# Patient Record
Sex: FEMALE | Race: WHITE | NOT HISPANIC OR LATINO | Employment: OTHER | URBAN - METROPOLITAN AREA
[De-identification: names, ages, dates, MRNs, and addresses within clinical notes are randomized per-mention and may not be internally consistent; named-entity substitution may affect disease eponyms.]

---

## 2017-02-05 DIAGNOSIS — I10 ESSENTIAL HYPERTENSION: ICD-10-CM

## 2017-02-06 RX ORDER — PROPRANOLOL HYDROCHLORIDE 120 MG/1
CAPSULE, EXTENDED RELEASE ORAL
Qty: 180 CAPSULE | Refills: 1 | Status: SHIPPED | OUTPATIENT
Start: 2017-02-06 | End: 2017-07-30 | Stop reason: SDUPTHER

## 2017-02-12 DIAGNOSIS — E03.9 ACQUIRED HYPOTHYROIDISM: ICD-10-CM

## 2017-02-13 RX ORDER — LEVOTHYROXINE SODIUM 75 UG/1
TABLET ORAL
Qty: 90 TABLET | Refills: 1 | Status: SHIPPED | OUTPATIENT
Start: 2017-02-13 | End: 2017-10-02 | Stop reason: SDUPTHER

## 2017-03-06 RX ORDER — OLMESARTAN MEDOXOMIL 40 MG/1
TABLET ORAL
Qty: 30 TABLET | Refills: 9 | OUTPATIENT
Start: 2017-03-06

## 2017-03-06 RX ORDER — OLMESARTAN MEDOXOMIL 40 MG/1
TABLET ORAL
Qty: 90 TABLET | Refills: 1 | Status: SHIPPED | OUTPATIENT
Start: 2017-03-06 | End: 2017-06-28

## 2017-03-14 DIAGNOSIS — M81.0 OSTEOPOROSIS: Primary | ICD-10-CM

## 2017-03-15 ENCOUNTER — CLINICAL SUPPORT (OUTPATIENT)
Dept: INTERNAL MEDICINE | Facility: CLINIC | Age: 82
End: 2017-03-15

## 2017-03-15 ENCOUNTER — OFFICE VISIT (OUTPATIENT)
Dept: INFECTIOUS DISEASES | Facility: CLINIC | Age: 82
End: 2017-03-15

## 2017-03-15 ENCOUNTER — APPOINTMENT (OUTPATIENT)
Dept: LAB | Facility: HOSPITAL | Age: 82
End: 2017-03-15

## 2017-03-15 VITALS
HEART RATE: 56 BPM | BODY MASS INDEX: 20.52 KG/M2 | SYSTOLIC BLOOD PRESSURE: 184 MMHG | TEMPERATURE: 97.7 F | HEIGHT: 63 IN | DIASTOLIC BLOOD PRESSURE: 96 MMHG | WEIGHT: 115.8 LBS

## 2017-03-15 DIAGNOSIS — Z79.2 LONG TERM CURRENT USE OF ANTIBIOTICS: ICD-10-CM

## 2017-03-15 DIAGNOSIS — A31.0 MYCOBACTERIUM AVIUM COMPLEX (HCC): Primary | ICD-10-CM

## 2017-03-15 DIAGNOSIS — M81.0 OSTEOPOROSIS: ICD-10-CM

## 2017-03-15 LAB
ALBUMIN SERPL-MCNC: 4.2 G/DL (ref 3.5–5.2)
ALBUMIN/GLOB SERPL: 1.3 G/DL
ALP SERPL-CCNC: 70 U/L (ref 39–117)
ALT SERPL W P-5'-P-CCNC: 10 U/L (ref 1–33)
ANION GAP SERPL CALCULATED.3IONS-SCNC: 12.6 MMOL/L
AST SERPL-CCNC: 19 U/L (ref 1–32)
BASOPHILS # BLD AUTO: 0.01 10*3/MM3 (ref 0–0.2)
BASOPHILS NFR BLD AUTO: 0.1 % (ref 0–1.5)
BILIRUB SERPL-MCNC: 0.7 MG/DL (ref 0.1–1.2)
BUN BLD-MCNC: 15 MG/DL (ref 8–23)
BUN/CREAT SERPL: 15.8 (ref 7–25)
CALCIUM SPEC-SCNC: 9.7 MG/DL (ref 8.6–10.5)
CHLORIDE SERPL-SCNC: 100 MMOL/L (ref 98–107)
CO2 SERPL-SCNC: 26.4 MMOL/L (ref 22–29)
CREAT BLD-MCNC: 0.95 MG/DL (ref 0.57–1)
DEPRECATED RDW RBC AUTO: 44.6 FL (ref 37–54)
EOSINOPHIL # BLD AUTO: 0.07 10*3/MM3 (ref 0–0.7)
EOSINOPHIL NFR BLD AUTO: 1 % (ref 0.3–6.2)
ERYTHROCYTE [DISTWIDTH] IN BLOOD BY AUTOMATED COUNT: 12.9 % (ref 11.7–13)
GFR SERPL CREATININE-BSD FRML MDRD: 56 ML/MIN/1.73
GLOBULIN UR ELPH-MCNC: 3.3 GM/DL
GLUCOSE BLD-MCNC: 99 MG/DL (ref 65–99)
HCT VFR BLD AUTO: 41 % (ref 35.6–45.5)
HGB BLD-MCNC: 14 G/DL (ref 11.9–15.5)
IMM GRANULOCYTES # BLD: 0.03 10*3/MM3 (ref 0–0.03)
IMM GRANULOCYTES NFR BLD: 0.4 % (ref 0–0.5)
LYMPHOCYTES # BLD AUTO: 2.13 10*3/MM3 (ref 0.9–4.8)
LYMPHOCYTES NFR BLD AUTO: 28.9 % (ref 19.6–45.3)
MCH RBC QN AUTO: 32.9 PG (ref 26.9–32)
MCHC RBC AUTO-ENTMCNC: 34.1 G/DL (ref 32.4–36.3)
MCV RBC AUTO: 96.2 FL (ref 80.5–98.2)
MONOCYTES # BLD AUTO: 1.02 10*3/MM3 (ref 0.2–1.2)
MONOCYTES NFR BLD AUTO: 13.9 % (ref 5–12)
NEUTROPHILS # BLD AUTO: 4.1 10*3/MM3 (ref 1.9–8.1)
NEUTROPHILS NFR BLD AUTO: 55.7 % (ref 42.7–76)
PLATELET # BLD AUTO: 259 10*3/MM3 (ref 140–500)
PMV BLD AUTO: 9.3 FL (ref 6–12)
POTASSIUM BLD-SCNC: 4.7 MMOL/L (ref 3.5–5.2)
PROT SERPL-MCNC: 7.5 G/DL (ref 6–8.5)
RBC # BLD AUTO: 4.26 10*6/MM3 (ref 3.9–5.2)
SODIUM BLD-SCNC: 139 MMOL/L (ref 136–145)
WBC NRBC COR # BLD: 7.36 10*3/MM3 (ref 4.5–10.7)

## 2017-03-15 PROCEDURE — 80053 COMPREHEN METABOLIC PANEL: CPT | Performed by: INTERNAL MEDICINE

## 2017-03-15 PROCEDURE — 99213 OFFICE O/P EST LOW 20 MIN: CPT | Performed by: INTERNAL MEDICINE

## 2017-03-15 PROCEDURE — 36415 COLL VENOUS BLD VENIPUNCTURE: CPT | Performed by: INTERNAL MEDICINE

## 2017-03-15 PROCEDURE — 77080 DXA BONE DENSITY AXIAL: CPT | Performed by: INTERNAL MEDICINE

## 2017-03-15 PROCEDURE — 85025 COMPLETE CBC W/AUTO DIFF WBC: CPT | Performed by: INTERNAL MEDICINE

## 2017-03-15 RX ORDER — AZITHROMYCIN 250 MG/1
500 TABLET, FILM COATED ORAL 3 TIMES WEEKLY
Qty: 26 TABLET | Refills: 6 | Status: SHIPPED | OUTPATIENT
Start: 2017-03-15 | End: 2017-10-02

## 2017-03-15 RX ORDER — ETHAMBUTOL HYDROCHLORIDE 400 MG/1
1200 TABLET, FILM COATED ORAL 3 TIMES WEEKLY
Qty: 30 TABLET | Refills: 6 | Status: SHIPPED | OUTPATIENT
Start: 2017-03-15 | End: 2017-09-02 | Stop reason: SDUPTHER

## 2017-03-15 RX ORDER — RIFAMPIN 300 MG/1
600 CAPSULE ORAL 3 TIMES WEEKLY
Qty: 30 CAPSULE | Refills: 6 | Status: SHIPPED | OUTPATIENT
Start: 2017-03-15 | End: 2017-10-02

## 2017-03-15 NOTE — PROGRESS NOTES
"cc: Stacie returns for follow-up MAC.    She was last seen in December 2016.  Since that time, she says she has done well.  She says that she's feeling much much better than she was this time last year.  Her energy level has increased.  She denies any significant cough or dyspnea or constitutional symptoms of infection such as fever, chills, night sweats.  She has chronic ringing in the ears which is not changed.  She denies any side effects or missed doses in terms of her long-term use antibiotics.  Her blood pressure is little bit elevated today, however this is chronic for her wounds being managed by cardiology and her primary care physician.  She is asymptomatic.    Review of Systems: All other reviewed and negative except as per HPI    Blood pressure (!) 184/96, pulse 56, temperature 97.7 °F (36.5 °C), height 63\" (160 cm), weight 115 lb 12.8 oz (52.5 kg),  GENERAL: Awake and alert, in no acute distress.   LUNGS: Clear to auscultation anteriorly with normal respiratory effort.   PSYCHIATRIC: Appropriate mood, affect, insight, and judgment.     Assessment and Plan  Mycobacterium avium complex  Long term current use of antibiotics    She is doing quite well.  She's been on therapy since January 2016 and has had tremendous symptomatic improvement.  In April 2016, she had bronchoscopy and a positive MAC cultures at that time.  I think we should give her 18 months from that time which would keep her on antibiotics through October of this year.  We will continue on triple drug therapy with rifampin 600 mg, azithromycin 500 mg ethambutol 25 mg all thrice weekly.  We will repeat CMP and CBC with differential today and I will see her back in clinic in 3 months or sooner if needed    "

## 2017-06-16 ENCOUNTER — APPOINTMENT (OUTPATIENT)
Dept: LAB | Facility: HOSPITAL | Age: 82
End: 2017-06-16

## 2017-06-16 ENCOUNTER — OFFICE VISIT (OUTPATIENT)
Dept: INFECTIOUS DISEASES | Facility: CLINIC | Age: 82
End: 2017-06-16

## 2017-06-16 ENCOUNTER — OFFICE VISIT (OUTPATIENT)
Dept: INTERNAL MEDICINE | Facility: CLINIC | Age: 82
End: 2017-06-16

## 2017-06-16 VITALS
HEIGHT: 63 IN | DIASTOLIC BLOOD PRESSURE: 96 MMHG | BODY MASS INDEX: 20.48 KG/M2 | WEIGHT: 115.6 LBS | RESPIRATION RATE: 12 BRPM | HEART RATE: 60 BPM | SYSTOLIC BLOOD PRESSURE: 201 MMHG

## 2017-06-16 VITALS
SYSTOLIC BLOOD PRESSURE: 150 MMHG | WEIGHT: 114.6 LBS | DIASTOLIC BLOOD PRESSURE: 100 MMHG | BODY MASS INDEX: 20.3 KG/M2 | HEIGHT: 63 IN

## 2017-06-16 DIAGNOSIS — E03.9 ACQUIRED HYPOTHYROIDISM: ICD-10-CM

## 2017-06-16 DIAGNOSIS — I10 ESSENTIAL HYPERTENSION: Primary | ICD-10-CM

## 2017-06-16 DIAGNOSIS — I10 ESSENTIAL HYPERTENSION: ICD-10-CM

## 2017-06-16 DIAGNOSIS — A31.0 MYCOBACTERIUM AVIUM COMPLEX (HCC): Primary | ICD-10-CM

## 2017-06-16 DIAGNOSIS — E78.2 MIXED HYPERLIPIDEMIA: ICD-10-CM

## 2017-06-16 DIAGNOSIS — Z79.2 LONG TERM CURRENT USE OF ANTIBIOTICS: ICD-10-CM

## 2017-06-16 LAB
ALBUMIN SERPL-MCNC: 4.5 G/DL (ref 3.5–5.2)
ALBUMIN/GLOB SERPL: 1.4 G/DL
ALP SERPL-CCNC: 72 U/L (ref 39–117)
ALT SERPL W P-5'-P-CCNC: 10 U/L (ref 1–33)
ANION GAP SERPL CALCULATED.3IONS-SCNC: 13.6 MMOL/L
AST SERPL-CCNC: 20 U/L (ref 1–32)
BASOPHILS # BLD AUTO: 0.02 10*3/MM3 (ref 0–0.2)
BASOPHILS NFR BLD AUTO: 0.3 % (ref 0–1.5)
BILIRUB SERPL-MCNC: 0.8 MG/DL (ref 0.1–1.2)
BUN BLD-MCNC: 14 MG/DL (ref 8–23)
BUN/CREAT SERPL: 14.7 (ref 7–25)
CALCIUM SPEC-SCNC: 9.7 MG/DL (ref 8.6–10.5)
CHLORIDE SERPL-SCNC: 99 MMOL/L (ref 98–107)
CO2 SERPL-SCNC: 26.4 MMOL/L (ref 22–29)
CREAT BLD-MCNC: 0.95 MG/DL (ref 0.57–1)
DEPRECATED RDW RBC AUTO: 44.2 FL (ref 37–54)
EOSINOPHIL # BLD AUTO: 0.42 10*3/MM3 (ref 0–0.7)
EOSINOPHIL NFR BLD AUTO: 6.3 % (ref 0.3–6.2)
ERYTHROCYTE [DISTWIDTH] IN BLOOD BY AUTOMATED COUNT: 12.9 % (ref 11.7–13)
GFR SERPL CREATININE-BSD FRML MDRD: 56 ML/MIN/1.73
GLOBULIN UR ELPH-MCNC: 3.3 GM/DL
GLUCOSE BLD-MCNC: 85 MG/DL (ref 65–99)
HCT VFR BLD AUTO: 41.8 % (ref 35.6–45.5)
HGB BLD-MCNC: 14.4 G/DL (ref 11.9–15.5)
IMM GRANULOCYTES # BLD: 0.04 10*3/MM3 (ref 0–0.03)
IMM GRANULOCYTES NFR BLD: 0.6 % (ref 0–0.5)
LYMPHOCYTES # BLD AUTO: 1.77 10*3/MM3 (ref 0.9–4.8)
LYMPHOCYTES NFR BLD AUTO: 26.4 % (ref 19.6–45.3)
MCH RBC QN AUTO: 32.2 PG (ref 26.9–32)
MCHC RBC AUTO-ENTMCNC: 34.4 G/DL (ref 32.4–36.3)
MCV RBC AUTO: 93.5 FL (ref 80.5–98.2)
MONOCYTES # BLD AUTO: 1.29 10*3/MM3 (ref 0.2–1.2)
MONOCYTES NFR BLD AUTO: 19.2 % (ref 5–12)
NEUTROPHILS # BLD AUTO: 3.17 10*3/MM3 (ref 1.9–8.1)
NEUTROPHILS NFR BLD AUTO: 47.2 % (ref 42.7–76)
NRBC BLD MANUAL-RTO: 0 /100 WBC (ref 0–0)
PLATELET # BLD AUTO: 295 10*3/MM3 (ref 140–500)
PMV BLD AUTO: 9.1 FL (ref 6–12)
POTASSIUM BLD-SCNC: 4.8 MMOL/L (ref 3.5–5.2)
PROT SERPL-MCNC: 7.8 G/DL (ref 6–8.5)
RBC # BLD AUTO: 4.47 10*6/MM3 (ref 3.9–5.2)
SODIUM BLD-SCNC: 139 MMOL/L (ref 136–145)
WBC NRBC COR # BLD: 6.71 10*3/MM3 (ref 4.5–10.7)

## 2017-06-16 PROCEDURE — 36415 COLL VENOUS BLD VENIPUNCTURE: CPT | Performed by: INTERNAL MEDICINE

## 2017-06-16 PROCEDURE — 85025 COMPLETE CBC W/AUTO DIFF WBC: CPT | Performed by: INTERNAL MEDICINE

## 2017-06-16 PROCEDURE — 99213 OFFICE O/P EST LOW 20 MIN: CPT | Performed by: INTERNAL MEDICINE

## 2017-06-16 PROCEDURE — 99214 OFFICE O/P EST MOD 30 MIN: CPT | Performed by: INTERNAL MEDICINE

## 2017-06-16 PROCEDURE — 80053 COMPREHEN METABOLIC PANEL: CPT | Performed by: INTERNAL MEDICINE

## 2017-06-16 NOTE — PROGRESS NOTES
"Subjective   Stacie Vieira is a 82 y.o. female here for   Chief Complaint   Patient presents with   • Hypertension     BP was high today at another doctors appointment   .    Vitals:    06/16/17 1556   BP: 150/100   BP Location: Left arm   Patient Position: Sitting   Cuff Size: Adult   Weight: 114 lb 9.6 oz (52 kg)   Height: 63\" (160 cm)       Hypertension   This is a chronic problem. The current episode started more than 1 year ago. The problem has been rapidly worsening since onset. The problem is uncontrolled. Associated symptoms include palpitations (off/on for yrs). Pertinent negatives include no chest pain, malaise/fatigue, peripheral edema or shortness of breath.        Encounter Diagnoses   Name Primary?   • Essential hypertension Yes   • Mixed hyperlipidemia    • Acquired hypothyroidism        The following portions of the patient's history were reviewed and updated as appropriate: allergies, current medications, past social history and problem list.    Review of Systems   Constitutional: Negative for chills, fatigue, fever and malaise/fatigue.   Respiratory: Positive for cough. Negative for shortness of breath and wheezing.    Cardiovascular: Positive for palpitations (off/on for yrs). Negative for chest pain and leg swelling.   Allergic/Immunologic: Positive for environmental allergies.   Psychiatric/Behavioral: Negative for dysphoric mood and sleep disturbance. The patient is not nervous/anxious.      BP at home = 160-170 at home systolic.    Objective   Physical Exam   Constitutional: She appears well-developed and well-nourished. No distress.   Cardiovascular: Normal rate, regular rhythm and normal heart sounds.    Pulmonary/Chest: No respiratory distress. She has no wheezes. She has no rales. She exhibits no tenderness.   Musculoskeletal: She exhibits no edema.   Psychiatric: She has a normal mood and affect. Her behavior is normal.   Nursing note and vitals reviewed.      Assessment/Plan   Problem " List Items Addressed This Visit        Unprioritized    Essential hypertension - Primary    Hypothyroidism    Hyperlipidemia         HTN uncontrolled - need to increase benicar from 1/2 to one pill daily.  Only use HCTZ if bp over 160 systolic. Continue low salt diet & avoiding stimulants.  Call if problems persist.   Return for wellness.

## 2017-06-16 NOTE — PROGRESS NOTES
"cc: Patient here for follow-up MAC.    In terms of her MAC, she remains largely asymptomatic.  Has significant dyspnea or shortness of breath.  Her weight is stable.  in terms of her long-term use antibiotics, she is tolerating her medicines well without side effects.  Today, she was noted to have poorly controlled hypertension with a blood pressure 200/110.  She is asymptomatic.  She said it normally runs in the 180s at home.  She is not touch base with her primary care doctor or her cardiologist.    Past medical history: MAC, C. difficile in the past, hyperlipidemia, hypertension, hypothyroidism, sinusitis, SVT    Review of Systems: Intermittent palpitations.  Rash for which she seeks dermatology.  All other reviewed and negative except as per HPI    Blood pressure (!) 201/96, pulse 60, resp. rate 12, height 63\" (160 cm), weight 115 lb 9.6 oz (52.4 kg),   GENERAL: Awake and alert, in no acute distress.   SKIN: Warm and dry without cutaneous eruptions   PSYCHIATRIC: Appropriate mood, affect, insight, and judgment.       DIAGNOSTICS:  Lab Results   Component Value Date    WBC 7.36 03/15/2017    HGB 14.0 03/15/2017    HCT 41.0 03/15/2017     03/15/2017     Lab Results   Component Value Date    GLUCOSE 99 03/15/2017    BUN 15 03/15/2017    CREATININE 0.95 03/15/2017    EGFRIFNONA 56 (L) 03/15/2017    EGFRIFAFRI  09/06/2016      Comment:      <15 Indicative of kidney failure.    BCR 15.8 03/15/2017    CO2 26.4 03/15/2017    CALCIUM 9.7 03/15/2017    ALBUMIN 4.20 03/15/2017    LABIL2 1.3 03/15/2017    AST 19 03/15/2017    ALT 10 03/15/2017         Assessment and Plan  Mycobacterium avium complex  Long term current use of antibiotics  Essential hypertension    She is doing tremendously well.  She has been on triple drug MAC therapy since January 2016 with good symptomatic improvement.  In April 2016 she had bronchoscopy and positive MAC cultures that time.  I would give her at least a year and a half of therapy " after that positive culture.  She is unable to produce any sputum to reculture.  We will continue on triple drug therapy with rifampin 600 mg, azithromycin 500 mg, and ethambutol 25 mg/kg all thrice weekly.  Repeat CBC and CBC with differential today.  I will also check a CT chests noncontrasted to follow-up for pulmonary nodules and see how her reticular nodular infiltrates are progressing.    Lastly, her blood pressure remains very very poorly controlled.  She is asymptomatic but I'm very concerned about her.  She does not want to go to the ER but did promise me that she would go see her primary care physician and her walk-in clinic this afternoon immediately after this appointment.    I will see her back in 3 months or sooner if needed.

## 2017-06-28 ENCOUNTER — TELEPHONE (OUTPATIENT)
Dept: INTERNAL MEDICINE | Facility: CLINIC | Age: 82
End: 2017-06-28

## 2017-06-28 RX ORDER — OLMESARTAN MEDOXOMIL 40 MG/1
TABLET ORAL
Qty: 90 TABLET | Refills: 1 | Status: SHIPPED | OUTPATIENT
Start: 2017-06-28 | End: 2017-10-02 | Stop reason: SDUPTHER

## 2017-06-28 NOTE — TELEPHONE ENCOUNTER
Discussed - she will continue whole pill benicar (increased 2 wks ago) - coming into office for bp chk & to chk her machine against our bp cuff next Monday - pls call her with best time for coming in

## 2017-06-29 NOTE — TELEPHONE ENCOUNTER
[facility],    Can you please call MsAmeya Jungeve and make her a nurse visit for a BP check only w/ me.    Thank you,    Von

## 2017-07-03 ENCOUNTER — CLINICAL SUPPORT (OUTPATIENT)
Dept: INTERNAL MEDICINE | Facility: CLINIC | Age: 82
End: 2017-07-03

## 2017-07-03 VITALS — DIASTOLIC BLOOD PRESSURE: 94 MMHG | SYSTOLIC BLOOD PRESSURE: 152 MMHG

## 2017-07-03 DIAGNOSIS — Z01.30 BP CHECK: Primary | ICD-10-CM

## 2017-07-03 PROCEDURE — 99211 OFF/OP EST MAY X REQ PHY/QHP: CPT | Performed by: INTERNAL MEDICINE

## 2017-07-03 NOTE — PROGRESS NOTES
Patient came in for her BP Check: She was checked by FATUMA Menard and it was 160/100 in her Left arm and 20 minutes later she was check by myself and it was 168/98 in her Right arm.    While sitting and allowing her heart to get at a resting rate Ms. Vieira had checked her BP with her  Own ([employer] BP machine) that she brought in from home and she recorded it to be 161/83 in her Left arm @ 1:35 P.M which was 5 min after FATUMA Menard had taken it.     3rd BP taken was 152/94 in the Left arm.    Ms. Vieira has been sufficient in keeping a list of her BP readings on a daily basis

## 2017-07-20 ENCOUNTER — TELEPHONE (OUTPATIENT)
Dept: INTERNAL MEDICINE | Facility: CLINIC | Age: 82
End: 2017-07-20

## 2017-07-20 NOTE — TELEPHONE ENCOUNTER
----- Message from Farooq Pablo sent at 7/20/2017 12:50 PM EDT -----  Contact: pt  Pt calling to give you a b.p update. Pt says that her lowest b/p reading has been 128/49 and that was on 7/09/17. She says that her highest b.p readings was 165/69. She says that her blood pressure is staying in the 150s/50s.     #[phone]

## 2017-07-30 DIAGNOSIS — I10 ESSENTIAL HYPERTENSION: ICD-10-CM

## 2017-07-31 RX ORDER — PROPRANOLOL HYDROCHLORIDE 120 MG/1
CAPSULE, EXTENDED RELEASE ORAL
Qty: 180 CAPSULE | Refills: 0 | Status: SHIPPED | OUTPATIENT
Start: 2017-07-31 | End: 2017-10-02 | Stop reason: SDUPTHER

## 2017-09-05 RX ORDER — ETHAMBUTOL HYDROCHLORIDE 400 MG/1
TABLET, FILM COATED ORAL
Qty: 30 TABLET | Refills: 5 | Status: SHIPPED | OUTPATIENT
Start: 2017-09-05 | End: 2017-10-02

## 2017-09-15 ENCOUNTER — HOSPITAL ENCOUNTER (OUTPATIENT)
Dept: CT IMAGING | Facility: HOSPITAL | Age: 82
Discharge: HOME | End: 2017-09-15
Attending: INTERNAL MEDICINE | Admitting: INTERNAL MEDICINE

## 2017-09-15 DIAGNOSIS — A31.0 MYCOBACTERIUM AVIUM COMPLEX (HCC): ICD-10-CM

## 2017-09-15 PROCEDURE — 71250 CT THORAX DX C-: CPT

## 2017-09-18 ENCOUNTER — APPOINTMENT (OUTPATIENT)
Dept: CT IMAGING | Facility: HOSPITAL | Age: 82
End: 2017-09-18
Attending: INTERNAL MEDICINE

## 2017-09-21 ENCOUNTER — OFFICE VISIT (OUTPATIENT)
Dept: INFECTIOUS DISEASES | Facility: CLINIC | Age: 82
End: 2017-09-21

## 2017-09-21 VITALS
HEART RATE: 58 BPM | BODY MASS INDEX: 20.3 KG/M2 | DIASTOLIC BLOOD PRESSURE: 101 MMHG | SYSTOLIC BLOOD PRESSURE: 173 MMHG | WEIGHT: 114.6 LBS | TEMPERATURE: 98.1 F | HEIGHT: 63 IN

## 2017-09-21 DIAGNOSIS — Z79.2 LONG TERM CURRENT USE OF ANTIBIOTICS: ICD-10-CM

## 2017-09-21 DIAGNOSIS — A31.0 MYCOBACTERIUM AVIUM COMPLEX (HCC): Primary | ICD-10-CM

## 2017-09-21 PROCEDURE — 99214 OFFICE O/P EST MOD 30 MIN: CPT | Performed by: INTERNAL MEDICINE

## 2017-09-21 NOTE — PROGRESS NOTES
"cc: Follow-up (MAC)      Stacie returns for follow-up MAC.  She has been on therapy since January 2016.  She denies any active symptoms.  She has tolerated her antibiotics without much difficulty.  Last month I was contacted by Dr. Braga and spoke with him about potential hearing loss.  There is some concern that this might of been secondary to the macrolide but she wants audiology had hearing aid place and is resolved.  It was the opinion of the audiologist that she had had chronic indolent hearing loss so it felt like it was less likely related to the macrolides.  She feels quite well today and is asking to come off her antibiotics.     Past medical history: MAC, C. difficile in the past, hyperlipidemia, hypertension, hypothyroidism, sinusitis, SVT         Review of Systems: All other reviewed and negative except as per HPI    Blood pressure (!) 173/101, pulse 58, temperature 98.1 °F (36.7 °C), height 63\" (160 cm), weight 114 lb 9.6 oz (52 kg),   GENERAL: Awake and alert, in no acute distress.   HEART: Regular rate and rhythm. No peripheral edema.   LUNGS: Clear to auscultation anteriorly with normal respiratory effort.   PSYCHIATRIC: Appropriate mood, affect, insight, and judgment.       DIAGNOSTICS:  CMP from June 16, 2017 was okay and a CBC with differential from that same day looked good except for a mild monocytosis of 19.2% eosinophilia 6%.  CT chest from September 15, personally reviewed shows no significant airspace disease.  There is minimal interstitial infiltrates and reticular nodular pattern.  Overall, radiology has read this as an improved scan since March 2016    Assessment and Plan  Mycobacterium avium complex  Long term current use of antibiotics    She has received 21 months of antibiotics and 18 months of antibiotics since her last positive culture.  Ideally we would've treated 1 year after her first negative culture.  Unfortunately (or fortunately) she was unable to produce sputum and didn't " want to go through with bronchoscopy.  Therefore we've given her 18 months of therapy from her last positive culture.  She is asymptomatic and is doing quite well.  CT scan shows improvement has not completely normalized which is rather common in MAC lung disease.  I estimate she has at least 80% chance of cure.  We will therefore discontinue antibiotics.  We discussed signs and symptoms recrudescent infection and when to seek medical attention.  I'll see her back in clinic in 6 months or sooner if needed.

## 2017-10-02 ENCOUNTER — RESULTS ENCOUNTER (OUTPATIENT)
Dept: INTERNAL MEDICINE | Facility: CLINIC | Age: 82
End: 2017-10-02

## 2017-10-02 ENCOUNTER — OFFICE VISIT (OUTPATIENT)
Dept: INTERNAL MEDICINE | Facility: CLINIC | Age: 82
End: 2017-10-02

## 2017-10-02 VITALS
RESPIRATION RATE: 15 BRPM | HEART RATE: 57 BPM | BODY MASS INDEX: 21.56 KG/M2 | HEIGHT: 61 IN | SYSTOLIC BLOOD PRESSURE: 158 MMHG | TEMPERATURE: 97.6 F | OXYGEN SATURATION: 97 % | WEIGHT: 114.2 LBS | DIASTOLIC BLOOD PRESSURE: 80 MMHG

## 2017-10-02 DIAGNOSIS — I10 ESSENTIAL HYPERTENSION: ICD-10-CM

## 2017-10-02 DIAGNOSIS — Z12.11 SCREENING FOR COLON CANCER: ICD-10-CM

## 2017-10-02 DIAGNOSIS — E78.2 MIXED HYPERLIPIDEMIA: ICD-10-CM

## 2017-10-02 DIAGNOSIS — R00.2 PALPITATIONS: ICD-10-CM

## 2017-10-02 DIAGNOSIS — E03.9 ACQUIRED HYPOTHYROIDISM: ICD-10-CM

## 2017-10-02 DIAGNOSIS — Z23 NEED FOR VACCINATION: ICD-10-CM

## 2017-10-02 DIAGNOSIS — A31.0 MYCOBACTERIUM AVIUM COMPLEX (HCC): ICD-10-CM

## 2017-10-02 DIAGNOSIS — Z00.00 MEDICARE ANNUAL WELLNESS VISIT, SUBSEQUENT: Primary | ICD-10-CM

## 2017-10-02 DIAGNOSIS — J30.89 NON-SEASONAL ALLERGIC RHINITIS, UNSPECIFIED CHRONICITY, UNSPECIFIED TRIGGER: ICD-10-CM

## 2017-10-02 PROCEDURE — G0008 ADMIN INFLUENZA VIRUS VAC: HCPCS | Performed by: INTERNAL MEDICINE

## 2017-10-02 PROCEDURE — 99213 OFFICE O/P EST LOW 20 MIN: CPT | Performed by: INTERNAL MEDICINE

## 2017-10-02 PROCEDURE — G0439 PPPS, SUBSEQ VISIT: HCPCS | Performed by: INTERNAL MEDICINE

## 2017-10-02 PROCEDURE — 90662 IIV NO PRSV INCREASED AG IM: CPT | Performed by: INTERNAL MEDICINE

## 2017-10-02 PROCEDURE — 96160 PT-FOCUSED HLTH RISK ASSMT: CPT | Performed by: INTERNAL MEDICINE

## 2017-10-02 RX ORDER — LEVOTHYROXINE SODIUM 0.07 MG/1
75 TABLET ORAL EVERY OTHER DAY
Qty: 45 TABLET | Refills: 3 | Status: SHIPPED | OUTPATIENT
Start: 2017-10-02 | End: 2018-12-01 | Stop reason: SDUPTHER

## 2017-10-02 RX ORDER — OLMESARTAN MEDOXOMIL 40 MG/1
TABLET ORAL
Qty: 90 TABLET | Refills: 3 | Status: ON HOLD | OUTPATIENT
Start: 2017-10-02 | End: 2017-12-08 | Stop reason: SDUPTHER

## 2017-10-02 RX ORDER — LEVOTHYROXINE SODIUM 0.05 MG/1
50 TABLET ORAL EVERY OTHER DAY
Qty: 45 TABLET | Refills: 3 | Status: SHIPPED | OUTPATIENT
Start: 2017-10-02 | End: 2018-11-17 | Stop reason: SDUPTHER

## 2017-10-02 RX ORDER — PROPRANOLOL HYDROCHLORIDE 120 MG/1
120 CAPSULE, EXTENDED RELEASE ORAL 2 TIMES DAILY
Qty: 180 CAPSULE | Refills: 3 | Status: SHIPPED | OUTPATIENT
Start: 2017-10-02 | End: 2018-01-11 | Stop reason: HOSPADM

## 2017-10-02 NOTE — PROGRESS NOTES
QUICK REFERENCE INFORMATION:  The ABCs of the Annual Wellness Visit    Subsequent Medicare Wellness Visit    HEALTH RISK ASSESSMENT    1935    Recent Hospitalizations:  No hospitalization(s) within the last year..        Current Medical Providers:  Patient Care Team:  Tatiana Bellamy MD as PCP - General (Internal Medicine)  James Del Angel MD as Consulting Physician (Infectious Diseases)  Ezekiel Verde MD as Consulting Physician (Otolaryngology)  Aisha Curiel MD as Consulting Physician (Cardiology)  Sinan Braga MD as Consulting Physician (Pulmonary Disease)  Shawn Felton MD as Consulting Physician (Allergy)        Smoking Status:  History   Smoking Status   • Never Smoker   Smokeless Tobacco   • Never Used     Comment: caffine use       Alcohol Consumption:  History   Alcohol Use No       Depression Screen:   PHQ-2/PHQ-9 Depression Screening 10/2/2017   Little interest or pleasure in doing things 0   Feeling down, depressed, or hopeless 0   Trouble falling or staying asleep, or sleeping too much 0   Feeling tired or having little energy 0   Poor appetite or overeating 0   Feeling bad about yourself - or that you are a failure or have let yourself or your family down 0   Trouble concentrating on things, such as reading the newspaper or watching television 0   Moving or speaking so slowly that other people could have noticed. Or the opposite - being so fidgety or restless that you have been moving around a lot more than usual 0   Thoughts that you would be better off dead, or of hurting yourself in some way 0   Total Score 0       Health Habits and Functional and Cognitive Screening:  Functional & Cognitive Status 10/2/2017   Do you have difficulty preparing food and eating? No   Do you have difficulty bathing yourself? No   Do you have difficulty getting dressed? No   Do you have difficulty using the toilet? No   Do you have difficulty moving around from place to place?  No   In the past year have you fallen or experienced a near fall? Yes   Do you need help using the phone?  No   Are you deaf or do you have serious difficulty hearing?  Yes   Do you need help with transportation? No   Do you need help shopping? No   Do you need help preparing meals?  No   Do you need help with housework?  No   Do you need help with laundry? No   Do you need help taking your medications? No   Do you need help managing money? No   Do you have difficulty concentrating, remembering or making decisions? No              Does the patient have evidence of cognitive impairment? No    Aspirin use counseling: Does not need ASA (and currently is not on it)      Recent Lab Results:  CMP:  Lab Results   Component Value Date    BUN 14 06/16/2017    CREATININE 0.95 06/16/2017    EGFRIFNONA 56 (L) 06/16/2017    EGFRIFAFRI  09/06/2016      Comment:      <15 Indicative of kidney failure.    BCR 14.7 06/16/2017     06/16/2017    K 4.8 06/16/2017    CO2 26.4 06/16/2017    CALCIUM 9.7 06/16/2017    ALBUMIN 4.50 06/16/2017    LABIL2 1.4 06/16/2017    BILITOT 0.8 06/16/2017    ALKPHOS 72 06/16/2017    AST 20 06/16/2017    ALT 10 06/16/2017     Lipid Panel:  Lab Results   Component Value Date    CHOL 274 (H) 09/06/2016    TRIG 108 09/06/2016     (H) 09/06/2016    VLDL 21.6 09/06/2016    LDLCALC 142 (H) 09/06/2016    LDLHDL 1.29 09/06/2016     HbA1c:       Visual Acuity:  No exam data present    Age-appropriate Screening Schedule:  Refer to the list below for future screening recommendations based on patient's age, sex and/or medical conditions. Orders for these recommended tests are listed in the plan section. The patient has been provided with a written plan.    Health Maintenance   Topic Date Due   • LIPID PANEL  09/06/2017   • PNEUMOCOCCAL VACCINES (65+ LOW/MEDIUM RISK) (2 of 2 - PPSV23) 09/13/2017   • MAMMOGRAM  11/15/2018   • DXA SCAN  03/15/2019   • TDAP/TD VACCINES (2 - Td) 09/13/2026   • INFLUENZA  VACCINE  Addressed   • ZOSTER VACCINE  Completed        Subjective   History of Present Illness    Stacie Vieira is a 82 y.o. female who presents for an Subsequent Wellness Visit.    The following portions of the patient's history were reviewed and updated as appropriate: allergies, current medications, past family history, past medical history, past social history, past surgical history and problem list.    Outpatient Medications Prior to Visit   Medication Sig Dispense Refill   • Calcium-Magnesium-Vitamin D (CALCIUM MAGNESIUM PO) Take by mouth.     • Cholecalciferol (VITAMIN D-3) 1000 UNITS capsule Take by mouth.     • loratadine (CLARITIN) 10 MG tablet Take 1 tablet by mouth daily.     • Misc Natural Products (JOINT SUPPORT PO) Take by mouth.     • montelukast (SINGULAIR) 10 MG tablet Take by mouth.     • Multiple Vitamin (MULTI VITAMIN DAILY PO) Take by mouth.     • saccharomyces boulardii (FLORASTOR) 250 MG capsule Take 250 mg by mouth 2 (two) times a day.     • LEVOXYL 50 MCG tablet TAKE ONE TABLET BY MOUTH EVERY OTHER DAY (ALTERNATING WITH 75 MCG) 30 tablet 11   • LEVOXYL 75 MCG tablet TAKE ONE TABLET BY MOUTH EVERY OTHER DAY **ALTERNATING WITH 50 MCG** 90 tablet 1   • olmesartan (BENICAR) 40 MG tablet 1 po daily 90 tablet 1   • propranolol LA (INDERAL LA) 120 MG 24 hr capsule TAKE ONE CAPSULE BY MOUTH TWICE A  capsule 0   • azithromycin (ZITHROMAX) 250 MG tablet Take 2 tablets by mouth 3 (Three) Times a Week. 26 tablet 6   • ethambutol (MYAMBUTOL) 400 MG tablet TAKE 3 TABLETS BY MOUTH 3 TIMES A WEEK 30 tablet 5   • rifAMPin (RIFADIN) 300 MG capsule Take 2 capsules by mouth 3 (Three) Times a Week. 30 capsule 6     No facility-administered medications prior to visit.        Patient Active Problem List   Diagnosis   • Mycobacterium avium complex   • Essential hypertension   • Palpitations   • Allergic rhinitis   • Hypothyroidism   • Hyperlipidemia       Advance Care Planning:  has an advance directive  "- a copy HAS NOT been provided. Have asked the patient to send this to us to add to record.    Identification of Risk Factors:  Risk factors include: increased fall risk.    Review of Systems    Compared to one year ago, the patient feels her physical health is better.  Compared to one year ago, the patient feels her mental health is better.    Objective     Physical Exam    Vitals:    10/02/17 1317   BP: 128/74   BP Location: Left arm   Patient Position: Sitting   Cuff Size: Adult   Pulse: 57   Resp: 15   Temp: 97.6 °F (36.4 °C)   TempSrc: Oral   SpO2: 97%   Weight: 114 lb 3.2 oz (51.8 kg)   Height: 60.65\" (154.1 cm)  Comment: Updated Height   PainSc: 0-No pain       Body mass index is 21.83 kg/(m^2).  Discussed the patient's BMI with her. The BMI is in the acceptable range.    Assessment/Plan   Patient Self-Management and Personalized Health Advice  The patient has been provided with information about: diet, exercise, weight management, prevention of cardiac or vascular disease and fall prevention and preventive services including:   · Advance directive, Colorectal cancer screening, cologuard test ordered, Counseling for cardiovascular disease risk reduction, Exercise counseling provided, Fall Risk assessment done, Fall Risk plan of care done, Influenza vaccine, Nutrition counseling provided, Screening for AAA, referral for ultrasound placed.    Visit Diagnoses:    ICD-10-CM ICD-9-CM   1. Medicare annual wellness visit, subsequent Z00.00 V70.0   2. Essential hypertension I10 401.9   3. Mixed hyperlipidemia E78.2 272.2   4. Acquired hypothyroidism E03.9 244.9   5. Mycobacterium avium complex A31.0 031.2   6. Non-seasonal allergic rhinitis, unspecified chronicity, unspecified trigger J30.89 477.8   7. Palpitations R00.2 785.1       Orders Placed This Encounter   Procedures   • CBC Auto Differential     Standing Status:   Future     Standing Expiration Date:   10/2/2018   • Comprehensive Metabolic Panel     Standing " Status:   Future     Standing Expiration Date:   10/2/2018   • Lipid Panel     Standing Status:   Future     Standing Expiration Date:   10/2/2018   • TSH     Standing Status:   Future     Standing Expiration Date:   10/2/2018   • Urinalysis With / Microscopic If Indicated - Urine, Clean Catch     Standing Status:   Future     Standing Expiration Date:   10/2/2018       Outpatient Encounter Prescriptions as of 10/2/2017   Medication Sig Dispense Refill   • Calcium-Magnesium-Vitamin D (CALCIUM MAGNESIUM PO) Take by mouth.     • Cholecalciferol (VITAMIN D-3) 1000 UNITS capsule Take by mouth.     • levothyroxine (LEVOXYL) 50 MCG tablet Take 1 tablet by mouth Every Other Day. 45 tablet 3   • levothyroxine (LEVOXYL) 75 MCG tablet Take 1 tablet by mouth Every Other Day. 45 tablet 3   • loratadine (CLARITIN) 10 MG tablet Take 1 tablet by mouth daily.     • Misc Natural Products (JOINT SUPPORT PO) Take by mouth.     • montelukast (SINGULAIR) 10 MG tablet Take by mouth.     • Multiple Vitamin (MULTI VITAMIN DAILY PO) Take by mouth.     • olmesartan (BENICAR) 40 MG tablet 1 po daily 90 tablet 3   • propranolol LA (INDERAL LA) 120 MG 24 hr capsule Take 1 capsule by mouth 2 (Two) Times a Day. 180 capsule 3   • saccharomyces boulardii (FLORASTOR) 250 MG capsule Take 250 mg by mouth 2 (two) times a day.     • [DISCONTINUED] LEVOXYL 50 MCG tablet TAKE ONE TABLET BY MOUTH EVERY OTHER DAY (ALTERNATING WITH 75 MCG) 30 tablet 11   • [DISCONTINUED] LEVOXYL 75 MCG tablet TAKE ONE TABLET BY MOUTH EVERY OTHER DAY **ALTERNATING WITH 50 MCG** 90 tablet 1   • [DISCONTINUED] olmesartan (BENICAR) 40 MG tablet 1 po daily 90 tablet 1   • [DISCONTINUED] propranolol LA (INDERAL LA) 120 MG 24 hr capsule TAKE ONE CAPSULE BY MOUTH TWICE A  capsule 0   • [DISCONTINUED] azithromycin (ZITHROMAX) 250 MG tablet Take 2 tablets by mouth 3 (Three) Times a Week. 26 tablet 6   • [DISCONTINUED] ethambutol (MYAMBUTOL) 400 MG tablet TAKE 3 TABLETS BY MOUTH  3 TIMES A WEEK 30 tablet 5   • [DISCONTINUED] rifAMPin (RIFADIN) 300 MG capsule Take 2 capsules by mouth 3 (Three) Times a Week. 30 capsule 6     No facility-administered encounter medications on file as of 10/2/2017.        Reviewed use of high risk medication in the elderly: not applicable  Reviewed for potential of harmful drug interactions in the elderly: not applicable    Follow Up:  Return in about 6 months (around 4/2/2018) for Recheck.     An After Visit Summary and PPPS with all of these plans were given to the patient.

## 2017-10-02 NOTE — PROGRESS NOTES
"Subjective   Stacie Vieira is a 82 y.o. female here for   Chief Complaint   Patient presents with   • Subsequent Wellness   • Hyperlipidemia   • Hypertension   • Hypothyroidism   .    Vitals:    10/02/17 1317   BP: 158/80   BP Location: Left arm   Patient Position: Sitting   Cuff Size: Adult   Pulse: 57   Resp: 15   Temp: 97.6 °F (36.4 °C)   TempSrc: Oral   SpO2: 97%   Weight: 114 lb 3.2 oz (51.8 kg)   Height: 60.65\" (154.1 cm)       Body mass index is 21.83 kg/(m^2).    Hyperlipidemia   This is a chronic problem. The current episode started more than 1 year ago. The problem is controlled. Recent lipid tests were reviewed and are normal. Exacerbating diseases include hypothyroidism. She has no history of diabetes or obesity. Pertinent negatives include no chest pain or shortness of breath.   Hypertension   This is a chronic problem. The current episode started more than 1 year ago. The problem is unchanged. The problem is controlled. Pertinent negatives include no chest pain, malaise/fatigue, palpitations, peripheral edema or shortness of breath.   Hypothyroidism   This is a chronic problem. The current episode started more than 1 year ago. The problem occurs constantly. The problem has been unchanged. Pertinent negatives include no chest pain, chills, coughing, fatigue or fever.        The following portions of the patient's history were reviewed and updated as appropriate: allergies, current medications, past social history and problem list.    Review of Systems   Constitutional: Negative for chills, fatigue, fever and malaise/fatigue.   Respiratory: Negative for cough, shortness of breath and wheezing.    Cardiovascular: Negative for chest pain, palpitations and leg swelling.   Psychiatric/Behavioral: Negative for dysphoric mood and sleep disturbance. The patient is not nervous/anxious.        Objective   Physical Exam   Constitutional: She appears well-developed and well-nourished. No distress. "   Cardiovascular: Normal rate, regular rhythm and normal heart sounds.    Pulmonary/Chest: No respiratory distress. She has no wheezes. She has no rales. She exhibits no tenderness.   Musculoskeletal: She exhibits no edema.   Psychiatric: She has a normal mood and affect. Her behavior is normal.   Nursing note and vitals reviewed.      Assessment/Plan   Diagnoses and all orders for this visit:    Medicare annual wellness visit, subsequent    Essential hypertension  Comments:  stable - call if bp over 140/90  Orders:  -     propranolol LA (INDERAL LA) 120 MG 24 hr capsule; Take 1 capsule by mouth 2 (Two) Times a Day.  -     olmesartan (BENICAR) 40 MG tablet; 1 po daily  -     CBC Auto Differential; Future  -     Comprehensive Metabolic Panel; Future  -     Lipid Panel; Future  -     TSH; Future  -     Urinalysis With / Microscopic If Indicated - Urine, Clean Catch; Future    Mixed hyperlipidemia  Comments:  continue diet & exercise  Orders:  -     Comprehensive Metabolic Panel; Future  -     Lipid Panel; Future    Acquired hypothyroidism  Comments:  need yearly TSH  Orders:  -     levothyroxine (LEVOXYL) 75 MCG tablet; Take 1 tablet by mouth Every Other Day.  -     levothyroxine (LEVOXYL) 50 MCG tablet; Take 1 tablet by mouth Every Other Day.  -     TSH; Future    Mycobacterium avium complex  Comments:  stopping 3 antibiotics today - keep f/u with pulmonary & ID    Non-seasonal allergic rhinitis, unspecified chronicity, unspecified trigger  Comments:  continue allergy shots & singulair     Palpitations  Comments:  no sx with propranolol    Screening for colon cancer  -     Cologuard - Stool, Per Rectum; Future    Need for vaccination  -     Flu Vaccine High Dose PF 65YR+     Return for fasting labs.        Wellness today.   Need daily strengthening & balance exercises (shown today).   She had screening for AAA & carotid disease.  I need reports from [facility].  Information given today.   Need cologuard.

## 2017-10-02 NOTE — PATIENT INSTRUCTIONS
Medicare Wellness  Personal Prevention Plan of Service     Date of Office Visit:  10/02/2017  Encounter Provider:  Tatiana Bellamy MD  Place of Service:  [facility] INTERNAL MEDICINE  Patient Name: Stacie Vieira  :  1935    As part of the Medicare Wellness portion of your visit today, we are providing you with this personalized preventive plan of services (PPPS). This plan is based upon recommendations of the [place] Preventive Services Task Force (USPSTF) and the Advisory Committee on Immunization Practices (ACIP).    This lists the preventive care services that should be considered, and provides dates of when you are due. Items listed as completed are up-to-date and do not require any further intervention.    Health Maintenance   Topic Date Due   • LIPID PANEL  2017   • PNEUMOCOCCAL VACCINES (65+ LOW/MEDIUM RISK) (2 of 2 - PPSV23) 2017   • MEDICARE ANNUAL WELLNESS  10/02/2018   • MAMMOGRAM  11/15/2018   • DXA SCAN  03/15/2019   • TDAP/TD VACCINES (2 - Td) 2026   • INFLUENZA VACCINE  Addressed   • ZOSTER VACCINE  Completed       No orders of the defined types were placed in this encounter.      No Follow-up on file.

## 2017-10-10 ENCOUNTER — LAB (OUTPATIENT)
Dept: INTERNAL MEDICINE | Facility: CLINIC | Age: 82
End: 2017-10-10

## 2017-10-10 DIAGNOSIS — E03.9 ACQUIRED HYPOTHYROIDISM: ICD-10-CM

## 2017-10-10 DIAGNOSIS — I10 ESSENTIAL HYPERTENSION: ICD-10-CM

## 2017-10-10 DIAGNOSIS — E78.2 MIXED HYPERLIPIDEMIA: ICD-10-CM

## 2017-10-10 LAB
ALBUMIN SERPL-MCNC: 4.6 G/DL (ref 3.5–5.2)
ALBUMIN/GLOB SERPL: 1.8 G/DL
ALP SERPL-CCNC: 71 U/L (ref 39–117)
ALT SERPL-CCNC: 9 U/L (ref 1–33)
AST SERPL-CCNC: 21 U/L (ref 1–32)
BACTERIA UR QL AUTO: ABNORMAL /HPF
BASOPHILS # BLD AUTO: 0.02 10*3/MM3 (ref 0–0.2)
BASOPHILS NFR BLD AUTO: 0.3 % (ref 0–1.5)
BILIRUB SERPL-MCNC: 0.4 MG/DL (ref 0.1–1.2)
BILIRUB UR QL STRIP: NEGATIVE
BUN SERPL-MCNC: 14 MG/DL (ref 8–23)
BUN/CREAT SERPL: 14.1 (ref 7–25)
CALCIUM SERPL-MCNC: 9.7 MG/DL (ref 8.6–10.5)
CHLORIDE SERPL-SCNC: 97 MMOL/L (ref 98–107)
CHOLEST SERPL-MCNC: 278 MG/DL (ref 0–200)
CLARITY UR: CLEAR
CO2 SERPL-SCNC: 27 MMOL/L (ref 22–29)
COLOR UR: YELLOW
CREAT SERPL-MCNC: 0.99 MG/DL (ref 0.57–1)
EOSINOPHIL # BLD AUTO: 0.08 10*3/MM3 (ref 0–0.7)
EOSINOPHIL # BLD AUTO: 1.4 % (ref 0.3–6.2)
ERYTHROCYTE [DISTWIDTH] IN BLOOD BY AUTOMATED COUNT: 13.3 % (ref 11.7–13)
GLOBULIN SER CALC-MCNC: 2.5 GM/DL
GLUCOSE SERPL-MCNC: 115 MG/DL (ref 65–99)
GLUCOSE UR STRIP-MCNC: NEGATIVE MG/DL
HCT VFR BLD AUTO: 41.6 % (ref 35.6–45.5)
HDLC SERPL-MCNC: 88 MG/DL (ref 40–60)
HGB BLD-MCNC: 13.5 G/DL (ref 11.9–15.5)
HGB UR QL STRIP.AUTO: ABNORMAL
HYALINE CASTS UR QL AUTO: ABNORMAL /LPF
IMM GRANULOCYTES # BLD: 0.02 10*3/MM3 (ref 0–0.03)
IMM GRANULOCYTES NFR BLD: 0.3 % (ref 0–0.5)
KETONES UR QL STRIP: NEGATIVE
LDLC SERPL CALC-MCNC: 161 MG/DL (ref 0–100)
LEUKOCYTE ESTERASE UR QL STRIP.AUTO: NEGATIVE
LYMPHOCYTES # BLD AUTO: 2.26 10*3/MM3 (ref 0.9–4.8)
LYMPHOCYTES NFR BLD AUTO: 38.8 % (ref 19.6–45.3)
MCH RBC QN AUTO: 32.3 PG (ref 26.9–32)
MCHC RBC AUTO-ENTMCNC: 32.5 G/DL (ref 32.4–36.3)
MCV RBC AUTO: 99.5 FL (ref 80.5–98.2)
MONOCYTES # BLD AUTO: 1.14 10*3/MM3 (ref 0.2–1.2)
MONOCYTES NFR BLD AUTO: 19.6 % (ref 5–12)
NEUTROPHILS # BLD AUTO: 2.3 10*3/MM3 (ref 1.9–8.1)
NEUTROPHILS NFR BLD AUTO: 39.6 % (ref 42.7–76)
NITRITE UR QL STRIP: NEGATIVE
PH UR STRIP.AUTO: 6 [PH] (ref 5–8)
PLATELET # BLD AUTO: 209 10*3/MM3 (ref 140–500)
POTASSIUM SERPL-SCNC: 4.4 MMOL/L (ref 3.5–5.2)
PROT SERPL-MCNC: 7.1 G/DL (ref 6–8.5)
PROT UR QL STRIP: NEGATIVE
RBC # BLD AUTO: 4.18 10*6/MM3 (ref 3.9–5.2)
RBC # UR: ABNORMAL /HPF
REF LAB TEST METHOD: ABNORMAL
SODIUM SERPL-SCNC: 139 MMOL/L (ref 136–145)
SP GR UR STRIP: 1.01 (ref 1–1.03)
SQUAMOUS #/AREA URNS HPF: ABNORMAL /HPF
TRIGL SERPL-MCNC: 147 MG/DL (ref 0–150)
TSH SERPL-ACNC: 1.62 MIU/ML (ref 0.27–4.2)
UROBILINOGEN UR QL STRIP: ABNORMAL
VLDLC SERPL-MCNC: 29.4 MG/DL (ref 5–40)
WBC # BLD AUTO: 5.82 10*3/MM3 (ref 4.5–10.7)
WBC UR QL AUTO: ABNORMAL /HPF

## 2017-10-10 PROCEDURE — 81001 URINALYSIS AUTO W/SCOPE: CPT | Performed by: INTERNAL MEDICINE

## 2017-11-22 ENCOUNTER — APPOINTMENT (OUTPATIENT)
Dept: WOMENS IMAGING | Facility: HOSPITAL | Age: 82
End: 2017-11-22

## 2017-11-22 PROCEDURE — 77063 BREAST TOMOSYNTHESIS BI: CPT | Performed by: RADIOLOGY

## 2017-11-22 PROCEDURE — G0202 SCR MAMMO BI INCL CAD: HCPCS | Performed by: RADIOLOGY

## 2017-11-22 PROCEDURE — MDREVIEWSP: Performed by: RADIOLOGY

## 2017-12-07 ENCOUNTER — APPOINTMENT (OUTPATIENT)
Dept: GENERAL RADIOLOGY | Facility: HOSPITAL | Age: 82
End: 2017-12-07

## 2017-12-07 LAB
BASOPHILS # BLD AUTO: 0.02 10*3/MM3 (ref 0–0.2)
BASOPHILS NFR BLD AUTO: 0.2 % (ref 0–1.5)
DEPRECATED RDW RBC AUTO: 45.9 FL (ref 37–54)
EOSINOPHIL # BLD AUTO: 0.01 10*3/MM3 (ref 0–0.7)
EOSINOPHIL NFR BLD AUTO: 0.1 % (ref 0.3–6.2)
ERYTHROCYTE [DISTWIDTH] IN BLOOD BY AUTOMATED COUNT: 13 % (ref 11.7–13)
HCT VFR BLD AUTO: 36.7 % (ref 35.6–45.5)
HGB BLD-MCNC: 12.4 G/DL (ref 11.9–15.5)
IMM GRANULOCYTES # BLD: 0.06 10*3/MM3 (ref 0–0.03)
IMM GRANULOCYTES NFR BLD: 0.5 % (ref 0–0.5)
LYMPHOCYTES # BLD AUTO: 1.31 10*3/MM3 (ref 0.9–4.8)
LYMPHOCYTES NFR BLD AUTO: 11.7 % (ref 19.6–45.3)
MCH RBC QN AUTO: 32.9 PG (ref 26.9–32)
MCHC RBC AUTO-ENTMCNC: 33.8 G/DL (ref 32.4–36.3)
MCV RBC AUTO: 97.3 FL (ref 80.5–98.2)
MONOCYTES # BLD AUTO: 2.06 10*3/MM3 (ref 0.2–1.2)
MONOCYTES NFR BLD AUTO: 18.4 % (ref 5–12)
NEUTROPHILS # BLD AUTO: 7.74 10*3/MM3 (ref 1.9–8.1)
NEUTROPHILS NFR BLD AUTO: 69.1 % (ref 42.7–76)
PLATELET # BLD AUTO: 303 10*3/MM3 (ref 140–500)
PMV BLD AUTO: 9.1 FL (ref 6–12)
RBC # BLD AUTO: 3.77 10*6/MM3 (ref 3.9–5.2)
WBC NRBC COR # BLD: 11.2 10*3/MM3 (ref 4.5–10.7)

## 2017-12-07 PROCEDURE — 80053 COMPREHEN METABOLIC PANEL: CPT | Performed by: EMERGENCY MEDICINE

## 2017-12-07 PROCEDURE — 83880 ASSAY OF NATRIURETIC PEPTIDE: CPT | Performed by: EMERGENCY MEDICINE

## 2017-12-07 PROCEDURE — 99284 EMERGENCY DEPT VISIT MOD MDM: CPT

## 2017-12-07 PROCEDURE — 84484 ASSAY OF TROPONIN QUANT: CPT | Performed by: EMERGENCY MEDICINE

## 2017-12-07 PROCEDURE — 85025 COMPLETE CBC W/AUTO DIFF WBC: CPT | Performed by: EMERGENCY MEDICINE

## 2017-12-07 PROCEDURE — 71020 HC CHEST PA AND LATERAL: CPT

## 2017-12-07 PROCEDURE — 36415 COLL VENOUS BLD VENIPUNCTURE: CPT

## 2017-12-07 PROCEDURE — 93010 ELECTROCARDIOGRAM REPORT: CPT | Performed by: INTERNAL MEDICINE

## 2017-12-07 PROCEDURE — 84145 PROCALCITONIN (PCT): CPT | Performed by: INTERNAL MEDICINE

## 2017-12-07 PROCEDURE — 93005 ELECTROCARDIOGRAM TRACING: CPT

## 2017-12-07 RX ORDER — SODIUM CHLORIDE 0.9 % (FLUSH) 0.9 %
10 SYRINGE (ML) INJECTION AS NEEDED
Status: DISCONTINUED | OUTPATIENT
Start: 2017-12-07 | End: 2017-12-13 | Stop reason: HOSPADM

## 2017-12-08 ENCOUNTER — APPOINTMENT (OUTPATIENT)
Dept: CT IMAGING | Facility: HOSPITAL | Age: 82
End: 2017-12-08

## 2017-12-08 ENCOUNTER — HOSPITAL ENCOUNTER (INPATIENT)
Facility: HOSPITAL | Age: 82
LOS: 5 days | Discharge: HOME HEALTH | End: 2017-12-13
Attending: EMERGENCY MEDICINE | Admitting: INTERNAL MEDICINE

## 2017-12-08 DIAGNOSIS — J96.01 ACUTE RESPIRATORY FAILURE WITH HYPOXIA (HCC): ICD-10-CM

## 2017-12-08 DIAGNOSIS — J18.9 COMMUNITY ACQUIRED PNEUMONIA OF LEFT LOWER LOBE OF LUNG: Primary | ICD-10-CM

## 2017-12-08 LAB
ALBUMIN SERPL-MCNC: 3.6 G/DL (ref 3.5–5.2)
ALBUMIN/GLOB SERPL: 0.9 G/DL
ALP SERPL-CCNC: 82 U/L (ref 39–117)
ALT SERPL W P-5'-P-CCNC: 13 U/L (ref 1–33)
ANION GAP SERPL CALCULATED.3IONS-SCNC: 12.8 MMOL/L
AST SERPL-CCNC: 23 U/L (ref 1–32)
B PERT DNA SPEC QL NAA+PROBE: NOT DETECTED
BILIRUB SERPL-MCNC: 0.8 MG/DL (ref 0.1–1.2)
BUN BLD-MCNC: 14 MG/DL (ref 8–23)
BUN/CREAT SERPL: 16.1 (ref 7–25)
C PNEUM DNA NPH QL NAA+NON-PROBE: NOT DETECTED
CALCIUM SPEC-SCNC: 8.9 MG/DL (ref 8.6–10.5)
CHLORIDE SERPL-SCNC: 98 MMOL/L (ref 98–107)
CO2 SERPL-SCNC: 24.2 MMOL/L (ref 22–29)
CREAT BLD-MCNC: 0.87 MG/DL (ref 0.57–1)
D-LACTATE SERPL-SCNC: 1.2 MMOL/L (ref 0.5–2)
FLUAV H1 2009 PAND RNA NPH QL NAA+PROBE: NOT DETECTED
FLUAV H1 HA GENE NPH QL NAA+PROBE: NOT DETECTED
FLUAV H3 RNA NPH QL NAA+PROBE: NOT DETECTED
FLUAV SUBTYP SPEC NAA+PROBE: NOT DETECTED
FLUBV RNA ISLT QL NAA+PROBE: NOT DETECTED
GFR SERPL CREATININE-BSD FRML MDRD: 62 ML/MIN/1.73
GLOBULIN UR ELPH-MCNC: 3.8 GM/DL
GLUCOSE BLD-MCNC: 195 MG/DL (ref 65–99)
HADV DNA SPEC NAA+PROBE: NOT DETECTED
HCOV 229E RNA SPEC QL NAA+PROBE: NOT DETECTED
HCOV HKU1 RNA SPEC QL NAA+PROBE: NOT DETECTED
HCOV NL63 RNA SPEC QL NAA+PROBE: NOT DETECTED
HCOV OC43 RNA SPEC QL NAA+PROBE: NOT DETECTED
HMPV RNA NPH QL NAA+NON-PROBE: NOT DETECTED
HOLD SPECIMEN: NORMAL
HOLD SPECIMEN: NORMAL
HPIV1 RNA SPEC QL NAA+PROBE: NOT DETECTED
HPIV2 RNA SPEC QL NAA+PROBE: NOT DETECTED
HPIV3 RNA NPH QL NAA+PROBE: NOT DETECTED
HPIV4 P GENE NPH QL NAA+PROBE: NOT DETECTED
M PNEUMO IGG SER IA-ACNC: NOT DETECTED
NT-PROBNP SERPL-MCNC: 3805 PG/ML (ref 0–1800)
POTASSIUM BLD-SCNC: 4.3 MMOL/L (ref 3.5–5.2)
PROCALCITONIN SERPL-MCNC: 0.31 NG/ML (ref 0.1–0.25)
PROT SERPL-MCNC: 7.4 G/DL (ref 6–8.5)
RHINOVIRUS RNA SPEC NAA+PROBE: NOT DETECTED
RSV RNA NPH QL NAA+NON-PROBE: NOT DETECTED
SODIUM BLD-SCNC: 135 MMOL/L (ref 136–145)
TROPONIN T SERPL-MCNC: <0.01 NG/ML (ref 0–0.03)
WHOLE BLOOD HOLD SPECIMEN: NORMAL
WHOLE BLOOD HOLD SPECIMEN: NORMAL

## 2017-12-08 PROCEDURE — 25010000002 AZITHROMYCIN PER 500 MG: Performed by: EMERGENCY MEDICINE

## 2017-12-08 PROCEDURE — 87486 CHLMYD PNEUM DNA AMP PROBE: CPT | Performed by: INTERNAL MEDICINE

## 2017-12-08 PROCEDURE — 87040 BLOOD CULTURE FOR BACTERIA: CPT | Performed by: EMERGENCY MEDICINE

## 2017-12-08 PROCEDURE — 87798 DETECT AGENT NOS DNA AMP: CPT | Performed by: INTERNAL MEDICINE

## 2017-12-08 PROCEDURE — 94799 UNLISTED PULMONARY SVC/PX: CPT

## 2017-12-08 PROCEDURE — 25010000002 CEFTRIAXONE PER 250 MG: Performed by: EMERGENCY MEDICINE

## 2017-12-08 PROCEDURE — 71250 CT THORAX DX C-: CPT

## 2017-12-08 PROCEDURE — 87581 M.PNEUMON DNA AMP PROBE: CPT | Performed by: INTERNAL MEDICINE

## 2017-12-08 PROCEDURE — 87633 RESP VIRUS 12-25 TARGETS: CPT | Performed by: INTERNAL MEDICINE

## 2017-12-08 PROCEDURE — 83605 ASSAY OF LACTIC ACID: CPT | Performed by: EMERGENCY MEDICINE

## 2017-12-08 RX ORDER — CETIRIZINE HYDROCHLORIDE 10 MG/1
5 TABLET ORAL DAILY
Status: DISCONTINUED | OUTPATIENT
Start: 2017-12-08 | End: 2017-12-08

## 2017-12-08 RX ORDER — OLMESARTAN MEDOXOMIL 20 MG/1
40 TABLET ORAL
Status: DISCONTINUED | OUTPATIENT
Start: 2017-12-08 | End: 2017-12-08 | Stop reason: CLARIF

## 2017-12-08 RX ORDER — OLMESARTAN MEDOXOMIL 40 MG/1
40 TABLET ORAL DAILY
COMMUNITY
End: 2018-12-28

## 2017-12-08 RX ORDER — LEVOTHYROXINE SODIUM 0.05 MG/1
50 TABLET ORAL EVERY OTHER DAY
Status: DISCONTINUED | OUTPATIENT
Start: 2017-12-08 | End: 2017-12-13 | Stop reason: HOSPADM

## 2017-12-08 RX ORDER — LOSARTAN POTASSIUM 100 MG/1
100 TABLET ORAL
Status: DISCONTINUED | OUTPATIENT
Start: 2017-12-08 | End: 2017-12-08

## 2017-12-08 RX ORDER — IPRATROPIUM BROMIDE AND ALBUTEROL SULFATE 2.5; .5 MG/3ML; MG/3ML
3 SOLUTION RESPIRATORY (INHALATION)
Status: DISCONTINUED | OUTPATIENT
Start: 2017-12-08 | End: 2017-12-13 | Stop reason: HOSPADM

## 2017-12-08 RX ORDER — LORATADINE 10 MG/1
10 TABLET ORAL DAILY PRN
COMMUNITY
End: 2018-10-08

## 2017-12-08 RX ORDER — SACCHAROMYCES BOULARDII 250 MG
250 CAPSULE ORAL 2 TIMES DAILY
Status: DISCONTINUED | OUTPATIENT
Start: 2017-12-08 | End: 2017-12-13 | Stop reason: HOSPADM

## 2017-12-08 RX ORDER — CEFTRIAXONE SODIUM 1 G/50ML
1 INJECTION, SOLUTION INTRAVENOUS ONCE
Status: COMPLETED | OUTPATIENT
Start: 2017-12-08 | End: 2017-12-08

## 2017-12-08 RX ORDER — LEVOTHYROXINE SODIUM 0.07 MG/1
75 TABLET ORAL EVERY OTHER DAY
Status: DISCONTINUED | OUTPATIENT
Start: 2017-12-09 | End: 2017-12-13 | Stop reason: HOSPADM

## 2017-12-08 RX ORDER — LOSARTAN POTASSIUM 50 MG/1
100 TABLET ORAL NIGHTLY
Status: DISCONTINUED | OUTPATIENT
Start: 2017-12-08 | End: 2017-12-13 | Stop reason: HOSPADM

## 2017-12-08 RX ORDER — METHYLPREDNISOLONE 4 MG/1
1 TABLET ORAL 2 TIMES DAILY
COMMUNITY
End: 2017-12-13 | Stop reason: HOSPADM

## 2017-12-08 RX ORDER — PROPRANOLOL HCL 60 MG
120 CAPSULE, EXTENDED RELEASE 24HR ORAL
Status: DISCONTINUED | OUTPATIENT
Start: 2017-12-08 | End: 2017-12-08

## 2017-12-08 RX ORDER — PROPRANOLOL HCL 60 MG
120 CAPSULE, EXTENDED RELEASE 24HR ORAL EVERY 12 HOURS SCHEDULED
Status: DISCONTINUED | OUTPATIENT
Start: 2017-12-08 | End: 2017-12-13 | Stop reason: HOSPADM

## 2017-12-08 RX ORDER — MONTELUKAST SODIUM 10 MG/1
10 TABLET ORAL DAILY
Status: DISCONTINUED | OUTPATIENT
Start: 2017-12-08 | End: 2017-12-08

## 2017-12-08 RX ORDER — LEVOTHYROXINE SODIUM 0.07 MG/1
75 TABLET ORAL EVERY OTHER DAY
Status: DISCONTINUED | OUTPATIENT
Start: 2017-12-08 | End: 2017-12-08

## 2017-12-08 RX ORDER — CEFTRIAXONE SODIUM 1 G/50ML
1 INJECTION, SOLUTION INTRAVENOUS EVERY 24 HOURS
Status: COMPLETED | OUTPATIENT
Start: 2017-12-09 | End: 2017-12-12

## 2017-12-08 RX ADMIN — IPRATROPIUM BROMIDE AND ALBUTEROL SULFATE 3 ML: .5; 3 SOLUTION RESPIRATORY (INHALATION) at 15:48

## 2017-12-08 RX ADMIN — IPRATROPIUM BROMIDE AND ALBUTEROL SULFATE 3 ML: .5; 3 SOLUTION RESPIRATORY (INHALATION) at 12:52

## 2017-12-08 RX ADMIN — AZITHROMYCIN MONOHYDRATE 500 MG: 500 INJECTION, POWDER, LYOPHILIZED, FOR SOLUTION INTRAVENOUS at 02:23

## 2017-12-08 RX ADMIN — IPRATROPIUM BROMIDE AND ALBUTEROL SULFATE 3 ML: .5; 3 SOLUTION RESPIRATORY (INHALATION) at 19:31

## 2017-12-08 RX ADMIN — PROPRANOLOL HYDROCHLORIDE 120 MG: 60 CAPSULE, EXTENDED RELEASE ORAL at 20:18

## 2017-12-08 RX ADMIN — CEFTRIAXONE SODIUM 1 G: 1 INJECTION, SOLUTION INTRAVENOUS at 02:02

## 2017-12-08 RX ADMIN — HYDROCODONE BITARTRATE AND HOMATROPINE METHYLBROMIDE 5 ML: 5; 1.5 SOLUTION ORAL at 16:03

## 2017-12-08 RX ADMIN — PROPRANOLOL HYDROCHLORIDE 120 MG: 60 CAPSULE, EXTENDED RELEASE ORAL at 12:04

## 2017-12-08 RX ADMIN — HYDROCODONE BITARTRATE AND HOMATROPINE METHYLBROMIDE 5 ML: 5; 1.5 SOLUTION ORAL at 06:54

## 2017-12-08 RX ADMIN — LOSARTAN POTASSIUM 100 MG: 100 TABLET, FILM COATED ORAL at 20:18

## 2017-12-08 RX ADMIN — Medication 250 MG: at 09:50

## 2017-12-08 RX ADMIN — IPRATROPIUM BROMIDE AND ALBUTEROL SULFATE 3 ML: .5; 3 SOLUTION RESPIRATORY (INHALATION) at 09:17

## 2017-12-08 RX ADMIN — Medication 250 MG: at 17:50

## 2017-12-08 NOTE — PLAN OF CARE
Problem: Patient Care Overview (Adult)  Goal: Plan of Care Review  Outcome: Ongoing (interventions implemented as appropriate)    12/08/17 1728   Coping/Psychosocial Response Interventions   Plan Of Care Reviewed With patient   Patient Care Overview   Progress no change   Outcome Evaluation   Outcome Summary/Follow up Plan No change in symptoms today. Bronchoscopy scheduled for tomorrow. Npo after midnight.        Goal: Adult Individualization and Mutuality  Outcome: Ongoing (interventions implemented as appropriate)  Goal: Discharge Needs Assessment  Outcome: Ongoing (interventions implemented as appropriate)    Problem: Fall Risk (Adult)  Goal: Identify Related Risk Factors and Signs and Symptoms  Outcome: Outcome(s) achieved Date Met:  12/08/17  Goal: Absence of Falls  Outcome: Ongoing (interventions implemented as appropriate)    Problem: Skin Integrity Impairment, Risk/Actual (Adult)  Goal: Identify Related Risk Factors and Signs and Symptoms  Outcome: Outcome(s) achieved Date Met:  12/08/17  Goal: Skin Integrity/Wound Healing  Outcome: Ongoing (interventions implemented as appropriate)    Problem: Pneumonia (Adult)  Goal: Signs and Symptoms of Listed Potential Problems Will be Absent or Manageable (Pneumonia)  Outcome: Ongoing (interventions implemented as appropriate)

## 2017-12-08 NOTE — PROGRESS NOTES
Seen by dr. harrison  Patient seen and discussed with her and rn.  Bronch tomorrow  D/w her Son Nato who is an intensivist in [place] ([phone])

## 2017-12-08 NOTE — PLAN OF CARE
Problem: Patient Care Overview (Adult)  Goal: Plan of Care Review  Outcome: Ongoing (interventions implemented as appropriate)    12/08/17 0423   Coping/Psychosocial Response Interventions   Plan Of Care Reviewed With patient   Patient Care Overview   Progress no change   Outcome Evaluation   Outcome Summary/Follow up Plan Very pleasant pt admitted to unit. VSS, no c/o pain or nausea. Pt has a frequest cough. Pt received Rocephin and Zithro IV in ER. Awaiting MD admission orders. Pt lying in bed comfortably. Will cont to monitor.         Problem: Fall Risk (Adult)  Goal: Identify Related Risk Factors and Signs and Symptoms  Outcome: Ongoing (interventions implemented as appropriate)  Goal: Absence of Falls  Outcome: Ongoing (interventions implemented as appropriate)    Problem: Skin Integrity Impairment, Risk/Actual (Adult)  Goal: Identify Related Risk Factors and Signs and Symptoms  Outcome: Ongoing (interventions implemented as appropriate)  Goal: Skin Integrity/Wound Healing  Outcome: Ongoing (interventions implemented as appropriate)    Problem: Pneumonia (Adult)  Goal: Signs and Symptoms of Listed Potential Problems Will be Absent or Manageable (Pneumonia)  Outcome: Ongoing (interventions implemented as appropriate)

## 2017-12-08 NOTE — ED NOTES
PT c/o increasing shortness of breathe on exertion for several days with a productive cough. PT reports yellow, thick sputum.      Ghislaine Sanchez RN  12/08/17 0058

## 2017-12-08 NOTE — ED NOTES
Pt from home, presents with SOA. Pt with hx of pneumonia. Pt does not wear O2 at home.     Rosa Delacruz RN  12/07/17 6032

## 2017-12-08 NOTE — PROGRESS NOTES
Clinical Pharmacy Services: Medication History    Stacie Vieira is a 82 y.o. female presenting to [facility] for Community acquired pneumonia of left lower lobe of lung [J18.1]    She  has a past medical history of Acute stress disorder; Allergic rhinitis; C. difficile colitis; Elevated fasting glucose; H/O: pneumonia; Health care maintenance; History of palpitations; Hyperlipidemia; Hypertension; Hypoactive thyroid; Hypokalemia; Hypothyroidism; Insomnia; MAC (mycobacterium avium-intracellulare complex); Osteoporosis; Palpitation; Pneumonia; Sinusitis; Staph infection; SVT (supraventricular tachycardia); and Syncope and collapse.    Allergies as of 12/07/2017 - Nato as Reviewed 12/07/2017   Allergen Reaction Noted   • Alendronate Other (See Comments) 02/15/2016   • Ezetimibe Other (See Comments) 02/15/2016   • Pravachol [pravastatin] Other (See Comments) 02/15/2016       Medication information was obtained from: Patient and Pharmacy  Pharmacy and Phone Number: [facility] [facility] [phone] [facility] [phone]     Prior to Admission Medications       Prescriptions Last Dose Informant Patient Reported? Taking?      CALCIUM-MAGNESIUM-VITAMIN D PO  Self Yes Yes    Take 1 tablet by mouth Daily.    Cholecalciferol (VITAMIN D-3) 1000 UNITS capsule 12/7/2017 Self Yes Yes    Take by mouth.    levothyroxine (LEVOXYL) 50 MCG tablet  Pharmacy No Yes    Take 1 tablet by mouth Every Other Day.    levothyroxine (LEVOXYL) 75 MCG tablet  Pharmacy No Yes    Take 1 tablet by mouth Every Other Day.    loratadine (CLARITIN) 10 MG tablet  Pharmacy Yes No    Take 10 mg by mouth Daily As Needed for Allergies.    MethylPREDNISolone (MEDROL) 4 MG tablet  Pharmacy Yes No    Take 1 tablet by mouth 2 (Two) Times a Day. Take as directed on package instructions.    Misc Natural Products (JOINT SUPPORT PO) 12/7/2017 Self Yes Yes    Take 1 tablet by  mouth Daily.    montelukast (SINGULAIR) 10 MG tablet  Pharmacy Yes No    Take 1 mg by mouth.    Multiple Vitamin (MULTI VITAMIN DAILY PO) 12/7/2017 Self Yes Yes    Take 1 tablet by mouth Daily.    olmesartan (BENICAR) 40 MG tablet  Pharmacy Yes Yes    Take 40 mg by mouth Daily.    propranolol LA (INDERAL LA) 120 MG 24 hr capsule 12/8/2017 Pharmacy No Yes    Take 1 capsule by mouth 2 (Two) Times a Day.    saccharomyces boulardii (FLORASTOR) 250 MG capsule 12/7/2017 Self Yes Yes    Take 250 mg by mouth 2 (two) times a day.                Medication notes: Patient was able to verify all medications but the pharmacy was also called to make sure it matched. Patient also stated that she was concerned about her propranolol 120 mg being changed from twice daily as prescribed to once daily. Spoke to nurse in charged of the patient and she would follow up with doctor regarding the change.     This medication list is complete to the best of my knowledge as of 12/8/2017    Please call if questions.    Pacheco Higgins  PharmD candidate   12/8/2017 10:22 AM

## 2017-12-08 NOTE — H&P
Group: [facility]         H/p  NOTE    Patient Identification:  Stacie Vieira  82 y.o.  female  1935  [id number]              CC:     History of Present Illness:  Very pleasant 82-year-old female follows my partner Dr. Sinan Joyce with a history of Mac treatment.  She was also following infectious diseases Dr. Aranda.  September of this year she had discontinued her Mac treatment after being treated for 21 months for back.  Patient presented to the emergency room last night with shortness of breath for the last 5-6 days.  She's had a cough productive of clearish to light yellow sputum.  She's had some chills but no fever documented.  No chest pain or chest tightness reported.  No nausea vomiting or aspiration was reported.      Review of Systems  Constitutional: Negative for fever.   HENT: Negative for sore throat.    Eyes: Negative.    Respiratory: Positive for cough (productive) and shortness of breath.    Cardiovascular: Negative for chest pain.   Gastrointestinal: Negative for abdominal pain, diarrhea and vomiting.   Genitourinary: Negative for dysuria.   Musculoskeletal: Negative for neck pain.   Skin: Negative for rash.   Allergic/Immunologic: Negative.    Neurological: Negative for weakness, numbness and headaches.   Hematological: Negative.    Psychiatric/Behavioral: Negative.    All other systems reviewed and are negative.  Past Medical History:  Past Medical History:   Diagnosis Date   • Acute stress disorder    • Allergic rhinitis    • C. difficile colitis     hx in past   • Elevated fasting glucose    • H/O: pneumonia    • Health care maintenance    • History of palpitations    • Hyperlipidemia    • Hypertension    • Hypoactive thyroid    • Hypokalemia    • Hypothyroidism    • Insomnia    • MAC (mycobacterium avium-intracellulare complex)     reason for bronch.  dx with this nov 2015   • Osteoporosis    • Palpitation    • Pneumonia     past hx of freq.   • Sinusitis    • Staph  infection     in sinus area  recent sx jan 2016   • SVT (supraventricular tachycardia)     past hx  on medicine   • Syncope and collapse        Past Surgical History:  Past Surgical History:   Procedure Laterality Date   • BREAST BIOPSY      PERCUTATNEOUS NEEDLE CORE   • BRONCHOSCOPY      nov 2015   • BRONCHOSCOPY N/A 4/8/2016    Procedure: BRONCHOSCOPY WITH BAL;  Surgeon: Sinan Braga MD;  Location: [facility] ENDOSCOPY;  Service:    • COLONOSCOPY N/A 07/08/2005   • DILATATION AND CURETTAGE N/A 01/01/1990   • PAP SMEAR N/A 03/30/2004   • SINUS SURGERY  01/2016   • TONSILLECTOMY          Home Meds:  Prescriptions Prior to Admission   Medication Sig Dispense Refill Last Dose   • Calcium-Magnesium-Vitamin D (CALCIUM MAGNESIUM PO) Take by mouth.   12/7/2017 at Unknown time   • Cholecalciferol (VITAMIN D-3) 1000 UNITS capsule Take by mouth.   12/7/2017 at Unknown time   • loratadine (CLARITIN) 10 MG tablet Take 1 tablet by mouth daily.   Past Week at Unknown time   • Misc Natural Products (JOINT SUPPORT PO) Take by mouth.   12/7/2017 at Unknown time   • Multiple Vitamin (MULTI VITAMIN DAILY PO) Take by mouth.   12/7/2017 at Unknown time   • olmesartan (BENICAR) 40 MG tablet 1 po daily 90 tablet 3 12/7/2017 at Unknown time   • propranolol LA (INDERAL LA) 120 MG 24 hr capsule Take 1 capsule by mouth 2 (Two) Times a Day. 180 capsule 3 12/8/2017 at Unknown time   • saccharomyces boulardii (FLORASTOR) 250 MG capsule Take 250 mg by mouth 2 (two) times a day.   12/7/2017 at Unknown time   • levothyroxine (LEVOXYL) 50 MCG tablet Take 1 tablet by mouth Every Other Day. 45 tablet 3 Unknown at Unknown time   • levothyroxine (LEVOXYL) 75 MCG tablet Take 1 tablet by mouth Every Other Day. 45 tablet 3 Unknown at Unknown time   • montelukast (SINGULAIR) 10 MG tablet Take by mouth.   Taking       Allergies:  Allergies   Allergen Reactions   • Alendronate Other (See Comments)     Headaches   • Ezetimibe Other (See Comments)     Leg  "Cramps   • Pravachol [Pravastatin] Other (See Comments)     Leg Cramps       Social History:   Social History     Social History   • Marital status:      Spouse name: N/A   • Number of children: N/A   • Years of education: N/A     Occupational History   • Not on file.     Social History Main Topics   • Smoking status: Never Smoker   • Smokeless tobacco: Never Used      Comment: caffine use   • Alcohol use No   • Drug use: No   • Sexual activity: No     Other Topics Concern   • Not on file     Social History Narrative       Family History:  Family History   Problem Relation Age of Onset   • Hypertension Brother    • Dementia Mother    • Kidney disease Father    • Thyroid disease Other        Physical Exam:  /69 (BP Location: Left arm, Patient Position: Lying)  Pulse 65  Temp 98.4 °F (36.9 °C) (Oral)   Resp 17  Ht 160 cm (62.99\")  Wt 50.8 kg (111 lb 15.9 oz)  LMP 01/01/1993 (LMP Unknown)  SpO2 98%  BMI 19.84 kg/m2 Body mass index is 19.84 kg/(m^2). 98% 50.8 kg (111 lb 15.9 oz)  Physical Exam  Elderly female resting comfortably she's had a persistent cough during the whole exam.  No respiratory distress  Alert oriented ×3  Neck is supple no bruit no adenopathy  Chest diminished breath sounds occasional rhonchi on the left lower base  CVS regular rate and rhythm no murmurs or gallop  Abdomen is soft nontender bowel sounds positive  Extremities no edema no sinuses no clubbing  CNS no deficits  No hallucination and delusional joint deformities or skin rashes    LABS:  Lab Results   Component Value Date    CALCIUM 8.9 12/07/2017     Results from last 7 days  Lab Units 12/07/17  2330   SODIUM mmol/L 135*   POTASSIUM mmol/L 4.3   CHLORIDE mmol/L 98   CO2 mmol/L 24.2   BUN mg/dL 14   CREATININE mg/dL 0.87   GLUCOSE mg/dL 195*   CALCIUM mg/dL 8.9   WBC 10*3/mm3 11.20*   HEMOGLOBIN g/dL 12.4   PLATELETS 10*3/mm3 303   ALT (SGPT) U/L 13   AST (SGOT) U/L 23   PROBNP pg/mL 3805.0*     Lab Results   Component " Value Date    TROPONINT <0.010 12/07/2017       Results from last 7 days  Lab Units 12/07/17  2330   TROPONIN T ng/mL <0.010           Results from last 7 days  Lab Units 12/08/17  0201   LACTATE mmol/L 1.2                     Lab Results   Component Value Date    TSH 1.620 10/10/2017     Estimated Creatinine Clearance: 40.7 mL/min (by C-G formula based on Cr of 0.87).         Imaging: I personally visualized the images of scans/x-rays performed within last 3 days.  Chest x-ray reviewed possible left lower lobe infiltrate    Assessment:  Community-acquired pneumonia  History of Mac  Acute hypoxemia      Recommendations:  At this point we have elderly female known history of Mac completed 21 months of treatment for Mac.  The question is does she have community acquired pneumonia versus reemergence of Mac.  Although I would think with her being immunocompetent it would be too early for Mac to return but I would proceed with CT chest without contrast.  She may need bronchoscopy to evaluate and rule out Mac.  Continue antibiotics for now  We'll prescribe cough syrup  Bronchodilators  Respiratory viral panel  Dr. Joyce will follow the patient.          Jac Duncan MD  12/8/2017  6:34 AM      Much of this encounter note is an electronic transcription/translation of spoken language to printed text using Dragon Software.

## 2017-12-08 NOTE — ED PROVIDER NOTES
" EMERGENCY DEPARTMENT ENCOUNTER    CHIEF COMPLAINT  Chief Complaint: SOA  History given by: pt  History limited by: nothing  Room Number: 29/29  PMD: Tatiana Bellamy MD      HPI:  Pt is a 82 y.o. female with MAC who presents complaining of shortness of breath that began 5 days ago. She states the exertion exacerbates to SOA. She also admits to a productive cough. She sees Dr. Moreno as her specialist. She is not on O2 at home.    Duration:  5 days  Onset: gradual  Timing: constant  Location: chest  Radiation: none  Quality: \"SOA\"  Intensity/Severity: moderate  Progression: unchanged  Associated Symptoms: productive cough  Aggravating Factors: none  Alleviating Factors: none  Previous Episodes: Pt has MAC  Treatment before arrival: none    PAST MEDICAL HISTORY  Active Ambulatory Problems     Diagnosis Date Noted   • Mycobacterium avium complex 03/07/2016   • Essential hypertension 03/07/2016   • Palpitations 09/13/2016   • Allergic rhinitis 09/13/2016   • Hypothyroidism 09/13/2016   • Hyperlipidemia 09/13/2016     Resolved Ambulatory Problems     Diagnosis Date Noted   • Long term current use of antibiotics 03/07/2016     Past Medical History:   Diagnosis Date   • Acute stress disorder    • Allergic rhinitis    • C. difficile colitis    • Elevated fasting glucose    • H/O: pneumonia    • Health care maintenance    • History of palpitations    • Hyperlipidemia    • Hypertension    • Hypoactive thyroid    • Hypokalemia    • Hypothyroidism    • Insomnia    • MAC (mycobacterium avium-intracellulare complex)    • Osteoporosis    • Palpitation    • Pneumonia    • Sinusitis    • Staph infection    • SVT (supraventricular tachycardia)    • Syncope and collapse        PAST SURGICAL HISTORY  Past Surgical History:   Procedure Laterality Date   • BREAST BIOPSY      PERCUTATNEOUS NEEDLE CORE   • BRONCHOSCOPY      nov 2015   • BRONCHOSCOPY N/A 4/8/2016    Procedure: BRONCHOSCOPY WITH BAL;  Surgeon: Sinan Braga MD;  " Location: [facility] ENDOSCOPY;  Service:    • COLONOSCOPY N/A 07/08/2005   • DILATATION AND CURETTAGE N/A 01/01/1990   • PAP SMEAR N/A 03/30/2004   • SINUS SURGERY  01/2016   • TONSILLECTOMY         FAMILY HISTORY  Family History   Problem Relation Age of Onset   • Hypertension Brother    • Dementia Mother    • Kidney disease Father    • Thyroid disease Other        SOCIAL HISTORY  Social History     Social History   • Marital status:      Spouse name: N/A   • Number of children: N/A   • Years of education: N/A     Occupational History   • Not on file.     Social History Main Topics   • Smoking status: Never Smoker   • Smokeless tobacco: Never Used      Comment: caffine use   • Alcohol use No   • Drug use: No   • Sexual activity: No     Other Topics Concern   • Not on file     Social History Narrative       ALLERGIES  Alendronate; Ezetimibe; and Pravachol [pravastatin]    REVIEW OF SYSTEMS  Review of Systems   Constitutional: Negative for fever.   HENT: Negative for sore throat.    Eyes: Negative.    Respiratory: Positive for cough (productive) and shortness of breath.    Cardiovascular: Negative for chest pain.   Gastrointestinal: Negative for abdominal pain, diarrhea and vomiting.   Genitourinary: Negative for dysuria.   Musculoskeletal: Negative for neck pain.   Skin: Negative for rash.   Allergic/Immunologic: Negative.    Neurological: Negative for weakness, numbness and headaches.   Hematological: Negative.    Psychiatric/Behavioral: Negative.    All other systems reviewed and are negative.      PHYSICAL EXAM  ED Triage Vitals   Temp Heart Rate Resp BP SpO2   12/07/17 2204 12/07/17 2204 12/07/17 2204 12/07/17 2204 12/07/17 2204   99.7 °F (37.6 °C) 90 30 141/91 100 %      Temp src Heart Rate Source Patient Position BP Location FiO2 (%)   12/07/17 2204 12/07/17 2204 12/07/17 2204 12/07/17 2204 --   Tympanic Monitor Sitting Right arm        Physical Exam   Constitutional: She is oriented to person, place, and  time. She appears distressed (mild).   HENT:   Head: Normocephalic and atraumatic.   Eyes: EOM are normal. Pupils are equal, round, and reactive to light.   Neck: Normal range of motion. Neck supple.   Cardiovascular: Normal rate, regular rhythm and normal heart sounds.    Pulmonary/Chest: Effort normal. No respiratory distress. She has rhonchi in the left lower field.   Persistent cough   Abdominal: Soft. There is no tenderness. There is no rebound and no guarding.   Musculoskeletal: Normal range of motion. She exhibits no edema.   Neurological: She is alert and oriented to person, place, and time. She has normal sensation and normal strength.   Skin: Skin is warm and dry. No rash noted.   Psychiatric: Mood and affect normal.   Nursing note and vitals reviewed.      LAB RESULTS  Lab Results (last 24 hours)     Procedure Component Value Units Date/Time    CBC & Differential [[id number]] Collected:  12/07/17 2330    Specimen:  Blood Updated:  12/07/17 2350    Narrative:       The following orders were created for panel order CBC & Differential.  Procedure                               Abnormality         Status                     ---------                               -----------         ------                     Scan Slide[[id number]]                                                                  CBC Auto Differential[[id number]]        Abnormal            Final result                 Please view results for these tests on the individual orders.    Comprehensive Metabolic Panel [[id number]]  (Abnormal) Collected:  12/07/17 2330    Specimen:  Blood from Arm, Right Updated:  12/08/17 0004     Glucose 195 (H) mg/dL      BUN 14 mg/dL      Creatinine 0.87 mg/dL      Sodium 135 (L) mmol/L      Potassium 4.3 mmol/L      Chloride 98 mmol/L      CO2 24.2 mmol/L      Calcium 8.9 mg/dL      Total Protein 7.4 g/dL      Albumin 3.60 g/dL      ALT (SGPT) 13 U/L      AST (SGOT) 23 U/L      Alkaline Phosphatase 82 U/L      Total  Bilirubin 0.8 mg/dL      eGFR Non African Amer 62 mL/min/1.73      Globulin 3.8 gm/dL      A/G Ratio 0.9 g/dL      BUN/Creatinine Ratio 16.1     Anion Gap 12.8 mmol/L     Narrative:       The MDRD GFR formula is only valid for adults with stable renal function between ages 18 and 70.    BNP [[id number]]  (Abnormal) Collected:  12/07/17 2330    Specimen:  Blood from Arm, Right Updated:  12/08/17 0002     proBNP 3805.0 (H) pg/mL     Narrative:       Among patients with dyspnea, NT-proBNP is highly sensitive for the detection of acute congestive heart failure. In addition NT-proBNP of <300 pg/ml effectively rules out acute congestive heart failure with 99% negative predictive value.    Troponin [[id number]]  (Normal) Collected:  12/07/17 2330    Specimen:  Blood from Arm, Right Updated:  12/08/17 0004     Troponin T <0.010 ng/mL     Narrative:       Troponin T Reference Ranges:  Less than 0.03 ng/mL:    Negative for AMI  0.03 to 0.09 ng/mL:      Indeterminant for AMI  Greater than 0.09 ng/mL: Positive for AMI    CBC Auto Differential [[id number]]  (Abnormal) Collected:  12/07/17 2330    Specimen:  Blood from Arm, Right Updated:  12/07/17 2350     WBC 11.20 (H) 10*3/mm3      RBC 3.77 (L) 10*6/mm3      Hemoglobin 12.4 g/dL      Hematocrit 36.7 %      MCV 97.3 fL      MCH 32.9 (H) pg      MCHC 33.8 g/dL      RDW 13.0 %      RDW-SD 45.9 fl      MPV 9.1 fL      Platelets 303 10*3/mm3      Neutrophil % 69.1 %      Lymphocyte % 11.7 (L) %      Monocyte % 18.4 (H) %      Eosinophil % 0.1 (L) %      Basophil % 0.2 %      Immature Grans % 0.5 %      Neutrophils, Absolute 7.74 10*3/mm3      Lymphocytes, Absolute 1.31 10*3/mm3      Monocytes, Absolute 2.06 (H) 10*3/mm3      Eosinophils, Absolute 0.01 10*3/mm3      Basophils, Absolute 0.02 10*3/mm3      Immature Grans, Absolute 0.06 (H) 10*3/mm3     Lactic Acid, Plasma [[id number]] Collected:  12/08/17 0201    Specimen:  Blood Updated:  12/08/17 0205    Blood Culture - Blood, Blood,  Venous Line [[id number]] Collected:  12/08/17 0201    Specimen:  Blood from Arm, Left Updated:  12/08/17 0206    Blood Culture - Blood, Blood, Venous Line [[id number]] Collected:  12/08/17 0201    Specimen:  Blood from Arm, Right Updated:  12/08/17 0206          I ordered the above labs and reviewed the results    RADIOLOGY  XR Chest 2 View   Final Result   1. COPD with new left lung base opacities suspicious for pneumonia.   2. Areas of nodularity are not as well seen by plain film imaging and   continued CT follow-up will be needed.       This report was finalized on 12/7/2017 11:12 PM by Eber Sepluveda MD.               I ordered the above noted radiological studies. Interpreted by radiologist. Reviewed by me in PACS.       PROCEDURES  Procedures    EKG           EKG time: 2347  Rhythm/Rate: NSR 64  P waves and CT: normal  QRS, axis: normal   ST and T waves: diffuse non specific T wave abnormalities     Interpreted Contemporaneously by me, independently viewed  Slightly changed compared to prior 12/14/2016      PROGRESS AND CONSULTS  ED Course     2207 - Ordered labs, EKG, and CXR for further evaluation.    0124 - Ordered labs for further evaluation. Ordered Rocephin and Zithromax for pneumonia.    0125 - Notified pt of imaging results which shows possible pneumonia. Discussed plan to admit the pt due to unknown cause of the pneumonia and  The pt requiring O2 in the ED. Pt understands and agrees with plan, all questions addressed.    0134 - Placed call to Pulmonology.    MEDICAL DECISION MAKING  Results were reviewed/discussed with the patient and they were also made aware of online access. Pt also made aware that some labs, such as cultures, will not be resulted during ER visit and follow up with PMD is necessary.     MDM  Number of Diagnoses or Management Options     Amount and/or Complexity of Data Reviewed  Clinical lab tests: ordered and reviewed (proBNP = 3805.0. WBC = 11.20)  Tests in the radiology section  of CPT®: ordered and reviewed (CXR - COPD with new left lung base opacities suspicious for pneumonia.)  Tests in the medicine section of CPT®: ordered and reviewed (See EKG procedure note)  Discuss the patient with other providers: yes (Pulmonology)           DIAGNOSIS  Final diagnoses:   Community acquired pneumonia of left lower lobe of lung       DISPOSITION  ADMISSION    Discussed treatment plan and reason for admission with pt/family and admitting physician.  Pt/family voiced understanding of the plan for admission for further testing/treatment as needed.         Latest Documented Vital Signs:  As of 2:12 AM  BP- 118/70 HR- 63 Temp- 99.7 °F (37.6 °C) (Tympanic) O2 sat- 98%    --  Documentation assistance provided by oskar Matthews for Dr. Knott.  Information recorded by the scribe was done at my direction and has been verified and validated by me.     Gagan Matthews  12/08/17 0214       Clyde Knott MD  12/08/17 0245

## 2017-12-09 ENCOUNTER — ANESTHESIA (OUTPATIENT)
Dept: GASTROENTEROLOGY | Facility: HOSPITAL | Age: 82
End: 2017-12-09

## 2017-12-09 ENCOUNTER — ANESTHESIA EVENT (OUTPATIENT)
Dept: GASTROENTEROLOGY | Facility: HOSPITAL | Age: 82
End: 2017-12-09

## 2017-12-09 LAB
APPEARANCE FLD: ABNORMAL
APPEARANCE FLD: ABNORMAL
B PERT DNA SPEC QL NAA+PROBE: NOT DETECTED
C PNEUM DNA NPH QL NAA+NON-PROBE: NOT DETECTED
COLOR FLD: ABNORMAL
COLOR FLD: ABNORMAL
EOSINOPHIL NFR FLD MANUAL: 1 %
FLUAV H1 2009 PAND RNA NPH QL NAA+PROBE: NOT DETECTED
FLUAV H1 HA GENE NPH QL NAA+PROBE: NOT DETECTED
FLUAV H3 RNA NPH QL NAA+PROBE: NOT DETECTED
FLUAV SUBTYP SPEC NAA+PROBE: NOT DETECTED
FLUBV RNA ISLT QL NAA+PROBE: NOT DETECTED
HADV DNA SPEC NAA+PROBE: NOT DETECTED
HCOV 229E RNA SPEC QL NAA+PROBE: NOT DETECTED
HCOV HKU1 RNA SPEC QL NAA+PROBE: NOT DETECTED
HCOV NL63 RNA SPEC QL NAA+PROBE: NOT DETECTED
HCOV OC43 RNA SPEC QL NAA+PROBE: NOT DETECTED
HMPV RNA NPH QL NAA+NON-PROBE: NOT DETECTED
HPIV1 RNA SPEC QL NAA+PROBE: NOT DETECTED
HPIV2 RNA SPEC QL NAA+PROBE: NOT DETECTED
HPIV3 RNA NPH QL NAA+PROBE: NOT DETECTED
HPIV4 P GENE NPH QL NAA+PROBE: NOT DETECTED
LYMPHOCYTES NFR FLD MANUAL: 6 %
M PNEUMO IGG SER IA-ACNC: NOT DETECTED
MONOCYTES NFR FLD: 3 %
MONOS+MACROS NFR FLD: 1 %
NEUTROPHILS NFR FLD MANUAL: 90 %
NEUTROPHILS NFR FLD MANUAL: 99 %
OTHER CELLS FLUID PER 100/WBCS: 1 /100 WBCS
RBC # FLD AUTO: 483 /MM3
RBC # FLD AUTO: ABNORMAL /MM3
RHINOVIRUS RNA SPEC NAA+PROBE: NOT DETECTED
RSV RNA NPH QL NAA+NON-PROBE: NOT DETECTED
WBC # FLD: 5950 /MM3
WBC # FLD: 665 /MM3

## 2017-12-09 PROCEDURE — 94799 UNLISTED PULMONARY SVC/PX: CPT

## 2017-12-09 PROCEDURE — 25010000002 ENOXAPARIN PER 10 MG: Performed by: INTERNAL MEDICINE

## 2017-12-09 PROCEDURE — 87633 RESP VIRUS 12-25 TARGETS: CPT | Performed by: INTERNAL MEDICINE

## 2017-12-09 PROCEDURE — 88104 CYTOPATH FL NONGYN SMEARS: CPT | Performed by: INTERNAL MEDICINE

## 2017-12-09 PROCEDURE — 87102 FUNGUS ISOLATION CULTURE: CPT | Performed by: INTERNAL MEDICINE

## 2017-12-09 PROCEDURE — 25010000002 CEFTRIAXONE PER 250 MG: Performed by: INTERNAL MEDICINE

## 2017-12-09 PROCEDURE — 87798 DETECT AGENT NOS DNA AMP: CPT | Performed by: INTERNAL MEDICINE

## 2017-12-09 PROCEDURE — 87116 MYCOBACTERIA CULTURE: CPT | Performed by: INTERNAL MEDICINE

## 2017-12-09 PROCEDURE — 88305 TISSUE EXAM BY PATHOLOGIST: CPT | Performed by: INTERNAL MEDICINE

## 2017-12-09 PROCEDURE — 87071 CULTURE AEROBIC QUANT OTHER: CPT | Performed by: INTERNAL MEDICINE

## 2017-12-09 PROCEDURE — 89051 BODY FLUID CELL COUNT: CPT | Performed by: INTERNAL MEDICINE

## 2017-12-09 PROCEDURE — 0BDB8ZX EXTRACTION OF LEFT LOWER LOBE BRONCHUS, VIA NATURAL OR ARTIFICIAL OPENING ENDOSCOPIC, DIAGNOSTIC: ICD-10-PCS | Performed by: INTERNAL MEDICINE

## 2017-12-09 PROCEDURE — 87205 SMEAR GRAM STAIN: CPT | Performed by: INTERNAL MEDICINE

## 2017-12-09 PROCEDURE — 87486 CHLMYD PNEUM DNA AMP PROBE: CPT | Performed by: INTERNAL MEDICINE

## 2017-12-09 PROCEDURE — 87070 CULTURE OTHR SPECIMN AEROBIC: CPT | Performed by: INTERNAL MEDICINE

## 2017-12-09 PROCEDURE — 87206 SMEAR FLUORESCENT/ACID STAI: CPT | Performed by: INTERNAL MEDICINE

## 2017-12-09 PROCEDURE — 25010000002 AZITHROMYCIN PER 500 MG: Performed by: INTERNAL MEDICINE

## 2017-12-09 PROCEDURE — 87581 M.PNEUMON DNA AMP PROBE: CPT | Performed by: INTERNAL MEDICINE

## 2017-12-09 PROCEDURE — 25010000002 PROPOFOL 10 MG/ML EMULSION: Performed by: ANESTHESIOLOGY

## 2017-12-09 PROCEDURE — 88112 CYTOPATH CELL ENHANCE TECH: CPT | Performed by: INTERNAL MEDICINE

## 2017-12-09 PROCEDURE — 0B9J8ZX DRAINAGE OF LEFT LOWER LUNG LOBE, VIA NATURAL OR ARTIFICIAL OPENING ENDOSCOPIC, DIAGNOSTIC: ICD-10-PCS | Performed by: INTERNAL MEDICINE

## 2017-12-09 PROCEDURE — 87556 M.TUBERCULO DNA AMP PROBE: CPT | Performed by: INTERNAL MEDICINE

## 2017-12-09 RX ORDER — PROPOFOL 10 MG/ML
VIAL (ML) INTRAVENOUS CONTINUOUS PRN
Status: DISCONTINUED | OUTPATIENT
Start: 2017-12-09 | End: 2017-12-09 | Stop reason: SURG

## 2017-12-09 RX ORDER — LIDOCAINE HYDROCHLORIDE 10 MG/ML
INJECTION, SOLUTION EPIDURAL; INFILTRATION; INTRACAUDAL; PERINEURAL AS NEEDED
Status: DISCONTINUED | OUTPATIENT
Start: 2017-12-09 | End: 2017-12-09 | Stop reason: HOSPADM

## 2017-12-09 RX ORDER — PROPOFOL 10 MG/ML
VIAL (ML) INTRAVENOUS AS NEEDED
Status: DISCONTINUED | OUTPATIENT
Start: 2017-12-09 | End: 2017-12-09 | Stop reason: SURG

## 2017-12-09 RX ORDER — LIDOCAINE HYDROCHLORIDE 20 MG/ML
INJECTION, SOLUTION EPIDURAL; INFILTRATION; INTRACAUDAL; PERINEURAL AS NEEDED
Status: DISCONTINUED | OUTPATIENT
Start: 2017-12-09 | End: 2017-12-09 | Stop reason: HOSPADM

## 2017-12-09 RX ORDER — SODIUM CHLORIDE, SODIUM LACTATE, POTASSIUM CHLORIDE, CALCIUM CHLORIDE 600; 310; 30; 20 MG/100ML; MG/100ML; MG/100ML; MG/100ML
30 INJECTION, SOLUTION INTRAVENOUS CONTINUOUS PRN
Status: DISCONTINUED | OUTPATIENT
Start: 2017-12-09 | End: 2017-12-13 | Stop reason: HOSPADM

## 2017-12-09 RX ORDER — LIDOCAINE HYDROCHLORIDE 20 MG/ML
INJECTION, SOLUTION INFILTRATION; PERINEURAL AS NEEDED
Status: DISCONTINUED | OUTPATIENT
Start: 2017-12-09 | End: 2017-12-09 | Stop reason: SURG

## 2017-12-09 RX ADMIN — HYDROCODONE BITARTRATE AND HOMATROPINE METHYLBROMIDE 5 ML: 5; 1.5 SOLUTION ORAL at 00:13

## 2017-12-09 RX ADMIN — PROPRANOLOL HYDROCHLORIDE 120 MG: 60 CAPSULE, EXTENDED RELEASE ORAL at 21:12

## 2017-12-09 RX ADMIN — LIDOCAINE HYDROCHLORIDE 100 MG: 20 INJECTION, SOLUTION INFILTRATION; PERINEURAL at 12:10

## 2017-12-09 RX ADMIN — ENOXAPARIN SODIUM 40 MG: 40 INJECTION SUBCUTANEOUS at 21:12

## 2017-12-09 RX ADMIN — HYDROCODONE BITARTRATE AND HOMATROPINE METHYLBROMIDE 5 ML: 5; 1.5 SOLUTION ORAL at 13:45

## 2017-12-09 RX ADMIN — LOSARTAN POTASSIUM 100 MG: 100 TABLET, FILM COATED ORAL at 21:12

## 2017-12-09 RX ADMIN — Medication 250 MG: at 08:00

## 2017-12-09 RX ADMIN — PROPOFOL 100 MCG/KG/MIN: 10 INJECTION, EMULSION INTRAVENOUS at 12:05

## 2017-12-09 RX ADMIN — Medication 250 MG: at 17:43

## 2017-12-09 RX ADMIN — EPHEDRINE SULFATE 25 MG: 50 INJECTION INTRAMUSCULAR; INTRAVENOUS; SUBCUTANEOUS at 12:22

## 2017-12-09 RX ADMIN — HYDROCODONE BITARTRATE AND HOMATROPINE METHYLBROMIDE 5 ML: 5; 1.5 SOLUTION ORAL at 17:45

## 2017-12-09 RX ADMIN — SODIUM CHLORIDE, POTASSIUM CHLORIDE, SODIUM LACTATE AND CALCIUM CHLORIDE: 600; 310; 30; 20 INJECTION, SOLUTION INTRAVENOUS at 11:58

## 2017-12-09 RX ADMIN — LEVOTHYROXINE SODIUM 75 MCG: 75 TABLET ORAL at 06:04

## 2017-12-09 RX ADMIN — CEFTRIAXONE SODIUM 1 G: 1 INJECTION, SOLUTION INTRAVENOUS at 02:03

## 2017-12-09 RX ADMIN — IPRATROPIUM BROMIDE AND ALBUTEROL SULFATE 3 ML: .5; 3 SOLUTION RESPIRATORY (INHALATION) at 20:30

## 2017-12-09 RX ADMIN — AZITHROMYCIN MONOHYDRATE 500 MG: 500 INJECTION, POWDER, LYOPHILIZED, FOR SOLUTION INTRAVENOUS at 03:03

## 2017-12-09 RX ADMIN — IPRATROPIUM BROMIDE AND ALBUTEROL SULFATE 3 ML: .5; 3 SOLUTION RESPIRATORY (INHALATION) at 16:58

## 2017-12-09 RX ADMIN — IPRATROPIUM BROMIDE AND ALBUTEROL SULFATE 3 ML: .5; 3 SOLUTION RESPIRATORY (INHALATION) at 07:41

## 2017-12-09 RX ADMIN — SODIUM CHLORIDE, POTASSIUM CHLORIDE, SODIUM LACTATE AND CALCIUM CHLORIDE 30 ML/HR: 600; 310; 30; 20 INJECTION, SOLUTION INTRAVENOUS at 11:10

## 2017-12-09 RX ADMIN — PROPOFOL 200 MG: 10 INJECTION, EMULSION INTRAVENOUS at 12:05

## 2017-12-09 RX ADMIN — PROPRANOLOL HYDROCHLORIDE 120 MG: 60 CAPSULE, EXTENDED RELEASE ORAL at 08:00

## 2017-12-09 RX ADMIN — EPHEDRINE SULFATE 25 MG: 50 INJECTION INTRAMUSCULAR; INTRAVENOUS; SUBCUTANEOUS at 12:19

## 2017-12-09 NOTE — ANESTHESIA PREPROCEDURE EVALUATION
Anesthesia Evaluation     Patient summary reviewed and Nursing notes reviewed   NPO Solid Status: > 8 hours  NPO Liquid Status: > 8 hours     Airway   Mallampati: II  TM distance: <3 FB  Neck ROM: full  no difficulty expected  Dental - normal exam     Pulmonary     breath sounds clear to auscultation  (+) pneumonia ,   Cardiovascular     Rhythm: regular    (+) hypertension, hyperlipidemia      Neuro/Psych  (+) syncope, psychiatric history,    GI/Hepatic/Renal/Endo    (+)  hypothyroidism,     Musculoskeletal     Abdominal    Substance History      OB/GYN          Other                                        Anesthesia Plan    ASA 3     MAC     intravenous induction   Anesthetic plan and risks discussed with patient.

## 2017-12-09 NOTE — PLAN OF CARE
Problem: Patient Care Overview (Adult)  Goal: Plan of Care Review  Outcome: Ongoing (interventions implemented as appropriate)    12/09/17 7465   Coping/Psychosocial Response Interventions   Plan Of Care Reviewed With patient   Patient Care Overview   Progress improving   Outcome Evaluation   Outcome Summary/Follow up Plan Patient has been pleasant and cooperative during shift. Medicated once for pain/cough. No complaints of SOA. Went for bronchoscopy, lavage, and brushing. Has ambulated once today. Will continue to monitor and assist patient as needed.         Problem: Fall Risk (Adult)  Goal: Absence of Falls  Outcome: Ongoing (interventions implemented as appropriate)    Problem: Skin Integrity Impairment, Risk/Actual (Adult)  Goal: Skin Integrity/Wound Healing  Outcome: Ongoing (interventions implemented as appropriate)    Problem: Pneumonia (Adult)  Goal: Signs and Symptoms of Listed Potential Problems Will be Absent or Manageable (Pneumonia)  Outcome: Ongoing (interventions implemented as appropriate)

## 2017-12-09 NOTE — PROGRESS NOTES
[facility]  Phone: [phone]  Real Rousseau MD    Subjective:  LOS: 1    She is still coughing. S/p bronch earlier today. No distress.    Objective   Vital Signs past 24hrs  BP range: BP: (107-161)/(51-81) 156/77  Pulse range: Heart Rate:  [67-81] 68  Resp rate range: Resp:  [16-20] 18  Temp range: Temp (24hrs), Av.6 °F (37 °C), Min:98.1 °F (36.7 °C), Max:99.7 °F (37.6 °C)    O2 Device: room airFlow (L/min):  [2-4] 2  Oxygen range:SpO2:  [92 %-100 %] 92 %   50.8 kg (111 lb 15.9 oz); Body mass index is 19.84 kg/(m^2).    Intake/Output Summary (Last 24 hours) at 17 1620  Last data filed at 17 1437   Gross per 24 hour   Intake             1160 ml   Output              300 ml   Net              860 ml       Physical Exam   Constitutional: She appears well-developed.   Eyes: Conjunctivae are normal.   Neck: Normal range of motion. Neck supple.   Cardiovascular: Normal rate and regular rhythm.    No murmur heard.  Pulmonary/Chest: Effort normal. No respiratory distress. She has no wheezes. She has rales (scattered).   Abdominal: Soft. Bowel sounds are normal. She exhibits no mass. There is no tenderness.   Musculoskeletal: She exhibits no edema.   Neurological: She is alert.   Skin: Skin is warm. No rash noted.     Results Review:    I have reviewed the laboratory and imaging data since the last note by [facility] physician.  My annotations are noted in assessment and plan.    Medication Review:  I have reviewed the current MAR.  My annotations are noted in assessment and plan.      lactated ringers 30 mL/hr Last Rate: Stopped (17 1329)     Plan   PCCM Problems  Pneumonia, CAP  Hx COOKIE  Acute respiratory failure, hypoxia    Plan of Treatment  Continue rx for CAP.    Now s/p bronch for possible COOKIE.    Wean O2 as tolerated.    Add lovenox for dvt prophylaxis.    Real Rousseau MD  17  4:20 PM    Part of this note may be an electronic transcription/translation of spoken language to printed  text using the Dragon Dictation System.

## 2017-12-09 NOTE — PLAN OF CARE
Problem: Patient Care Overview (Adult)  Goal: Plan of Care Review  Outcome: Ongoing (interventions implemented as appropriate)    12/09/17 0311   Coping/Psychosocial Response Interventions   Plan Of Care Reviewed With patient   Patient Care Overview   Progress no change   Outcome Evaluation   Outcome Summary/Follow up Plan no complications pt slept most of the night. will continue to monitor        Goal: Adult Individualization and Mutuality  Outcome: Ongoing (interventions implemented as appropriate)  Goal: Discharge Needs Assessment  Outcome: Ongoing (interventions implemented as appropriate)    Problem: Fall Risk (Adult)  Goal: Absence of Falls  Outcome: Ongoing (interventions implemented as appropriate)    Problem: Skin Integrity Impairment, Risk/Actual (Adult)  Goal: Skin Integrity/Wound Healing  Outcome: Ongoing (interventions implemented as appropriate)    Problem: Pneumonia (Adult)  Goal: Signs and Symptoms of Listed Potential Problems Will be Absent or Manageable (Pneumonia)  Outcome: Ongoing (interventions implemented as appropriate)

## 2017-12-09 NOTE — PLAN OF CARE
Problem: Bronchoscopy (Adult)  Goal: Signs and Symptoms of Listed Potential Problems Will be Absent or Manageable (Bronchoscopy)  Outcome: Ongoing (interventions implemented as appropriate)    12/09/17 1107   Bronchoscopy   Problems Assessed (Bronchoscopy) all   Problems Present (Bronchoscopy) none

## 2017-12-09 NOTE — ANESTHESIA POSTPROCEDURE EVALUATION
"Patient: Stacie Vieira    Procedure Summary     Date Anesthesia Start Anesthesia Stop Room / Location    12/09/17 1158 1253  [facility]       Procedure Diagnosis Surgeon Provider    BRONCHOSCOPY (N/A Bronchus) Community acquired pneumonia of left lower lobe of lung  (Community acquired pneumonia of left lower lobe of lung [J18.1]) MD Fabian Vidales MD          Anesthesia Type: MAC  Last vitals  BP   161/76 (12/09/17 1308)   Temp   36.7 °C (98.1 °F) (12/09/17 1258)   Pulse   71 (12/09/17 1308)   Resp   16 (12/09/17 1308)     SpO2   98 % (12/09/17 1308)     Post Anesthesia Care and Evaluation    Patient location during evaluation: PACU  Patient participation: complete - patient participated  Level of consciousness: sleepy but conscious  Pain score: 0  Pain management: adequate  Airway patency: patent  Anesthetic complications: No anesthetic complications    Cardiovascular status: acceptable  Respiratory status: acceptable  Hydration status: acceptable    Comments: /76 (BP Location: Left arm, Patient Position: Sitting)  Pulse 71  Temp 36.7 °C (98.1 °F) (Oral)   Resp 16  Ht 160 cm (62.99\")  Wt 50.8 kg (111 lb 15.9 oz)  LMP 01/01/1993 (LMP Unknown)  SpO2 98%  BMI 19.84 kg/m2        "

## 2017-12-10 PROCEDURE — 25010000002 ENOXAPARIN PER 10 MG: Performed by: INTERNAL MEDICINE

## 2017-12-10 PROCEDURE — 94799 UNLISTED PULMONARY SVC/PX: CPT

## 2017-12-10 PROCEDURE — 25010000002 AZITHROMYCIN PER 500 MG: Performed by: INTERNAL MEDICINE

## 2017-12-10 PROCEDURE — 25010000002 CEFTRIAXONE PER 250 MG: Performed by: INTERNAL MEDICINE

## 2017-12-10 RX ADMIN — HYDROCODONE BITARTRATE AND HOMATROPINE METHYLBROMIDE 5 ML: 5; 1.5 SOLUTION ORAL at 09:56

## 2017-12-10 RX ADMIN — PROPRANOLOL HYDROCHLORIDE 120 MG: 60 CAPSULE, EXTENDED RELEASE ORAL at 20:44

## 2017-12-10 RX ADMIN — LEVOTHYROXINE SODIUM 50 MCG: 50 TABLET ORAL at 06:23

## 2017-12-10 RX ADMIN — PROPRANOLOL HYDROCHLORIDE 120 MG: 60 CAPSULE, EXTENDED RELEASE ORAL at 08:00

## 2017-12-10 RX ADMIN — HYDROCODONE BITARTRATE AND HOMATROPINE METHYLBROMIDE 5 ML: 5; 1.5 SOLUTION ORAL at 16:24

## 2017-12-10 RX ADMIN — IPRATROPIUM BROMIDE AND ALBUTEROL SULFATE 3 ML: .5; 3 SOLUTION RESPIRATORY (INHALATION) at 16:02

## 2017-12-10 RX ADMIN — HYDROCODONE BITARTRATE AND HOMATROPINE METHYLBROMIDE 5 ML: 5; 1.5 SOLUTION ORAL at 22:40

## 2017-12-10 RX ADMIN — CEFTRIAXONE SODIUM 1 G: 1 INJECTION, SOLUTION INTRAVENOUS at 02:00

## 2017-12-10 RX ADMIN — ENOXAPARIN SODIUM 40 MG: 40 INJECTION SUBCUTANEOUS at 20:44

## 2017-12-10 RX ADMIN — LOSARTAN POTASSIUM 100 MG: 100 TABLET, FILM COATED ORAL at 20:43

## 2017-12-10 RX ADMIN — AZITHROMYCIN MONOHYDRATE 500 MG: 500 INJECTION, POWDER, LYOPHILIZED, FOR SOLUTION INTRAVENOUS at 03:00

## 2017-12-10 RX ADMIN — IPRATROPIUM BROMIDE AND ALBUTEROL SULFATE 3 ML: .5; 3 SOLUTION RESPIRATORY (INHALATION) at 20:16

## 2017-12-10 RX ADMIN — Medication 250 MG: at 08:00

## 2017-12-10 RX ADMIN — IPRATROPIUM BROMIDE AND ALBUTEROL SULFATE 3 ML: .5; 3 SOLUTION RESPIRATORY (INHALATION) at 08:39

## 2017-12-10 RX ADMIN — IPRATROPIUM BROMIDE AND ALBUTEROL SULFATE 3 ML: .5; 3 SOLUTION RESPIRATORY (INHALATION) at 11:36

## 2017-12-10 RX ADMIN — Medication 250 MG: at 17:08

## 2017-12-10 NOTE — PLAN OF CARE
Problem: Patient Care Overview (Adult)  Goal: Plan of Care Review  Outcome: Ongoing (interventions implemented as appropriate)    12/10/17 0427   Coping/Psychosocial Response Interventions   Plan Of Care Reviewed With patient   Patient Care Overview   Progress no change   Outcome Evaluation   Outcome Summary/Follow up Plan patient alert follow sc ommands up adlib. prn hycodan for cough. no nausea. iv antibiotics given. no acute distress noted will continue to monitor       Goal: Adult Individualization and Mutuality  Outcome: Ongoing (interventions implemented as appropriate)  Goal: Discharge Needs Assessment  Outcome: Ongoing (interventions implemented as appropriate)    Problem: Fall Risk (Adult)  Goal: Absence of Falls  Outcome: Ongoing (interventions implemented as appropriate)    Problem: Skin Integrity Impairment, Risk/Actual (Adult)  Goal: Skin Integrity/Wound Healing  Outcome: Ongoing (interventions implemented as appropriate)    Problem: Pneumonia (Adult)  Goal: Signs and Symptoms of Listed Potential Problems Will be Absent or Manageable (Pneumonia)  Outcome: Ongoing (interventions implemented as appropriate)

## 2017-12-10 NOTE — PROGRESS NOTES
[facility]  Phone: [phone]  Real Rousseau MD    Subjective:  LOS: 2    Cough and SOA better. She feels stronger.    Objective   Vital Signs past 24hrs  BP range: BP: ()/(59-72) 141/59  Pulse range: Heart Rate:  [62-97] 65  Resp rate range: Resp:  [12-18] 12  Temp range: Temp (24hrs), Av.6 °F (36.4 °C), Min:97 °F (36.1 °C), Max:98.2 °F (36.8 °C)    O2 Device: nasal cannulaFlow (L/min):  [2-3] 2  Oxygen range:SpO2:  [91 %-100 %] 98 %   50.8 kg (111 lb 15.9 oz); Body mass index is 19.84 kg/(m^2).    Intake/Output Summary (Last 24 hours) at 12/10/17 1752  Last data filed at 17 2319   Gross per 24 hour   Intake              210 ml   Output                0 ml   Net              210 ml       Physical Exam   Constitutional: She appears well-developed.   Eyes: Conjunctivae are normal.   Neck: Normal range of motion. Neck supple.   Cardiovascular: Normal rate and regular rhythm.    No murmur heard.  Pulmonary/Chest: Effort normal. No respiratory distress. She has no wheezes. She has no rales.   Abdominal: Soft. Bowel sounds are normal. She exhibits no mass. There is no tenderness.   Musculoskeletal: She exhibits no edema.   Neurological: She is alert.   Skin: Skin is warm. No rash noted.     Results Review:    I have reviewed the laboratory and imaging data since the last note by [facility] physician.  My annotations are noted in assessment and plan.    Medication Review:  I have reviewed the current MAR.  My annotations are noted in assessment and plan.      lactated ringers 30 mL/hr Last Rate: Stopped (17 1329)     Plan   PCCM Problems  Pneumonia, CAP  Hx COOKIE  Acute respiratory failure, hypoxia    Plan of Treatment  Continue rx for CAP. So far BAL unremarkable.    Now s/p bronch for possible COOKIE. Pending results.    Wean O2 as tolerated.    Lovenox for dvt prophylaxis.    Ask PT to see.    Real Rousseau MD  12/10/17  5:52 PM    Part of this note may be an electronic transcription/translation  of spoken language to printed text using the Dragon Dictation System.

## 2017-12-10 NOTE — PLAN OF CARE
Problem: Patient Care Overview (Adult)  Goal: Plan of Care Review  Outcome: Ongoing (interventions implemented as appropriate)    12/10/17 5453   Coping/Psychosocial Response Interventions   Plan Of Care Reviewed With patient   Patient Care Overview   Progress improving   Outcome Evaluation   Outcome Summary/Follow up Plan Patient has been pleasant and cooperative during shift. Has been treated twice for pain/cough. No complaints of nausea or SOA.. VSS. Has ambulated once today. Will continue to monitor and assist patient as needed.         Problem: Fall Risk (Adult)  Goal: Absence of Falls  Outcome: Ongoing (interventions implemented as appropriate)    Problem: Skin Integrity Impairment, Risk/Actual (Adult)  Goal: Skin Integrity/Wound Healing  Outcome: Ongoing (interventions implemented as appropriate)

## 2017-12-11 LAB
ANION GAP SERPL CALCULATED.3IONS-SCNC: 9.5 MMOL/L
BACTERIA SPEC AEROBE CULT: NORMAL
BACTERIA SPEC RESP CULT: NORMAL
BUN BLD-MCNC: 12 MG/DL (ref 8–23)
BUN/CREAT SERPL: 14.1 (ref 7–25)
CALCIUM SPEC-SCNC: 8.7 MG/DL (ref 8.6–10.5)
CHLORIDE SERPL-SCNC: 101 MMOL/L (ref 98–107)
CO2 SERPL-SCNC: 29.5 MMOL/L (ref 22–29)
CREAT BLD-MCNC: 0.85 MG/DL (ref 0.57–1)
DEPRECATED RDW RBC AUTO: 46.8 FL (ref 37–54)
ERYTHROCYTE [DISTWIDTH] IN BLOOD BY AUTOMATED COUNT: 12.9 % (ref 11.7–13)
GFR SERPL CREATININE-BSD FRML MDRD: 64 ML/MIN/1.73
GLUCOSE BLD-MCNC: 105 MG/DL (ref 65–99)
GRAM STN SPEC: NORMAL
HCT VFR BLD AUTO: 32.8 % (ref 35.6–45.5)
HGB BLD-MCNC: 10.8 G/DL (ref 11.9–15.5)
MAGNESIUM SERPL-MCNC: 2.1 MG/DL (ref 1.6–2.4)
MCH RBC QN AUTO: 32.4 PG (ref 26.9–32)
MCHC RBC AUTO-ENTMCNC: 32.9 G/DL (ref 32.4–36.3)
MCV RBC AUTO: 98.5 FL (ref 80.5–98.2)
PHOSPHATE SERPL-MCNC: 3.7 MG/DL (ref 2.5–4.5)
PLATELET # BLD AUTO: 341 10*3/MM3 (ref 140–500)
PMV BLD AUTO: 9.1 FL (ref 6–12)
POTASSIUM BLD-SCNC: 4.4 MMOL/L (ref 3.5–5.2)
RBC # BLD AUTO: 3.33 10*6/MM3 (ref 3.9–5.2)
SODIUM BLD-SCNC: 140 MMOL/L (ref 136–145)
WBC NRBC COR # BLD: 9.25 10*3/MM3 (ref 4.5–10.7)

## 2017-12-11 PROCEDURE — 97162 PT EVAL MOD COMPLEX 30 MIN: CPT

## 2017-12-11 PROCEDURE — 94799 UNLISTED PULMONARY SVC/PX: CPT

## 2017-12-11 PROCEDURE — 84100 ASSAY OF PHOSPHORUS: CPT | Performed by: INTERNAL MEDICINE

## 2017-12-11 PROCEDURE — G8980 MOBILITY D/C STATUS: HCPCS

## 2017-12-11 PROCEDURE — 85027 COMPLETE CBC AUTOMATED: CPT | Performed by: INTERNAL MEDICINE

## 2017-12-11 PROCEDURE — G8978 MOBILITY CURRENT STATUS: HCPCS

## 2017-12-11 PROCEDURE — 80048 BASIC METABOLIC PNL TOTAL CA: CPT | Performed by: INTERNAL MEDICINE

## 2017-12-11 PROCEDURE — 25010000002 ENOXAPARIN PER 10 MG: Performed by: INTERNAL MEDICINE

## 2017-12-11 PROCEDURE — 83735 ASSAY OF MAGNESIUM: CPT | Performed by: INTERNAL MEDICINE

## 2017-12-11 PROCEDURE — G8979 MOBILITY GOAL STATUS: HCPCS

## 2017-12-11 PROCEDURE — 25010000002 CEFTRIAXONE PER 250 MG: Performed by: INTERNAL MEDICINE

## 2017-12-11 RX ORDER — AZITHROMYCIN 250 MG/1
500 TABLET, FILM COATED ORAL NIGHTLY
Status: COMPLETED | OUTPATIENT
Start: 2017-12-11 | End: 2017-12-12

## 2017-12-11 RX ADMIN — AZITHROMYCIN MONOHYDRATE 500 MG: 500 INJECTION, POWDER, LYOPHILIZED, FOR SOLUTION INTRAVENOUS at 03:09

## 2017-12-11 RX ADMIN — Medication 250 MG: at 17:23

## 2017-12-11 RX ADMIN — PROPRANOLOL HYDROCHLORIDE 120 MG: 60 CAPSULE, EXTENDED RELEASE ORAL at 20:22

## 2017-12-11 RX ADMIN — LOSARTAN POTASSIUM 100 MG: 100 TABLET, FILM COATED ORAL at 20:22

## 2017-12-11 RX ADMIN — HYDROCODONE BITARTRATE AND HOMATROPINE METHYLBROMIDE 5 ML: 5; 1.5 SOLUTION ORAL at 02:40

## 2017-12-11 RX ADMIN — IPRATROPIUM BROMIDE AND ALBUTEROL SULFATE 3 ML: .5; 3 SOLUTION RESPIRATORY (INHALATION) at 08:08

## 2017-12-11 RX ADMIN — AZITHROMYCIN 500 MG: 250 TABLET, FILM COATED ORAL at 21:45

## 2017-12-11 RX ADMIN — LEVOTHYROXINE SODIUM 75 MCG: 75 TABLET ORAL at 07:29

## 2017-12-11 RX ADMIN — IPRATROPIUM BROMIDE AND ALBUTEROL SULFATE 3 ML: .5; 3 SOLUTION RESPIRATORY (INHALATION) at 21:06

## 2017-12-11 RX ADMIN — HYDROCODONE BITARTRATE AND HOMATROPINE METHYLBROMIDE 5 ML: 5; 1.5 SOLUTION ORAL at 20:22

## 2017-12-11 RX ADMIN — IPRATROPIUM BROMIDE AND ALBUTEROL SULFATE 3 ML: .5; 3 SOLUTION RESPIRATORY (INHALATION) at 15:07

## 2017-12-11 RX ADMIN — CEFTRIAXONE SODIUM 1 G: 1 INJECTION, SOLUTION INTRAVENOUS at 02:35

## 2017-12-11 RX ADMIN — PROPRANOLOL HYDROCHLORIDE 120 MG: 60 CAPSULE, EXTENDED RELEASE ORAL at 08:45

## 2017-12-11 RX ADMIN — IPRATROPIUM BROMIDE AND ALBUTEROL SULFATE 3 ML: .5; 3 SOLUTION RESPIRATORY (INHALATION) at 11:04

## 2017-12-11 RX ADMIN — ENOXAPARIN SODIUM 40 MG: 40 INJECTION SUBCUTANEOUS at 20:21

## 2017-12-11 RX ADMIN — Medication 250 MG: at 08:45

## 2017-12-11 NOTE — PROGRESS NOTES
Continued Stay Note  [facility]     Patient Name: Stacie Vieira  MRN: [id number]  Today's Date: 12/11/2017    Admit Date: 12/8/2017          Discharge Plan       12/11/17 1015    Case Management/Social Work Plan    Plan Plan home.  MARIPOSA Calderon    Additional Comments FACE SHEET VERIFIED/ IM LETTER SIGNED.  Spoke to pt at bedside.  Pt lives alone in a single level house.  Pt is independent with ADL's.  Pt gets prescriptions from [facility]  ([place]). Pt denies any issues affording medications.  Pt is not current with HH.  Pt has not been in SNF.  Pt denies any discharge  needs at this time.  Plan home.  MARIPOSA Calderon              Discharge Codes     None            Lucia Cabrera RN

## 2017-12-11 NOTE — PLAN OF CARE
Problem: Patient Care Overview (Adult)  Goal: Plan of Care Review  Outcome: Ongoing (interventions implemented as appropriate)    12/11/17 0551   Coping/Psychosocial Response Interventions   Plan Of Care Reviewed With patient   Patient Care Overview   Progress no change   Outcome Evaluation   Outcome Summary/Follow up Plan UP AD ROBB AROUND ROOM,ALERT AND AWARE OF HER NEEDS. CONTINUE TO HAVE DRY, FREQUENT COUGH, HYCODIN GIVEN TWICE FOR COUGH RELIEF EFFECTIVE.          12/11/17 0551   Coping/Psychosocial Response Interventions   Plan Of Care Reviewed With patient   Patient Care Overview   Progress no change   Outcome Evaluation   Outcome Summary/Follow up Plan UP AD ROBB AROUND ROOM,ALERT AND AWARE OF HER NEEDS. CONTINUE TO HAVE DRY, FREQUENT COUGH, HYCODIN GIVEN TWICE FOR COUGH RELIEF EFFECTIVE. CONT ON IV A/B WITH NO ADVERSE REACTIONS. VSS. NO ACUTE DISTRESS.        Goal: Adult Individualization and Mutuality  Outcome: Ongoing (interventions implemented as appropriate)  Goal: Discharge Needs Assessment  Outcome: Ongoing (interventions implemented as appropriate)    Problem: Fall Risk (Adult)  Goal: Absence of Falls  Outcome: Ongoing (interventions implemented as appropriate)    Problem: Skin Integrity Impairment, Risk/Actual (Adult)  Goal: Skin Integrity/Wound Healing  Outcome: Ongoing (interventions implemented as appropriate)    Problem: Pneumonia (Adult)  Goal: Signs and Symptoms of Listed Potential Problems Will be Absent or Manageable (Pneumonia)  Outcome: Ongoing (interventions implemented as appropriate)

## 2017-12-11 NOTE — THERAPY EVALUATION
Acute Care - Physical Therapy Initial Evaluation  [facility]     Patient Name: Stacie Vieira  : 1935  MRN: [id number]  Today's Date: 2017   Onset of Illness/Injury or Date of Surgery Date: 17  Date of Referral to PT: 17  Referring Physician: Dr. Duncan      Admit Date: 2017     Visit Dx:    ICD-10-CM ICD-9-CM   1. Community acquired pneumonia of left lower lobe of lung J18.1 481     Patient Active Problem List   Diagnosis   • Mycobacterium avium complex   • Essential hypertension   • Palpitations   • Allergic rhinitis   • Hypothyroidism   • Hyperlipidemia   • Community acquired pneumonia of left lower lobe of lung     Past Medical History:   Diagnosis Date   • Acute stress disorder    • Allergic rhinitis    • C. difficile colitis     hx in past   • Elevated fasting glucose    • H/O: pneumonia    • Health care maintenance    • History of palpitations    • Hyperlipidemia    • Hypertension    • Hypoactive thyroid    • Hypokalemia    • Hypothyroidism    • Insomnia    • MAC (mycobacterium avium-intracellulare complex)     reason for bronch.  dx with this 2015   • Osteoporosis    • Palpitation    • Pneumonia     past hx of freq.   • Sinusitis    • Staph infection     in sinus area  recent sx 2016   • SVT (supraventricular tachycardia)     past hx  on medicine   • Syncope and collapse      Past Surgical History:   Procedure Laterality Date   • BREAST BIOPSY      PERCUTATNEOUS NEEDLE CORE   • BRONCHOSCOPY      2015   • BRONCHOSCOPY N/A 2016    Procedure: BRONCHOSCOPY WITH BAL;  Surgeon: Sinan Braga MD;  Location: [facility] ENDOSCOPY;  Service:    • BRONCHOSCOPY N/A 2017    Procedure: BRONCHOSCOPY;  Surgeon: Real Rousseau MD;  Location: [facility] ENDOSCOPY;  Service:    • COLONOSCOPY N/A 2005   • DILATATION AND CURETTAGE N/A 1990   • PAP SMEAR N/A 2004   • SINUS SURGERY  2016   • TONSILLECTOMY            PT ASSESSMENT (last 72 hours)      PT  Evaluation       12/11/17 1400 12/11/17 1014    Rehab Evaluation    Document Type evaluation  -MA     Subjective Information agree to therapy;complains of;fatigue  -MA     Patient Effort, Rehab Treatment good  -MA     Symptoms Noted During/After Treatment none  -MA     General Information    Patient Profile Review yes  -MA     Onset of Illness/Injury or Date of Surgery Date 12/11/17  -MA     Referring Physician Dr. Duncan  -MA     General Observations supine in bed with HOB elevated, no acute distress noted at rest  -MA     Pertinent History Of Current Problem Admitted for SOA  -MA     Precautions/Limitations no known precautions/limitations   hearing aids-bilaterally  -MA     Prior Level of Function independent:;all household mobility  -MA     Equipment Currently Used at Home none  -MA none  -BW    Plans/Goals Discussed With patient  -MA     Living Environment    Lives With  alone  -BW    Living Arrangements  house  -BW    Home Accessibility  no concerns  -BW    Stair Railings at Home  none  -BW    Type of Financial/Environmental Concern  none  -BW    Transportation Available  car;family or friend will provide  -BW    Living Environment Comment No home concerns per patient report  -MA     Clinical Impression    Date of Referral to PT 12/11/17  -MA     Criteria for Skilled Therapeutic Interventions Met no;no problems identified which require skilled intervention;current level of function same as previous level of function  -MA     Vital Signs    O2 Delivery Pre Treatment supplemental O2  -MA     O2 Delivery Intra Treatment room air  -MA     O2 Delivery Post Treatment supplemental O2  -MA     Pain Assessment    Pain Assessment No/denies pain  -MA     Vision Assessment/Intervention    Visual Impairment WFL with corrective lenses  -MA     Cognitive Assessment/Intervention    Current Cognitive/Communication Assessment functional  -MA     Orientation Status oriented x 4  -MA     Follows Commands/Answers Questions 100% of  the time;able to follow multi-step instructions  -MA     Personal Safety WNL/WFL;good awareness, safety precautions  -MA     Personal Safety Interventions fall prevention program maintained;gait belt;nonskid shoes/slippers when out of bed  -MA     ROM (Range of Motion)    General ROM no range of motion deficits identified  -MA     MMT (Manual Muscle Testing)    General MMT Assessment Detail B LE MMT 4+/5 grossly  -MA     Bed Mobility, Assessment/Treatment    Bed Mobility, Assistive Device bed rails  -MA     Bed Mobility, Scoot/Bridge, [place] independent  -MA     Bed Mob, Supine to Sit, [place] independent  -MA     Bed Mob, Sit to Supine, [place] independent  -MA     Transfer Assessment/Treatment    Transfers, Sit-Stand [place] supervision required  -MA     Transfers, Stand-Sit [place] supervision required  -MA     Transfers, Sit-Stand-Sit, Assist Device --   no AD  -MA     Transfer, Comment Good safety awareness, no LOB upon standing  -MA     Gait Assessment/Treatment    Gait, [place] Level supervision required  -MA     Gait, Assistive Device --   no AD  -MA     Gait, Distance (Feet) 160  -MA     Gait, Gait Pattern Analysis swing-through gait  -MA     Gait, Gait Deviations kee decreased  -MA     Gait, Comment No overt LOB or veering noted- slowed kee but steady  -MA     Therapy Exercises    Bilateral Lower Extremities AROM:;10 reps;LAQ;ankle pumps/circles;hip flexion  -MA     Positioning and Restraints    Pre-Treatment Position in bed  -MA     Post Treatment Position chair  -MA     In Chair notified nsg;sitting;call light within reach;encouraged to call for assist  -MA       User Key  (r) = Recorded By, (t) = Taken By, (c) = Cosigned By    Initials Name Provider Type    ARNAV Cabrera RN Case Manager    FATUMA Lee, PT Physical Therapist          Physical Therapy Education     Title: PT OT SLP Therapies (Resolved)     Topic: Physical Therapy (Resolved)      Point: Mobility training (Resolved)    Learning Progress Summary    Learner Readiness Method Response Comment Documented by Status   Patient Acceptance E MARCELINO  MA 12/11/17 1416 Done               Point: Home exercise program (Resolved)    Learning Progress Summary    Learner Readiness Method Response Comment Documented by Status   Patient Acceptance E MARCELINO  MA 12/11/17 1416 Done                      User Key     Initials Effective Dates Name Provider Type Discipline    MA 12/13/16 -  Layne Lee PT Physical Therapist PT                PT Recommendation and Plan  Anticipated Discharge Disposition: home  PT Frequency: evaluation only  Plan of Care Review  Plan Of Care Reviewed With: patient  Outcome Summary/Follow up Plan: Patient is a pleasant 82 y.o. female who was admitted for SOA. Per patient report, she lives at home alone and no use of prior AD. All bed mobility and transfers performed mod I or with SV. Ambulated 150' with no AD and with SV. Based on clinical presentation, patient does not require skilled PT services acutely at this time as patient appears to be at baseline.              Outcome Measures       12/11/17 1400          How much help from another person do you currently need...    Turning from your back to your side while in flat bed without using bedrails? 4  -MA      Moving from lying on back to sitting on the side of a flat bed without bedrails? 4  -MA      Moving to and from a bed to a chair (including a wheelchair)? 4  -MA      Standing up from a chair using your arms (e.g., wheelchair, bedside chair)? 4  -MA      Climbing 3-5 steps with a railing? 4  -MA      To walk in hospital room? 4  -MA      AM-PAC 6 Clicks Score 24  -MA      Functional Assessment    Outcome Measure Options AM-PAC 6 Clicks Basic Mobility (PT)  -MA        User Key  (r) = Recorded By, (t) = Taken By, (c) = Cosigned By    Initials Name Provider Type    FATUMA Lee PT Physical Therapist           Time  Calculation:         PT Charges       12/11/17 1408          Time Calculation    Start Time 1358  -MA      Stop Time 1408  -MA      Time Calculation (min) 10 min  -MA      PT Received On 12/11/17  -MA      PT - Next Appointment 12/12/17  -MA        User Key  (r) = Recorded By, (t) = Taken By, (c) = Cosigned By    Initials Name Provider Type    FATUMA Lee PT Physical Therapist          Therapy Charges for Today     Code Description Service Date Service Provider Modifiers Qty    [id number] HC PT EVAL MOD COMPLEXITY 2 12/11/2017 Layne Lee, PT GP 1          PT G-Codes  Outcome Measure Options: AM-PAC 6 Clicks Basic Mobility (PT)      Layne Lee PT  12/11/2017

## 2017-12-11 NOTE — PLAN OF CARE
Problem: Patient Care Overview (Adult)  Goal: Plan of Care Review  Outcome: Ongoing (interventions implemented as appropriate)    12/11/17 2959   Coping/Psychosocial Response Interventions   Plan Of Care Reviewed With patient   Patient Care Overview   Progress improving   Outcome Evaluation   Outcome Summary/Follow up Plan No c/o SOA. Occ cough. Plan to switch to PO abx and poss D/C in am.         Problem: Fall Risk (Adult)  Goal: Absence of Falls  Outcome: Ongoing (interventions implemented as appropriate)    Problem: Skin Integrity Impairment, Risk/Actual (Adult)  Goal: Skin Integrity/Wound Healing  Outcome: Ongoing (interventions implemented as appropriate)    Problem: Pneumonia (Adult)  Goal: Signs and Symptoms of Listed Potential Problems Will be Absent or Manageable (Pneumonia)  Outcome: Ongoing (interventions implemented as appropriate)

## 2017-12-11 NOTE — PLAN OF CARE
Problem: Patient Care Overview (Adult)  Goal: Plan of Care Review    12/11/17 8292   Coping/Psychosocial Response Interventions   Plan Of Care Reviewed With patient   Outcome Evaluation   Outcome Summary/Follow up Plan Patient is a pleasant 82 y.o. female who was admitted for SOA. Per patient report, she lives at home alone and no use of prior AD. All bed mobility and transfers performed mod I or with SV. Ambulated 150' with no AD and with SV. Based on clinical presentation, patient does not require skilled PT services acutely at this time as patient appears to be at baseline.

## 2017-12-11 NOTE — PROGRESS NOTES
Discharge Planning Assessment  [facility]     Patient Name: Stacie Vieira  MRN: [id number]  Today's Date: 12/11/2017    Admit Date: 12/8/2017          Discharge Needs Assessment       12/11/17 1014    Living Environment    Lives With alone    Living Arrangements house    Home Accessibility no concerns    Stair Railings at Home none    Type of Financial/Environmental Concern none    Transportation Available car;family or friend will provide    Living Environment    Provides Primary Care For no one    Quality Of Family Relationships supportive    Able to Return to Prior Living Arrangements yes    Living Arrangement Comments Pt lives alone in single story house.    Discharge Needs Assessment    Concerns To Be Addressed denies needs/concerns at this time    Anticipated Changes Related to Illness none    Equipment Currently Used at Home none    Equipment Needed After Discharge none    Discharge Disposition home or self-care            Discharge Plan       12/11/17 1015    Case Management/Social Work Plan    Plan Plan home.  MARIPOSA Calderon    Additional Comments FACE SHEET VERIFIED/ IM LETTER SIGNED.  Spoke to pt at bedside.  Pt lives alone in a single level house.  Pt is independent with ADL's.  Pt gets prescriptions from [facility]  ([place] Pt denies any issues affording medications.  Pt is not current with HH.  Pt has not been in SNF.  Pt denies any discharge  needs at this time.  Plan home.  MARIPOSA Calderon        Discharge Placement     No information found                Demographic Summary       12/11/17 1012    Referral Information    Admission Type inpatient    Arrived From home or self-care    Primary Care Physician Information    Name Dr. Tatiana Bellamy    Phone [phone]            Functional Status       12/11/17 1013    Functional Status Current    Ambulation 2-->assistive person    Transferring 2-->assistive person    Toileting 2-->assistive person    Bathing 2-->assistive person    Dressing  2-->assistive person    Eating 0-->independent    Communication 0-->understands/communicates without difficulty    Functional Status Prior    Ambulation 0-->independent    Transferring 0-->independent    Toileting 0-->independent    Bathing 0-->independent    Dressing 0-->independent    Eating 0-->independent    Communication 0-->understands/communicates without difficulty            Psychosocial     None            Abuse/Neglect     None            Legal     None            Substance Abuse     None            Patient Forms     None          Lucia Cabrera, RN

## 2017-12-11 NOTE — PROGRESS NOTES
[facility]  Phone: [phone]  Real Rousseau MD    Subjective:  LOS: 3    Improving but still on O2.    Objective   Vital Signs past 24hrs  BP range: BP: ()/(54-79) 142/62  Pulse range: Heart Rate:  [60-70] 61  Resp rate range: Resp:  [16] 16  Temp range: Temp (24hrs), Av °F (37.2 °C), Min:98.8 °F (37.1 °C), Max:99.4 °F (37.4 °C)    O2 Device: nasal cannulaFlow (L/min):  [1-2] 2  Oxygen range:SpO2:  [95 %-98 %] 98 %   50.8 kg (111 lb 15.9 oz); Body mass index is 19.84 kg/(m^2).    Intake/Output Summary (Last 24 hours) at 17 1609  Last data filed at 17 0309   Gross per 24 hour   Intake              300 ml   Output                0 ml   Net              300 ml       Physical Exam   Constitutional: She appears well-developed.   Eyes: Conjunctivae are normal.   Neck: Normal range of motion. Neck supple.   Cardiovascular: Normal rate and regular rhythm.    No murmur heard.  Pulmonary/Chest: Effort normal. No respiratory distress. She has no wheezes. She has no rales.   Abdominal: Soft. Bowel sounds are normal. She exhibits no mass. There is no tenderness.   Musculoskeletal: She exhibits no edema.   Neurological: She is alert.   Skin: Skin is warm. No rash noted.     Results Review:    I have reviewed the laboratory and imaging data since the last note by [facility] physician.  My annotations are noted in assessment and plan.    Medication Review:  I have reviewed the current MAR.  My annotations are noted in assessment and plan.      lactated ringers 30 mL/hr Last Rate: Stopped (17 1329)     Plan   PCCM Problems  Pneumonia, CAP  Hx COOKIE  Acute respiratory failure, hypoxia    Plan of Treatment  Continue rx for CAP. So far BAL unremarkable.    Now s/p bronch for possible COOKIE. Pending results.    Wean O2 as tolerated.    Lovenox for dvt prophylaxis.    Walk ox and CxR tomorrow. ? Home on oral abx.    Real Rousseau MD  17  4:09 PM    Part of this note may be an electronic  transcription/translation of spoken language to printed text using the Dragon Dictation System.

## 2017-12-12 ENCOUNTER — APPOINTMENT (OUTPATIENT)
Dept: GENERAL RADIOLOGY | Facility: HOSPITAL | Age: 82
End: 2017-12-12
Attending: INTERNAL MEDICINE

## 2017-12-12 LAB
LAB AP CASE REPORT: NORMAL
Lab: NORMAL
PATH REPORT.FINAL DX SPEC: NORMAL
PATH REPORT.GROSS SPEC: NORMAL

## 2017-12-12 PROCEDURE — 25010000002 CEFTRIAXONE PER 250 MG: Performed by: INTERNAL MEDICINE

## 2017-12-12 PROCEDURE — 94799 UNLISTED PULMONARY SVC/PX: CPT

## 2017-12-12 PROCEDURE — 94620 HC PULMONARY STRESS TEST SIMPLE: CPT

## 2017-12-12 PROCEDURE — 71020 HC CHEST PA AND LATERAL: CPT

## 2017-12-12 PROCEDURE — 25010000002 ENOXAPARIN PER 10 MG: Performed by: INTERNAL MEDICINE

## 2017-12-12 RX ORDER — CEFDINIR 300 MG/1
300 CAPSULE ORAL EVERY 12 HOURS SCHEDULED
Status: DISCONTINUED | OUTPATIENT
Start: 2017-12-13 | End: 2017-12-13 | Stop reason: HOSPADM

## 2017-12-12 RX ADMIN — HYDROCODONE BITARTRATE AND HOMATROPINE METHYLBROMIDE 5 ML: 5; 1.5 SOLUTION ORAL at 21:36

## 2017-12-12 RX ADMIN — IPRATROPIUM BROMIDE AND ALBUTEROL SULFATE 3 ML: .5; 3 SOLUTION RESPIRATORY (INHALATION) at 11:24

## 2017-12-12 RX ADMIN — Medication 250 MG: at 17:22

## 2017-12-12 RX ADMIN — IPRATROPIUM BROMIDE AND ALBUTEROL SULFATE 3 ML: .5; 3 SOLUTION RESPIRATORY (INHALATION) at 19:12

## 2017-12-12 RX ADMIN — HYDROCODONE BITARTRATE AND HOMATROPINE METHYLBROMIDE 5 ML: 5; 1.5 SOLUTION ORAL at 05:43

## 2017-12-12 RX ADMIN — ENOXAPARIN SODIUM 40 MG: 40 INJECTION SUBCUTANEOUS at 20:01

## 2017-12-12 RX ADMIN — PROPRANOLOL HYDROCHLORIDE 120 MG: 60 CAPSULE, EXTENDED RELEASE ORAL at 08:35

## 2017-12-12 RX ADMIN — IPRATROPIUM BROMIDE AND ALBUTEROL SULFATE 3 ML: .5; 3 SOLUTION RESPIRATORY (INHALATION) at 06:57

## 2017-12-12 RX ADMIN — LOSARTAN POTASSIUM 100 MG: 100 TABLET, FILM COATED ORAL at 21:36

## 2017-12-12 RX ADMIN — LEVOTHYROXINE SODIUM 50 MCG: 50 TABLET ORAL at 05:41

## 2017-12-12 RX ADMIN — CEFTRIAXONE SODIUM 1 G: 1 INJECTION, SOLUTION INTRAVENOUS at 02:18

## 2017-12-12 RX ADMIN — AZITHROMYCIN 500 MG: 250 TABLET, FILM COATED ORAL at 20:02

## 2017-12-12 RX ADMIN — PROPRANOLOL HYDROCHLORIDE 120 MG: 60 CAPSULE, EXTENDED RELEASE ORAL at 20:01

## 2017-12-12 RX ADMIN — IPRATROPIUM BROMIDE AND ALBUTEROL SULFATE 3 ML: .5; 3 SOLUTION RESPIRATORY (INHALATION) at 15:40

## 2017-12-12 RX ADMIN — Medication 250 MG: at 08:35

## 2017-12-12 NOTE — PLAN OF CARE
Problem: Patient Care Overview (Adult)  Goal: Plan of Care Review  Outcome: Ongoing (interventions implemented as appropriate)    12/12/17 0521   Coping/Psychosocial Response Interventions   Plan Of Care Reviewed With patient   Patient Care Overview   Progress no change   Outcome Evaluation   Outcome Summary/Follow up Plan CONT WITH INTERMITTENT COUGH, PRN HYCODAN GIVEN PERPT REQUEST,SOME RELEIF NOTED. SLEPT FAIR THRU THE NIGHT. NOC/OPAIN OR OTHER DISCOPMFORT. HOB ELEVATED,O2 AT 2LPM/NC IS USE. VSS. NO SOB.        Goal: Adult Individualization and Mutuality  Outcome: Ongoing (interventions implemented as appropriate)  Goal: Discharge Needs Assessment  Outcome: Ongoing (interventions implemented as appropriate)    Problem: Fall Risk (Adult)  Goal: Absence of Falls  Outcome: Ongoing (interventions implemented as appropriate)    Problem: Skin Integrity Impairment, Risk/Actual (Adult)  Goal: Skin Integrity/Wound Healing  Outcome: Ongoing (interventions implemented as appropriate)    Problem: Pneumonia (Adult)  Goal: Signs and Symptoms of Listed Potential Problems Will be Absent or Manageable (Pneumonia)  Outcome: Ongoing (interventions implemented as appropriate)

## 2017-12-12 NOTE — PROGRESS NOTES
Exercise Oximetry    Patient Name:Stacie Vieira   MRN: [id number]   Date: 12/12/17             ROOM AIR BASELINE   SpO2% 93   Heart Rate    Blood Pressure      EXERCISE ON ROOM AIR SpO2% EXERCISE ON O2 @ LPM SpO2%   1 MINUTE 94 1 MINUTE    2 MINUTES 96 2 MINUTES    3 MINUTES 97 3 MINUTES    4 MINUTES 96 4 MINUTES    5 MINUTES 94 5 MINUTES    6 MINUTES 93 6 MINUTES               Distance Walked   Distance Walked   Dyspnea (Sharmila Scale)   Dyspnea (Sharmila Scale)   Fatigue (Sharmila Scale) Fatigue (Sharmila Scale)   SpO2% Post Exercise  94 SpO2% Post Exercise   HR Post Exercise   HR Post Exercise   Time to Recovery   Time to Recovery     Comments: Pt completed full 6 minute walk without getting SOA

## 2017-12-12 NOTE — PROGRESS NOTES
[facility]  Phone: [phone]  Real Rousseau MD    Subjective:  LOS: 4    No significant cough or phlegm. She feels safe to go home.     Objective   Vital Signs past 24hrs  BP range: BP: (124-168)/(58-84) 138/74  Pulse range: Heart Rate:  [53-66] 58  Resp rate range: Resp:  [16-18] 16  Temp range: Temp (24hrs), Av.2 °F (36.8 °C), Min:97.6 °F (36.4 °C), Max:98.9 °F (37.2 °C)    O2 Device: room airFlow (L/min):  [1-3] 1  Oxygen range:SpO2:  [93 %-98 %] 98 %   50.8 kg (111 lb 15.9 oz); Body mass index is 19.84 kg/(m^2).    Intake/Output Summary (Last 24 hours) at 17 1609  Last data filed at 17 0835   Gross per 24 hour   Intake              410 ml   Output                0 ml   Net              410 ml       Physical Exam   Constitutional: She appears well-developed.   Eyes: Conjunctivae are normal.   Neck: Normal range of motion. Neck supple.   Cardiovascular: Normal rate and regular rhythm.    No murmur heard.  Pulmonary/Chest: Effort normal. No respiratory distress. She has no wheezes. She has no rales.   Abdominal: Soft. Bowel sounds are normal. She exhibits no mass. There is no tenderness.   Musculoskeletal: She exhibits no edema.   Neurological: She is alert.   Skin: Skin is warm. No rash noted.     Results Review:    I have reviewed the laboratory and imaging data since the last note by [facility] physician.  My annotations are noted in assessment and plan.    Medication Review:  I have reviewed the current MAR.  My annotations are noted in assessment and plan.      lactated ringers 30 mL/hr Last Rate: Stopped (17 1329)     Plan   PCCM Problems  Pneumonia, CAP  Hx COOKIE  Acute respiratory failure, hypoxia    Plan of Treatment  Continue rx for CAP. So far BAL unremarkable. Change to po tomorrow.    Now s/p bronch for possible COOKIE. Pending results. So far -ve.    Off O2 and walkox normal.    CxR shows evolution of pneumonia. Eventually needs fu CT.    Lovenox for dvt prophylaxis.    Home  after pt assessment tomorrow.    Rael Rousseau MD  12/12/17  4:09 PM    Part of this note may be an electronic transcription/translation of spoken language to printed text using the Dragon Dictation System.

## 2017-12-12 NOTE — PLAN OF CARE
Problem: Patient Care Overview (Adult)  Goal: Plan of Care Review  Outcome: Ongoing (interventions implemented as appropriate)    12/12/17 1085   Coping/Psychosocial Response Interventions   Plan Of Care Reviewed With patient   Patient Care Overview   Progress no change   Outcome Evaluation   Outcome Summary/Follow up Plan Patient doing well. Patient denies having any pain. Patient on room air. Denies having any SOA. Vital signs stable. Will continue to monitor.          Problem: Fall Risk (Adult)  Goal: Absence of Falls  Outcome: Ongoing (interventions implemented as appropriate)    Problem: Skin Integrity Impairment, Risk/Actual (Adult)  Goal: Skin Integrity/Wound Healing  Outcome: Ongoing (interventions implemented as appropriate)    Problem: Pneumonia (Adult)  Goal: Signs and Symptoms of Listed Potential Problems Will be Absent or Manageable (Pneumonia)  Outcome: Ongoing (interventions implemented as appropriate)

## 2017-12-13 ENCOUNTER — TELEPHONE (OUTPATIENT)
Dept: INTERNAL MEDICINE | Facility: CLINIC | Age: 82
End: 2017-12-13

## 2017-12-13 VITALS
BODY MASS INDEX: 19.84 KG/M2 | DIASTOLIC BLOOD PRESSURE: 61 MMHG | HEART RATE: 57 BPM | WEIGHT: 111.99 LBS | OXYGEN SATURATION: 96 % | HEIGHT: 63 IN | SYSTOLIC BLOOD PRESSURE: 138 MMHG | TEMPERATURE: 98.1 F | RESPIRATION RATE: 16 BRPM

## 2017-12-13 PROBLEM — J96.01 ACUTE RESPIRATORY FAILURE WITH HYPOXIA: Status: ACTIVE | Noted: 2017-12-13

## 2017-12-13 LAB
BACTERIA SPEC AEROBE CULT: NORMAL
BACTERIA SPEC AEROBE CULT: NORMAL

## 2017-12-13 PROCEDURE — 94799 UNLISTED PULMONARY SVC/PX: CPT

## 2017-12-13 RX ORDER — CEFDINIR 300 MG/1
300 CAPSULE ORAL EVERY 12 HOURS SCHEDULED
Qty: 11 CAPSULE | Refills: 0 | Status: SHIPPED | OUTPATIENT
Start: 2017-12-13 | End: 2017-12-19

## 2017-12-13 RX ORDER — IPRATROPIUM BROMIDE AND ALBUTEROL SULFATE 2.5; .5 MG/3ML; MG/3ML
3 SOLUTION RESPIRATORY (INHALATION)
Qty: 360 ML | Refills: 3 | Status: SHIPPED | OUTPATIENT
Start: 2017-12-13 | End: 2018-01-11 | Stop reason: HOSPADM

## 2017-12-13 RX ADMIN — IPRATROPIUM BROMIDE AND ALBUTEROL SULFATE 3 ML: .5; 3 SOLUTION RESPIRATORY (INHALATION) at 06:56

## 2017-12-13 RX ADMIN — LEVOTHYROXINE SODIUM 75 MCG: 75 TABLET ORAL at 06:28

## 2017-12-13 RX ADMIN — IPRATROPIUM BROMIDE AND ALBUTEROL SULFATE 3 ML: .5; 3 SOLUTION RESPIRATORY (INHALATION) at 11:39

## 2017-12-13 RX ADMIN — Medication 250 MG: at 08:20

## 2017-12-13 RX ADMIN — PROPRANOLOL HYDROCHLORIDE 120 MG: 60 CAPSULE, EXTENDED RELEASE ORAL at 08:20

## 2017-12-13 RX ADMIN — CEFDINIR 300 MG: 300 CAPSULE ORAL at 08:23

## 2017-12-13 NOTE — SIGNIFICANT NOTE
12/13/17 0954   Rehab Treatment   Discipline physical therapist   Rehab Evaluation   Evaluation Not Performed (New PT eval order noted. Discussed with pt.She feels like mobility is unchanged from PT eval on 12/11-pt was found to be at baseline and safe to d/c home. PT will sign off. Pt would like HH nursing and continued breathing treatments at d/c.)

## 2017-12-13 NOTE — DISCHARGE SUMMARY
Date of Admission: 12/8/2017  Date of Discharge:  12/13/2017    Discharge Diagnosis:    Pneumonia, CAP  Hx COOKIE  Acute respiratory failure, hypoxia-resolved    Discharge Medications     Your medication list      START taking these medications       Instructions Last Dose Given Next Dose Due    cefdinir 300 MG capsule   Commonly known as:  OMNICEF        Take 1 capsule by mouth Every 12 (Twelve) Hours for 11 doses. Indications: Pneumonia         ipratropium-albuterol 0.5-2.5 mg/mL nebulizer   Commonly known as:  DUO-NEB        Take 3 mL by nebulization 4 (Four) Times a Day for 30 days.           CONTINUE taking these medications       Instructions Last Dose Given Next Dose Due    CALCIUM-MAGNESIUM-VITAMIN D PO              JOINT SUPPORT PO              levothyroxine 75 MCG tablet   Commonly known as:  LEVOXYL        Take 1 tablet by mouth Every Other Day.         levothyroxine 50 MCG tablet   Commonly known as:  LEVOXYL        Take 1 tablet by mouth Every Other Day.         loratadine 10 MG tablet   Commonly known as:  CLARITIN              montelukast 10 MG tablet   Commonly known as:  SINGULAIR              MULTI VITAMIN DAILY PO              olmesartan 40 MG tablet   Commonly known as:  BENICAR              propranolol  MG 24 hr capsule   Commonly known as:  INDERAL LA        Take 1 capsule by mouth 2 (Two) Times a Day.         saccharomyces boulardii 250 MG capsule   Commonly known as:  FLORASTOR              Vitamin D-3 1000 units capsule                STOP taking these medications          MEDROL 4 MG tablet   Generic drug:  MethylPREDNISolone                Where to Get Your Medications      These medications were sent to [facility] [address] [place] [address] - [address] AT [facility] - [phone]  - [phone]   [address], [facility] [address]     Phone:  [phone]    • cefdinir 300 MG capsule   • ipratropium-albuterol 0.5-2.5 mg/mL nebulizer              Discharge Instructions    Discharge Diet:  Diet Order   Procedures   • Diet Regular       Follow-up Appointments  Follow-up Information     Follow up with Tatiana Andersen MD .    Specialty:  Internal Medicine    Contact information:    [address]  [place] [address]  [phone]          Follow up with Sinan Braga MD. Schedule an appointment as soon as possible for a visit in 1 week(s).    Specialty:  Pulmonary Disease    Contact information:    [address]  [place] [address]  [phone]          Additional Instructions for the Follow-ups that You Need to Schedule     Obtain Informed Consent    Dec 09, 2017    Informed Consent Given For:  bronch           Ambulatory Referral to Home Health    As directed    Face to Face Visit Date:  12/13/2017    Follow-up Provider for Plan of Care?:  I treated the patient in an acute care facility and will not continue treatment after discharge.    Follow-up Provider:  TATIANA ANDERSEN [[id number]]    Reason/Clinical Findings:  pneumonia; juan j    Describe mobility limitations that make leaving home difficult:  pneumonia    Nursing/Therapeutic Services Requested:  Skilled Nursing Physical Therapy    Skilled nursing orders:  Mini-nebs Cardiopulmonary assessments    PT orders:  Strengthening    Frequency:  1 Week 1                         Presenting Problem/History of Present Illness    Very pleasant 82-year-old female follows my partner Dr. Sinan Braga with a history of Mac treatment.  She was also following infectious diseases Dr. Aranda.  September of this year she had discontinued her Mac treatment after being treated for 21 months for back.  Patient presented to the emergency room last night with shortness of breath for the last 5-6 days.  She's had a cough productive of clearish to light yellow sputum.  She's had some chills but no fever documented.  No chest pain or chest tightness reported.  No nausea vomiting or aspiration was  reported.    Hospital Course  Bronchoscopy was performed. BAL cultures so far are negative and will need to be reviewed as outpatient. She has not had significant cough. Her O2 was weaned off and her exercise oximetry was normal. Her f/u CXR showed evolution of pneumonia and f/u CT will be required as below to ensure infiltrates do not progress. She felt safe to go home with home health.    I was able to reach her son in [place] (intensivist) at [phone], updated. Use nebs qid - ordered. Finish out abx. Clinically better. Requests early appt with Dr. Sinan Braga - will arrange for 2 weeks. Needs CT in 6 weeks or so.      Condition on Discharge:  Good    Vital Signs past 24hrs  BP range: BP: (138-182)/(61-91) 138/61  Pulse range: Heart Rate:  [] 52  Resp rate range: Resp:  [16-18] 16  Temp range: Temp (24hrs), Av.1 °F (36.7 °C), Min:97.5 °F (36.4 °C), Max:98.5 °F (36.9 °C)    O2 Device: room air   Oxygen range:SpO2:  [97 %-98 %] 97 %   50.8 kg (111 lb 15.9 oz); Body mass index is 19.84 kg/(m^2).    Intake/Output Summary (Last 24 hours) at 17 1117  Last data filed at 17 0829   Gross per 24 hour   Intake              300 ml   Output                0 ml   Net              300 ml       Constitutional: She appears well-developed.   Eyes: Conjunctivae are normal.   Neck: Normal range of motion. Neck supple.   Cardiovascular: Normal rate and regular rhythm.    No murmur heard.  Pulmonary/Chest: Effort normal. No respiratory distress. She has no wheezes. She has no rales.   Abdominal: Soft. Bowel sounds are normal. She exhibits no mass. There is no tenderness.   Musculoskeletal: She exhibits no edema.   Neurological: She is alert.   Skin: Skin is warm. No rash noted.     Procedures Performed:    Procedure(s) with comments:  BRONCHOSCOPY - pneumonia    Consults:    Consults     Date and Time Order Name Status Description    2017 0134 Pulmonology (on-call MD unless specified) Completed            Pertinent Test Results:           Results from last 7 days  Lab Units 12/07/17  2330   TROPONIN T ng/mL <0.010               Results from last 7 days  Lab Units 12/11/17  0733 12/07/17  2330   WBC 10*3/mm3 9.25 11.20*   HEMOGLOBIN g/dL 10.8* 12.4   HEMATOCRIT % 32.8* 36.7   PLATELETS 10*3/mm3 341 303       Microbiology Results (last 10 days)     Procedure Component Value - Date/Time    AFB Culture - Brushing, Lung, Left Lower Lobe [[id number]] Collected:  12/09/17 1255    Lab Status:  Preliminary result Specimen:  Brushing from Lung, Left Lower Lobe Updated:  12/10/17 1056     AFB Stain No acid fast bacilli seen on direct smear    Respiratory Culture - Brushing, Lung, Left Lower Lobe [[id number]] Collected:  12/09/17 1255    Lab Status:  Final result Specimen:  Brushing from Lung, Left Lower Lobe Updated:  12/11/17 1054     Respiratory Culture No growth at 2 days     Gram Stain Result Many (4+) WBCs seen      No organisms seen    AFB Culture - Wash, Bronchus [[id number]] Collected:  12/09/17 1252    Lab Status:  Preliminary result Specimen:  Wash from Bronchus Updated:  12/10/17 1352     AFB Stain No acid fast bacilli seen on concentrated smear    Respiratory Culture - Wash, Bronchus [[id number]] Collected:  12/09/17 1252    Lab Status:  Final result Specimen:  Wash from Bronchus Updated:  12/11/17 1045     Respiratory Culture --      Rare Normal Respiratory Ledy     Gram Stain Result Many (4+) WBCs seen      No organisms seen    Respiratory Panel, PCR - Wash, Bronchus [[id number]]  (Normal) Collected:  12/09/17 1252    Lab Status:  Final result Specimen:  Wash from Bronchus Updated:  12/09/17 1505     ADENOVIRUS, PCR Not Detected     Coronavirus 229E Not Detected     Coronavirus HKU1 Not Detected     Coronavirus NL63 Not Detected     Coronavirus OC43 Not Detected     Human Metapneumovirus Not Detected     Human Rhinovirus/Enterovirus Not Detected     Influenza B PCR Not Detected     Parainfluenza Virus 1 Not  Detected     Parainfluenza Virus 2 Not Detected     Parainfluenza Virus 3 Not Detected     Parainfluenza Virus 4 Not Detected     Bordetella pertussis pcr Not Detected     Influenza 2009 H1N1 by PCR Not Detected     Chlamydophila pneumoniae PCR Not Detected     Mycoplasma pneumo by PCR Not Detected     Influenza A PCR Not Detected     Influenza A H3 Not Detected     Influenza A H1 Not Detected     RSV, PCR Not Detected    AFB Culture - Lavage, Lung, Left Lower Lobe [[id number]] Collected:  12/09/17 1233    Lab Status:  Preliminary result Specimen:  Lavage from Lung, Left Lower Lobe Updated:  12/10/17 1352     AFB Stain No acid fast bacilli seen on concentrated smear    BAL Culture, Quantitative - Lavage, Lung, Left Lower Lobe [[id number]]  (Normal) Collected:  12/09/17 1233    Lab Status:  Final result Specimen:  Lavage from Lung, Left Lower Lobe Updated:  12/11/17 1042     BAL Culture No growth at 2 days     Gram Stain Result Few (2+) WBCs seen      No organisms seen    Respiratory Panel, PCR - Swab, Nasopharynx [[id number]]  (Normal) Collected:  12/08/17 1605    Lab Status:  Final result Specimen:  Swab from Nasopharynx Updated:  12/08/17 1748     ADENOVIRUS, PCR Not Detected     Coronavirus 229E Not Detected     Coronavirus HKU1 Not Detected     Coronavirus NL63 Not Detected     Coronavirus OC43 Not Detected     Human Metapneumovirus Not Detected     Human Rhinovirus/Enterovirus Not Detected     Influenza B PCR Not Detected     Parainfluenza Virus 1 Not Detected     Parainfluenza Virus 2 Not Detected     Parainfluenza Virus 3 Not Detected     Parainfluenza Virus 4 Not Detected     Bordetella pertussis pcr Not Detected     Influenza 2009 H1N1 by PCR Not Detected     Chlamydophila pneumoniae PCR Not Detected     Mycoplasma pneumo by PCR Not Detected     Influenza A PCR Not Detected     Influenza A H3 Not Detected     Influenza A H1 Not Detected     RSV, PCR Not Detected    Blood Culture - Blood, Blood, Venous Line  [[id number]]  (Normal) Collected:  12/08/17 0201    Lab Status:  Final result Specimen:  Blood from Arm, Left Updated:  12/13/17 0216     Blood Culture No growth at 5 days    Blood Culture - Blood, Blood, Venous Line [[id number]]  (Normal) Collected:  12/08/17 0201    Lab Status:  Final result Specimen:  Blood from Arm, Right Updated:  12/13/17 0216     Blood Culture No growth at 5 days            Results from last 7 days  Lab Units 12/11/17  0733 12/07/17  2330   SODIUM mmol/L 140 135*   POTASSIUM mmol/L 4.4 4.3   CHLORIDE mmol/L 101 98   CO2 mmol/L 29.5* 24.2   BUN mg/dL 12 14   CREATININE mg/dL 0.85 0.87       Results from last 7 days  Lab Units 12/07/17  2330   BILIRUBIN mg/dL 0.8   ALK PHOS U/L 82   ALT (SGPT) U/L 13   AST (SGOT) U/L 23       Pertinent Imaging Results:    Imaging Results (all)     Procedure Component Value Units Date/Time    XR Chest 2 View [[id number]] Collected:  12/07/17 2310     Updated:  12/07/17 2315    Narrative:       PA AND LATERAL CHEST X-RAY     HISTORY: SOA  triage protocol     COMPARISON: 09/20/2016.     FINDINGS: PA and lateral views of the chest were obtained. The lungs are  hyperexpanded suggesting COPD. There is ill-defined airspace disease  posteriorly in the left lower lobe, better seen on the lateral view,  suspicious for an area of pneumonia. On the lateral view, there appears  to be some consolidation in the lingula as well. On previous CT scan  from 09/15/2017, multiple pulmonary nodules were noted. There is some  faint nodularity peripherally in the right upper lobe which was present  on that CT examination. There were additional smaller nodules which are  not well seen on today's plain film study. Continued surveillance will  be needed to exclude neoplastic process. Vascularity is normal. No  significant pleural fluid. There is lower thoracic dextroscoliosis.  There is generalized osteopenia.       Impression:       1. COPD with new left lung base opacities suspicious for  pneumonia.  2. Areas of nodularity are not as well seen by plain film imaging and  continued CT follow-up will be needed.     This report was finalized on 12/7/2017 11:12 PM by Eber Sepulveda MD.       CT Chest Without Contrast [[id number]] Collected:  12/08/17 0914     Updated:  12/08/17 1137    Narrative:       CT CHEST WITHOUT CONTRAST     HISTORY: Follow-up pneumonia. Mycobacterium avium intracellulare  infection.     TECHNIQUE: Axial CT images of the chest were obtained without  administration of intravenous contrast. Coronal and sagittal reformats  were obtained.     COMPARISON: CT chest from 09/15/2017, chest radiograph from 12/07/2017.     FINDINGS: Lung windows demonstrate new dense consolidation in the  subpleural aspect within the left lower lobe associated with a small  pleural effusion. Since prior chest imaging from September 2017, there  is additionally reticulonodular opacities seen within the right upper  lobe with mild progression, areas of bronchial wall thickening and  mucous plugging are particularly demonstrated within the bilateral lower  lobes.     Mild bronchiectasis and patchy groundglass opacities are also seen  within the right middle lobe with some subpleural consolidation that has  progressed in the interim from prior imaging. Stable and similar nodular  changes are also seen within the lingula.     A superior mediastinal lymph node seen in the paraesophageal location  has increased in the interim now measuring up to 1.2 cm in comparison to  7 mm previously. The visualized upper abdomen is unremarkable.       Impression:       1. New dense consolidation within the subpleural left lower lobe with  small pleural effusion consistent with pneumonia. Reticulonodular  opacities within the right upper lobe and right middle lobe have  slightly progressed with enlarging subpleural consolidation within the  right middle lobe. There are areas of parenchymal thickening and mucous  plugging seen  within bilateral lower lobes.  2. Mild interval enlargement of the superior mediastinal lymph node  which is likely reactive. Follow-up with CT chest is recommended.     Radiation dose reduction techniques were utilized, including automated  exposure control and exposure modulation based on body size.     This report was finalized on 12/8/2017 11:34 AM by Dr. Samuel Ham MD.       XR Chest 2 View [[id number]] Collected:  12/12/17 1003     Updated:  12/12/17 1007    Narrative:       TWO-VIEW CHEST     HISTORY: 82-year-old female with chronic shortness of breath, mildly  progressive     COMPARISON: 12/07/2017     FINDINGS:  1. Mild progression in left lower lobe pneumonia.  2. Mild right basal atelectasis, new since previous study.  3. Developing right upper lobe pneumonia.  4. No other change.     This report was finalized on 12/12/2017 10:04 AM by Dr. Sami Martin MD.               Discharge Disposition  Home    Activity at Discharge:      Test Results Pending at Discharge   Order Current Status    Fungus Culture - Brushing, Lung, Left Lower Lobe In process    Fungus Culture - Lavage, Lung, Left Lower Lobe In process    Fungus Culture - Wash, Bronchus In process    MTB PCR sent to [facility]. - Miscellaneous Test In process    AFB Culture - Brushing, Lung, Left Lower Lobe Preliminary result    AFB Culture - Lavage, Lung, Left Lower Lobe Preliminary result    AFB Culture - Wash, Bronchus Preliminary result           Real Rousseau MD  12/13/17  11:17 AM    Time: I spent over 30mins in the discharge planning of this patient.    Some of this encounter note is an electronic transcription/translation of spoken language to printed text.    Orders Placed during this admission:    Orders Placed This Encounter   Procedures   • Home Nebulizer   •  Nebulizer Kit - Administration Set   • Blood Culture - Blood, Blood, Venous Line   • Blood Culture - Blood, Blood, Venous Line   • Respiratory Panel, PCR -  Swab, Nasopharynx   • Fungus Culture - Lavage, Lung, Left Lower Lobe   • Fungus Culture - Wash, Bronchus   • Fungus Culture - Brushing, Lung, Left Lower Lobe   • AFB Stain - Brushing, Lung, Left Lower Lobe   • AFB Culture - Lavage, Lung, Left Lower Lobe   • AFB Culture - Wash, Bronchus   • AFB Culture - Brushing, Lung, Left Lower Lobe   • Respiratory Culture - Wash, Bronchus   • Respiratory Culture - Brushing, Lung, Left Lower Lobe   • BAL Culture, Quantitative - Lavage, Lung, Left Lower Lobe   • Respiratory Panel, PCR - Wash, Bronchus   • XR Chest 2 View   • CT Chest Without Contrast   • XR Chest 2 View   • [place] Draw   • Comprehensive Metabolic Panel   • BNP   • Troponin   • CBC Auto Differential   • Scan Slide   • Lactic Acid, Plasma   • Procalcitonin   • Body Fluid Cell Count With Differential - Lavage, Lung, Left Lower Lobe   • Body Fluid Cell Count With Differential - Wash, Bronchus   • Body fluid cell count - Body Fluid,   • Body fluid cell count - Body Fluid,   • Body fluid differential - Body Fluid,   • Body fluid differential - Body Fluid,   • Basic Metabolic Panel   • CBC (No Diff)   • Phosphorus   • Magnesium   • MTB PCR sent to [facility]. - Miscellaneous Test   • Ambulatory Referral to Home Health   • Diet Regular   • Undress and Gown   • Cardiac monitoring   • Continuous Pulse Oximetry   • Vital Signs   • Obtain Informed Consent   • Conditional Code   • Pulmonology (on-call MD unless specified)   • PT Consult: Eval & Treat   • PT Consult: Eval & Treat   • Oxygen Therapy- Nasal Cannula; 2 LPM; Titrate for SPO2: equal to or greater than, 92%   • OSCILLATING POSITIVE EXIRATORY PRESSURE (OPEP)   • ECG 12 Lead   • BRONCHOSCOPY   • Walking Oximetry   • Insert peripheral IV   • Inpatient Admission   • Discharge patient   • CBC & Differential   • Light Blue Top   • Green Top (Gel)   • Lavender Top   • Gold Top - SST

## 2017-12-13 NOTE — DISCHARGE PLACEMENT REQUEST
"Dariel Mia J (82 y.o. Female)     Date of Birth Social Security Number Address Home Phone MRN    [id number]  [id number]  [facility] [address] [phone] [id number]    Nondenominational Marital Status          Christianity        Admission Date Admission Type Admitting Provider Attending Provider Department, Room/Bed    12/8/17 Emergency Jac Duncan MD Sayied, Tausif, MD [address], [address]    Discharge Date Discharge Disposition Discharge Destination         [facility]             Attending Provider: Jac Duncan MD     Allergies:  Alendronate, Ezetimibe, Pravachol [Pravastatin]    Isolation:  None   Infection:  None   Code Status:  Conditional    Ht:  160 cm (62.99\")   Wt:  50.8 kg (111 lb 15.9 oz)    Admission Cmt:  None   Principal Problem:  Community acquired pneumonia of left lower lobe of lung [J18.1]                 Active Insurance as of 12/8/2017     Primary Coverage     Payor Plan Insurance Group Employer/Plan Group    HUMANA MEDICARE REPLACEMENT HUMANA MEDICARE REPL [id number]     Payor Plan Address Payor Plan Phone Number Effective From Effective To    [address] [address] [phone] 1/1/2013     [place] [address]       Subscriber Name Subscriber Birth Date Member ID       MIA DORMAN 1935 [id number]                 Emergency Contacts      (Rel.) Home Phone Work Phone Mobile Phone    JanessaGeorges garciale (Grandchild) [phone] -- [phone]          "

## 2017-12-13 NOTE — PLAN OF CARE
Problem: Patient Care Overview (Adult)  Goal: Plan of Care Review  Outcome: Ongoing (interventions implemented as appropriate)    12/13/17 0522   Coping/Psychosocial Response Interventions   Plan Of Care Reviewed With patient   Patient Care Overview   Progress no change   Outcome Evaluation   Outcome Summary/Follow up Plan Pt PNA has evolved into her R. side upper lobe. Wheezing on exertion on R side lobe along with L lobe. Hycodan given at bedtime. Pt slept most of the night. Possible DC tomorrow. Will cont to monitor.         Problem: Fall Risk (Adult)  Goal: Absence of Falls  Outcome: Ongoing (interventions implemented as appropriate)    Problem: Skin Integrity Impairment, Risk/Actual (Adult)  Goal: Skin Integrity/Wound Healing  Outcome: Ongoing (interventions implemented as appropriate)    Problem: Pneumonia (Adult)  Goal: Signs and Symptoms of Listed Potential Problems Will be Absent or Manageable (Pneumonia)  Outcome: Ongoing (interventions implemented as appropriate)

## 2017-12-13 NOTE — TELEPHONE ENCOUNTER
----- Message from Lakia Glover MA sent at 12/13/2017 11:35 AM EST -----  Stella with [facility] calling for SN and PT Eval and Treat.    Stella #[phone] until 4:30

## 2017-12-13 NOTE — PROGRESS NOTES
Continued Stay Note  [facility]     Patient Name: Stacie Vieira  MRN: [id number]  Today's Date: 12/13/2017    Admit Date: 12/8/2017          Discharge Plan       12/13/17 1116    Case Management/Social Work Plan    Plan Plan home with [facility] JOSE Calderon RN    Additional Comments Spoke with pt at bedside.  Pt requesting HH to follow.  Pt provided HH list.   Pt first choice is [facility].  Stella ([facility] [phone]) called to follow.   Pt also will need nebulizer. Pt  requests CCP call  [facility] for nebulizer.   Jennifer  [phone]) called for nebulizer.   Tatiana called with [facility] to inquire if pt qualifies for any meals.  Plan home with [facility] JOSE Calderon RN              Discharge Codes     None        Expected Discharge Date and Time     Expected Discharge Date Expected Discharge Time    Dec 13, 2017             Lucia Cabrera, RN

## 2017-12-14 NOTE — PROGRESS NOTES
Case Management Discharge Note    Final Note: Plan home with [facility] JOSE Calderon RN    Discharge Placement     Facility/Agency Request Status Selected? Address Phone Number Fax Number    [facility] Accepted    Yes [address] [address] [phone]        Other: Other (Private Auto)    Discharge Codes: 06  Discharged/transferred to home under care of organized home health service organization in anticipation of skilled care

## 2017-12-15 ENCOUNTER — APPOINTMENT (OUTPATIENT)
Dept: GENERAL RADIOLOGY | Facility: HOSPITAL | Age: 82
End: 2017-12-15
Attending: EMERGENCY MEDICINE

## 2017-12-15 ENCOUNTER — HOSPITAL ENCOUNTER (EMERGENCY)
Facility: HOSPITAL | Age: 82
Discharge: HOME | End: 2017-12-15
Attending: EMERGENCY MEDICINE | Admitting: EMERGENCY MEDICINE

## 2017-12-15 VITALS
HEART RATE: 60 BPM | BODY MASS INDEX: 19.67 KG/M2 | WEIGHT: 111 LBS | DIASTOLIC BLOOD PRESSURE: 90 MMHG | SYSTOLIC BLOOD PRESSURE: 198 MMHG | TEMPERATURE: 98.5 F | OXYGEN SATURATION: 100 % | RESPIRATION RATE: 18 BRPM | HEIGHT: 63 IN

## 2017-12-15 DIAGNOSIS — K22.4 ESOPHAGEAL DYSMOTILITY: Primary | ICD-10-CM

## 2017-12-15 DIAGNOSIS — R20.2 PARESTHESIAS: ICD-10-CM

## 2017-12-15 PROCEDURE — 74220 X-RAY XM ESOPHAGUS 1CNTRST: CPT

## 2017-12-15 PROCEDURE — 99284 EMERGENCY DEPT VISIT MOD MDM: CPT

## 2017-12-16 NOTE — ED NOTES
"Pt c/o difficulty swallowing that began earlier today.  States the more solid items, shes had trouble with for years but today she noticed that mow just drinking thin liquids gets \"me choked\"     Sherlyn Recinos RN  12/15/17 2019    "

## 2017-12-16 NOTE — ED NOTES
Ems reports difficulty swallowing and numbness in the hands     Brandon Laguerre RN  12/15/17 1931

## 2017-12-16 NOTE — ED PROVIDER NOTES
EMERGENCY DEPARTMENT ENCOUNTER    CHIEF COMPLAINT  Chief Complaint: difficulty swallowing, extremity numbness  History given by: patient  History limited by: none  Room Number: 30/30  PMD: Tatiana Bellamy MD  Pulmonologist- Dr. Sinan Braga    HPI:  Pt is a 82 y.o. female who presents complaining of intermittent numbness in the plamar aspect of her bilateral hands for the last 2-3 days, which became worse today. Pt states that her numbness initially improved when she moved her hands and is worse in certain positions. Pt also complains of difficulty swallowing due to a FB sensation. Pt states that she does not have difficulty swallowing liquids but states that food feels like it becomes stuck in her throat. Pt denies fever, neck pain or history of carpal tunnel. Pt states that she has not taken her night time medications yet.    Duration:  2-3 days  Onset: gradual  Timing: intermittent  Location: palmar aspect of bilateral hands  Radiation: none  Quality: numbness  Intensity/Severity: mild  Progression: unchanged   Associated Symptoms:  Difficulty swallowing  Aggravating Factors: certain positions  Alleviating Factors: moving hands  Previous Episodes: none  Treatment before arrival: none    PAST MEDICAL HISTORY  Active Ambulatory Problems     Diagnosis Date Noted   • Mycobacterium avium complex 03/07/2016   • Essential hypertension 03/07/2016   • Palpitations 09/13/2016   • Allergic rhinitis 09/13/2016   • Hypothyroidism 09/13/2016   • Hyperlipidemia 09/13/2016   • Community acquired pneumonia of left lower lobe of lung 12/08/2017   • Acute respiratory failure with hypoxia 12/13/2017     Resolved Ambulatory Problems     Diagnosis Date Noted   • Long term current use of antibiotics 03/07/2016     Past Medical History:   Diagnosis Date   • Acute stress disorder    • Allergic rhinitis    • C. difficile colitis    • Elevated fasting glucose    • Essential hypertension    • H/O: pneumonia    • Health care maintenance     • History of palpitations    • Hyperlipidemia    • Hypertension    • Hypoactive thyroid    • Hypokalemia    • Hypothyroidism    • Insomnia    • MAC (mycobacterium avium-intracellulare complex)    • Osteoporosis    • Palpitations    • Pneumonia    • Sinusitis    • Staph infection    • SVT (supraventricular tachycardia)    • Syncope and collapse        PAST SURGICAL HISTORY  Past Surgical History:   Procedure Laterality Date   • BREAST BIOPSY      PERCUTATNEOUS NEEDLE CORE   • BRONCHOSCOPY      nov 2015   • BRONCHOSCOPY N/A 4/8/2016    Procedure: BRONCHOSCOPY WITH BAL;  Surgeon: Sinan Braga MD;  Location: [facility] ENDOSCOPY;  Service:    • BRONCHOSCOPY N/A 12/9/2017    Procedure: BRONCHOSCOPY;  Surgeon: eRal Rousseau MD;  Location: [facility] ENDOSCOPY;  Service:    • COLONOSCOPY N/A 07/08/2005   • DILATATION AND CURETTAGE N/A 01/01/1990   • PAP SMEAR N/A 03/30/2004   • SINUS SURGERY  01/2016   • TONSILLECTOMY         FAMILY HISTORY  Family History   Problem Relation Age of Onset   • Hypertension Brother    • Dementia Mother    • Kidney disease Father    • Thyroid disease Other        SOCIAL HISTORY  Social History     Social History   • Marital status:      Spouse name: N/A   • Number of children: N/A   • Years of education: N/A     Occupational History   • Not on file.     Social History Main Topics   • Smoking status: Never Smoker   • Smokeless tobacco: Never Used      Comment: caffine use   • Alcohol use No   • Drug use: No   • Sexual activity: No     Other Topics Concern   • Not on file     Social History Narrative       ALLERGIES  Alendronate; Ezetimibe; and Pravachol [pravastatin]    REVIEW OF SYSTEMS  Review of Systems   Constitutional: Negative for fever.   HENT: Positive for trouble swallowing. Negative for sore throat.    Eyes: Negative.    Respiratory: Negative for cough and shortness of breath.    Cardiovascular: Negative for chest pain.   Gastrointestinal: Negative for abdominal pain, diarrhea and  vomiting.   Genitourinary: Negative for dysuria.   Musculoskeletal: Negative for neck pain.   Skin: Negative for rash.   Allergic/Immunologic: Negative.    Neurological: Positive for numbness (bilateral palms). Negative for weakness and headaches.   Hematological: Negative.    Psychiatric/Behavioral: Negative.    All other systems reviewed and are negative.      PHYSICAL EXAM  ED Triage Vitals   Temp Heart Rate Resp BP SpO2   12/15/17 1935 12/15/17 1935 12/15/17 1935 12/15/17 1935 12/15/17 1935   97.8 °F (36.6 °C) 60 17 195/95 97 %      Temp src Heart Rate Source Patient Position BP Location FiO2 (%)   -- -- -- -- --              Physical Exam   Constitutional: She is oriented to person, place, and time and well-developed, well-nourished, and in no distress. No distress.   HENT:   Head: Normocephalic and atraumatic.   Eyes: EOM are normal. Pupils are equal, round, and reactive to light.   Neck: Normal range of motion. Neck supple. No spinous process tenderness and no muscular tenderness present.   Cardiovascular: Normal rate, regular rhythm and normal heart sounds.    Pulses:       Radial pulses are 2+ on the right side, and 2+ on the left side.   Pulmonary/Chest: Effort normal and breath sounds normal. No respiratory distress.   Abdominal: Soft. There is no tenderness. There is no rebound and no guarding.   Musculoskeletal: Normal range of motion. She exhibits no edema.   Neurological: She is alert and oriented to person, place, and time. She has normal sensation, normal strength and intact cranial nerves.   Skin: Skin is warm and dry. No rash noted.   Psychiatric: Mood and affect normal.   Nursing note and vitals reviewed.    RADIOLOGY  FL Esophagram Complete   Final Result   Moderate distal esophageal dysmotility, otherwise   unremarkable       This report was finalized on 12/15/2017 9:41 PM by Eber Sepulveda MD.               I ordered the above noted radiological studies. Interpreted by radiologist. Reviewed by  me in PACS.       PROCEDURES  Procedures      PROGRESS AND CONSULTS  ED Course     2114 Ordered esophagram for further evaluation.    2208 - Rechecked pt. Pt is resting comfortably. Notified pt of her swallow study results and that she will need to follow up with GI regarding her esophageal dysmotility. D/w pt that her bilateral hand paresthesias could be due to carpal tunnel but I am not concerned that it could be due to a CVA. Discussed the plan to discharge the pt home and instructed her to take her night time BP medications when she gets home. Pt agrees with the plan and all questions were addressed.    2214 Discussed pt's case with pt's son via phone,who agrees with plan to discharge and to follow up with GI.      MEDICAL DECISION MAKING  Results were reviewed/discussed with the patient and they were also made aware of online access. Pt also made aware that some labs, such as cultures, will not be resulted during ER visit and follow up with PMD is necessary.     MDM  Number of Diagnoses or Management Options  Esophageal dysmotility:   Paresthesias:      Amount and/or Complexity of Data Reviewed  Tests in the radiology section of CPT®: ordered and reviewed (FL esophagram shows moderate distal esophageal dysmotility)  Decide to obtain previous medical records or to obtain history from someone other than the patient: yes  Review and summarize past medical records: yes (Pt was admitted from 12-8 until 12-13-17 for community acquired pneumonia and acute respiratory failure. Pt had a bronchoscopy that showed bilateral infiltrates.)  Independent visualization of images, tracings, or specimens: yes    Patient Progress  Patient progress: stable         DIAGNOSIS  Final diagnoses:   Esophageal dysmotility   Paresthesias       DISPOSITION  Disposition: Discharged.    Patient discharged in stable condition.    Reviewed implications of results, diagnosis, meds, responsibility to follow up, warning signs and symptoms of  possible worsening, potential complications and reasons to return to ER.    Patient/Family voiced understanding of above instructions.    Discussed plan for discharge, as there is no emergent indication for admission.  Pt/family is agreeable and understands need for follow up and repeat testing.  Pt is aware that discharge does not mean that nothing is wrong but it indicates no emergency is present and they must continue care with follow-up as given below or physician of their choice.     FOLLOW-UP  Kolby Leblanc MD  [address]  [place] [address]  [phone]    Schedule an appointment as soon as possible for a visit      Tatiana Bellamy MD  [address]  [place] [address]  [phone]    Schedule an appointment as soon as possible for a visit           Medication List      Notice     No changes were made to your prescriptions during this visit.          Latest Documented Vital Signs:  As of 10:21 PM  BP- (!) 184/102 HR- 55 Temp- 97.8 °F (36.6 °C) O2 sat- 97%    --  Documentation assistance provided by debra Magallon and Julia Knox for Dr Triplett.  Information recorded by the oskar was done at my direction and has been verified and validated by me.      Julia Knox  12/15/17 2221       Juana Magallon  12/16/17 1225       Armando Triplett MD  12/18/17 6205

## 2017-12-18 ENCOUNTER — OFFICE VISIT (OUTPATIENT)
Dept: INTERNAL MEDICINE | Facility: CLINIC | Age: 82
End: 2017-12-18

## 2017-12-18 VITALS
WEIGHT: 109 LBS | DIASTOLIC BLOOD PRESSURE: 76 MMHG | HEIGHT: 63 IN | HEART RATE: 58 BPM | BODY MASS INDEX: 19.31 KG/M2 | SYSTOLIC BLOOD PRESSURE: 118 MMHG | OXYGEN SATURATION: 98 %

## 2017-12-18 DIAGNOSIS — K22.4 ESOPHAGEAL DYSMOTILITY: ICD-10-CM

## 2017-12-18 DIAGNOSIS — J18.9 COMMUNITY ACQUIRED PNEUMONIA OF LEFT LOWER LOBE OF LUNG: ICD-10-CM

## 2017-12-18 DIAGNOSIS — IMO0001 PARESTHESIAS/NUMBNESS: Primary | ICD-10-CM

## 2017-12-18 LAB
ALBUMIN SERPL-MCNC: 4.4 G/DL (ref 3.5–5.2)
ALBUMIN/GLOB SERPL: 1.2 G/DL
ALP SERPL-CCNC: 73 U/L (ref 39–117)
ALT SERPL-CCNC: 13 U/L (ref 1–33)
AST SERPL-CCNC: 23 U/L (ref 1–32)
BASOPHILS # BLD AUTO: 0.02 10*3/MM3 (ref 0–0.2)
BASOPHILS NFR BLD AUTO: 0.2 % (ref 0–1.5)
BILIRUB SERPL-MCNC: 0.4 MG/DL (ref 0.1–1.2)
BUN SERPL-MCNC: 13 MG/DL (ref 8–23)
BUN/CREAT SERPL: 14.1 (ref 7–25)
CALCIUM SERPL-MCNC: 9.7 MG/DL (ref 8.6–10.5)
CHLORIDE SERPL-SCNC: 95 MMOL/L (ref 98–107)
CO2 SERPL-SCNC: 25.5 MMOL/L (ref 22–29)
CREAT SERPL-MCNC: 0.92 MG/DL (ref 0.57–1)
EOSINOPHIL # BLD AUTO: 0.05 10*3/MM3 (ref 0–0.7)
EOSINOPHIL # BLD AUTO: 0.5 % (ref 0.3–6.2)
ERYTHROCYTE [DISTWIDTH] IN BLOOD BY AUTOMATED COUNT: 12.8 % (ref 11.7–13)
FOLATE SERPL-MCNC: >20 NG/ML (ref 4.78–24.2)
GLOBULIN SER CALC-MCNC: 3.6 GM/DL
GLUCOSE SERPL-MCNC: 122 MG/DL (ref 65–99)
HCT VFR BLD AUTO: 43.5 % (ref 35.6–45.5)
HGB BLD-MCNC: 14.4 G/DL (ref 11.9–15.5)
IMM GRANULOCYTES # BLD: 0.26 10*3/MM3 (ref 0–0.03)
IMM GRANULOCYTES NFR BLD: 2.5 % (ref 0–0.5)
LYMPHOCYTES # BLD AUTO: 2.99 10*3/MM3 (ref 0.9–4.8)
LYMPHOCYTES NFR BLD AUTO: 28.8 % (ref 19.6–45.3)
MCH RBC QN AUTO: 32.4 PG (ref 26.9–32)
MCHC RBC AUTO-ENTMCNC: 33.1 G/DL (ref 32.4–36.3)
MCV RBC AUTO: 97.8 FL (ref 80.5–98.2)
MONOCYTES # BLD AUTO: 0.92 10*3/MM3 (ref 0.2–1.2)
MONOCYTES NFR BLD AUTO: 8.9 % (ref 5–12)
NEUTROPHILS # BLD AUTO: 6.15 10*3/MM3 (ref 1.9–8.1)
NEUTROPHILS NFR BLD AUTO: 59.1 % (ref 42.7–76)
PLATELET # BLD AUTO: 530 10*3/MM3 (ref 140–500)
POTASSIUM SERPL-SCNC: 4.7 MMOL/L (ref 3.5–5.2)
PROT SERPL-MCNC: 8 G/DL (ref 6–8.5)
RBC # BLD AUTO: 4.45 10*6/MM3 (ref 3.9–5.2)
SODIUM SERPL-SCNC: 137 MMOL/L (ref 136–145)
TSH SERPL-ACNC: 1.72 MIU/ML (ref 0.27–4.2)
VIT B12 SERPL-MCNC: 1318 PG/ML (ref 211–946)
WBC # BLD AUTO: 10.39 10*3/MM3 (ref 4.5–10.7)

## 2017-12-18 PROCEDURE — 36415 COLL VENOUS BLD VENIPUNCTURE: CPT | Performed by: NURSE PRACTITIONER

## 2017-12-18 PROCEDURE — 99214 OFFICE O/P EST MOD 30 MIN: CPT | Performed by: NURSE PRACTITIONER

## 2017-12-19 ENCOUNTER — TELEPHONE (OUTPATIENT)
Dept: INTERNAL MEDICINE | Facility: CLINIC | Age: 82
End: 2017-12-19

## 2017-12-19 NOTE — TELEPHONE ENCOUNTER
Called patient but someone answered, did not speak. Called back but call went straight into voicemail. Left message stating there were no acute abnormalities on labs, she will f/u with Dr. Bellamy next week. Advised to schedule appt with Dr. Leblanc (referred to him in ER) and ENT for further evaluation.

## 2017-12-19 NOTE — TELEPHONE ENCOUNTER
----- Message from Lakia Glover MA sent at 12/19/2017 12:37 PM EST -----  Pt calling for lab results and says she feels worse today than before

## 2017-12-19 NOTE — PROGRESS NOTES
Subjective   Stacie Vieira is a 82 y.o. female who presents for f/u regarding recent hospitalization for left lower lobe pneumonia. She also c/o paresthesias of bilateral hands and feet.    HPI Comments: She was hospitalized 12/8 for left lower lobe pneumonia, discharged home on Cefdinir which she is completing. However, she c/o difficulty swallowing with sensation that her tongue was swelling and therefore presented to the ER 12/15. She also c/o bilateral hand and plantar foot numbness/tingling which began Friday and has been persistent.    Pneumonia   She complains of chest tightness, cough, shortness of breath and sputum production. There is no wheezing. This is a new problem. The current episode started 1 to 4 weeks ago. The problem occurs daily. The problem has been gradually improving. The cough is productive of sputum. Associated symptoms include postnasal drip, rhinorrhea, sneezing and a sore throat. Pertinent negatives include no appetite change, chest pain, ear pain, fever or trouble swallowing.   Upper Extremity Issue   This is a new problem. The current episode started in the past 7 days. The problem occurs constantly. The problem has been gradually worsening. Associated symptoms include congestion, coughing, fatigue, numbness, a sore throat and weakness. Pertinent negatives include no abdominal pain, chest pain, chills, fever, joint swelling, nausea or vomiting.        The following portions of the patient's history were reviewed and updated as appropriate: allergies, current medications, past social history and problem list.    Past Medical History:   Diagnosis Date   • Acute stress disorder    • Allergic rhinitis    • C. difficile colitis     hx in past   • Elevated fasting glucose    • Essential hypertension    • H/O: pneumonia    • Health care maintenance    • History of palpitations    • Hyperlipidemia    • Hypertension    • Hypoactive thyroid    • Hypokalemia    • Hypothyroidism    • Insomnia     • MAC (mycobacterium avium-intracellulare complex)     reason for bronch.  dx with this nov 2015   • Osteoporosis    • Palpitations    • Pneumonia     past hx of freq.   • Sinusitis    • Staph infection     in sinus area  recent sx jan 2016   • SVT (supraventricular tachycardia)     past hx  on medicine   • Syncope and collapse          Current Outpatient Prescriptions:   •  CALCIUM-MAGNESIUM-VITAMIN D PO, Take 1 tablet by mouth Daily., Disp: , Rfl:   •  cefdinir (OMNICEF) 300 MG capsule, Take 1 capsule by mouth Every 12 (Twelve) Hours for 11 doses. Indications: Pneumonia, Disp: 11 capsule, Rfl: 0  •  Cholecalciferol (VITAMIN D-3) 1000 UNITS capsule, Take by mouth., Disp: , Rfl:   •  ipratropium-albuterol (DUO-NEB) 0.5-2.5 mg/mL nebulizer, Take 3 mL by nebulization 4 (Four) Times a Day for 30 days., Disp: 360 mL, Rfl: 3  •  levothyroxine (LEVOXYL) 50 MCG tablet, Take 1 tablet by mouth Every Other Day., Disp: 45 tablet, Rfl: 3  •  levothyroxine (LEVOXYL) 75 MCG tablet, Take 1 tablet by mouth Every Other Day., Disp: 45 tablet, Rfl: 3  •  loratadine (CLARITIN) 10 MG tablet, Take 10 mg by mouth Daily As Needed for Allergies., Disp: , Rfl:   •  Misc Natural Products (JOINT SUPPORT PO), Take 1 tablet by mouth Daily., Disp: , Rfl:   •  montelukast (SINGULAIR) 10 MG tablet, Take 1 mg by mouth., Disp: , Rfl:   •  Multiple Vitamin (MULTI VITAMIN DAILY PO), Take 1 tablet by mouth Daily., Disp: , Rfl:   •  olmesartan (BENICAR) 40 MG tablet, Take 40 mg by mouth Daily., Disp: , Rfl:   •  propranolol LA (INDERAL LA) 120 MG 24 hr capsule, Take 1 capsule by mouth 2 (Two) Times a Day., Disp: 180 capsule, Rfl: 3  •  saccharomyces boulardii (FLORASTOR) 250 MG capsule, Take 250 mg by mouth 2 (two) times a day., Disp: , Rfl:     Allergies   Allergen Reactions   • Alendronate Other (See Comments)     Headaches   • Ezetimibe Other (See Comments)     Leg Cramps   • Pravachol [Pravastatin] Other (See Comments)     Leg Cramps       Review  "of Systems   Constitutional: Positive for fatigue. Negative for activity change, appetite change, chills, fever and unexpected weight change.   HENT: Positive for congestion, postnasal drip, rhinorrhea, sinus pressure, sneezing and sore throat. Negative for drooling, ear pain, facial swelling, hearing loss, mouth sores, nosebleeds, trouble swallowing and voice change.    Eyes: Negative for pain, discharge, itching and visual disturbance.   Respiratory: Positive for cough, sputum production, chest tightness and shortness of breath. Negative for choking and wheezing.    Cardiovascular: Negative for chest pain and palpitations.   Gastrointestinal: Negative for abdominal pain, constipation, diarrhea, nausea and vomiting.   Endocrine: Negative for polyuria.   Genitourinary: Negative for dysuria and frequency.   Musculoskeletal: Negative for back pain and joint swelling.   Neurological: Positive for weakness and numbness.       Objective   Vitals:    12/18/17 1512   BP: 118/76   BP Location: Left arm   Patient Position: Sitting   Cuff Size: Adult   Pulse: 58   SpO2: 98%   Weight: 49.4 kg (109 lb)   Height: 160 cm (63\")     Physical Exam   Constitutional: She appears well-developed and well-nourished. She is cooperative. She does not have a sickly appearance. She does not appear ill.   HENT:   Head: Normocephalic.   Right Ear: Hearing and external ear normal. No drainage, swelling or tenderness. Tympanic membrane is bulging. Tympanic membrane is not erythematous. No middle ear effusion. No decreased hearing is noted.   Left Ear: Hearing and external ear normal. No drainage, swelling or tenderness. Tympanic membrane is bulging. Tympanic membrane is not erythematous.  No middle ear effusion. No decreased hearing is noted.   Nose: Nose normal. No mucosal edema, rhinorrhea, sinus tenderness or nasal deformity. Right sinus exhibits no maxillary sinus tenderness and no frontal sinus tenderness. Left sinus exhibits no maxillary " sinus tenderness and no frontal sinus tenderness.   Mouth/Throat: Mucous membranes are normal. Posterior oropharyngeal erythema present.   Eyes: Conjunctivae and lids are normal.   Neck: Trachea normal.   Cardiovascular: Regular rhythm, normal heart sounds and normal pulses.    No murmur heard.  Pulmonary/Chest: Breath sounds normal. No respiratory distress. She has no decreased breath sounds. She has no wheezes. She has no rhonchi. She has no rales.   Lymphadenopathy:     She has no cervical adenopathy.   Neurological: She is alert.   Skin: Skin is warm, dry and intact.   Nursing note and vitals reviewed.      Assessment/Plan   Stacie was seen today for follow-up.    Diagnoses and all orders for this visit:    Paresthesias/numbness  Comments:  ?etiology, sx began Fri-will check labs to further evaluate  Orders:  -     Vitamin B12 & Folate; Future  -     CBC Auto Differential; Future  -     Comprehensive Metabolic Panel; Future  -     TSH; Future  -     Vitamin B12 & Folate  -     Cancel: CBC Auto Differential  -     Comprehensive Metabolic Panel  -     TSH  -     Scan Slide; Future  -     Cancel: Scan Slide  -     CBC & Differential; Future  -     CBC & Differential  -     CBC Auto Differential    Esophageal dysmotility  Comments:  encouraged GI consult-referred in ER which she has not yet scheduled    Community acquired pneumonia of left lower lobe of lung  Comments:  complete course of Cefdinir

## 2017-12-20 ENCOUNTER — APPOINTMENT (OUTPATIENT)
Dept: GENERAL RADIOLOGY | Facility: HOSPITAL | Age: 82
End: 2017-12-20
Attending: FAMILY MEDICINE

## 2017-12-20 ENCOUNTER — APPOINTMENT (OUTPATIENT)
Dept: CT IMAGING | Facility: HOSPITAL | Age: 82
End: 2017-12-20

## 2017-12-20 ENCOUNTER — APPOINTMENT (OUTPATIENT)
Dept: GENERAL RADIOLOGY | Facility: HOSPITAL | Age: 82
End: 2017-12-20

## 2017-12-20 ENCOUNTER — HOSPITAL ENCOUNTER (INPATIENT)
Facility: HOSPITAL | Age: 82
LOS: 8 days | End: 2017-12-28
Attending: EMERGENCY MEDICINE | Admitting: INTERNAL MEDICINE

## 2017-12-20 DIAGNOSIS — R13.10 DYSPHAGIA, UNSPECIFIED TYPE: ICD-10-CM

## 2017-12-20 DIAGNOSIS — R26.2 DIFFICULTY WALKING: ICD-10-CM

## 2017-12-20 DIAGNOSIS — G62.9 PERIPHERAL POLYNEUROPATHY: Primary | ICD-10-CM

## 2017-12-20 DIAGNOSIS — R49.0 HOARSENESS OF VOICE: ICD-10-CM

## 2017-12-20 DIAGNOSIS — M62.81 GENERALIZED MUSCLE WEAKNESS: ICD-10-CM

## 2017-12-20 DIAGNOSIS — G62.9 POLYNEUROPATHY: ICD-10-CM

## 2017-12-20 PROBLEM — Z66 DNR (DO NOT RESUSCITATE): Status: ACTIVE | Noted: 2017-12-20

## 2017-12-20 PROBLEM — K22.4 ESOPHAGEAL DYSMOTILITY: Status: ACTIVE | Noted: 2017-12-20

## 2017-12-20 PROBLEM — R20.0 BILATERAL HAND NUMBNESS: Status: ACTIVE | Noted: 2017-12-20

## 2017-12-20 PROBLEM — R13.12 OROPHARYNGEAL DYSPHAGIA: Status: ACTIVE | Noted: 2017-12-20

## 2017-12-20 LAB
ALBUMIN SERPL-MCNC: 4.3 G/DL (ref 3.5–5.2)
ALBUMIN/GLOB SERPL: 1 G/DL
ALP SERPL-CCNC: 70 U/L (ref 39–117)
ALT SERPL W P-5'-P-CCNC: 14 U/L (ref 1–33)
ANION GAP SERPL CALCULATED.3IONS-SCNC: 13 MMOL/L
APPEARANCE CSF: ABNORMAL
APPEARANCE CSF: CLEAR
AST SERPL-CCNC: 24 U/L (ref 1–32)
BASOPHILS # BLD AUTO: 0.03 10*3/MM3 (ref 0–0.2)
BASOPHILS NFR BLD AUTO: 0.4 % (ref 0–1.5)
BILIRUB SERPL-MCNC: 0.6 MG/DL (ref 0.1–1.2)
BILIRUB UR QL STRIP: NEGATIVE
BUN BLD-MCNC: 14 MG/DL (ref 8–23)
BUN/CREAT SERPL: 13.3 (ref 7–25)
C GATTII+NEOFOR DNA CSF QL NAA+NON-PROBE: NOT DETECTED
CALCIUM SPEC-SCNC: 9.7 MG/DL (ref 8.6–10.5)
CHLORIDE SERPL-SCNC: 98 MMOL/L (ref 98–107)
CLARITY UR: CLEAR
CMV DNA CSF QL NAA+PROBE: NOT DETECTED
CO2 SERPL-SCNC: 28 MMOL/L (ref 22–29)
COLOR CSF: ABNORMAL
COLOR CSF: COLORLESS
COLOR SPUN CSF: COLORLESS
COLOR UR: YELLOW
CREAT BLD-MCNC: 1.05 MG/DL (ref 0.57–1)
CRP SERPL-MCNC: 0.37 MG/DL (ref 0–0.5)
DEPRECATED RDW RBC AUTO: 45.3 FL (ref 37–54)
E COLI K1 DNA CSF QL NAA+NON-PROBE: NOT DETECTED
EOSINOPHIL # BLD AUTO: 0.03 10*3/MM3 (ref 0–0.7)
EOSINOPHIL NFR BLD AUTO: 0.4 % (ref 0.3–6.2)
ERYTHROCYTE [DISTWIDTH] IN BLOOD BY AUTOMATED COUNT: 13 % (ref 11.7–13)
ERYTHROCYTE [SEDIMENTATION RATE] IN BLOOD: 40 MM/HR (ref 0–30)
EV RNA CSF QL NAA+PROBE: NOT DETECTED
FLUAV AG NPH QL: NEGATIVE
FLUBV AG NPH QL IA: NEGATIVE
GFR SERPL CREATININE-BSD FRML MDRD: 50 ML/MIN/1.73
GLOBULIN UR ELPH-MCNC: 4.3 GM/DL
GLUCOSE BLD-MCNC: 124 MG/DL (ref 65–99)
GLUCOSE CSF-MCNC: 55 MG/DL (ref 40–70)
GLUCOSE UR STRIP-MCNC: NEGATIVE MG/DL
GP B STREP DNA SPEC QL NAA+PROBE: NOT DETECTED
HAEM INFLU SEROTYP DNA SPEC NAA+PROBE: NOT DETECTED
HCT VFR BLD AUTO: 45.4 % (ref 35.6–45.5)
HGB BLD-MCNC: 14.9 G/DL (ref 11.9–15.5)
HGB UR QL STRIP.AUTO: NEGATIVE
HHV6 DNA CSF QL NAA+PROBE: NOT DETECTED
HSV1 DNA CSF QL NAA+PROBE: NOT DETECTED
HSV2 DNA CSF QL NAA+PROBE: NOT DETECTED
IMM GRANULOCYTES # BLD: 0.13 10*3/MM3 (ref 0–0.03)
IMM GRANULOCYTES NFR BLD: 1.7 % (ref 0–0.5)
KETONES UR QL STRIP: NEGATIVE
L MONOCYTOG RRNA SPEC QL PROBE: NOT DETECTED
LEUKOCYTE ESTERASE UR QL STRIP.AUTO: NEGATIVE
LYMPHOCYTES # BLD AUTO: 2.38 10*3/MM3 (ref 0.9–4.8)
LYMPHOCYTES NFR BLD AUTO: 30.9 % (ref 19.6–45.3)
LYMPHOCYTES NFR CSF MANUAL: 43 %
MCH RBC QN AUTO: 31.9 PG (ref 26.9–32)
MCHC RBC AUTO-ENTMCNC: 32.8 G/DL (ref 32.4–36.3)
MCV RBC AUTO: 97.2 FL (ref 80.5–98.2)
METHOD: ABNORMAL
METHOD: ABNORMAL
MONOCYTES # BLD AUTO: 0.84 10*3/MM3 (ref 0.2–1.2)
MONOCYTES NFR BLD AUTO: 10.9 % (ref 5–12)
MONOCYTES NFR CSF MANUAL: 9 %
N MEN DNA SPEC QL NAA+PROBE: NOT DETECTED
NEUTROPHILS # BLD AUTO: 4.28 10*3/MM3 (ref 1.9–8.1)
NEUTROPHILS NFR BLD AUTO: 55.7 % (ref 42.7–76)
NEUTROPHILS NFR CSF MICRO: 48 %
NITRITE UR QL STRIP: NEGATIVE
NUC CELL # CSF MANUAL: 1 /MM3 (ref 0–5)
NUC CELL # CSF MANUAL: 7 /MM3 (ref 0–5)
PARECHOVIRUS A RNA CSF QL NAA+NON-PROBE: NOT DETECTED
PH UR STRIP.AUTO: 6 [PH] (ref 5–8)
PLATELET # BLD AUTO: 454 10*3/MM3 (ref 140–500)
PMV BLD AUTO: 8.9 FL (ref 6–12)
POTASSIUM BLD-SCNC: 4.5 MMOL/L (ref 3.5–5.2)
PROT CSF-MCNC: 48 MG/DL (ref 15–45)
PROT SERPL-MCNC: 8.6 G/DL (ref 6–8.5)
PROT UR QL STRIP: NEGATIVE
RBC # BLD AUTO: 4.67 10*6/MM3 (ref 3.9–5.2)
RBC # CSF MANUAL: 3730 /MM3 (ref 0–0)
RBC # CSF MANUAL: 484 /MM3 (ref 0–0)
S PNEUM DNA CSF QL NAA+NON-PROBE: NOT DETECTED
SODIUM BLD-SCNC: 139 MMOL/L (ref 136–145)
SP GR UR STRIP: 1.01 (ref 1–1.03)
TROPONIN T SERPL-MCNC: <0.01 NG/ML (ref 0–0.03)
TUBE # CSF: 1
TUBE # CSF: 4
UROBILINOGEN UR QL STRIP: NORMAL
VZV DNA CSF QL NAA+PROBE: NOT DETECTED
WBC NRBC COR # BLD: 7.69 10*3/MM3 (ref 4.5–10.7)

## 2017-12-20 PROCEDURE — 70450 CT HEAD/BRAIN W/O DYE: CPT

## 2017-12-20 PROCEDURE — 80053 COMPREHEN METABOLIC PANEL: CPT | Performed by: NURSE PRACTITIONER

## 2017-12-20 PROCEDURE — 009U3ZX DRAINAGE OF SPINAL CANAL, PERCUTANEOUS APPROACH, DIAGNOSTIC: ICD-10-PCS | Performed by: RADIOLOGY

## 2017-12-20 PROCEDURE — 82945 GLUCOSE OTHER FLUID: CPT | Performed by: FAMILY MEDICINE

## 2017-12-20 PROCEDURE — 87205 SMEAR GRAM STAIN: CPT | Performed by: FAMILY MEDICINE

## 2017-12-20 PROCEDURE — 71020 HC CHEST PA AND LATERAL: CPT

## 2017-12-20 PROCEDURE — 36415 COLL VENOUS BLD VENIPUNCTURE: CPT | Performed by: INTERNAL MEDICINE

## 2017-12-20 PROCEDURE — 84484 ASSAY OF TROPONIN QUANT: CPT | Performed by: NURSE PRACTITIONER

## 2017-12-20 PROCEDURE — 81003 URINALYSIS AUTO W/O SCOPE: CPT | Performed by: NURSE PRACTITIONER

## 2017-12-20 PROCEDURE — 99285 EMERGENCY DEPT VISIT HI MDM: CPT

## 2017-12-20 PROCEDURE — 89050 BODY FLUID CELL COUNT: CPT | Performed by: FAMILY MEDICINE

## 2017-12-20 PROCEDURE — 93005 ELECTROCARDIOGRAM TRACING: CPT | Performed by: NURSE PRACTITIONER

## 2017-12-20 PROCEDURE — 87070 CULTURE OTHR SPECIMN AEROBIC: CPT | Performed by: FAMILY MEDICINE

## 2017-12-20 PROCEDURE — 87483 CNS DNA AMP PROBE TYPE 12-25: CPT | Performed by: FAMILY MEDICINE

## 2017-12-20 PROCEDURE — 94799 UNLISTED PULMONARY SVC/PX: CPT

## 2017-12-20 PROCEDURE — 84443 ASSAY THYROID STIM HORMONE: CPT | Performed by: INTERNAL MEDICINE

## 2017-12-20 PROCEDURE — 85652 RBC SED RATE AUTOMATED: CPT | Performed by: FAMILY MEDICINE

## 2017-12-20 PROCEDURE — 89051 BODY FLUID CELL COUNT: CPT | Performed by: FAMILY MEDICINE

## 2017-12-20 PROCEDURE — B01BZZZ FLUOROSCOPY OF SPINAL CORD: ICD-10-PCS | Performed by: RADIOLOGY

## 2017-12-20 PROCEDURE — 86140 C-REACTIVE PROTEIN: CPT | Performed by: FAMILY MEDICINE

## 2017-12-20 PROCEDURE — 85025 COMPLETE CBC W/AUTO DIFF WBC: CPT | Performed by: NURSE PRACTITIONER

## 2017-12-20 PROCEDURE — 84157 ASSAY OF PROTEIN OTHER: CPT | Performed by: FAMILY MEDICINE

## 2017-12-20 PROCEDURE — 93010 ELECTROCARDIOGRAM REPORT: CPT | Performed by: INTERNAL MEDICINE

## 2017-12-20 PROCEDURE — 87015 SPECIMEN INFECT AGNT CONCNTJ: CPT | Performed by: FAMILY MEDICINE

## 2017-12-20 PROCEDURE — 77003 FLUOROGUIDE FOR SPINE INJECT: CPT

## 2017-12-20 PROCEDURE — 87804 INFLUENZA ASSAY W/OPTIC: CPT | Performed by: NURSE PRACTITIONER

## 2017-12-20 PROCEDURE — 25010000002 ENOXAPARIN PER 10 MG: Performed by: INTERNAL MEDICINE

## 2017-12-20 RX ORDER — SODIUM CHLORIDE 0.9 % (FLUSH) 0.9 %
1-10 SYRINGE (ML) INJECTION AS NEEDED
Status: DISCONTINUED | OUTPATIENT
Start: 2017-12-20 | End: 2017-12-28 | Stop reason: HOSPADM

## 2017-12-20 RX ORDER — CETIRIZINE HYDROCHLORIDE 10 MG/1
5 TABLET ORAL DAILY
Status: DISCONTINUED | OUTPATIENT
Start: 2017-12-21 | End: 2017-12-28 | Stop reason: HOSPADM

## 2017-12-20 RX ORDER — SACCHAROMYCES BOULARDII 250 MG
250 CAPSULE ORAL 2 TIMES DAILY
Status: DISCONTINUED | OUTPATIENT
Start: 2017-12-21 | End: 2017-12-28 | Stop reason: HOSPADM

## 2017-12-20 RX ORDER — SODIUM CHLORIDE 9 MG/ML
75 INJECTION, SOLUTION INTRAVENOUS CONTINUOUS
Status: DISCONTINUED | OUTPATIENT
Start: 2017-12-20 | End: 2017-12-22

## 2017-12-20 RX ORDER — IPRATROPIUM BROMIDE AND ALBUTEROL SULFATE 2.5; .5 MG/3ML; MG/3ML
3 SOLUTION RESPIRATORY (INHALATION)
Status: DISCONTINUED | OUTPATIENT
Start: 2017-12-21 | End: 2017-12-21

## 2017-12-20 RX ORDER — ONDANSETRON 4 MG/1
4 TABLET, FILM COATED ORAL EVERY 6 HOURS PRN
Status: DISCONTINUED | OUTPATIENT
Start: 2017-12-20 | End: 2017-12-28 | Stop reason: HOSPADM

## 2017-12-20 RX ORDER — OLMESARTAN MEDOXOMIL 20 MG/1
40 TABLET ORAL DAILY
Status: DISCONTINUED | OUTPATIENT
Start: 2017-12-21 | End: 2017-12-21 | Stop reason: CLARIF

## 2017-12-20 RX ORDER — PROPRANOLOL HCL 60 MG
120 CAPSULE, EXTENDED RELEASE 24HR ORAL ONCE
Status: COMPLETED | OUTPATIENT
Start: 2017-12-20 | End: 2017-12-20

## 2017-12-20 RX ORDER — DOCUSATE SODIUM 100 MG/1
100 CAPSULE, LIQUID FILLED ORAL 2 TIMES DAILY
Status: DISCONTINUED | OUTPATIENT
Start: 2017-12-20 | End: 2017-12-21

## 2017-12-20 RX ORDER — ONDANSETRON 2 MG/ML
4 INJECTION INTRAMUSCULAR; INTRAVENOUS EVERY 6 HOURS PRN
Status: DISCONTINUED | OUTPATIENT
Start: 2017-12-20 | End: 2017-12-28 | Stop reason: HOSPADM

## 2017-12-20 RX ORDER — PROPRANOLOL HCL 60 MG
120 CAPSULE, EXTENDED RELEASE 24HR ORAL
Status: DISCONTINUED | OUTPATIENT
Start: 2017-12-21 | End: 2017-12-25

## 2017-12-20 RX ORDER — CLONIDINE HYDROCHLORIDE 0.1 MG/1
0.1 TABLET ORAL ONCE
Status: DISCONTINUED | OUTPATIENT
Start: 2017-12-20 | End: 2017-12-20

## 2017-12-20 RX ORDER — LIDOCAINE HYDROCHLORIDE 10 MG/ML
10 INJECTION, SOLUTION INFILTRATION; PERINEURAL ONCE
Status: COMPLETED | OUTPATIENT
Start: 2017-12-20 | End: 2017-12-20

## 2017-12-20 RX ORDER — ACETAMINOPHEN 325 MG/1
650 TABLET ORAL EVERY 4 HOURS PRN
Status: DISCONTINUED | OUTPATIENT
Start: 2017-12-20 | End: 2017-12-28 | Stop reason: HOSPADM

## 2017-12-20 RX ORDER — CALCIUM CARBONATE 200(500)MG
2 TABLET,CHEWABLE ORAL 2 TIMES DAILY PRN
Status: DISCONTINUED | OUTPATIENT
Start: 2017-12-20 | End: 2017-12-28 | Stop reason: HOSPADM

## 2017-12-20 RX ORDER — LEVOTHYROXINE SODIUM 0.07 MG/1
75 TABLET ORAL EVERY OTHER DAY
Status: DISCONTINUED | OUTPATIENT
Start: 2017-12-21 | End: 2017-12-28 | Stop reason: HOSPADM

## 2017-12-20 RX ORDER — SODIUM CHLORIDE 0.9 % (FLUSH) 0.9 %
10 SYRINGE (ML) INJECTION AS NEEDED
Status: DISCONTINUED | OUTPATIENT
Start: 2017-12-20 | End: 2017-12-28 | Stop reason: HOSPADM

## 2017-12-20 RX ORDER — NITROGLYCERIN 0.4 MG/1
0.4 TABLET SUBLINGUAL
Status: DISCONTINUED | OUTPATIENT
Start: 2017-12-20 | End: 2017-12-28 | Stop reason: HOSPADM

## 2017-12-20 RX ORDER — MONTELUKAST SODIUM 10 MG/1
10 TABLET ORAL NIGHTLY
Status: DISCONTINUED | OUTPATIENT
Start: 2017-12-21 | End: 2017-12-28 | Stop reason: HOSPADM

## 2017-12-20 RX ORDER — ONDANSETRON 4 MG/1
4 TABLET, ORALLY DISINTEGRATING ORAL EVERY 6 HOURS PRN
Status: DISCONTINUED | OUTPATIENT
Start: 2017-12-20 | End: 2017-12-28 | Stop reason: HOSPADM

## 2017-12-20 RX ADMIN — LIDOCAINE HYDROCHLORIDE 1 ML: 10 INJECTION, SOLUTION INFILTRATION; PERINEURAL at 14:32

## 2017-12-20 RX ADMIN — PROPRANOLOL HYDROCHLORIDE 120 MG: 60 CAPSULE, EXTENDED RELEASE ORAL at 13:26

## 2017-12-20 RX ADMIN — ENOXAPARIN SODIUM 40 MG: 40 INJECTION SUBCUTANEOUS at 21:58

## 2017-12-20 RX ADMIN — SODIUM CHLORIDE 75 ML/HR: 9 INJECTION, SOLUTION INTRAVENOUS at 21:57

## 2017-12-21 ENCOUNTER — APPOINTMENT (OUTPATIENT)
Dept: GENERAL RADIOLOGY | Facility: HOSPITAL | Age: 82
End: 2017-12-21
Attending: SURGERY

## 2017-12-21 PROBLEM — T88.1XXA GUILLAIN-BARRE SYNDROME FOLLOWING VACCINATION: Status: ACTIVE | Noted: 2017-12-21

## 2017-12-21 PROBLEM — E43 SEVERE MALNUTRITION: Status: ACTIVE | Noted: 2017-12-21

## 2017-12-21 PROBLEM — G61.0 GUILLAIN-BARRE SYNDROME FOLLOWING VACCINATION (HCC): Status: ACTIVE | Noted: 2017-12-21

## 2017-12-21 LAB
ANION GAP SERPL CALCULATED.3IONS-SCNC: 15.6 MMOL/L
BASOPHILS # BLD AUTO: 0.06 10*3/MM3 (ref 0–0.2)
BASOPHILS NFR BLD AUTO: 0.6 % (ref 0–1.5)
BUN BLD-MCNC: 16 MG/DL (ref 8–23)
BUN/CREAT SERPL: 18.6 (ref 7–25)
CALCIUM SPEC-SCNC: 8.8 MG/DL (ref 8.6–10.5)
CHLORIDE SERPL-SCNC: 103 MMOL/L (ref 98–107)
CO2 SERPL-SCNC: 20.4 MMOL/L (ref 22–29)
CREAT BLD-MCNC: 0.86 MG/DL (ref 0.57–1)
DEPRECATED RDW RBC AUTO: 49.1 FL (ref 37–54)
EOSINOPHIL # BLD AUTO: 0.05 10*3/MM3 (ref 0–0.7)
EOSINOPHIL NFR BLD AUTO: 0.5 % (ref 0.3–6.2)
ERYTHROCYTE [DISTWIDTH] IN BLOOD BY AUTOMATED COUNT: 13.3 % (ref 11.7–13)
GFR SERPL CREATININE-BSD FRML MDRD: 63 ML/MIN/1.73
GLUCOSE BLD-MCNC: 87 MG/DL (ref 65–99)
HCT VFR BLD AUTO: 46.6 % (ref 35.6–45.5)
HGB BLD-MCNC: 14.7 G/DL (ref 11.9–15.5)
IMM GRANULOCYTES # BLD: 0.14 10*3/MM3 (ref 0–0.03)
IMM GRANULOCYTES NFR BLD: 1.4 % (ref 0–0.5)
LYMPHOCYTES # BLD AUTO: 3.69 10*3/MM3 (ref 0.9–4.8)
LYMPHOCYTES NFR BLD AUTO: 36.2 % (ref 19.6–45.3)
MCH RBC QN AUTO: 32 PG (ref 26.9–32)
MCHC RBC AUTO-ENTMCNC: 31.5 G/DL (ref 32.4–36.3)
MCV RBC AUTO: 101.5 FL (ref 80.5–98.2)
MONOCYTES # BLD AUTO: 1.28 10*3/MM3 (ref 0.2–1.2)
MONOCYTES NFR BLD AUTO: 12.6 % (ref 5–12)
NEUTROPHILS # BLD AUTO: 4.97 10*3/MM3 (ref 1.9–8.1)
NEUTROPHILS NFR BLD AUTO: 48.7 % (ref 42.7–76)
PLATELET # BLD AUTO: 346 10*3/MM3 (ref 140–500)
PMV BLD AUTO: 8.8 FL (ref 6–12)
POTASSIUM BLD-SCNC: 4.3 MMOL/L (ref 3.5–5.2)
RBC # BLD AUTO: 4.59 10*6/MM3 (ref 3.9–5.2)
SODIUM BLD-SCNC: 139 MMOL/L (ref 136–145)
TSH SERPL DL<=0.05 MIU/L-ACNC: 2.08 MIU/ML (ref 0.27–4.2)
WBC NRBC COR # BLD: 10.19 10*3/MM3 (ref 4.5–10.7)

## 2017-12-21 PROCEDURE — 05HM33Z INSERTION OF INFUSION DEVICE INTO RIGHT INTERNAL JUGULAR VEIN, PERCUTANEOUS APPROACH: ICD-10-PCS | Performed by: SURGERY

## 2017-12-21 PROCEDURE — 80048 BASIC METABOLIC PNL TOTAL CA: CPT | Performed by: INTERNAL MEDICINE

## 2017-12-21 PROCEDURE — 25010000002 HEPARIN (PORCINE) PER 1000 UNITS: Performed by: SURGERY

## 2017-12-21 PROCEDURE — 99223 1ST HOSP IP/OBS HIGH 75: CPT | Performed by: PSYCHIATRY & NEUROLOGY

## 2017-12-21 PROCEDURE — 25010000002 ENOXAPARIN PER 10 MG: Performed by: INTERNAL MEDICINE

## 2017-12-21 PROCEDURE — 94640 AIRWAY INHALATION TREATMENT: CPT

## 2017-12-21 PROCEDURE — 71010 HC CHEST PA OR AP: CPT

## 2017-12-21 PROCEDURE — 94799 UNLISTED PULMONARY SVC/PX: CPT

## 2017-12-21 PROCEDURE — 6A551Z3 PHERESIS OF PLASMA, MULTIPLE: ICD-10-PCS | Performed by: INTERNAL MEDICINE

## 2017-12-21 PROCEDURE — 85025 COMPLETE CBC W/AUTO DIFF WBC: CPT | Performed by: INTERNAL MEDICINE

## 2017-12-21 PROCEDURE — 92610 EVALUATE SWALLOWING FUNCTION: CPT

## 2017-12-21 RX ORDER — HEPARIN SODIUM 1000 [USP'U]/ML
4000 INJECTION, SOLUTION INTRAVENOUS; SUBCUTANEOUS DAILY
Status: DISPENSED | OUTPATIENT
Start: 2017-12-21 | End: 2017-12-26

## 2017-12-21 RX ORDER — IPRATROPIUM BROMIDE AND ALBUTEROL SULFATE 2.5; .5 MG/3ML; MG/3ML
3 SOLUTION RESPIRATORY (INHALATION) EVERY 4 HOURS PRN
Status: DISCONTINUED | OUTPATIENT
Start: 2017-12-21 | End: 2017-12-28 | Stop reason: HOSPADM

## 2017-12-21 RX ORDER — DOCUSATE SODIUM 50 MG/5 ML
100 LIQUID (ML) ORAL 2 TIMES DAILY
Status: DISCONTINUED | OUTPATIENT
Start: 2017-12-21 | End: 2017-12-22

## 2017-12-21 RX ORDER — HEPARIN SODIUM 1000 [USP'U]/ML
10000 INJECTION, SOLUTION INTRAVENOUS; SUBCUTANEOUS
Status: COMPLETED | OUTPATIENT
Start: 2017-12-21 | End: 2017-12-21

## 2017-12-21 RX ORDER — ANTICOAGULANT CITRATE DEXTROSE SOLUTION FORMULA A 12.25; 11; 3.65 G/500ML; G/500ML; G/500ML
500 SOLUTION INTRAVENOUS ONCE
Status: COMPLETED | OUTPATIENT
Start: 2017-12-21 | End: 2017-12-22

## 2017-12-21 RX ORDER — LOSARTAN POTASSIUM 100 MG/1
100 TABLET ORAL
Status: DISCONTINUED | OUTPATIENT
Start: 2017-12-21 | End: 2017-12-28 | Stop reason: HOSPADM

## 2017-12-21 RX ORDER — ALBUMIN, HUMAN INJ 5% 5 %
1750 SOLUTION INTRAVENOUS DAILY
Status: DISPENSED | OUTPATIENT
Start: 2017-12-21 | End: 2017-12-26

## 2017-12-21 RX ADMIN — Medication 250 MG: at 20:11

## 2017-12-21 RX ADMIN — PROPRANOLOL HYDROCHLORIDE 120 MG: 60 CAPSULE, EXTENDED RELEASE ORAL at 12:16

## 2017-12-21 RX ADMIN — ACETAMINOPHEN 650 MG: 325 TABLET ORAL at 15:21

## 2017-12-21 RX ADMIN — HEPARIN SODIUM 10000 UNITS: 1000 INJECTION, SOLUTION INTRAVENOUS; SUBCUTANEOUS at 13:47

## 2017-12-21 RX ADMIN — MONTELUKAST 10 MG: 10 TABLET, FILM COATED ORAL at 20:11

## 2017-12-21 RX ADMIN — SODIUM CHLORIDE 75 ML/HR: 9 INJECTION, SOLUTION INTRAVENOUS at 12:16

## 2017-12-21 RX ADMIN — Medication 250 MG: at 12:16

## 2017-12-21 RX ADMIN — DOCUSATE SODIUM 100 MG: 50 LIQUID ORAL at 20:11

## 2017-12-21 RX ADMIN — LOSARTAN POTASSIUM 100 MG: 100 TABLET, FILM COATED ORAL at 12:16

## 2017-12-21 RX ADMIN — ENOXAPARIN SODIUM 40 MG: 40 INJECTION SUBCUTANEOUS at 20:11

## 2017-12-22 ENCOUNTER — APPOINTMENT (OUTPATIENT)
Dept: GENERAL RADIOLOGY | Facility: HOSPITAL | Age: 82
End: 2017-12-22

## 2017-12-22 LAB
ALBUMIN SERPL-MCNC: 3.8 G/DL (ref 3.5–5.2)
ANION GAP SERPL CALCULATED.3IONS-SCNC: 11.3 MMOL/L
BUN BLD-MCNC: 15 MG/DL (ref 8–23)
BUN/CREAT SERPL: 18.5 (ref 7–25)
CA-I BLD-MCNC: 5.2 MG/DL (ref 4.6–5.4)
CA-I SERPL ISE-MCNC: 1.3 MMOL/L (ref 1.15–1.35)
CALCIUM SPEC-SCNC: 8.6 MG/DL (ref 8.6–10.5)
CHLORIDE SERPL-SCNC: 107 MMOL/L (ref 98–107)
CO2 SERPL-SCNC: 23.7 MMOL/L (ref 22–29)
CREAT BLD-MCNC: 0.81 MG/DL (ref 0.57–1)
GFR SERPL CREATININE-BSD FRML MDRD: 68 ML/MIN/1.73
GLUCOSE BLD-MCNC: 86 MG/DL (ref 65–99)
PHOSPHATE SERPL-MCNC: 2.6 MG/DL (ref 2.5–4.5)
POTASSIUM BLD-SCNC: 3.9 MMOL/L (ref 3.5–5.2)
SODIUM BLD-SCNC: 142 MMOL/L (ref 136–145)

## 2017-12-22 PROCEDURE — 25010000002 ENOXAPARIN PER 10 MG: Performed by: INTERNAL MEDICINE

## 2017-12-22 PROCEDURE — 82330 ASSAY OF CALCIUM: CPT | Performed by: INTERNAL MEDICINE

## 2017-12-22 PROCEDURE — 80069 RENAL FUNCTION PANEL: CPT | Performed by: INTERNAL MEDICINE

## 2017-12-22 PROCEDURE — 25010000002 ALBUMIN HUMAN 5% PER 50 ML: Performed by: INTERNAL MEDICINE

## 2017-12-22 PROCEDURE — P9041 ALBUMIN (HUMAN),5%, 50ML: HCPCS | Performed by: INTERNAL MEDICINE

## 2017-12-22 PROCEDURE — 25010000002 CALCIUM GLUCONATE PER 10 ML: Performed by: INTERNAL MEDICINE

## 2017-12-22 PROCEDURE — 92611 MOTION FLUOROSCOPY/SWALLOW: CPT

## 2017-12-22 PROCEDURE — 74230 X-RAY XM SWLNG FUNCJ C+: CPT

## 2017-12-22 PROCEDURE — 99231 SBSQ HOSP IP/OBS SF/LOW 25: CPT | Performed by: PSYCHIATRY & NEUROLOGY

## 2017-12-22 PROCEDURE — 97162 PT EVAL MOD COMPLEX 30 MIN: CPT

## 2017-12-22 PROCEDURE — 97110 THERAPEUTIC EXERCISES: CPT

## 2017-12-22 RX ORDER — ANTICOAGULANT CITRATE DEXTROSE SOLUTION FORMULA A 12.25; 11; 3.65 G/500ML; G/500ML; G/500ML
500 SOLUTION INTRAVENOUS DAILY
Status: COMPLETED | OUTPATIENT
Start: 2017-12-23 | End: 2017-12-25

## 2017-12-22 RX ORDER — DOCUSATE SODIUM 100 MG/1
100 CAPSULE, LIQUID FILLED ORAL 2 TIMES DAILY
Status: DISCONTINUED | OUTPATIENT
Start: 2017-12-22 | End: 2017-12-28 | Stop reason: HOSPADM

## 2017-12-22 RX ORDER — AMLODIPINE BESYLATE 5 MG/1
5 TABLET ORAL
Status: DISCONTINUED | OUTPATIENT
Start: 2017-12-22 | End: 2017-12-28 | Stop reason: HOSPADM

## 2017-12-22 RX ADMIN — ANTICOAGULANT CITRATE DEXTROSE SOLUTION FORMULA A 500 ML: 12.25; 11; 3.65 SOLUTION INTRAVENOUS at 16:34

## 2017-12-22 RX ADMIN — CALCIUM GLUCONATE 1.75 G: 94 INJECTION, SOLUTION INTRAVENOUS at 16:34

## 2017-12-22 RX ADMIN — SODIUM CHLORIDE 75 ML/HR: 9 INJECTION, SOLUTION INTRAVENOUS at 13:04

## 2017-12-22 RX ADMIN — Medication 250 MG: at 20:06

## 2017-12-22 RX ADMIN — Medication 250 MG: at 11:03

## 2017-12-22 RX ADMIN — ACETAMINOPHEN 650 MG: 325 TABLET ORAL at 06:12

## 2017-12-22 RX ADMIN — DOCUSATE SODIUM 100 MG: 100 CAPSULE, LIQUID FILLED ORAL at 11:04

## 2017-12-22 RX ADMIN — ALBUMIN HUMAN 1750 ML: 0.05 INJECTION, SOLUTION INTRAVENOUS at 16:34

## 2017-12-22 RX ADMIN — AMLODIPINE BESYLATE 5 MG: 5 TABLET ORAL at 18:01

## 2017-12-22 RX ADMIN — SODIUM CHLORIDE 75 ML/HR: 9 INJECTION, SOLUTION INTRAVENOUS at 00:02

## 2017-12-22 RX ADMIN — DOCUSATE SODIUM 100 MG: 100 CAPSULE, LIQUID FILLED ORAL at 20:06

## 2017-12-22 RX ADMIN — ENOXAPARIN SODIUM 40 MG: 40 INJECTION SUBCUTANEOUS at 20:06

## 2017-12-22 RX ADMIN — LOSARTAN POTASSIUM 100 MG: 100 TABLET, FILM COATED ORAL at 11:03

## 2017-12-22 RX ADMIN — PROPRANOLOL HYDROCHLORIDE 120 MG: 60 CAPSULE, EXTENDED RELEASE ORAL at 11:03

## 2017-12-23 LAB
ALBUMIN SERPL-MCNC: 4 G/DL (ref 3.5–5.2)
ANION GAP SERPL CALCULATED.3IONS-SCNC: 10.9 MMOL/L
BACTERIA SPEC AEROBE CULT: NORMAL
BUN BLD-MCNC: 11 MG/DL (ref 8–23)
BUN/CREAT SERPL: 15.9 (ref 7–25)
CALCIUM SPEC-SCNC: 8.8 MG/DL (ref 8.6–10.5)
CHLORIDE SERPL-SCNC: 106 MMOL/L (ref 98–107)
CO2 SERPL-SCNC: 25.1 MMOL/L (ref 22–29)
CREAT BLD-MCNC: 0.69 MG/DL (ref 0.57–1)
GFR SERPL CREATININE-BSD FRML MDRD: 82 ML/MIN/1.73
GLUCOSE BLD-MCNC: 106 MG/DL (ref 65–99)
GRAM STN SPEC: NORMAL
PHOSPHATE SERPL-MCNC: 2.6 MG/DL (ref 2.5–4.5)
POTASSIUM BLD-SCNC: 4 MMOL/L (ref 3.5–5.2)
SODIUM BLD-SCNC: 142 MMOL/L (ref 136–145)

## 2017-12-23 PROCEDURE — 86255 FLUORESCENT ANTIBODY SCREEN: CPT | Performed by: PSYCHIATRY & NEUROLOGY

## 2017-12-23 PROCEDURE — 25010000002 ALBUMIN HUMAN 5% PER 50 ML: Performed by: INTERNAL MEDICINE

## 2017-12-23 PROCEDURE — P9041 ALBUMIN (HUMAN),5%, 50ML: HCPCS | Performed by: INTERNAL MEDICINE

## 2017-12-23 PROCEDURE — 80069 RENAL FUNCTION PANEL: CPT | Performed by: INTERNAL MEDICINE

## 2017-12-23 PROCEDURE — 99231 SBSQ HOSP IP/OBS SF/LOW 25: CPT | Performed by: PSYCHIATRY & NEUROLOGY

## 2017-12-23 PROCEDURE — 25010000002 ENOXAPARIN PER 10 MG: Performed by: INTERNAL MEDICINE

## 2017-12-23 PROCEDURE — 25010000002 CALCIUM GLUCONATE PER 10 ML: Performed by: INTERNAL MEDICINE

## 2017-12-23 PROCEDURE — 97110 THERAPEUTIC EXERCISES: CPT

## 2017-12-23 PROCEDURE — 25010000002 HEPARIN (PORCINE) PER 1000 UNITS: Performed by: INTERNAL MEDICINE

## 2017-12-23 PROCEDURE — 83519 RIA NONANTIBODY: CPT | Performed by: PSYCHIATRY & NEUROLOGY

## 2017-12-23 PROCEDURE — 86317 IMMUNOASSAY INFECTIOUS AGENT: CPT | Performed by: PSYCHIATRY & NEUROLOGY

## 2017-12-23 RX ORDER — LEVOTHYROXINE SODIUM 0.05 MG/1
50 TABLET ORAL EVERY OTHER DAY
Status: DISCONTINUED | OUTPATIENT
Start: 2017-12-24 | End: 2017-12-26

## 2017-12-23 RX ADMIN — PROPRANOLOL HYDROCHLORIDE 120 MG: 60 CAPSULE, EXTENDED RELEASE ORAL at 08:01

## 2017-12-23 RX ADMIN — AMLODIPINE BESYLATE 5 MG: 5 TABLET ORAL at 08:01

## 2017-12-23 RX ADMIN — Medication 10 ML: at 07:53

## 2017-12-23 RX ADMIN — DOCUSATE SODIUM 100 MG: 100 CAPSULE, LIQUID FILLED ORAL at 08:01

## 2017-12-23 RX ADMIN — ACETAMINOPHEN 650 MG: 325 TABLET ORAL at 03:17

## 2017-12-23 RX ADMIN — Medication 250 MG: at 08:01

## 2017-12-23 RX ADMIN — HEPARIN SODIUM 4000 UNITS: 1000 INJECTION, SOLUTION INTRAVENOUS; SUBCUTANEOUS at 12:29

## 2017-12-23 RX ADMIN — ANTICOAGULANT CITRATE DEXTROSE SOLUTION FORMULA A 500 ML: 12.25; 11; 3.65 SOLUTION INTRAVENOUS at 10:30

## 2017-12-23 RX ADMIN — LEVOTHYROXINE SODIUM 75 MCG: 75 TABLET ORAL at 08:01

## 2017-12-23 RX ADMIN — Medication 10 ML: at 07:52

## 2017-12-23 RX ADMIN — ENOXAPARIN SODIUM 40 MG: 40 INJECTION SUBCUTANEOUS at 21:10

## 2017-12-23 RX ADMIN — CALCIUM GLUCONATE 1.75 G: 94 INJECTION, SOLUTION INTRAVENOUS at 10:30

## 2017-12-23 RX ADMIN — Medication 250 MG: at 21:10

## 2017-12-23 RX ADMIN — ALBUMIN HUMAN 1750 ML: 0.05 INJECTION, SOLUTION INTRAVENOUS at 10:30

## 2017-12-23 RX ADMIN — LOSARTAN POTASSIUM 100 MG: 100 TABLET, FILM COATED ORAL at 08:01

## 2017-12-24 LAB
ANION GAP SERPL CALCULATED.3IONS-SCNC: 10 MMOL/L
BUN BLD-MCNC: 12 MG/DL (ref 8–23)
BUN/CREAT SERPL: 16.2 (ref 7–25)
CALCIUM SPEC-SCNC: 8.8 MG/DL (ref 8.6–10.5)
CHLORIDE SERPL-SCNC: 106 MMOL/L (ref 98–107)
CO2 SERPL-SCNC: 28 MMOL/L (ref 22–29)
CREAT BLD-MCNC: 0.74 MG/DL (ref 0.57–1)
GFR SERPL CREATININE-BSD FRML MDRD: 75 ML/MIN/1.73
GLUCOSE BLD-MCNC: 100 MG/DL (ref 65–99)
POTASSIUM BLD-SCNC: 3.8 MMOL/L (ref 3.5–5.2)
SODIUM BLD-SCNC: 144 MMOL/L (ref 136–145)

## 2017-12-24 PROCEDURE — 25010000002 ALBUMIN HUMAN 5% PER 50 ML: Performed by: INTERNAL MEDICINE

## 2017-12-24 PROCEDURE — 80048 BASIC METABOLIC PNL TOTAL CA: CPT | Performed by: INTERNAL MEDICINE

## 2017-12-24 PROCEDURE — 25010000002 CALCIUM GLUCONATE PER 10 ML: Performed by: INTERNAL MEDICINE

## 2017-12-24 PROCEDURE — P9041 ALBUMIN (HUMAN),5%, 50ML: HCPCS | Performed by: INTERNAL MEDICINE

## 2017-12-24 PROCEDURE — 99233 SBSQ HOSP IP/OBS HIGH 50: CPT | Performed by: PSYCHIATRY & NEUROLOGY

## 2017-12-24 PROCEDURE — 97110 THERAPEUTIC EXERCISES: CPT

## 2017-12-24 PROCEDURE — 25010000002 HEPARIN (PORCINE) PER 1000 UNITS: Performed by: INTERNAL MEDICINE

## 2017-12-24 PROCEDURE — 25010000002 ENOXAPARIN PER 10 MG: Performed by: INTERNAL MEDICINE

## 2017-12-24 RX ADMIN — CALCIUM GLUCONATE 1.75 G: 94 INJECTION, SOLUTION INTRAVENOUS at 14:06

## 2017-12-24 RX ADMIN — Medication 250 MG: at 20:36

## 2017-12-24 RX ADMIN — ALBUMIN HUMAN 1750 ML: 0.05 INJECTION, SOLUTION INTRAVENOUS at 14:08

## 2017-12-24 RX ADMIN — AMLODIPINE BESYLATE 5 MG: 5 TABLET ORAL at 08:23

## 2017-12-24 RX ADMIN — PROPRANOLOL HYDROCHLORIDE 120 MG: 60 CAPSULE, EXTENDED RELEASE ORAL at 08:23

## 2017-12-24 RX ADMIN — MONTELUKAST 10 MG: 10 TABLET, FILM COATED ORAL at 20:36

## 2017-12-24 RX ADMIN — DOCUSATE SODIUM 100 MG: 100 CAPSULE, LIQUID FILLED ORAL at 20:36

## 2017-12-24 RX ADMIN — ANTICOAGULANT CITRATE DEXTROSE SOLUTION FORMULA A 500 ML: 12.25; 11; 3.65 SOLUTION INTRAVENOUS at 14:09

## 2017-12-24 RX ADMIN — ENOXAPARIN SODIUM 40 MG: 40 INJECTION SUBCUTANEOUS at 20:36

## 2017-12-24 RX ADMIN — LEVOTHYROXINE SODIUM 50 MCG: 50 TABLET ORAL at 05:47

## 2017-12-24 RX ADMIN — Medication 250 MG: at 08:24

## 2017-12-24 RX ADMIN — HEPARIN SODIUM 4000 UNITS: 1000 INJECTION, SOLUTION INTRAVENOUS; SUBCUTANEOUS at 14:07

## 2017-12-24 RX ADMIN — LOSARTAN POTASSIUM 100 MG: 100 TABLET, FILM COATED ORAL at 10:21

## 2017-12-24 RX ADMIN — DOCUSATE SODIUM 100 MG: 100 CAPSULE, LIQUID FILLED ORAL at 08:25

## 2017-12-24 RX ADMIN — ACETAMINOPHEN 650 MG: 325 TABLET ORAL at 01:24

## 2017-12-25 ENCOUNTER — APPOINTMENT (OUTPATIENT)
Dept: GENERAL RADIOLOGY | Facility: HOSPITAL | Age: 82
End: 2017-12-25

## 2017-12-25 PROBLEM — R00.1 BRADYCARDIA: Status: ACTIVE | Noted: 2017-12-25

## 2017-12-25 PROBLEM — R55 SYNCOPE: Status: ACTIVE | Noted: 2017-12-25

## 2017-12-25 LAB
ANION GAP SERPL CALCULATED.3IONS-SCNC: 13.3 MMOL/L
BUN BLD-MCNC: 19 MG/DL (ref 8–23)
BUN/CREAT SERPL: 20.9 (ref 7–25)
CALCIUM SPEC-SCNC: 9 MG/DL (ref 8.6–10.5)
CHLORIDE SERPL-SCNC: 102 MMOL/L (ref 98–107)
CO2 SERPL-SCNC: 25.7 MMOL/L (ref 22–29)
CREAT BLD-MCNC: 0.91 MG/DL (ref 0.57–1)
D DIMER PPP FEU-MCNC: 0.34 MCGFEU/ML (ref 0–0.49)
DEPRECATED RDW RBC AUTO: 50.2 FL (ref 37–54)
ERYTHROCYTE [DISTWIDTH] IN BLOOD BY AUTOMATED COUNT: 14.1 % (ref 11.7–13)
GFR SERPL CREATININE-BSD FRML MDRD: 59 ML/MIN/1.73
GLUCOSE BLD-MCNC: 133 MG/DL (ref 65–99)
GLUCOSE BLDC GLUCOMTR-MCNC: 158 MG/DL (ref 70–130)
HCT VFR BLD AUTO: 36.8 % (ref 35.6–45.5)
HGB BLD-MCNC: 11.8 G/DL (ref 11.9–15.5)
MCH RBC QN AUTO: 31.7 PG (ref 26.9–32)
MCHC RBC AUTO-ENTMCNC: 32.1 G/DL (ref 32.4–36.3)
MCV RBC AUTO: 98.9 FL (ref 80.5–98.2)
PLATELET # BLD AUTO: 208 10*3/MM3 (ref 140–500)
PMV BLD AUTO: 9 FL (ref 6–12)
POTASSIUM BLD-SCNC: 4.5 MMOL/L (ref 3.5–5.2)
RBC # BLD AUTO: 3.72 10*6/MM3 (ref 3.9–5.2)
SODIUM BLD-SCNC: 141 MMOL/L (ref 136–145)
TROPONIN T SERPL-MCNC: <0.01 NG/ML (ref 0–0.03)
TSH SERPL DL<=0.05 MIU/L-ACNC: 4.2 MIU/ML (ref 0.27–4.2)
WBC NRBC COR # BLD: 11.58 10*3/MM3 (ref 4.5–10.7)

## 2017-12-25 PROCEDURE — 82962 GLUCOSE BLOOD TEST: CPT

## 2017-12-25 PROCEDURE — 80048 BASIC METABOLIC PNL TOTAL CA: CPT | Performed by: INTERNAL MEDICINE

## 2017-12-25 PROCEDURE — 25010000002 HEPARIN (PORCINE) PER 1000 UNITS: Performed by: INTERNAL MEDICINE

## 2017-12-25 PROCEDURE — 93010 ELECTROCARDIOGRAM REPORT: CPT | Performed by: INTERNAL MEDICINE

## 2017-12-25 PROCEDURE — 85379 FIBRIN DEGRADATION QUANT: CPT | Performed by: INTERNAL MEDICINE

## 2017-12-25 PROCEDURE — 25010000002 ENOXAPARIN PER 10 MG: Performed by: INTERNAL MEDICINE

## 2017-12-25 PROCEDURE — 25010000002 ALBUMIN HUMAN 5% PER 50 ML: Performed by: INTERNAL MEDICINE

## 2017-12-25 PROCEDURE — 84443 ASSAY THYROID STIM HORMONE: CPT | Performed by: INTERNAL MEDICINE

## 2017-12-25 PROCEDURE — 25010000002 CALCIUM GLUCONATE PER 10 ML: Performed by: INTERNAL MEDICINE

## 2017-12-25 PROCEDURE — P9041 ALBUMIN (HUMAN),5%, 50ML: HCPCS | Performed by: INTERNAL MEDICINE

## 2017-12-25 PROCEDURE — 93005 ELECTROCARDIOGRAM TRACING: CPT | Performed by: INTERNAL MEDICINE

## 2017-12-25 PROCEDURE — 85027 COMPLETE CBC AUTOMATED: CPT | Performed by: INTERNAL MEDICINE

## 2017-12-25 PROCEDURE — 25010000002 ONDANSETRON PER 1 MG: Performed by: INTERNAL MEDICINE

## 2017-12-25 PROCEDURE — 84484 ASSAY OF TROPONIN QUANT: CPT | Performed by: INTERNAL MEDICINE

## 2017-12-25 PROCEDURE — 99232 SBSQ HOSP IP/OBS MODERATE 35: CPT | Performed by: PSYCHIATRY & NEUROLOGY

## 2017-12-25 PROCEDURE — 71010 HC CHEST PA OR AP: CPT

## 2017-12-25 RX ORDER — ATROPINE SULFATE 0.4 MG/ML
1 AMPUL (ML) INJECTION ONCE
Status: COMPLETED | OUTPATIENT
Start: 2017-12-25 | End: 2017-12-25

## 2017-12-25 RX ADMIN — LOSARTAN POTASSIUM 100 MG: 100 TABLET, FILM COATED ORAL at 10:11

## 2017-12-25 RX ADMIN — HEPARIN SODIUM 4000 UNITS: 1000 INJECTION, SOLUTION INTRAVENOUS; SUBCUTANEOUS at 09:25

## 2017-12-25 RX ADMIN — Medication 250 MG: at 10:09

## 2017-12-25 RX ADMIN — DOCUSATE SODIUM 100 MG: 100 CAPSULE, LIQUID FILLED ORAL at 23:13

## 2017-12-25 RX ADMIN — ENOXAPARIN SODIUM 40 MG: 40 INJECTION SUBCUTANEOUS at 21:02

## 2017-12-25 RX ADMIN — SODIUM CHLORIDE 250 ML: 9 INJECTION, SOLUTION INTRAVENOUS at 16:18

## 2017-12-25 RX ADMIN — ANTICOAGULANT CITRATE DEXTROSE SOLUTION FORMULA A 500 ML: 12.25; 11; 3.65 SOLUTION INTRAVENOUS at 08:00

## 2017-12-25 RX ADMIN — PROPRANOLOL HYDROCHLORIDE 120 MG: 60 CAPSULE, EXTENDED RELEASE ORAL at 10:09

## 2017-12-25 RX ADMIN — DOCUSATE SODIUM 100 MG: 100 CAPSULE, LIQUID FILLED ORAL at 10:09

## 2017-12-25 RX ADMIN — CALCIUM GLUCONATE 1.75 G: 94 INJECTION, SOLUTION INTRAVENOUS at 08:00

## 2017-12-25 RX ADMIN — LEVOTHYROXINE SODIUM 75 MCG: 75 TABLET ORAL at 06:03

## 2017-12-25 RX ADMIN — ATROPINE SULFATE 1 MG: 0.4 INJECTION, SOLUTION INTRAMUSCULAR; INTRAVENOUS; SUBCUTANEOUS at 16:18

## 2017-12-25 RX ADMIN — ALBUMIN HUMAN 1750 ML: 0.05 INJECTION, SOLUTION INTRAVENOUS at 08:00

## 2017-12-25 RX ADMIN — AMLODIPINE BESYLATE 5 MG: 5 TABLET ORAL at 10:09

## 2017-12-25 RX ADMIN — Medication 250 MG: at 21:02

## 2017-12-25 RX ADMIN — ONDANSETRON 4 MG: 2 INJECTION INTRAMUSCULAR; INTRAVENOUS at 16:38

## 2017-12-26 ENCOUNTER — TELEPHONE (OUTPATIENT)
Dept: INTERNAL MEDICINE | Facility: CLINIC | Age: 82
End: 2017-12-26

## 2017-12-26 ENCOUNTER — TRANSCRIBE ORDERS (OUTPATIENT)
Dept: ADMINISTRATIVE | Facility: HOSPITAL | Age: 82
End: 2017-12-26

## 2017-12-26 DIAGNOSIS — J18.9 PNEUMONIA DUE TO INFECTIOUS ORGANISM, UNSPECIFIED LATERALITY, UNSPECIFIED PART OF LUNG: Primary | ICD-10-CM

## 2017-12-26 LAB
ANION GAP SERPL CALCULATED.3IONS-SCNC: 11.5 MMOL/L
BUN BLD-MCNC: 21 MG/DL (ref 8–23)
BUN/CREAT SERPL: 18.3 (ref 7–25)
CALCIUM SPEC-SCNC: 8.8 MG/DL (ref 8.6–10.5)
CHLORIDE SERPL-SCNC: 104 MMOL/L (ref 98–107)
CO2 SERPL-SCNC: 24.5 MMOL/L (ref 22–29)
CREAT BLD-MCNC: 1.15 MG/DL (ref 0.57–1)
GFR SERPL CREATININE-BSD FRML MDRD: 45 ML/MIN/1.73
GLUCOSE BLD-MCNC: 106 MG/DL (ref 65–99)
GLUCOSE BLDC GLUCOMTR-MCNC: 169 MG/DL (ref 70–130)
PLATELET # BLD AUTO: 192 10*3/MM3 (ref 140–500)
POTASSIUM BLD-SCNC: 4.6 MMOL/L (ref 3.5–5.2)
SODIUM BLD-SCNC: 140 MMOL/L (ref 136–145)

## 2017-12-26 PROCEDURE — 25010000002 ENOXAPARIN PER 10 MG: Performed by: INTERNAL MEDICINE

## 2017-12-26 PROCEDURE — 97110 THERAPEUTIC EXERCISES: CPT | Performed by: PHYSICAL THERAPIST

## 2017-12-26 PROCEDURE — 93010 ELECTROCARDIOGRAM REPORT: CPT | Performed by: INTERNAL MEDICINE

## 2017-12-26 PROCEDURE — 82962 GLUCOSE BLOOD TEST: CPT

## 2017-12-26 PROCEDURE — 93005 ELECTROCARDIOGRAM TRACING: CPT | Performed by: INTERNAL MEDICINE

## 2017-12-26 PROCEDURE — 99222 1ST HOSP IP/OBS MODERATE 55: CPT | Performed by: INTERNAL MEDICINE

## 2017-12-26 PROCEDURE — 85049 AUTOMATED PLATELET COUNT: CPT | Performed by: INTERNAL MEDICINE

## 2017-12-26 PROCEDURE — 80048 BASIC METABOLIC PNL TOTAL CA: CPT | Performed by: INTERNAL MEDICINE

## 2017-12-26 RX ORDER — METOPROLOL SUCCINATE 25 MG/1
12.5 TABLET, EXTENDED RELEASE ORAL EVERY 12 HOURS SCHEDULED
Status: DISCONTINUED | OUTPATIENT
Start: 2017-12-26 | End: 2017-12-28 | Stop reason: HOSPADM

## 2017-12-26 RX ORDER — LEVOTHYROXINE SODIUM 0.05 MG/1
50 TABLET ORAL
Status: DISCONTINUED | OUTPATIENT
Start: 2017-12-28 | End: 2017-12-28 | Stop reason: HOSPADM

## 2017-12-26 RX ADMIN — AMLODIPINE BESYLATE 5 MG: 5 TABLET ORAL at 10:17

## 2017-12-26 RX ADMIN — DOCUSATE SODIUM 100 MG: 100 CAPSULE, LIQUID FILLED ORAL at 10:17

## 2017-12-26 RX ADMIN — LEVOTHYROXINE SODIUM 50 MCG: 50 TABLET ORAL at 07:32

## 2017-12-26 RX ADMIN — Medication 250 MG: at 20:11

## 2017-12-26 RX ADMIN — LOSARTAN POTASSIUM 100 MG: 100 TABLET, FILM COATED ORAL at 10:17

## 2017-12-26 RX ADMIN — METOPROLOL SUCCINATE 12.5 MG: 25 TABLET, FILM COATED, EXTENDED RELEASE ORAL at 16:01

## 2017-12-26 RX ADMIN — ENOXAPARIN SODIUM 30 MG: 30 INJECTION SUBCUTANEOUS at 20:11

## 2017-12-26 RX ADMIN — DOCUSATE SODIUM 100 MG: 100 CAPSULE, LIQUID FILLED ORAL at 20:10

## 2017-12-26 RX ADMIN — Medication 250 MG: at 10:17

## 2017-12-26 NOTE — TELEPHONE ENCOUNTER
----- Message from Misa Rey sent at 12/26/2017  8:54 AM EST -----  Contact: pt -ROBERTA  Pt has been admitted to [facility] since last Thursday morning, she will be canceling her appt today.    Pt thinks it was from the flu shot.        # [facility] room 551

## 2017-12-27 ENCOUNTER — APPOINTMENT (OUTPATIENT)
Dept: CARDIOLOGY | Facility: HOSPITAL | Age: 82
End: 2017-12-27
Attending: INTERNAL MEDICINE

## 2017-12-27 LAB
ANION GAP SERPL CALCULATED.3IONS-SCNC: 14.5 MMOL/L
AORTIC ARCH: 2 CM
ASCENDING AORTA: 2.9 CM
BH CV ECHO MEAS - ACS: 1.6 CM
BH CV ECHO MEAS - AO MAX PG: 4 MMHG
BH CV ECHO MEAS - AO MEAN PG (FULL): 1 MMHG
BH CV ECHO MEAS - AO MEAN PG: 3 MMHG
BH CV ECHO MEAS - AO ROOT AREA (BSA CORRECTED): 1.8
BH CV ECHO MEAS - AO ROOT AREA: 5.7 CM^2
BH CV ECHO MEAS - AO ROOT DIAM: 2.7 CM
BH CV ECHO MEAS - AO V2 MAX: 105 CM/SEC
BH CV ECHO MEAS - AO V2 MEAN: 82 CM/SEC
BH CV ECHO MEAS - AO V2 VTI: 23.8 CM
BH CV ECHO MEAS - ASC AORTA: 2.9 CM
BH CV ECHO MEAS - AVA(I,A): 2.7 CM^2
BH CV ECHO MEAS - AVA(I,D): 2.7 CM^2
BH CV ECHO MEAS - BSA(HAYCOCK): 1.5 M^2
BH CV ECHO MEAS - BSA: 1.5 M^2
BH CV ECHO MEAS - BZI_BMI: 18 KILOGRAMS/M^2
BH CV ECHO MEAS - BZI_METRIC_HEIGHT: 162.6 CM
BH CV ECHO MEAS - BZI_METRIC_WEIGHT: 47.6 KG
BH CV ECHO MEAS - CONTRAST EF (2CH): 62.7 ML/M^2
BH CV ECHO MEAS - CONTRAST EF 4CH: 66.7 ML/M^2
BH CV ECHO MEAS - EDV(CUBED): 54.9 ML
BH CV ECHO MEAS - EDV(MOD-SP2): 59 ML
BH CV ECHO MEAS - EDV(MOD-SP4): 51 ML
BH CV ECHO MEAS - EDV(TEICH): 62 ML
BH CV ECHO MEAS - EF(CUBED): 74.8 %
BH CV ECHO MEAS - EF(MOD-SP2): 62.7 %
BH CV ECHO MEAS - EF(MOD-SP4): 66.7 %
BH CV ECHO MEAS - EF(TEICH): 67.5 %
BH CV ECHO MEAS - ESV(CUBED): 13.8 ML
BH CV ECHO MEAS - ESV(MOD-SP2): 22 ML
BH CV ECHO MEAS - ESV(MOD-SP4): 17 ML
BH CV ECHO MEAS - ESV(TEICH): 20.2 ML
BH CV ECHO MEAS - FS: 36.8 %
BH CV ECHO MEAS - IVS/LVPW: 1
BH CV ECHO MEAS - IVSD: 0.7 CM
BH CV ECHO MEAS - LAT PEAK E' VEL: 6 CM/SEC
BH CV ECHO MEAS - LV DIASTOLIC VOL/BSA (35-75): 34.3 ML/M^2
BH CV ECHO MEAS - LV MASS(C)D: 71.9 GRAMS
BH CV ECHO MEAS - LV MASS(C)DI: 48.4 GRAMS/M^2
BH CV ECHO MEAS - LV MAX PG: 4 MMHG
BH CV ECHO MEAS - LV MEAN PG: 2 MMHG
BH CV ECHO MEAS - LV SYSTOLIC VOL/BSA (12-30): 11.4 ML/M^2
BH CV ECHO MEAS - LV V1 MAX: 103 CM/SEC
BH CV ECHO MEAS - LV V1 MEAN: 65.6 CM/SEC
BH CV ECHO MEAS - LV V1 VTI: 20.7 CM
BH CV ECHO MEAS - LVIDD: 3.8 CM
BH CV ECHO MEAS - LVIDS: 2.4 CM
BH CV ECHO MEAS - LVLD AP2: 6.1 CM
BH CV ECHO MEAS - LVLD AP4: 5.6 CM
BH CV ECHO MEAS - LVLS AP2: 5.1 CM
BH CV ECHO MEAS - LVLS AP4: 4.6 CM
BH CV ECHO MEAS - LVOT AREA (M): 3.1 CM^2
BH CV ECHO MEAS - LVOT AREA: 3.1 CM^2
BH CV ECHO MEAS - LVOT DIAM: 2 CM
BH CV ECHO MEAS - LVPWD: 0.7 CM
BH CV ECHO MEAS - MED PEAK E' VEL: 5 CM/SEC
BH CV ECHO MEAS - MV A DUR: 0.09 SEC
BH CV ECHO MEAS - MV A MAX VEL: 116 CM/SEC
BH CV ECHO MEAS - MV DEC SLOPE: 511 CM/SEC^2
BH CV ECHO MEAS - MV DEC TIME: 0.24 SEC
BH CV ECHO MEAS - MV E MAX VEL: 73.1 CM/SEC
BH CV ECHO MEAS - MV E/A: 0.63
BH CV ECHO MEAS - MV MAX PG: 6 MMHG
BH CV ECHO MEAS - MV MEAN PG: 2 MMHG
BH CV ECHO MEAS - MV P1/2T MAX VEL: 96.4 CM/SEC
BH CV ECHO MEAS - MV P1/2T: 55.3 MSEC
BH CV ECHO MEAS - MV V2 MEAN: 61.9 CM/SEC
BH CV ECHO MEAS - MV V2 VTI: 35.6 CM
BH CV ECHO MEAS - MVA P1/2T LCG: 2.3 CM^2
BH CV ECHO MEAS - MVA(P1/2T): 4 CM^2
BH CV ECHO MEAS - MVA(VTI): 1.8 CM^2
BH CV ECHO MEAS - PA ACC SLOPE: 979 CM/SEC^2
BH CV ECHO MEAS - PA ACC TIME: 0.09 SEC
BH CV ECHO MEAS - PA MAX PG: 3.9 MMHG
BH CV ECHO MEAS - PA PR(ACCEL): 37.6 MMHG
BH CV ECHO MEAS - PA V2 MAX: 99.1 CM/SEC
BH CV ECHO MEAS - PI END-D VEL: 82.8 CM/SEC
BH CV ECHO MEAS - PULM A REVS DUR: 0.1 SEC
BH CV ECHO MEAS - PULM A REVS VEL: 34.6 CM/SEC
BH CV ECHO MEAS - PULM DIAS VEL: 27.6 CM/SEC
BH CV ECHO MEAS - PULM S/D: 2
BH CV ECHO MEAS - PULM SYS VEL: 55.3 CM/SEC
BH CV ECHO MEAS - QP/QS: 0.58
BH CV ECHO MEAS - RAP SYSTOLE: 3 MMHG
BH CV ECHO MEAS - RV MEAN PG: 1 MMHG
BH CV ECHO MEAS - RV V1 MEAN: 52.5 CM/SEC
BH CV ECHO MEAS - RV V1 VTI: 14.7 CM
BH CV ECHO MEAS - RVOT AREA: 2.5 CM^2
BH CV ECHO MEAS - RVOT DIAM: 1.8 CM
BH CV ECHO MEAS - RVSP: 15 MMHG
BH CV ECHO MEAS - SI(AO): 91.6 ML/M^2
BH CV ECHO MEAS - SI(CUBED): 27.6 ML/M^2
BH CV ECHO MEAS - SI(LVOT): 43.7 ML/M^2
BH CV ECHO MEAS - SI(MOD-SP2): 24.9 ML/M^2
BH CV ECHO MEAS - SI(MOD-SP4): 22.9 ML/M^2
BH CV ECHO MEAS - SI(TEICH): 28.1 ML/M^2
BH CV ECHO MEAS - SUP REN AO DIAM: 1.6 CM
BH CV ECHO MEAS - SV(AO): 136.3 ML
BH CV ECHO MEAS - SV(CUBED): 41 ML
BH CV ECHO MEAS - SV(LVOT): 65 ML
BH CV ECHO MEAS - SV(MOD-SP2): 37 ML
BH CV ECHO MEAS - SV(MOD-SP4): 34 ML
BH CV ECHO MEAS - SV(RVOT): 37.4 ML
BH CV ECHO MEAS - SV(TEICH): 41.8 ML
BH CV ECHO MEAS - TAPSE (>1.6): 2.6 CM2
BH CV ECHO MEAS - TR MAX VEL: 174 CM/SEC
BH CV VAS BP RIGHT ARM: NORMAL MMHG
BH CV XLRA - RV BASE: 3.3 CM
BH CV XLRA - TDI S': 16 CM/SEC
BUN BLD-MCNC: 20 MG/DL (ref 8–23)
BUN/CREAT SERPL: 24.7 (ref 7–25)
C DIPHTHERIAE AB SER IA-ACNC: <0.1 IU/ML
CALCIUM SPEC-SCNC: 9 MG/DL (ref 8.6–10.5)
CHLORIDE SERPL-SCNC: 103 MMOL/L (ref 98–107)
CO2 SERPL-SCNC: 23.5 MMOL/L (ref 22–29)
CREAT BLD-MCNC: 0.81 MG/DL (ref 0.57–1)
E/E' RATIO: 13
GFR SERPL CREATININE-BSD FRML MDRD: 68 ML/MIN/1.73
GLUCOSE BLD-MCNC: 103 MG/DL (ref 65–99)
LEFT ATRIUM VOLUME INDEX: 19 ML/M2
MAXIMAL PREDICTED HEART RATE: 139 BPM
POTASSIUM BLD-SCNC: 4.4 MMOL/L (ref 3.5–5.2)
SODIUM BLD-SCNC: 141 MMOL/L (ref 136–145)
STRESS TARGET HR: 118 BPM

## 2017-12-27 PROCEDURE — 80048 BASIC METABOLIC PNL TOTAL CA: CPT | Performed by: INTERNAL MEDICINE

## 2017-12-27 PROCEDURE — 99232 SBSQ HOSP IP/OBS MODERATE 35: CPT | Performed by: INTERNAL MEDICINE

## 2017-12-27 PROCEDURE — 25010000002 ENOXAPARIN PER 10 MG: Performed by: INTERNAL MEDICINE

## 2017-12-27 PROCEDURE — 97165 OT EVAL LOW COMPLEX 30 MIN: CPT

## 2017-12-27 PROCEDURE — 92526 ORAL FUNCTION THERAPY: CPT

## 2017-12-27 PROCEDURE — 93306 TTE W/DOPPLER COMPLETE: CPT

## 2017-12-27 PROCEDURE — 93010 ELECTROCARDIOGRAM REPORT: CPT | Performed by: INTERNAL MEDICINE

## 2017-12-27 PROCEDURE — 93306 TTE W/DOPPLER COMPLETE: CPT | Performed by: INTERNAL MEDICINE

## 2017-12-27 PROCEDURE — 97110 THERAPEUTIC EXERCISES: CPT | Performed by: PHYSICAL THERAPIST

## 2017-12-27 PROCEDURE — 93005 ELECTROCARDIOGRAM TRACING: CPT | Performed by: NURSE PRACTITIONER

## 2017-12-27 RX ADMIN — DOCUSATE SODIUM 100 MG: 100 CAPSULE, LIQUID FILLED ORAL at 09:16

## 2017-12-27 RX ADMIN — METOPROLOL SUCCINATE 12.5 MG: 25 TABLET, FILM COATED, EXTENDED RELEASE ORAL at 09:15

## 2017-12-27 RX ADMIN — METOPROLOL SUCCINATE 12.5 MG: 25 TABLET, FILM COATED, EXTENDED RELEASE ORAL at 20:25

## 2017-12-27 RX ADMIN — LEVOTHYROXINE SODIUM 75 MCG: 75 TABLET ORAL at 06:15

## 2017-12-27 RX ADMIN — ENOXAPARIN SODIUM 30 MG: 30 INJECTION SUBCUTANEOUS at 20:26

## 2017-12-27 RX ADMIN — DOCUSATE SODIUM 100 MG: 100 CAPSULE, LIQUID FILLED ORAL at 20:26

## 2017-12-27 RX ADMIN — AMLODIPINE BESYLATE 5 MG: 5 TABLET ORAL at 09:15

## 2017-12-27 RX ADMIN — LOSARTAN POTASSIUM 100 MG: 100 TABLET, FILM COATED ORAL at 09:19

## 2017-12-27 RX ADMIN — Medication 250 MG: at 20:26

## 2017-12-27 RX ADMIN — Medication 250 MG: at 09:16

## 2017-12-28 ENCOUNTER — HOSPITAL ENCOUNTER (INPATIENT)
Facility: HOSPITAL | Age: 82
LOS: 14 days | Discharge: HOME HEALTH | End: 2018-01-11
Attending: PHYSICAL MEDICINE & REHABILITATION | Admitting: PHYSICAL MEDICINE & REHABILITATION

## 2017-12-28 VITALS
OXYGEN SATURATION: 92 % | SYSTOLIC BLOOD PRESSURE: 143 MMHG | HEIGHT: 64 IN | TEMPERATURE: 98.1 F | RESPIRATION RATE: 20 BRPM | DIASTOLIC BLOOD PRESSURE: 73 MMHG | BODY MASS INDEX: 18.06 KG/M2 | WEIGHT: 105.8 LBS | HEART RATE: 84 BPM

## 2017-12-28 DIAGNOSIS — R53.1 GENERALIZED WEAKNESS: Primary | ICD-10-CM

## 2017-12-28 PROBLEM — G61.0 GUILLAIN BARRÉ SYNDROME (HCC): Status: ACTIVE | Noted: 2017-12-28

## 2017-12-28 LAB
ANION GAP SERPL CALCULATED.3IONS-SCNC: 12.1 MMOL/L
BUN BLD-MCNC: 18 MG/DL (ref 8–23)
BUN/CREAT SERPL: 22.8 (ref 7–25)
CALCIUM SPEC-SCNC: 8.9 MG/DL (ref 8.6–10.5)
CHLORIDE SERPL-SCNC: 105 MMOL/L (ref 98–107)
CO2 SERPL-SCNC: 25.9 MMOL/L (ref 22–29)
CREAT BLD-MCNC: 0.79 MG/DL (ref 0.57–1)
GFR SERPL CREATININE-BSD FRML MDRD: 70 ML/MIN/1.73
GLUCOSE BLD-MCNC: 103 MG/DL (ref 65–99)
POTASSIUM BLD-SCNC: 5.1 MMOL/L (ref 3.5–5.2)
SODIUM BLD-SCNC: 143 MMOL/L (ref 136–145)

## 2017-12-28 PROCEDURE — 97535 SELF CARE MNGMENT TRAINING: CPT

## 2017-12-28 PROCEDURE — 97110 THERAPEUTIC EXERCISES: CPT | Performed by: PHYSICAL THERAPIST

## 2017-12-28 PROCEDURE — 25010000002 ENOXAPARIN PER 10 MG: Performed by: PHYSICAL MEDICINE & REHABILITATION

## 2017-12-28 PROCEDURE — 99232 SBSQ HOSP IP/OBS MODERATE 35: CPT | Performed by: INTERNAL MEDICINE

## 2017-12-28 PROCEDURE — 80048 BASIC METABOLIC PNL TOTAL CA: CPT | Performed by: INTERNAL MEDICINE

## 2017-12-28 RX ORDER — NITROGLYCERIN 0.4 MG/1
0.4 TABLET SUBLINGUAL
Status: DISCONTINUED | OUTPATIENT
Start: 2017-12-28 | End: 2018-01-11

## 2017-12-28 RX ORDER — LOSARTAN POTASSIUM 50 MG/1
100 TABLET ORAL
Status: DISCONTINUED | OUTPATIENT
Start: 2017-12-29 | End: 2018-01-11 | Stop reason: HOSPADM

## 2017-12-28 RX ORDER — ACETAMINOPHEN 325 MG/1
650 TABLET ORAL EVERY 4 HOURS PRN
Status: DISCONTINUED | OUTPATIENT
Start: 2017-12-28 | End: 2018-01-11

## 2017-12-28 RX ORDER — MONTELUKAST SODIUM 10 MG/1
10 TABLET ORAL NIGHTLY
Status: CANCELLED | OUTPATIENT
Start: 2017-12-28

## 2017-12-28 RX ORDER — LEVOTHYROXINE SODIUM 0.05 MG/1
50 TABLET ORAL
Status: CANCELLED | OUTPATIENT
Start: 2017-12-30

## 2017-12-28 RX ORDER — ONDANSETRON 4 MG/1
4 TABLET, ORALLY DISINTEGRATING ORAL EVERY 6 HOURS PRN
Status: CANCELLED | OUTPATIENT
Start: 2017-12-28

## 2017-12-28 RX ORDER — SACCHAROMYCES BOULARDII 250 MG
250 CAPSULE ORAL 2 TIMES DAILY
Status: CANCELLED | OUTPATIENT
Start: 2017-12-28

## 2017-12-28 RX ORDER — LOSARTAN POTASSIUM 100 MG/1
100 TABLET ORAL
Status: CANCELLED | OUTPATIENT
Start: 2017-12-29

## 2017-12-28 RX ORDER — SODIUM CHLORIDE 0.9 % (FLUSH) 0.9 %
10 SYRINGE (ML) INJECTION AS NEEDED
Status: CANCELLED | OUTPATIENT
Start: 2017-12-28

## 2017-12-28 RX ORDER — ONDANSETRON 2 MG/ML
4 INJECTION INTRAMUSCULAR; INTRAVENOUS EVERY 6 HOURS PRN
Status: CANCELLED | OUTPATIENT
Start: 2017-12-28

## 2017-12-28 RX ORDER — CALCIUM CARBONATE 200(500)MG
2 TABLET,CHEWABLE ORAL 2 TIMES DAILY PRN
Status: DISCONTINUED | OUTPATIENT
Start: 2017-12-28 | End: 2018-01-11

## 2017-12-28 RX ORDER — LEVOTHYROXINE SODIUM 0.07 MG/1
75 TABLET ORAL EVERY OTHER DAY
Status: CANCELLED | OUTPATIENT
Start: 2017-12-29

## 2017-12-28 RX ORDER — AMLODIPINE BESYLATE 5 MG/1
5 TABLET ORAL
Status: CANCELLED | OUTPATIENT
Start: 2017-12-29

## 2017-12-28 RX ORDER — AMLODIPINE BESYLATE 5 MG/1
5 TABLET ORAL
Status: DISCONTINUED | OUTPATIENT
Start: 2017-12-29 | End: 2018-01-11 | Stop reason: HOSPADM

## 2017-12-28 RX ORDER — ONDANSETRON 2 MG/ML
4 INJECTION INTRAMUSCULAR; INTRAVENOUS EVERY 6 HOURS PRN
Status: DISCONTINUED | OUTPATIENT
Start: 2017-12-28 | End: 2018-01-11

## 2017-12-28 RX ORDER — DOCUSATE SODIUM 100 MG/1
100 CAPSULE, LIQUID FILLED ORAL 2 TIMES DAILY
Status: DISCONTINUED | OUTPATIENT
Start: 2017-12-28 | End: 2018-01-11 | Stop reason: HOSPADM

## 2017-12-28 RX ORDER — CALCIUM CARBONATE 200(500)MG
2 TABLET,CHEWABLE ORAL 2 TIMES DAILY PRN
Status: CANCELLED | OUTPATIENT
Start: 2017-12-28

## 2017-12-28 RX ORDER — ONDANSETRON 4 MG/1
4 TABLET, FILM COATED ORAL EVERY 6 HOURS PRN
Status: CANCELLED | OUTPATIENT
Start: 2017-12-28

## 2017-12-28 RX ORDER — LEVOTHYROXINE SODIUM 0.05 MG/1
50 TABLET ORAL
Status: DISCONTINUED | OUTPATIENT
Start: 2017-12-30 | End: 2018-01-11 | Stop reason: HOSPADM

## 2017-12-28 RX ORDER — METOPROLOL SUCCINATE 25 MG/1
25 TABLET, EXTENDED RELEASE ORAL
Status: DISCONTINUED | OUTPATIENT
Start: 2017-12-29 | End: 2018-01-11 | Stop reason: HOSPADM

## 2017-12-28 RX ORDER — ONDANSETRON 4 MG/1
4 TABLET, FILM COATED ORAL EVERY 6 HOURS PRN
Status: DISCONTINUED | OUTPATIENT
Start: 2017-12-28 | End: 2018-01-11

## 2017-12-28 RX ORDER — DOCUSATE SODIUM 100 MG/1
100 CAPSULE, LIQUID FILLED ORAL 2 TIMES DAILY
Status: CANCELLED | OUTPATIENT
Start: 2017-12-28

## 2017-12-28 RX ORDER — SODIUM CHLORIDE 0.9 % (FLUSH) 0.9 %
1-10 SYRINGE (ML) INJECTION AS NEEDED
Status: CANCELLED | OUTPATIENT
Start: 2017-12-28

## 2017-12-28 RX ORDER — MONTELUKAST SODIUM 10 MG/1
10 TABLET ORAL NIGHTLY
Status: DISCONTINUED | OUTPATIENT
Start: 2017-12-28 | End: 2017-12-28

## 2017-12-28 RX ORDER — NITROGLYCERIN 0.4 MG/1
0.4 TABLET SUBLINGUAL
Status: CANCELLED | OUTPATIENT
Start: 2017-12-28

## 2017-12-28 RX ORDER — IPRATROPIUM BROMIDE AND ALBUTEROL SULFATE 2.5; .5 MG/3ML; MG/3ML
3 SOLUTION RESPIRATORY (INHALATION) EVERY 4 HOURS PRN
Status: CANCELLED | OUTPATIENT
Start: 2017-12-28

## 2017-12-28 RX ORDER — ONDANSETRON 4 MG/1
4 TABLET, ORALLY DISINTEGRATING ORAL EVERY 6 HOURS PRN
Status: DISCONTINUED | OUTPATIENT
Start: 2017-12-28 | End: 2018-01-11

## 2017-12-28 RX ORDER — CETIRIZINE HYDROCHLORIDE 10 MG/1
5 TABLET ORAL DAILY
Status: DISCONTINUED | OUTPATIENT
Start: 2017-12-29 | End: 2018-01-11

## 2017-12-28 RX ORDER — IPRATROPIUM BROMIDE AND ALBUTEROL SULFATE 2.5; .5 MG/3ML; MG/3ML
3 SOLUTION RESPIRATORY (INHALATION) EVERY 4 HOURS PRN
Status: DISCONTINUED | OUTPATIENT
Start: 2017-12-28 | End: 2018-01-11

## 2017-12-28 RX ORDER — ACETAMINOPHEN 325 MG/1
650 TABLET ORAL EVERY 4 HOURS PRN
Status: CANCELLED | OUTPATIENT
Start: 2017-12-28

## 2017-12-28 RX ORDER — SACCHAROMYCES BOULARDII 250 MG
250 CAPSULE ORAL 2 TIMES DAILY
Status: DISCONTINUED | OUTPATIENT
Start: 2017-12-28 | End: 2018-01-11 | Stop reason: HOSPADM

## 2017-12-28 RX ORDER — LEVOTHYROXINE SODIUM 0.07 MG/1
75 TABLET ORAL EVERY OTHER DAY
Status: DISCONTINUED | OUTPATIENT
Start: 2017-12-29 | End: 2018-01-11 | Stop reason: HOSPADM

## 2017-12-28 RX ORDER — CETIRIZINE HYDROCHLORIDE 10 MG/1
5 TABLET ORAL DAILY
Status: CANCELLED | OUTPATIENT
Start: 2017-12-29

## 2017-12-28 RX ORDER — METOPROLOL SUCCINATE 25 MG/1
25 TABLET, EXTENDED RELEASE ORAL
Status: CANCELLED | OUTPATIENT
Start: 2017-12-29

## 2017-12-28 RX ADMIN — ENOXAPARIN SODIUM 30 MG: 30 INJECTION SUBCUTANEOUS at 21:33

## 2017-12-28 RX ADMIN — Medication 250 MG: at 08:20

## 2017-12-28 RX ADMIN — METOPROLOL SUCCINATE 12.5 MG: 25 TABLET, FILM COATED, EXTENDED RELEASE ORAL at 08:20

## 2017-12-28 RX ADMIN — LOSARTAN POTASSIUM 100 MG: 100 TABLET, FILM COATED ORAL at 08:19

## 2017-12-28 RX ADMIN — LEVOTHYROXINE SODIUM 50 MCG: 50 TABLET ORAL at 06:33

## 2017-12-28 RX ADMIN — DOCUSATE SODIUM 100 MG: 100 CAPSULE, LIQUID FILLED ORAL at 08:19

## 2017-12-28 RX ADMIN — Medication 250 MG: at 21:33

## 2017-12-28 RX ADMIN — DOCUSATE SODIUM 100 MG: 100 CAPSULE, LIQUID FILLED ORAL at 21:33

## 2017-12-28 RX ADMIN — CETIRIZINE HYDROCHLORIDE 5 MG: 10 TABLET, FILM COATED ORAL at 08:19

## 2017-12-28 RX ADMIN — AMLODIPINE BESYLATE 5 MG: 5 TABLET ORAL at 08:20

## 2017-12-29 LAB
ANION GAP SERPL CALCULATED.3IONS-SCNC: 11.3 MMOL/L
BASOPHILS # BLD AUTO: 0.02 10*3/MM3 (ref 0–0.2)
BASOPHILS NFR BLD AUTO: 0.3 % (ref 0–1.5)
BUN BLD-MCNC: 18 MG/DL (ref 8–23)
BUN/CREAT SERPL: 25.7 (ref 7–25)
CALCIUM SPEC-SCNC: 9 MG/DL (ref 8.6–10.5)
CHLORIDE SERPL-SCNC: 103 MMOL/L (ref 98–107)
CO2 SERPL-SCNC: 26.7 MMOL/L (ref 22–29)
CREAT BLD-MCNC: 0.7 MG/DL (ref 0.57–1)
DEPRECATED RDW RBC AUTO: 51.9 FL (ref 37–54)
EOSINOPHIL # BLD AUTO: 0.41 10*3/MM3 (ref 0–0.7)
EOSINOPHIL NFR BLD AUTO: 5.6 % (ref 0.3–6.2)
ERYTHROCYTE [DISTWIDTH] IN BLOOD BY AUTOMATED COUNT: 13.9 % (ref 11.7–13)
GFR SERPL CREATININE-BSD FRML MDRD: 80 ML/MIN/1.73
GLUCOSE BLD-MCNC: 99 MG/DL (ref 65–99)
HCT VFR BLD AUTO: 36.8 % (ref 35.6–45.5)
HGB BLD-MCNC: 11.6 G/DL (ref 11.9–15.5)
IMM GRANULOCYTES # BLD: 0.05 10*3/MM3 (ref 0–0.03)
IMM GRANULOCYTES NFR BLD: 0.7 % (ref 0–0.5)
LYMPHOCYTES # BLD AUTO: 2.51 10*3/MM3 (ref 0.9–4.8)
LYMPHOCYTES NFR BLD AUTO: 34.1 % (ref 19.6–45.3)
MCH RBC QN AUTO: 32 PG (ref 26.9–32)
MCHC RBC AUTO-ENTMCNC: 31.5 G/DL (ref 32.4–36.3)
MCV RBC AUTO: 101.7 FL (ref 80.5–98.2)
MONOCYTES # BLD AUTO: 1.06 10*3/MM3 (ref 0.2–1.2)
MONOCYTES NFR BLD AUTO: 14.4 % (ref 5–12)
NEUTROPHILS # BLD AUTO: 3.31 10*3/MM3 (ref 1.9–8.1)
NEUTROPHILS NFR BLD AUTO: 44.9 % (ref 42.7–76)
PLATELET # BLD AUTO: 212 10*3/MM3 (ref 140–500)
PMV BLD AUTO: 9.2 FL (ref 6–12)
POTASSIUM BLD-SCNC: 4.6 MMOL/L (ref 3.5–5.2)
RBC # BLD AUTO: 3.62 10*6/MM3 (ref 3.9–5.2)
SODIUM BLD-SCNC: 141 MMOL/L (ref 136–145)
WBC NRBC COR # BLD: 7.36 10*3/MM3 (ref 4.5–10.7)

## 2017-12-29 PROCEDURE — 97110 THERAPEUTIC EXERCISES: CPT | Performed by: OCCUPATIONAL THERAPIST

## 2017-12-29 PROCEDURE — 25010000002 ENOXAPARIN PER 10 MG: Performed by: PHYSICAL MEDICINE & REHABILITATION

## 2017-12-29 PROCEDURE — 97165 OT EVAL LOW COMPLEX 30 MIN: CPT | Performed by: OCCUPATIONAL THERAPIST

## 2017-12-29 PROCEDURE — 97161 PT EVAL LOW COMPLEX 20 MIN: CPT | Performed by: PHYSICAL THERAPIST

## 2017-12-29 PROCEDURE — 80048 BASIC METABOLIC PNL TOTAL CA: CPT | Performed by: PHYSICAL MEDICINE & REHABILITATION

## 2017-12-29 PROCEDURE — 85025 COMPLETE CBC W/AUTO DIFF WBC: CPT | Performed by: PHYSICAL MEDICINE & REHABILITATION

## 2017-12-29 PROCEDURE — 97110 THERAPEUTIC EXERCISES: CPT | Performed by: PHYSICAL THERAPIST

## 2017-12-29 PROCEDURE — 97535 SELF CARE MNGMENT TRAINING: CPT | Performed by: OCCUPATIONAL THERAPIST

## 2017-12-29 RX ADMIN — CETIRIZINE HYDROCHLORIDE 5 MG: 10 TABLET, FILM COATED ORAL at 08:44

## 2017-12-29 RX ADMIN — DOCUSATE SODIUM 100 MG: 100 CAPSULE, LIQUID FILLED ORAL at 08:46

## 2017-12-29 RX ADMIN — ENOXAPARIN SODIUM 30 MG: 30 INJECTION SUBCUTANEOUS at 20:42

## 2017-12-29 RX ADMIN — LEVOTHYROXINE SODIUM 75 MCG: 75 TABLET ORAL at 06:14

## 2017-12-29 RX ADMIN — METOPROLOL SUCCINATE 25 MG: 25 TABLET, FILM COATED, EXTENDED RELEASE ORAL at 08:44

## 2017-12-29 RX ADMIN — AMLODIPINE BESYLATE 5 MG: 5 TABLET ORAL at 08:44

## 2017-12-29 RX ADMIN — LOSARTAN POTASSIUM 100 MG: 50 TABLET, FILM COATED ORAL at 08:44

## 2017-12-29 RX ADMIN — DOCUSATE SODIUM 100 MG: 100 CAPSULE, LIQUID FILLED ORAL at 20:42

## 2017-12-29 RX ADMIN — Medication 250 MG: at 20:42

## 2017-12-29 RX ADMIN — Medication 250 MG: at 09:30

## 2017-12-30 PROCEDURE — 97110 THERAPEUTIC EXERCISES: CPT

## 2017-12-30 PROCEDURE — 25010000002 ENOXAPARIN PER 10 MG: Performed by: PHYSICAL MEDICINE & REHABILITATION

## 2017-12-30 PROCEDURE — 97530 THERAPEUTIC ACTIVITIES: CPT

## 2017-12-30 PROCEDURE — 97535 SELF CARE MNGMENT TRAINING: CPT

## 2017-12-30 RX ADMIN — DOCUSATE SODIUM 100 MG: 100 CAPSULE, LIQUID FILLED ORAL at 08:53

## 2017-12-30 RX ADMIN — AMLODIPINE BESYLATE 5 MG: 5 TABLET ORAL at 08:53

## 2017-12-30 RX ADMIN — LEVOTHYROXINE SODIUM 50 MCG: 50 TABLET ORAL at 05:38

## 2017-12-30 RX ADMIN — ENOXAPARIN SODIUM 30 MG: 30 INJECTION SUBCUTANEOUS at 20:34

## 2017-12-30 RX ADMIN — METOPROLOL SUCCINATE 25 MG: 25 TABLET, FILM COATED, EXTENDED RELEASE ORAL at 08:53

## 2017-12-30 RX ADMIN — LOSARTAN POTASSIUM 100 MG: 50 TABLET, FILM COATED ORAL at 08:53

## 2017-12-30 RX ADMIN — Medication 250 MG: at 20:33

## 2017-12-30 RX ADMIN — DOCUSATE SODIUM 100 MG: 100 CAPSULE, LIQUID FILLED ORAL at 20:33

## 2017-12-30 RX ADMIN — Medication 250 MG: at 08:53

## 2017-12-31 PROCEDURE — 25010000002 ENOXAPARIN PER 10 MG: Performed by: PHYSICAL MEDICINE & REHABILITATION

## 2017-12-31 RX ADMIN — Medication 250 MG: at 08:56

## 2017-12-31 RX ADMIN — DOCUSATE SODIUM 100 MG: 100 CAPSULE, LIQUID FILLED ORAL at 20:14

## 2017-12-31 RX ADMIN — AMLODIPINE BESYLATE 5 MG: 5 TABLET ORAL at 08:56

## 2017-12-31 RX ADMIN — CETIRIZINE HYDROCHLORIDE 5 MG: 10 TABLET, FILM COATED ORAL at 08:56

## 2017-12-31 RX ADMIN — ENOXAPARIN SODIUM 30 MG: 30 INJECTION SUBCUTANEOUS at 20:15

## 2017-12-31 RX ADMIN — Medication 250 MG: at 20:14

## 2017-12-31 RX ADMIN — LOSARTAN POTASSIUM 100 MG: 50 TABLET, FILM COATED ORAL at 08:57

## 2017-12-31 RX ADMIN — DOCUSATE SODIUM 100 MG: 100 CAPSULE, LIQUID FILLED ORAL at 08:56

## 2017-12-31 RX ADMIN — LEVOTHYROXINE SODIUM 75 MCG: 75 TABLET ORAL at 05:43

## 2017-12-31 RX ADMIN — METOPROLOL SUCCINATE 25 MG: 25 TABLET, FILM COATED, EXTENDED RELEASE ORAL at 08:56

## 2018-01-01 LAB
BASOPHILS # BLD AUTO: 0.03 10*3/MM3 (ref 0–0.2)
BASOPHILS NFR BLD AUTO: 0.5 % (ref 0–1.5)
DEPRECATED RDW RBC AUTO: 51 FL (ref 37–54)
EOSINOPHIL # BLD AUTO: 0.21 10*3/MM3 (ref 0–0.7)
EOSINOPHIL NFR BLD AUTO: 3.2 % (ref 0.3–6.2)
ERYTHROCYTE [DISTWIDTH] IN BLOOD BY AUTOMATED COUNT: 13.8 % (ref 11.7–13)
HCT VFR BLD AUTO: 33.9 % (ref 35.6–45.5)
HGB BLD-MCNC: 11 G/DL (ref 11.9–15.5)
IMM GRANULOCYTES # BLD: 0.05 10*3/MM3 (ref 0–0.03)
IMM GRANULOCYTES NFR BLD: 0.8 % (ref 0–0.5)
LYMPHOCYTES # BLD AUTO: 1.9 10*3/MM3 (ref 0.9–4.8)
LYMPHOCYTES NFR BLD AUTO: 29 % (ref 19.6–45.3)
MCH RBC QN AUTO: 32.6 PG (ref 26.9–32)
MCHC RBC AUTO-ENTMCNC: 32.4 G/DL (ref 32.4–36.3)
MCV RBC AUTO: 100.6 FL (ref 80.5–98.2)
MONOCYTES # BLD AUTO: 0.92 10*3/MM3 (ref 0.2–1.2)
MONOCYTES NFR BLD AUTO: 14 % (ref 5–12)
NEUTROPHILS # BLD AUTO: 3.44 10*3/MM3 (ref 1.9–8.1)
NEUTROPHILS NFR BLD AUTO: 52.5 % (ref 42.7–76)
PLATELET # BLD AUTO: 287 10*3/MM3 (ref 140–500)
PMV BLD AUTO: 9 FL (ref 6–12)
RBC # BLD AUTO: 3.37 10*6/MM3 (ref 3.9–5.2)
WBC NRBC COR # BLD: 6.55 10*3/MM3 (ref 4.5–10.7)

## 2018-01-01 PROCEDURE — 25010000002 ENOXAPARIN PER 10 MG: Performed by: PHYSICAL MEDICINE & REHABILITATION

## 2018-01-01 PROCEDURE — 97110 THERAPEUTIC EXERCISES: CPT

## 2018-01-01 PROCEDURE — 97535 SELF CARE MNGMENT TRAINING: CPT | Performed by: OCCUPATIONAL THERAPIST

## 2018-01-01 PROCEDURE — 85025 COMPLETE CBC W/AUTO DIFF WBC: CPT | Performed by: PHYSICAL MEDICINE & REHABILITATION

## 2018-01-01 RX ADMIN — METOPROLOL SUCCINATE 25 MG: 25 TABLET, FILM COATED, EXTENDED RELEASE ORAL at 08:39

## 2018-01-01 RX ADMIN — Medication 250 MG: at 08:39

## 2018-01-01 RX ADMIN — Medication 250 MG: at 20:25

## 2018-01-01 RX ADMIN — CETIRIZINE HYDROCHLORIDE 5 MG: 10 TABLET, FILM COATED ORAL at 08:39

## 2018-01-01 RX ADMIN — AMLODIPINE BESYLATE 5 MG: 5 TABLET ORAL at 08:39

## 2018-01-01 RX ADMIN — DOCUSATE SODIUM 100 MG: 100 CAPSULE, LIQUID FILLED ORAL at 20:25

## 2018-01-01 RX ADMIN — ENOXAPARIN SODIUM 30 MG: 30 INJECTION SUBCUTANEOUS at 20:25

## 2018-01-01 RX ADMIN — LOSARTAN POTASSIUM 100 MG: 50 TABLET, FILM COATED ORAL at 08:39

## 2018-01-01 RX ADMIN — LEVOTHYROXINE SODIUM 50 MCG: 50 TABLET ORAL at 05:11

## 2018-01-01 RX ADMIN — DOCUSATE SODIUM 100 MG: 100 CAPSULE, LIQUID FILLED ORAL at 08:39

## 2018-01-01 NOTE — THERAPY TREATMENT NOTE
Inpatient Rehabilitation - Physical Therapy Treatment Note  [facility]     Patient Name: Stacie Vieira  : 1935  MRN: [id number]  Today's Date: 2018  Onset of Illness/Injury or Date of Surgery Date: 17  Date of Referral to PT: 17  Referring Physician: Dr Houston    Admit Date: 2017    Visit Dx:    ICD-10-CM ICD-9-CM   1. 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   • Bradycardia   • Syncope   • Guillain Barré syndrome               Adult Rehabilitation Note       18 1216 17 1352 17 0929    Rehab Assessment/Intervention    Discipline physical therapist  -MD occupational therapist  -LE physical therapist  -MD    Document Type therapy note (daily note)  -MD therapy note (daily note)  -LE therapy note (daily note)  -MD    Subjective Information agree to therapy;complains of;weakness;fatigue  -MD agree to therapy;complains of;fatigue;weakness  -LE agree to therapy;complains of;weakness;fatigue  -MD    Patient Effort, Rehab Treatment good  -MD  good  -MD    Symptoms Noted During/After Treatment none  -MD fatigue;shortness of breath   as day progresses more fatigued and weary looking  -CARLA none  -MD    Precautions/Limitations fall precautions  -MD fall precautions  -LE fall precautions  -MD    Specific Treatment Considerations  --   ed frequent rest breaks, seated tasks, taking things slow  -LE     Equipment Issued to Patient gait belt  -MD      Recorded by [MD] Jennifer Vera, PT [LE]  Lakia Reardon OTR [MD] Jennifer Vera PT    Vital Signs    O2 Delivery Pre Treatment  room air  -LE     O2 Delivery Intra Treatment  room air  -LE     O2 Delivery Post Treatment  room air  -LE     Recorded by  [CARLA] Lakia Reardon OTR     Pain Assessment    Pain Assessment No/denies pain  -MD No/denies pain  -LE No/denies pain  -MD    Recorded by [MD] Jennifer Vera PT [LE] Lakia Reardon OTR [MD] Jennifer Vera PT    Cognitive Assessment/Intervention    Current Cognitive/Communication Assessment functional  -MD functional  -LE functional  -MD    Orientation Status oriented x 4  -MD  oriented x 4  -MD    Follows Commands/Answers Questions 100% of the time  -MD  100% of the time  -MD    Personal Safety WNL/WFL  -MD  WNL/WFL  -MD    Personal Safety Interventions fall prevention program maintained;gait belt;muscle strengthening facilitated;nonskid shoes/slippers when out of bed;supervised activity  -MD  fall prevention program maintained;gait belt;muscle strengthening facilitated;nonskid shoes/slippers when out of bed;supervised activity  -MD    Recorded by [MD] Jennifer Vera, PT [LE] Lakia Reardon, OTR [MD] Jennifer Vera PT    Bed Mobility, Assessment/Treatment    Bed Mob, Supine to Sit, [place] not tested  -MD supervision required  -LE not tested  -MD    Bed Mob, Sit to Supine, [place] not tested  -MD  not tested  -MD    Recorded by [MD] Jennifer Vera, PT [LE] Lakia Reardon OTR [MD] Jennifer Vera PT    Transfer Assessment/Treatment    Transfers, Bed-Chair [place]  supervision required  -LE     Transfers, Sit-Stand [place] contact guard assist;hand held assist  -MD contact guard assist  -LE verbal cues required;minimum assist (75% patient effort)  -MD    Transfers, Stand-Sit [place] contact guard assist;hand held assist  -MD contact guard assist  -LE verbal cues required;minimum assist (75% patient effort)  -MD    Toilet Transfer, [place]  moderate assist (50% patient effort)  -LE     Walk-In Shower Transfer, [place]   maximum assist (25% patient effort)  -LE     Walk-In Shower Transfer, Assist Device  standard shower chair   grab bar  -LE     Transfer, Safety Issues weight-shifting ability decreased  -MD balance decreased during turns;sequencing ability decreased   hand held assist as pt becomes fatigued.  increase A in PM  -LE weight-shifting ability decreased;step length decreased  -MD    Transfer, Impairments impaired balance;strength decreased  -MD  strength decreased  -MD    Recorded by [MD] Jennifer Vera PT [LE] AMAURI Mazariegos [MD] Jennifer Vera PT    Gait Assessment/Treatment    Gait, [place] Level verbal cues required;hand held assist;contact guard assist  -MD  verbal cues required;minimum assist (75% patient effort);hand held assist  -MD    Gait, Distance (Feet) 80   x3  -MD  40   x4.  80` x2 in PM   -MD    Gait, Gait Deviations bilateral:;kee decreased;decreased heel strike;forward flexed posture;step length decreased;toe-to-floor clearance decreased  -MD  bilateral:;kee decreased;forward flexed posture;step length decreased;toe-to-floor clearance decreased  -MD    Gait, Safety Issues sequencing ability decreased;step length decreased  -MD  step length decreased;weight-shifting ability decreased  -MD    Gait, Impairments strength decreased;impaired balance  -MD  strength decreased;impaired balance  -MD    Recorded by [MD] Jennifer Vera PT  [MD] Jennifer Vera PT    Functional Mobility    Functional Mobility- Ind. Level  contact guard assist   hand held assist  -LE     Functional Mobility-Distance (Feet)  20  -LE     Functional Mobility- Safety Issues  balance decreased during turns;step length decreased;sequencing ability decreased  -LE     Recorded by  [LE] AMAURI Mazariegos     Upper Body Bathing Assessment/Training    UB Bathing Assess/Train Assistive Device  grab bars;hand-held shower head;shower chair with back  -LE     UB Bathing Assess/Train, Position  supported sitting  -LE     UB Bathing Assess/Train,  [place] Level  set up required;stand by assist;supervision required  -LE     Recorded by  [CARLA] AMAURI Mazariegos     Lower Body Bathing Assessment/Training    LB Bathing Assess/Train Assistive Device  shower chair with back;hand-held shower head;grab bars  -LE     LB Bathing Assess/Train, Position  supported sitting;supported standing  -LE     LB Bathing Assess/Train, [place] Level  set up required;stand by assist;supervision required   close SBA to stand  -LE     LB Bathing Assess/Train, Impairments  impaired balance;strength decreased  -LE     Recorded by  [CARLA] AMAURI Mazariegos     Upper Body Dressing Assessment/Training    UB Dressing Assess/Train, Clothing Type  doffing:;donning:;bra;pull over  -LE     UB Dressing Assess/Train, Position  supported sitting  -LE     UB Dressing Assess/Train, [place]  set up required;supervision required;verbal cues required  -LE     UB Dressing Assess/Train, Comment  --   increase time to unbutton pj top   -LE     Recorded by  [CARLA] AMAURI Mazariegos     Lower Body Dressing Assessment/Training    LB Dressing Assess/Train, Clothing Type  doffing:;donning:;pants;shoes;socks  -LE     LB Dressing Assess/Train, Position  supported sitting;supported standing  -LE     LB Dressing Assess/Train, [place]  contact guard assist  -LE     LB Dressing Assess/Train, Impairments  impaired balance  -LE     Recorded by  [CARLA] AMAURI Mazariegos     Toileting Assessment/Training    Toileting Assess/Train, Assistive Device  grab bars  -LE     Toileting Assess/Train, Position  supported sitting;supported standing  -LE     Toileting Assess/Train, Indepen Level  supervision required   close SBA  -LE     Toileting Assess/Train, Impairments  impaired balance;coordination impaired  -LE     Recorded by  [CARLA] AMAURI Mazariegos     Grooming Assessment/Training    Grooming Assess/Train, Position  supported sitting  -LE     Grooming Assess/Train, Indep[place] Level  set up required;supervision  required;verbal cues required  -LE     Recorded by  [CARLA] AMAURI Mazariegos     Balance Skills Training    Standing-Level of Assistance  --   CGA.  More assist PM session as pt fatigued  -LE     Standing-Balance Activities  Reaching for objects;Reaching for weighted objects   water plants, carry vase to BR, hand clothes.  CGA balance.   -LE     Standing Balance # of Minutes  --   in PM pt had to sit with each 30 seconds of standing  -LE     Gait Balance-Level of Assistance   Contact guard  -MD    Gait Balance Support   parallel bars  -MD    Gait Balance Activities   backwards;side-stepping  -MD    Recorded by  [CARLA] AMAURI Mazariegos [MD] Jennifer Vera PT    Therapy Exercises    Bilateral Lower Extremities AROM:;20 reps;sitting;ankle pumps/circles;hip abduction/adduction;hip flexion;LAQ;glut sets  -MD  AROM:;20 reps;sitting;ankle pumps/circles;hip abduction/adduction;hip flexion;LAQ;glut sets  -MD    Recorded by [MD] Jennifer Vera PT  [MD] Jennifer Vera PT    Sensory Assessment/Intervention    Sensory Impairment  --   clumsines with unbuttoning shirt  -LE     Recorded by  [CARLA] AMAURI Mazariegos     Positioning and Restraints    Pre-Treatment Position sitting in chair/recliner  -MD --   am-in bed.  PM-in w/c  -LE sitting in chair/recliner  -MD    Post Treatment Position wheelchair  -MD wheelchair  -LE wheelchair  -MD    In Chair  sitting;call light within reach;notified nsg;encouraged to call for assist  -LE sitting;call light within reach  -MD    In Wheelchair sitting;call light within reach;encouraged to call for assist  -MD      Recorded by [MD] Jennifer Vera PT [CARLA] AMAURI Mazariegos [MD] Jennifer Vera PT      User Key  (r) = Recorded By, (t) = Taken By, (c) = Cosigned By    Initials Name Effective Dates    AMAURI Goodwin 04/13/15 -     MD Jennifer Vera PT 12/01/15 -                 IP PT Goals       12/29/17 1225 12/22/17 1301       Bed Mobility PT LTG    Bed Mobility PT LTG, Activity Type  all bed mobility  -EF     Bed Mobility  PT LTG, [place] Level  conditional independence  -EF     Transfer Training PT LTG    Transfer Training PT LTG, Activity Type  all transfers  -EF     Transfer Training PT LTG, [place] Level  contact guard assist;minimum assist (75% patient effort)  -EF     Transfer Training PT LTG, Assist Device  --   with device if needed  -EF     Transfer Training 2 PT LTG    Transfer Training PT 2 LTG, Date Established 12/29/17  -KH      Transfer Training PT 2 LTG, Time to Achieve 1 wk  -KH      Transfer Training PT 2 LTG, Activity Type all transfers  -KH      Transfer Training PT 2 LTG, [place] Level independent  -KH      Gait Training PT LTG    Gait Training Goal PT LTG, Date Established 12/29/17  -KH      Gait Training Goal PT LTG, Time to Achieve 1 wk  -KH      Gait Training Goal PT LTG, [place] Level independent  -KH contact guard assist;minimum assist (75% patient effort)  -EF     Gait Training Goal PT LTG, Assist Device  --   with device if needed  -EF     Gait Training Goal PT LTG, Distance to Achieve 150  -KH 80  -EF     Stair Training PT LTG    Stair Training Goal PT LTG, Date Established 12/29/17  -KH      Stair Training Goal PT LTG, Time to Achieve 1 wk  -KH      Stair Training Goal PT LTG, Number of Steps 12  -KH      Stair Training Goal PT LTG, [place] Level conditional independence  -KH      Stair Training Goal PT LTG, Assist Device 1 handrail  -KH      Strength Goal PT LTG    Strength Goal PT LTG, Date Established 12/29/17  -KH      Strength Goal PT LTG, Time to Achieve 1 wk  -KH      Strength Goal PT LTG, Measure to Achieve B LE grossly 4-/5 with MMT  -KH        User Key  (r) = Recorded By, (t) = Taken By, (c) = Cosigned By    Initials Name Provider Type    EF Zulma Vides, PT Physical Therapist    TEMO Rosario, PT Physical Therapist          Physical Therapy Education     Title: PT OT SLP Therapies (Active)     Topic: Physical Therapy (Active)     Point: Mobility training (Done)     Learning Progress Summary    Learner Readiness Method Response Comment Documented by Status   Patient Acceptance E VU Educated patient on safe hand placement and body mechanics with basic mobility including bed mobility, transfers, gait, and stairs  12/29/17 1224 Done               Point: Body mechanics (Done)    Learning Progress Summary    Learner Readiness Method Response Comment Documented by Status   Patient Acceptance E VU Educated patient on safe hand placement and body mechanics with basic mobility including bed mobility, transfers, gait, and stairs  12/29/17 1224 Done               Point: Precautions (Done)    Learning Progress Summary    Learner Readiness Method Response Comment Documented by Status   Patient Acceptance PURNIMA FARFAN MD 01/01/18 1219 Done    Acceptance PURNIMA FARFAN MD 12/30/17 0932 Done                      User Key     Initials Effective Dates Name Provider Type Discipline    MD 12/01/15 -  Jennifer Vera, PT Physical Therapist PT     06/22/16 -  Maria Del Carmen Rosario PT Physical Therapist PT                    PT Recommendation and Plan  Anticipated Discharge Disposition: home with home health  Planned Therapy Interventions: balance training, bed mobility training, gait training, home exercise program, stair training, strengthening, stretching, transfer training, patient/family education, postural re-education  PT Frequency: 2 times/day            Time Calculation:         PT Charges       01/01/18 1215          Time Calculation    Start Time 1045  -MD      Stop Time 1115  -MD      Time Calculation (min) 30 min  -MD      PT Received On 01/01/18  -MD      PT - Next Appointment 01/02/18  -MD        User Key  (r) = Recorded By, (t) = Taken By, (c) = Cosigned By    Initials Name Provider Type    MD Jennifer Vera, PT Physical Therapist          Therapy Charges for Today     Code Description Service Date Service Provider Modifiers Qty    63601397188 HC PT THER PROC EA 15 MIN 1/1/2018 Jennifer Vera, PT GP 2     [id number]  PT THER SUPP EA 15 MIN 1/1/2018 Jennifer Vera, PT GP 1               Jennifer Vera, PT  1/1/2018

## 2018-01-01 NOTE — PROGRESS NOTES
Inpatient Rehabilitation Functional Measures Assessment    Functional Measures  KISHOR Eating:  [facility] Grooming: [facility] Bathing:  [facility] Upper Body Dressing:  [facility] Lower Body Dressing:  [facility] Toileting:  [facility] Bladder Management  Level of Assistance:  [place]  Frequency/Number of Accidents this Shift:  [facility] Bowel Management  Level of Assistance: [place]  Frequency/Number of Accidents this Shift: [facility] Bed/Chair/Wheelchair Transfer:  [facility] Toilet Transfer:  [facility] Tub/Shower Transfer:  [place]    Previously Documented Mode of Locomotion at Discharge: Field  KISHOR Expected Mode of Locomotion at Discharge: [facility] Walk/Wheelchair:  [facility] Stairs:  [facility] Comprehension:  Auditory comprehension is the usual mode. Comprehension  Score = 6, Modified [place].  Patient comprehends complex/abstract  information in their primary language with only mild difficulty.  KISHOR Expression:  Vocal expression is the usual mode. Expression Score = 6,  Modified Independent.  Patient expresses complex/abstract information in their  primary language with only mild difficulty with tasks.  KISHOR Social Interaction:  Social Interaction Score = 7, Independent. Patient is  completely independent for social interaction.  There are no activity  limitations.  KISHOR Problem Solving:  Problem Solving Score = 6, Modified [place].  Patient  makes appropriate decisions in order to solve complex problems with mild  difficulty but self-corrects.  KISHOR Memory:  Memory Score = 6, Modified [place].  Patient is modified  independent for memory, having only mild difficulty and using self-initiated or  environmental cues to remember.    Therapy Mode Minutes  Occupational Therapy: [place]  Physical Therapy: [place]  Speech Language Pathology:  [place]    Signed by: Shira Tobias RN

## 2018-01-01 NOTE — PLAN OF CARE
Problem: Patient Care Overview (Adult)  Goal: Plan of Care Review  Outcome: Ongoing (interventions implemented as appropriate)    Goal: Adult Individualization and Mutuality  Outcome: Ongoing (interventions implemented as appropriate)    Goal: Discharge Needs Assessment  Outcome: Ongoing (interventions implemented as appropriate)      Problem: Fall Risk (Adult)  Goal: Absence of Falls  Outcome: Ongoing (interventions implemented as appropriate)      Problem: Mobility, Physical Impaired (Adult)  Goal: Enhanced Functionality Ability  Outcome: Ongoing (interventions implemented as appropriate)

## 2018-01-01 NOTE — PLAN OF CARE
Problem: Fall Risk (Adult)  Goal: Absence of Falls  Outcome: Ongoing (interventions implemented as appropriate)   01/01/18 0456   Fall Risk (Adult)   Absence of Falls making progress toward outcome       Problem: Mobility, Physical Impaired (Adult)  Goal: Enhanced Functionality Ability  Outcome: Ongoing (interventions implemented as appropriate)   01/01/18 0456   Mobility, Physical Impaired (Adult)   Enhanced Functionality Ability making progress toward outcome

## 2018-01-01 NOTE — THERAPY TREATMENT NOTE
Inpatient Rehabilitation - Occupational Therapy Treatment Note  [facility]     Patient Name: Stacie Vieira  : 1935  MRN: [id number]  Today's Date: 2018  Onset of Illness/Injury or Date of Surgery Date: 17     Referring Physician: Dr Houston      Admit Date: 2017    Visit Dx:     ICD-10-CM ICD-9-CM   1. Generalized weakness R53.1 780.79     Patient Active Problem List   Diagnosis   • Mycobacterium avium complex   • Essential hypertension   • Palpitations   • Allergic rhinitis   • Hypothyroidism   • Hyperlipidemia   • Community acquired pneumonia of left lower lobe of lung   • Acute respiratory failure with hypoxia   • Bilateral hand numbness   • DNR (do not resuscitate)   • Polyneuropathy   • Oropharyngeal dysphagia   • Esophageal dysmotility   • Severe malnutrition due to acute illness   • Guillain[place] syndrome following vaccination   • Bradycardia   • Syncope   • Guillain Barré syndrome             Adult Rehabilitation Note       18 1216 18 1215 17 1352    Rehab Assessment/Intervention    Discipline physical therapist  -MD occupational therapist  -KP occupational therapist  -LE    Document Type therapy note (daily note)  -MD therapy note (daily note)  - therapy note (daily note)  -LE    Subjective Information agree to therapy;complains of;weakness;fatigue  -MD agree to therapy;no complaints  -KP agree to therapy;complains of;fatigue;weakness  -LE    Patient Effort, Rehab Treatment good  -MD good  -KP     Symptoms Noted During/After Treatment none  -MD none  -KP fatigue;shortness of breath   as day progresses more fatigued and weary looking  -LE    Precautions/Limitations fall precautions  -MD fall precautions  -KP fall precautions  -LE    Specific Treatment Considerations   --   ed frequent rest breaks, seated tasks, taking things slow  -LE    Equipment Issued to Patient gait belt  -MD      Recorded by [MD] Jennifer Vera, PT [KP] Maggie Gilbert, OTR [LE] Lakia  Elder, AMAURI    Vital Signs    O2 Delivery Pre Treatment   room air  -LE    O2 Delivery Intra Treatment   room air  -LE    O2 Delivery Post Treatment   room air  -LE    Recorded by   [CARLA] AMAURI Mazariegos    Pain Assessment    Pain Assessment No/denies pain  -MD No/denies pain  -KP No/denies pain  -LE    Recorded by [MD] Jennifer Vera, PT [KP] Maggie Gilbert, OTR [LE] AMAURI Mazariegos    Vision Assessment/Intervention    Visual Impairment  WFL with corrective lenses  -KP     Recorded by  [KP] Maggie Gilbert, OTR     Cognitive Assessment/Intervention    Current Cognitive/Communication Assessment functional  -MD functional  -KP functional  -LE    Orientation Status oriented x 4  -MD oriented x 4  -KP     Follows Commands/Answers Questions 100% of the time  -% of the time;able to follow single-step instructions  -KP     Personal Safety WNL/WFL  -MD WNL/WFL  -KP     Personal Safety Interventions fall prevention program maintained;gait belt;muscle strengthening facilitated;nonskid shoes/slippers when out of bed;supervised activity  -MD fall prevention program maintained;gait belt;nonskid shoes/slippers when out of bed  -KP     Recorded by [MD] Jennifer Vera, PT [KP] Maggie Gilbert, OTR [LE] AMAURI Mazariegos    Bed Mobility, Assessment/Treatment    Bed Mob, Supine to Sit, [place] not tested  -MD set up required;supervision required  - supervision required  -LE    Bed Mob, Sit to Supine, [place] not tested  -MD not tested  -KP     Recorded by [MD] Jennifer Vera, PT [KP] Maggie Gilbert, OTR [LE] Lakia Reardon OTR    Transfer Assessment/Treatment    Transfers, Bed-Chair [place]  supervision required  - supervision required  -LE    Transfers, Chair-Bed [place]  not tested  -KP     Transfers, Sit-Stand [place] contact guard assist;hand held assist  -MD stand by assist  -[place] contact guard assist  -LE    Transfers, Stand-Sit [place] contact guard assist;hand held assist  -MD  stand by assist  -KP contact guard assist  -LE    Toilet Transfer, [place]  supervision required  -KP moderate assist (50% patient effort)  -LE    Toilet Transfer, Assistive Device  other (see comments)   grab bar  -KP     Walk-In Shower Transfer, [place]  set up required;contact guard assist  -KP maximum assist (25% patient effort)  -LE    Walk-In Shower Transfer, Assist Device  other (see comments);standard shower chair   grab bar  -KP standard shower chair   grab bar  -LE    Transfer, Safety Issues weight-shifting ability decreased  -MD  balance decreased during turns;sequencing ability decreased   hand held assist as pt becomes fatigued.  increase A in PM  -LE    Transfer, Impairments impaired balance;strength decreased  -MD      Recorded by [MD] Jennifer Vera, PT [KP] Maggie Gilbert, OTR [LE] AMAURI Mazariegos    Gait Assessment/Treatment    Gait, [place] Level verbal cues required;hand held assist;contact guard assist  -MD      Gait, Distance (Feet) 80   x3  -MD      Gait, Gait Deviations bilateral:;kee decreased;decreased heel strike;forward flexed posture;step length decreased;toe-to-floor clearance decreased  -MD      Gait, Safety Issues sequencing ability decreased;step length decreased  -MD      Gait, Impairments strength decreased;impaired balance  -MD      Recorded by [MD] Jennifer Vera, PT      Functional Mobility    Functional Mobility- Ind. Level  set up required;contact guard assist  -KP contact guard assist   hand held assist  -LE    Functional Mobility-Distance (Feet)   20  -LE    Functional Mobility- Safety Issues   balance decreased during turns;step length decreased;sequencing ability decreased  -LE    Functional Mobility- Comment  walks in room. to BR, to toilet, to shower from toilet  -     Recorded by  [KP] Maggie Gilbert, OTR [LE] AMAURI Mazariegos    Upper Body Bathing Assessment/Training    UB Bathing Assess/Train Assistive Device  hand-held shower head;shower  chair with back  -KP grab bars;hand-held shower head;shower chair with back  -LE    UB Bathing Assess/Train, Position  sitting  -KP supported sitting  -LE    UB Bathing Assess/Train, [place] Level  set up required;stand by assist  -KP set up required;stand by assist;supervision required  -LE    Recorded by  [KP] Maggie Gilbert OTR [LE] AMAURI Mazariegos    Lower Body Bathing Assessment/Training    LB Bathing Assess/Train Assistive Device  grab bars;hand-held shower head;shower chair with back  -KP shower chair with back;hand-held shower head;grab bars  -LE    LB Bathing Assess/Train, Position  sitting;standing  -KP supported sitting;supported standing  -LE    LB Bathing Assess/Train, [place] Level  set up required;stand by assist  -KP set up required;stand by assist;supervision required   close SBA to stand  -LE    LB Bathing Assess/Train, Impairments   impaired balance;strength decreased  -LE    Recorded by  [KP] Maggie Gilbert OTR [LE] AMAURI Mazariegos    Upper Body Dressing Assessment/Training    UB Dressing Assess/Train, Clothing Type  doffing:;donning:;bra;pull over  -KP doffing:;donning:;bra;pull over  -LE    UB Dressing Assess/Train, Position  sitting  -KP supported sitting  -LE    UB Dressing Assess/Train, [place]  set up required;supervision required  -KP set up required;supervision required;verbal cues required  -LE    UB Dressing Assess/Train, Comment   --   increase time to unbutton pj top   -LE    Recorded by  [KP] Maggie Gilbert OTR [LE] AMAURI Mazariegos    Lower Body Dressing Assessment/Training    LB Dressing Assess/Train, Clothing Type  doffing:;donning:;pants;shoes;socks  -KP doffing:;donning:;pants;shoes;socks  -LE    LB Dressing Assess/Train, Position  sitting;standing  -KP supported sitting;supported standing  -LE    LB Dressing Assess/Train, [place]  set up required;supervision required  -KP contact guard assist  -LE    LB Dressing Assess/Train,  Impairments   impaired balance  -LE    Recorded by  [KP] AMAURI Au [LE] AMAURI Mazariegos    Toileting Assessment/Training    Toileting Assess/Train, Assistive Device  grab bars  - grab bars  -    Toileting Assess/Train, Position  standing;sitting  - supported sitting;supported standing  -LE    Toileting Assess/Train, [place] Level  supervision required;set up required  - supervision required   close SBA  -LE    Toileting Assess/Train, Impairments   impaired balance;coordination impaired  -LE    Recorded by  [KP] AMAURI Au [LE] AMAURI Mazariegos    Grooming Assessment/Training    Grooming Assess/Train, Position  sitting  - supported sitting  -LE    Grooming Assess/Train, [place] Level  set up required;supervision required  - set up required;supervision required;verbal cues required  -LE    Recorded by  [KP] AMAURI Au [LE] AMAURI Mazariegos    Balance Skills Training    Sitting-Level of Assistance  Close supervision  -     Sitting-Balance Support  Feet supported  -     Standing-Level of Assistance  Close supervision;Contact guard  - --   CGA.  More assist PM session as pt fatigued  -LE    Standing-Balance Activities   Reaching for objects;Reaching for weighted objects   water plants, carry vase to BR, hand clothes.  CGA balance.   -LE    Standing Balance # of Minutes   --   in PM pt had to sit with each 30 seconds of standing  -LE    Recorded by  [KP] AMAURI Au [LE] AMAURI Mazariegos    Therapy Exercises    Bilateral Lower Extremities AROM:;20 reps;sitting;ankle pumps/circles;hip abduction/adduction;hip flexion;LAQ;glut sets  -MD      Recorded by [MD] Jennifer Vera, PT      Sensory Assessment/Intervention    Sensory Impairment   --   clumsines with unbuttoning shirt  -LE    Recorded by   [LE] AMAURI Mazariegos    Positioning and Restraints    Pre-Treatment Position sitting in chair/recliner  -MD in bed  - --   am-in bed.  PM-in w/c  -LE     Post Treatment Position wheelchair  -MD wheelchair  -KP wheelchair  -LE    In Chair   sitting;call light within reach;notified nsg;encouraged to call for assist  -LE    In Wheelchair sitting;call light within reach;encouraged to call for assist  -MD sitting;with PT  -KP     Recorded by [MD] Jennifer Vera PT [KP] Maggie [place] Gilbert, OTR [LE] Lakia Reardon, OTR      12/30/17 0904          Rehab Assessment/Intervention    Discipline physical therapist  -MD      Document Type therapy note (daily note)  -MD      Subjective Information agree to therapy;complains of;weakness;fatigue  -MD      Patient Effort, Rehab Treatment good  -MD      Symptoms Noted During/After Treatment none  -MD      Precautions/Limitations fall precautions  -MD      Recorded by [MD] Jennifer Vera PT      Pain Assessment    Pain Assessment No/denies pain  -MD      Recorded by [MD] Jennifer Vera PT      Cognitive Assessment/Intervention    Current Cognitive/Communication Assessment functional  -MD      Orientation Status oriented x 4  -MD      Follows Commands/Answers Questions 100% of the time  -MD      Personal Safety WNL/WFL  -MD      Personal Safety Interventions fall prevention program maintained;gait belt;muscle strengthening facilitated;nonskid shoes/slippers when out of bed;supervised activity  -MD      Recorded by [MD] Jennifer Vera PT      Bed Mobility, Assessment/Treatment    Bed Mob, Supine to Sit, [place] not tested  -MD      Bed Mob, Sit to Supine, [place] not tested  -MD      Recorded by [MD] Jennifer Vera PT      Transfer Assessment/Treatment    Transfers, Sit-Stand [place] verbal cues required;minimum assist (75% patient effort)  -MD      Transfers, Stand-Sit [place] verbal cues required;minimum assist (75% patient effort)  -MD      Transfer, Safety Issues weight-shifting ability decreased;step length decreased  -MD      Transfer, Impairments strength decreased  -MD      Recorded by [MD] Jennifer Vera PT      Gait  Assessment/Treatment    Gait, [place] Level verbal cues required;minimum assist (75% patient effort);hand held assist  -MD      Gait, Distance (Feet) 40   x4.  80` x2 in PM   -MD      Gait, Gait Deviations bilateral:;kee decreased;forward flexed posture;step length decreased;toe-to-floor clearance decreased  -MD      Gait, Safety Issues step length decreased;weight-shifting ability decreased  -MD      Gait, Impairments strength decreased;impaired balance  -MD      Recorded by [MD] Jennifer Vera, PT      Balance Skills Training    Gait Balance-Level of Assistance Contact guard  -MD      Gait Balance Support parallel bars  -MD      Gait Balance Activities backwards;side-stepping  -MD      Recorded by [MD] Jennifer Vera PT      Therapy Exercises    Bilateral Lower Extremities AROM:;20 reps;sitting;ankle pumps/circles;hip abduction/adduction;hip flexion;LAQ;glut sets  -MD      Recorded by [MD] Jennifer Vera PT      Positioning and Restraints    Pre-Treatment Position sitting in chair/recliner  -MD      Post Treatment Position wheelchair  -MD      In Chair sitting;call light within reach  -MD      Recorded by [MD] Jennifer Vera PT        User Key  (r) = Recorded By, (t) = Taken By, (c) = Cosigned By    Initials Name Effective Dates    CARLA Lakia Alexys, OTR 04/13/15 -     KP Maggie Gilbert, OTR 04/13/15 -     MD Jennifer Vera, PT 12/01/15 -                 OT Goals       12/29/17 1511 12/27/17 1545       Transfer Training OT STG    Transfer Training OT STG, Date Established 12/29/17  -KP      Transfer Training OT STG, Time to Achieve 5 - 7 days  -KP      Transfer Training OT STG, Activity Type toilet;sit to stand/stand to sit;shower chair;walk-in shower  -      Transfer Training OT STG, [place] Level supervision required  -KP      Transfer Training OT STG, Assist Device shower chair  -KP      Transfer Training OT LTG    Transfer Training OT LTG, Date Established 12/29/17  -KP 12/27/17  -AF     Transfer Training OT  LTG, Time to Achieve by discharge  -KP by discharge  -AF     Transfer Training OT LTG, Activity Type toilet;walk-in shower;shower chair;sit to stand/stand to sit  -KP toilet  -AF     Transfer Training OT LTG, [place] Level independent;conditional independence  -KP supervision required  -AF     Transfer Training OT LTG, Assist Device shower chair  -KP      Transfer Training OT LTG, Additional Goal  appropriate AD  -AF     Transfer Training OT LTG, Outcome  goal ongoing  -AF     Strength OT STG    Strength Goal OT STG, Date Established 12/29/17  -KP      Strength Goal OT STG, Time to Achieve 5 - 7 days  -KP      Strength Goal OT STG, Measure to Achieve to increase B UE to 4/5  -KP      Strength Goal OT STG, Functional Goal to increase functional tsf and self-care skills  -KP      Strength OT LTG    Strength Goal OT LTG, Date Established 12/29/17  -      Strength Goal OT LTG, Time to Achieve by discharge  -KP      Strength Goal OT LTG, Measure to Achieve to increase to 4+/5   -KP      Strength Goal OT LTG, Functional Goal to increase functional tsf and self-care skills  -KP      Dynamic Standing Balance OT STG    Dynamic Standing Balance OT STG, Date Established 12/29/17  -      Dynamic Standing Balance OT STG, Time to Achieve 5 - 7 days  -KP      Dynamic Standing Balance OT STG, [place] Level supervision required  -KP      Dynamic Standing Balance OT STG, Assist Device UE Support   to increase independence w. self care and IADLs  -KP      Dynamic Standing Balance OT LTG    Dynamic Standing Balance OT LTG, Date Established 12/29/17  -      Dynamic Standing Balance OT LTG, Time to Achieve by discharge  -KP      Dynamic Standing Balance OT LTG, [place] Level independent;conditional independence  -KP      Patient Education OT LTG    Patient Education OT LTG, Date Established 12/29/17  -      Patient Education OT LTG, Time to Achieve by discharge  -KP      Patient Education OT LTG, Education Type  written program;HEP;home safety  -      Patient Education OT LTG, Education Understanding independent;demonstrates adequately;verbalizes understanding  -KP      ADL OT STG    ADL OT STG, Date Established 12/29/17  -      ADL OT STG, Time to Achieve 1 wk  -      ADL OT STG, Activity Type ADL skills  -KP      ADL OT STG, [place] Level standby assist;setup;assistive device  -      ADL OT LTG    ADL OT LTG, Date Established 12/29/17  -KP 12/27/17  -AF     ADL OT LTG, Time to Achieve by discharge  -KP by discharge  -AF     ADL OT LTG, Activity Type ADL skills  -KP ADL skills  -AF     ADL OT LTG, [place] Level independent;independent with device;assistive device  - standby assist  -AF     ADL OT LTG, Outcome  goal ongoing  -AF     Functional Mobility OT LTG    Functional Mobility Goal OT LTG, Date Established  12/27/17  -     Functional Mobility Goal OT LTG, Time to Achieve  by discharge  -AF     Functional Mobility Goal OT LTG, [place] Level  supervision  -AF     Functional Mobility Goal OT LTG, Assist Device  appropriate AD  -AF     Functional Mobility Goal OT LTG, Distance to Achieve  to the bathroom  -AF     Functional Mobility Goal OT LTG, Outcome  goal ongoing  -AF     Endurance OT LTG    Endurance Goal OT LTG, Date Established 12/29/17  -      Endurance Goal OT LTG, Time to Achieve by discharge  -      Endurance Goal OT LTG, Activity Level to increase;endurance 2 good  -KP        User Key  (r) = Recorded By, (t) = Taken By, (c) = Cosigned By    Initials Name Provider Type     Maggie Gilbert, OTR Occupational Therapist    AF Elissa Malone, OTR Occupational Therapist          Occupational Therapy Education     Title: PT OT SLP Therapies (Active)     Topic: Occupational Therapy (Active)     Point: ADL training (Done)    Description: Instruct learner(s) on proper safety adaptation and remediation techniques during self care or transfers.   Instruct in proper use of assistive  devices.    Learning Progress Summary    Learner Readiness Method Response Comment Documented by Status   Patient Acceptance E,TB,D VU,DU ed pt on role of OT, benefit of therapy. POC w. OT.  12/29/17 1510 Done               Point: Precautions (Done)    Description: Instruct learner(s) on prescribed precautions during self-care and functional transfers.    Learning Progress Summary    Learner Readiness Method Response Comment Documented by Status   Patient Acceptance E,TB,D VU,DU ed pt on role of OT, benefit of therapy. POC w. OT.  12/29/17 1510 Done               Point: Body mechanics (Done)    Description: Instruct learner(s) on proper positioning and spine alignment during self-care, functional mobility activities and/or exercises.    Learning Progress Summary    Learner Readiness Method Response Comment Documented by Status   Patient Acceptance E,TB,D VU,DU ed pt on role of OT, benefit of therapy. POC w. OT.  12/29/17 1510 Done                      User Key     Initials Effective Dates Name Provider Type Discipline     04/13/15 -  Maggie Gilbert, OTR Occupational Therapist OT                  OT Recommendation and Plan  Anticipated Discharge Disposition: home  Planned Therapy Interventions: activity intolerance, ADL retraining, balance training, IADL retraining, home exercise program, strengthening, transfer training  Therapy Frequency: 5 times/wk  Plan of Care Review  Plan Of Care Reviewed With: patient  Progress: no change  Outcome Summary/Follow up Plan: pt evaluated for OT. Pt is SBA w. toilet tsf, SBA w. UB bathing and UBD. Pt is CGA for LBD and bathing, but close to SBA. Pt w. decr B UE strength, will benefit from contiued OT.       Time Calculation:         Time Calculation- OT       01/01/18 1220          Time Calculation- OT    OT Start Time 1000  -      OT Stop Time 1030  -      OT Time Calculation (min) 30 min  -        User Key  (r) = Recorded By, (t) = Taken By, (c) = Cosigned By     Initials Name Provider Type     AMAURI Au Occupational Therapist           Therapy Charges for Today     Code Description Service Date Service Provider Modifiers Qty    08134329541 HC OT SELF CARE/MGMT/TRAIN EA 15 MIN 1/1/2018 AMAURI Au GO 2               AMAURI Au  1/1/2018

## 2018-01-01 NOTE — PROGRESS NOTES
Occupational Therapy: Individual: 30 minutes.    Physical Therapy: [place]    Speech Language Pathology:  Branch    Signed by: AMAURI Ibarra/L

## 2018-01-01 NOTE — PROGRESS NOTES
Inpatient Rehabilitation Functional Measures Assessment    Functional Measures  KISHOR Eating:  Branch  [facility] Grooming: [place]  [facility] Bathing:  [place]  [facility] Upper Body Dressing:  [place]  [facility] Lower Body Dressing:  [facility] Toileting:  [facility] Bladder Management  Level of Assistance:  [place]  Frequency/Number of Accidents this Shift:  [facility] Bowel Management  Level of Assistance: [place]  Frequency/Number of Accidents this Shift: [facility] Bed/Chair/Wheelchair Transfer:  Activity was not observed.  KISHOR Toilet Transfer:  [facility] Tub/Shower Transfer:  [place]    Previously Documented Mode of Locomotion at Discharge: Field  KISHOR Expected Mode of Locomotion at Discharge: [facility] Walk/Wheelchair:  WHEELCHAIR OBSERVATION   Activity was not observed.    WALK OBSERVATION   Walk Distance Scale = 2.  Distance walked is 50 -149 feet. Walk Score = 2.  Patient performs 75% or more of effort and requires minimal assistance.  Incidental assistance, contact guard or steadying was provided. Patient walked a  distance of 80 feet. No assistive devices were required.  KISHOR Stairs:  Activity was not observed.    KISHOR Comprehension:  [place]  [facility] Expression:  [facility] Social Interaction:  [facility] Problem Solving:  [facility] Memory:  [place]    Therapy Mode Minutes  Occupational Therapy: [place]  Physical Therapy: Individual: 30 minutes.  Speech Language Pathology:  [place]    Signed by: Jennifer Vera, PT

## 2018-01-01 NOTE — PROGRESS NOTES
Inpatient Rehabilitation Plan of Care Note    Plan of Care  Care Plan Reviewed - No updates at this time.    Psychosocial    Performed Intervention(s)  Therapeutic enviroment  verbalizes needs and concerns.      Safety    Performed Intervention(s)  Falls protocol  hourly rounds, items in reach  Bed alarm chair alarm      Sphincter Control    Performed Intervention(s)  Monitor intake and output.  medication    Signed by: Shira Tobias RN

## 2018-01-01 NOTE — PROGRESS NOTES
LOS: 4 days   Patient Care Team:  Tatiana Bellamy MD as PCP - General (Internal Medicine)  James Del Angel MD as Consulting Physician (Infectious Diseases)  Ezekiel Verde MD as Consulting Physician (Otolaryngology)  Aisha Curiel MD as Consulting Physician (Cardiology)  Sinan Braga MD as Consulting Physician (Pulmonary Disease)  Shawn Felton MD as Consulting Physician (Allergy)    Chief Complaint: same    Subjective     History of Present Illness    Subjective Pt is awake and alert. Pt is pleased with her progress so far    History taken from: patient    Objective     Vital Signs  Temp:  [97.7 °F (36.5 °C)-98.1 °F (36.7 °C)] 98.1 °F (36.7 °C)  Heart Rate:  [73-88] 88  Resp:  [16-18] 16  BP: (130-141)/(57-73) 134/68    Objective exam unchanged    Results Review:     I reviewed the patient's new clinical results.    Medication Review:     Assessment/Plan     Active Problems:    Guillain Barré syndrome      Assessment & Plan Continue to prepare for dc.     Chris Chávez MD  01/01/18  12:40 PM    Time:

## 2018-01-02 PROCEDURE — 25010000002 ENOXAPARIN PER 10 MG: Performed by: PHYSICAL MEDICINE & REHABILITATION

## 2018-01-02 PROCEDURE — 97110 THERAPEUTIC EXERCISES: CPT

## 2018-01-02 RX ADMIN — Medication 250 MG: at 20:02

## 2018-01-02 RX ADMIN — LEVOTHYROXINE SODIUM 75 MCG: 75 TABLET ORAL at 07:07

## 2018-01-02 RX ADMIN — DOCUSATE SODIUM 100 MG: 100 CAPSULE, LIQUID FILLED ORAL at 08:45

## 2018-01-02 RX ADMIN — Medication 250 MG: at 08:46

## 2018-01-02 RX ADMIN — ACETAMINOPHEN 650 MG: 325 TABLET, FILM COATED ORAL at 21:59

## 2018-01-02 RX ADMIN — ENOXAPARIN SODIUM 30 MG: 30 INJECTION SUBCUTANEOUS at 20:02

## 2018-01-02 RX ADMIN — METOPROLOL SUCCINATE 25 MG: 25 TABLET, FILM COATED, EXTENDED RELEASE ORAL at 08:46

## 2018-01-02 RX ADMIN — LOSARTAN POTASSIUM 100 MG: 50 TABLET, FILM COATED ORAL at 08:45

## 2018-01-02 RX ADMIN — DOCUSATE SODIUM 100 MG: 100 CAPSULE, LIQUID FILLED ORAL at 20:02

## 2018-01-02 RX ADMIN — AMLODIPINE BESYLATE 5 MG: 5 TABLET ORAL at 08:46

## 2018-01-02 NOTE — PROGRESS NOTES
Case Management  Inpatient Rehabilitation Plan of Care and Discharge Plan Note    Rehabilitation Diagnosis:  Branch  Date of Onset:  Linda    Medical Summary:  Branch  Past Medical History: Branch    Plan of Care  Updated Problems/Interventions  Field    Expected Intensity:  [place]  Interdisciplinary Team:  Linda  Estimated Length of Stay/Anticipated Discharge Date: Branch  Anticipated Discharge Destination:  Anticipated discharge destination from inpatient rehabilitation is community  discharge with assistance. Patient lives alone in one story home with 2 step  entry.  D/C plan is home with intermittent assist from family and friends      Based on the patient's medical and functional status, their prognosis and  expected level of functional improvement is:  [place]inda    Signed by: JAVIER Parkinson

## 2018-01-02 NOTE — THERAPY TREATMENT NOTE
Inpatient Rehabilitation - Occupational Therapy Treatment Note  [facility]     Patient Name: Stacie Vieira  : 1935  MRN: [id number]  Today's Date: 2018  Onset of Illness/Injury or Date of Surgery Date: 17     Referring Physician: Dr Houston      Admit Date: 2017    Visit Dx:     ICD-10-CM ICD-9-CM   1. Generalized weakness R53.1 780.79     Patient Active Problem List   Diagnosis   • Mycobacterium avium complex   • Essential hypertension   • Palpitations   • Allergic rhinitis   • Hypothyroidism   • Hyperlipidemia   • Community acquired pneumonia of left lower lobe of lung   • Acute respiratory failure with hypoxia   • Bilateral hand numbness   • DNR (do not resuscitate)   • Polyneuropathy   • Oropharyngeal dysphagia   • Esophageal dysmotility   • Severe malnutrition due to acute illness   • Guillain-[place] syndrome following vaccination   • Bradycardia   • Syncope   • Guillain Barré syndrome             Adult Rehabilitation Note       18 1536 18 0938 18 1216    Rehab Assessment/Intervention    Discipline occupational therapist  -CC physical therapy assistant  -LB physical therapist  -MD    Document Type therapy note (daily note)  -CC therapy note (daily note)  -LB therapy note (daily note)  -MD    Subjective Information no complaints;agree to therapy  -CC agree to therapy  -LB agree to therapy;complains of;weakness;fatigue  -MD    Patient Effort, Rehab Treatment good  -CC good  -LB good  -MD    Symptoms Noted During/After Treatment   none  -MD    Precautions/Limitations fall precautions  -CC fall precautions  -LB fall precautions  -MD    Equipment Issued to Patient   gait belt  -MD    Recorded by [CC] Jyoti Roblero, OTR [LB] Lakia Menjivar PTA [MD] Jennifer Vera, PT    Pain Assessment    Pain Assessment No/denies pain  -CC No/denies pain  -LB No/denies pain  -MD    Recorded by [CC] Jyoti Roblero OTR [LB] Lakia Menjivar PTA [MD] Jennifer Vera, PT    Cognitive  Assessment/Intervention    Current Cognitive/Communication Assessment   functional  -MD    Orientation Status   oriented x 4  -MD    Follows Commands/Answers Questions   100% of the time  -MD    Personal Safety   WNL/WFL  -MD    Personal Safety Interventions fall prevention program maintained  -CC fall prevention program maintained;gait belt;muscle strengthening facilitated;nonskid shoes/slippers when out of bed  -LB fall prevention program maintained;gait belt;muscle strengthening facilitated;nonskid shoes/slippers when out of bed;supervised activity  -MD    Recorded by [CC] Jyoti Roblero OTR [LB] Lakia Menjivar PTA [MD] Jennifer Vera, PT    Bed Mobility, Assessment/Treatment    Bed Mob, Supine to Sit, [place]  supervision required  -LB not tested  -MD    Bed Mob, Sit to Supine, [place]  supervision required  -LB not tested  -MD    Bed Mobility, Comment --   in w/c  -CC      Recorded by [CC] AMAURI Rojas [LB] Lakia Menjivar PTA [MD] Jennifer Vera, PT    Transfer Assessment/Treatment    Transfers, Bed-Chair [place]  contact guard assist  -LB     Transfers, Chair-Bed [place]  contact guard assist  -LB     Transfers, Sit-Stand [place]  contact guard assist  -LB contact guard assist;hand held assist  -MD    Transfers, Stand-Sit [place]  contact guard assist  -LB contact guard assist;hand held assist  -MD    Transfers, Sit-Stand-Sit, Assist Device  other (see comments)   HHA  -LB     Transfer, Safety Issues   weight-shifting ability decreased  -MD    Transfer, Impairments   impaired balance;strength decreased  -MD    Transfer, Comment  car tsf, CGA, HHA  -LB     Recorded by  [LB] Lakia Menjivar PTA [MD] Jennifer Vera, PT    Gait Assessment/Treatment    Gait, [place] Level  contact guard assist;hand held assist  -LB verbal cues required;hand held assist;contact guard assist  -MD    Gait, Assistive Device  other (see comments)   HHA  -LB     Gait, Distance (Feet)  160  -LB 80    x3  -MD    Gait, Gait Deviations  bilateral:;kee decreased;forward flexed posture;decreased heel strike;step length decreased;toe-to-floor clearance decreased  -LB bilateral:;kee decreased;decreased heel strike;forward flexed posture;step length decreased;toe-to-floor clearance decreased  -MD    Gait, Safety Issues   sequencing ability decreased;step length decreased  -MD    Gait, Impairments   strength decreased;impaired balance  -MD    Recorded by  [LB] Lakia Menjivar PTA [MD] Jennifer Vera, PT    Stairs Assessment/Treatment    Number of Stairs  4  -LB     Stairs, Handrail Location  both sides  -LB     Stairs, [place] Level  minimum assist (75% patient effort)  -LB     Stairs, Technique Used  step over step (ascending);step over step (descending)  -LB     Recorded by  [LB] Lakia Menjivar PTA     ADL Assessment/Intervention    Additional Documentation --   refused this date  -CC      Recorded by [CC] Jyoti Roblero OTR      Therapy Exercises    Bilateral Lower Extremities  AROM:;20 reps;supine;ankle pumps/circles;heel slides;hip abduction/adduction;SAQ;sitting  -LB AROM:;20 reps;sitting;ankle pumps/circles;hip abduction/adduction;hip flexion;LAQ;glut sets  -MD    Bilateral Upper Extremity --   arm bike 3 min x 2; dowel 2# 10x3  -CC      BUE Resistance --   yello wtheraband 10x3; 1# hand wt 10x3; hand helper 20  -CC      Recorded by [CC] Jyoti Roblero OTR [LB] Lakia Menjivar PTA [MD] Jennifer Vera, PT    Positioning and Restraints    Pre-Treatment Position   sitting in chair/recliner  -MD    Post Treatment Position wheelchair  -CC wheelchair  -LB wheelchair  -MD    In Wheelchair sitting;call light within reach;with family/caregiver;encouraged to call for assist  -CC sitting;with OT  -LB sitting;call light within reach;encouraged to call for assist  -MD    Recorded by [CC] Jyoti Roblero OTR [LB] Lakia Mejnivar PTA [MD] Jennifer Vera, PT      01/01/18 1215          Rehab  Assessment/Intervention    Discipline occupational therapist  -      Document Type therapy note (daily note)  -      Subjective Information agree to therapy;no complaints  -KP      Patient Effort, Rehab Treatment good  -KP      Symptoms Noted During/After Treatment none  -KP      Precautions/Limitations fall precautions  -KP      Recorded by [KP] Maggie Gilbert OTR      Pain Assessment    Pain Assessment No/denies pain  -KP      Recorded by [KP] Maggie Gilbert OTR      Vision Assessment/Intervention    Visual Impairment WFL with corrective lenses  -KP      Recorded by [KP] Maggie Gilbert OTR      Cognitive Assessment/Intervention    Current Cognitive/Communication Assessment functional  -      Orientation Status oriented x 4  -KP      Follows Commands/Answers Questions 100% of the time;able to follow single-step instructions  -      Personal Safety WNL/WFL  -KP      Personal Safety Interventions fall prevention program maintained;gait belt;nonskid shoes/slippers when out of bed  -KP      Recorded by [KP] Maggie Gilbert OTR      Bed Mobility, Assessment/Treatment    Bed Mob, Supine to Sit, [place] set up required;supervision required  -      Bed Mob, Sit to Supine, [place] not tested  -KP      Recorded by [KP] Maggie Gilbert OTR      Transfer Assessment/Treatment    Transfers, Bed-Chair [place] supervision required  -      Transfers, Chair-Bed [place] not tested  -      Transfers, Sit-Stand [place] stand by assist  -      Transfers, Stand-Sit [place] stand by assist  -      Toilet Transfer, [place] supervision required  -      Toilet Transfer, Assistive Device other (see comments)   grab bar  -      Walk-In Shower Transfer, [place] set up required;contact guard assist  -      Walk-In Shower Transfer, Assist Device other (see comments);standard shower chair   grab bar  -KP      Recorded by [KP] Maggie Hodge  ROSA GilbertR      Functional Mobility    Functional Mobility- Ind. Level set up required;contact guard assist  -      Functional Mobility- Comment walks in room. to BR, to toilet, to shower from toilet  -KP      Recorded by [] Maggie Gilbert OTR      Upper Body Bathing Assessment/Training    UB Bathing Assess/Train Assistive Device hand-held shower head;shower chair with back  -KP      UB Bathing Assess/Train, Position sitting  -KP      UB Bathing Assess/Train, [place] Level set up required;stand by assist  -KP      Recorded by [KP] Maggie Gilbert OTR      Lower Body Bathing Assessment/Training    LB Bathing Assess/Train Assistive Device grab bars;hand-held shower head;shower chair with back  -KP      LB Bathing Assess/Train, Position sitting;standing  -KP      LB Bathing Assess/Train, [place] Level set up required;stand by assist  -KP      Recorded by [KP] Maggie Gilbert OTR      Upper Body Dressing Assessment/Training    UB Dressing Assess/Train, Clothing Type doffing:;donning:;bra;pull over  -KP      UB Dressing Assess/Train, Position sitting  -      UB Dressing Assess/Train, [place] set up required;supervision required  -KP      Recorded by [KP] Maggie Gilbert OTR      Lower Body Dressing Assessment/Training    LB Dressing Assess/Train, Clothing Type doffing:;donning:;pants;shoes;socks  -KP      LB Dressing Assess/Train, Position sitting;standing  -KP      LB Dressing Assess/Train, [place] set up required;supervision required  -KP      Recorded by [KP] Maggie Gilbert OTR      Toileting Assessment/Training    Toileting Assess/Train, Assistive Device grab bars  -      Toileting Assess/Train, Position standing;sitting  -      Toileting Assess/Train, [place] Level supervision required;set up required  -      Recorded by [] Maggie Gilbert OTR      Grooming Assessment/Training    Grooming Assess/Train, Position sitting  -      Grooming  Assess/Train, [place] Level set up required;supervision required  -KP      Recorded by [KP] Maggei Gilbert, OTR      Balance Skills Training    Sitting-Level of Assistance Close supervision  -KP      Sitting-Balance Support Feet supported  -KP      Standing-Level of Assistance Close supervision;Contact guard  -KP      Recorded by [KP] Maggie Gilbert, OTR      Positioning and Restraints    Pre-Treatment Position in bed  -KP      Post Treatment Position wheelchair  -KP      In Wheelchair sitting;with PT  -KP      Recorded by [KP] Maggie Gilbert, OTR        User Key  (r) = Recorded By, (t) = Taken By, (c) = Cosigned By    Initials Name Effective Dates    CC Jyoti Osbaldo, OTR 04/13/15 -     KP Maggie Gilbert, OTR 04/13/15 -     LB Lakia Menjivar, PTA 02/18/16 -     MD Jennifer Vera, PT 12/01/15 -                 OT Goals       12/29/17 1511 12/27/17 1545       Transfer Training OT STG    Transfer Training OT STG, Date Established 12/29/17  -KP      Transfer Training OT STG, Time to Achieve 5 - 7 days  -KP      Transfer Training OT STG, Activity Type toilet;sit to stand/stand to sit;shower chair;walk-in shower  -KP      Transfer Training OT STG, [place] Level supervision required  -KP      Transfer Training OT STG, Assist Device shower chair  -KP      Transfer Training OT LTG    Transfer Training OT LTG, Date Established 12/29/17  -KP 12/27/17  -AF     Transfer Training OT LTG, Time to Achieve by discharge  -KP by discharge  -AF     Transfer Training OT LTG, Activity Type toilet;walk-in shower;shower chair;sit to stand/stand to sit  -KP toilet  -AF     Transfer Training OT LTG, [place] Level independent;conditional independence  -KP supervision required  -AF     Transfer Training OT LTG, Assist Device shower chair  -KP      Transfer Training OT LTG, Additional Goal  appropriate AD  -AF     Transfer Training OT LTG, Outcome  goal ongoing  -AF     Strength OT STG    Strength Goal OT  STG, Date Established 12/29/17  -      Strength Goal OT STG, Time to Achieve 5 - 7 days  -      Strength Goal OT STG, Measure to Achieve to increase B UE to 4/5  -KP      Strength Goal OT STG, Functional Goal to increase functional tsf and self-care skills  -      Strength OT LTG    Strength Goal OT LTG, Date Established 12/29/17  -      Strength Goal OT LTG, Time to Achieve by discharge  -      Strength Goal OT LTG, Measure to Achieve to increase to 4+/5   -KP      Strength Goal OT LTG, Functional Goal to increase functional tsf and self-care skills  -      Dynamic Standing Balance OT STG    Dynamic Standing Balance OT STG, Date Established 12/29/17  -      Dynamic Standing Balance OT STG, Time to Achieve 5 - 7 days  -      Dynamic Standing Balance OT STG, [place] Level supervision required  -      Dynamic Standing Balance OT STG, Assist Device UE Support   to increase independence w. self care and IADLs  -      Dynamic Standing Balance OT LTG    Dynamic Standing Balance OT LTG, Date Established 12/29/17  -      Dynamic Standing Balance OT LTG, Time to Achieve by discharge  -      Dynamic Standing Balance OT LTG, [place] Level independent;conditional independence  -      Patient Education OT LTG    Patient Education OT LTG, Date Established 12/29/17  -      Patient Education OT LTG, Time to Achieve by discharge  -      Patient Education OT LTG, Education Type written program;HEP;home safety  -      Patient Education OT LTG, Education Understanding independent;demonstrates adequately;verbalizes understanding  -      ADL OT STG    ADL OT STG, Date Established 12/29/17  -      ADL OT STG, Time to Achieve 1 wk  -      ADL OT STG, Activity Type ADL skills  -      ADL OT STG, [place] Level standby assist;setup;assistive device  -      ADL OT LTG    ADL OT LTG, Date Established 12/29/17  -KP 12/27/17  -AF     ADL OT LTG, Time to Achieve by discharge  - by  discharge  -AF     ADL OT LTG, Activity Type ADL skills  - ADL skills  -AF     ADL OT LTG, [place] Level independent;independent with device;assistive device  - standby assist  -AF     ADL OT LTG, Outcome  goal ongoing  -AF     Functional Mobility OT LTG    Functional Mobility Goal OT LTG, Date Established  12/27/17  -AF     Functional Mobility Goal OT LTG, Time to Achieve  by discharge  -AF     Functional Mobility Goal OT LTG, [place] Level  supervision  -AF     Functional Mobility Goal OT LTG, Assist Device  appropriate AD  -AF     Functional Mobility Goal OT LTG, Distance to Achieve  to the bathroom  -AF     Functional Mobility Goal OT LTG, Outcome  goal ongoing  -AF     Endurance OT LTG    Endurance Goal OT LTG, Date Established 12/29/17  -      Endurance Goal OT LTG, Time to Achieve by discharge  -      Endurance Goal OT LTG, Activity Level to increase;endurance 2 good  -        User Key  (r) = Recorded By, (t) = Taken By, (c) = Cosigned By    Initials Name Provider Type     Maggie Gilbert, OTR Occupational Therapist    AF Elissa Malone, OTR Occupational Therapist          Occupational Therapy Education     Title: PT OT SLP Therapies (Active)     Topic: Occupational Therapy (Active)     Point: ADL training (Done)    Description: Instruct learner(s) on proper safety adaptation and remediation techniques during self care or transfers.   Instruct in proper use of assistive devices.    Learning Progress Summary    Learner Readiness Method Response Comment Documented by Status   Patient Acceptance E,PURNIMA CARRANZA,TOYIN ed pt on role of OT, benefit of therapy. POC w. OT.  12/29/17 1510 Done               Point: Precautions (Done)    Description: Instruct learner(s) on prescribed precautions during self-care and functional transfers.    Learning Progress Summary    Learner Readiness Method Response Comment Documented by Status   Patient Acceptance E,TERE,PURNIMA BENSON,TOYIN ed pt on role of OT, benefit of  therapy. POC w. OT.  12/29/17 1510 Done               Point: Body mechanics (Done)    Description: Instruct learner(s) on proper positioning and spine alignment during self-care, functional mobility activities and/or exercises.    Learning Progress Summary    Learner Readiness Method Response Comment Documented by Status   Patient Acceptance E,TB,D VU,DU ed pt on role of OT, benefit of therapy. POC w. OT.  12/29/17 1510 Done                      User Key     Initials Effective Dates Name Provider Type Discipline     04/13/15 -  AMAURI Au Occupational Therapist OT                  OT Recommendation and Plan  Anticipated Discharge Disposition: home  Planned Therapy Interventions: activity intolerance, ADL retraining, balance training, IADL retraining, home exercise program, strengthening, transfer training  Therapy Frequency: 5 times/wk          Time Calculation:         Time Calculation- OT       01/02/18 1430 01/02/18 1100       Time Calculation- OT    OT Start Time 1430  -CC 1100  -CC     OT Stop Time 1515  -CC 1145  -CC     OT Time Calculation (min) 45 min  -CC 45 min  -CC       User Key  (r) = Recorded By, (t) = Taken By, (c) = Cosigned By    Initials Name Provider Type    CC AMAURI Rojas Occupational Therapist           Therapy Charges for Today     Code Description Service Date Service Provider Modifiers Qty    13925911543 HC OT THER PROC EA 15 MIN 1/2/2018 AMAURI Rojas GO 6               AMAURI Rojas  1/2/2018

## 2018-01-02 NOTE — PROGRESS NOTES
"   LOS: 5 days   Patient Care Team:  Tatiana Bellamy MD as PCP - General (Internal Medicine)  James Del Angel MD as Consulting Physician (Infectious Diseases)  Ezekiel Verde MD as Consulting Physician (Otolaryngology)  Aisha Curiel MD as Consulting Physician (Cardiology)  Sinan Braga MD as Consulting Physician (Pulmonary Disease)  Shawn Felton MD as Consulting Physician (Allergy)    Chief Complaint:   Guillain-Barré syndrome    Subjective     History of Present Illness    Subjective  Still some numbness in her feet greater than hands.  She feels that she is making improvement with her mobility.  Tolerates therapies.      History taken from: patient    Objective     Vital Signs  Temp:  [98.1 °F (36.7 °C)-98.3 °F (36.8 °C)] 98.1 °F (36.7 °C)  Heart Rate:  [71-74] 74  Resp:  [18] 18  BP: (113-120)/(56) 116/56    Objective:  Vital signs: (most recent): Blood pressure 116/56, pulse 74, temperature 98.1 °F (36.7 °C), temperature source Oral, resp. rate 18, height 160 cm (63\"), weight 50.3 kg (110 lb 12.8 oz), last menstrual period 01/01/1993, SpO2 99 %, not currently breastfeeding.            Physical Exam  Mental status-awake alert rest  HEENT-sclera nonicteric, conjunctiva pink, or fax moist.     No JVD.     Lungs-clear to auscultation without wheezes rales rhonchi.  No accessory muscle use.  Heart-regular rate and rhythm without rub murmur gallop.  Abdomen-normal active bowel sounds soft and nontender.  Extremities -without edema or cyanosis.  Neurologic-oriented ×4.  Good historian.        Face symmetric.  No dysarthria.  Shoulder shrug equal.  Motor exam shows to take resistance proximally distally in the bilateral upper extremities and lower extremities.   She has some slight weakness with left shoulder abduction compared to right shoulder abduction.  Light touch decreased distally in the  lower extremities      Results Review:     I reviewed the patient's new clinical " results.    Results from last 7 days  Lab Units 01/01/18  0851 12/29/17  0722   WBC 10*3/mm3 6.55 7.36   HEMOGLOBIN g/dL 11.0* 11.6*   HEMATOCRIT % 33.9* 36.8   PLATELETS 10*3/mm3 287 212         Results from last 7 days  Lab Units 12/29/17  0722 12/28/17  0507 12/27/17  0600   SODIUM mmol/L 141 143 141   POTASSIUM mmol/L 4.6 5.1 4.4   CHLORIDE mmol/L 103 105 103   CO2 mmol/L 26.7 25.9 23.5   BUN mg/dL 18 18 20   CREATININE mg/dL 0.70 0.79 0.81   CALCIUM mg/dL 9.0 8.9 9.0   GLUCOSE mg/dL 99 103* 103*       Medication Review: done  Scheduled Meds:    amLODIPine 5 mg Oral Q24H   cetirizine 5 mg Oral Daily   docusate sodium 100 mg Oral BID   enoxaparin 30 mg Subcutaneous Q24H   levothyroxine 50 mcg Oral Q48H   levothyroxine 75 mcg Oral Every Other Day   losartan 100 mg Oral Q24H   metoprolol succinate XL 25 mg Oral Q24H   saccharomyces boulardii 250 mg Oral BID     Continuous Infusions:   PRN Meds:.•  acetaminophen  •  calcium carbonate  •  ipratropium-albuterol  •  nitroglycerin  •  ondansetron **OR** ondansetron ODT **OR** ondansetron      Assessment/Plan     Active Problems:    Guillain Barré syndrome      Assessment & Plan  Guillain-Barré syndrome  S/p plasmapheresis  HTN- multi-drug regimen  DVT prophylaxis - SCDs/Lovenox  Hypothyroidism - on replacement.      82-year-old female previously independent with Guillain-Barré syndrome with numbness and weakness in her extremities with impairments with her mobility and self-care.  She's also had bradycardia.     She's completed 5 sessions of plasmapheresis.  Recent pneumonia.  Recent dysphagia.  Given her functional impairments and comorbidities, current recommendation is to pursue acute inpatient rehabilitation.  This would be a comprehensive inpatient rehabilitation program with physical medicine rehabilitation and ongoing physician follow-up monitoring her neurologic and pulmonary and cardiac status, occupational therapy for functional mobility and activities daily  living and physical therapy for transfers balance gait and strengthening.  Therapy 3 hours a day 5 days a week, PT 1.5 hours and OT 1.5 hours 5 days a week. .  Rehabilitation nursing for carryover and monitoring of her bowel bladder, skin, cardiopulmonary status, neurologic status.  Weekly team conferences.  The patient will require precertification.  Features of the rehabilitation program were reviewed with the patient.  The patient's in agreement with this plan. Goal mod I. Rehab prognosis fair. Medical prognosis fair. ELOS 10 days but only estimation.     January 2-transfers contact-guard.  Gait 160 feet contact guard with a rolling walker.  She still has some numbness in the hands and feet.  Tolerates therapies.  She feels he's making progress.     Virgilio Amaya MD  01/02/18  4:40 PM    Time:

## 2018-01-02 NOTE — PROGRESS NOTES
Inpatient Rehabilitation Plan of Care Note    Plan of Care  Care Plan Reviewed - Updates as Follows    Psychosocial    [RN] Coping/Adjustment(Active)  Current Status(01/02/2018): Very pleasant, verbalizes easily. No coping issues  voiced.  Weekly Goal(01/09/2018): Allow opportunity to express concerns regarding status.  Discharge Goal: Adequate coping regarding life changes with ongoing support    Performed Intervention(s)  Therapeutic enviroment  verbalizes needs and concerns.      Safety    [RN] Potential for Injury(Active)  Current Status(01/02/2018): weakness of LEs, numbness bilateral feet. Fatiques  easily.  Weekly Goal(01/09/2018): No unsafe behavior. Calls for assist.  Discharge Goal: Pt/family aware of fall/safety in the home setting.    Performed Intervention(s)  Falls protocol  hourly rounds, items in reach  Bed alarm chair alarm      Sphincter Control    [RN] Bladder and bowel management(Active)  Current Status(01/02/2018): Continent  Weekly Goal(01/09/2018): Continent of bladder, bowel 100% with BM q1-3 days and  no difficulty voiding.  Discharge Goal: Same as weekly    Performed Intervention(s)  Monitor intake and output.  medication    Signed by: Carole Candelario RN

## 2018-01-02 NOTE — PROGRESS NOTES
Inpatient Rehabilitation Functional Measures Assessment    Functional Measures  KISHOR Eating:  [facility] Grooming: [facility] Bathing:  [facility] Upper Body Dressing:  [facility] Lower Body Dressing:  [facility] Toileting:  [facility] Bladder Management  Level of Assistance:  [place]  Frequency/Number of Accidents this Shift:  [facility] Bowel Management  Level of Assistance: [place]  Frequency/Number of Accidents this Shift: [facility] Bed/Chair/Wheelchair Transfer:  [facility] Toilet Transfer:  [facility] Tub/Shower Transfer:  [place]    Previously Documented Mode of Locomotion at Discharge: [place]  KISHOR Expected Mode of Locomotion at Discharge: [facility] Walk/Wheelchair:  [facility] Stairs:  [facility] Comprehension:  Auditory comprehension is the usual mode. Comprehension  Score = 6, Modified [place].  Patient comprehends complex/abstract  information in their primary language with only mild difficulty.  KISHOR Expression:  Vocal expression is the usual mode. Expression Score = 6,  Modified Independent.  Patient expresses complex/abstract information in their  primary language with only mild difficulty with tasks.  KISHOR Social Interaction:  Social Interaction Score = 7, Independent. Patient is  completely independent for social interaction.  There are no activity  limitations.  KISHOR Problem Solving:  Activity was not observed.  KISHOR Memory:  Memory Score = 6, Modified [place].  Patient is modified  independent for memory, having only mild difficulty and using self-initiated or  environmental cues to remember.    Therapy Mode Minutes  Occupational Therapy: [place]  Physical Therapy: [place]  Speech Language Pathology:  [place]    Signed by: Carole Candelario RN

## 2018-01-02 NOTE — PROGRESS NOTES
Inpatient Rehabilitation Functional Measures Assessment and Plan of Care    Plan of Care  Updated Problems/Interventions  Mobility    [PT] Bed/Chair/Wheelchair(Active)  Current Status(01/02/2018): CGA  Weekly Goal(01/10/2018): Indep  Discharge Goal: Independent    [PT] Stairs(Active)  Current Status(01/02/2018): Min A 4 steps with handrails  Weekly Goal(01/10/2018): PT only  Discharge Goal: Independent with one Handrail 12 steps    [PT] Car Transfers(Active)  Current Status(01/02/2018): CGA  Weekly Goal(01/10/2018): Indep  Discharge Goal: Independent    [PT] Walk(Active)  Current Status(01/02/2018): ', HHA  Weekly Goal(01/10/2018): to BR Indep  Discharge Goal: Independent 160'    Functional Measures  KISHOR Eating:  [place]  [facility] Grooming: [place]  [facility] Bathing:  [place]  [facility] Upper Body Dressing:  [place]  [facility] Lower Body Dressing:  [place]  [facility] Toileting:  [place]    [facility] Bladder Management  Level of Assistance:  [place]  Frequency/Number of Accidents this Shift:  [facility] Bowel Management  Level of Assistance: [place]  Frequency/Number of Accidents this Shift: [place]    [facility] Bed/Chair/Wheelchair Transfer:  Bed/chair/wheelchair Transfer Score = 4.  Patient performs 75% or more of effort and minimal assistance (little/incidental  help/lifting of one limb/steadying) for transferring to and from the  bed/chair/wheelchair, requiring: Contact guard. No assistive devices were  required.  KISHOR Toilet Transfer:  [place]  [facility] Tub/Shower Transfer:  [place]    Previously Documented Mode of Locomotion at Discharge: Field  KISHOR Expected Mode of Locomotion at Discharge: [place]  [facility] Walk/Wheelchair:  WHEELCHAIR OBSERVATION   Activity was not observed.    WALK OBSERVATION   Walk Distance Scale = 3.  Distance walked is greater than 150 feet. Walk Score  = 4.  Patient performs 75% or more of effort and requires minimal assistance.  Incidental help/contact guard/steadying was provided. Patient walked a distance  of  160 feet. Patient  requires the following assistive device(s): HHA .  KISHOR Stairs:  Stairs Score = 2.  Incidental assistance with lifting or lowering,  contact guard or steadying was provided. Patient performs 75% or more of effort  and requires minimal contact assistance. Patient negotiated  4 stairs. Patient  requires the following assistive device(s): Handrail(s).    KISHOR Comprehension:  [place]  KISHOR Expression:  [place]  KISHOR Social Interaction:  [facility] Problem Solving:  [facility] Memory:  [place]    Therapy Mode Minutes  Occupational Therapy: [place]  Physical Therapy: Individual: 90 minutes.  Speech Language Pathology:  [place]    Signed by: Lakia Menjivar PTA

## 2018-01-02 NOTE — THERAPY TREATMENT NOTE
Inpatient Rehabilitation - Physical Therapy Treatment Note  [facility]     Patient Name: Stacie Vieira  : 1935  MRN: [id number]  Today's Date: 2018  Onset of Illness/Injury or Date of Surgery Date: 17  Date of Referral to PT: 17  Referring Physician: Dr Houston    Admit Date: 2017    Visit Dx:    ICD-10-CM ICD-9-CM   1. Generalized weakness R53.1 780.79     Patient Active Problem List   Diagnosis   • Mycobacterium avium complex   • Essential hypertension   • Palpitations   • Allergic rhinitis   • Hypothyroidism   • Hyperlipidemia   • Community acquired pneumonia of left lower lobe of lung   • Acute respiratory failure with hypoxia   • Bilateral hand numbness   • DNR (do not resuscitate)   • Polyneuropathy   • Oropharyngeal dysphagia   • Esophageal dysmotility   • Severe malnutrition due to acute illness   • Guillain-[place] syndrome following vaccination   • Bradycardia   • Syncope   • Guillain Barré syndrome               Adult Rehabilitation Note       18 0938 18 1216 18 1215    Rehab Assessment/Intervention    Discipline physical therapy assistant  -LB physical therapist  -MD occupational therapist  -KP    Document Type therapy note (daily note)  -LB therapy note (daily note)  -MD therapy note (daily note)  -KP    Subjective Information agree to therapy  -LB agree to therapy;complains of;weakness;fatigue  -MD agree to therapy;no complaints  -KP    Patient Effort, Rehab Treatment good  -LB good  -MD good  -KP    Symptoms Noted During/After Treatment  none  -MD none  -KP    Precautions/Limitations fall precautions  -LB fall precautions  -MD fall precautions  -KP    Equipment Issued to Patient  gait belt  -MD     Recorded by [LB] Lakia Menjivar PTA [MD] Jennifer Vera, PT [KP] Maggie Gilbert, OTR    Pain Assessment    Pain Assessment No/denies pain  -LB No/denies pain  -MD No/denies pain  -KP    Recorded by [LB] Lakia Menjivar PTA [MD] Jennifer Vera, PT [KP]  Maggie Gilbert, ROSAR    Vision Assessment/Intervention    Visual Impairment   WFL with corrective lenses  -KP    Recorded by   [KP] Maggie Gilbert OTR    Cognitive Assessment/Intervention    Current Cognitive/Communication Assessment  functional  -MD functional  -KP    Orientation Status  oriented x 4  -MD oriented x 4  -KP    Follows Commands/Answers Questions  100% of the time  -% of the time;able to follow single-step instructions  -KP    Personal Safety  WNL/WFL  -MD WNL/WFL  -KP    Personal Safety Interventions fall prevention program maintained;gait belt;muscle strengthening facilitated;nonskid shoes/slippers when out of bed  -LB fall prevention program maintained;gait belt;muscle strengthening facilitated;nonskid shoes/slippers when out of bed;supervised activity  -MD fall prevention program maintained;gait belt;nonskid shoes/slippers when out of bed  -KP    Recorded by [LB] Lakia Menjivar PTA [MD] Jennifer Vera, PT [KP] Maggie Gilbert, OTR    Bed Mobility, Assessment/Treatment    Bed Mob, Supine to Sit, [place] supervision required  -LB not tested  -MD set up required;supervision required  -KP    Bed Mob, Sit to Supine, [place] supervision required  -LB not tested  -MD not tested  -KP    Recorded by [LB] Lakia Menjivar PTA [MD] Jennifer Vera, PT [KP] Maggie Gilbert, OTR    Transfer Assessment/Treatment    Transfers, Bed-Chair [place] contact guard assist  -LB  supervision required  -    Transfers, Chair-Bed [place] contact guard assist  -LB  not tested  -KP    Transfers, Sit-Stand [place] contact guard assist  -LB contact guard assist;hand held assist  -MD stand by assist  -KP    Transfers, Stand-Sit [place] contact guard assist  -LB contact guard assist;hand held assist  -MD stand by assist  -KP    Transfers, Sit-Stand-Sit, Assist Device other (see comments)   HHA  -LB      Toilet Transfer, [place]   supervision required  -    Toilet  Transfer, Assistive Device   other (see comments)   grab bar  -KP    Walk-In Shower Transfer, [place]   set up required;contact guard assist  -KP    Walk-In Shower Transfer, Assist Device   other (see comments);standard shower chair   grab bar  -KP    Transfer, Safety Issues  weight-shifting ability decreased  -MD     Transfer, Impairments  impaired balance;strength decreased  -MD     Transfer, Comment car tsf, CGA, HHA  -LB      Recorded by [LB] Lakia Menjivar PTA [MD] Jennifer Vera, PT [KP] Maggie Gilbert OTR    Gait Assessment/Treatment    Gait, [place] Level contact guard assist;hand held assist  -LB verbal cues required;hand held assist;contact guard assist  -MD     Gait, Assistive Device other (see comments)   HHA  -LB      Gait, Distance (Feet) 160  -LB 80   x3  -MD     Gait, Gait Deviations bilateral:;kee decreased;forward flexed posture;decreased heel strike;step length decreased;toe-to-floor clearance decreased  -LB bilateral:;kee decreased;decreased heel strike;forward flexed posture;step length decreased;toe-to-floor clearance decreased  -MD     Gait, Safety Issues  sequencing ability decreased;step length decreased  -MD     Gait, Impairments  strength decreased;impaired balance  -MD     Recorded by [LB] Lakia Menjivar PTA [MD] Jennifer Vera, PT     Stairs Assessment/Treatment    Number of Stairs 4  -LB      Stairs, Handrail Location both sides  -LB      Stairs, [place] Level minimum assist (75% patient effort)  -LB      Stairs, Technique Used step over step (ascending);step over step (descending)  -LB      Recorded by [LB] Lakia Menjivar PTA      Functional Mobility    Functional Mobility- Ind. Level   set up required;contact guard assist  -KP    Functional Mobility- Comment   walks in room. to BR, to toilet, to shower from toilet  -    Recorded by   [KP] Maggie Gilbert OTR    Upper Body Bathing Assessment/Training    UB Bathing Assess/Train Assistive Device    hand-held shower head;shower chair with back  -KP    UB Bathing Assess/Train, Position   sitting  -KP    UB Bathing Assess/Train, [place] Level   set up required;stand by assist  -KP    Recorded by   [] Maggie Gilbert OTR    Lower Body Bathing Assessment/Training    LB Bathing Assess/Train Assistive Device   grab bars;hand-held shower head;shower chair with back  -KP    LB Bathing Assess/Train, Position   sitting;standing  -KP    LB Bathing Assess/Train, [place] Level   set up required;stand by assist  -KP    Recorded by   [KP] Maggie Gilbert OTR    Upper Body Dressing Assessment/Training    UB Dressing Assess/Train, Clothing Type   doffing:;donning:;bra;pull over  -KP    UB Dressing Assess/Train, Position   sitting  -    UB Dressing Assess/Train, [place]   set up required;supervision required  -KP    Recorded by   [] Maggie Gilbert, ROSAR    Lower Body Dressing Assessment/Training    LB Dressing Assess/Train, Clothing Type   doffing:;donning:;pants;shoes;socks  -    LB Dressing Assess/Train, Position   sitting;standing  -KP    LB Dressing Assess/Train, [place]   set up required;supervision required  -KP    Recorded by   [] AMAURI Au    Toileting Assessment/Training    Toileting Assess/Train, Assistive Device   grab bars  -    Toileting Assess/Train, Position   standing;sitting  -    Toileting Assess/Train, [place] Level   supervision required;set up required  -KP    Recorded by   [] Maggie Gilbert OTR    Grooming Assessment/Training    Grooming Assess/Train, Position   sitting  -    Grooming Assess/Train, [place] Level   set up required;supervision required  -KP    Recorded by   [] Maggie Gilbert OTR    Balance Skills Training    Sitting-Level of Assistance   Close supervision  -    Sitting-Balance Support   Feet supported  -    Standing-Level of Assistance   Close supervision;Contact guard  -    Recorded by   []  Maggie Gilbert, OTR    Therapy Exercises    Bilateral Lower Extremities AROM:;20 reps;supine;ankle pumps/circles;heel slides;hip abduction/adduction;SAQ;sitting  -LB AROM:;20 reps;sitting;ankle pumps/circles;hip abduction/adduction;hip flexion;LAQ;glut sets  -MD     Recorded by [LB] Lakia Menjivar PTA [MD] Jennifer Vera, PT     Positioning and Restraints    Pre-Treatment Position  sitting in chair/recliner  -MD in bed  -KP    Post Treatment Position wheelchair  -LB wheelchair  -MD wheelchair  -KP    In Wheelchair sitting;with OT  -LB sitting;call light within reach;encouraged to call for assist  -MD sitting;with PT  -KP    Recorded by [LB] Lakia Menjivar PTA [MD] Jennifer Vera, PT [KP] Maggie Gilbert, OTR      User Key  (r) = Recorded By, (t) = Taken By, (c) = Cosigned By    Initials Name Effective Dates     Maggie Gilbert, OTR 04/13/15 -     LB Lakia Menjivar PTA 02/18/16 -     MD Jennifer Vera, PT 12/01/15 -                 IP PT Goals       12/29/17 1225 12/22/17 1301       Bed Mobility PT LTG    Bed Mobility PT LTG, Activity Type  all bed mobility  -EF     Bed Mobility PT LTG, [place] Level  conditional independence  -EF     Transfer Training PT LTG    Transfer Training PT LTG, Activity Type  all transfers  -EF     Transfer Training PT LTG, [place] Level  contact guard assist;minimum assist (75% patient effort)  -EF     Transfer Training PT LTG, Assist Device  --   with device if needed  -EF     Transfer Training 2 PT LTG    Transfer Training PT 2 LTG, Date Established 12/29/17  -KH      Transfer Training PT 2 LTG, Time to Achieve 1 wk  -KH      Transfer Training PT 2 LTG, Activity Type all transfers  -KH      Transfer Training PT 2 LTG, [place] Level independent  -KH      Gait Training PT LTG    Gait Training Goal PT LTG, Date Established 12/29/17  -KH      Gait Training Goal PT LTG, Time to Achieve 1 wk  -KH      Gait Training Goal PT LTG, [place] Level independent   -KH contact guard assist;minimum assist (75% patient effort)  -EF     Gait Training Goal PT LTG, Assist Device  --   with device if needed  -EF     Gait Training Goal PT LTG, Distance to Achieve 150  -KH 80  -EF     Stair Training PT LTG    Stair Training Goal PT LTG, Date Established 12/29/17  -KH      Stair Training Goal PT LTG, Time to Achieve 1 wk  -KH      Stair Training Goal PT LTG, Number of Steps 12  -KH      Stair Training Goal PT LTG, [place] Level conditional independence  -KH      Stair Training Goal PT LTG, Assist Device 1 handrail  -KH      Strength Goal PT LTG    Strength Goal PT LTG, Date Established 12/29/17  -KH      Strength Goal PT LTG, Time to Achieve 1 wk  -KH      Strength Goal PT LTG, Measure to Achieve B LE grossly 4-/5 with MMT  -KH        User Key  (r) = Recorded By, (t) = Taken By, (c) = Cosigned By    Initials Name Provider Type    EF Zulma Vides, PT Physical Therapist    TEMO Rosario, PT Physical Therapist          Physical Therapy Education     Title: PT OT SLP Therapies (Active)     Topic: Physical Therapy (Active)     Point: Mobility training (Done)    Learning Progress Summary    Learner Readiness Method Response Comment Documented by Status   Patient Acceptance NAHEED BENSON,NR  LB 01/02/18 1312 Done    Acceptance E MARCELINO Educated patient on safe hand placement and body mechanics with basic mobility including bed mobility, transfers, gait, and stairs  12/29/17 1224 Done               Point: Body mechanics (Done)    Learning Progress Summary    Learner Readiness Method Response Comment Documented by Status   Patient Acceptance NAHEED BENSON Educated patient on safe hand placement and body mechanics with basic mobility including bed mobility, transfers, gait, and stairs  12/29/17 1224 Done               Point: Precautions (Done)    Learning Progress Summary    Learner Readiness Method Response Comment Documented by Status   Patient Acceptance PURNIMA FARFAN MD 01/01/18 1219 Done    Acceptance  E,D MARCELINO DANIELSON 12/30/17 0932 Done                      User Key     Initials Effective Dates Name Provider Type Discipline    LB 02/18/16 -  Lakia Menjivar PTA Physical Therapy Assistant PT    MD 12/01/15 -  Jennifer Vera, PT Physical Therapist PT     06/22/16 -  Maria Del Carmen Rosario, PT Physical Therapist PT                    PT Recommendation and Plan  Anticipated Discharge Disposition: home with home health  Planned Therapy Interventions: balance training, bed mobility training, gait training, home exercise program, stair training, strengthening, stretching, transfer training, patient/family education, postural re-education  PT Frequency: 2 times/day            Time Calculation:         PT Charges       01/02/18 1411 01/02/18 1312       Time Calculation    Start Time 1400  -LB 0930  -LB     Stop Time 1430  -LB 1030  -LB     Time Calculation (min) 30 min  -LB 60 min  -LB       User Key  (r) = Recorded By, (t) = Taken By, (c) = Cosigned By    Initials Name Provider Type    LB Lakia Menjivar PTA Physical Therapy Assistant          Therapy Charges for Today     Code Description Service Date Service Provider Modifiers Qty    [id number] HC PT THER PROC EA 15 MIN 1/2/2018 Lakia Menjivar PTA GP 6               Lakia Menjivar PTA  1/2/2018

## 2018-01-02 NOTE — PROGRESS NOTES
Case Management  Inpatient Rehabilitation Plan of Care and Discharge Plan Note    Rehabilitation Diagnosis:  [place] syndrome  Date of Onset:  12/20/17    Medical Summary:  Recent hospitalization for LLL PNA, flu vax 2 months ago; to  ER with c/o numbness PHILIPPE hands and feet, difficulty swallowing, hoarse voice and  weakness all extremities; dx GBS, severe malnutrition; dysphagia - resolved  sinus bradycardia - rapid response  Past Medical History: Allergic rhinitis, sinusitis, tonsils, sinus sx x3;  essential HTN, hyperlipid, hx SVT; PNA, nycobacterium avium - intracellular  complex, bronch; osteoporosis; hx c-diff, D&C, colonoscopy; insomnia; hypoactive  thyroid, hypokalemia; breast bx benign; acute distress disorder    Plan of Care  Updated Problems/Interventions      Expected Intensity:  Average of 3 hours of therapy 5 days/week.  Interdisciplinary Team:  Interdisciplinary Team: Medical Supervision and 24 Hour Rehabilitation Nursing.,  Physical Therapy:, Occupational Therapy:, Social Work, Therapeutic Recreation.  Physical Therapy Intensity/Duration: 90 minutes/day, 5 days/week  Occupational Therapy Intensity/Duration: 90 minutes/day, 5 days/week  Estimated Length of Stay/Anticipated Discharge Date: ELOS: 1 - 2 weeks  Anticipated Discharge Destination:  Anticipated discharge destination from inpatient rehabilitation is community  discharge with assistance. Home alone with intermittent assist      Based on the patient's medical and functional status, their prognosis and  expected level of functional improvement is:  Indep/MOD I    Signed by: Adam Gallegos RN

## 2018-01-02 NOTE — PROGRESS NOTES
Inpatient Rehabilitation Functional Measures Assessment and Plan of Care    Plan of Care  Updated Problems/Interventions  Field    Functional Measures  KISHOR Eating:  [facility] Grooming: [facility] Bathing:  [facility] Upper Body Dressing:  [facility] Lower Body Dressing:  [facility] Toileting:  [facility] Bladder Management  Level of Assistance:  [place]  Frequency/Number of Accidents this Shift:  [facility] Bowel Management  Level of Assistance: [place]  Frequency/Number of Accidents this Shift: [facility] Bed/Chair/Wheelchair Transfer:  [facility] Toilet Transfer:  [facility] Tub/Shower Transfer:  [place]    Previously Documented Mode of Locomotion at Discharge: Field  KISHOR Expected Mode of Locomotion at Discharge: [facility] Walk/Wheelchair:  [facility] Stairs:  [facility] Comprehension:  [facility] Expression:  [facility] Social Interaction:  [facility] Problem Solving:  [facility] Memory:  [place]    Therapy Mode Minutes  Occupational Therapy: Individual: 90 minutes.  Physical Therapy: [place]  Speech Language Pathology:  [place]    Signed by: AMAURI Rojas/MECHE

## 2018-01-02 NOTE — PLAN OF CARE
Problem: Patient Care Overview (Adult)  Goal: Plan of Care Review  Outcome: Ongoing (interventions implemented as appropriate)   01/02/18 1241   Coping/Psychosocial Response Interventions   Plan Of Care Reviewed With patient   Patient Care Overview   Progress improving   Outcome Evaluation   Outcome Summary/Follow up Plan Pleasant and cooperative. No unsafe behavior. Still numb, heaviness in feet and tires easily. Rests as needed.       Problem: Fall Risk (Adult)  Goal: Absence of Falls  Outcome: Ongoing (interventions implemented as appropriate)      Problem: Mobility, Physical Impaired (Adult)  Goal: Enhanced Functionality Ability  Outcome: Ongoing (interventions implemented as appropriate)

## 2018-01-02 NOTE — PLAN OF CARE
Problem: Patient Care Overview (Adult)  Goal: Plan of Care Review  Outcome: Ongoing (interventions implemented as appropriate)   01/02/18 0329   Coping/Psychosocial Response Interventions   Plan Of Care Reviewed With patient   Outcome Evaluation   Outcome Summary/Follow up Plan Pt alert and oriented, cooperative with staff, able to make needs known, denies pain at this time. Appears to be sleeping well overnight, will continue to monitor and follow MD orders.        Problem: Fall Risk (Adult)  Goal: Absence of Falls  Outcome: Ongoing (interventions implemented as appropriate)   01/02/18 0329   Fall Risk (Adult)   Absence of Falls making progress toward outcome       Problem: Mobility, Physical Impaired (Adult)  Goal: Enhanced Functionality Ability  Outcome: Ongoing (interventions implemented as appropriate)   01/02/18 0329   Mobility, Physical Impaired (Adult)   Enhanced Functionality Ability making progress toward outcome

## 2018-01-02 NOTE — PROGRESS NOTES
Adult Nutrition  Assessment/PES    Patient Name:  Stacie Vieira  YOB: 1935  MRN: [id number]  Admit Date:  12/28/2017    Assessment Date:  1/2/2018     Comments: Appetite good, tolerating diet. Intake 100%          Reason for Assessment       01/02/18 1337    Reason for Assessment    Reason For Assessment/Visit follow up protocol                  Labs/Tests/Procedures/Meds       01/02/18 1337    Labs/Tests/Procedures/Meds    Diagnostic Test/Procedure Review reviewed    Labs/Tests Review Reviewed    Medication Review Reviewed, pertinent    Significant Vitals reviewed                Nutrition Prescription Ordered       01/02/18 1338    Nutrition Prescription PO    Current PO Diet Regular    Fluid Consistency Thin    Common Modifiers Cardiac            Evaluation of Received Nutrient/Fluid Intake       01/02/18 1338    PO Evaluation    Number of Meals 5    % PO Intake 100            Problem/Interventions:                  Intervention Goal       01/02/18 1339    Intervention Goal    General Maintain nutrition    PO Maintain intake;PO intake (%)    PO Intake % 75 %    Weight Maintain weight            Nutrition Intervention       01/02/18 1339    Nutrition Intervention    RD/Tech Action Follow Tx progress;Care plan reviewd;Encourage intake;Interview for preference              Education/Evaluation       01/02/18 1339    Education    Education Will Instruct as appropriate    Monitor/Evaluation    Monitor Per protocol        Electronically signed by:  Yaquelin Pozo RD  01/02/18 1:39 PM

## 2018-01-02 NOTE — PROGRESS NOTES
Inpatient Rehabilitation Functional Measures Assessment and Plan of Care    Plan of Care  Updated Problems/Interventions  Mobility    [OT] Toilet Transfers(Active)  Current Status(01/02/2018): SBA  Weekly Goal(01/10/2018): supervision  Discharge Goal: Mod I/ I    [OT] Tub/Shower Transfers(Active)  Current Status(01/02/2018):  CGA  Weekly Goal(01/09/2018): SBA  Discharge Goal: Mod I/I        Self Care    [OT] Bathing(Active)  Current Status(01/02/2018): CGA/SBA  Weekly Goal(01/10/2018): Supervision  Discharge Goal: mod I/I    [OT] Dressing (Lower)(Active)  Current Status(01/02/2018): CGA/SBA  Weekly Goal(01/09/2018): Supervision  Discharge Goal: mod I/ I    [OT] Dressing (Upper)(Active)  Current Status(01/02/2018): Set up  Weekly Goal(01/10/2018): set up  Discharge Goal: mod I / I    [OT] Grooming(Active)  Current Status(01/02/2018): SBA  Weekly Goal(01/09/2018):  supervision  Discharge Goal: mod I/ I    [OT] Toileting(Active)  Current Status(01/02/2018): SBA  Weekly Goal(01/10/2018): supervision  Discharge Goal: mod I/ I    Functional Measures  KISHOR Eating:  Branch  KISHOR Grooming: Branch  KISHOR Bathing:  Branch  KISHOR Upper Body Dressing:  Branch  KISHOR Lower Body Dressing:  Branch  KISHOR Toileting:  Branch    KISHOR Bladder Management  Level of Assistance:  Branch  Frequency/Number of Accidents this Shift:  Branch    KISHOR Bowel Management  Level of Assistance: Branch  Frequency/Number of Accidents this Shift: Branch    KISHOR Bed/Chair/Wheelchair Transfer:  Branch  KISHOR Toilet Transfer:  Branch  KISHOR Tub/Shower Transfer:  Branch    Previously Documented Mode of Locomotion at Discharge: Field  [facility] Expected Mode of Locomotion at Discharge: Branch  KISHOR Walk/Wheelchair:  Branch  [facility] Stairs:  Branch    [facility] Comprehension:  Branch  KISHOR Expression:  Branch  [facility] Social Interaction:  Branch  [facility] Problem Solving:  Branch  KISHOR Memory:  Branch    Therapy Mode Minutes  Occupational Therapy: Branch  Physical Therapy: Branch  Speech Language  Pathology:  Branch    Signed by: Jyoti Roblero, OTR/L

## 2018-01-03 LAB
ACHR AB SER-SCNC: <0.03 NMOL/L (ref 0–0.24)
ACHR BLOCK AB/ACHR TOTAL SFR SER: 8 % (ref 0–25)
ACHR MOD AB/ACHR TOTAL SFR SER: <12 % (ref 0–20)
STRIA MUS AB TITR SER IF: NEGATIVE {TITER}

## 2018-01-03 PROCEDURE — 25010000002 ENOXAPARIN PER 10 MG: Performed by: PHYSICAL MEDICINE & REHABILITATION

## 2018-01-03 PROCEDURE — 97110 THERAPEUTIC EXERCISES: CPT | Performed by: OCCUPATIONAL THERAPIST

## 2018-01-03 PROCEDURE — 97112 NEUROMUSCULAR REEDUCATION: CPT | Performed by: OCCUPATIONAL THERAPIST

## 2018-01-03 PROCEDURE — 97110 THERAPEUTIC EXERCISES: CPT

## 2018-01-03 PROCEDURE — 97535 SELF CARE MNGMENT TRAINING: CPT | Performed by: OCCUPATIONAL THERAPIST

## 2018-01-03 RX ADMIN — LOSARTAN POTASSIUM 100 MG: 50 TABLET, FILM COATED ORAL at 08:54

## 2018-01-03 RX ADMIN — METOPROLOL SUCCINATE 25 MG: 25 TABLET, FILM COATED, EXTENDED RELEASE ORAL at 08:54

## 2018-01-03 RX ADMIN — DOCUSATE SODIUM 100 MG: 100 CAPSULE, LIQUID FILLED ORAL at 08:54

## 2018-01-03 RX ADMIN — ENOXAPARIN SODIUM 30 MG: 30 INJECTION SUBCUTANEOUS at 21:01

## 2018-01-03 RX ADMIN — ACETAMINOPHEN 650 MG: 325 TABLET, FILM COATED ORAL at 23:33

## 2018-01-03 RX ADMIN — LEVOTHYROXINE SODIUM 50 MCG: 50 TABLET ORAL at 06:01

## 2018-01-03 RX ADMIN — AMLODIPINE BESYLATE 5 MG: 5 TABLET ORAL at 08:54

## 2018-01-03 RX ADMIN — Medication 250 MG: at 08:54

## 2018-01-03 RX ADMIN — DOCUSATE SODIUM 100 MG: 100 CAPSULE, LIQUID FILLED ORAL at 21:01

## 2018-01-03 RX ADMIN — Medication 250 MG: at 21:01

## 2018-01-03 NOTE — PROGRESS NOTES
Per staff report.  SECTION GG    Mobility Performance:    Uses Wheelchair/Scooter: No    Mobility Discharge Goals: Branch    Signed by: Adam Gallegos RN

## 2018-01-03 NOTE — PROGRESS NOTES
[facility] Coordinator  Inpatient Rehabilitation Admission    Ethnic Group: White.  Marital Status:  Marital Status: .    IRF Admission Date:  12/28/2017  Admission Class: Initial Rehab.  Admit From:  [facility]    Pre-Hospital Living: Home. Pre-Hospital Living  With: (1) Alone.    Payment Sources: Primary: Medicare - Medicare Advantage  Secondary: Not Listed.  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 No    KISHOR Bladder Accidents:  0 - Accidents.  Bladder Score = 7. Patient has not had an accident.  KISHOR Bowel Accident: 0 -Accidents.  Bowel Score = 6. Patient has no accidents, but uses a device/medications.    Presence of Pressure Ulcer:  No observed/documented pressure ulcers.    MEDICAL NEEDS  Height on Admission:  63 inches.  Weight on Admission:  111 pounds.    QUALITY INDICATORS  Prior Functioning:  Self Care: Patient completed the activities by him/herself, with or without an  assistive device, with no assistance from a helper.  Indoor Mobility: Patient completed the activities by him/herself, with or  without an assistive device, with no assistance from a helper.  Stairs: Patient completed the activities by him/herself, with or without an  assistive device, with no assistance from a helper.  Functional Cognition: Patient completed the activities by him/herself, with or  without an assistive device, with no assistance from a helper.  Prior Device Use: Patient does not use manual or motorized wheelchair or  scooter, mechanical lift, walker, or an orthotic/prosthesis.    Bladder and Bowel: Bladder  Continence: Always continent (no documented  incontinence).  Bowel Continence: Always continent (no documented incontinence).  Swallowing/Nutritional Status: Regular food (solids and liquids swallowed safely  without supervision or modified food or liquid consistency).  Special Conditions: Patient did not receive total parenteral nutrition treatment  at the time of admission.    Signed by: Adam Gallegos RN

## 2018-01-03 NOTE — THERAPY TREATMENT NOTE
Inpatient Rehabilitation - Occupational Therapy Treatment Note  [facility]     Patient Name: Stacie Vieira  : 1935  MRN: [id number]  Today's Date: 1/3/2018  Onset of Illness/Injury or Date of Surgery Date: 17     Referring Physician: Dr Houston      Admit Date: 2017    Visit Dx:     ICD-10-CM ICD-9-CM   1. Generalized weakness R53.1 780.79     Patient Active Problem List   Diagnosis   • Mycobacterium avium complex   • Essential hypertension   • Palpitations   • Allergic rhinitis   • Hypothyroidism   • Hyperlipidemia   • Community acquired pneumonia of left lower lobe of lung   • Acute respiratory failure with hypoxia   • Bilateral hand numbness   • DNR (do not resuscitate)   • Polyneuropathy   • Oropharyngeal dysphagia   • Esophageal dysmotility   • Severe malnutrition due to acute illness   • Guillain-Denison syndrome following vaccination   • Bradycardia   • Syncope   • Guillain Barré syndrome             Adult Rehabilitation Note       18 1608 18 1216 18 0950    Rehab Assessment/Intervention    Discipline occupational therapist  -KP occupational therapist  - physical therapy assistant  -LB    Document Type therapy note (daily note)  - therapy note (daily note)  - therapy note (daily note)  -LB    Subjective Information agree to therapy;no complaints  - agree to therapy;no complaints  - agree to therapy  -LB    Patient Effort, Rehab Treatment good  -KP good  -KP good  -LB    Precautions/Limitations fall precautions  - fall precautions  - fall precautions  -LB    Recorded by [KP] Maggie Gilbert, OTR [KP] Maggie Gilbert, OTR [LB] Lakia Menjivar PTA    Pain Assessment    Pain Assessment No/denies pain  - No/denies pain  - No/denies pain  -LB    Recorded by [KP] Maggie Gilbert, OTR [KP] Maggie Gilbert, OTR [LB] Lakia Menjivar PTA    Cognitive Assessment/Intervention    Current Cognitive/Communication Assessment functional   -KP functional  -KP     Orientation Status oriented x 4  -KP oriented x 4  -KP     Follows Commands/Answers Questions 100% of the time;able to follow single-step instructions  -% of the time;able to follow single-step instructions  -KP     Personal Safety WNL/WFL  -KP WNL/WFL  -KP     Personal Safety Interventions fall prevention program maintained;gait belt;muscle strengthening facilitated;nonskid shoes/slippers when out of bed  -KP fall prevention program maintained;gait belt;nonskid shoes/slippers when out of bed  -KP fall prevention program maintained;gait belt;muscle strengthening facilitated;nonskid shoes/slippers when out of bed  -LB    Recorded by [KP] Maggie Gilbert, OTR [KP] Maggie Gilbert, OTR [LB] Lakia Menjivar PTA    Bed Mobility, Assessment/Treatment    Bed Mobility, Assistive Device   bed rails;head of bed elevated  -LB    Bed Mob, Supine to Sit, [place] independent  -KP  conditional independence  -LB    Bed Mob, Sit to Supine, [place] not tested  -KP  conditional independence  -LB    Bed Mobility, Comment  NT pt up in   -KP     Recorded by [KP] Maggie Gilbert, OTR [KP] Maggie Gilbert, OTR [LB] Lakia Menjivar, DEJA    Transfer Assessment/Treatment    Transfers, Bed-Chair [place] supervision required  -KP  contact guard assist;stand by assist  -LB    Transfers, Chair-Bed [place]   contact guard assist;stand by assist  -LB    Transfers, Sit-Stand [place] stand by assist  -KP stand by assist  -KP stand by assist  -LB    Transfers, Stand-Sit [place] stand by assist  -KP stand by assist  -KP stand by assist  -LB    Transfers, Sit-Stand-Sit, Assist Device other (see comments)     - other (see comments)   wc.   -KP     Toilet Transfer, [place]  supervision required  -     Toilet Transfer, Assistive Device  other (see comments)   grab bars  -KP     Walk-In Shower Transfer, [place]  supervision required;set up required   -KP     Walk-In Shower Transfer, Assist Device  other (see comments)   tub bench. grab bar  -KP     Recorded by [KP] AMAURI Au [KP] Maggie Gilbert OTR [LB] Lakia Menjivar PTA    Gait Assessment/Treatment    Gait, [place] Level   contact guard assist  -LB    Gait, Assistive Device   other (see comments)   none  -LB    Gait, Distance (Feet)   160  -LB    Gait, Gait Deviations   kee decreased;forward flexed posture;bilateral:;toe-to-floor clearance decreased;step length decreased;decreased heel strike  -LB    Recorded by   [LB] Lakia Menjivar PTA    Stairs Assessment/Treatment    Number of Stairs   4  -LB    Stairs, Handrail Location   both sides  -LB    Stairs, [place] Level   minimum assist (75% patient effort)  -LB    Stairs, Technique Used   step over step (ascending);step over step (descending)  -LB    Recorded by   [LB] Lakia Menjivar PTA    Functional Mobility    Functional Mobility- Ind. Level  set up required;contact guard assist  -KP     Functional Mobility- Comment  walks into BR , to toilet, to shower, to wc.   -KP     Recorded by  [KP] Maggie Gilbert OTR     Upper Body Bathing Assessment/Training    UB Bathing Assess/Train Assistive Device  tub bench;hand-held shower head  -KP     UB Bathing Assess/Train, Position  sitting  -KP     UB Bathing Assess/Train, [place] Level  stand by assist;conditional independence  -KP     UB Bathing Assess/Train, Comment  pt retireves clothes from her closet CGA to SBA  -KP     Recorded by  [KP] Maggie Gilbert OTR     Lower Body Bathing Assessment/Training    LB Bathing Assess/Train Assistive Device  grab bars;hand-held shower head;tub bench  -KP     LB Bathing Assess/Train, Position  sitting;standing  -KP     LB Bathing Assess/Train, [place] Level  stand by assist  -KP     Recorded by  [KP] Maggie Gilbert OTR     Upper Body Dressing Assessment/Training    UB Dressing Assess/Train, Clothing  Type  donning:;doffing:;bra;pull over  -KP     UB Dressing Assess/Train, Position  sitting  -KP     UB Dressing Assess/Train, [place]  conditional independence  -KP     Recorded by  [KP] Maggie Gilbert OTR     Lower Body Dressing Assessment/Training    LB Dressing Assess/Train, Clothing Type  doffing:;donning:;shoes;pants;slipper socks   undergarment  -KP     LB Dressing Assess/Train, Position  sitting;standing  -KP     LB Dressing Assess/Train, [place]  supervision required  -KP     Recorded by  [KP] Maggie Gilbert OTR     Toileting Assessment/Training    Toileting Assess/Train, Assistive Device  grab bars  -KP     Toileting Assess/Train, Position  standing;sitting  -KP     Toileting Assess/Train, [place]ep[place] Level  supervision required  -KP     Recorded by  [KP] Maggie Gilbert OTR     Grooming Assessment/Training    Grooming Assess/Train, Position  sitting  -KP     Grooming Assess/Train, [place] Level  supervision required  -KP     Recorded by  [KP] AMAURI Au     Balance Skills Training    Sitting-Level of Assistance  Distant supervision  -KP     Sitting-Balance Support  Feet supported  -KP     Sitting-Balance Activities  Reaching for objects  -KP     Standing-Level of Assistance Close supervision  -KP Close supervision;Contact guard   CGA./HHA for walking  -KP Contact guard  -LB    Static Standing Balance Support --   intermittent UE support  -KP Right upper extremity supported;No upper extremity supported   intermittent UE support during shower, toileting  -KP No upper extremity supported  -LB    Standing-Balance Activities Reaching for objects;Weight Shift R-L;Lateral lean  -  --   batting a balloon  -LB    Recorded by [KP] Maggie Gilbert, AMAURI [KP] Maggie Gilbert, ROSAR [LB] Lakia Menjivar, PTA    Therapy Exercises    Bilateral Lower Extremities   AROM:;20 reps;supine;ankle pumps/circles;hip abduction/adduction;SAQ   hhel slides w small ball under  foot.  -LB    BUE Resistance other (comment);theraputty   1# hand wt 10x3, yellow digi flex 10x2,yellow putty pinch  -KP      Recorded by [KP] Mgagie Gilbert, OTR  [LB] Lakia Menjivar PTA    Positioning and Restraints    Pre-Treatment Position in bed  -KP sitting in chair/recliner  -KP     Post Treatment Position wheelchair  -KP wheelchair  -KP wheelchair  -LB    In Wheelchair sitting;call light within reach;encouraged to call for assist   w. her  present  -KP sitting;encouraged to call for assist;call light within reach  -KP sitting;call light within reach  -LB    Recorded by [KP] Maggie Gilbert, OTR [KP] Maggie Gilbert, ROSAR [LB] Lakia Menjivar PTA      01/02/18 1536 01/02/18 0938 01/01/18 1216    Rehab Assessment/Intervention    Discipline occupational therapist  -CC physical therapy assistant  -LB physical therapist  -MD    Document Type therapy note (daily note)  -CC therapy note (daily note)  -LB therapy note (daily note)  -MD    Subjective Information no complaints;agree to therapy  -CC agree to therapy  -LB agree to therapy;complains of;weakness;fatigue  -MD    Patient Effort, Rehab Treatment good  -CC good  -LB good  -MD    Symptoms Noted During/After Treatment   none  -MD    Precautions/Limitations fall precautions  -CC fall precautions  -LB fall precautions  -MD    Equipment Issued to Patient   gait belt  -MD    Recorded by [CC] Jyoti Roblero OTR [LB] Lakia Menjivar PTA [MD] Jennifer Vera, PT    Pain Assessment    Pain Assessment No/denies pain  -CC No/denies pain  -LB No/denies pain  -MD    Recorded by [CC] AMAURI Rojas [LB] Lakia Menjivar PTA [MD] Jennifer Vera, PT    Cognitive Assessment/Intervention    Current Cognitive/Communication Assessment   functional  -MD    Orientation Status   oriented x 4  -MD    Follows Commands/Answers Questions   100% of the time  -MD    Personal Safety   WNL/WFL  -MD    Personal Safety Interventions fall prevention program  maintained  -CC fall prevention program maintained;gait belt;muscle strengthening facilitated;nonskid shoes/slippers when out of bed  -LB fall prevention program maintained;gait belt;muscle strengthening facilitated;nonskid shoes/slippers when out of bed;supervised activity  -MD    Recorded by [CC] Jyoti Roblero OTR [LB] Lakia Menjivar PTA [MD] Jennifer Vera PT    Bed Mobility, Assessment/Treatment    Bed Mob, Supine to Sit, [place]  supervision required  -LB not tested  -MD    Bed Mob, Sit to Supine, [place]  supervision required  -LB not tested  -MD    Bed Mobility, Comment --   in w/c  -CC      Recorded by [CC] Jyoti Roblero OTR [LB] Lakia Menjivar PTA [MD] Jennifer Vera PT    Transfer Assessment/Treatment    Transfers, Bed-Chair [place]  contact guard assist  -LB     Transfers, Chair-Bed [place]  contact guard assist  -LB     Transfers, Sit-Stand [place]  contact guard assist  -LB contact guard assist;hand held assist  -MD    Transfers, Stand-Sit [place]  contact guard assist  -LB contact guard assist;hand held assist  -MD    Transfers, Sit-Stand-Sit, Assist Device  other (see comments)   HHA  -LB     Transfer, Safety Issues   weight-shifting ability decreased  -MD    Transfer, Impairments   impaired balance;strength decreased  -MD    Transfer, Comment  car tsf, CGA, HHA  -LB     Recorded by  [LB] Lakia Menjivar PTA [MD] Jennifer Vera, PT    Gait Assessment/Treatment    Gait, [place] Level  contact guard assist;hand held assist  -LB verbal cues required;hand held assist;contact guard assist  -MD    Gait, Assistive Device  other (see comments)   HHA  -LB     Gait, Distance (Feet)  160  -LB 80   x3  -MD    Gait, Gait Deviations  bilateral:;kee decreased;forward flexed posture;decreased heel strike;step length decreased;toe-to-floor clearance decreased  -LB bilateral:;kee decreased;decreased heel strike;forward flexed posture;step length decreased;toe-to-floor  clearance decreased  -MD    Gait, Safety Issues   sequencing ability decreased;step length decreased  -MD    Gait, Impairments   strength decreased;impaired balance  -MD    Recorded by  [LB] Lakia Menjivar PTA [MD] Jennifer Vera, PT    Stairs Assessment/Treatment    Number of Stairs  4  -LB     Stairs, Handrail Location  both sides  -LB     Stairs, [place] Level  minimum assist (75% patient effort)  -LB     Stairs, Technique Used  step over step (ascending);step over step (descending)  -LB     Recorded by  [LB] Lakia Menjivar PTA     ADL Assessment/Intervention    Additional Documentation --   refused this date  -CC      Recorded by [CC] Jyoti Roblero OTR      Therapy Exercises    Bilateral Lower Extremities  AROM:;20 reps;supine;ankle pumps/circles;heel slides;hip abduction/adduction;SAQ;sitting  -LB AROM:;20 reps;sitting;ankle pumps/circles;hip abduction/adduction;hip flexion;LAQ;glut sets  -MD    Bilateral Upper Extremity --   arm bike 3 min x 2; dowel 2# 10x3  -CC      BUE Resistance --   yello wtheraband 10x3; 1# hand wt 10x3; hand helper 20  -CC      Recorded by [CC] Jyoti Roblero, OTR [LB] Lakia Menjivar PTA [MD] Jennifer Vera, PT    Positioning and Restraints    Pre-Treatment Position   sitting in chair/recliner  -MD    Post Treatment Position wheelchair  -CC wheelchair  -LB wheelchair  -MD    In Wheelchair sitting;call light within reach;with family/caregiver;encouraged to call for assist  -CC sitting;with OT  -LB sitting;call light within reach;encouraged to call for assist  -MD    Recorded by [CC] Jyoti Roblero, OTR [LB] Lakia Menjivar PTA [MD] Jennifer Vera, PT      01/01/18 1215          Rehab Assessment/Intervention    Discipline occupational therapist  -      Document Type therapy note (daily note)  -KP      Subjective Information agree to therapy;no complaints  -KP      Patient Effort, Rehab Treatment good  -KP      Symptoms Noted During/After Treatment none  -KP       Precautions/Limitations fall precautions  -KP      Recorded by [KP] AMAURI Au      Pain Assessment    Pain Assessment No/denies pain  -KP      Recorded by [KP] AMAURI Au      Vision Assessment/Intervention    Visual Impairment WFL with corrective lenses  -KP      Recorded by [KP] Maggie Gilbert OTR      Cognitive Assessment/Intervention    Current Cognitive/Communication Assessment functional  -      Orientation Status oriented x 4  -KP      Follows Commands/Answers Questions 100% of the time;able to follow single-step instructions  -      Personal Safety WNL/WFL  -      Personal Safety Interventions fall prevention program maintained;gait belt;nonskid shoes/slippers when out of bed  -KP      Recorded by [KP] AMAURI Au      Bed Mobility, Assessment/Treatment    Bed Mob, Supine to Sit, [place] set up required;supervision required  -      Bed Mob, Sit to Supine, [place] not tested  -KP      Recorded by [KP] Maggie Gilbert OTR      Transfer Assessment/Treatment    Transfers, Bed-Chair [place] supervision required  -      Transfers, Chair-Bed [place] not tested  -      Transfers, Sit-Stand [place] stand by assist  -      Transfers, Stand-Sit [place] stand by assist  -      Toilet Transfer, [place] supervision required  -      Toilet Transfer, Assistive Device other (see comments)   grab bar  -      Walk-In Shower Transfer, [place] set up required;contact guard assist  -      Walk-In Shower Transfer, Assist Device other (see comments);standard shower chair   grab bar  -KP      Recorded by [KP] AMAURI Au      Functional Mobility    Functional Mobility- Ind. Level set up required;contact guard assist  -      Functional Mobility- Comment walks in room. to BR, to toilet, to shower from toilet  -      Recorded by [KP] AMAURI Au      Upper Body Bathing  Assessment/Training    UB Bathing Assess/Train Assistive Device hand-held shower head;shower chair with back  -KP      UB Bathing Assess/Train, Position sitting  -KP      UB Bathing Assess/Train, [place] Level set up required;stand by assist  -      Recorded by [] Maggie Gilbert, OTR      Lower Body Bathing Assessment/Training    LB Bathing Assess/Train Assistive Device grab bars;hand-held shower head;shower chair with back  -KP      LB Bathing Assess/Train, Position sitting;standing  -KP      LB Bathing Assess/Train, [place] Level set up required;stand by assist  -      Recorded by [] Maggie Gilbert, OTR      Upper Body Dressing Assessment/Training    UB Dressing Assess/Train, Clothing Type doffing:;donning:;bra;pull over  -      UB Dressing Assess/Train, Position sitting  -      UB Dressing Assess/Train, [place] set up required;supervision required  -KP      Recorded by [] Maggie Gilbert, OTR      Lower Body Dressing Assessment/Training    LB Dressing Assess/Train, Clothing Type doffing:;donning:;pants;shoes;socks  -      LB Dressing Assess/Train, Position sitting;standing  -KP      LB Dressing Assess/Train, [place] set up required;supervision required  -KP      Recorded by [] Maggie Gilbert, ROSAR      Toileting Assessment/Training    Toileting Assess/Train, Assistive Device grab bars  -      Toileting Assess/Train, Position standing;sitting  -      Toileting Assess/Train, [place] Level supervision required;set up required  -KP      Recorded by [] Maggie Gilbert, RSOAR      Grooming Assessment/Training    Grooming Assess/Train, Position sitting  -      Grooming Assess/Train, [place] Level set up required;supervision required  -KP      Recorded by [] Maggie Gilbert, ROSAR      Balance Skills Training    Sitting-Level of Assistance Close supervision  -      Sitting-Balance Support Feet supported  -      Standing-Level of  Assistance Close supervision;Contact guard  -KP      Recorded by [KP] AMAURI Au      Positioning and Restraints    Pre-Treatment Position in bed  -KP      Post Treatment Position wheelchair  -KP      In Wheelchair sitting;with PT  -KP      Recorded by [KP] Maggie Gilbert OTPERNELL        User Key  (r) = Recorded By, (t) = Taken By, (c) = Cosigned By    Initials Name Effective Dates    CC Jyoti Osbaldo, OTR 04/13/15 -     KP Maggie Gilbert, OTR 04/13/15 -     LB Lakia Menjivar, PTA 02/18/16 -     MD Jennifer Vera, PT 12/01/15 -                 OT Goals       12/29/17 1511 12/27/17 1545       Transfer Training OT STG    Transfer Training OT STG, Date Established 12/29/17  -KP      Transfer Training OT STG, Time to Achieve 5 - 7 days  -KP      Transfer Training OT STG, Activity Type toilet;sit to stand/stand to sit;shower chair;walk-in shower  -KP      Transfer Training OT STG, [place] Level supervision required  -KP      Transfer Training OT STG, Assist Device shower chair  -KP      Transfer Training OT LTG    Transfer Training OT LTG, Date Established 12/29/17  -KP 12/27/17  -AF     Transfer Training OT LTG, Time to Achieve by discharge  -KP by discharge  -AF     Transfer Training OT LTG, Activity Type toilet;walk-in shower;shower chair;sit to stand/stand to sit  -KP toilet  -AF     Transfer Training OT LTG, [place] Level independent;conditional independence  -KP supervision required  -AF     Transfer Training OT LTG, Assist Device shower chair  -KP      Transfer Training OT LTG, Additional Goal  appropriate AD  -AF     Transfer Training OT LTG, Outcome  goal ongoing  -AF     Strength OT STG    Strength Goal OT STG, Date Established 12/29/17  -KP      Strength Goal OT STG, Time to Achieve 5 - 7 days  -KP      Strength Goal OT STG, Measure to Achieve to increase B UE to 4/5  -KP      Strength Goal OT STG, Functional Goal to increase functional tsf and self-care skills  -KP       Strength OT LTG    Strength Goal OT LTG, Date Established 12/29/17  -      Strength Goal OT LTG, Time to Achieve by discharge  -      Strength Goal OT LTG, Measure to Achieve to increase to 4+/5   -KP      Strength Goal OT LTG, Functional Goal to increase functional tsf and self-care skills  -      Dynamic Standing Balance OT STG    Dynamic Standing Balance OT STG, Date Established 12/29/17  -      Dynamic Standing Balance OT STG, Time to Achieve 5 - 7 days  -      Dynamic Standing Balance OT STG, [place] Level supervision required  -      Dynamic Standing Balance OT STG, Assist Device UE Support   to increase independence w. self care and IADLs  -      Dynamic Standing Balance OT LTG    Dynamic Standing Balance OT LTG, Date Established 12/29/17  -      Dynamic Standing Balance OT LTG, Time to Achieve by discharge  -      Dynamic Standing Balance OT LTG, [place] Level independent;conditional independence  -      Patient Education OT LTG    Patient Education OT LTG, Date Established 12/29/17  -      Patient Education OT LTG, Time to Achieve by discharge  -      Patient Education OT LTG, Education Type written program;HEP;home safety  -      Patient Education OT LTG, Education Understanding independent;demonstrates adequately;verbalizes understanding  -      ADL OT STG    ADL OT STG, Date Established 12/29/17  -      ADL OT STG, Time to Achieve 1 wk  -      ADL OT STG, Activity Type ADL skills  -      ADL OT STG, [place] Level standby assist;setup;assistive device  -      ADL OT LTG    ADL OT LTG, Date Established 12/29/17  - 12/27/17  -AF     ADL OT LTG, Time to Achieve by discharge  - by discharge  -AF     ADL OT LTG, Activity Type ADL skills  - ADL skills  -AF     ADL OT LTG, [place] Level independent;independent with device;assistive device  - standby assist  -AF     ADL OT LTG, Outcome  goal ongoing  -     Functional Mobility OT LTG    Functional  Mobility Goal OT LTG, Date Established  12/27/17  -AF     Functional Mobility Goal OT LTG, Time to Achieve  by discharge  -AF     Functional Mobility Goal OT LTG, [place] Level  supervision  -AF     Functional Mobility Goal OT LTG, Assist Device  appropriate AD  -AF     Functional Mobility Goal OT LTG, Distance to Achieve  to the bathroom  -AF     Functional Mobility Goal OT LTG, Outcome  goal ongoing  -AF     Endurance OT LTG    Endurance Goal OT LTG, Date Established 12/29/17  -KP      Endurance Goal OT LTG, Time to Achieve by discharge  -      Endurance Goal OT LTG, Activity Level to increase;endurance 2 good  -KP        User Key  (r) = Recorded By, (t) = Taken By, (c) = Cosigned By    Initials Name Provider Type     Maggie Gilbert, OTR Occupational Therapist    AF Elissa Malone, OTR Occupational Therapist          Occupational Therapy Education     Title: PT OT SLP Therapies (Active)     Topic: Occupational Therapy (Active)     Point: ADL training (Done)    Description: Instruct learner(s) on proper safety adaptation and remediation techniques during self care or transfers.   Instruct in proper use of assistive devices.    Learning Progress Summary    Learner Readiness Method Response Comment Documented by Status   Patient Acceptance E,TB,D VU,DU ed pt on role of OT, benefit of therapy. POC w. OT.  12/29/17 1510 Done               Point: Precautions (Done)    Description: Instruct learner(s) on prescribed precautions during self-care and functional transfers.    Learning Progress Summary    Learner Readiness Method Response Comment Documented by Status   Patient Acceptance E,TB,D VU,DU ed pt on role of OT, benefit of therapy. POC w. OT.  12/29/17 1510 Done               Point: Body mechanics (Done)    Description: Instruct learner(s) on proper positioning and spine alignment during self-care, functional mobility activities and/or exercises.    Learning Progress Summary    Learner Readiness  Method Response Comment Documented by Status   Patient Acceptance E,TB,D VU,DU ed pt on role of OT, benefit of therapy. POC w. OT.  12/29/17 1510 Done                      User Key     Initials Effective Dates Name Provider Type Discipline     04/13/15 -  AMAURI Au Occupational Therapist OT                  OT Recommendation and Plan  Anticipated Discharge Disposition: home  Planned Therapy Interventions: activity intolerance, ADL retraining, balance training, IADL retraining, home exercise program, strengthening, transfer training  Therapy Frequency: 5 times/wk  Plan of Care Review  Plan Of Care Reviewed With: patient  Progress: no change  Outcome Summary/Follow up Plan: pt evaluated for OT. Pt is SBA w. toilet tsf, SBA w. UB bathing and UBD. Pt is CGA for LBD and bathing, but close to SBA. Pt w. decr B UE strength, will benefit from contiued OT.       Time Calculation:         Time Calculation- OT       01/03/18 1611 01/03/18 1220 01/03/18 0835    Time Calculation- OT    OT Start Time 1430  - 1045  -     OT Stop Time 1515  - 1130  -     OT Time Calculation (min) 45 min  - 45 min  -     OT Non-Billable Time (min)   15 min   team rounds  -AF      User Key  (r) = Recorded By, (t) = Taken By, (c) = Cosigned By    Initials Name Provider Type     AMAURI Au Occupational Therapist    AF Elissa Malone, OTR Occupational Therapist           Therapy Charges for Today     Code Description Service Date Service Provider Modifiers Qty    37710710569 HC OT SELF CARE/MGMT/TRAIN EA 15 MIN 1/3/2018 AMAURI Au GO 3    00959760227 HC OT NEUROMUSC RE EDUCATION EA 15 MIN 1/3/2018 AMAURI Au GO 1    [id number] HC OT THER PROC EA 15 MIN 1/3/2018 AMAURI Au GO 2               AMAURI Au  1/3/2018

## 2018-01-03 NOTE — PLAN OF CARE
Problem: Patient Care Overview (Adult)  Goal: Adult Individualization and Mutuality  Outcome: Ongoing (interventions implemented as appropriate)    Goal: Discharge Needs Assessment  Outcome: Ongoing (interventions implemented as appropriate)      Problem: Fall Risk (Adult)  Goal: Absence of Falls  Outcome: Ongoing (interventions implemented as appropriate)      Problem: Mobility, Physical Impaired (Adult)  Goal: Enhanced Functionality Ability  Outcome: Ongoing (interventions implemented as appropriate)

## 2018-01-03 NOTE — PROGRESS NOTES
Inpatient Rehabilitation Functional Measures Assessment    Functional Measures  KISHOR Eating:  Branch  KISHOR Grooming: Branch  KISHOR Bathing:  Branch  KISHOR Upper Body Dressing:  Branch  [facility] Lower Body Dressing:  Branch  [facility] Toileting:  Toileting Score = 4.  Patient requires minimal assistance for  toileting, such as steadying for balance while cleansing or adjusting clothes.  Patient requires the following assistive device(s): Grab bar.    KISHOR Bladder Management  Level of Assistance:  Bladder Score = 5.  Patient is supervision/set-up for  bladder management, requiring: Verbal cuing, prompting, or instructing. Stand by  assistance. No assistive devices were required.  Frequency/Number of Accidents this Shift:  Bladder accidents this shift:  0 .  Patient has not had an accident this shift.    KISHOR Bowel Management  Level of Assistance: Activity was not observed.  Frequency/Number of Accidents this Shift: Bowel accidents this shift: 0 .  Patient has not had an accident this shift.    KISHOR Bed/Chair/Wheelchair Transfer:  Bed/chair/wheelchair Transfer Score = 3.  Patient performs 50-74% of effort and requires moderate assistance (some  lifting) for transferring to and from the bed/chair/wheelchair. Patient requires  the following assistive device(s): Bed rails. Elevated head of bed. Elevated  bed/surface. Grab bars.  KISHOR Toilet Transfer:  Toilet Transfer Score = 3.  Patient performs 50-74% of  effort and requires moderate assistance (some lifting) for transferring to and  from the toilet/commode. Patient requires the following assistive device(s):  Grab bars.  KISHOR Tub/Shower Transfer:  Branch    Previously Documented Mode of Locomotion at Discharge: Field  [facility] Expected Mode of Locomotion at Discharge: Branch  [facility] Walk/Wheelchair:  Branch  [facility] Stairs:  Branch    [facility] Comprehension:  Branch  KISHOR Expression:  Branch  KISHOR Social Interaction:  Branch  KISHOR Problem Solving:  Branch  KISHOR Memory:  Branch    Therapy Mode  Minutes  Occupational Therapy: Branch  Physical Therapy: Branch  Speech Language Pathology:  Branch    Signed by: GERMAN Varghese

## 2018-01-03 NOTE — PROGRESS NOTES
Per staff report.    Section B. Hearing, Speech, Vision: Expression of Ideas and Wants: Expresses  complex messages without difficulty and with speech that is clear and easy to  understand.  Understanding Verbal Content: Understands: Clear comprehension without cues or  repetitions.    Section C. Cognitive Patterns: Brief Interview for Mental Status (BIMS) was  conducted.  Repetition of Three Words: Three words  Temporal Orientation Year: Correct  Temporal Orientation Month: Accurate within 5 days  Temporal Orientation Day of Week: Correct  Recall Socks: Yes, no cue required  Recall Blue: Yes, no cue required  Recall Bed: Yes, no cue required  BIMS SUMMARY SCORE: 15 Cognitively intact Patient was able to complete the Brief  Interview for Mental Status    Signed by: Adam Gallegos RN

## 2018-01-03 NOTE — PROGRESS NOTES
QUALITY INDICATORS  Active Diagnosis: Comorbidities and Co-existing Conditions:   Patient does not  have PAD, PVD, or Diabetes Mellitus  Health Conditions: Patient has not had any falls in the past year. Patient has  had major surgery during the 100 days prior to admission.  Skin Conditions: Unhealed Pressure Ulcer(s) at Stage 1 or Higher on Admission:  No.    Signed by: Adam Gallegos RN

## 2018-01-03 NOTE — PROGRESS NOTES
Occupational Therapy: Individual: 90 minutes.    Physical Therapy: Branch    Speech Language Pathology:  Branch    Signed by: AMAURI Ibarra/L

## 2018-01-03 NOTE — PLAN OF CARE
Problem: Patient Care Overview (Adult)  Goal: Plan of Care Review  Outcome: Unable to achieve outcome(s) by discharge Date Met: 01/03/18 01/03/18 1048   Coping/Psychosocial Response Interventions   Plan Of Care Reviewed With patient   Outcome Evaluation   Outcome Summary/Follow up Plan Pt is getting strionger and is attending all therapies.     Goal: Adult Individualization and Mutuality  Outcome: Ongoing (interventions implemented as appropriate)    Goal: Discharge Needs Assessment  Outcome: Ongoing (interventions implemented as appropriate)      Problem: Fall Risk (Adult)  Goal: Absence of Falls  Outcome: Ongoing (interventions implemented as appropriate)      Problem: Mobility, Physical Impaired (Adult)  Goal: Enhanced Functionality Ability  Outcome: Ongoing (interventions implemented as appropriate)

## 2018-01-03 NOTE — PROGRESS NOTES
Inpatient Rehabilitation Functional Measures Assessment and Plan of Care    Plan of Care  Updated Problems/Interventions  Field    Functional Measures  KISHOR Eating:  [facility] Grooming: [facility] Bathing:  [facility] Upper Body Dressing:  [facility] Lower Body Dressing:  [facility] Toileting:  [facility] Bladder Management  Level of Assistance:  [place]  Frequency/Number of Accidents this Shift:  [facility] Bowel Management  Level of Assistance: [place]  Frequency/Number of Accidents this Shift: [facility] Bed/Chair/Wheelchair Transfer:  Bed/chair/wheelchair Transfer Score = 4.  Patient performs 75% or more of effort and minimal assistance (little/incidental  help/lifting of one limb/steadying) for transferring to and from the  bed/chair/wheelchair, requiring: Contact guard. No assistive devices were  required.  KISHOR Toilet Transfer:  [facility] Tub/Shower Transfer:  [place]    Previously Documented Mode of Locomotion at Discharge: Field  KISHOR Expected Mode of Locomotion at Discharge: [facility] Walk/Wheelchair:  WHEELCHAIR OBSERVATION   Activity was not observed.    WALK OBSERVATION   Walk Distance Scale = 3.  Distance walked is greater than 150 feet. Walk Score  = 4.  Patient performs 75% or more of effort and requires minimal assistance.  Incidental help/contact guard/steadying was provided. Patient walked a distance  of  160 feet. No assistive devices were required.  KISHOR Stairs:  Stairs Score = 2.  Incidental assistance with lifting or lowering,  contact guard or steadying was provided. Patient performs 75% or more of effort  and requires minimal contact assistance. Patient negotiated  4 stairs. Patient  requires the following assistive device(s): Handrail(s).    KISHOR Comprehension:  [facility] Expression:  [facility] Social Interaction:  [facility] Problem Solving:  [facility] Memory:  [place]    Therapy Mode Minutes  Occupational Therapy: [place]  Physical Therapy: Individual: 90 minutes.  Speech  Language Pathology:  Branch    Signed by: Lakia Menjivar, PTA

## 2018-01-03 NOTE — PROGRESS NOTES
Case Management  Inpatient Rehabilitation Team Conference    Conference Date/Time: 1/3/2018 7:40:24 AM    Team Conference Attendees:  Dr. Virgilio Ojeda, Ph.d  Gabriela Villar, MSSW  Estephania Thompson, PT  Lakia Menjivar, PTA  Elissa Malone, OT  Gabriela Tolentino, SLP  Gabriela Dick, CTRS  Oly Nelson RN    Demographics            Age: 82Y            Gender: Female    Admission Date: 12/28/2017 3:08:00 PM  Rehabilitation Diagnosis:  Guillian-[place] syndrome  Past Medical History: Allergic rhinitis, sinusitis, tonsils, sinus sx x3;  essential HTN, hyperlipid, hx SVT; PNA, nycobacterium avium - intracellular  complex, bronch; osteoporosis; hx c-diff, D&C, colonoscopy; insomnia; hypoactive  thyroid, hypokalemia; breast bx benign; acute distress disorder      Plan of Care  Anticipated Discharge Date/Estimated Length of Stay: ELOS: 1 - 2 weeks  Anticipated Discharge Destination: Community discharge with assistance  Discharge Plan : Patient lives alone in one story home with 2 step entry.  D/C plan is home with intermittent assist from family and friends  Medical Necessity Expected Level Rationale: Indep/MOD I  Intensity and Duration: an average of 3 hours/5 days per week  Medical Supervision and 24 Hour Rehab Nursing: x  Physical Therapy: x  PT Intensity/Duration: 90 minutes/day, 5 days/week  Occupational Therapy: x  OT Intensity/Duration: 90 minutes/day, 5 days/week  Social Work: x  Therapeutic Recreation: x  Updated (if changes indicated)    Anticipated Discharge Date/Estimated Length of Stay:   ELOS: DC 1/11    Based on the patient's medical and functional status, their prognosis and  expected level of functional improvement is: Indep/MOD I      Interdisciplinary Problem/Goals/Status    All Rehab Problems:  Mobility    [PT] Bed/Chair/Wheelchair(Active)  Current Status(01/02/2018): CGA  Weekly Goal(01/10/2018): Indep  Discharge Goal: Independent    [PT] Stairs(Active)  Current Status(01/02/2018): Min A 4 steps with  handrails  Weekly Goal(01/10/2018): PT only  Discharge Goal: Independent with one Handrail 12 steps    [PT] Car Transfers(Active)  Current Status(01/02/2018): CGA  Weekly Goal(01/10/2018): Indep  Discharge Goal: Independent    [PT] Walk(Active)  Current Status(01/02/2018): ', HHA  Weekly Goal(01/10/2018): to BR Indep  Discharge Goal: Independent 160'    [OT] Toilet Transfers(Active)  Current Status(01/02/2018): SBA  Weekly Goal(01/10/2018): supervision  Discharge Goal: Mod I/ I    [OT] Tub/Shower Transfers(Active)  Current Status(01/02/2018):  CGA  Weekly Goal(01/09/2018): SBA  Discharge Goal: Mod I/I        Psychosocial    [RN] Coping/Adjustment(Active)  Current Status(01/02/2018): Very pleasant, verbalizes easily. No coping issues  voiced.  Weekly Goal(01/09/2018): Allow opportunity to express concerns regarding status.  Discharge Goal: Adequate coping regarding life changes with ongoing support        Safety    [RN] Potential for Injury(Active)  Current Status(01/02/2018): weakness of LEs, numbness bilateral feet. Fatiques  easily.  Weekly Goal(01/09/2018): No unsafe behavior. Calls for assist.  Discharge Goal: Pt/family aware of fall/safety in the home setting.        Self Care    [OT] Bathing(Active)  Current Status(01/02/2018): CGA/SBA  Weekly Goal(01/10/2018): Supervision  Discharge Goal: mod I/I    [OT] Dressing (Lower)(Active)  Current Status(01/02/2018): CGA/SBA  Weekly Goal(01/09/2018): Supervision  Discharge Goal: mod I/ I    [OT] Dressing (Upper)(Active)  Current Status(01/02/2018): Set up  Weekly Goal(01/10/2018): set up  Discharge Goal: mod I / I    [OT] Grooming(Active)  Current Status(01/02/2018): SBA  Weekly Goal(01/09/2018):  supervision  Discharge Goal: mod I/ I    [OT] Toileting(Active)  Current Status(01/02/2018): SBA  Weekly Goal(01/10/2018): supervision  Discharge Goal: mod I/ I        Sphincter Control    [RN] Bladder and bowel management(Active)  Current Status(01/02/2018):  Continent  Weekly Goal(01/09/2018): Continent of bladder, bowel 100% with BM q1-3 days and  no difficulty voiding.  Discharge Goal: Same as weekly        Comments: 1/3: doing well;    Signed by: Adam Gallegos RN    Physician CoSigned By: Virgilio Amaya 01/03/2018 07:54:14

## 2018-01-03 NOTE — PROGRESS NOTES
Inpatient Rehabilitation Plan of Care Note    Plan of Care  Care Plan Reviewed - No updates at this time.    Psychosocial    Performed Intervention(s)  Therapeutic enviroment  verbalizes needs and concerns.      Safety    Performed Intervention(s)  Falls protocol  hourly rounds, items in reach  Bed alarm chair alarm      Sphincter Control    Performed Intervention(s)  Monitor intake and output.  medication    Signed by: Oly Nelson RN

## 2018-01-03 NOTE — PROGRESS NOTES
"   LOS: 6 days   Patient Care Team:  Tatiana Bellamy MD as PCP - General (Internal Medicine)  James Del Angel MD as Consulting Physician (Infectious Diseases)  Ezekiel Verde MD as Consulting Physician (Otolaryngology)  Aisha Curiel MD as Consulting Physician (Cardiology)  Sinan Braga MD as Consulting Physician (Pulmonary Disease)  Shawn Felton MD as Consulting Physician (Allergy)    Chief Complaint:   Guillain-Barré syndrome    Subjective     History of Present Illness    Subjective   She feels that her strength continues to improve.  Does not have any tingling in the hands but continues with tingling in the feet to just above the ankles.  She's working on gait activities without assistive device.    History taken from: patient    Objective     Vital Signs  Temp:  [98.1 °F (36.7 °C)-98.8 °F (37.1 °C)] 98.7 °F (37.1 °C)  Heart Rate:  [65-77] 65  Resp:  [18] 18  BP: (116-140)/(56-72) 140/72    Objective:  Vital signs: (most recent): Blood pressure 140/72, pulse 65, temperature 98.7 °F (37.1 °C), temperature source Oral, resp. rate 18, height 160 cm (63\"), weight 50.3 kg (110 lb 12.8 oz), last menstrual period 01/01/1993, SpO2 99 %, not currently breastfeeding.            Physical Exam  Mental status-awake alert rest  HEENT-sclera nonicteric, conjunctiva pink, or fax moist.    No JVD.  .  Ears unremarkable externally.  Lungs-clear to auscultation without wheezes rales rhonchi.  No accessory muscle use.  Heart-regular rate and rhythm without rub murmur gallop.  Abdomen-normal active bowel sounds soft and nontender.  Extremities -without edema or cyanosis.  Neurologic-oriented ×4.  Good historian.        Face symmetric.  No dysarthria.     Motor exam shows to take resistance proximally distally in the bilateral upper extremities and lower extremities.         Results Review:     I reviewed the patient's new clinical results.    Results from last 7 days  Lab Units 01/01/18  0851 " 12/29/17  0722   WBC 10*3/mm3 6.55 7.36   HEMOGLOBIN g/dL 11.0* 11.6*   HEMATOCRIT % 33.9* 36.8   PLATELETS 10*3/mm3 287 212         Results from last 7 days  Lab Units 12/29/17  0722 12/28/17  0507   SODIUM mmol/L 141 143   POTASSIUM mmol/L 4.6 5.1   CHLORIDE mmol/L 103 105   CO2 mmol/L 26.7 25.9   BUN mg/dL 18 18   CREATININE mg/dL 0.70 0.79   CALCIUM mg/dL 9.0 8.9   GLUCOSE mg/dL 99 103*       Medication Review: done  Scheduled Meds:    amLODIPine 5 mg Oral Q24H   cetirizine 5 mg Oral Daily   docusate sodium 100 mg Oral BID   enoxaparin 30 mg Subcutaneous Q24H   levothyroxine 50 mcg Oral Q48H   levothyroxine 75 mcg Oral Every Other Day   losartan 100 mg Oral Q24H   metoprolol succinate XL 25 mg Oral Q24H   saccharomyces boulardii 250 mg Oral BID     Continuous Infusions:   PRN Meds:.•  acetaminophen  •  calcium carbonate  •  ipratropium-albuterol  •  nitroglycerin  •  ondansetron **OR** ondansetron ODT **OR** ondansetron      Assessment/Plan     Active Problems:    Guillain Barré syndrome      Assessment & Plan  Guillain-Barré syndrome  S/p plasmapheresis  HTN- multi-drug regimen  DVT prophylaxis - SCDs/Lovenox  Hypothyroidism - on replacement.      82-year-old female previously independent with Guillain-Barré syndrome with numbness and weakness in her extremities with impairments with her mobility and self-care.  She's also had bradycardia.     She's completed 5 sessions of plasmapheresis.  Recent pneumonia.  Recent dysphagia.  Given her functional impairments and comorbidities, current recommendation is to pursue acute inpatient rehabilitation.  This would be a comprehensive inpatient rehabilitation program with physical medicine rehabilitation and ongoing physician follow-up monitoring her neurologic and pulmonary and cardiac status, occupational therapy for functional mobility and activities daily living and physical therapy for transfers balance gait and strengthening.  Therapy 3 hours a day 5 days a week,  PT 1.5 hours and OT 1.5 hours 5 days a week. .  Rehabilitation nursing for carryover and monitoring of her bowel bladder, skin, cardiopulmonary status, neurologic status.  Weekly team conferences.  The patient will require precertification.  Features of the rehabilitation program were reviewed with the patient.  The patient's in agreement with this plan. Goal mod I. Rehab prognosis fair. Medical prognosis fair. ELOS 10 days but only estimation.     January 2-transfers contact-guard.  Gait 160 feet contact guard with a rolling walker.  She still has some numbness in the hands and feet.  Tolerates therapies.  She feels he's making progress.    TEAM CONF - JAN 3 - transfers CTG..  Toilet transfers SBA. LB adls CTG SBA. Continent bowel and bladder.  ELOS- Jan 11.      Virgilio Amaya MD  01/03/18  7:52 AM    Time:

## 2018-01-03 NOTE — THERAPY TREATMENT NOTE
Inpatient Rehabilitation - Physical Therapy Treatment Note  [facility]     Patient Name: Stacie Vieira  : 1935  MRN: [id number]  Today's Date: 1/3/2018  Onset of Illness/Injury or Date of Surgery Date: 17  Date of Referral to PT: 17  Referring Physician: Dr Houston    Admit Date: 2017    Visit Dx:    ICD-10-CM ICD-9-CM   1. 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   • Bradycardia   • Syncope   • Guillain Barré syndrome               Adult Rehabilitation Note       18 1216 18 0950 18 1536    Rehab Assessment/Intervention    Discipline occupational therapist  -KP physical therapy assistant  -LB occupational therapist  -CC    Document Type therapy note (daily note)  -KP therapy note (daily note)  -LB therapy note (daily note)  -CC    Subjective Information agree to therapy;no complaints  -KP agree to therapy  -LB no complaints;agree to therapy  -CC    Patient Effort, Rehab Treatment good  -KP good  -LB good  -CC    Precautions/Limitations fall precautions  -KP fall precautions  -LB fall precautions  -CC    Recorded by [KP] Maggie Gilbert, OTR [LB] Lakia Menjivar PTA [CC] Jyoti Roblero OTR    Pain Assessment    Pain Assessment No/denies pain  -KP No/denies pain  -LB No/denies pain  -CC    Recorded by [KP] Maggie Gilbert, OTR [LB] Lakia Menjivar, DEJA [CC] Jyoti Roblero OTR    Cognitive Assessment/Intervention    Current Cognitive/Communication  Assessment functional  -      Orientation Status oriented x 4  -      Follows Commands/Answers Questions 100% of the time;able to follow single-step instructions  -      Personal Safety WNL/WFL  -KP      Personal Safety Interventions fall prevention program maintained;gait belt;nonskid shoes/slippers when out of bed  -KP fall prevention program maintained;gait belt;muscle strengthening facilitated;nonskid shoes/slippers when out of bed  -LB fall prevention program maintained  -CC    Recorded by [KP] Maggie Gilbert, OTR [LB] Lakia Menjivar PTA [CC] Jyoti Roblero OTR    Bed Mobility, Assessment/Treatment    Bed Mobility, Assistive Device  bed rails;head of bed elevated  -LB     Bed Mob, Supine to Sit, [place]  conditional independence  -LB     Bed Mob, Sit to Supine, [place]  conditional independence  -LB     Bed Mobility, Comment NT pt up in   -  --   in w/c  -CC    Recorded by [KP] Maggie Gilbert, OTR [LB] Lakia Menjivar, DEJA [CC] Jyoti Roblero OTR    Transfer Assessment/Treatment    Transfers, Bed-Chair [place]  contact guard assist;stand by assist  -LB     Transfers, Chair-Bed [place]  contact guard assist;stand by assist  -LB     Transfers, Sit-Stand [place] stand by assist  -KP stand by assist  -LB     Transfers, Stand-Sit [place] stand by assist  -KP stand by assist  -LB     Transfers, Sit-Stand-Sit, Assist Device other (see comments)   wc.   -      Toilet Transfer, [place] supervision required  -      Toilet Transfer, Assistive Device other (see comments)   grab bars  -      Walk-In Shower Transfer, [place] supervision required;set up required  -      Walk-In Shower Transfer, Assist Device other (see comments)   tub bench. grab bar  -KP      Recorded by [] Maggie Gilbert, OTR [LB] Lakia Menjivar PTA     Gait Assessment/Treatment    Gait, [place] Level  contact guard assist  -LB     Gait, Assistive Device   other (see comments)   none  -LB     Gait, Distance (Feet)  160  -LB     Gait, Gait Deviations  kee decreased;forward flexed posture;bilateral:;toe-to-floor clearance decreased;step length decreased;decreased heel strike  -LB     Recorded by  [LB] Lakia Menjivar PTA     Stairs Assessment/Treatment    Number of Stairs  4  -LB     Stairs, Handrail Location  both sides  -LB     Stairs, [place] Level  minimum assist (75% patient effort)  -LB     Stairs, Technique Used  step over step (ascending);step over step (descending)  -LB     Recorded by  [LB] Lakia Menjivar PTA     Functional Mobility    Functional Mobility- Ind. Level set up required;contact guard assist  -      Functional Mobility- Comment walks into BR , to toilet, to shower, to wc.   -      Recorded by [KP] AMAURI Au      ADL Assessment/Intervention    Additional Documentation   --   refused this date  -CC    Recorded by   [CC] AMAURI Rojas    Upper Body Bathing Assessment/Training    UB Bathing Assess/Train Assistive Device tub bench;hand-held shower head  -      UB Bathing Assess/Train, Position sitting  -      UB Bathing Assess/Train, [place] Level stand by assist;conditional independence  -      UB Bathing Assess/Train, Comment pt retireves clothes from her closet CGA to SBA  -KP      Recorded by [KP] AMAURI Au      Lower Body Bathing Assessment/Training    LB Bathing Assess/Train Assistive Device grab bars;hand-held shower head;tub bench  -KP      LB Bathing Assess/Train, Position sitting;standing  -KP      LB Bathing Assess/Train, [place] Level stand by assist  -      Recorded by [KP] Maggie Gilbert OTR      Upper Body Dressing Assessment/Training    UB Dressing Assess/Train, Clothing Type donning:;doffing:;bra;pull over  -KP      UB Dressing Assess/Train, Position sitting  -KP      UB Dressing Assess/Train, [place] conditional independence  -      Recorded  by [KP] Maggie Gilbert, ROSAR      Lower Body Dressing Assessment/Training    LB Dressing Assess/Train, Clothing Type doffing:;donning:;shoes;pants;slipper socks   undergarment  -KP      LB Dressing Assess/Train, Position sitting;standing  -KP      LB Dressing Assess/Train, [place] supervision required  -KP      Recorded by [KP] Maggie Gilbert, OTR      Toileting Assessment/Training    Toileting Assess/Train, Assistive Device grab bars  -KP      Toileting Assess/Train, Position standing;sitting  -KP      Toileting Assess/Train, [place]ep[place] Level supervision required  -KP      Recorded by [KP] Maggie Gilbert OTR      Grooming Assessment/Training    Grooming Assess/Train, Position sitting  -KP      Grooming Assess/Train, [place]ep[place] Level supervision required  -KP      Recorded by [KP] Maggie Gilbert, ROSAR      Balance Skills Training    Sitting-Level of Assistance Distant supervision  -KP      Sitting-Balance Support Feet supported  -KP      Sitting-Balance Activities Reaching for objects  -KP      Standing-Level of Assistance Close supervision;Contact guard   CGA./HHA for walking  -KP Contact guard  -LB     Static Standing Balance Support Right upper extremity supported;No upper extremity supported   intermittent UE support during shower, toileting  -KP No upper extremity supported  -LB     Standing-Balance Activities  --   batting a balloon  -LB     Recorded by [KP] Maggie Gilbert, OTR [LB] Lakia Menjivar, DEJA     Therapy Exercises    Bilateral Lower Extremities  AROM:;20 reps;supine;ankle pumps/circles;hip abduction/adduction;SAQ   hhel slides w small ball under foot.  -LB     Bilateral Upper Extremity   --   arm bike 3 min x 2; dowel 2# 10x3  -CC    BUE Resistance   --   yello wtheraband 10x3; 1# hand wt 10x3; hand helper 20  -CC    Recorded by  [LB] Lakia Menjivar, PTA [CC] Jyoti Roblero OTR    Positioning and Restraints    Pre-Treatment Position sitting in chair/recliner   -KP      Post Treatment Position wheelchair  -KP wheelchair  -LB wheelchair  -CC    In Wheelchair sitting;encouraged to call for assist;call light within reach  -KP sitting;call light within reach  -LB sitting;call light within reach;with family/caregiver;encouraged to call for assist  -CC    Recorded by [KP] Maggie Gilbert, ROSAR [LB] Lakia Menjivar PTA [CC] Jyoti Roblero, OTR      01/02/18 0938 01/01/18 1216 01/01/18 1215    Rehab Assessment/Intervention    Discipline physical therapy assistant  -LB physical therapist  -MD occupational therapist  -    Document Type therapy note (daily note)  -LB therapy note (daily note)  -MD therapy note (daily note)  -KP    Subjective Information agree to therapy  -LB agree to therapy;complains of;weakness;fatigue  -MD agree to therapy;no complaints  -KP    Patient Effort, Rehab Treatment good  -LB good  -MD good  -KP    Symptoms Noted During/After Treatment  none  -MD none  -KP    Precautions/Limitations fall precautions  -LB fall precautions  -MD fall precautions  -KP    Equipment Issued to Patient  gait belt  -MD     Recorded by [LB] Lakia Menjivar PTA [MD] Jennifer Vera, PT [KP] Maggie Gilbert, OTR    Pain Assessment    Pain Assessment No/denies pain  -LB No/denies pain  -MD No/denies pain  -KP    Recorded by [LB] Lakia Menjivar PTA [MD] Jennifer Vera, PT [KP] Maggie Gilbert, OTR    Vision Assessment/Intervention    Visual Impairment   WFL with corrective lenses  -KP    Recorded by   [KP] Maggie Gilbert, OTR    Cognitive Assessment/Intervention    Current Cognitive/Communication Assessment  functional  -MD functional  -KP    Orientation Status  oriented x 4  -MD oriented x 4  -KP    Follows Commands/Answers Questions  100% of the time  -% of the time;able to follow single-step instructions  -KP    Personal Safety  WNL/WFL  -MD WNL/WFL  -KP    Personal Safety Interventions fall prevention program maintained;gait belt;muscle  strengthening facilitated;nonskid shoes/slippers when out of bed  -LB fall prevention program maintained;gait belt;muscle strengthening facilitated;nonskid shoes/slippers when out of bed;supervised activity  -MD fall prevention program maintained;gait belt;nonskid shoes/slippers when out of bed  -KP    Recorded by [LB] Lakia Menjivar PTA [MD] Jennifer Vera, PT [KP] Maggie Gilbert, OTR    Bed Mobility, Assessment/Treatment    Bed Mob, Supine to Sit, [place] supervision required  -LB not tested  -MD set up required;supervision required  -KP    Bed Mob, Sit to Supine, [place] supervision required  -LB not tested  -MD not tested  -KP    Recorded by [LB] Lakia Menjivar PTA [MD] Jennifer Vera, PT [KP] Maggie Gilbert, OTR    Transfer Assessment/Treatment    Transfers, Bed-Chair [place] contact guard assist  -LB  supervision required  -KP    Transfers, Chair-Bed [place] contact guard assist  -LB  not tested  -KP    Transfers, Sit-Stand [place] contact guard assist  -LB contact guard assist;hand held assist  -MD stand by assist  -KP    Transfers, Stand-Sit [place] contact guard assist  -LB contact guard assist;hand held assist  -MD stand by assist  -KP    Transfers, Sit-Stand-Sit, Assist Device other (see comments)   HHA  -LB      Toilet Transfer, [place]   supervision required  -KP    Toilet Transfer, Assistive Device   other (see comments)   grab bar  -KP    Walk-In Shower Transfer, [place]   set up required;contact guard assist  -KP    Walk-In Shower Transfer, Assist Device   other (see comments);standard shower chair   grab bar  -KP    Transfer, Safety Issues  weight-shifting ability decreased  -MD     Transfer, Impairments  impaired balance;strength decreased  -MD     Transfer, Comment car tsf, CGA, HHA  -LB      Recorded by [LB] Lakia Menjivar PTA [MD] Jennifer Vera, PT [KP] Maggie Gilbert, OTR    Gait Assessment/Treatment    Gait, [place] Level contact  guard assist;hand held assist  -LB verbal cues required;hand held assist;contact guard assist  -MD     Gait, Assistive Device other (see comments)   HHA  -LB      Gait, Distance (Feet) 160  -LB 80   x3  -MD     Gait, Gait Deviations bilateral:;kee decreased;forward flexed posture;decreased heel strike;step length decreased;toe-to-floor clearance decreased  -LB bilateral:;kee decreased;decreased heel strike;forward flexed posture;step length decreased;toe-to-floor clearance decreased  -MD     Gait, Safety Issues  sequencing ability decreased;step length decreased  -MD     Gait, Impairments  strength decreased;impaired balance  -MD     Recorded by [LB] Lakia Menjivar PTA [MD] Jennifer Vera, PT     Stairs Assessment/Treatment    Number of Stairs 4  -LB      Stairs, Handrail Location both sides  -LB      Stairs, [place] Level minimum assist (75% patient effort)  -LB      Stairs, Technique Used step over step (ascending);step over step (descending)  -LB      Recorded by [LB] Lakia Menjivar PTA      Functional Mobility    Functional Mobility- Ind. Level   set up required;contact guard assist  -KP    Functional Mobility- Comment   walks in room. to BR, to toilet, to shower from toilet  -    Recorded by   [KP] AMAURI Au    Upper Body Bathing Assessment/Training    UB Bathing Assess/Train Assistive Device   hand-held shower head;shower chair with back  -KP    UB Bathing Assess/Train, Position   sitting  -KP    UB Bathing Assess/Train, [place] Level   set up required;stand by assist  -KP    Recorded by   [KP] AMAURI Au    Lower Body Bathing Assessment/Training    LB Bathing Assess/Train Assistive Device   grab bars;hand-held shower head;shower chair with back  -KP    LB Bathing Assess/Train, Position   sitting;standing  -KP    LB Bathing Assess/Train, [place] Level   set up required;stand by assist  -KP    Recorded by   [KP] AMAURI Au    Upper  Body Dressing Assessment/Training    UB Dressing Assess/Train, Clothing Type   doffing:;donning:;bra;pull over  -KP    UB Dressing Assess/Train, Position   sitting  -KP    UB Dressing Assess/Train, [place]   set up required;supervision required  -KP    Recorded by   [KP] Maggie Gilbert OTR    Lower Body Dressing Assessment/Training    LB Dressing Assess/Train, Clothing Type   doffing:;donning:;pants;shoes;socks  -KP    LB Dressing Assess/Train, Position   sitting;standing  -KP    LB Dressing Assess/Train, [place]   set up required;supervision required  -KP    Recorded by   [KP] Maggie Gilbert OTR    Toileting Assessment/Training    Toileting Assess/Train, Assistive Device   grab bars  -KP    Toileting Assess/Train, Position   standing;sitting  -KP    Toileting Assess/Train, [place] Level   supervision required;set up required  -KP    Recorded by   [KP] Maggie Gilbert OTR    Grooming Assessment/Training    Grooming Assess/Train, Position   sitting  -KP    Grooming Assess/Train, [place] Level   set up required;supervision required  -KP    Recorded by   [KP] AMAURI Au    Balance Skills Training    Sitting-Level of Assistance   Close supervision  -KP    Sitting-Balance Support   Feet supported  -KP    Standing-Level of Assistance   Close supervision;Contact guard  -KP    Recorded by   [KP] AMAURI Au    Therapy Exercises    Bilateral Lower Extremities AROM:;20 reps;supine;ankle pumps/circles;heel slides;hip abduction/adduction;SAQ;sitting  -LB AROM:;20 reps;sitting;ankle pumps/circles;hip abduction/adduction;hip flexion;LAQ;glut sets  -MD     Recorded by [LB] Lakia Menjivar PTA [MD] Jennifer Vera PT     Positioning and Restraints    Pre-Treatment Position  sitting in chair/recliner  -MD in bed  -KP    Post Treatment Position wheelchair  -LB wheelchair  -MD wheelchair  -KP    In Wheelchair sitting;with OT  -LB sitting;call light within reach;encouraged  to call for assist  -MD sitting;with PT  -KP    Recorded by [LB] Lakia Menjivar, PTA [MD] Jennifer Vera, PT [KP] Maggie Gilbert, OTR      User Key  (r) = Recorded By, (t) = Taken By, (c) = Cosigned By    Initials Name Effective Dates    CC Jyoti Roblero, OTR 04/13/15 -     KP Maggie Gilbert, OTR 04/13/15 -     LB Lakia Menjivar, PTA 02/18/16 -     MD Jennifer Vera, PT 12/01/15 -                 IP PT Goals       12/29/17 1225 12/22/17 1301       Bed Mobility PT LTG    Bed Mobility PT LTG, Activity Type  all bed mobility  -EF     Bed Mobility PT LTG, [place] Level  conditional independence  -EF     Transfer Training PT LTG    Transfer Training PT LTG, Activity Type  all transfers  -EF     Transfer Training PT LTG, [place] Level  contact guard assist;minimum assist (75% patient effort)  -EF     Transfer Training PT LTG, Assist Device  --   with device if needed  -EF     Transfer Training 2 PT LTG    Transfer Training PT 2 LTG, Date Established 12/29/17  -KH      Transfer Training PT 2 LTG, Time to Achieve 1 wk  -KH      Transfer Training PT 2 LTG, Activity Type all transfers  -KH      Transfer Training PT 2 LTG, [place] Level independent  -KH      Gait Training PT LTG    Gait Training Goal PT LTG, Date Established 12/29/17  -KH      Gait Training Goal PT LTG, Time to Achieve 1 wk  -KH      Gait Training Goal PT LTG, [place] Level independent  -KH contact guard assist;minimum assist (75% patient effort)  -EF     Gait Training Goal PT LTG, Assist Device  --   with device if needed  -EF     Gait Training Goal PT LTG, Distance to Achieve 150  -KH 80  -EF     Stair Training PT LTG    Stair Training Goal PT LTG, Date Established 12/29/17  -KH      Stair Training Goal PT LTG, Time to Achieve 1 wk  -KH      Stair Training Goal PT LTG, Number of Steps 12  -KH      Stair Training Goal PT LTG, [place] Level conditional independence  -KH      Stair Training Goal PT LTG, Assist Device 1  handrail  -KH      Strength Goal PT LTG    Strength Goal PT LTG, Date Established 12/29/17  -KH      Strength Goal PT LTG, Time to Achieve 1 wk  -KH      Strength Goal PT LTG, Measure to Achieve B LE grossly 4-/5 with MMT  -KH        User Key  (r) = Recorded By, (t) = Taken By, (c) = Cosigned By    Initials Name Provider Type    EF Zulma Vides, PT Physical Therapist    TEMO Rosario, PT Physical Therapist          Physical Therapy Education     Title: PT OT SLP Therapies (Active)     Topic: Physical Therapy (Active)     Point: Mobility training (Done)    Learning Progress Summary    Learner Readiness Method Response Comment Documented by Status   Patient Acceptance E VU,NR   01/03/18 1449 Done    Acceptance E VU,NR   01/02/18 1312 Done    Acceptance E VU Educated patient on safe hand placement and body mechanics with basic mobility including bed mobility, transfers, gait, and stairs  12/29/17 1224 Done               Point: Body mechanics (Done)    Learning Progress Summary    Learner Readiness Method Response Comment Documented by Status   Patient Acceptance E VU Educated patient on safe hand placement and body mechanics with basic mobility including bed mobility, transfers, gait, and stairs  12/29/17 1224 Done               Point: Precautions (Done)    Learning Progress Summary    Learner Readiness Method Response Comment Documented by Status   Patient Acceptance PURNIMA FARFAN MD 01/01/18 1219 Done    Acceptance PURNIMA FARFAN MD 12/30/17 0932 Done                      User Key     Initials Effective Dates Name Provider Type Discipline     02/18/16 -  Lakia Menjivar, PTA Physical Therapy Assistant PT    MD 12/01/15 -  Jennifer Vera, PT Physical Therapist PT     06/22/16 -  Maria Del Carmen Rosario, PT Physical Therapist PT                    PT Recommendation and Plan  Anticipated Discharge Disposition: home with home health  Planned Therapy Interventions: balance training, bed mobility training, gait training, home  exercise program, stair training, strengthening, stretching, transfer training, patient/family education, postural re-education  PT Frequency: 2 times/day            Time Calculation:         PT Charges       01/03/18 1448 01/03/18 1029       Time Calculation    Start Time 1230  -LB 0930  -LB     Stop Time 1300  -LB 1030  -LB     Time Calculation (min) 30 min  -LB 60 min  -LB       User Key  (r) = Recorded By, (t) = Taken By, (c) = Cosigned By    Initials Name Provider Type     Lakia Menjivar PTA Physical Therapy Assistant          Therapy Charges for Today     Code Description Service Date Service Provider Modifiers Qty    80664314797 HC PT THER PROC EA 15 MIN 1/2/2018 Lakia Menjivar PTA GP 6    92211938781 HC PT THER PROC EA 15 MIN 1/3/2018 Lakia Menjivar PTA GP 6               Lakia Menjivar PTA  1/3/2018

## 2018-01-03 NOTE — PROGRESS NOTES
Inpatient Rehabilitation Functional Measures Assessment    Functional Measures  KISHOR Eating:  [facility] Grooming: [facility] Bathing:  [facility] Upper Body Dressing:  [facility] Lower Body Dressing:  [facility] Toileting:  [facility] Bladder Management  Level of Assistance:  [place]  Frequency/Number of Accidents this Shift:  [facility] Bowel Management  Level of Assistance: [place]  Frequency/Number of Accidents this Shift: [facility] Bed/Chair/Wheelchair Transfer:  [facility] Toilet Transfer:  [facility] Tub/Shower Transfer:  [place]    Previously Documented Mode of Locomotion at Discharge: Field  KISHOR Expected Mode of Locomotion at Discharge: [facility] Walk/Wheelchair:  [facility] Stairs:  [facility] Comprehension:  Auditory comprehension is the usual mode. Comprehension  Score = 7, Independent.  Patient comprehends complex/abstract information in  their primary language.  Patient is completely independent for auditory  comprehension.  There are no activity limitations.  KISHOR Expression:  Vocal expression is the usual mode. Expression Score = 7,  Independent.  Patient expresses complex/abstract information in their primary  language.  Patient is completely independent for vocal expression.  There are no  activity limitations.  KISHOR Social Interaction:  Social Interaction Score = 7, Independent. Patient is  completely independent for social interaction.  There are no activity  limitations.  KISHOR Problem Solving:  Problem Solving Score = 7, Independent.  Patient makes  appropriate decisions in order to solve complex problems.  Patient is completely  independent for problem solving.  There are no activity limitations.  KISHOR Memory:  Memory Score = 7, Independent.  Patient is completely independent  for memory.  There are no activity limitations.    Therapy Mode Minutes  Occupational Therapy: Branch  Physical Therapy: Branch  Speech Language Pathology:  [place]    Signed by: Oly Nelson RN

## 2018-01-04 LAB
BASOPHILS # BLD AUTO: 0.02 10*3/MM3 (ref 0–0.2)
BASOPHILS NFR BLD AUTO: 0.3 % (ref 0–1.5)
DEPRECATED RDW RBC AUTO: 49.2 FL (ref 37–54)
EOSINOPHIL # BLD AUTO: 0.33 10*3/MM3 (ref 0–0.7)
EOSINOPHIL NFR BLD AUTO: 4.6 % (ref 0.3–6.2)
ERYTHROCYTE [DISTWIDTH] IN BLOOD BY AUTOMATED COUNT: 13.4 % (ref 11.7–13)
HCT VFR BLD AUTO: 34.8 % (ref 35.6–45.5)
HGB BLD-MCNC: 10.9 G/DL (ref 11.9–15.5)
IMM GRANULOCYTES # BLD: 0.07 10*3/MM3 (ref 0–0.03)
IMM GRANULOCYTES NFR BLD: 1 % (ref 0–0.5)
LYMPHOCYTES # BLD AUTO: 2.93 10*3/MM3 (ref 0.9–4.8)
LYMPHOCYTES NFR BLD AUTO: 41.2 % (ref 19.6–45.3)
MCH RBC QN AUTO: 31.5 PG (ref 26.9–32)
MCHC RBC AUTO-ENTMCNC: 31.3 G/DL (ref 32.4–36.3)
MCV RBC AUTO: 100.6 FL (ref 80.5–98.2)
MONOCYTES # BLD AUTO: 1 10*3/MM3 (ref 0.2–1.2)
MONOCYTES NFR BLD AUTO: 14.1 % (ref 5–12)
NEUTROPHILS # BLD AUTO: 2.76 10*3/MM3 (ref 1.9–8.1)
NEUTROPHILS NFR BLD AUTO: 38.8 % (ref 42.7–76)
PLATELET # BLD AUTO: 295 10*3/MM3 (ref 140–500)
PMV BLD AUTO: 8.9 FL (ref 6–12)
RBC # BLD AUTO: 3.46 10*6/MM3 (ref 3.9–5.2)
WBC NRBC COR # BLD: 7.11 10*3/MM3 (ref 4.5–10.7)

## 2018-01-04 PROCEDURE — 97110 THERAPEUTIC EXERCISES: CPT

## 2018-01-04 PROCEDURE — 97535 SELF CARE MNGMENT TRAINING: CPT

## 2018-01-04 PROCEDURE — 85025 COMPLETE CBC W/AUTO DIFF WBC: CPT | Performed by: PHYSICAL MEDICINE & REHABILITATION

## 2018-01-04 PROCEDURE — 97112 NEUROMUSCULAR REEDUCATION: CPT

## 2018-01-04 PROCEDURE — 97110 THERAPEUTIC EXERCISES: CPT | Performed by: PHYSICAL THERAPIST

## 2018-01-04 PROCEDURE — 25010000002 ENOXAPARIN PER 10 MG: Performed by: PHYSICAL MEDICINE & REHABILITATION

## 2018-01-04 RX ORDER — UREA 10 %
3 LOTION (ML) TOPICAL NIGHTLY PRN
Status: DISCONTINUED | OUTPATIENT
Start: 2018-01-04 | End: 2018-01-11 | Stop reason: HOSPADM

## 2018-01-04 RX ADMIN — LEVOTHYROXINE SODIUM 75 MCG: 75 TABLET ORAL at 05:38

## 2018-01-04 RX ADMIN — ENOXAPARIN SODIUM 30 MG: 30 INJECTION SUBCUTANEOUS at 20:34

## 2018-01-04 RX ADMIN — DOCUSATE SODIUM 100 MG: 100 CAPSULE, LIQUID FILLED ORAL at 20:33

## 2018-01-04 RX ADMIN — Medication 250 MG: at 08:29

## 2018-01-04 RX ADMIN — DOCUSATE SODIUM 100 MG: 100 CAPSULE, LIQUID FILLED ORAL at 08:29

## 2018-01-04 RX ADMIN — AMLODIPINE BESYLATE 5 MG: 5 TABLET ORAL at 08:29

## 2018-01-04 RX ADMIN — Medication 3 MG: at 20:34

## 2018-01-04 RX ADMIN — Medication 250 MG: at 20:34

## 2018-01-04 RX ADMIN — METOPROLOL SUCCINATE 25 MG: 25 TABLET, FILM COATED, EXTENDED RELEASE ORAL at 08:29

## 2018-01-04 RX ADMIN — LOSARTAN POTASSIUM 100 MG: 50 TABLET, FILM COATED ORAL at 08:29

## 2018-01-04 NOTE — PROGRESS NOTES
Occupational Therapy: Individual: 90 minutes.    Physical Therapy: Branch    Speech Language Pathology:  [place]    Signed by: Rocío Strickland OT

## 2018-01-04 NOTE — PROGRESS NOTES
"   LOS: 7 days   Patient Care Team:  Tatiana Bellamy MD as PCP - General (Internal Medicine)  James Del Angel MD as Consulting Physician (Infectious Diseases)  Ezekiel Verde MD as Consulting Physician (Otolaryngology)  Aisha Curiel MD as Consulting Physician (Cardiology)  Sinan Braga MD as Consulting Physician (Pulmonary Disease)  Shawn Felton MD as Consulting Physician (Allergy)    Chief Complaint:   Guillain-Barré syndrome    Subjective     History of Present Illness    Subjective   Still has some tingling in the feet but her hands or premotor) to normal.  Still impaired balance.  Tolerates therapies.  Does have decreased sleep and will try melatonin.    History taken from: patient    Objective     Vital Signs  Temp:  [97.6 °F (36.4 °C)-98 °F (36.7 °C)] 97.6 °F (36.4 °C)  Heart Rate:  [69-79] 79  Resp:  [16-18] 16  BP: (130-142)/(66-72) 130/68    Objective:  Vital signs: (most recent): Blood pressure 130/68, pulse 79, temperature 97.6 °F (36.4 °C), temperature source Oral, resp. rate 16, height 160 cm (63\"), weight 50.3 kg (110 lb 12.8 oz), last menstrual period 01/01/1993, SpO2 100 %, not currently breastfeeding.            Physical Exam  Mental status-awake alert rest  HEENT-sclera nonicteric, conjunctiva pink.     No JVD.  .  Ears unremarkable externally.  Lungs-clear to auscultation without wheezes rales rhonchi.  No accessory muscle use.  Heart-regular rate and rhythm without rub murmur gallop.  Abdomen-normal active bowel sounds soft and nontender.  Extremities -without edema or cyanosis.  Neurologic-oriented ×4.  Good historian.        Face symmetric.  No dysarthria.     Motor exam shows to take resistance proximally distally in the bilateral upper extremities and lower extremities.         Results Review:     I reviewed the patient's new clinical results.    Results from last 7 days  Lab Units 01/04/18  0504 01/01/18  0851 12/29/17  0722   WBC 10*3/mm3 7.11 6.55 " 7.36   HEMOGLOBIN g/dL 10.9* 11.0* 11.6*   HEMATOCRIT % 34.8* 33.9* 36.8   PLATELETS 10*3/mm3 295 287 212         Results from last 7 days  Lab Units 12/29/17  0722   SODIUM mmol/L 141   POTASSIUM mmol/L 4.6   CHLORIDE mmol/L 103   CO2 mmol/L 26.7   BUN mg/dL 18   CREATININE mg/dL 0.70   CALCIUM mg/dL 9.0   GLUCOSE mg/dL 99       Medication Review: done  Scheduled Meds:    amLODIPine 5 mg Oral Q24H   cetirizine 5 mg Oral Daily   docusate sodium 100 mg Oral BID   enoxaparin 30 mg Subcutaneous Q24H   levothyroxine 50 mcg Oral Q48H   levothyroxine 75 mcg Oral Every Other Day   losartan 100 mg Oral Q24H   metoprolol succinate XL 25 mg Oral Q24H   saccharomyces boulardii 250 mg Oral BID     Continuous Infusions:   PRN Meds:.•  acetaminophen  •  calcium carbonate  •  ipratropium-albuterol  •  melatonin  •  nitroglycerin  •  ondansetron **OR** ondansetron ODT **OR** ondansetron      Assessment/Plan     Active Problems:    Guillain Barré syndrome      Assessment & Plan  Guillain-Barré syndrome  S/p plasmapheresis  HTN- multi-drug regimen  DVT prophylaxis - SCDs/Lovenox  Hypothyroidism - on replacement.      82-year-old female previously independent with Guillain-Barré syndrome with numbness and weakness in her extremities with impairments with her mobility and self-care.  She's also had bradycardia.     She's completed 5 sessions of plasmapheresis.  Recent pneumonia.  Recent dysphagia.  Given her functional impairments and comorbidities, current recommendation is to pursue acute inpatient rehabilitation.  This would be a comprehensive inpatient rehabilitation program with physical medicine rehabilitation and ongoing physician follow-up monitoring her neurologic and pulmonary and cardiac status, occupational therapy for functional mobility and activities daily living and physical therapy for transfers balance gait and strengthening.  Therapy 3 hours a day 5 days a week, PT 1.5 hours and OT 1.5 hours 5 days a week. .   Rehabilitation nursing for carryover and monitoring of her bowel bladder, skin, cardiopulmonary status, neurologic status.  Weekly team conferences.  The patient will require precertification.  Features of the rehabilitation program were reviewed with the patient.  The patient's in agreement with this plan. Goal mod I. Rehab prognosis fair. Medical prognosis fair. ELOS 10 days but only estimation.     January 2-transfers contact-guard.  Gait 160 feet contact guard with a rolling walker.  She still has some numbness in the hands and feet.  Tolerates therapies.  She feels he's making progress.    TEAM CONF - JAN 3 - transfers CTG..  Toilet transfers SBA. LB adls CTG SBA. Continent bowel and bladder.  ELOS- Jan 11.      Virgilio Amaya MD  01/04/18  2:59 PM    Time:

## 2018-01-04 NOTE — PROGRESS NOTES
Inpatient Rehabilitation Functional Measures Assessment    Functional Measures  KISHOR Eating:  Branch  [facility] Grooming: Branch  [facility] Bathing:  Branch  [facility] Upper Body Dressing:  Branch  [facility] Lower Body Dressing:  Branch  [facility] Toileting:  Toileting Score = 4.  Patient requires minimal assistance for  toileting, such as steadying for balance while cleansing or adjusting clothes.  Patient requires the following assistive device(s): Grab bar. Adaptive device to  maintain balance.    KISHOR Bladder Management  Level of Assistance:  Bladder Score = 5.  Patient is supervision/set-up for  bladder management, requiring: Stand by assistance. Verbal cuing, prompting, or  instructing. Patient requires the following assistive device(s): bathroom .  Frequency/Number of Accidents this Shift:  Bladder accidents this shift:  0 .  Patient has not had an accident this shift.    KISHOR Bowel Management  Level of Assistance: Activity was not observed.  Frequency/Number of Accidents this Shift: Bowel accidents this shift: 0 .  Patient has not had an accident this shift.    KISHOR Bed/Chair/Wheelchair Transfer:  Bed/chair/wheelchair Transfer Score = 3.  Patient performs 50-74% of effort and requires moderate assistance (some  lifting) for transferring to and from the bed/chair/wheelchair. Patient requires  the following assistive device(s): Elevated head of bed. Elevated bed/surface.  Grab bars.  KISHOR Toilet Transfer:  Toilet Transfer Score = 5.  Patient is supervision/set-up  for transferring to and from the toilet/commode, requiring: Verbal cuing,  prompting, or instructing. Stand by assistance. No assistive devices were  required.  KISHOR Tub/Shower Transfer:  [place]    Previously Documented Mode of Locomotion at Discharge: Field  KISHOR Expected Mode of Locomotion at Discharge: [facility] Walk/Wheelchair:  Branch  [facility] Stairs:  [facility] Comprehension:  Branch  [facility] Expression:  Branch  [facility] Social Interaction:  Branch  [facility] Problem Solving:  Branch  [facility]  Memory:  Branch    Therapy Mode Minutes  Occupational Therapy: Branch  Physical Therapy: Branch  Speech Language Pathology:  Branch    Signed by: GERMAN Varghese

## 2018-01-04 NOTE — PROGRESS NOTES
Inpatient Rehabilitation Functional Measures Assessment    Functional Measures  KISHOR Eating:  [facility] Grooming: [facility] Bathing:  [facility] Upper Body Dressing:  [facility] Lower Body Dressing:  [facility] Toileting:  [facility] Bladder Management  Level of Assistance:  [place]  Frequency/Number of Accidents this Shift:  [facility] Bowel Management  Level of Assistance: [place]  Frequency/Number of Accidents this Shift: [facility] Bed/Chair/Wheelchair Transfer:  Activity was not observed.  KISHOR Toilet Transfer:  [facility] Tub/Shower Transfer:  [place]    Previously Documented Mode of Locomotion at Discharge: Field  KISHOR Expected Mode of Locomotion at Discharge: [facility] Walk/Wheelchair:  WHEELCHAIR OBSERVATION   Wheelchair locomotion was observed using a manual wheelchair. Wheelchair  Distance Scale = 3.  Distance traveled in wheelchair is greater than 150 feet.  Wheelchair Score = 4.  Incidental help such as around corners or over thresholds  was provided.  Patient performs 75% or more of effort and requires minimal  assistance for propelling wheelchair. Patient was able to propel a distance of  175 feet in a wheelchair. No other assistive devices were required.    WALK OBSERVATION   Walk Distance Scale = 3.  Distance walked is greater than 150 feet. Walk Score  = 4.  Patient performs 75% or more of effort and requires minimal assistance.  Incidental help/contact guard/steadying was provided. Patient walked a distance  of  160 feet. Patient requires the following assistive device(s): hand held  assist .  KISHOR Stairs:  Stairs Score = 1.  Incidental assistance with lifting or lowering,  contact guard or steadying was provided. Patient requires/performs 75% or more  of effort and minimal contact assistance with negotiating stairs. Patient  negotiated 1 stairs.  Patient requires the following assistive device(s): hand  held assist .    KISHOR Comprehension:  [facility] Expression:  [facility] Social  Interaction:  Branch  [facility] Problem Solving:  Branch  [facility] Memory:  Branch    Therapy Mode Minutes  Occupational Therapy: Branch  Physical Therapy: Individual: 105 minutes.  Speech Language Pathology:  Branch    Signed by: Darleen Lakhani PT

## 2018-01-04 NOTE — PROGRESS NOTES
Inpatient Rehabilitation Functional Measures Assessment    Functional Measures  KISHOR Eating:  [facility] Grooming: [facility] Bathing:  [facility] Upper Body Dressing:  [facility] Lower Body Dressing:  [facility] Toileting:  [facility] Bladder Management  Level of Assistance:  [place]  Frequency/Number of Accidents this Shift:  [facility] Bowel Management  Level of Assistance: [place]  Frequency/Number of Accidents this Shift: [facility] Bed/Chair/Wheelchair Transfer:  [facility] Toilet Transfer:  [facility] Tub/Shower Transfer:  [place]    Previously Documented Mode of Locomotion at Discharge: Field  KISHOR Expected Mode of Locomotion at Discharge: [facility] Walk/Wheelchair:  [facility] Stairs:  [facility] Comprehension:  Auditory comprehension is the usual mode. Comprehension  Score = 7, Independent.  Patient comprehends complex/abstract information in  their primary language.  Patient is completely independent for auditory  comprehension.  There are no activity limitations.  KISHRO Expression:  Vocal expression is the usual mode. Expression Score = 7,  Independent.  Patient expresses complex/abstract information in their primary  language.  Patient is completely independent for vocal expression.  There are no  activity limitations.  KISHOR Social Interaction:  Social Interaction Score = 7, Independent. Patient is  completely independent for social interaction.  There are no activity  limitations.  KISHOR Problem Solving:  Problem Solving Score = 7, Independent.  Patient makes  appropriate decisions in order to solve complex problems.  Patient is completely  independent for problem solving.  There are no activity limitations.  KISHOR Memory:  Memory Score = 7, Independent.  Patient is completely independent  for memory.  There are no activity limitations.    Therapy Mode Minutes  Occupational Therapy: Branch  Physical Therapy: Branch  Speech Language Pathology:  Branch    Signed by: Evelyne Ornelas RN

## 2018-01-04 NOTE — PLAN OF CARE
Problem: Patient Care Overview (Adult)  Goal: Plan of Care Review  Outcome: Ongoing (interventions implemented as appropriate)   01/04/18 4770   Coping/Psychosocial Response Interventions   Plan Of Care Reviewed With patient   Patient Care Overview   Progress progress toward functional goals as expected   Outcome Evaluation   Outcome Summary/Follow up Plan Participated in therapies and cooperative with staff. Amb. CGA to bathroom. Sat up in wc most of the day. Uses call light for assistance. No safety issues noted. Has numbness in hands and feet.     Goal: Adult Individualization and Mutuality  Outcome: Ongoing (interventions implemented as appropriate)      Problem: Fall Risk (Adult)  Goal: Absence of Falls  Outcome: Ongoing (interventions implemented as appropriate)      Problem: Mobility, Physical Impaired (Adult)  Goal: Enhanced Functionality Ability  Outcome: Ongoing (interventions implemented as appropriate)

## 2018-01-04 NOTE — PLAN OF CARE
Problem: Patient Care Overview (Adult)  Goal: Plan of Care Review  Outcome: Ongoing (interventions implemented as appropriate)   01/04/18 0824   Coping/Psychosocial Response Interventions   Plan Of Care Reviewed With patient   Patient Care Overview   Progress progress towards functional goals is fair   Outcome Evaluation   Outcome Summary/Follow up Plan Patient anxious tonight, had a difficult time going to sleep, but did eventually fall asleep. No new issues

## 2018-01-04 NOTE — THERAPY TREATMENT NOTE
"Inpatient Rehabilitation - Occupational Therapy Treatment Note  [facility]     Patient Name: Stacie Vieira  : 1935  MRN: [id number]  Today's Date: 2018  Onset of Illness/Injury or Date of Surgery Date: 17     Referring Physician: Dr Houston      Admit Date: 2017    Visit Dx:     ICD-10-CM ICD-9-CM   1. 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   • Bradycardia   • Syncope   • Guillain Barré syndrome             Adult Rehabilitation Note       18 1100 18 1608 18 1216    Rehab Assessment/Intervention    Discipline occupational therapist  -HC occupational therapist  -KP occupational therapist  -KP    Document Type therapy note (daily note)  -HC therapy note (daily note)  -KP therapy note (daily note)  -KP    Subjective Information agree to therapy;no complaints  -HC agree to therapy;no complaints  -KP agree to therapy;no complaints  -KP    Patient Effort, Rehab Treatment good  -HC good  -KP good  -KP    Symptoms Noted During/After Treatment fatigue;shortness of breath  -HC      Symptoms Noted Comment Pt teary eyed and concerned over \"the road ahead\" of her in regards to her overall decreased function.  -HC      Precautions/Limitations fall precautions  -HC fall precautions  -KP fall precautions  -KP    Recorded by [HC] Rocío Strickland, OTR [KP] Maggie Gilbert OTR [KP] Maggie Gilbert, OTR    Pain Assessment    Pain Assessment No/denies pain  -HC No/denies pain  -KP " No/denies pain  -KP    Recorded by [HC] Rocío Strickland OTR [KP] Maggie Gilbert, OTR [KP] Maggie Gilbert OTR    Cognitive Assessment/Intervention    Current Cognitive/Communication Assessment functional  -HC functional  -KP functional  -KP    Orientation Status oriented x 4  -HC oriented x 4  -KP oriented x 4  -KP    Follows Commands/Answers Questions 100% of the time;able to follow single-step instructions  -% of the time;able to follow single-step instructions  -% of the time;able to follow single-step instructions  -KP    Personal Safety WNL/WFL  -HC WNL/WFL  -KP WNL/WFL  -KP    Personal Safety Interventions fall prevention program maintained;gait belt;nonskid shoes/slippers when out of bed  -HC fall prevention program maintained;gait belt;muscle strengthening facilitated;nonskid shoes/slippers when out of bed  -KP fall prevention program maintained;gait belt;nonskid shoes/slippers when out of bed  -KP    Recorded by [HC] AMAURI Cook [KP] Maggie Gilbert, OTR [KP] Maggie Gilbert, OTR    Bed Mobility, Assessment/Treatment    Bed Mob, Supine to Sit, [place]  independent  -KP     Bed Mob, Sit to Supine, [place]  not tested  -KP     Bed Mobility, Comment up in w/c  -HC  NT pt up in wc  -KP    Recorded by [HC] AMAURI Cook [KP] Maggie Gilbert, OTR [KP] Maggie Gilbert, OTR    Transfer Assessment/Treatment    Transfers, Bed-Chair [place]  supervision required  -KP     Transfers, Sit-Stand [place] stand by assist  -HC stand by assist  -KP stand by assist  -KP    Transfers, Stand-Sit [place] stand by assist  -HC stand by assist  -KP stand by assist  -KP    Transfers, Sit-Stand-Sit, Assist Device other (see comments)   w/c  -HC other (see comments)   wc  -KP other (see comments)   wc.   -KP    Toilet Transfer, [place] supervision required;other (see comments)   transfer x2  -HC  supervision required  -KP    Toilet Transfer,  Assistive Device other (see comments)   grab bars  -HC  other (see comments)   grab bars  -    Walk-In Shower Transfer, [place]   supervision required;set up required  -    Walk-In Shower Transfer, Assist Device   other (see comments)   tub bench. grab bar  -KP    Transfer, Comment sit<>stand multiple times at sink side during ADLs, CGA intermittently 2/2 increased fatigue  -HC      Recorded by [HC] AMAURI Cook [KP] AMAURI Au [KP] AMAURI Au    Functional Mobility    Functional Mobility- Ind. Level contact guard assist;set up required  -HC  set up required;contact guard assist  -    Functional Mobility-Distance (Feet) --   to BR from bedside and back  -      Functional Mobility- Comment   walks into BR , to toilet, to shower, to wc.   -KP    Recorded by [HC] AMAURI Cook  [KP] AMAURI Au    Upper Body Bathing Assessment/Training    UB Bathing Assess/Train Assistive Device   tub bench;hand-held shower head  -    UB Bathing Assess/Train, Position standing;sink side  -HC  sitting  -    UB Bathing Assess/Train, [place] Level stand by assist;set up required  -HC  stand by assist;conditional independence  -    UB Bathing Assess/Train, Impairments strength decreased  -      UB Bathing Assess/Train, Comment pt stands to complete most of UB bathing  -  pt retireves clothes from her closet CGA to SBA  -KP    Recorded by [HC] AMAURI Cook  [KP] AMAURI Au    Lower Body Bathing Assessment/Training    LB Bathing Assess/Train Assistive Device   grab bars;hand-held shower head;tub bench  -    LB Bathing Assess/Train, Position sitting;standing;sink side  -HC  sitting;standing  -    LB Bathing Assess/Train, [place] Level supervision required;set up required  -HC  stand by assist  -    LB Bathing Assess/Train, Impairments impaired balance;strength decreased  -      LB Bathing Assess/Train, Comment stands to  complete LB w/1 seated 2/2 to decreased endurance and increased fatigue  -HC      Recorded by [HC] Rocío Strickland OTR  [KP] Maggie Gilbert OTR    Upper Body Dressing Assessment/Training    UB Dressing Assess/Train, Clothing Type doffing:;donning:;bra;pull over;t-shirt  -HC  donning:;doffing:;bra;pull over  -    UB Dressing Assess/Train, Position sitting;sink side  -HC  sitting  -    UB Dressing Assess/Train, [place] conditional independence  -  conditional independence  -    UB Dressing Assess/Train, Comment pt sits to complete 2/2 to decreased endurance and SOB; able to retrieve clothing from closet with CGA to SBA  -HC      Recorded by [HC] AMAURI Cook  [KP] Maggie Gilbert, ROSAR    Lower Body Dressing Assessment/Training    LB Dressing Assess/Train, Clothing Type doffing:;donning:;shoes;pants;slipper socks   undergarments  -  doffing:;donning:;shoes;pants;slipper socks   undergarment  -    LB Dressing Assess/Train, Position sitting;standing;sink side  -HC  sitting;standing  -    LB Dressing Assess/Train, [place] supervision required;contact guard assist  -  supervision required  -    LB Dressing Assess/Train, Impairments impaired balance;strength decreased  -      LB Dressing Assess/Train, Comment pt initiates LB dressind in standing starting with undergarments, then needs to sit to complete 2/2 fatigue; CGA to SBA in standing to hike garments over hips  -HC      Recorded by [HC] AMAURI Cook  [KP] Maggie Gilbert, ROSAR    Toileting Assessment/Training    Toileting Assess/Train, Assistive Device grab bars  -HC  grab bars  -    Toileting Assess/Train, Position sitting  -  standing;sitting  -    Toileting Assess/Train, [place] Level supervision required  -  supervision required  -    Toileting Assess/Train, Impairments impaired balance;strength decreased  -      Toileting Assess/Train, Comment toileting x2  -HC      Recorded by [HC] Rocío  AMAURI Strickland  [] AMAURI Au    Grooming Assessment/Training    Grooming Assess/Train, Position standing;sink side  -  sitting  -    Grooming Assess/Train, [place] Level supervision required  -  supervision required  -    Grooming Assess/Train, Impairments strength decreased;impaired balance  -      Grooming Assess/Train, Comment oral hygiene  -HC      Recorded by [HC] AMAURI Cook  [KP] AMAURI Au    Motor Skills/Interventions    Additional Documentation Functional Endurance (Group)  -      Recorded by [HC] AMAURI Cook      Balance Skills Training    Sitting-Level of Assistance Distant supervision  -  Distant supervision  -    Sitting-Balance Support Feet supported  -  Feet supported  -    Sitting-Balance Activities   Reaching for objects  -    Standing-Level of Assistance Close supervision;Contact guard  - Close supervision  - Close supervision;Contact guard   CGA./HHA for walking  -    Static Standing Balance Support  --   intermittent UE support  - Right upper extremity supported;No upper extremity supported   intermittent UE support during shower, toileting  -    Standing-Balance Activities  Reaching for objects;Weight Shift R-L;Lateral lean  -     Recorded by [] AMAURI Cook [] AMAURI Au [] AMAURI Au    Therapy Exercises    BUE Resistance  other (comment);theraputty   1# hand wt 10x3, yellow digi flex 10x2,yellow putty pinch  -     Recorded by  [KP] AMAURI Au     Functional Endurance    Detail (Functional Endurance) fair  -      Recorded by [HC] AMAURI Cook      Positioning and Restraints    Pre-Treatment Position sitting in chair/recliner  - in bed  - sitting in chair/recliner  -KP    Post Treatment Position wheelchair  - wheelchair  -KP wheelchair  -KP    In Wheelchair sitting;call light within reach;encouraged to call for assist  - sitting;call  light within reach;encouraged to call for assist   w. her  present  -KP sitting;encouraged to call for assist;call light within reach  -KP    Recorded by [HC] Rocío Strickland, OTR [KP] Maggie Gilbert, OTR [KP] Maggie Gilbert, OTR      01/03/18 0950 01/02/18 1536 01/02/18 0938    Rehab Assessment/Intervention    Discipline physical therapy assistant  -LB occupational therapist  -CC physical therapy assistant  -LB    Document Type therapy note (daily note)  -LB therapy note (daily note)  -CC therapy note (daily note)  -LB    Subjective Information agree to therapy  -LB no complaints;agree to therapy  -CC agree to therapy  -LB    Patient Effort, Rehab Treatment good  -LB good  -CC good  -LB    Precautions/Limitations fall precautions  -LB fall precautions  -CC fall precautions  -LB    Recorded by [LB] Lakia Menjivar PTA [CC] Jyoti Roblero, OTR [LB] Lakia Menjivar PTA    Pain Assessment    Pain Assessment No/denies pain  -LB No/denies pain  -CC No/denies pain  -LB    Recorded by [LB] Lakia Menjivar PTA [CC] Jyoti Roblero, OTR [LB] Lakia Menjivar PTA    Cognitive Assessment/Intervention    Personal Safety Interventions fall prevention program maintained;gait belt;muscle strengthening facilitated;nonskid shoes/slippers when out of bed  -LB fall prevention program maintained  -CC fall prevention program maintained;gait belt;muscle strengthening facilitated;nonskid shoes/slippers when out of bed  -LB    Recorded by [LB] Lakia Menjivar PTA [CC] Jyoti Roblero, OTR [LB] Lakia Menjivar PTA    Bed Mobility, Assessment/Treatment    Bed Mobility, Assistive Device bed rails;head of bed elevated  -LB      Bed Mob, Supine to Sit, [place] conditional independence  -LB  supervision required  -LB    Bed Mob, Sit to Supine, [place] conditional independence  -LB  supervision required  -LB    Bed Mobility, Comment  --   in w/c  -CC     Recorded by [LB] Lakia Menjivar PTA [CC]  Jyoti Roblero OTR [LB] Lakia Menjivar PTA    Transfer Assessment/Treatment    Transfers, Bed-Chair [place] contact guard assist;stand by assist  -LB  contact guard assist  -LB    Transfers, Chair-Bed [place] contact guard assist;stand by assist  -LB  contact guard assist  -LB    Transfers, Sit-Stand [place] stand by assist  -LB  contact guard assist  -LB    Transfers, Stand-Sit [place] stand by assist  -LB  contact guard assist  -LB    Transfers, Sit-Stand-Sit, Assist Device   other (see comments)   HHA  -LB    Transfer, Comment   car tsf, CGA, HHA  -LB    Recorded by [LB] Lakia Menjivar PTA  [LB] Lakia Menjivar PTA    Gait Assessment/Treatment    Gait, [place] Level contact guard assist  -LB  contact guard assist;hand held assist  -LB    Gait, Assistive Device other (see comments)   none  -LB  other (see comments)   HHA  -LB    Gait, Distance (Feet) 160  -LB  160  -LB    Gait, Gait Deviations kee decreased;forward flexed posture;bilateral:;toe-to-floor clearance decreased;step length decreased;decreased heel strike  -LB  bilateral:;kee decreased;forward flexed posture;decreased heel strike;step length decreased;toe-to-floor clearance decreased  -LB    Recorded by [LB] Lakia Menjivar PTA  [LB] Lakia Menjivar PTA    Stairs Assessment/Treatment    Number of Stairs 4  -LB  4  -LB    Stairs, Handrail Location both sides  -LB  both sides  -LB    Stairs, [place] Level minimum assist (75% patient effort)  -LB  minimum assist (75% patient effort)  -LB    Stairs, Technique Used step over step (ascending);step over step (descending)  -LB  step over step (ascending);step over step (descending)  -LB    Recorded by [LB] Lakia Menjivar PTA  [LB] Lakia Menjivar PTA    ADL Assessment/Intervention    Additional Documentation  --   refused this date  -CC     Recorded by  [CC] Jyoti Roblero OTR     Balance Skills Training    Standing-Level of Assistance Contact  guard  -LB      Static Standing Balance Support No upper extremity supported  -LB      Standing-Balance Activities --   batting a balloon  -LB      Recorded by [LB] Lakia Menjivar PTA      Therapy Exercises    Bilateral Lower Extremities AROM:;20 reps;supine;ankle pumps/circles;hip abduction/adduction;SAQ   hhel slides w small ball under foot.  -LB  AROM:;20 reps;supine;ankle pumps/circles;heel slides;hip abduction/adduction;SAQ;sitting  -LB    Bilateral Upper Extremity  --   arm bike 3 min x 2; dowel 2# 10x3  -CC     BUE Resistance  --   yello wtheraband 10x3; 1# hand wt 10x3; hand helper 20  -CC     Recorded by [LB] Lakia Menjivar PTA [CC] Jyoti Roblero OTR [LB] Lakia Menjivar PTA    Positioning and Restraints    Post Treatment Position wheelchair  -LB wheelchair  -CC wheelchair  -LB    In Wheelchair sitting;call light within reach  -LB sitting;call light within reach;with family/caregiver;encouraged to call for assist  -CC sitting;with OT  -LB    Recorded by [LB] Lakia Menjivar PTA [CC] Jyoti Roblero OTR [LB] Lakia Menjivar PTA      01/01/18 1216 01/01/18 1215       Rehab Assessment/Intervention    Discipline physical therapist  -MD occupational therapist  -     Document Type therapy note (daily note)  -MD therapy note (daily note)  -KP     Subjective Information agree to therapy;complains of;weakness;fatigue  -MD agree to therapy;no complaints  -KP     Patient Effort, Rehab Treatment good  -MD good  -KP     Symptoms Noted During/After Treatment none  -MD none  -KP     Precautions/Limitations fall precautions  -MD fall precautions  -KP     Equipment Issued to Patient gait belt  -MD      Recorded by [MD] Jennifer Vera, PT [KP] Maggie Gilbert, OTR     Pain Assessment    Pain Assessment No/denies pain  -MD No/denies pain  -KP     Recorded by [MD] Jennifer Vera, PT [KP] Maggie Gilbert, OTR     Vision Assessment/Intervention    Visual Impairment  WFL with corrective lenses  -KP      Recorded by  [KP] Maggie Gilbert, ROSAR     Cognitive Assessment/Intervention    Current Cognitive/Communication Assessment functional  -MD functional  -KP     Orientation Status oriented x 4  -MD oriented x 4  -KP     Follows Commands/Answers Questions 100% of the time  -% of the time;able to follow single-step instructions  -KP     Personal Safety WNL/WFL  -MD WNL/WFL  -KP     Personal Safety Interventions fall prevention program maintained;gait belt;muscle strengthening facilitated;nonskid shoes/slippers when out of bed;supervised activity  -MD fall prevention program maintained;gait belt;nonskid shoes/slippers when out of bed  -KP     Recorded by [MD] Jennifer Vera, PT [KP] Maggie Gilbert, ROSAR     Bed Mobility, Assessment/Treatment    Bed Mob, Supine to Sit, [place] not tested  -MD set up required;supervision required  -     Bed Mob, Sit to Supine, [place] not tested  -MD not tested  -KP     Recorded by [MD] Jennifer Vera, PT [KP] Maggie Gilbert, ROSAR     Transfer Assessment/Treatment    Transfers, Bed-Chair [place]  supervision required  -KP     Transfers, Chair-Bed [place]  not tested  -KP     Transfers, Sit-Stand [place] contact guard assist;hand held assist  -MD stand by assist  -KP     Transfers, Stand-Sit [place] contact guard assist;hand held assist  -MD stand by assist  -     Toilet Transfer, [place]  supervision required  -     Toilet Transfer, Assistive Device  other (see comments)   grab bar  -     Walk-In Shower Transfer, [place]  set up required;contact guard assist  -     Walk-In Shower Transfer, Assist Device  other (see comments);standard shower chair   grab bar  -KP     Transfer, Safety Issues weight-shifting ability decreased  -MD      Transfer, Impairments impaired balance;strength decreased  -MD      Recorded by [MD] Jennifer Vera, PT [KP] Maggie Gilbert, OTR     Gait Assessment/Treatment    Gait, [place] Level verbal  cues required;hand held assist;contact guard assist  -MD      Gait, Distance (Feet) 80   x3  -MD      Gait, Gait Deviations bilateral:;kee decreased;decreased heel strike;forward flexed posture;step length decreased;toe-to-floor clearance decreased  -MD      Gait, Safety Issues sequencing ability decreased;step length decreased  -MD      Gait, Impairments strength decreased;impaired balance  -MD      Recorded by [MD] Jennifer Vera PT      Functional Mobility    Functional Mobility- Ind. Level  set up required;contact guard assist  -KP     Functional Mobility- Comment  walks in room. to BR, to toilet, to shower from toilet  -     Recorded by  [KP] Maggie Gilbert OTR     Upper Body Bathing Assessment/Training    UB Bathing Assess/Train Assistive Device  hand-held shower head;shower chair with back  -     UB Bathing Assess/Train, Position  sitting  -     UB Bathing Assess/Train, [place] Level  set up required;stand by assist  -     Recorded by  [] AMAURI Au     Lower Body Bathing Assessment/Training    LB Bathing Assess/Train Assistive Device  grab bars;hand-held shower head;shower chair with back  -     LB Bathing Assess/Train, Position  sitting;standing  -     LB Bathing Assess/Train, [place] Level  set up required;stand by assist  -     Recorded by  [] Maggie Gilbert OTR     Upper Body Dressing Assessment/Training    UB Dressing Assess/Train, Clothing Type  doffing:;donning:;bra;pull over  -     UB Dressing Assess/Train, Position  sitting  -     UB Dressing Assess/Train, [place]  set up required;supervision required  -     Recorded by  [KP] Maggie Gilbert OTR     Lower Body Dressing Assessment/Training    LB Dressing Assess/Train, Clothing Type  doffing:;donning:;pants;shoes;socks  -     LB Dressing Assess/Train, Position  sitting;standing  -     LB Dressing Assess/Train, [place]  set up required;supervision required  -      Recorded by  [KP] Maggie Gilbert OTR     Toileting Assessment/Training    Toileting Assess/Train, Assistive Device  grab bars  -KP     Toileting Assess/Train, Position  standing;sitting  -KP     Toileting Assess/Train, [place] Level  supervision required;set up required  -KP     Recorded by  [KP] Maggie Gilbert OTR     Grooming Assessment/Training    Grooming Assess/Train, Position  sitting  -KP     Grooming Assess/Train, [place] Level  set up required;supervision required  -KP     Recorded by  [KP] Maggie Gilbert OTR     Balance Skills Training    Sitting-Level of Assistance  Close supervision  -KP     Sitting-Balance Support  Feet supported  -KP     Standing-Level of Assistance  Close supervision;Contact guard  -KP     Recorded by  [KP] Maggie Gilbert OTR     Therapy Exercises    Bilateral Lower Extremities AROM:;20 reps;sitting;ankle pumps/circles;hip abduction/adduction;hip flexion;LAQ;glut sets  -MD      Recorded by [MD] Jennifer Vera, PT      Positioning and Restraints    Pre-Treatment Position sitting in chair/recliner  -MD in bed  -KP     Post Treatment Position wheelchair  -MD wheelchair  -KP     In Wheelchair sitting;call light within reach;encouraged to call for assist  -MD sitting;with PT  -KP     Recorded by [MD] Jennifer Vera, PT [KP] Maggie Gilbert OTR       User Key  (r) = Recorded By, (t) = Taken By, (c) = Cosigned By    Initials Name Effective Dates    CC Jyoti Roblero, OTR 04/13/15 -     KP Maggie Gilbert, OTR 04/13/15 -     LB Lakia Menjivar, PTA 02/18/16 -     MD Jennifer Vera, PT 12/01/15 -     HC Rocío Strickland, OTR 08/17/17 -                 OT Goals       12/29/17 1511 12/27/17 1545       Transfer Training OT STG    Transfer Training OT STG, Date Established 12/29/17  -KP      Transfer Training OT STG, Time to Achieve 5 - 7 days  -      Transfer Training OT STG, Activity Type toilet;sit to stand/stand to sit;shower chair;walk-in shower  -       Transfer Training OT STG, [place] Level supervision required  -KP      Transfer Training OT STG, Assist Device shower chair  -KP      Transfer Training OT LTG    Transfer Training OT LTG, Date Established 12/29/17  -KP 12/27/17  -AF     Transfer Training OT LTG, Time to Achieve by discharge  -KP by discharge  -AF     Transfer Training OT LTG, Activity Type toilet;walk-in shower;shower chair;sit to stand/stand to sit  -KP toilet  -AF     Transfer Training OT LTG, [place] Level independent;conditional independence  -KP supervision required  -AF     Transfer Training OT LTG, Assist Device shower chair  -KP      Transfer Training OT LTG, Additional Goal  appropriate AD  -AF     Transfer Training OT LTG, Outcome  goal ongoing  -AF     Strength OT STG    Strength Goal OT STG, Date Established 12/29/17  -KP      Strength Goal OT STG, Time to Achieve 5 - 7 days  -KP      Strength Goal OT STG, Measure to Achieve to increase B UE to 4/5  -KP      Strength Goal OT STG, Functional Goal to increase functional tsf and self-care skills  -KP      Strength OT LTG    Strength Goal OT LTG, Date Established 12/29/17  -KP      Strength Goal OT LTG, Time to Achieve by discharge  -KP      Strength Goal OT LTG, Measure to Achieve to increase to 4+/5   -KP      Strength Goal OT LTG, Functional Goal to increase functional tsf and self-care skills  -KP      Dynamic Standing Balance OT STG    Dynamic Standing Balance OT STG, Date Established 12/29/17  -KP      Dynamic Standing Balance OT STG, Time to Achieve 5 - 7 days  -KP      Dynamic Standing Balance OT STG, [place] Level supervision required  -KP      Dynamic Standing Balance OT STG, Assist Device UE Support   to increase independence w. self care and IADLs  -KP      Dynamic Standing Balance OT LTG    Dynamic Standing Balance OT LTG, Date Established 12/29/17  -KP      Dynamic Standing Balance OT LTG, Time to Achieve by discharge  -KP      Dynamic Standing Balance OT LTG,  [place] Level independent;conditional independence  -KP      Patient Education OT LTG    Patient Education OT LTG, Date Established 12/29/17  -      Patient Education OT LTG, Time to Achieve by discharge  -      Patient Education OT LTG, Education Type written program;HEP;home safety  -      Patient Education OT LTG, Education Understanding independent;demonstrates adequately;verbalizes understanding  -      ADL OT STG    ADL OT STG, Date Established 12/29/17  -      ADL OT STG, Time to Achieve 1 wk  -      ADL OT STG, Activity Type ADL skills  -      ADL OT STG, [place] Level standby assist;setup;assistive device  -      ADL OT LTG    ADL OT LTG, Date Established 12/29/17  - 12/27/17  -AF     ADL OT LTG, Time to Achieve by discharge  -KP by discharge  -AF     ADL OT LTG, Activity Type ADL skills  - ADL skills  -AF     ADL OT LTG, [place] Level independent;independent with device;assistive device  - standby assist  -AF     ADL OT LTG, Outcome  goal ongoing  -AF     Functional Mobility OT LTG    Functional Mobility Goal OT LTG, Date Established  12/27/17  -AF     Functional Mobility Goal OT LTG, Time to Achieve  by discharge  -AF     Functional Mobility Goal OT LTG, [place] Level  supervision  -AF     Functional Mobility Goal OT LTG, Assist Device  appropriate AD  -AF     Functional Mobility Goal OT LTG, Distance to Achieve  to the bathroom  -AF     Functional Mobility Goal OT LTG, Outcome  goal ongoing  -AF     Endurance OT LTG    Endurance Goal OT LTG, Date Established 12/29/17  -      Endurance Goal OT LTG, Time to Achieve by discharge  -      Endurance Goal OT LTG, Activity Level to increase;endurance 2 good  -KP        User Key  (r) = Recorded By, (t) = Taken By, (c) = Cosigned By    Initials Name Provider Type     Maggie Gilbert, OTR Occupational Therapist    AF Elissa Malone, OTR Occupational Therapist          Occupational Therapy Education     Title:  PT OT SLP Therapies (Active)     Topic: Occupational Therapy (Active)     Point: ADL training (Done)    Description: Instruct learner(s) on proper safety adaptation and remediation techniques during self care or transfers.   Instruct in proper use of assistive devices.    Learning Progress Summary    Learner Readiness Method Response Comment Documented by Status   Patient Acceptance E,TB,D VU,DU ed pt on role of OT, benefit of therapy. POC w. OT.  12/29/17 1510 Done               Point: Precautions (Done)    Description: Instruct learner(s) on prescribed precautions during self-care and functional transfers.    Learning Progress Summary    Learner Readiness Method Response Comment Documented by Status   Patient Acceptance E,TB,D VU,DU ed pt on role of OT, benefit of therapy. POC w. OT.  12/29/17 1510 Done               Point: Body mechanics (Done)    Description: Instruct learner(s) on proper positioning and spine alignment during self-care, functional mobility activities and/or exercises.    Learning Progress Summary    Learner Readiness Method Response Comment Documented by Status   Patient Acceptance E,TB,D VU,DU ed pt on role of OT, benefit of therapy. POC w. OT.  12/29/17 1510 Done                      User Key     Initials Effective Dates Name Provider Type Discipline     04/13/15 -  Maggie Gilbert OTR Occupational Therapist OT                  OT Recommendation and Plan  Anticipated Discharge Disposition: home  Planned Therapy Interventions: activity intolerance, ADL retraining, balance training, IADL retraining, home exercise program, strengthening, transfer training  Therapy Frequency: 5 times/wk          Time Calculation:         Time Calculation- OT       01/04/18 1146          Time Calculation- OT    OT Start Time 1045  -HC      OT Stop Time 1130  -HC      OT Time Calculation (min) 45 min  -HC      OT Received On 01/04/18  -        User Key  (r) = Recorded By, (t) = Taken By, (c) =  Cosigned By    Initials Name Provider Type    HC AMAURI Cook Occupational Therapist           Therapy Charges for Today     Code Description Service Date Service Provider Modifiers Qty    [id number]  OT SELF CARE/MGMT/TRAIN EA 15 MIN 1/4/2018 AMAURI Cook GO 3               AMAURI Cook  1/4/2018

## 2018-01-04 NOTE — PROGRESS NOTES
Inpatient Rehabilitation Functional Measures Assessment    Functional Measures  KISHOR Eating:  [facility] Grooming: [facility] Bathing:  [facility] Upper Body Dressing:  [facility] Lower Body Dressing:  [facility] Toileting:  [facility] Bladder Management  Level of Assistance:  [place]  Frequency/Number of Accidents this Shift:  [facility] Bowel Management  Level of Assistance: [place]  Frequency/Number of Accidents this Shift: [facility] Bed/Chair/Wheelchair Transfer:  [facility] Toilet Transfer:  [facility] Tub/Shower Transfer:  [place]    Previously Documented Mode of Locomotion at Discharge: Field  KISHOR Expected Mode of Locomotion at Discharge: [facility] Walk/Wheelchair:  [facility] Stairs:  [facility] Comprehension:  Auditory comprehension is the usual mode. Comprehension  Score = 7, Independent.  Patient comprehends complex/abstract information in  their primary language.  Patient is completely independent for auditory  comprehension.  There are no activity limitations.  KISHOR Expression:  Vocal expression is the usual mode. Expression Score = 7,  Independent.  Patient expresses complex/abstract information in their primary  language.  Patient is completely independent for vocal expression.  There are no  activity limitations.  KISHOR Social Interaction:  Social Interaction Score = 6, Modified Independent.  Patient is modified independent for social interaction, requiring:  [facility] Problem Solving:  Problem Solving Score = 6, Modified [place].  Patient  makes appropriate decisions in order to solve complex problems, but requires  extra time.  KISHOR Memory:  Memory Score = 7, Independent.  Patient is completely independent  for memory.  There are no activity limitations.    Therapy Mode Minutes  Occupational Therapy: [place]  Physical Therapy: [place]  Speech Language Pathology:  [place]    Signed by: Lilli Nova RN

## 2018-01-04 NOTE — THERAPY TREATMENT NOTE
"Inpatient Rehabilitation - Physical Therapy Treatment Note  [facility]     Patient Name: Stacie Vieira  : 1935  MRN: [id number]  Today's Date: 2018  Onset of Illness/Injury or Date of Surgery Date: 17  Date of Referral to PT: 17  Referring Physician: Dr [place]    Admit Date: 2017    Visit Dx:    ICD-10-CM ICD-9-CM   1. 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   • Bradycardia   • Syncope   • Guillain Barré syndrome               Adult Rehabilitation Note       18 1315 18 1100 18 1608    Rehab Assessment/Intervention    Discipline physical therapist  -JK occupational therapist  -HC occupational therapist  -    Document Type therapy note (daily note)  -JK therapy note (daily note)  -HC therapy note (daily note)  -    Subjective Information agree to therapy  -JK agree to therapy;no complaints  -HC agree to therapy;no complaints  -KP    Patient Effort, Rehab Treatment good  -JK good  -HC good  -KP    Patient Effort, Rehab Treatment Comment pt became tearful during therapy; expressing emotion  over events leading to rehab placement and concern over ability to go home independent'y  -JK      Symptoms Noted During/After Treatment none  -JK fatigue;shortness of breath  -HC     Symptoms Noted Comment  Pt teary eyed and concerned over \"the road ahead\" of her in regards to her overall decreased function.  -HC     Precautions/Limitations fall precautions  -JK fall precautions  " -HC fall precautions  -KP    Recorded by [JK] Darleen Lakhani PT [HC] Rocío Strickland, OTR [KP] Maggie Gilbert, OTR    Pain Assessment    Pain Assessment No/denies pain  -JK No/denies pain  -HC No/denies pain  -KP    Recorded by [JK] Darleen Lakhani PT [HC] Rocío Strickland, OTR [KP] Maggie Gilbert, OTR    Vision Assessment/Intervention    Visual Impairment WFL with corrective lenses  -JK      Recorded by [JK] Darleen Lakhani, PT      Cognitive Assessment/Intervention    Current Cognitive/Communication Assessment functional  -JK functional  -HC functional  -KP    Orientation Status oriented x 4  -JK oriented x 4  -HC oriented x 4  -KP    Follows Commands/Answers Questions 100% of the time;able to follow multi-step instructions  -% of the time;able to follow single-step instructions  -% of the time;able to follow single-step instructions  -KP    Personal Safety WNL/WFL  -JK WNL/WFL  -HC WNL/WFL  -KP    Personal Safety Interventions fall prevention program maintained;gait belt  -JK fall prevention program maintained;gait belt;nonskid shoes/slippers when out of bed  -HC fall prevention program maintained;gait belt;muscle strengthening facilitated;nonskid shoes/slippers when out of bed  -KP    Recorded by [JK] Darleen Lakhani, RED [HC] Rocío Strickland, OTR [KP] Maggie Gilbert, ROSAR    Bed Mobility, Assessment/Treatment    Bed Mob, Supine to Sit, [place]   independent  -    Bed Mob, Sit to Supine, [place]   not tested  -    Bed Mobility, Comment up in chair  -JK up in w/c  -HC     Recorded by [JK] Darleen Lakhani, RED [HC] Rocío Strickland, OTR [KP] Maggie Gilbert, ROSAR    Transfer Assessment/Treatment    Transfers, Bed-Chair [place] minimum assist (75% patient effort);contact guard assist  -JK  supervision required  -    Transfers, Chair-Bed [place] minimum assist (75% patient effort);contact guard assist   from armless chair  -JK      Transfers, Bed-Chair-Bed, Assist  Device other (see comments)   none; arm rest of one chair  -JK      Transfers, Sit-Stand [place] minimum assist (75% patient effort);contact guard assist  -JK stand by assist  -HC stand by assist  -KP    Transfers, Stand-Sit [place] minimum assist (75% patient effort);contact guard assist  -JK stand by assist  -HC stand by assist  -KP    Transfers, Sit-Stand-Sit, Assist Device other (see comments)   none  -JK other (see comments)   w/c  -HC other (see comments)   wc  -KP    Toilet Transfer, [place]  supervision required;other (see comments)   transfer x2  -HC     Toilet Transfer, Assistive Device  other (see comments)   grab bars  -HC     Transfer, Safety Issues weight-shifting ability decreased;balance decreased during turns;step length decreased  -JK      Transfer, Impairments impaired balance;strength decreased  -JK      Transfer, Comment  sit<>stand multiple times at sink side during ADLs, CGA intermittently 2/2 increased fatigue  -HC     Recorded by [JK] Darleen Lakhani, PT [HC] Rocío Strickland, OTR [KP] Maggie Gilbert, OTR    Gait Assessment/Treatment    Gait, [place] Level minimum assist (75% patient effort);verbal cues required  -JK      Gait, Assistive Device other (see comments)   HHA (min A) on R  -JK      Gait, Distance (Feet) 160  -JK      Gait, Gait Deviations kee decreased;decreased heel strike;toe-to-floor clearance decreased;step length decreased;forward flexed posture   wide LEDA  -JK      Gait, Safety Issues sequencing ability decreased;step length decreased;weight-shifting ability decreased;balance decreased during turns  -JK      Gait, Impairments strength decreased;impaired balance  -JK      Gait, Comment Pt does not clear toes on R when advancing R LE and poor clearance L LE. Pt amb 80' with roll wx, CGA; poor clearance B LE when stepping.  -JK      Recorded by [JK] Darleen Lakhani, PT      Stairs Assessment/Treatment    Number of Stairs curb   -JK      Stairs,  Handrail Location other (see comments)   HHA on R  -JK      Stairs, [place] Level minimum assist (75% patient effort);verbal cues required  -JK      Stairs, Safety Issues balance decreased during turns;sequencing ability decreased;weight-shifting ability decreased  -JK      Stairs, Impairments strength decreased;impaired balance  -JK      Recorded by [JK] Darleen Lakhani, PT      Functional Mobility    Functional Mobility- Ind. Level  contact guard assist;set up required  -HC     Functional Mobility-Distance (Feet)  --   to BR from bedside and back  -HC     Functional Mobility- Comment [place] 12/28; [place] 10/56  -JK      Recorded by [JK] Darleen Lakhani PT [HC] AMAURI Cook     Wheelchair Training/Management    Wheelchair Propulsion Training forward propulsion;moving through doorways;steering wheelchair;turning wheelchair  -JK      Wheelchair, Distance Propelled 175  -JK      Wheelchair Training Comment SBA for max VCs to steer and propel efficiently  -JK      Recorded by [JK] Darleen Lakhani PT      Upper Body Bathing Assessment/Training    UB Bathing Assess/Train, Position  standing;sink side  -HC     UB Bathing Assess/Train, [place] Level  stand by assist;set up required  -HC     UB Bathing Assess/Train, Impairments  strength decreased  -     UB Bathing Assess/Train, Comment  pt stands to complete most of UB bathing  -HC     Recorded by  [HC] AMAURI Cook     Lower Body Bathing Assessment/Training    LB Bathing Assess/Train, Position  sitting;standing;sink side  -HC     LB Bathing Assess/Train, [place] Level  supervision required;set up required  -HC     LB Bathing Assess/Train, Impairments  impaired balance;strength decreased  -     LB Bathing Assess/Train, Comment  stands to complete LB w/1 seated 2/2 to decreased endurance and increased fatigue  -HC     Recorded by  [HC] AMAURI Cook     Upper Body Dressing Assessment/Training    UB Dressing Assess/Train, Clothing Type   doffing:;donning:;bra;pull over;t-shirt  -     UB Dressing Assess/Train, Position  sitting;sink side  -HC     UB Dressing Assess/Train, [place]  conditional independence  -HC     UB Dressing Assess/Train, Comment  pt sits to complete 2/2 to decreased endurance and SOB; able to retrieve clothing from closet with CGA to SBA  -HC     Recorded by  [HC] AMAURI Cook     Lower Body Dressing Assessment/Training    LB Dressing Assess/Train, Clothing Type  doffing:;donning:;shoes;pants;slipper socks   undergarments  -HC     LB Dressing Assess/Train, Position  sitting;standing;sink side  -HC     LB Dressing Assess/Train, [place]  supervision required;contact guard assist  -     LB Dressing Assess/Train, Impairments  impaired balance;strength decreased  -     LB Dressing Assess/Train, Comment  pt initiates LB dressind in standing starting with undergarments, then needs to sit to complete 2/2 fatigue; CGA to SBA in standing to hike garments over hips  -HC     Recorded by  [HC] AMAURI Cook     Toileting Assessment/Training    Toileting Assess/Train, Assistive Device  grab bars  -     Toileting Assess/Train, Position  sitting  -     Toileting Assess/Train, [place] Level  supervision required  -     Toileting Assess/Train, Impairments  impaired balance;strength decreased  -     Toileting Assess/Train, Comment  toileting x2  -HC     Recorded by  [HC] AMAURI Cook     Grooming Assessment/Training    Grooming Assess/Train, Position  standing;sink side  -     Grooming Assess/Train, [place]ep[place] Level  supervision required  -     Grooming Assess/Train, Impairments  strength decreased;impaired balance  -     Grooming Assess/Train, Comment  oral hygiene  -HC     Recorded by  [HC] AMAURI Cook     Motor Skills/Interventions    Additional Documentation  Functional Endurance (Group)  -HC     Recorded by  [HC] AMAURI Cook     Balance Skills Training    Sitting-Level of Assistance   Distant supervision  -     Sitting-Balance Support  Feet supported  -     Standing-Level of Assistance  Close supervision;Contact guard  - Close supervision  -KP    Static Standing Balance Support   --   intermittent UE support  -KP    Standing-Balance Activities   Reaching for objects;Weight Shift R-L;Lateral lean  -    Recorded by  [HC] AMAURI Cook [KP] Maggie Gilbert, ROSAR    Therapy Exercises    BUE Resistance   other (comment);theraputty   1# hand wt 10x3, yellow digi flex 10x2,yellow putty pinch  -KP    Recorded by   [KP] Maggie Gilbert OTR    Functional Endurance    Detail (Functional Endurance)  fair  -HC     Recorded by  [HC] AMAURI Cook     Positioning and Restraints    Pre-Treatment Position sitting in chair/recliner  -JK sitting in chair/recliner  -HC in bed  -KP    Post Treatment Position wheelchair  -JK wheelchair  -HC wheelchair  -KP    In Wheelchair with nsg;sitting  -JK sitting;call light within reach;encouraged to call for assist  -HC sitting;call light within reach;encouraged to call for assist   w. her  present  -    Recorded by [JK] Darleen Lakhani, PT [HC] AMAURI Cook [KP] Maggie Gilbert, ROSAR      01/03/18 1216 01/03/18 0950 01/02/18 1536    Rehab Assessment/Intervention    Discipline occupational therapist  -KP physical therapy assistant  -LB occupational therapist  -CC    Document Type therapy note (daily note)  -KP therapy note (daily note)  -LB therapy note (daily note)  -CC    Subjective Information agree to therapy;no complaints  -KP agree to therapy  -LB no complaints;agree to therapy  -CC    Patient Effort, Rehab Treatment good  -KP good  -LB good  -CC    Precautions/Limitations fall precautions  -KP fall precautions  -LB fall precautions  -CC    Recorded by [KP] Maggie Gilbert, AMAURI [LB] Lakia Menjivar, PTA [CC] AMAURI Rojas    Pain Assessment    Pain Assessment No/denies pain  -KP No/denies pain  -LB No/denies  pain  -CC    Recorded by [KP] Maggie Gilbert, OTR [LB] Lakia Menjivar PTA [CC] Jyoti Roblero OTR    Cognitive Assessment/Intervention    Current Cognitive/Communication Assessment functional  -      Orientation Status oriented x 4  -      Follows Commands/Answers Questions 100% of the time;able to follow single-step instructions  -      Personal Safety WNL/WFL  -KP      Personal Safety Interventions fall prevention program maintained;gait belt;nonskid shoes/slippers when out of bed  -KP fall prevention program maintained;gait belt;muscle strengthening facilitated;nonskid shoes/slippers when out of bed  -LB fall prevention program maintained  -CC    Recorded by [KP] Maggie Gilbert, OTR [LB] Lakia Menjivar, DEJA [CC] Jyoti Roblero OTR    Bed Mobility, Assessment/Treatment    Bed Mobility, Assistive Device  bed rails;head of bed elevated  -LB     Bed Mob, Supine to Sit, [place]  conditional independence  -LB     Bed Mob, Sit to Supine, [place]  conditional independence  -LB     Bed Mobility, Comment NT pt up in   -KP  --   in w/c  -CC    Recorded by [KP] Maggie Gilbert, OTR [LB] Lakia Menjivar, DEJA [CC] Jyoti Roblero OTR    Transfer Assessment/Treatment    Transfers, Bed-Chair [place]  contact guard assist;stand by assist  -LB     Transfers, Chair-Bed [place]  contact guard assist;stand by assist  -LB     Transfers, Sit-Stand [place] stand by assist  -KP stand by assist  -LB     Transfers, Stand-Sit [place] stand by assist  -KP stand by assist  -LB     Transfers, Sit-Stand-Sit, Assist Device other (see comments)   wc.   -      Toilet Transfer, [place] supervision required  -      Toilet Transfer, Assistive Device other (see comments)   grab bars  -      Walk-In Shower Transfer, [place] supervision required;set up required  -      Walk-In Shower Transfer, Assist Device other (see comments)   tub bench. grab bar  -       Recorded by [KP] Maggie Gilbert OTR [LB] Lakia Menjivar PTA     Gait Assessment/Treatment    Gait, [place] Level  contact guard assist  -LB     Gait, Assistive Device  other (see comments)   none  -LB     Gait, Distance (Feet)  160  -LB     Gait, Gait Deviations  kee decreased;forward flexed posture;bilateral:;toe-to-floor clearance decreased;step length decreased;decreased heel strike  -LB     Recorded by  [LB] Lakia Menjivar PTA     Stairs Assessment/Treatment    Number of Stairs  4  -LB     Stairs, Handrail Location  both sides  -LB     Stairs, [place] Level  minimum assist (75% patient effort)  -LB     Stairs, Technique Used  step over step (ascending);step over step (descending)  -LB     Recorded by  [LB] Lakia Menjivar PTA     Functional Mobility    Functional Mobility- Ind. Level set up required;contact guard assist  -KP      Functional Mobility- Comment walks into BR , to toilet, to shower, to wc.   -KP      Recorded by [KP] Maggie Gilbert OTR      ADL Assessment/Intervention    Additional Documentation   --   refused this date  -CC    Recorded by   [CC] AMAURI Rojas    Upper Body Bathing Assessment/Training    UB Bathing Assess/Train Assistive Device tub bench;hand-held shower head  -      UB Bathing Assess/Train, Position sitting  -      UB Bathing Assess/Train, [place] Level stand by assist;conditional independence  -      UB Bathing Assess/Train, Comment pt retireves clothes from her closet CGA to SBA  -KP      Recorded by [KP] Maggie Gilbert OTR      Lower Body Bathing Assessment/Training    LB Bathing Assess/Train Assistive Device grab bars;hand-held shower head;tub bench  -      LB Bathing Assess/Train, Position sitting;standing  -      LB Bathing Assess/Train, [place] Level stand by assist  -      Recorded by [KP] Maggie Gilbert OTR      Upper Body Dressing Assessment/Training    UB Dressing Assess/Train, Clothing  Type donning:;doffing:;bra;pull over  -KP      UB Dressing Assess/Train, Position sitting  -KP      UB Dressing Assess/Train, [place] conditional independence  -KP      Recorded by [KP] Maggie Gilbert, ROSAR      Lower Body Dressing Assessment/Training    LB Dressing Assess/Train, Clothing Type doffing:;donning:;shoes;pants;slipper socks   undergarment  -KP      LB Dressing Assess/Train, Position sitting;standing  -KP      LB Dressing Assess/Train, [place] supervision required  -KP      Recorded by [KP] Maggie Gilbert, ROSAR      Toileting Assessment/Training    Toileting Assess/Train, Assistive Device grab bars  -KP      Toileting Assess/Train, Position standing;sitting  -KP      Toileting Assess/Train, [place] Level supervision required  -KP      Recorded by [KP] Maggie Gilbert OTR      Grooming Assessment/Training    Grooming Assess/Train, Position sitting  -KP      Grooming Assess/Train, [place] Level supervision required  -KP      Recorded by [KP] Maggie Gilbert, ROSAR      Balance Skills Training    Sitting-Level of Assistance Distant supervision  -KP      Sitting-Balance Support Feet supported  -KP      Sitting-Balance Activities Reaching for objects  -KP      Standing-Level of Assistance Close supervision;Contact guard   CGA./HHA for walking  -KP Contact guard  -LB     Static Standing Balance Support Right upper extremity supported;No upper extremity supported   intermittent UE support during shower, toileting  - No upper extremity supported  -LB     Standing-Balance Activities  --   batting a balloon  -LB     Recorded by [KP] Maggie Gilbert, ROSAR [LB] Lakia Menjivar, PTA     Therapy Exercises    Bilateral Lower Extremities  AROM:;20 reps;supine;ankle pumps/circles;hip abduction/adduction;SAQ   hhel slides w small ball under foot.  -LB     Bilateral Upper Extremity   --   arm bike 3 min x 2; dowel 2# 10x3  -CC    BUE Resistance   --   yello wtheraband 10x3; 1# hand  wt 10x3; hand helper 20  -CC    Recorded by  [LB] Lakia Menjivar PTA [CC] Jyoti Roblero, OTR    Positioning and Restraints    Pre-Treatment Position sitting in chair/recliner  -KP      Post Treatment Position wheelchair  -KP wheelchair  -LB wheelchair  -CC    In Wheelchair sitting;encouraged to call for assist;call light within reach  -KP sitting;call light within reach  -LB sitting;call light within reach;with family/caregiver;encouraged to call for assist  -CC    Recorded by [KP] Maggie Gilbert, OTR [LB] Lakia Menjivar PTA [CC] Jyoti Roblero, OTR      01/02/18 0938          Rehab Assessment/Intervention    Discipline physical therapy assistant  -LB      Document Type therapy note (daily note)  -LB      Subjective Information agree to therapy  -LB      Patient Effort, Rehab Treatment good  -LB      Precautions/Limitations fall precautions  -LB      Recorded by [LB] Lakia Menjivar PTA      Pain Assessment    Pain Assessment No/denies pain  -LB      Recorded by [LB] Lakia Menjivar PTA      Cognitive Assessment/Intervention    Personal Safety Interventions fall prevention program maintained;gait belt;muscle strengthening facilitated;nonskid shoes/slippers when out of bed  -LB      Recorded by [LB] Lakia Menjivar PTA      Bed Mobility, Assessment/Treatment    Bed Mob, Supine to Sit, [place] supervision required  -LB      Bed Mob, Sit to Supine, [place] supervision required  -LB      Recorded by [LB] Lakia Menjivar PTA      Transfer Assessment/Treatment    Transfers, Bed-Chair [place] contact guard assist  -LB      Transfers, Chair-Bed [place] contact guard assist  -LB      Transfers, Sit-Stand [place] contact guard assist  -LB      Transfers, Stand-Sit [place] contact guard assist  -LB      Transfers, Sit-Stand-Sit, Assist Device other (see comments)   HHA  -LB      Transfer, Comment car tsf, CGA, HHA  -LB      Recorded by [LB] Lakia Menjivar PTA       Gait Assessment/Treatment    Gait, [place] Level contact guard assist;hand held assist  -LB      Gait, Assistive Device other (see comments)   HHA  -LB      Gait, Distance (Feet) 160  -LB      Gait, Gait Deviations bilateral:;kee decreased;forward flexed posture;decreased heel strike;step length decreased;toe-to-floor clearance decreased  -LB      Recorded by [LB] Lakia Menjivar PTA      Stairs Assessment/Treatment    Number of Stairs 4  -LB      Stairs, Handrail Location both sides  -LB      Stairs, [place] Level minimum assist (75% patient effort)  -LB      Stairs, Technique Used step over step (ascending);step over step (descending)  -LB      Recorded by [LB] Lakia Menjivar PTA      Therapy Exercises    Bilateral Lower Extremities AROM:;20 reps;supine;ankle pumps/circles;heel slides;hip abduction/adduction;SAQ;sitting  -LB      Recorded by [LB] Lakia Menjivar PTA      Positioning and Restraints    Post Treatment Position wheelchair  -LB      In Wheelchair sitting;with OT  -LB      Recorded by [LB] Lakia Menjivar PTA        User Key  (r) = Recorded By, (t) = Taken By, (c) = Cosigned By    Initials Name Effective Dates    CC Jyoti Roblero, OTR 04/13/15 -     KP Maggie Gilbert, OTR 04/13/15 -     LB Lakia Menjivar, PTA 02/18/16 -     JK Darleen Lakhani, PT 12/01/15 -     HC Rocío Strickland, OTR 08/17/17 -                 IP PT Goals       12/29/17 1225 12/22/17 1301       Bed Mobility PT LTG    Bed Mobility PT LTG, Activity Type  all bed mobility  -EF     Bed Mobility PT LTG, [place] Level  conditional independence  -EF     Transfer Training PT LTG    Transfer Training PT LTG, Activity Type  all transfers  -EF     Transfer Training PT LTG, [place] Level  contact guard assist;minimum assist (75% patient effort)  -EF     Transfer Training PT LTG, Assist Device  --   with device if needed  -EF     Transfer Training 2 PT LTG    Transfer Training PT 2 LTG, Date Established  12/29/17  -KH      Transfer Training PT 2 LTG, Time to Achieve 1 wk  -KH      Transfer Training PT 2 LTG, Activity Type all transfers  -KH      Transfer Training PT 2 LTG, [place] Level independent  -KH      Gait Training PT LTG    Gait Training Goal PT LTG, Date Established 12/29/17  -KH      Gait Training Goal PT LTG, Time to Achieve 1 wk  -KH      Gait Training Goal PT LTG, [place] Level independent  -KH contact guard assist;minimum assist (75% patient effort)  -EF     Gait Training Goal PT LTG, Assist Device  --   with device if needed  -EF     Gait Training Goal PT LTG, Distance to Achieve 150  -KH 80  -EF     Stair Training PT LTG    Stair Training Goal PT LTG, Date Established 12/29/17  -KH      Stair Training Goal PT LTG, Time to Achieve 1 wk  -KH      Stair Training Goal PT LTG, Number of Steps 12  -KH      Stair Training Goal PT LTG, [place] Level conditional independence  -KH      Stair Training Goal PT LTG, Assist Device 1 handrail  -KH      Strength Goal PT LTG    Strength Goal PT LTG, Date Established 12/29/17  -KH      Strength Goal PT LTG, Time to Achieve 1 wk  -KH      Strength Goal PT LTG, Measure to Achieve B LE grossly 4-/5 with MMT  -KH        User Key  (r) = Recorded By, (t) = Taken By, (c) = Cosigned By    Initials Name Provider Type    EF Zulma Vides, PT Physical Therapist    TEMO Rosario, PT Physical Therapist          Physical Therapy Education     Title: PT OT SLP Therapies (Active)     Topic: Physical Therapy (Active)     Point: Mobility training (Done)    Learning Progress Summary    Learner Readiness Method Response Comment Documented by Status   Patient Acceptance E,D HERMINIO BENSON Spoke at length with pt about usefulness of using a roll wx and WC upon return to home since pt will be independent to ensure pt safety and mobility with lessened risk of falls. JK 01/04/18 1323 Done    Acceptance HERMINIO GALE  LB 01/03/18 1449 Done    Acceptance E HERMINIO BENSON 01/02/18 1312 Done     Acceptance E MARCELINO Educated patient on safe hand placement and body mechanics with basic mobility including bed mobility, transfers, gait, and stairs  12/29/17 1224 Done               Point: Body mechanics (Done)    Learning Progress Summary    Learner Readiness Method Response Comment Documented by Status   Patient Acceptance E MARCELINO Educated patient on safe hand placement and body mechanics with basic mobility including bed mobility, transfers, gait, and stairs  12/29/17 1224 Done               Point: Precautions (Done)    Learning Progress Summary    Learner Readiness Method Response Comment Documented by Status   Patient Acceptance PURNIMA FARFAN MD 01/01/18 1219 Done    Acceptance PURNIMA FARFAN MD 12/30/17 0932 Done                      User Key     Initials Effective Dates Name Provider Type Discipline     02/18/16 -  Lakia Menjivar, PTA Physical Therapy Assistant PT    OZ 12/01/15 -  Darleen Lakhani, PT Physical Therapist PT    MD 12/01/15 -  Jennifer Vera, PT Physical Therapist PT     06/22/16 -  Maria Del Carmen Rosario, PT Physical Therapist PT                    PT Recommendation and Plan  Anticipated Discharge Disposition: home with home health  Planned Therapy Interventions: balance training, bed mobility training, gait training, home exercise program, stair training, strengthening, stretching, transfer training, patient/family education, postural re-education  PT Frequency: 2 times/day  Plan of Care Review  Outcome Summary/Follow up Plan: Pt has made improvements with functional status, but continues to have weakness in B LEs and trunk that require pt to have assist to perform daily activities and functional tasks. Pt needed min assist for transfers to stand and for ambulation of 160'. Pt's choice is to ambulate without an assist device, but without a device pt must have min assist provided from hand held assist on the R and assist from holding to the gait belt too. Pt has significantly impaired static and dynamic standing  balance. Her Tinetti score was 12/28 and OSORIO balance assessment is 10/56; both scores indicate pt is at high risk of falls. Pt does not have full clearance of B feet when stepping due to dorsiflexor weakness. Pt would be much safer with gait if she would chose to use a rolling walker, and may need evaluation for AFOs to assist with DF to prevent falling after return to home with walking on carpet.          Time Calculation:         PT Charges       01/04/18 1325 01/04/18 1323       Time Calculation    Start Time 0930  -JK 1230  -JK     Stop Time 1030  -JK 1315  -JK     Time Calculation (min) 60 min  -JK 45 min  -JK       User Key  (r) = Recorded By, (t) = Taken By, (c) = Cosigned By    Initials Name Provider Type    OZ Lakhani PT Physical Therapist          Therapy Charges for Today     Code Description Service Date Service Provider Modifiers Qty    85455645648 HC PT THER PROC EA 15 MIN 1/4/2018 Darleen Lakhani PT GP 7               Darleen Lakhani PT  1/4/2018

## 2018-01-04 NOTE — PROGRESS NOTES
Discussed current status/progress, weekly and d/c goals and d/c date for Thursday, 1/11 with patient. She is a little anxious that she might not be ready to go home that soon but feels she is making progress. She stated she needs to be able to do everything for herself as she will be home alone and only have limited intermittent assistance. She stated she hates to ask people to help her. She discussed her experience with what has happened to her. Will plan to contact her son who is MD in [place] at her request and also contact granddaughter who is here locally to schedule family conference next week before d/c.

## 2018-01-04 NOTE — THERAPY TREATMENT NOTE
"Inpatient Rehabilitation - Occupational Therapy Treatment Note  [facility]     Patient Name: Stacie Vieira  : 1935  MRN: [id number]  Today's Date: 2018  Onset of Illness/Injury or Date of Surgery Date: 17     Referring Physician: Dr Houston      Admit Date: 2017    Visit Dx:     ICD-10-CM ICD-9-CM   1. 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   • Bradycardia   • Syncope   • Guillain Barré syndrome             Adult Rehabilitation Note       18 1315 18 1100 18 1608    Rehab Assessment/Intervention    Discipline physical therapist  -JK occupational therapist  -HC occupational therapist  -    Document Type therapy note (daily note)  -JK therapy note (daily note)  - therapy note (daily note)  -    Subjective Information agree to therapy  -JK agree to therapy;no complaints  - agree to therapy;no complaints  -KP    Patient Effort, Rehab Treatment good  -JK good  -HC good  -    Patient Effort, Rehab Treatment Comment pt became tearful during therapy; expressing emotion  over events leading to rehab placement and concern over ability to go home independent'y  -JK      Symptoms Noted During/After Treatment none  -JK fatigue;shortness of breath  -     Symptoms Noted Comment  Pt teary eyed and concerned over \"the road ahead\" of her in regards to her overall decreased function.  -HC     Precautions/Limitations fall precautions  -JK fall precautions  -HC fall precautions  - "    Recorded by [JK] Darleen Lakhani PT [HC] Rocío Strickland OTR [KP] Maggie Gilbert, ROSAR    Pain Assessment    Pain Assessment No/denies pain  -JK No/denies pain  -HC No/denies pain  -KP    Recorded by [JK] Darleen Lakhani PT [HC] Rocío Strickland, ROSAR [KP] Maggie Gilbert, OTR    Vision Assessment/Intervention    Visual Impairment WFL with corrective lenses  -JK      Recorded by [JK] Darleen Lakhani PT      Cognitive Assessment/Intervention    Current Cognitive/Communication Assessment functional  -JK functional  -HC functional  -KP    Orientation Status oriented x 4  -JK oriented x 4  -HC oriented x 4  -KP    Follows Commands/Answers Questions 100% of the time;able to follow multi-step instructions  -% of the time;able to follow single-step instructions  -% of the time;able to follow single-step instructions  -KP    Personal Safety WNL/WFL  -JK WNL/WFL  -HC WNL/WFL  -KP    Personal Safety Interventions fall prevention program maintained;gait belt  -JK fall prevention program maintained;gait belt;nonskid shoes/slippers when out of bed  -HC fall prevention program maintained;gait belt;muscle strengthening facilitated;nonskid shoes/slippers when out of bed  -KP    Recorded by [JK] Darleen Lakhani PT [HC] Rocío Strickland, ROSAR [KP] Maggie Gilbert, OTR    Bed Mobility, Assessment/Treatment    Bed Mobility, Assistive Device  bed rails;head of bed elevated  -HC     Bed Mob, Supine to Sit, [place]  conditional independence  -HC independent  -KP    Bed Mob, Sit to Supine, [place]  not tested  -HC not tested  -KP    Bed Mobility, Comment up in chair  -JK up in w/c in AM  -HC     Recorded by [JK] Darleen Lakhani PT [HC] Rocío Strickland, ROSAR [KP] Maggie Gilbert, ROSAR    Transfer Assessment/Treatment    Transfers, Bed-Chair [place] minimum assist (75% patient effort);contact guard assist  -JK  supervision required  -KP    Transfers, Chair-Bed [place] minimum assist (75% patient  effort);contact guard assist   from armless chair  -JK      Transfers, Bed-Chair-Bed, Assist Device other (see comments)   none; arm rest of one chair  -JK      Transfers, Sit-Stand [place] minimum assist (75% patient effort);contact guard assist  -JK stand by assist  -HC stand by assist  -KP    Transfers, Stand-Sit [place] minimum assist (75% patient effort);contact guard assist  -JK stand by assist  -HC stand by assist  -KP    Transfers, Sit-Stand-Sit, Assist Device other (see comments)   none  -JK other (see comments)   w/c  -HC other (see comments)   wc  -KP    Toilet Transfer, [place]  supervision required;other (see comments)   transfer x2  -HC     Toilet Transfer, Assistive Device  other (see comments)   grab bars  -HC     Transfer, Safety Issues weight-shifting ability decreased;balance decreased during turns;step length decreased  -JK      Transfer, Impairments impaired balance;strength decreased  -JK      Transfer, Comment  sit<>stand multiple times at sink side during ADLs, CGA intermittently 2/2 increased fatigue  -HC     Recorded by [KATHRINEK] Darleen Lakhani, PT [HC] Rocío Strickland, OTR [KP] Maggie Gilbert, OTR    Gait Assessment/Treatment    Gait, [place] Level minimum assist (75% patient effort);verbal cues required  -JK      Gait, Assistive Device other (see comments)   HHA (min A) on R  -JK      Gait, Distance (Feet) 160  -JK      Gait, Gait Deviations kee decreased;decreased heel strike;toe-to-floor clearance decreased;step length decreased;forward flexed posture   wide LEDA  -JK      Gait, Safety Issues sequencing ability decreased;step length decreased;weight-shifting ability decreased;balance decreased during turns  -JK      Gait, Impairments strength decreased;impaired balance  -JK      Gait, Comment Pt does not clear toes on R when advancing R LE and poor clearance L LE. Pt amb 80' with roll wx, CGA; poor clearance B LE when stepping.  -JK      Recorded by [OZ] Darleen CUELLAR  Lesvia, PT      Stairs Assessment/Treatment    Number of Stairs curb   -JK      Stairs, Handrail Location other (see comments)   HHA on R  -JK      Stairs, [place] Level minimum assist (75% patient effort);verbal cues required  -JK      Stairs, Safety Issues balance decreased during turns;sequencing ability decreased;weight-shifting ability decreased  -JK      Stairs, Impairments strength decreased;impaired balance  -JK      Recorded by [JK] Darleen Lakhani PT      Functional Mobility    Functional Mobility- Ind. Level  contact guard assist;set up required  -HC     Functional Mobility-Distance (Feet)  --   to BR from bedside and back  -HC     Functional Mobility- Comment [place] 12/28; [place] 10/56  -JK      Recorded by [JK] Darleen Lakhani PT [HC] AMAURI Cook     Wheelchair Training/Management    Wheelchair Propulsion Training forward propulsion;moving through doorways;steering wheelchair;turning wheelchair  -JK      Wheelchair, Distance Propelled 175  -JK      Wheelchair Training Comment SBA for max VCs to steer and propel efficiently  -JK      Recorded by [JK] Darleen Lakhani PT      Upper Body Bathing Assessment/Training    UB Bathing Assess/Train, Position  standing;sink side  -     UB Bathing Assess/Train, [place] Level  stand by assist;set up required  -     UB Bathing Assess/Train, Impairments  strength decreased  -     UB Bathing Assess/Train, Comment  pt stands to complete most of UB bathing  -HC     Recorded by  [HC] AMAURI Cook     Lower Body Bathing Assessment/Training    LB Bathing Assess/Train, Position  sitting;standing;sink side  -HC     LB Bathing Assess/Train, [place] Level  supervision required;set up required  -HC     LB Bathing Assess/Train, Impairments  impaired balance;strength decreased  -     LB Bathing Assess/Train, Comment  stands to complete LB w/1 seated 2/2 to decreased endurance and increased fatigue  -HC     Recorded by  [HC] AMAURI Cook      Upper Body Dressing Assessment/Training    UB Dressing Assess/Train, Clothing Type  doffing:;donning:;bra;pull over;t-shirt  -     UB Dressing Assess/Train, Position  sitting;sink side  -HC     UB Dressing Assess/Train, [place]  conditional independence  -     UB Dressing Assess/Train, Comment  pt sits to complete 2/2 to decreased endurance and SOB; able to retrieve clothing from closet with CGA to SBA  -HC     Recorded by  [HC] AMAURI Cook     Lower Body Dressing Assessment/Training    LB Dressing Assess/Train, Clothing Type  doffing:;donning:;shoes;pants;slipper socks   undergarments  -     LB Dressing Assess/Train, Position  sitting;standing;sink side  -HC     LB Dressing Assess/Train, [place]  supervision required;contact guard assist  -     LB Dressing Assess/Train, Impairments  impaired balance;strength decreased  -     LB Dressing Assess/Train, Comment  pt initiates LB dressind in standing starting with undergarments, then needs to sit to complete 2/2 fatigue; CGA to SBA in standing to hike garments over hips  -HC     Recorded by  [HC] AMAURI Cook     Toileting Assessment/Training    Toileting Assess/Train, Assistive Device  grab bars  -     Toileting Assess/Train, Position  sitting  -     Toileting Assess/Train, [place] Level  supervision required  -     Toileting Assess/Train, Impairments  impaired balance;strength decreased  -     Toileting Assess/Train, Comment  toileting x2  -HC     Recorded by  [HC] AMAURI Cook     Grooming Assessment/Training    Grooming Assess/Train, Position  standing;sink side  -     Grooming Assess/Train, [place] Level  supervision required  -     Grooming Assess/Train, Impairments  strength decreased;impaired balance  -     Grooming Assess/Train, Comment  oral hygiene  -HC     Recorded by  [HC] AMAURI Cook     Motor Skills/Interventions    Additional Documentation  Functional Endurance (Group)  -HC     Recorded by  [HC]  AMAURI Cook     Balance Skills Training    Sitting-Level of Assistance  Distant supervision  -     Sitting-Balance Support  Feet supported  -     Standing-Level of Assistance  Close supervision;Contact guard  - Close supervision  -    Static Standing Balance Support  No upper extremity supported  - --   intermittent UE support  -    Standing-Balance Activities  Weight Shift A-P;Ball toss;Retrieve object from floor  - Reaching for objects;Weight Shift R-L;Lateral lean  -    Recorded by  [HC] AMUARI Cook [KP] AMAURI Au    Therapy Exercises    Bilateral Upper Extremity  AROM:;30 reps;sitting;elbow flexion/extension;pronation/supination;shoulder abduction/adduction;shoulder extension/flexion   1# hand weight for 3x10; chest press and overhead press  -     BUE Resistance   other (comment);theraputty   1# hand wt 10x3, yellow digi flex 10x2,yellow putty pinch  -    Recorded by  [HC] AMAURI Cook [KP] AMAURI Au    Functional Endurance    Detail (Functional Endurance)  fair  -     Recorded by  [HC] AMAURI Cook     Positioning and Restraints    Pre-Treatment Position sitting in chair/recliner  -JK sitting in chair/recliner  - in bed  -    Post Treatment Position wheelchair  -JK wheelchair  -HC wheelchair  -KP    In Wheelchair with nsg;sitting  -JK sitting;call light within reach;encouraged to call for assist  - sitting;call light within reach;encouraged to call for assist   w. her  present  -    Recorded by [JK] Darleen Lakhani, PT [] AMAURI Cook [] AMAURI Au      01/03/18 1216 01/03/18 0950 01/02/18 1536    Rehab Assessment/Intervention    Discipline occupational therapist  -KP physical therapy assistant  -LB occupational therapist  -CC    Document Type therapy note (daily note)  -KP therapy note (daily note)  -LB therapy note (daily note)  -CC    Subjective Information agree to therapy;no complaints   -KP agree to therapy  -LB no complaints;agree to therapy  -CC    Patient Effort, Rehab Treatment good  -KP good  -LB good  -CC    Precautions/Limitations fall precautions  -KP fall precautions  -LB fall precautions  -CC    Recorded by [KP] Maggie Gilbert, OTR [LB] Lakia Menjivar PTA [CC] Jyoti Roblero OTR    Pain Assessment    Pain Assessment No/denies pain  -KP No/denies pain  -LB No/denies pain  -CC    Recorded by [KP] Maggie Gilbert, OTR [LB] Lakia Menjivar PTA [CC] AMAURI Rojas    Cognitive Assessment/Intervention    Current Cognitive/Communication Assessment functional  -KP      Orientation Status oriented x 4  -KP      Follows Commands/Answers Questions 100% of the time;able to follow single-step instructions  -KP      Personal Safety WNL/WFL  -KP      Personal Safety Interventions fall prevention program maintained;gait belt;nonskid shoes/slippers when out of bed  -KP fall prevention program maintained;gait belt;muscle strengthening facilitated;nonskid shoes/slippers when out of bed  -LB fall prevention program maintained  -CC    Recorded by [KP] Maggie Gilbert, OTR [LB] Lakia Menjivar PTA [CC] Jyoti Roblero OTR    Bed Mobility, Assessment/Treatment    Bed Mobility, Assistive Device  bed rails;head of bed elevated  -LB     Bed Mob, Supine to Sit, [place]  conditional independence  -LB     Bed Mob, Sit to Supine, [place]  conditional independence  -LB     Bed Mobility, Comment NT pt up in   -KP  --   in w/c  -CC    Recorded by [KP] Maggie Gilbert, OTR [LB] Lakia Menjivar, DEJA [CC] Jyoti Roblero OTR    Transfer Assessment/Treatment    Transfers, Bed-Chair [place]  contact guard assist;stand by assist  -LB     Transfers, Chair-Bed [place]  contact guard assist;stand by assist  -LB     Transfers, Sit-Stand [place] stand by assist  -KP stand by assist  -LB     Transfers, Stand-Sit [place] stand by assist  -KP stand by  assist  -LB     Transfers, Sit-Stand-Sit, Assist Device other (see comments)   wc.   -KP      Toilet Transfer, [place] supervision required  -KP      Toilet Transfer, Assistive Device other (see comments)   grab bars  -KP      Walk-In Shower Transfer, [place] supervision required;set up required  -KP      Walk-In Shower Transfer, Assist Device other (see comments)   tub bench. grab bar  -KP      Recorded by [KP] Maggie Gilbert OTR [LB] Lakia Menjivar PTA     Gait Assessment/Treatment    Gait, [place] Level  contact guard assist  -LB     Gait, Assistive Device  other (see comments)   none  -LB     Gait, Distance (Feet)  160  -LB     Gait, Gait Deviations  kee decreased;forward flexed posture;bilateral:;toe-to-floor clearance decreased;step length decreased;decreased heel strike  -LB     Recorded by  [LB] Lakia Menjivar PTA     Stairs Assessment/Treatment    Number of Stairs  4  -LB     Stairs, Handrail Location  both sides  -LB     Stairs, [place] Level  minimum assist (75% patient effort)  -LB     Stairs, Technique Used  step over step (ascending);step over step (descending)  -LB     Recorded by  [LB] Lakia Menjivar PTA     Functional Mobility    Functional Mobility- Ind. Level set up required;contact guard assist  -KP      Functional Mobility- Comment walks into BR , to toilet, to shower, to wc.   -KP      Recorded by [KP] Maggie Gilbert OTR      ADL Assessment/Intervention    Additional Documentation   --   refused this date  -CC    Recorded by   [CC] AMAURI Rojas    Upper Body Bathing Assessment/Training    UB Bathing Assess/Train Assistive Device tub bench;hand-held shower head  -      UB Bathing Assess/Train, Position sitting  -      UB Bathing Assess/Train, [place] Level stand by assist;conditional independence  -      UB Bathing Assess/Train, Comment pt retireves clothes from her closet CGA to SBA  -KP      Recorded by [KP] Maggie Hodge  [place], ROSAR      Lower Body Bathing Assessment/Training    LB Bathing Assess/Train Assistive Device grab bars;hand-held shower head;tub bench  -KP      LB Bathing Assess/Train, Position sitting;standing  -KP      LB Bathing Assess/Train, [place] Level stand by assist  -KP      Recorded by [] Maggie Gilbert, ROSAR      Upper Body Dressing Assessment/Training    UB Dressing Assess/Train, Clothing Type donning:;doffing:;bra;pull over  -KP      UB Dressing Assess/Train, Position sitting  -KP      UB Dressing Assess/Train, [place] conditional independence  -KP      Recorded by [KP] Maggie Gilbert, OTR      Lower Body Dressing Assessment/Training    LB Dressing Assess/Train, Clothing Type doffing:;donning:;shoes;pants;slipper socks   undergarment  -KP      LB Dressing Assess/Train, Position sitting;standing  -KP      LB Dressing Assess/Train, [place] supervision required  -KP      Recorded by [] Maggie Gilbert, ROSAR      Toileting Assessment/Training    Toileting Assess/Train, Assistive Device grab bars  -KP      Toileting Assess/Train, Position standing;sitting  -KP      Toileting Assess/Train, [place] supervision required  -KP      Recorded by [KP] AMAURI Au      Grooming Assessment/Training    Grooming Assess/Train, Position sitting  -KP      Grooming Assess/Train, [place] supervision required  -KP      Recorded by [] Maggie Gilbert, ROSAR      Balance Skills Training    Sitting-Level of Assistance Distant supervision  -KP      Sitting-Balance Support Feet supported  -KP      Sitting-Balance Activities Reaching for objects  -KP      Standing-Level of Assistance Close supervision;Contact guard   CGA./HHA for walking  -KP Contact guard  -LB     Static Standing Balance Support Right upper extremity supported;No upper extremity supported   intermittent UE support during shower, toileting  -KP No upper extremity supported  -LB     Standing-Balance  Activities  --   batting a balloon  -LB     Recorded by [KP] Maggie Gilbert, OTR [LB] Lakia Menjivar PTA     Therapy Exercises    Bilateral Lower Extremities  AROM:;20 reps;supine;ankle pumps/circles;hip abduction/adduction;SAQ   hhel slides w small ball under foot.  -LB     Bilateral Upper Extremity   --   arm bike 3 min x 2; dowel 2# 10x3  -CC    BUE Resistance   --   yello wtheraband 10x3; 1# hand wt 10x3; hand helper 20  -CC    Recorded by  [LB] Lakia Menjivar PTA [CC] Jyoti Roblero OTR    Positioning and Restraints    Pre-Treatment Position sitting in chair/recliner  -KP      Post Treatment Position wheelchair  -KP wheelchair  -LB wheelchair  -CC    In Wheelchair sitting;encouraged to call for assist;call light within reach  -KP sitting;call light within reach  -LB sitting;call light within reach;with family/caregiver;encouraged to call for assist  -CC    Recorded by [KP] Maggie Gilbert, OTR [LB] Lakia Menjivar PTA [CC] Jyoti Roblero, OTR      01/02/18 0938          Rehab Assessment/Intervention    Discipline physical therapy assistant  -LB      Document Type therapy note (daily note)  -LB      Subjective Information agree to therapy  -LB      Patient Effort, Rehab Treatment good  -LB      Precautions/Limitations fall precautions  -LB      Recorded by [LB] Lakia Menjivar PTA      Pain Assessment    Pain Assessment No/denies pain  -LB      Recorded by [LB] Lakia Menjivar PTA      Cognitive Assessment/Intervention    Personal Safety Interventions fall prevention program maintained;gait belt;muscle strengthening facilitated;nonskid shoes/slippers when out of bed  -LB      Recorded by [LB] Lakia Menjivar PTA      Bed Mobility, Assessment/Treatment    Bed Mob, Supine to Sit, [place] supervision required  -LB      Bed Mob, Sit to Supine, [place] supervision required  -LB      Recorded by [LB] Lakia Menjivar PTA      Transfer Assessment/Treatment    Transfers,  Bed-Chair [place] contact guard assist  -LB      Transfers, Chair-Bed [place] contact guard assist  -LB      Transfers, Sit-Stand [place] contact guard assist  -LB      Transfers, Stand-Sit [place] contact guard assist  -LB      Transfers, Sit-Stand-Sit, Assist Device other (see comments)   HHA  -LB      Transfer, Comment car tsf, CGA, HHA  -LB      Recorded by [LB] Lakia Menjivar PTA      Gait Assessment/Treatment    Gait, [place] Level contact guard assist;hand held assist  -LB      Gait, Assistive Device other (see comments)   HHA  -LB      Gait, Distance (Feet) 160  -LB      Gait, Gait Deviations bilateral:;kee decreased;forward flexed posture;decreased heel strike;step length decreased;toe-to-floor clearance decreased  -LB      Recorded by [LB] Lakia Menjivar PTA      Stairs Assessment/Treatment    Number of Stairs 4  -LB      Stairs, Handrail Location both sides  -LB      Stairs, [place] Level minimum assist (75% patient effort)  -LB      Stairs, Technique Used step over step (ascending);step over step (descending)  -LB      Recorded by [LB] Lakia Menjivar PTA      Therapy Exercises    Bilateral Lower Extremities AROM:;20 reps;supine;ankle pumps/circles;heel slides;hip abduction/adduction;SAQ;sitting  -LB      Recorded by [MELISA] Lakia Menjivar PTA      Positioning and Restraints    Post Treatment Position wheelchair  -LB      In Wheelchair sitting;with OT  -LB      Recorded by [MELISA] Lakia Menjivar PTA        User Key  (r) = Recorded By, (t) = Taken By, (c) = Cosigned By    Initials Name Effective Dates    CC Jyoti Roblero, OTR 04/13/15 -     KP Maggie Gilbert, OTR 04/13/15 -     LB Lakia Menjivar, PTA 02/18/16 -     OZ Lakhani, PT 12/01/15 -     HC Rocío Strickland, OTR 08/17/17 -                 OT Goals       12/29/17 1511 12/27/17 1545       Transfer Training OT STG    Transfer Training OT STG, Date Established 12/29/17  -KP      Transfer  Training OT STG, Time to Achieve 5 - 7 days  -KP      Transfer Training OT STG, Activity Type toilet;sit to stand/stand to sit;shower chair;walk-in shower  -KP      Transfer Training OT STG, [place] Level supervision required  -KP      Transfer Training OT STG, Assist Device shower chair  -KP      Transfer Training OT LTG    Transfer Training OT LTG, Date Established 12/29/17  -KP 12/27/17  -AF     Transfer Training OT LTG, Time to Achieve by discharge  -KP by discharge  -AF     Transfer Training OT LTG, Activity Type toilet;walk-in shower;shower chair;sit to stand/stand to sit  -KP toilet  -AF     Transfer Training OT LTG, [place] Level independent;conditional independence  -KP supervision required  -AF     Transfer Training OT LTG, Assist Device shower chair  -KP      Transfer Training OT LTG, Additional Goal  appropriate AD  -AF     Transfer Training OT LTG, Outcome  goal ongoing  -AF     Strength OT STG    Strength Goal OT STG, Date Established 12/29/17  -KP      Strength Goal OT STG, Time to Achieve 5 - 7 days  -KP      Strength Goal OT STG, Measure to Achieve to increase B UE to 4/5  -KP      Strength Goal OT STG, Functional Goal to increase functional tsf and self-care skills  -KP      Strength OT LTG    Strength Goal OT LTG, Date Established 12/29/17  -KP      Strength Goal OT LTG, Time to Achieve by discharge  -KP      Strength Goal OT LTG, Measure to Achieve to increase to 4+/5   -KP      Strength Goal OT LTG, Functional Goal to increase functional tsf and self-care skills  -KP      Dynamic Standing Balance OT STG    Dynamic Standing Balance OT STG, Date Established 12/29/17  -KP      Dynamic Standing Balance OT STG, Time to Achieve 5 - 7 days  -KP      Dynamic Standing Balance OT STG, [place] Level supervision required  -KP      Dynamic Standing Balance OT STG, Assist Device UE Support   to increase independence w. self care and IADLs  -KP      Dynamic Standing Balance OT LTG    Dynamic  Standing Balance OT LTG, Date Established 12/29/17  -      Dynamic Standing Balance OT LTG, Time to Achieve by discharge  -KP      Dynamic Standing Balance OT LTG, [place] Level independent;conditional independence  -KP      Patient Education OT LTG    Patient Education OT LTG, Date Established 12/29/17  -      Patient Education OT LTG, Time to Achieve by discharge  -      Patient Education OT LTG, Education Type written program;HEP;home safety  -      Patient Education OT LTG, Education Understanding independent;demonstrates adequately;verbalizes understanding  -      ADL OT STG    ADL OT STG, Date Established 12/29/17  -      ADL OT STG, Time to Achieve 1 wk  -KP      ADL OT STG, Activity Type ADL skills  -KP      ADL OT STG, [place] Level standby assist;setup;assistive device  -KP      ADL OT LTG    ADL OT LTG, Date Established 12/29/17  - 12/27/17  -AF     ADL OT LTG, Time to Achieve by discharge  - by discharge  -AF     ADL OT LTG, Activity Type ADL skills  - ADL skills  -AF     ADL OT LTG, [place] Level independent;independent with device;assistive device  - standby assist  -AF     ADL OT LTG, Outcome  goal ongoing  -AF     Functional Mobility OT LTG    Functional Mobility Goal OT LTG, Date Established  12/27/17  -AF     Functional Mobility Goal OT LTG, Time to Achieve  by discharge  -AF     Functional Mobility Goal OT LTG, [place] Level  supervision  -AF     Functional Mobility Goal OT LTG, Assist Device  appropriate AD  -AF     Functional Mobility Goal OT LTG, Distance to Achieve  to the bathroom  -AF     Functional Mobility Goal OT LTG, Outcome  goal ongoing  -AF     Endurance OT LTG    Endurance Goal OT LTG, Date Established 12/29/17  -      Endurance Goal OT LTG, Time to Achieve by discharge  -KP      Endurance Goal OT LTG, Activity Level to increase;endurance 2 good  -KP        User Key  (r) = Recorded By, (t) = Taken By, (c) = Cosigned By    Initials Name  Provider Type     Maggie Gilbert OTR Occupational Therapist    SOPHIE Malone OTR Occupational Therapist          Occupational Therapy Education     Title: PT OT SLP Therapies (Active)     Topic: Occupational Therapy (Active)     Point: ADL training (Done)    Description: Instruct learner(s) on proper safety adaptation and remediation techniques during self care or transfers.   Instruct in proper use of assistive devices.    Learning Progress Summary    Learner Readiness Method Response Comment Documented by Status   Patient Acceptance E,TB,D VU,DU ed pt on role of OT, benefit of therapy. POC w. OT.  12/29/17 1510 Done               Point: Precautions (Done)    Description: Instruct learner(s) on prescribed precautions during self-care and functional transfers.    Learning Progress Summary    Learner Readiness Method Response Comment Documented by Status   Patient Acceptance E,TB,D VU,DU ed pt on role of OT, benefit of therapy. POC w. OT.  12/29/17 1510 Done               Point: Body mechanics (Done)    Description: Instruct learner(s) on proper positioning and spine alignment during self-care, functional mobility activities and/or exercises.    Learning Progress Summary    Learner Readiness Method Response Comment Documented by Status   Patient Acceptance E,TB,D VU,DU ed pt on role of OT, benefit of therapy. POC w. OT.  12/29/17 1510 Done                      User Key     Initials Effective Dates Name Provider Type Discipline     04/13/15 -  AMAURI Au Occupational Therapist OT                  OT Recommendation and Plan  Anticipated Discharge Disposition: home  Planned Therapy Interventions: activity intolerance, ADL retraining, balance training, IADL retraining, home exercise program, strengthening, transfer training  Therapy Frequency: 5 times/wk          Time Calculation:         Time Calculation- OT       01/04/18 1530 01/04/18 1146       Time Calculation- OT    OT Start  Time 1400  - 1045  -     OT Stop Time 1445  - 1130  -     OT Time Calculation (min) 45 min  - 45 min  -     OT Received On 01/04/18  - 01/04/18  -       User Key  (r) = Recorded By, (t) = Taken By, (c) = Cosigned By    Initials Name Provider Type     AMAURI Cook Occupational Therapist           Therapy Charges for Today     Code Description Service Date Service Provider Modifiers Qty    14449473522  OT SELF CARE/MGMT/TRAIN EA 15 MIN 1/4/2018 AMAURI Cook GO 3    [id number]  OT NEUROMUSC RE EDUCATION EA 15 MIN 1/4/2018 AMAURI Cook GO 1    [id number]  OT THER PROC EA 15 MIN 1/4/2018 AMAURI Cook GO 2               AMAURI Cook  1/4/2018

## 2018-01-04 NOTE — PROGRESS NOTES
Inpatient Rehabilitation Plan of Care Note    Plan of Care  Care Plan Reviewed - No updates at this time.    Sphincter Control    Performed Intervention(s)  medication    Signed by: Evelyne Ornelas RN

## 2018-01-04 NOTE — PROGRESS NOTES
Inpatient Rehabilitation Plan of Care Note    Plan of Care  Care Plan Reviewed - No updates at this time.    Psychosocial    Performed Intervention(s)  Therapeutic enviroment  verbalizes needs and concerns.      Safety    Performed Intervention(s)  Falls protocol  hourly rounds, items in reach  Bed alarm chair alarm      Sphincter Control    Performed Intervention(s)  Monitor intake and output.  medication    Signed by: Lilli Nova RN

## 2018-01-04 NOTE — PLAN OF CARE
Problem: Patient Care Overview (Adult)  Goal: Plan of Care Review  Outcome: Ongoing (interventions implemented as appropriate)   01/04/18 0681   Outcome Evaluation   Outcome Summary/Follow up Plan Pt has made improvements with functional status, but continues to have weakness in B LEs and trunk that require pt to have assist to perform daily activities and functional tasks. Pt needed min assist for transfers to stand and for ambulation of 160'. Pt's choice is to ambulate without an assist device, but without a device pt must have min assist provided from hand held assist on the R and assist from holding to the gait belt too.  Pt does not have full clearance of B feet when stepping due to dorsiflexor weakness. Pt would be much safer with gait if she would chose to use a rolling walker, and may need evaluation for AFOs to assist with DF to prevent falling after return to home with walking on carpet.Pt has significantly impaired static and dynamic standing balance. Her Tinetti score was 12/28 and OSORIO balance assessment is 10/56; both scores indicate pt is at high risk of falls.

## 2018-01-05 ENCOUNTER — TELEPHONE (OUTPATIENT)
Dept: INTERNAL MEDICINE | Facility: CLINIC | Age: 83
End: 2018-01-05

## 2018-01-05 PROCEDURE — 97535 SELF CARE MNGMENT TRAINING: CPT

## 2018-01-05 PROCEDURE — 25010000002 ENOXAPARIN PER 10 MG: Performed by: PHYSICAL MEDICINE & REHABILITATION

## 2018-01-05 PROCEDURE — 97110 THERAPEUTIC EXERCISES: CPT

## 2018-01-05 PROCEDURE — 94799 UNLISTED PULMONARY SVC/PX: CPT

## 2018-01-05 RX ADMIN — DOCUSATE SODIUM 100 MG: 100 CAPSULE, LIQUID FILLED ORAL at 08:21

## 2018-01-05 RX ADMIN — Medication 250 MG: at 08:21

## 2018-01-05 RX ADMIN — DOCUSATE SODIUM 100 MG: 100 CAPSULE, LIQUID FILLED ORAL at 20:36

## 2018-01-05 RX ADMIN — METOPROLOL SUCCINATE 25 MG: 25 TABLET, FILM COATED, EXTENDED RELEASE ORAL at 08:21

## 2018-01-05 RX ADMIN — LEVOTHYROXINE SODIUM 50 MCG: 50 TABLET ORAL at 06:24

## 2018-01-05 RX ADMIN — AMLODIPINE BESYLATE 5 MG: 5 TABLET ORAL at 08:21

## 2018-01-05 RX ADMIN — LOSARTAN POTASSIUM 100 MG: 50 TABLET, FILM COATED ORAL at 08:20

## 2018-01-05 RX ADMIN — Medication 250 MG: at 20:36

## 2018-01-05 RX ADMIN — Medication 3 MG: at 20:36

## 2018-01-05 RX ADMIN — ENOXAPARIN SODIUM 30 MG: 30 INJECTION SUBCUTANEOUS at 20:36

## 2018-01-05 NOTE — PLAN OF CARE
Problem: Inpatient Physical Therapy  Goal: Transfer Training Goal 2 LTG- PT  Outcome: Ongoing (interventions implemented as appropriate)   12/29/17 1225 01/05/18 1212   Transfer Training 2 PT LTG   Transfer Training PT 2 LTG, Date Established 12/29/17 --    Transfer Training PT 2 LTG, Time to Achieve 1 wk --    Transfer Training PT 2 LTG, Activity Type all transfers --    Transfer Training PT 2 LTG, [place] Level independent --    Transfer Training PT 2 LTG, Date Goal Reviewed --  01/05/18   Transfer Training PT 2 LTG, Outcome --  goal ongoing     Goal: Gait Training Goal LTG- PT  Outcome: Ongoing (interventions implemented as appropriate)   12/29/17 1225 01/05/18 1212   Gait Training PT LTG   Gait Training Goal PT LTG, Date Established 12/29/17 --    Gait Training Goal PT LTG, Time to Achieve 1 wk --    Gait Training Goal PT LTG, [place] Level independent --    Gait Training Goal PT LTG, Distance to Achieve 150 --    Gait Training Goal PT LTG, Date Goal Reviewed --  01/05/18   Gait Training Goal PT LTG, Outcome --  goal ongoing     Goal: Stair Training Goal LTG- PT  Outcome: Ongoing (interventions implemented as appropriate)   12/29/17 1225 01/05/18 1212   Stair Training PT LTG   Stair Training Goal PT LTG, Date Established 12/29/17 --    Stair Training Goal PT LTG, Time to Achieve 1 wk --    Stair Training Goal PT LTG, Number of Steps 12 --    Stair Training Goal PT LTG, [place] Level conditional independence --    Stair Training Goal PT LTG, Assist Device 1 handrail --    Stair Training Goal PT LTG, Date Goal Reviewed --  01/05/18   Stair Training Goal PT LTG, Outcome --  goal ongoing     Goal: Strength Goal LTG- PT  Outcome: Ongoing (interventions implemented as appropriate)   12/29/17 1225 01/05/18 1212   Strength Goal PT LTG   Strength Goal PT LTG, Date Established 12/29/17 --    Strength Goal PT LTG, Time to Achieve 1 wk --    Strength Goal PT LTG, Measure to Achieve B LE grossly 4-/5 with MMT  --    Strength Goal PT LTG, Date Goal Reviewed --  01/05/18   Strength Goal PT LTG, Outcome --  goal ongoing

## 2018-01-05 NOTE — PROGRESS NOTES
"   LOS: 8 days   Patient Care Team:  Tatiana Bellamy MD as PCP - General (Internal Medicine)  James Del Angel MD as Consulting Physician (Infectious Diseases)  Ezekiel Verde MD as Consulting Physician (Otolaryngology)  Aisha Curiel MD as Consulting Physician (Cardiology)  Sinan Braga MD as Consulting Physician (Pulmonary Disease)  Shawn Felton MD as Consulting Physician (Allergy)    Chief Complaint:   Guillain-Barré syndrome    Subjective     History of Present Illness    Subjective    Still with tingling in the feet but she feels that her hands are pretty much back to normal.  Still has some balance issues.  Continues work on her ambulation.    History taken from: patient    Objective     Vital Signs  Temp:  [97.5 °F (36.4 °C)-98.4 °F (36.9 °C)] 97.6 °F (36.4 °C)  Heart Rate:  [72-79] 72  Resp:  [14-16] 16  BP: (128-138)/(63-68) 128/63    Objective:  Vital signs: (most recent): Blood pressure 128/63, pulse 72, temperature 97.6 °F (36.4 °C), temperature source Oral, resp. rate 16, height 160 cm (63\"), weight 50.3 kg (110 lb 12.8 oz), last menstrual period 01/01/1993, SpO2 99 %, not currently breastfeeding.            Physical Exam  Mental status-awake alert rest  HEENT-sclera nonicteric, conjunctiva pink.     No JVD.  .  Ears unremarkable externally.  Lungs-Normal respirations.  No accessory muscle use.  Heart-regular rate and rhythm    Abdomen- nondistended  Extremities -without edema or cyanosis.  Neurologic-oriented ×4.  Good historian.        Face symmetric.  No dysarthria.     Motor exam shows to take resistance proximally distally in the bilateral upper extremities and lower extremities.         Results Review:     I reviewed the patient's new clinical results.    Results from last 7 days  Lab Units 01/04/18  0504 01/01/18  0851   WBC 10*3/mm3 7.11 6.55   HEMOGLOBIN g/dL 10.9* 11.0*   HEMATOCRIT % 34.8* 33.9*   PLATELETS 10*3/mm3 295 287             Invalid input(s): " LABALBU, PROT    Medication Review: done  Scheduled Meds:    amLODIPine 5 mg Oral Q24H   cetirizine 5 mg Oral Daily   docusate sodium 100 mg Oral BID   enoxaparin 30 mg Subcutaneous Q24H   levothyroxine 50 mcg Oral Q48H   levothyroxine 75 mcg Oral Every Other Day   losartan 100 mg Oral Q24H   metoprolol succinate XL 25 mg Oral Q24H   saccharomyces boulardii 250 mg Oral BID     Continuous Infusions:   PRN Meds:.•  acetaminophen  •  calcium carbonate  •  ipratropium-albuterol  •  melatonin  •  nitroglycerin  •  ondansetron **OR** ondansetron ODT **OR** ondansetron      Assessment/Plan     Active Problems:    Guillain Barré syndrome      Assessment & Plan  Guillain-Barré syndrome  S/p plasmapheresis  HTN- multi-drug regimen  DVT prophylaxis - SCDs/Lovenox  Hypothyroidism - on replacement.      82-year-old female previously independent with Guillain-Barré syndrome with numbness and weakness in her extremities with impairments with her mobility and self-care.  She's also had bradycardia.     She's completed 5 sessions of plasmapheresis.  Recent pneumonia.  Recent dysphagia.  Given her functional impairments and comorbidities, current recommendation is to pursue acute inpatient rehabilitation.  This would be a comprehensive inpatient rehabilitation program with physical medicine rehabilitation and ongoing physician follow-up monitoring her neurologic and pulmonary and cardiac status, occupational therapy for functional mobility and activities daily living and physical therapy for transfers balance gait and strengthening.  Therapy 3 hours a day 5 days a week, PT 1.5 hours and OT 1.5 hours 5 days a week. .  Rehabilitation nursing for carryover and monitoring of her bowel bladder, skin, cardiopulmonary status, neurologic status.  Weekly team conferences.  The patient will require precertification.  Features of the rehabilitation program were reviewed with the patient.  The patient's in agreement with this plan. Goal mod I.  Rehab prognosis fair. Medical prognosis fair. ELOS 10 days but only estimation.     January 2-transfers contact-guard.  Gait 160 feet contact guard with a rolling walker.  She still has some numbness in the hands and feet.  Tolerates therapies.  She feels he's making progress.    TEAM CONF - JAN 3 - transfers CTG..  Toilet transfers SBA. LB adls CTG SBA. Continent bowel and bladder.  ELOS- Jan 11.      Virgilio Amaya MD  01/05/18  4:01 PM    Time:

## 2018-01-05 NOTE — PROGRESS NOTES
Inpatient Rehabilitation Functional Measures Assessment    Functional Measures  KISHOR Eating:  [facility] Grooming: [facility] Bathing:  [facility] Upper Body Dressing:  [facility] Lower Body Dressing:  [facility] Toileting:  [facility] Bladder Management  Level of Assistance:  [place]  Frequency/Number of Accidents this Shift:  [facility] Bowel Management  Level of Assistance: [place]  Frequency/Number of Accidents this Shift: [facility] Bed/Chair/Wheelchair Transfer:  [facility] Toilet Transfer:  [facility] Tub/Shower Transfer:  [place]    Previously Documented Mode of Locomotion at Discharge: Field  KISHOR Expected Mode of Locomotion at Discharge: [facility] Walk/Wheelchair:  [facility] Stairs:  [facility] Comprehension:  Auditory comprehension is the usual mode. Comprehension  Score = 7, Independent.  Patient comprehends complex/abstract information in  their primary language.  Patient is completely independent for auditory  comprehension.  There are no activity limitations.  KISHOR Expression:  Vocal expression is the usual mode. Expression Score = 7,  Independent.  Patient expresses complex/abstract information in their primary  language.  Patient is completely independent for vocal expression.  There are no  activity limitations.  KISHOR Social Interaction:  Social Interaction Score = 6, Modified Independent.  Patient is modified independent for social interaction, requiring:  [facility] Problem Solving:  Problem Solving Score = 6, Modified [place].  Patient  makes appropriate decisions in order to solve complex problems, but requires  extra time.  KISHOR Memory:  Memory Score = 7, Independent.  Patient is completely independent  for memory.  There are no activity limitations.    Therapy Mode Minutes  Occupational Therapy: [place]  Physical Therapy: [place]  Speech Language Pathology:  [place]    Signed by: Lilli Nova RN

## 2018-01-05 NOTE — PLAN OF CARE
Problem: Patient Care Overview (Adult)  Goal: Plan of Care Review  Outcome: Ongoing (interventions implemented as appropriate)   01/05/18 0750 01/05/18 1057   Coping/Psychosocial Response Interventions   Plan Of Care Reviewed With patient --    Patient Care Overview   Progress --  no change   Outcome Evaluation   Outcome Summary/Follow up Plan --  Pt is transfering with an assistance of one staff and has had no c/o pain.     Goal: Adult Individualization and Mutuality  Outcome: Ongoing (interventions implemented as appropriate)    Goal: Discharge Needs Assessment  Outcome: Ongoing (interventions implemented as appropriate)      Problem: Fall Risk (Adult)  Goal: Absence of Falls  Outcome: Ongoing (interventions implemented as appropriate)      Problem: Mobility, Physical Impaired (Adult)  Goal: Enhanced Functionality Ability  Outcome: Ongoing (interventions implemented as appropriate)

## 2018-01-05 NOTE — PLAN OF CARE
Problem: Patient Care Overview (Adult)  Goal: Plan of Care Review  Outcome: Ongoing (interventions implemented as appropriate)   01/05/18 1214   Coping/Psychosocial Response Interventions   Plan Of Care Reviewed With patient   Patient Care Overview   Progress progress toward functional goals is gradual   Outcome Evaluation   Outcome Summary/Follow up Plan pnt is ambulating farther. She has improved with transfers and stairs. Pleasant and cooperative.

## 2018-01-05 NOTE — PROGRESS NOTES
Inpatient Rehabilitation Functional Measures Assessment and Plan of Care    Plan of Care  Updated Problems/Interventions  Field    Functional Measures  KISHOR Eating:  [facility] Grooming: [facility] Bathing:  [facility] Upper Body Dressing:  [facility] Lower Body Dressing:  [facility] Toileting:  [facility] Bladder Management  Level of Assistance:  [place]  Frequency/Number of Accidents this Shift:  [facility] Bowel Management  Level of Assistance: [place]  Frequency/Number of Accidents this Shift: [facility] Bed/Chair/Wheelchair Transfer:  [facility] Toilet Transfer:  [facility] Tub/Shower Transfer:  [place]    Previously Documented Mode of Locomotion at Discharge: Field  KISHOR Expected Mode of Locomotion at Discharge: [facility] Walk/Wheelchair:  [facility] Stairs:  [facility] Comprehension:  [facility] Expression:  [facility] Social Interaction:  [facility] Problem Solving:  [facility] Memory:  [place]    Therapy Mode Minutes  Occupational Therapy: Individual: 90 minutes.  Physical Therapy: [place]  Speech Language Pathology:  [place]    Signed by: AMAURI Rojas/MECHE

## 2018-01-05 NOTE — PROGRESS NOTES
Inpatient Rehabilitation Functional Measures Assessment and Plan of Care    Plan of Care  Updated Problems/Interventions  Field    Functional Measures  KISHOR Eating:  [facility] Grooming: [facility] Bathing:  [facility] Upper Body Dressing:  [facility] Lower Body Dressing:  [facility] Toileting:  [facility] Bladder Management  Level of Assistance:  [place]  Frequency/Number of Accidents this Shift:  [facility] Bowel Management  Level of Assistance: [place]  Frequency/Number of Accidents this Shift: [facility] Bed/Chair/Wheelchair Transfer:  Bed/chair/wheelchair Transfer Score = 4.  Patient performs 75% or more of effort and minimal assistance (little/incidental  help/lifting of one limb/steadying) for transferring to and from the  bed/chair/wheelchair, requiring: Contact guard. No assistive devices were  required.  KISHOR Toilet Transfer:  [facility] Tub/Shower Transfer:  [place]    Previously Documented Mode of Locomotion at Discharge: Field  KISHOR Expected Mode of Locomotion at Discharge: [facility] Walk/Wheelchair:  WHEELCHAIR OBSERVATION   Activity was not observed.    WALK OBSERVATION   Walk Distance Scale = 3.  Distance walked is greater than 150 feet. Walk Score  = 4.  Patient performs 75% or more of effort and requires minimal assistance.  Incidental help/contact guard/steadying was provided. Patient walked a distance  of  160 feet. No assistive devices were required.  KISHOR Stairs:  Stairs Score = 2.  Incidental assistance with lifting or lowering,  contact guard or steadying was provided. Patient performs 75% or more of effort  and requires minimal contact assistance. Patient negotiated  4 stairs. Patient  requires the following assistive device(s): Handrail(s).    KISHOR Comprehension:  [facility] Expression:  [facility] Social Interaction:  [facility] Problem Solving:  [facility] Memory:  [place]    Therapy Mode Minutes  Occupational Therapy: [place]  Physical Therapy: Individual: 90 minutes.  Speech  Language Pathology:  [place]    Signed by: Lakia Menjivar, PTA

## 2018-01-05 NOTE — TELEPHONE ENCOUNTER
----- Message from Lakia Glover MA sent at 1/5/2018  8:49 AM EST -----  [facility] calling to inform that pt will d/c next week  Will Dr Bellamy follow for home health orders    [facility] # [phone] ok to leave msg

## 2018-01-05 NOTE — THERAPY TREATMENT NOTE
Inpatient Rehabilitation - Occupational Therapy Treatment Note  [facility]     Patient Name: Stacie Vieira  : 1935  MRN: [id number]  Today's Date: 2018  Onset of Illness/Injury or Date of Surgery Date: 17     Referring Physician: Dr Houston      Admit Date: 2017    Visit Dx:     ICD-10-CM ICD-9-CM   1. Generalized weakness R53.1 780.79     Patient Active Problem List   Diagnosis   • Mycobacterium avium complex   • Essential hypertension   • Palpitations   • Allergic rhinitis   • Hypothyroidism   • Hyperlipidemia   • Community acquired pneumonia of left lower lobe of lung   • Acute respiratory failure with hypoxia   • Bilateral hand numbness   • DNR (do not resuscitate)   • Polyneuropathy   • Oropharyngeal dysphagia   • Esophageal dysmotility   • Severe malnutrition due to acute illness   • Guillain-[place] syndrome following vaccination   • Bradycardia   • Syncope   • Guillain Barré syndrome             Adult Rehabilitation Note       18 1524 18 0848 18 1315    Rehab Assessment/Intervention    Discipline occupational therapist  -CC physical therapy assistant  -LB physical therapist  -JK    Document Type therapy note (daily note)  -CC therapy note (daily note)  -LB therapy note (daily note)  -JK    Subjective Information agree to therapy  -CC agree to therapy  -LB agree to therapy  -JK    Patient Effort, Rehab Treatment good  -CC good  -LB good  -JK    Patient Effort, Rehab Treatment Comment   pt became tearful during therapy; expressing emotion  over events leading to rehab placement and concern over ability to go home independent'y  -JK    Symptoms Noted During/After Treatment   none  -JK    Precautions/Limitations fall precautions  -CC fall precautions  -LB fall precautions  -JK    Recorded by [CC] Jyoti Roblero OTR [LB] Lakia Menjivar PTA [JK] Darleen Lakhani, PT    Pain Assessment    Pain Assessment No/denies pain  -CC No/denies pain  -LB No/denies pain  -JK     Recorded by [CC] AMAURI Rojas [LB] Lakia Menjivar PTA [JK] Darleen Lakhani, PT    Vision Assessment/Intervention    Visual Impairment WFL with corrective lenses  -CC  WFL with corrective lenses  -JK    Recorded by [CC] AMAURI Rojas  [JK] Darleen Lakhani PT    Cognitive Assessment/Intervention    Current Cognitive/Communication Assessment functional  -CC  functional  -JK    Orientation Status oriented x 4  -CC  oriented x 4  -JK    Follows Commands/Answers Questions   100% of the time;able to follow multi-step instructions  -JK    Personal Safety WNL/WFL  -CC  WNL/WFL  -JK    Personal Safety Interventions fall prevention program maintained;gait belt  -CC fall prevention program maintained;gait belt;muscle strengthening facilitated;nonskid shoes/slippers when out of bed  -LB fall prevention program maintained;gait belt  -JK    Recorded by [CC] AMAURI Rojas [LB] Lakia Menjivar PTA [JK] Darleen Lakhain, PT    Bed Mobility, Assessment/Treatment    Bed Mobility, Roll Left, [place] independent  -CC      Bed Mob, Supine to Sit, [place] supervision required  -CC supervision required  -LB     Bed Mob, Sit to Supine, [place] supervision required  -CC supervision required  -LB     Bed Mobility, Comment   up in chair  -JK    Recorded by [CC] AMAURI Rojas [LB] Lakia Menjivar PTA [JK] Darleen Lakhani, PT    Transfer Assessment/Treatment    Transfers, Bed-Chair [place] contact guard assist  -CC contact guard assist  -LB minimum assist (75% patient effort);contact guard assist  -JK    Transfers, Chair-Bed [place]  contact guard assist  -LB minimum assist (75% patient effort);contact guard assist   from armless chair  -JK    Transfers, Bed-Chair-Bed, Assist Device   other (see comments)   none; arm rest of one chair  -JK    Transfers, Sit-Stand [place] contact guard assist  -CC contact guard assist  -LB minimum assist (75% patient effort);contact guard assist  -JK     Transfers, Stand-Sit [place] contact guard assist  -CC contact guard assist  -LB minimum assist (75% patient effort);contact guard assist  -JK    Transfers, Sit-Stand-Sit, Assist Device   other (see comments)   none  -JK    Toilet Transfer, [place] contact guard assist  -CC      Walk-In Shower Transfer, [place] contact guard assist  -CC      Walk-In Shower Transfer, Assist Device standard shower chair  -CC      Transfer, Safety Issues   weight-shifting ability decreased;balance decreased during turns;step length decreased  -JK    Transfer, Impairments strength decreased;impaired balance  -CC  impaired balance;strength decreased  -JK    Transfer, Comment  car tsf, CGA  -LB     Recorded by [CC] AMAURI Rojas [LB] Lakia Menjivar PTA [JK] Darleen Lakhani, PT    Gait Assessment/Treatment    Gait, [place] Level  contact guard assist  -LB minimum assist (75% patient effort);verbal cues required  -JK    Gait, Assistive Device  other (see comments)   none  -LB other (see comments)   HHA (min A) on R  -JK    Gait, Distance (Feet)  160   as far as 240 ft  -  -JK    Gait, Gait Deviations  kee decreased;forward flexed posture;bilateral:;decreased heel strike;toe-to-floor clearance decreased;step length decreased  -LB kee decreased;decreased heel strike;toe-to-floor clearance decreased;step length decreased;forward flexed posture   wide LEDA  -JK    Gait, Safety Issues   sequencing ability decreased;step length decreased;weight-shifting ability decreased;balance decreased during turns  -JK    Gait, Impairments   strength decreased;impaired balance  -JK    Gait, Comment  slight LOB x 1 when pnt got distracted  -LB Pt does not clear toes on R when advancing R LE and poor clearance L LE. Pt amb 80' with roll wx, CGA; poor clearance B LE when stepping.  -JK    Recorded by  [LB] Lakia Menjivar PTA [JK] Darleen Lakhani, PT    Stairs Assessment/Treatment    Number of Stairs  4  -LB curb    -JK    Stairs, Handrail Location  both sides  -LB other (see comments)   HHA on R  -JK    Stairs, [place] Level  contact guard assist  -LB minimum assist (75% patient effort);verbal cues required  -JK    Stairs, Technique Used  step over step (ascending);step over step (descending)  -LB     Stairs, Safety Issues   balance decreased during turns;sequencing ability decreased;weight-shifting ability decreased  -JK    Stairs, Impairments   strength decreased;impaired balance  -JK    Recorded by  [LB] Lakia Menjivar PTA [JK] Darleen Lakhani, PT    Functional Mobility    Functional Mobility- Comment   Vu 12/28; [place] 10/56  -JK    Recorded by   [JK] Darleen Lakhani, PT    Wheelchair Training/Management    Wheelchair Propulsion Training   forward propulsion;moving through doorways;steering wheelchair;turning wheelchair  -JK    Wheelchair, Distance Propelled   175  -JK    Wheelchair Training Comment   SBA for max VCs to steer and propel efficiently  -JK    Recorded by   [JK] Darleen Lakhani, PT    Upper Body Bathing Assessment/Training    UB Bathing Assess/Train Assistive Device hand-held shower head;shower chair with back  -CC      UB Bathing Assess/Train, Position sitting  -CC      UB Bathing Assess/Train, [place] Level set up required  -CC      Recorded by [CC] Jyoti Roblero OTR      Lower Body Bathing Assessment/Training    LB Bathing Assess/Train Assistive Device hand-held shower head;grab bars;shower chair with back  -CC      LB Bathing Assess/Train, Position standing;sitting  -CC      LB Bathing Assess/Train, [place] Level minimum assist (75% patient effort)  -CC      Recorded by [CC] AMAURI Rojas      Upper Body Dressing Assessment/Training    UB Dressing Assess/Train, Clothing Type donning:;bra;pull over  -CC      UB Dressing Assess/Train, Position sitting  -CC      UB Dressing Assess/Train, [place] supervision required  -CC      Recorded by [CC] Jyoti Roblero OTR      Lower  Body Dressing Assessment/Training    LB Dressing Assess/Train, Clothing Type donning:;doffing:  -CC      LB Dressing Assess/Train, Position sitting;standing  -CC      LB Dressing Assess/Train, [place] contact guard assist;supervision required  -CC      Recorded by [CC] Jyoti Roblero OTR      Toileting Assessment/Training    Toileting Assess/Train, Position sitting;standing  -CC      Toileting Assess/Train, [place] Level contact guard assist  -CC      Recorded by [CC] Jyoti Roblero OTR      Grooming Assessment/Training    Grooming Assess/Train, Position sitting;standing  -CC      Grooming Assess/Train, [place] Level contact guard assist  -CC      Recorded by [CC] AMAURI Rojas      Balance Skills Training    Sitting-Level of Assistance Independent  -CC      Standing-Level of Assistance Close supervision;Contact guard  -CC      Gait Balance-Level of Assistance  Contact guard;Minimum assistance  -LB     Gait Balance Support  No upper extremity supported  -LB     Gait Balance Activities  backwards;side-stepping  -LB     Recorded by [CC] Jyoti Roblero OTR [LB] Lakia Menjivar PTA     Therapy Exercises    Bilateral Lower Extremities  AROM:;20 reps;supine;sitting  -LB     Bilateral Upper Extremity --   arm bike 5 min x 2' yellow theraband 15x2; 1# wt 15x2 wrist  -CC      Recorded by [CC] Jyoti Roblero OTR [LB] Lakia Menjivar PTA     Positioning and Restraints    Pre-Treatment Position   sitting in chair/recliner  -JK    Post Treatment Position  wheelchair  -LB wheelchair  -JK    In Wheelchair  sitting;call light within reach  -LB with nsg;sitting  -JK    Recorded by  [LB] Lakia Menjivar PTA [JK] Darleen Lakhani, PT      01/04/18 1100 01/03/18 1608 01/03/18 1216    Rehab Assessment/Intervention    Discipline occupational therapist  -HC occupational therapist  -KP occupational therapist  -KP    Document Type therapy note (daily note)  -HC therapy note (daily note)  -KP therapy note (daily  "note)  -KP    Subjective Information agree to therapy;no complaints  -HC agree to therapy;no complaints  -KP agree to therapy;no complaints  -KP    Patient Effort, Rehab Treatment good  -HC good  -KP good  -KP    Symptoms Noted During/After Treatment fatigue;shortness of breath  -HC      Symptoms Noted Comment Pt teary eyed and concerned over \"the road ahead\" of her in regards to her overall decreased function.  -HC      Precautions/Limitations fall precautions  -HC fall precautions  -KP fall precautions  -KP    Recorded by [HC] AMAURI Cook [KP] Maggie Gilbert OTR [KP] Maggie Gilbert, ROSAR    Pain Assessment    Pain Assessment No/denies pain  -HC No/denies pain  -KP No/denies pain  -KP    Recorded by [HC] AMAURI Cook [KP] AMAURI Au [KP] Maggie Gilbert, OTR    Cognitive Assessment/Intervention    Current Cognitive/Communication Assessment functional  - functional  -KP functional  -KP    Orientation Status oriented x 4  -HC oriented x 4  -KP oriented x 4  -KP    Follows Commands/Answers Questions 100% of the time;able to follow single-step instructions  -% of the time;able to follow single-step instructions  -% of the time;able to follow single-step instructions  -KP    Personal Safety WNL/WFL  -HC WNL/WFL  -KP WNL/WFL  -KP    Personal Safety Interventions fall prevention program maintained;gait belt;nonskid shoes/slippers when out of bed  -HC fall prevention program maintained;gait belt;muscle strengthening facilitated;nonskid shoes/slippers when out of bed  -KP fall prevention program maintained;gait belt;nonskid shoes/slippers when out of bed  -KP    Recorded by [HC] AMAURI Cook [KP] Maggie Gilbert, OTR [KP] Maggie Gilbert, ROSAR    Bed Mobility, Assessment/Treatment    Bed Mobility, Assistive Device bed rails;head of bed elevated  -      Bed Mob, Supine to Sit, [place] conditional independence  - independent  -     Bed " Mob, Sit to Supine, [place] not tested  -HC not tested  -KP     Bed Mobility, Comment up in w/c in AM  -HC  NT pt up in wc  -KP    Recorded by [HC] AMAURI Cook [KP] AMAURI Au [KP] Maggie Gilbert, AMAURI    Transfer Assessment/Treatment    Transfers, Bed-Chair [place]  supervision required  -KP     Transfers, Sit-Stand [place] stand by assist  -HC stand by assist  -KP stand by assist  -KP    Transfers, Stand-Sit [place] stand by assist  -HC stand by assist  -KP stand by assist  -KP    Transfers, Sit-Stand-Sit, Assist Device other (see comments)   w/c  -HC other (see comments)   wc  -KP other (see comments)   wc.   -KP    Toilet Transfer, [place] supervision required;other (see comments)   transfer x2  -HC  supervision required  -KP    Toilet Transfer, Assistive Device other (see comments)   grab bars  -  other (see comments)   grab bars  -KP    Walk-In Shower Transfer, [place]   supervision required;set up required  -    Walk-In Shower Transfer, Assist Device   other (see comments)   tub bench. grab bar  -KP    Transfer, Comment sit<>stand multiple times at sink side during ADLs, CGA intermittently 2/2 increased fatigue  -HC      Recorded by [HC] AMAURI Cook [KP] AMAURI Au [KP] Maggie Gilbert, AMAURI    Functional Mobility    Functional Mobility- Ind[place] Level contact guard assist;set up required  -  set up required;contact guard assist  -    Functional Mobility-Distance (Feet) --   to BR from bedside and back  -      Functional Mobility- Comment   walks into BR , to toilet, to shower, to wc.   -KP    Recorded by [HC] AMAURI Cook  [KP] Maggie Gilbert, AMAURI    Upper Body Bathing Assessment/Training    UB Bathing Assess/Train Assistive Device   tub bench;hand-held shower head  -    UB Bathing Assess/Train, Position standing;sink side  -  sitting  -    UB Bathing Assess/Train, [place] Level stand by  assist;set up required  -HC  stand by assist;conditional independence  -    UB Bathing Assess/Train, Impairments strength decreased  -      UB Bathing Assess/Train, Comment pt stands to complete most of UB bathing  -  pt retireves clothes from her closet CGA to [facility]  -    Recorded by [HC] Rocío Strickland OTR  [KP] Maggie Gilbert, ROSAR    Lower Body Bathing Assessment/Training    LB Bathing Assess/Train Assistive Device   grab bars;hand-held shower head;tub bench  -    LB Bathing Assess/Train, Position sitting;standing;sink side  -HC  sitting;standing  -    LB Bathing Assess/Train, [place] Level supervision required;set up required  -HC  stand by assist  -    LB Bathing Assess/Train, Impairments impaired balance;strength decreased  -      LB Bathing Assess/Train, Comment stands to complete LB w/1 seated 2/2 to decreased endurance and increased fatigue  -HC      Recorded by [HC] AMAURI Cook  [KP] Maggie Gilbert, ROSAR    Upper Body Dressing Assessment/Training    UB Dressing Assess/Train, Clothing Type doffing:;donning:;bra;pull over;t-shirt  -  donning:;doffing:;bra;pull over  -    UB Dressing Assess/Train, Position sitting;sink side  -HC  sitting  -    UB Dressing Assess/Train, [place] conditional independence  -  conditional independence  -    UB Dressing Assess/Train, Comment pt sits to complete 2/2 to decreased endurance and SOB; able to retrieve clothing from closet with CGA to [facility]      Recorded by [HC] AMAURI Cook  [KP] Maggie Gilbert, ROSAR    Lower Body Dressing Assessment/Training    LB Dressing Assess/Train, Clothing Type doffing:;donning:;shoes;pants;slipper socks   undergarments  -  doffing:;donning:;shoes;pants;slipper socks   undergarment  -    LB Dressing Assess/Train, Position sitting;standing;sink side  -HC  sitting;standing  -    LB Dressing Assess/Train, [place] supervision required;contact guard assist  -   supervision required  -    LB Dressing Assess/Train, Impairments impaired balance;strength decreased  -      LB Dressing Assess/Train, Comment pt initiates LB dressind in standing starting with undergarments, then needs to sit to complete 2/2 fatigue; CGA to SBA in standing to hike garments over hips  -HC      Recorded by [HC] AMAURI Cook  [KP] Maggie Gilbert OTR    Toileting Assessment/Training    Toileting Assess/Train, Assistive Device grab bars  -  grab bars  -    Toileting Assess/Train, Position sitting  -  standing;sitting  -    Toileting Assess/Train, [place] Level supervision required  -  supervision required  -    Toileting Assess/Train, Impairments impaired balance;strength decreased  -      Toileting Assess/Train, Comment toileting x2  -HC      Recorded by [HC] AMAURI Cook  [KP] Maggie Gilbert, ROSAR    Grooming Assessment/Training    Grooming Assess/Train, Position standing;sink side  -HC  sitting  -    Grooming Assess/Train, [place] Level supervision required  -  supervision required  -    Grooming Assess/Train, Impairments strength decreased;impaired balance  -      Grooming Assess/Train, Comment oral hygiene  -HC      Recorded by [HC] AMAURI Cook  [KP] AMAURI Au    Motor Skills/Interventions    Additional Documentation Functional Endurance (Group)  -HC      Recorded by [] AMAURI oCok      Balance Skills Training    Sitting-Level of Assistance Distant supervision  -  Distant supervision  -    Sitting-Balance Support Feet supported  -  Feet supported  -    Sitting-Balance Activities   Reaching for objects  -    Standing-Level of Assistance Close supervision;Contact guard  - Close supervision  - Close supervision;Contact guard   CGA./HHA for walking  -    Static Standing Balance Support No upper extremity supported  - --   intermittent UE support  - Right upper extremity supported;No upper extremity  supported   intermittent UE support during shower, toileting  -    Standing-Balance Activities Weight Shift A-P;Ball toss;Retrieve object from floor  - Reaching for objects;Weight Shift R-L;Lateral lean  -     Recorded by [HC] AMAURI Cook [KP] AMAURI Au [KP] Maggie Gilbert, ROSAR    Therapy Exercises    Bilateral Upper Extremity AROM:;30 reps;sitting;elbow flexion/extension;pronation/supination;shoulder abduction/adduction;shoulder extension/flexion   1# hand weight for 3x10; chest press and overhead press  -      BUE Resistance  other (comment);theraputty   1# hand wt 10x3, yellow digi flex 10x2,yellow putty pinch  -     Recorded by [HC] AMAURI Cook [KP] AMAURI Au     Functional Endurance    Detail (Functional Endurance) fair  -      Recorded by [HC] AMAURI Cook      Positioning and Restraints    Pre-Treatment Position sitting in chair/recliner  - in bed  - sitting in chair/recliner  -    Post Treatment Position wheelchair  - wheelchair  - wheelchair  -    In Wheelchair sitting;call light within reach;encouraged to call for assist  - sitting;call light within reach;encouraged to call for assist   w. her  present  - sitting;encouraged to call for assist;call light within reach  -    Recorded by [HC] AMAURI Cook [KP] AMAURI Au [KP] AMAURI Au      01/03/18 0950 01/02/18 1536       Rehab Assessment/Intervention    Discipline physical therapy assistant  -LB occupational therapist  -CC     Document Type therapy note (daily note)  -LB therapy note (daily note)  -CC     Subjective Information agree to therapy  -LB no complaints;agree to therapy  -CC     Patient Effort, Rehab Treatment good  -LB good  -CC     Precautions/Limitations fall precautions  -LB fall precautions  -CC     Recorded by [LB] Lakia Menjivar PTA [CC] AMAURI Rojas     Pain Assessment    Pain Assessment  No/denies pain  -LB No/denies pain  -CC     Recorded by [LB] Lakia Menjivar PTA [CC] Jyoti Roblero, OTR     Cognitive Assessment/Intervention    Personal Safety Interventions fall prevention program maintained;gait belt;muscle strengthening facilitated;nonskid shoes/slippers when out of bed  -LB fall prevention program maintained  -CC     Recorded by [LB] Lakia Menjivar PTA [CC] Jyoti Roblero, OTR     Bed Mobility, Assessment/Treatment    Bed Mobility, Assistive Device bed rails;head of bed elevated  -LB      Bed Mob, Supine to Sit, [place] conditional independence  -LB      Bed Mob, Sit to Supine, [place] conditional independence  -LB      Bed Mobility, Comment  --   in w/c  -CC     Recorded by [LB] Lakia Menjivar PTA [CC] Jyoti Roblero OTR     Transfer Assessment/Treatment    Transfers, Bed-Chair [place] contact guard assist;stand by assist  -LB      Transfers, Chair-Bed [place] contact guard assist;stand by assist  -LB      Transfers, Sit-Stand [place] stand by assist  -LB      Transfers, Stand-Sit [place] stand by assist  -LB      Recorded by [LB] Lakia Menjivar PTA      Gait Assessment/Treatment    Gait, [place] Level contact guard assist  -LB      Gait, Assistive Device other (see comments)   none  -LB      Gait, Distance (Feet) 160  -LB      Gait, Gait Deviations kee decreased;forward flexed posture;bilateral:;toe-to-floor clearance decreased;step length decreased;decreased heel strike  -LB      Recorded by [LB] Lakia Menjivar PTA      Stairs Assessment/Treatment    Number of Stairs 4  -LB      Stairs, Handrail Location both sides  -LB      Stairs, [place] Level minimum assist (75% patient effort)  -LB      Stairs, Technique Used step over step (ascending);step over step (descending)  -LB      Recorded by [LB] Lakia Menjivar PTA      ADL Assessment/Intervention    Additional Documentation  --   refused this date  -CC     Recorded by   [CC] Jyoti Roblero OTR     Balance Skills Training    Standing-Level of Assistance Contact guard  -LB      Static Standing Balance Support No upper extremity supported  -LB      Standing-Balance Activities --   batting a balloon  -LB      Recorded by [LB] Lakia Menjivar PTA      Therapy Exercises    Bilateral Lower Extremities AROM:;20 reps;supine;ankle pumps/circles;hip abduction/adduction;SAQ   hhel slides w small ball under foot.  -LB      Bilateral Upper Extremity  --   arm bike 3 min x 2; dowel 2# 10x3  -CC     BUE Resistance  --   yello wtheraband 10x3; 1# hand wt 10x3; hand helper 20  -CC     Recorded by [LB] Lakia Menjivar PTA [CC] AMAURI Rojas     Positioning and Restraints    Post Treatment Position wheelchair  -LB wheelchair  -CC     In Wheelchair sitting;call light within reach  -LB sitting;call light within reach;with family/caregiver;encouraged to call for assist  -CC     Recorded by [LB] Lakia Menjivar PTA [CC] AMAURI Rojas       User Key  (r) = Recorded By, (t) = Taken By, (c) = Cosigned By    Initials Name Effective Dates    CC Jyoti Roblero, OTR 04/13/15 -     KP Maggie Gilbert, OTR 04/13/15 -     LB Lakia Menjivar, DEJA 02/18/16 -     OZ Lakhani, PT 12/01/15 -     HC Rocío Strickland, OTR 08/17/17 -                 OT Goals       12/29/17 1511 12/27/17 1545       Transfer Training OT STG    Transfer Training OT STG, Date Established 12/29/17  -KP      Transfer Training OT STG, Time to Achieve 5 - 7 days  -KP      Transfer Training OT STG, Activity Type toilet;sit to stand/stand to sit;shower chair;walk-in shower  -KP      Transfer Training OT STG, [place] Level supervision required  -KP      Transfer Training OT STG, Assist Device shower chair  -KP      Transfer Training OT LTG    Transfer Training OT LTG, Date Established 12/29/17  -KP 12/27/17  -AF     Transfer Training OT LTG, Time to Achieve by discharge  -KP by discharge  -AF     Transfer  Training OT LTG, Activity Type toilet;walk-in shower;shower chair;sit to stand/stand to sit  - toilet  -AF     Transfer Training OT LTG, [place] Level independent;conditional independence  - supervision required  -AF     Transfer Training OT LTG, Assist Device shower chair  -      Transfer Training OT LTG, Additional Goal  appropriate AD  -AF     Transfer Training OT LTG, Outcome  goal ongoing  -AF     Strength OT STG    Strength Goal OT STG, Date Established 12/29/17  -      Strength Goal OT STG, Time to Achieve 5 - 7 days  -KP      Strength Goal OT STG, Measure to Achieve to increase B UE to 4/5  -KP      Strength Goal OT STG, Functional Goal to increase functional tsf and self-care skills  -      Strength OT LTG    Strength Goal OT LTG, Date Established 12/29/17  -      Strength Goal OT LTG, Time to Achieve by discharge  -      Strength Goal OT LTG, Measure to Achieve to increase to 4+/5   -KP      Strength Goal OT LTG, Functional Goal to increase functional tsf and self-care skills  -      Dynamic Standing Balance OT STG    Dynamic Standing Balance OT STG, Date Established 12/29/17  -      Dynamic Standing Balance OT STG, Time to Achieve 5 - 7 days  -      Dynamic Standing Balance OT STG, [place] Level supervision required  -      Dynamic Standing Balance OT STG, Assist Device UE Support   to increase independence w. self care and IADLs  -      Dynamic Standing Balance OT LTG    Dynamic Standing Balance OT LTG, Date Established 12/29/17  -      Dynamic Standing Balance OT LTG, Time to Achieve by discharge  -      Dynamic Standing Balance OT LTG, [place] Level independent;conditional independence  -      Patient Education OT LTG    Patient Education OT LTG, Date Established 12/29/17  -      Patient Education OT LTG, Time to Achieve by discharge  -      Patient Education OT LTG, Education Type written program;HEP;home safety  -      Patient Education OT LTG,  Education Understanding independent;demonstrates adequately;verbalizes understanding  -KP      ADL OT STG    ADL OT STG, Date Established 12/29/17  -      ADL OT STG, Time to Achieve 1 wk  -KP      ADL OT STG, Activity Type ADL skills  -KP      ADL OT STG, [place] Level standby assist;setup;assistive device  -KP      ADL OT LTG    ADL OT LTG, Date Established 12/29/17  -KP 12/27/17  -AF     ADL OT LTG, Time to Achieve by discharge  -KP by discharge  -AF     ADL OT LTG, Activity Type ADL skills  -KP ADL skills  -AF     ADL OT LTG, [place] Level independent;independent with device;assistive device  -KP standby assist  -AF     ADL OT LTG, Outcome  goal ongoing  -AF     Functional Mobility OT LTG    Functional Mobility Goal OT LTG, Date Established  12/27/17  -AF     Functional Mobility Goal OT LTG, Time to Achieve  by discharge  -AF     Functional Mobility Goal OT LTG, [place] Level  supervision  -AF     Functional Mobility Goal OT LTG, Assist Device  appropriate AD  -AF     Functional Mobility Goal OT LTG, Distance to Achieve  to the bathroom  -AF     Functional Mobility Goal OT LTG, Outcome  goal ongoing  -AF     Endurance OT LTG    Endurance Goal OT LTG, Date Established 12/29/17  -      Endurance Goal OT LTG, Time to Achieve by discharge  -KP      Endurance Goal OT LTG, Activity Level to increase;endurance 2 good  -KP        User Key  (r) = Recorded By, (t) = Taken By, (c) = Cosigned By    Initials Name Provider Type     Maggie Gilbert, OTR Occupational Therapist    AF Elissa Malone, OTR Occupational Therapist          Occupational Therapy Education     Title: PT OT SLP Therapies (Active)     Topic: Occupational Therapy (Active)     Point: ADL training (Done)    Description: Instruct learner(s) on proper safety adaptation and remediation techniques during self care or transfers.   Instruct in proper use of assistive devices.    Learning Progress Summary    Learner Readiness Method  Response Comment Documented by Status   Patient Acceptance E,TB,D VU,DU ed pt on role of OT, benefit of therapy. POC w. OT.  12/29/17 1510 Done               Point: Precautions (Done)    Description: Instruct learner(s) on prescribed precautions during self-care and functional transfers.    Learning Progress Summary    Learner Readiness Method Response Comment Documented by Status   Patient Acceptance E,TB,D VU,DU ed pt on role of OT, benefit of therapy. POC w. OT.  12/29/17 1510 Done               Point: Body mechanics (Done)    Description: Instruct learner(s) on proper positioning and spine alignment during self-care, functional mobility activities and/or exercises.    Learning Progress Summary    Learner Readiness Method Response Comment Documented by Status   Patient Acceptance E,TB,D VU,DU ed pt on role of OT, benefit of therapy. POC w. OT.  12/29/17 1510 Done                      User Key     Initials Effective Dates Name Provider Type Discipline     04/13/15 -  Maggie Gilbert OTPERNELL Occupational Therapist OT                  OT Recommendation and Plan  Anticipated Discharge Disposition: home  Planned Therapy Interventions: activity intolerance, ADL retraining, balance training, IADL retraining, home exercise program, strengthening, transfer training  Therapy Frequency: 5 times/wk  Plan of Care Review  Outcome Summary/Follow up Plan: Pt showing steady progress. Increased activity tolerance and balance noted. ADLs at SBA/CGA level.        Time Calculation:         Time Calculation- OT       01/05/18 1430 01/05/18 1030       Time Calculation- OT    OT Start Time 1430  -CC 1030  -CC     OT Stop Time 1500  -CC 1130  -CC     OT Time Calculation (min) 30 min  -CC 60 min  -CC       User Key  (r) = Recorded By, (t) = Taken By, (c) = Cosigned By    Initials Name Provider Type    CC Jyoti Roblero OTR Occupational Therapist           Therapy Charges for Today     Code Description Service Date Service  Provider Modifiers Qty    03243973317 HC OT THER PROC EA 15 MIN 1/5/2018 Jyoti Roblero OTR GO 2    23479413856 HC OT SELF CARE/MGMT/TRAIN EA 15 MIN 1/5/2018 AMAURI Rojas GO 4               AMAURI Rojas  1/5/2018

## 2018-01-05 NOTE — THERAPY TREATMENT NOTE
"Inpatient Rehabilitation - Physical Therapy Treatment Note  [facility]     Patient Name: Stacie Vieira  : 1935  MRN: [id number]  Today's Date: 2018  Onset of Illness/Injury or Date of Surgery Date: 17  Date of Referral to PT: 17  Referring Physician: Dr Houston    Admit Date: 2017    Visit Dx:    ICD-10-CM ICD-9-CM   1. Generalized weakness R53.1 780.79     Patient Active Problem List   Diagnosis   • Mycobacterium avium complex   • Essential hypertension   • Palpitations   • Allergic rhinitis   • Hypothyroidism   • Hyperlipidemia   • Community acquired pneumonia of left lower lobe of lung   • Acute respiratory failure with hypoxia   • Bilateral hand numbness   • DNR (do not resuscitate)   • Polyneuropathy   • Oropharyngeal dysphagia   • Esophageal dysmotility   • Severe malnutrition due to acute illness   • Guillain-[place] syndrome following vaccination   • Bradycardia   • Syncope   • Guillain Barré syndrome               Adult Rehabilitation Note       18 0848 18 1315 18 1100    Rehab Assessment/Intervention    Discipline physical therapy assistant  -LB physical therapist  -JK occupational therapist  -HC    Document Type therapy note (daily note)  -LB therapy note (daily note)  -JK therapy note (daily note)  -HC    Subjective Information agree to therapy  -LB agree to therapy  -JK agree to therapy;no complaints  -HC    Patient Effort, Rehab Treatment good  -LB good  -JK good  -HC    Patient Effort, Rehab Treatment Comment  pt became tearful during therapy; expressing emotion  over events leading to rehab placement and concern over ability to go home independent'y  -JK     Symptoms Noted During/After Treatment  none  -JK fatigue;shortness of breath  -HC    Symptoms Noted Comment   Pt teary eyed and concerned over \"the road ahead\" of her in regards to her overall decreased function.  -HC    Precautions/Limitations fall precautions  -LB fall precautions  -JK fall " precautions  -HC    Recorded by [LB] Lakia Menjivar PTA [JK] Darleen Lakhani, PT [HC] Rocío Strickland, OTR    Pain Assessment    Pain Assessment No/denies pain  -LB No/denies pain  -JK No/denies pain  -HC    Recorded by [LB] Lakia Menjivar PTA [JK] Darleen Lakhani, PT [HC] Rocío Strickland, OTR    Vision Assessment/Intervention    Visual Impairment  WFL with corrective lenses  -JK     Recorded by  [JK] Darleen Lakhani PT     Cognitive Assessment/Intervention    Current Cognitive/Communication Assessment  functional  -JK functional  -HC    Orientation Status  oriented x 4  -JK oriented x 4  -HC    Follows Commands/Answers Questions  100% of the time;able to follow multi-step instructions  -% of the time;able to follow single-step instructions  -HC    Personal Safety  WNL/WFL  -JK WNL/WFL  -HC    Personal Safety Interventions fall prevention program maintained;gait belt;muscle strengthening facilitated;nonskid shoes/slippers when out of bed  -LB fall prevention program maintained;gait belt  -JK fall prevention program maintained;gait belt;nonskid shoes/slippers when out of bed  -HC    Recorded by [LB] Lakia Menjivar PTA [JK] Darleen Lakhani, PT [HC] Rocío Strickland, ROSAR    Bed Mobility, Assessment/Treatment    Bed Mobility, Assistive Device   bed rails;head of bed elevated  -HC    Bed Mob, Supine to Sit, [place] supervision required  -LB  conditional independence  -HC    Bed Mob, Sit to Supine, [place] supervision required  -LB  not tested  -HC    Bed Mobility, Comment  up in chair  -JK up in w/c in AM  -HC    Recorded by [LB] Lakia Menjivar PTA [JK] Darleen Lakhani, PT [HC] Rocío Strickland, OTR    Transfer Assessment/Treatment    Transfers, Bed-Chair [place] contact guard assist  -LB minimum assist (75% patient effort);contact guard assist  -JK     Transfers, Chair-Bed [place] contact guard assist  -LB minimum assist (75% patient effort);contact guard assist   from armless chair  -JK      Transfers, Bed-Chair-Bed, Assist Device  other (see comments)   none; arm rest of one chair  -JK     Transfers, Sit-Stand [place] contact guard assist  -LB minimum assist (75% patient effort);contact guard assist  -JK stand by assist  -HC    Transfers, Stand-Sit [place] contact guard assist  -LB minimum assist (75% patient effort);contact guard assist  -JK stand by assist  -HC    Transfers, Sit-Stand-Sit, Assist Device  other (see comments)   none  -JK other (see comments)   w/c  -HC    Toilet Transfer, [place]   supervision required;other (see comments)   transfer x2  -HC    Toilet Transfer, Assistive Device   other (see comments)   grab bars  -HC    Transfer, Safety Issues  weight-shifting ability decreased;balance decreased during turns;step length decreased  -JK     Transfer, Impairments  impaired balance;strength decreased  -JK     Transfer, Comment car tsf, CGA  -LB  sit<>stand multiple times at sink side during ADLs, CGA intermittently 2/2 increased fatigue  -HC    Recorded by [LB] Lakia Menjivar, PTA [JK] Darleen Lakhani, PT [HC] AMAURI Cook    Gait Assessment/Treatment    Gait, [place] Level contact guard assist  -LB minimum assist (75% patient effort);verbal cues required  -JK     Gait, Assistive Device other (see comments)   none  -LB other (see comments)   HHA (min A) on R  -JK     Gait, Distance (Feet) 160   as far as 240 ft  -  -JK     Gait, Gait Deviations kee decreased;forward flexed posture;bilateral:;decreased heel strike;toe-to-floor clearance decreased;step length decreased  -LB kee decreased;decreased heel strike;toe-to-floor clearance decreased;step length decreased;forward flexed posture   wide LEDA  -JK     Gait, Safety Issues  sequencing ability decreased;step length decreased;weight-shifting ability decreased;balance decreased during turns  -JK     Gait, Impairments  strength decreased;impaired balance  -JK     Gait, Comment slight LOB x 1 when pnt  got distracted  -LB Pt does not clear toes on R when advancing R LE and poor clearance L LE. Pt amb 80' with roll wx, CGA; poor clearance B LE when stepping.  -JK     Recorded by [LB] Lakia Menjivar PTA [JK] Darleen Lakhani, PT     Stairs Assessment/Treatment    Number of Stairs 4  -LB curb   -JK     Stairs, Handrail Location both sides  -LB other (see comments)   HHA on R  -JK     Stairs, [place] Level contact guard assist  -LB minimum assist (75% patient effort);verbal cues required  -JK     Stairs, Technique Used step over step (ascending);step over step (descending)  -LB      Stairs, Safety Issues  balance decreased during turns;sequencing ability decreased;weight-shifting ability decreased  -JK     Stairs, Impairments  strength decreased;impaired balance  -JK     Recorded by [LB] Lakia Menjivar, PTA [JK] Darleen Lakhani, PT     Functional Mobility    Functional Mobility- Ind. Level   contact guard assist;set up required  -    Functional Mobility-Distance (Feet)   --   to BR from bedside and back  -    Functional Mobility- Comment  Vu 12/28; [place] 10/56  -JK     Recorded by  [JK] Darleen Lakhani, PT [HC] AMAURI Cook    Wheelchair Training/Management    Wheelchair Propulsion Training  forward propulsion;moving through doorways;steering wheelchair;turning wheelchair  -JK     Wheelchair, Distance Propelled  175  -JK     Wheelchair Training Comment  SBA for max VCs to steer and propel efficiently  -JK     Recorded by  [JK] Darleen Lakhani, PT     Upper Body Bathing Assessment/Training    UB Bathing Assess/Train, Position   standing;sink side  -    UB Bathing Assess/Train, [place] Level   stand by assist;set up required  -    UB Bathing Assess/Train, Impairments   strength decreased  -    UB Bathing Assess/Train, Comment   pt stands to complete most of UB bathing  -    Recorded by   [HC] AMAURI Cook    Lower Body Bathing Assessment/Training    LB Bathing Assess/Train,  Position   sitting;standing;sink side  -HC    LB Bathing Assess/Train, [place] Level   supervision required;set up required  -    LB Bathing Assess/Train, Impairments   impaired balance;strength decreased  -HC    LB Bathing Assess/Train, Comment   stands to complete LB w/1 seated 2/2 to decreased endurance and increased fatigue  -HC    Recorded by   [HC] AMAURI Cook    Upper Body Dressing Assessment/Training    UB Dressing Assess/Train, Clothing Type   doffing:;donning:;bra;pull over;t-shirt  -HC    UB Dressing Assess/Train, Position   sitting;sink side  -HC    UB Dressing Assess/Train, [place]   conditional independence  -HC    UB Dressing Assess/Train, Comment   pt sits to complete 2/2 to decreased endurance and SOB; able to retrieve clothing from closet with CGA to SBA  -HC    Recorded by   [HC] AMAURI Cook    Lower Body Dressing Assessment/Training    LB Dressing Assess/Train, Clothing Type   doffing:;donning:;shoes;pants;slipper socks   undergarments  -HC    LB Dressing Assess/Train, Position   sitting;standing;sink side  -HC    LB Dressing Assess/Train, [place]   supervision required;contact guard assist  -    LB Dressing Assess/Train, Impairments   impaired balance;strength decreased  -HC    LB Dressing Assess/Train, Comment   pt initiates LB dressind in standing starting with undergarments, then needs to sit to complete 2/2 fatigue; CGA to SBA in standing to hike garments over hips  -HC    Recorded by   [HC] AMAURI Cook    Toileting Assessment/Training    Toileting Assess/Train, Assistive Device   grab bars  -    Toileting Assess/Train, Position   sitting  -    Toileting Assess/Train, [place] Level   supervision required  -    Toileting Assess/Train, Impairments   impaired balance;strength decreased  -    Toileting Assess/Train, Comment   toileting x2  -HC    Recorded by   [HC] AMAURI Cook    Grooming Assessment/Training    Grooming Assess/Train,  Position   standing;sink side  -HC    Grooming Assess/Train, [place] Level   supervision required  -HC    Grooming Assess/Train, Impairments   strength decreased;impaired balance  -HC    Grooming Assess/Train, Comment   oral hygiene  -HC    Recorded by   [HC] AMAURI Cook    Motor Skills/Interventions    Additional Documentation   Functional Endurance (Group)  -HC    Recorded by   [HC] AMAURI Cook    Balance Skills Training    Sitting-Level of Assistance   Distant supervision  -HC    Sitting-Balance Support   Feet supported  -HC    Standing-Level of Assistance   Close supervision;Contact guard  -HC    Static Standing Balance Support   No upper extremity supported  -HC    Standing-Balance Activities   Weight Shift A-P;Ball toss;Retrieve object from floor  -HC    Gait Balance-Level of Assistance Contact guard;Minimum assistance  -LB      Gait Balance Support No upper extremity supported  -LB      Gait Balance Activities backwards;side-stepping  -LB      Recorded by [LB] Lakia Menjivar PTA  [HC] AMAURI Cook    Therapy Exercises    Bilateral Lower Extremities AROM:;20 reps;supine;sitting  -LB      Bilateral Upper Extremity   AROM:;30 reps;sitting;elbow flexion/extension;pronation/supination;shoulder abduction/adduction;shoulder extension/flexion   1# hand weight for 3x10; chest press and overhead press  -HC    Recorded by [LB] Lakia Menjivar PTA  [HC] AMAURI Cook    Functional Endurance    Detail (Functional Endurance)   fair  -HC    Recorded by   [HC] AMAURI Cook    Positioning and Restraints    Pre-Treatment Position  sitting in chair/recliner  -JK sitting in chair/recliner  -HC    Post Treatment Position wheelchair  -LB wheelchair  -JK wheelchair  -HC    In Wheelchair sitting;call light within reach  -LB with nsg;sitting  -JK sitting;call light within reach;encouraged to call for assist  -HC    Recorded by [LB] Lakia Menjivar PTA [JK] Darleen Lakhani PT [HC] Rocío  Marco A, OTR      01/03/18 1608 01/03/18 1216 01/03/18 0950    Rehab Assessment/Intervention    Discipline occupational therapist  -KP occupational therapist  -KP physical therapy assistant  -LB    Document Type therapy note (daily note)  - therapy note (daily note)  - therapy note (daily note)  -LB    Subjective Information agree to therapy;no complaints  -KP agree to therapy;no complaints  -KP agree to therapy  -LB    Patient Effort, Rehab Treatment good  -KP good  -KP good  -LB    Precautions/Limitations fall precautions  -KP fall precautions  - fall precautions  -LB    Recorded by [KP] Maggie Gilbert, OTR [KP] Maggie Gilbert, OTR [LB] Lakia Menjivar PTA    Pain Assessment    Pain Assessment No/denies pain  -KP No/denies pain  - No/denies pain  -LB    Recorded by [KP] Maggie Gilbert, OTR [KP] Maggie Gilbert, OTR [LB] Lakia Menjivar PTA    Cognitive Assessment/Intervention    Current Cognitive/Communication Assessment functional  - functional  -     Orientation Status oriented x 4  -KP oriented x 4  -KP     Follows Commands/Answers Questions 100% of the time;able to follow single-step instructions  -% of the time;able to follow single-step instructions  -     Personal Safety WNL/WFL  -KP WNL/WFL  -KP     Personal Safety Interventions fall prevention program maintained;gait belt;muscle strengthening facilitated;nonskid shoes/slippers when out of bed  -KP fall prevention program maintained;gait belt;nonskid shoes/slippers when out of bed  -KP fall prevention program maintained;gait belt;muscle strengthening facilitated;nonskid shoes/slippers when out of bed  -LB    Recorded by [KP] Maggie Gilbert, OTR [KP] Maggie Gilbert, OTR [LB] Lakia Menjivar PTA    Bed Mobility, Assessment/Treatment    Bed Mobility, Assistive Device   bed rails;head of bed elevated  -LB    Bed Mob, Supine to Sit, [place] independent  -KP  conditional independence  -LB     Bed Mob, Sit to Supine, [place] not tested  -KP  conditional independence  -LB    Bed Mobility, Comment  NT pt up in wc  -KP     Recorded by [KP] Maggie Gilbert, OTR [KP] Maggie Gilbert, OTR [LB] Lakia Menjivar PTA    Transfer Assessment/Treatment    Transfers, Bed-Chair [place] supervision required  -KP  contact guard assist;stand by assist  -LB    Transfers, Chair-Bed [place]   contact guard assist;stand by assist  -LB    Transfers, Sit-Stand [place] stand by assist  -KP stand by assist  -KP stand by assist  -LB    Transfers, Stand-Sit [place] stand by assist  -KP stand by assist  -KP stand by assist  -LB    Transfers, Sit-Stand-Sit, Assist Device other (see comments)   wc  -KP other (see comments)   wc.   -KP     Toilet Transfer, [place]  supervision required  -KP     Toilet Transfer, Assistive Device  other (see comments)   grab bars  -KP     Walk-In Shower Transfer, [place]  supervision required;set up required  -KP     Walk-In Shower Transfer, Assist Device  other (see comments)   tub bench. grab bar  -KP     Recorded by [KP] Maggie Gilbert, OTR [KP] Maggie Gilbert, OTR [LB] Lakia Menjivar PTA    Gait Assessment/Treatment    Gait, [place] Level   contact guard assist  -LB    Gait, Assistive Device   other (see comments)   none  -LB    Gait, Distance (Feet)   160  -LB    Gait, Gait Deviations   kee decreased;forward flexed posture;bilateral:;toe-to-floor clearance decreased;step length decreased;decreased heel strike  -LB    Recorded by   [LB] Lakia Menjivar PTA    Stairs Assessment/Treatment    Number of Stairs   4  -LB    Stairs, Handrail Location   both sides  -LB    Stairs, [place] Level   minimum assist (75% patient effort)  -LB    Stairs, Technique Used   step over step (ascending);step over step (descending)  -LB    Recorded by   [LB] Lakia Menjivar PTA    Functional Mobility    Functional Mobility- Ind. Level   set up required;contact guard assist  -KP     Functional Mobility- Comment  walks into BR , to toilet, to shower, to wc.   -KP     Recorded by  [KP] Maggie Gilbert OTR     Upper Body Bathing Assessment/Training    UB Bathing Assess/Train Assistive Device  tub bench;hand-held shower head  -KP     UB Bathing Assess/Train, Position  sitting  -KP     UB Bathing Assess/Train, [place] Level  stand by assist;conditional independence  -KP     UB Bathing Assess/Train, Comment  pt retireves clothes from her closet CGA to SBA  -KP     Recorded by  [KP] Maggie Gilbert, OTR     Lower Body Bathing Assessment/Training    LB Bathing Assess/Train Assistive Device  grab bars;hand-held shower head;tub bench  -KP     LB Bathing Assess/Train, Position  sitting;standing  -KP     LB Bathing Assess/Train, [place] Level  stand by assist  -KP     Recorded by  [KP] Maggie Gilbert, OTR     Upper Body Dressing Assessment/Training    UB Dressing Assess/Train, Clothing Type  donning:;doffing:;bra;pull over  -KP     UB Dressing Assess/Train, Position  sitting  -KP     UB Dressing Assess/Train, [place]  conditional independence  -KP     Recorded by  [KP] Maggie Gilbert, OTR     Lower Body Dressing Assessment/Training    LB Dressing Assess/Train, Clothing Type  doffing:;donning:;shoes;pants;slipper socks   undergarment  -KP     LB Dressing Assess/Train, Position  sitting;standing  -KP     LB Dressing Assess/Train, [place]  supervision required  -KP     Recorded by  [KP] Maggie Gilbert OTR     Toileting Assessment/Training    Toileting Assess/Train, Assistive Device  grab bars  -     Toileting Assess/Train, Position  standing;sitting  -KP     Toileting Assess/Train, [place] Level  supervision required  -KP     Recorded by  [KP] Maggie Gilbert, OTR     Grooming Assessment/Training    Grooming Assess/Train, Position  sitting  -KP     Grooming Assess/Train, [place] Level  supervision  required  -KP     Recorded by  [KP] Maggie Gilbert OTR     Balance Skills Training    Sitting-Level of Assistance  Distant supervision  -     Sitting-Balance Support  Feet supported  -     Sitting-Balance Activities  Reaching for objects  -     Standing-Level of Assistance Close supervision  - Close supervision;Contact guard   CGA./HHA for walking  - Contact guard  -LB    Static Standing Balance Support --   intermittent UE support  -KP Right upper extremity supported;No upper extremity supported   intermittent UE support during shower, toileting  - No upper extremity supported  -    Standing-Balance Activities Reaching for objects;Weight Shift R-L;Lateral lean  -  --   batting a balloon  -    Recorded by [KP] Maggie Gilbert OTR [KP] Maggie Gilbert, ROSAR [LB] Lakia Menjivar, DEJA    Therapy Exercises    Bilateral Lower Extremities   AROM:;20 reps;supine;ankle pumps/circles;hip abduction/adduction;SAQ   hhel slides w small ball under foot.  -LB    BUE Resistance other (comment);theraputty   1# hand wt 10x3, yellow digi flex 10x2,yellow putty pinch  -      Recorded by [KP] Maggie Gilbert, ROSAR  [LB] Lakia Menjivar, DEJA    Positioning and Restraints    Pre-Treatment Position in bed  - sitting in chair/recliner  -     Post Treatment Position wheelchair  - wheelchair  - wheelchair  -    In Wheelchair sitting;call light within reach;encouraged to call for assist   w. her  present  - sitting;encouraged to call for assist;call light within reach  - sitting;call light within reach  -LB    Recorded by [KP] Maggie Gilbert, ROSAR [KP] Maggie Gilbert, ROSAR [LB] Lakia Menjivar, DEJA      01/02/18 9096          Rehab Assessment/Intervention    Discipline occupational therapist  -CC      Document Type therapy note (daily note)  -CC      Subjective Information no complaints;agree to therapy  -CC      Patient Effort, Rehab Treatment good  -CC       Precautions/Limitations fall precautions  -CC      Recorded by [CC] Jyoti Roblero OTR      Pain Assessment    Pain Assessment No/denies pain  -CC      Recorded by [CC] AMAURI Rojas      Cognitive Assessment/Intervention    Personal Safety Interventions fall prevention program maintained  -CC      Recorded by [CC] AMAURI Rojas      Bed Mobility, Assessment/Treatment    Bed Mobility, Comment --   in w/c  -CC      Recorded by [CC] AMAURI Rojas      ADL Assessment/Intervention    Additional Documentation --   refused this date  -CC      Recorded by [CC] Jyoti Roblero OTR      Therapy Exercises    Bilateral Upper Extremity --   arm bike 3 min x 2; dowel 2# 10x3  -CC      BUE Resistance --   yello wtheraband 10x3; 1# hand wt 10x3; hand helper 20  -CC      Recorded by [CC] AMAURI Rojas      Positioning and Restraints    Post Treatment Position wheelchair  -CC      In Wheelchair sitting;call light within reach;with family/caregiver;encouraged to call for assist  -CC      Recorded by [CC] AMAURI Rojas        User Key  (r) = Recorded By, (t) = Taken By, (c) = Cosigned By    Initials Name Effective Dates    CC Jyoti Roblero, OTR 04/13/15 -     KP Maggie Gilbert, OTR 04/13/15 -     LB Lakia Menjivar, PTA 02/18/16 -     JK Darleen Lakhani, PT 12/01/15 -     HC Rocío Strickland, OTR 08/17/17 -                 IP PT Goals       01/05/18 1212 12/29/17 1225       Transfer Training 2 PT LTG    Transfer Training PT 2 LTG, Date Established  12/29/17  -KH     Transfer Training PT 2 LTG, Time to Achieve  1 wk  -KH     Transfer Training PT 2 LTG, Activity Type  all transfers  -KH     Transfer Training PT 2 LTG, [place] Level  independent  -KH     Transfer Training PT 2 LTG, Date Goal Reviewed 01/05/18  -LB      Transfer Training PT 2 LTG, Outcome goal ongoing  -LB      Gait Training PT LTG    Gait Training Goal PT LTG, Date Established  12/29/17  -KH     Gait Training Goal PT LTG,  Time to Achieve  1 wk  -KH     Gait Training Goal PT LTG, [place] Level  independent  -KH     Gait Training Goal PT LTG, Distance to Achieve  150  -KH     Gait Training Goal PT LTG, Date Goal Reviewed 01/05/18  -LB      Gait Training Goal PT LTG, Outcome goal ongoing  -LB      Stair Training PT LTG    Stair Training Goal PT LTG, Date Established  12/29/17  -KH     Stair Training Goal PT LTG, Time to Achieve  1 wk  -KH     Stair Training Goal PT LTG, Number of Steps  12  -KH     Stair Training Goal PT LTG, [place] Level  conditional independence  -KH     Stair Training Goal PT LTG, Assist Device  1 handrail  -KH     Stair Training Goal PT LTG, Date Goal Reviewed 01/05/18  -LB      Stair Training Goal PT LTG, Outcome goal ongoing  -LB      Strength Goal PT LTG    Strength Goal PT LTG, Date Established  12/29/17  -KH     Strength Goal PT LTG, Time to Achieve  1 wk  -KH     Strength Goal PT LTG, Measure to Achieve  B LE grossly 4-/5 with MMT  -KH     Strength Goal PT LTG, Date Goal Reviewed 01/05/18  -LB      Strength Goal PT LTG, Outcome goal ongoing  -LB        User Key  (r) = Recorded By, (t) = Taken By, (c) = Cosigned By    Initials Name Provider Type    MELISA Menjivar, PTA Physical Therapy Assistant    TEMO Rosario, PT Physical Therapist          Physical Therapy Education     Title: PT OT SLP Therapies (Active)     Topic: Physical Therapy (Active)     Point: Mobility training (Done)    Learning Progress Summary    Learner Readiness Method Response Comment Documented by Status   Patient Acceptance E MARCELINO,NR  LB 01/05/18 7175 Done    Acceptance E,D HERMINIO BENSON Spoke at length with pt about usefulness of using a roll wx and WC upon return to home since pt will be independent to ensure pt safety and mobility with lessened risk of falls. JK 01/04/18 1323 Done    Acceptance E MARCELINO,NR  LB 01/03/18 1449 Done    Acceptance E MARCELINO,NR  LB 01/02/18 1312 Done    Acceptance E MARCELINO Educated patient on safe hand placement and  body mechanics with basic mobility including bed mobility, transfers, gait, and stairs  12/29/17 1224 Done               Point: Body mechanics (Done)    Learning Progress Summary    Learner Readiness Method Response Comment Documented by Status   Patient Acceptance NAHEED MARCELINO Educated patient on safe hand placement and body mechanics with basic mobility including bed mobility, transfers, gait, and stairs  12/29/17 1224 Done               Point: Precautions (Done)    Learning Progress Summary    Learner Readiness Method Response Comment Documented by Status   Patient Acceptance PURNIMA FARFAN MD 01/01/18 1219 Done    Acceptance PURNIMA FARFAN MD 12/30/17 0932 Done                      User Key     Initials Effective Dates Name Provider Type Discipline     02/18/16 -  Lakia Menjivar, PTA Physical Therapy Assistant PT    JK 12/01/15 -  Darleen Lakhani, PT Physical Therapist PT    MD 12/01/15 -  Jennifer Vera, PT Physical Therapist PT     06/22/16 -  Maria Del Carmen Rosario, PT Physical Therapist PT                    PT Recommendation and Plan  Anticipated Discharge Disposition: home with home health  Planned Therapy Interventions: balance training, bed mobility training, gait training, home exercise program, stair training, strengthening, stretching, transfer training, patient/family education, postural re-education  PT Frequency: 2 times/day  Plan of Care Review  Plan Of Care Reviewed With: patient  Progress: progress toward functional goals is gradual  Outcome Summary/Follow up Plan: pnt is ambulating farther. She has improved with transfers and stairs. Pleasant and cooperative.         Time Calculation:         PT Charges       01/05/18 1249 01/05/18 1216 01/05/18 0848    Time Calculation    Start Time 1230  -LB  0830  -LB    Stop Time 1300  -LB  0930  -LB    Time Calculation (min) 30 min  -LB  60 min  -LB    PT Goal Re-Cert Due Date  01/12/18  -LB       User Key  (r) = Recorded By, (t) = Taken By, (c) = Cosigned By    Initials Name  Provider Type    LB Lakia Menjivar PTA Physical Therapy Assistant          Therapy Charges for Today     Code Description Service Date Service Provider Modifiers Qty    06649771452 HC PT THER PROC EA 15 MIN 1/5/2018 Lakia Menjivar PTA GP 6               Lakia Menjivar PTA  1/5/2018

## 2018-01-05 NOTE — PLAN OF CARE
Problem: Patient Care Overview (Adult)  Goal: Plan of Care Review  Outcome: Ongoing (interventions implemented as appropriate)   01/05/18 0115   Coping/Psychosocial Response Interventions   Plan Of Care Reviewed With patient   Patient Care Overview   Progress progress toward functional goals as expected   Outcome Evaluation   Outcome Summary/Follow up Plan Patient pleasant and cooperative, no complaints of pain. Melatonin taken last night with good results. No safety concerns or other issues at this time       Problem: Fall Risk (Adult)  Goal: Absence of Falls  Outcome: Ongoing (interventions implemented as appropriate)   01/05/18 0115   Fall Risk (Adult)   Absence of Falls making progress toward outcome       Problem: Mobility, Physical Impaired (Adult)  Goal: Enhanced Functionality Ability  Outcome: Ongoing (interventions implemented as appropriate)   01/05/18 0115   Mobility, Physical Impaired (Adult)   Enhanced Functionality Ability making progress toward outcome

## 2018-01-05 NOTE — PROGRESS NOTES
Inpatient Rehabilitation Functional Measures Assessment    Functional Measures  KISHOR Eating:  [facility] Grooming: [facility] Bathing:  [facility] Upper Body Dressing:  [facility] Lower Body Dressing:  [facility] Toileting:  [facility] Bladder Management  Level of Assistance:  [place]  Frequency/Number of Accidents this Shift:  [facility] Bowel Management  Level of Assistance: [place]  Frequency/Number of Accidents this Shift: [facility] Bed/Chair/Wheelchair Transfer:  [facility] Toilet Transfer:  [facility] Tub/Shower Transfer:  [place]    Previously Documented Mode of Locomotion at Discharge: Field  KISHOR Expected Mode of Locomotion at Discharge: [facility] Walk/Wheelchair:  [facility] Stairs:  [facility] Comprehension:  Auditory comprehension is the usual mode. Comprehension  Score = 7, Independent.  Patient comprehends complex/abstract information in  their primary language.  Patient is completely independent for auditory  comprehension.  There are no activity limitations.  KISHOR Expression:  Vocal expression is the usual mode. Expression Score = 7,  Independent.  Patient expresses complex/abstract information in their primary  language.  Patient is completely independent for vocal expression.  There are no  activity limitations.  KISHOR Social Interaction:  Activity was not observed.  KISHOR Problem Solving:  Activity was not observed.  KISHOR Memory:  Memory Score = 7, Independent.  Patient is completely independent  for memory.  There are no activity limitations.    Therapy Mode Minutes  Occupational Therapy: [place]  Physical Therapy: [place]  Speech Language Pathology:  [place]    Signed by: Oly Nelson RN

## 2018-01-06 LAB
FUNGUS WND CULT: NORMAL

## 2018-01-06 PROCEDURE — 25010000002 ENOXAPARIN PER 10 MG: Performed by: PHYSICAL MEDICINE & REHABILITATION

## 2018-01-06 PROCEDURE — 97110 THERAPEUTIC EXERCISES: CPT

## 2018-01-06 RX ADMIN — DOCUSATE SODIUM 100 MG: 100 CAPSULE, LIQUID FILLED ORAL at 20:43

## 2018-01-06 RX ADMIN — AMLODIPINE BESYLATE 5 MG: 5 TABLET ORAL at 08:24

## 2018-01-06 RX ADMIN — Medication 250 MG: at 20:43

## 2018-01-06 RX ADMIN — METOPROLOL SUCCINATE 25 MG: 25 TABLET, FILM COATED, EXTENDED RELEASE ORAL at 08:24

## 2018-01-06 RX ADMIN — Medication 3 MG: at 21:16

## 2018-01-06 RX ADMIN — DOCUSATE SODIUM 100 MG: 100 CAPSULE, LIQUID FILLED ORAL at 08:24

## 2018-01-06 RX ADMIN — ENOXAPARIN SODIUM 30 MG: 30 INJECTION SUBCUTANEOUS at 20:42

## 2018-01-06 RX ADMIN — Medication 250 MG: at 08:24

## 2018-01-06 RX ADMIN — LOSARTAN POTASSIUM 100 MG: 50 TABLET, FILM COATED ORAL at 08:24

## 2018-01-06 RX ADMIN — LEVOTHYROXINE SODIUM 75 MCG: 75 TABLET ORAL at 05:20

## 2018-01-06 NOTE — PROGRESS NOTES
Inpatient Rehabilitation Functional Measures Assessment    Functional Measures  KISHOR Eating:  [facility] Grooming: [facility] Bathing:  [facility] Upper Body Dressing:  [facility] Lower Body Dressing:  [facility] Toileting:  [facility] Bladder Management  Level of Assistance:  [place]  Frequency/Number of Accidents this Shift:  [facility] Bowel Management  Level of Assistance: [place]  Frequency/Number of Accidents this Shift: [facility] Bed/Chair/Wheelchair Transfer:  [facility] Toilet Transfer:  [facility] Tub/Shower Transfer:  [place]    Previously Documented Mode of Locomotion at Discharge: Field  KISHOR Expected Mode of Locomotion at Discharge: [facility] Walk/Wheelchair:  [facility] Stairs:  [facility] Comprehension:  Auditory comprehension is the usual mode. Comprehension  Score = 7, Independent.  Patient comprehends complex/abstract information in  their primary language.  Patient is completely independent for auditory  comprehension.  There are no activity limitations.  KISHOR Expression:  Vocal expression is the usual mode. Expression Score = 7,  Independent.  Patient expresses complex/abstract information in their primary  language.  Patient is completely independent for vocal expression.  There are no  activity limitations.  KISHOR Social Interaction:  Social Interaction Score = 7, Independent. Patient is  completely independent for social interaction.  There are no activity  limitations.  KISHOR Problem Solving:  Problem Solving Score = 6, Modified [place].  Patient  makes appropriate decisions in order to solve complex problems, but requires  extra time.  KISHOR Memory:  Memory Score = 7, Independent.  Patient is completely independent  for memory.  There are no activity limitations.    Therapy Mode Minutes  Occupational Therapy: [place]  Physical Therapy: [place]  Speech Language Pathology:  [place]    Signed by: Evelyne Ornelas RN

## 2018-01-06 NOTE — PROGRESS NOTES
Inpatient Rehabilitation Functional Measures Assessment and Plan of Care    Plan of Care  Updated Problems/Interventions  Field    Functional Measures  KISHOR Eating:  [place]  KISHOR Grooming: [facility] Bathing:  [facility] Upper Body Dressing:  [facility] Lower Body Dressing:  [facility] Toileting:  [facility] Bladder Management  Level of Assistance:  [place]  Frequency/Number of Accidents this Shift:  [facility] Bowel Management  Level of Assistance: [place]  Frequency/Number of Accidents this Shift: [facility] Bed/Chair/Wheelchair Transfer:  Activity was not observed.  KISHOR Toilet Transfer:  [facility] Tub/Shower Transfer:  [place]    Previously Documented Mode of Locomotion at Discharge: Field  KISHOR Expected Mode of Locomotion at Discharge: [facility] Walk/Wheelchair:  WHEELCHAIR OBSERVATION   Activity was not observed.    WALK OBSERVATION   Walk Distance Scale = 3.  Distance walked is greater than 150 feet. Walk Score  = 4.  Patient performs 75% or more of effort and requires minimal assistance.  Incidental help/contact guard/steadying was provided. Patient walked a distance  of  160 feet. Patient requires the following assistive device(s): Single point  cane.  KISHOR Stairs:  Stairs Score = 2.  Incidental assistance with lifting or lowering,  contact guard or steadying was provided. Patient performs 75% or more of effort  and requires minimal contact assistance. Patient negotiated  4 stairs. Patient  requires the following assistive device(s): Handrail(s).    KISHOR Comprehension:  [facility] Expression:  [facility] Social Interaction:  [facility] Problem Solving:  [facility] Memory:  [place]    Therapy Mode Minutes  Occupational Therapy: [place]  Physical Therapy: Individual: 30 minutes.  Speech Language Pathology:  [place]    Signed by: Lakia Menjivar PTA

## 2018-01-06 NOTE — THERAPY TREATMENT NOTE
"Inpatient Rehabilitation - Physical Therapy Treatment Note  [facility]     Patient Name: Stacie Vieira  : 1935  MRN: [id number]  Today's Date: 2018  Onset of Illness/Injury or Date of Surgery Date: 17  Date of Referral to PT: 17  Referring Physician: Dr Houston    Admit Date: 2017    Visit Dx:    ICD-10-CM ICD-9-CM   1. Generalized weakness R53.1 780.79     Patient Active Problem List   Diagnosis   • Mycobacterium avium complex   • Essential hypertension   • Palpitations   • Allergic rhinitis   • Hypothyroidism   • Hyperlipidemia   • Community acquired pneumonia of left lower lobe of lung   • Acute respiratory failure with hypoxia   • Bilateral hand numbness   • DNR (do not resuscitate)   • Polyneuropathy   • Oropharyngeal dysphagia   • Esophageal dysmotility   • Severe malnutrition due to acute illness   • Guillain-[place] syndrome following vaccination   • Bradycardia   • Syncope   • Guillain Barré syndrome               Adult Rehabilitation Note       18 1017 18 1524 18 0848    Rehab Assessment/Intervention    Discipline physical therapy assistant  -LB occupational therapist  -CC physical therapy assistant  -LB    Document Type therapy note (daily note)  -LB therapy note (daily note)  -CC therapy note (daily note)  -LB    Subjective Information agree to therapy   \"It felt my my heart flipped earlir. .....fine now\"  -LB agree to therapy  -CC agree to therapy  -LB    Patient Effort, Rehab Treatment good  -LB good  -CC good  -LB    Precautions/Limitations fall precautions  -LB fall precautions  -CC fall precautions  -LB    Recorded by [LB] Lakia Menjivar PTA [CC] Jyoti Roblero, OTR [LB] Lakia Menjivar PTA    Pain Assessment    Pain Assessment No/denies pain  -LB No/denies pain  -CC No/denies pain  -LB    Recorded by [LB] Lakia Menjivar PTA [CC] Jyoti Roblero, OTR [LB] Lakia Menjivar PTA    Vision Assessment/Intervention    Visual Impairment  WFL " with corrective lenses  -CC     Recorded by  [CC] Jyoti Roblero OTR     Cognitive Assessment/Intervention    Current Cognitive/Communication Assessment  functional  -CC     Orientation Status  oriented x 4  -CC     Personal Safety  WNL/WFL  -CC     Personal Safety Interventions fall prevention program maintained;gait belt;muscle strengthening facilitated;nonskid shoes/slippers when out of bed  -LB fall prevention program maintained;gait belt  -CC fall prevention program maintained;gait belt;muscle strengthening facilitated;nonskid shoes/slippers when out of bed  -LB    Recorded by [LB] Lakia Menjivar, DEJA [CC] Jytoi Roblero, OTR [LB] Lakia Menjivar PTA    Bed Mobility, Assessment/Treatment    Bed Mobility, Roll Left, [place]  independent  -CC     Bed Mob, Supine to Sit, [place]  supervision required  -CC supervision required  -LB    Bed Mob, Sit to Supine, [place]  supervision required  -CC supervision required  -LB    Bed Mobility, Comment up in chair  -LB      Recorded by [LB] Lakia Menjivar, DEJA [CC] Jyoti Roblero, OTR [LB] Lakia Menjivar PTA    Transfer Assessment/Treatment    Transfers, Bed-Chair [place]  contact guard assist  -CC contact guard assist  -LB    Transfers, Chair-Bed [place]   contact guard assist  -LB    Transfers, Sit-Stand [place] contact guard assist;stand by assist  -LB contact guard assist  -CC contact guard assist  -LB    Transfers, Stand-Sit [place] contact guard assist;stand by assist  -LB contact guard assist  -CC contact guard assist  -LB    Transfers, Sit-Stand-Sit, Assist Device rolling walker  -LB      Toilet Transfer, [place]  contact guard assist  -CC     Walk-In Shower Transfer, [place]  contact guard assist  -CC     Walk-In Shower Transfer, Assist Device  standard shower chair  -CC     Transfer, Impairments  strength decreased;impaired balance  -CC     Transfer, Comment   car tsf, CGA  -LB    Recorded by [LB] Lakia  DORYS Menjivar PTA [CC] Jyoti Roblero OTR [LB] Lakia Menjivar PTA    Gait Assessment/Treatment    Gait, [place] Level contact guard assist;verbal cues required  -LB  contact guard assist  -LB    Gait, Assistive Device straight cane   w tripod base.  -LB  other (see comments)   none  -LB    Gait, Distance (Feet) 160  -LB  160   as far as 240 ft  -LB    Gait, Gait Deviations kee decreased;forward flexed posture;step length decreased;decreased heel strike;toe-to-floor clearance decreased   kee extremely slow  -LB  kee decreased;forward flexed posture;bilateral:;decreased heel strike;toe-to-floor clearance decreased;step length decreased  -LB    Gait, Comment   slight LOB x 1 when pnt got distracted  -LB    Recorded by [LB] Lakia Menjivar PTA  [LB] Lakia Menjivar PTA    Stairs Assessment/Treatment    Number of Stairs 4  -LB  4  -LB    Stairs, Handrail Location both sides  -LB  both sides  -LB    Stairs, [place] Level contact guard assist  -LB  contact guard assist  -LB    Stairs, Technique Used step over step (ascending);step over step (descending)  -LB  step over step (ascending);step over step (descending)  -LB    Recorded by [LB] Lakia Menjivar PTA  [LB] Lakia Menjivar PTA    Functional Mobility    Functional Mobility- Ind. Level --   SBA  -LB      Functional Mobility- Device rolling walker  -LB      Functional Mobility-Distance (Feet) 200  -LB      Functional Mobility- Safety Issues step length decreased;other (see comments)   decreased kee, decreased HS  -LB      Recorded by [LB] Lakia Menjivar PTA      Upper Body Bathing Assessment/Training    UB Bathing Assess/Train Assistive Device  hand-held shower head;shower chair with back  -CC     UB Bathing Assess/Train, Position  sitting  -CC     UB Bathing Assess/Train, [place] Level  set up required  -CC     Recorded by  [CC] Jyoti Roblero OTR     Lower Body Bathing Assessment/Training    LB Bathing Assess/Train  Assistive Device  hand-held shower head;grab bars;shower chair with back  -CC     LB Bathing Assess/Train, Position  standing;sitting  -CC     LB Bathing Assess/Train, [place] Level  minimum assist (75% patient effort)  -CC     Recorded by  [CC] Jyoti Roblero OTR     Upper Body Dressing Assessment/Training    UB Dressing Assess/Train, Clothing Type  donning:;bra;pull over  -CC     UB Dressing Assess/Train, Position  sitting  -CC     UB Dressing Assess/Train, [place]  supervision required  -CC     Recorded by  [CC] Jyoti Roblero OTR     Lower Body Dressing Assessment/Training    LB Dressing Assess/Train, Clothing Type  donning:;doffing:  -CC     LB Dressing Assess/Train, Position  sitting;standing  -CC     LB Dressing Assess/Train, [place]  contact guard assist;supervision required  -CC     Recorded by  [CC] Jyoti Roblero OTR     Toileting Assessment/Training    Toileting Assess/Train, Position  sitting;standing  -CC     Toileting Assess/Train, [place] Level  contact guard assist  -CC     Recorded by  [CC] Jyoti Roblero OTR     Grooming Assessment/Training    Grooming Assess/Train, Position  sitting;standing  -CC     Grooming Assess/Train, [place] Level  contact guard assist  -CC     Recorded by  [CC] Jyoti Roblero OTR     Balance Skills Training    Sitting-Level of Assistance  Independent  -CC     Standing-Level of Assistance  Close supervision;Contact guard  -CC     Gait Balance-Level of Assistance   Contact guard;Minimum assistance  -LB    Gait Balance Support   No upper extremity supported  -LB    Gait Balance Activities   backwards;side-stepping  -LB    Recorded by  [CC] Jyoti Roblero OTR [LB] Lakia Menjivar, DEJA    Therapy Exercises    Bilateral Lower Extremities   AROM:;20 reps;supine;sitting  -LB    Bilateral Upper Extremity  --   arm bike 5 min x 2' yellow theraband 15x2; 1# wt 15x2 wrist  -CC     Recorded by  [CC] Jyoti Roblero OTR [LB] Lakia Menjivar, PTA     "Positioning and Restraints    Post Treatment Position wheelchair  -LB  wheelchair  -LB    In Wheelchair sitting;call light within reach  -LB  sitting;call light within reach  -LB    Recorded by [LB] Lakia Menjivar PTA  [LB] Lakia Menjivar PTA      01/04/18 1315 01/04/18 1100 01/03/18 1608    Rehab Assessment/Intervention    Discipline physical therapist  -JK occupational therapist  -HC occupational therapist  -KP    Document Type therapy note (daily note)  -JK therapy note (daily note)  -HC therapy note (daily note)  -KP    Subjective Information agree to therapy  -JK agree to therapy;no complaints  -HC agree to therapy;no complaints  -KP    Patient Effort, Rehab Treatment good  -JK good  -HC good  -KP    Patient Effort, Rehab Treatment Comment pt became tearful during therapy; expressing emotion  over events leading to rehab placement and concern over ability to go home independent'y  -JK      Symptoms Noted During/After Treatment none  -JK fatigue;shortness of breath  -HC     Symptoms Noted Comment  Pt teary eyed and concerned over \"the road ahead\" of her in regards to her overall decreased function.  -HC     Precautions/Limitations fall precautions  -JK fall precautions  -HC fall precautions  -KP    Recorded by [JK] Darleen Lakhani, PT [HC] Rocío Strickland, OTR [KP] Maggie Gilbert, OTR    Pain Assessment    Pain Assessment No/denies pain  -JK No/denies pain  -HC No/denies pain  -KP    Recorded by [JK] Darleen Lakhani PT [HC] Rocío Strickland, OTR [KP] Maggie Gilbert, OTR    Vision Assessment/Intervention    Visual Impairment WFL with corrective lenses  -JK      Recorded by [JK] Darleen Lakhani PT      Cognitive Assessment/Intervention    Current Cognitive/Communication Assessment functional  -JK functional  -HC functional  -KP    Orientation Status oriented x 4  -JK oriented x 4  -HC oriented x 4  -KP    Follows Commands/Answers Questions 100% of the time;able to follow multi-step instructions  " -% of the time;able to follow single-step instructions  -% of the time;able to follow single-step instructions  -KP    Personal Safety WNL/WFL  -JK WNL/WFL  -HC WNL/WFL  -KP    Personal Safety Interventions fall prevention program maintained;gait belt  -JK fall prevention program maintained;gait belt;nonskid shoes/slippers when out of bed  - fall prevention program maintained;gait belt;muscle strengthening facilitated;nonskid shoes/slippers when out of bed  -KP    Recorded by [JK] Darleen Lakhani, PT [HC] Rocío Strickland, OTR [] Maggie Gilbert, OTR    Bed Mobility, Assessment/Treatment    Bed Mobility, Assistive Device  bed rails;head of bed elevated  -     Bed Mob, Supine to Sit, [place]  conditional independence  -HC independent  -    Bed Mob, Sit to Supine, [place]  not tested  - not tested  -    Bed Mobility, Comment up in chair  -JK up in w/c in AM  -HC     Recorded by [JK] Darleen Lakhani, PT [HC] Rocío Strickland, OTR [] Maggie Gilbert, OTR    Transfer Assessment/Treatment    Transfers, Bed-Chair [place] minimum assist (75% patient effort);contact guard assist  -JK  supervision required  -KP    Transfers, Chair-Bed [place] minimum assist (75% patient effort);contact guard assist   from armless chair  -JK      Transfers, Bed-Chair-Bed, Assist Device other (see comments)   none; arm rest of one chair  -JK      Transfers, Sit-Stand [place] minimum assist (75% patient effort);contact guard assist  -JK stand by assist  - stand by assist  -KP    Transfers, Stand-Sit [place] minimum assist (75% patient effort);contact guard assist  -JK stand by assist  -HC stand by assist  -KP    Transfers, Sit-Stand-Sit, Assist Device other (see comments)   none  -JK other (see comments)   w/c  -HC other (see comments)   wc  -KP    Toilet Transfer, [place]  supervision required;other (see comments)   transfer x2  -     Toilet Transfer, Assistive Device  other  (see comments)   grab bars  -HC     Transfer, Safety Issues weight-shifting ability decreased;balance decreased during turns;step length decreased  -JK      Transfer, Impairments impaired balance;strength decreased  -JK      Transfer, Comment  sit<>stand multiple times at sink side during ADLs, CGA intermittently 2/2 increased fatigue  -HC     Recorded by [JK] Darleen Lakhani, PT [HC] Rocío Strickland, OTR [KP] Maggie Gilbert, OTR    Gait Assessment/Treatment    Gait, [place] Level minimum assist (75% patient effort);verbal cues required  -JK      Gait, Assistive Device other (see comments)   HHA (min A) on R  -JK      Gait, Distance (Feet) 160  -JK      Gait, Gait Deviations kee decreased;decreased heel strike;toe-to-floor clearance decreased;step length decreased;forward flexed posture   wide LEDA  -JK      Gait, Safety Issues sequencing ability decreased;step length decreased;weight-shifting ability decreased;balance decreased during turns  -JK      Gait, Impairments strength decreased;impaired balance  -JK      Gait, Comment Pt does not clear toes on R when advancing R LE and poor clearance L LE. Pt amb 80' with roll wx, CGA; poor clearance B LE when stepping.  -JK      Recorded by [KATHRINEK] Darelen Lakhani PT      Stairs Assessment/Treatment    Number of Stairs curb   -JK      Stairs, Handrail Location other (see comments)   HHA on R  -JK      Stairs, [place] Level minimum assist (75% patient effort);verbal cues required  -JK      Stairs, Safety Issues balance decreased during turns;sequencing ability decreased;weight-shifting ability decreased  -JK      Stairs, Impairments strength decreased;impaired balance  -JK      Recorded by [JK] Darleen Lakhani, PT      Functional Mobility    Functional Mobility- Ind. Level  contact guard assist;set up required  -HC     Functional Mobility-Distance (Feet)  --   to BR from bedside and back  -HC     Functional Mobility- Comment Vu 12/28; Alek 10/56  -OZ       Recorded by [JK] Darleen Lakhani, PT [HC] AMAURI Cook     Wheelchair Training/Management    Wheelchair Propulsion Training forward propulsion;moving through doorways;steering wheelchair;turning wheelchair  -JK      Wheelchair, Distance Propelled 175  -JK      Wheelchair Training Comment SBA for max VCs to steer and propel efficiently  -JK      Recorded by [JK] Darleen Lakhani, PT      Upper Body Bathing Assessment/Training    UB Bathing Assess/Train, Position  standing;sink side  -HC     UB Bathing Assess/Train, [place] Level  stand by assist;set up required  -HC     UB Bathing Assess/Train, Impairments  strength decreased  -     UB Bathing Assess/Train, Comment  pt stands to complete most of UB bathing  -HC     Recorded by  [HC] AMAURI Cook     Lower Body Bathing Assessment/Training    LB Bathing Assess/Train, Position  sitting;standing;sink side  -HC     LB Bathing Assess/Train, [place] Level  supervision required;set up required  -HC     LB Bathing Assess/Train, Impairments  impaired balance;strength decreased  -     LB Bathing Assess/Train, Comment  stands to complete LB w/1 seated 2/2 to decreased endurance and increased fatigue  -HC     Recorded by  [HC] AMAURI Cook     Upper Body Dressing Assessment/Training    UB Dressing Assess/Train, Clothing Type  doffing:;donning:;bra;pull over;t-shirt  -     UB Dressing Assess/Train, Position  sitting;sink side  -HC     UB Dressing Assess/Train, [place]  conditional independence  -     UB Dressing Assess/Train, Comment  pt sits to complete 2/2 to decreased endurance and SOB; able to retrieve clothing from closet with CGA to SBA  -HC     Recorded by  [HC] AMAURI Cook     Lower Body Dressing Assessment/Training    LB Dressing Assess/Train, Clothing Type  doffing:;donning:;shoes;pants;slipper socks   undergarments  -     LB Dressing Assess/Train, Position  sitting;standing;sink side  -HC     LB Dressing Assess/Train,  [place]  supervision required;contact guard assist  -HC     LB Dressing Assess/Train, Impairments  impaired balance;strength decreased  -     LB Dressing Assess/Train, Comment  pt initiates LB dressind in standing starting with undergarments, then needs to sit to complete 2/2 fatigue; CGA to SBA in standing to hike garments over hips  -HC     Recorded by  [HC] AMAURI Cook     Toileting Assessment/Training    Toileting Assess/Train, Assistive Device  grab bars  -     Toileting Assess/Train, Position  sitting  -     Toileting Assess/Train, [place] Level  supervision required  -     Toileting Assess/Train, Impairments  impaired balance;strength decreased  -     Toileting Assess/Train, Comment  toileting x2  -HC     Recorded by  [HC] AMAURI Cook     Grooming Assessment/Training    Grooming Assess/Train, Position  standing;sink side  -     Grooming Assess/Train, [place] Level  supervision required  -     Grooming Assess/Train, Impairments  strength decreased;impaired balance  -     Grooming Assess/Train, Comment  oral hygiene  -HC     Recorded by  [HC] AMAURI Cook     Motor Skills/Interventions    Additional Documentation  Functional Endurance (Group)  -     Recorded by  [HC] AMAURI Cook     Balance Skills Training    Sitting-Level of Assistance  Distant supervision  -     Sitting-Balance Support  Feet supported  -     Standing-Level of Assistance  Close supervision;Contact guard  - Close supervision  -    Static Standing Balance Support  No upper extremity supported  - --   intermittent UE support  -    Standing-Balance Activities  Weight Shift A-P;Ball toss;Retrieve object from floor  - Reaching for objects;Weight Shift R-L;Lateral lean  -KP    Recorded by  [HC] AMAURI Cook [KP] Maggie Gilbert OTR    Therapy Exercises    Bilateral Upper Extremity  AROM:;30 reps;sitting;elbow flexion/extension;pronation/supination;shoulder  abduction/adduction;shoulder extension/flexion   1# hand weight for 3x10; chest press and overhead press  -     BUE Resistance   other (comment);theraputty   1# hand wt 10x3, yellow digi flex 10x2,yellow putty pinch  -KP    Recorded by  [HC] Rocío Strickland OTR [KP] AMAURI Au    Functional Endurance    Detail (Functional Endurance)  fair  -HC     Recorded by  [HC] AMAURI Cook     Positioning and Restraints    Pre-Treatment Position sitting in chair/recliner  -JK sitting in chair/recliner  -HC in bed  -KP    Post Treatment Position wheelchair  -JK wheelchair  -HC wheelchair  -KP    In Wheelchair with nsg;sitting  -JK sitting;call light within reach;encouraged to call for assist  -HC sitting;call light within reach;encouraged to call for assist   w. her  present  -KP    Recorded by [JK] Darleen Lakhani, PT [HC] Rocío Strickland OTR [KP] AMAURI Au      01/03/18 1216          Rehab Assessment/Intervention    Discipline occupational therapist  -      Document Type therapy note (daily note)  -      Subjective Information agree to therapy;no complaints  -      Patient Effort, Rehab Treatment good  -KP      Precautions/Limitations fall precautions  -KP      Recorded by [KP] AMAURI Au      Pain Assessment    Pain Assessment No/denies pain  -KP      Recorded by [KP] AMAURI Au      Cognitive Assessment/Intervention    Current Cognitive/Communication Assessment functional  -      Orientation Status oriented x 4  -KP      Follows Commands/Answers Questions 100% of the time;able to follow single-step instructions  -      Personal Safety WNL/WFL  -      Personal Safety Interventions fall prevention program maintained;gait belt;nonskid shoes/slippers when out of bed  -KP      Recorded by [KP] AMAURI Au      Bed Mobility, Assessment/Treatment    Bed Mobility, Comment NT pt up in wc  -KP      Recorded by [KP] Maggie Hodge  AMAURI Gilbert      Transfer Assessment/Treatment    Transfers, Sit-Stand [place] stand by assist  -KP      Transfers, Stand-Sit [place] stand by assist  -KP      Transfers, Sit-Stand-Sit, Assist Device other (see comments)   wc.   -KP      Toilet Transfer, [place] supervision required  -KP      Toilet Transfer, Assistive Device other (see comments)   grab bars  -KP      Walk-In Shower Transfer, [place] supervision required;set up required  -KP      Walk-In Shower Transfer, Assist Device other (see comments)   tub bench. grab bar  -KP      Recorded by [KP] AMAURI Au      Functional Mobility    Functional Mobility- Ind. Level set up required;contact guard assist  -KP      Functional Mobility- Comment walks into BR , to toilet, to shower, to wc.   -KP      Recorded by [KP] AMAURI Au      Upper Body Bathing Assessment/Training    UB Bathing Assess/Train Assistive Device tub bench;hand-held shower head  -KP      UB Bathing Assess/Train, Position sitting  -KP      UB Bathing Assess/Train, [place] Level stand by assist;conditional independence  -KP      UB Bathing Assess/Train, Comment pt retireves clothes from her closet CGA to SBA  -KP      Recorded by [KP] AMAURI Au      Lower Body Bathing Assessment/Training    LB Bathing Assess/Train Assistive Device grab bars;hand-held shower head;tub bench  -KP      LB Bathing Assess/Train, Position sitting;standing  -KP      LB Bathing Assess/Train, [place] Level stand by assist  -KP      Recorded by [KP] AMAURI Au      Upper Body Dressing Assessment/Training    UB Dressing Assess/Train, Clothing Type donning:;doffing:;bra;pull over  -KP      UB Dressing Assess/Train, Position sitting  -KP      UB Dressing Assess/Train, [place] conditional independence  -KP      Recorded by [KP] Maggie Gilbert OTR      Lower Body Dressing Assessment/Training    LB Dressing Assess/Train,  Clothing Type doffing:;donning:;shoes;pants;slipper socks   undergarment  -KP      LB Dressing Assess/Train, Position sitting;standing  -KP      LB Dressing Assess/Train, [place] supervision required  -KP      Recorded by [KP] AMAURI Au      Toileting Assessment/Training    Toileting Assess/Train, Assistive Device grab bars  -KP      Toileting Assess/Train, Position standing;sitting  -KP      Toileting Assess/Train, [place] Level supervision required  -KP      Recorded by [KP] AMAURI Au      Grooming Assessment/Training    Grooming Assess/Train, Position sitting  -KP      Grooming Assess/Train, [place] Level supervision required  -KP      Recorded by [KP] AMAURI Au      Balance Skills Training    Sitting-Level of Assistance Distant supervision  -KP      Sitting-Balance Support Feet supported  -KP      Sitting-Balance Activities Reaching for objects  -KP      Standing-Level of Assistance Close supervision;Contact guard   CGA./HHA for walking  -KP      Static Standing Balance Support Right upper extremity supported;No upper extremity supported   intermittent UE support during shower, toileting  -KP      Recorded by [KP] AMAURI Au      Positioning and Restraints    Pre-Treatment Position sitting in chair/recliner  -KP      Post Treatment Position wheelchair  -KP      In Wheelchair sitting;encouraged to call for assist;call light within reach  -KP      Recorded by [KP] AMAURI Au        User Key  (r) = Recorded By, (t) = Taken By, (c) = Cosigned By    Initials Name Effective Dates    CC Jyoti Roblero, OTR 04/13/15 -     KP Maggie Gilbert, OTR 04/13/15 -     LB Lakia Menjivar, PTA 02/18/16 -     JK Darleen Lakhani, PT 12/01/15 -     HC Rocío Strickland, OTR 08/17/17 -                 IP PT Goals       01/05/18 1212 12/29/17 1225       Transfer Training 2 PT LTG    Transfer Training PT 2 LTG, Date Established  12/29/17  -KH      Transfer Training PT 2 LTG, Time to Achieve  1 wk  -KH     Transfer Training PT 2 LTG, Activity Type  all transfers  -KH     Transfer Training PT 2 LTG, [place] Level  independent  -KH     Transfer Training PT 2 LTG, Date Goal Reviewed 01/05/18  -LB      Transfer Training PT 2 LTG, Outcome goal ongoing  -LB      Gait Training PT LTG    Gait Training Goal PT LTG, Date Established  12/29/17  -KH     Gait Training Goal PT LTG, Time to Achieve  1 wk  -KH     Gait Training Goal PT LTG, [place] Level  independent  -KH     Gait Training Goal PT LTG, Distance to Achieve  150  -KH     Gait Training Goal PT LTG, Date Goal Reviewed 01/05/18  -LB      Gait Training Goal PT LTG, Outcome goal ongoing  -LB      Stair Training PT LTG    Stair Training Goal PT LTG, Date Established  12/29/17  -KH     Stair Training Goal PT LTG, Time to Achieve  1 wk  -KH     Stair Training Goal PT LTG, Number of Steps  12  -KH     Stair Training Goal PT LTG, [place] Level  conditional independence  -KH     Stair Training Goal PT LTG, Assist Device  1 handrail  -KH     Stair Training Goal PT LTG, Date Goal Reviewed 01/05/18  -LB      Stair Training Goal PT LTG, Outcome goal ongoing  -LB      Strength Goal PT LTG    Strength Goal PT LTG, Date Established  12/29/17  -KH     Strength Goal PT LTG, Time to Achieve  1 wk  -KH     Strength Goal PT LTG, Measure to Achieve  B LE grossly 4-/5 with MMT  -KH     Strength Goal PT LTG, Date Goal Reviewed 01/05/18  -LB      Strength Goal PT LTG, Outcome goal ongoing  -LB        User Key  (r) = Recorded By, (t) = Taken By, (c) = Cosigned By    Initials Name Provider Type    LB Lakia Menjivar PTA Physical Therapy Assistant    TEMO Rosario, PT Physical Therapist          Physical Therapy Education     Title: PT OT SLP Therapies (Active)     Topic: Physical Therapy (Active)     Point: Mobility training (Done)    Learning Progress Summary    Learner Readiness Method Response Comment Documented by  Status   Patient Acceptance E MARCELINO,NR gait w STC, and w rwx  01/06/18 1056 Done    Acceptance E VU,NR   01/05/18 1425 Done    Acceptance PURNIMA FARFAN NR Spoke at length with pt about usefulness of using a roll wx and WC upon return to home since pt will be independent to ensure pt safety and mobility with lessened risk of falls. JK 01/04/18 1323 Done    Acceptance E MARCELINO,HERMINIO   01/03/18 1449 Done    Acceptance E MARCELINO,NR   01/02/18 1312 Done    Acceptance E MARCELINO Educated patient on safe hand placement and body mechanics with basic mobility including bed mobility, transfers, gait, and stairs  12/29/17 1224 Done               Point: Body mechanics (Done)    Learning Progress Summary    Learner Readiness Method Response Comment Documented by Status   Patient Acceptance E MARCELINO Educated patient on safe hand placement and body mechanics with basic mobility including bed mobility, transfers, gait, and stairs  12/29/17 1224 Done               Point: Precautions (Done)    Learning Progress Summary    Learner Readiness Method Response Comment Documented by Status   Patient Acceptance PURNIMA FARFAN MD 01/01/18 1219 Done    Acceptance PURNIMA FARFAN MD 12/30/17 0932 Done                      User Key     Initials Effective Dates Name Provider Type Discipline     02/18/16 -  Lakia Menjivar, PTA Physical Therapy Assistant PT    JRAFAELA 12/01/15 -  Darleen Lakhani, PT Physical Therapist PT    MD 12/01/15 -  Jennifer Vera, PT Physical Therapist PT     06/22/16 -  Maria Del Carmen Rosario, PT Physical Therapist PT                    PT Recommendation and Plan  Anticipated Discharge Disposition: home with home health  Planned Therapy Interventions: balance training, bed mobility training, gait training, home exercise program, stair training, strengthening, stretching, transfer training, patient/family education, postural re-education  PT Frequency: 2 times/day  Plan of Care Review  Plan Of Care Reviewed With: patient  Progress: progress toward functional goals is  gradual  Outcome Summary/Follow up Plan: pnt is ambulating farther. She has improved with transfers and stairs. Pleasant and cooperative.         Time Calculation:         PT Charges       01/06/18 1056          Time Calculation    Start Time 1000  -LB      Stop Time 1030  -LB      Time Calculation (min) 30 min  -LB        User Key  (r) = Recorded By, (t) = Taken By, (c) = Cosigned By    Initials Name Provider Type     Lakia Menjivar PTA Physical Therapy Assistant          Therapy Charges for Today     Code Description Service Date Service Provider Modifiers Qty    69098905923 HC PT THER PROC EA 15 MIN 1/5/2018 Lakia Menjivar PTA GP 6    70116969979 HC PT THER PROC EA 15 MIN 1/6/2018 Lakia Menjivar PTA GP 2               Lakia Menjivar PTA  1/6/2018

## 2018-01-06 NOTE — PROGRESS NOTES
Inpatient Rehabilitation Plan of Care Note    Plan of Care  Care Plan Reviewed - No updates at this time.    Signed by: Braydon Taylor RN

## 2018-01-06 NOTE — PROGRESS NOTES
Inpatient Rehabilitation Functional Measures Assessment    Functional Measures  KISHOR Eating:  [facility] Grooming: [facility] Bathing:  [facility] Upper Body Dressing:  [facility] Lower Body Dressing:  [facility] Toileting:  [facility] Bladder Management  Level of Assistance:  [place]  Frequency/Number of Accidents this Shift:  [facility] Bowel Management  Level of Assistance: [place]  Frequency/Number of Accidents this Shift: [facility] Bed/Chair/Wheelchair Transfer:  [facility] Toilet Transfer:  [facility] Tub/Shower Transfer:  [place]    Previously Documented Mode of Locomotion at Discharge: Field  KISHOR Expected Mode of Locomotion at Discharge: [facility] Walk/Wheelchair:  [facility] Stairs:  [facility] Comprehension:  Auditory comprehension is the usual mode. Comprehension  Score = 6, Modified [place].  Patient comprehends complex/abstract  information in their primary language with only mild difficulty.  KISHOR Expression:  Vocal expression is the usual mode. Expression Score = 6,  Modified Independent.  Patient expresses complex/abstract information in their  primary language with only mild difficulty with tasks.  KISHOR Social Interaction:  Social Interaction Score = 6, Modified Independent.  Patient is modified independent for social interaction, requiring: Requires  additional time.  KISHOR Problem Solving:  Problem Solving Score = 6, Modified [place].  Patient  makes appropriate decisions in order to solve complex problems with mild  difficulty but self-corrects.  KISHOR Memory:  Memory Score = 6, Modified [place].  Patient is modified  independent for memory, having only mild difficulty and using self-initiated or  environmental cues to remember.    Therapy Mode Minutes  Occupational Therapy: Branch  Physical Therapy: Branch  Speech Language Pathology:  Branch    Signed by: Braydon Taylor RN

## 2018-01-06 NOTE — PROGRESS NOTES
Inpatient Rehabilitation Plan of Care Note    Plan of Care  Care Plan Reviewed - Updates as Follows    Psychosocial    [RN] Coping/Adjustment(Active)  Current Status(01/06/2018): Very pleasant, verbalizes easily. No coping issues  voiced.  Weekly Goal(01/13/2018): Allow opportunity to express concerns regarding status.  Discharge Goal: Adequate coping regarding life changes with ongoing support    Performed Intervention(s)  Therapeutic enviroment  verbalizes needs and concerns.      Safety    [RN] Potential for Injury(Active)  Current Status(01/06/2018): weakness of LEs, numbness bilateral feet. Fatiques  easily.  Weekly Goal(01/13/2018): No unsafe behavior. Calls for assist.  Discharge Goal: Pt/family aware of fall/safety in the home setting.    Performed Intervention(s)  Falls protocol  hourly rounds, items in reach  Bed alarm chair alarm      Sphincter Control    [RN] Bladder and bowel management(Active)  Current Status(01/06/2018): Continent  Weekly Goal(01/13/2018): Continent of bladder, bowel 100% with BM q1-3 days and  no difficulty voiding.  Discharge Goal: Same as weekly    Performed Intervention(s)  Monitor intake and output.  medication    Signed by: Evelyne Ornelas RN

## 2018-01-06 NOTE — PLAN OF CARE
Problem: Patient Care Overview (Adult)  Goal: Plan of Care Review  Outcome: Ongoing (interventions implemented as appropriate)   01/06/18 1602   Coping/Psychosocial Response Interventions   Plan Of Care Reviewed With patient   Patient Care Overview   Progress improving   Outcome Evaluation   Outcome Summary/Follow up Plan Participated in therapy this am. Amb CGA with wx. Up in wc part of the day. Uses call light for assistance. No safety issues noted.     Goal: Adult Individualization and Mutuality  Outcome: Ongoing (interventions implemented as appropriate)      Problem: Fall Risk (Adult)  Goal: Absence of Falls  Outcome: Ongoing (interventions implemented as appropriate)      Problem: Mobility, Physical Impaired (Adult)  Goal: Enhanced Functionality Ability  Outcome: Ongoing (interventions implemented as appropriate)

## 2018-01-06 NOTE — PROGRESS NOTES
LOS: 9 days   Patient Care Team:  Tatiana Bellamy MD as PCP - General (Internal Medicine)  James Del Angel MD as Consulting Physician (Infectious Diseases)  Ezekiel Verde MD as Consulting Physician (Otolaryngology)  Aisha Curiel MD as Consulting Physician (Cardiology)  Sinan Braga MD as Consulting Physician (Pulmonary Disease)  Shawn Felton MD as Consulting Physician (Allergy)    Chief Complaint: same    Subjective     History of Present Illness    Subjective Pt is awake and alert. No c/o. Pt experience episode of tacycardia earlier this am.     History taken from: patient    Objective     Vital Signs  Temp:  [97.6 °F (36.4 °C)-97.9 °F (36.6 °C)] 97.9 °F (36.6 °C)  Heart Rate:  [72-78] 76  Resp:  [16] 16  BP: (128-140)/(63-77) 132/68    Objective  Exam unchanged  Results Review:     I reviewed the patient's new clinical results.    Medication Review:     Assessment/Plan     Active Problems:    Guillain Barré syndrome      Assessment & Plan Continue to prepare for dc.     Chris Chávez MD  01/06/18  1:04 PM    Time:

## 2018-01-06 NOTE — PLAN OF CARE
Problem: Patient Care Overview (Adult)  Goal: Plan of Care Review  Outcome: Ongoing (interventions implemented as appropriate)   01/06/18 0051   Coping/Psychosocial Response Interventions   Plan Of Care Reviewed With patient   Patient Care Overview   Progress progress towards functional goals is fair   Outcome Evaluation   Outcome Summary/Follow up Plan Pt. doing fine tonight. No c/o any pain. Pleasant and cooperative.      Goal: Adult Individualization and Mutuality  Outcome: Ongoing (interventions implemented as appropriate)    Goal: Discharge Needs Assessment  Outcome: Ongoing (interventions implemented as appropriate)      Problem: Fall Risk (Adult)  Goal: Absence of Falls  Outcome: Ongoing (interventions implemented as appropriate)      Problem: Mobility, Physical Impaired (Adult)  Goal: Enhanced Functionality Ability  Outcome: Ongoing (interventions implemented as appropriate)

## 2018-01-07 PROCEDURE — 25010000002 ENOXAPARIN PER 10 MG: Performed by: PHYSICAL MEDICINE & REHABILITATION

## 2018-01-07 PROCEDURE — 99223 1ST HOSP IP/OBS HIGH 75: CPT | Performed by: INTERNAL MEDICINE

## 2018-01-07 RX ORDER — AZITHROMYCIN 250 MG/1
250 TABLET, FILM COATED ORAL
Status: DISCONTINUED | OUTPATIENT
Start: 2018-01-07 | End: 2018-01-11 | Stop reason: HOSPADM

## 2018-01-07 RX ORDER — ETHAMBUTOL HYDROCHLORIDE 400 MG/1
800 TABLET, FILM COATED ORAL
Status: DISCONTINUED | OUTPATIENT
Start: 2018-01-08 | End: 2018-01-11 | Stop reason: HOSPADM

## 2018-01-07 RX ORDER — RIFAMPIN 300 MG/1
600 CAPSULE ORAL
Status: DISCONTINUED | OUTPATIENT
Start: 2018-01-08 | End: 2018-01-11 | Stop reason: HOSPADM

## 2018-01-07 RX ADMIN — LEVOTHYROXINE SODIUM 50 MCG: 50 TABLET ORAL at 05:47

## 2018-01-07 RX ADMIN — Medication 250 MG: at 21:56

## 2018-01-07 RX ADMIN — LOSARTAN POTASSIUM 100 MG: 50 TABLET, FILM COATED ORAL at 08:26

## 2018-01-07 RX ADMIN — DOCUSATE SODIUM 100 MG: 100 CAPSULE, LIQUID FILLED ORAL at 08:26

## 2018-01-07 RX ADMIN — DOCUSATE SODIUM 100 MG: 100 CAPSULE, LIQUID FILLED ORAL at 21:56

## 2018-01-07 RX ADMIN — AMLODIPINE BESYLATE 5 MG: 5 TABLET ORAL at 08:26

## 2018-01-07 RX ADMIN — ENOXAPARIN SODIUM 30 MG: 30 INJECTION SUBCUTANEOUS at 21:56

## 2018-01-07 RX ADMIN — Medication 3 MG: at 21:58

## 2018-01-07 RX ADMIN — AZITHROMYCIN 250 MG: 250 TABLET, FILM COATED ORAL at 17:07

## 2018-01-07 RX ADMIN — Medication 250 MG: at 08:26

## 2018-01-07 RX ADMIN — METOPROLOL SUCCINATE 25 MG: 25 TABLET, FILM COATED, EXTENDED RELEASE ORAL at 08:26

## 2018-01-07 NOTE — CONSULTS
Referring Provider: Dr. Chávez  Reason for Consultation: +AFB      Subjective   History of present illness:   This very nice 82-year-old woman who was admitted to rehabilitation on 12/28/2017 for convalescence following Guillain-[place] syndrome, who I am now asked to provide evaluation and opinion regarding positive AFB from a BAL culture.     The patient is known to me from clinic.  She had started on triple drug therapy for MAC in 01/2016 with ethambutal, rifampin, and azithromycin thrice weekly.  She has done exceptionally well with her MAC and basically had no symptom burden and was unable to provide us any sputum.  We treated her for 21 months in total and 18 months since her last positive culture and again she was asymptomatic and CT scan showed improvement.  Antibiotics were discontinued in 09/2017.         Review of her past medical records, she was readmitted to [facility] in early 12/2017 with respiratory symptoms. CT of her chest showed a dense left lower lobe infiltrate and a BAL was performed on 12/09/2017; 1 out of 2 of AFB cultures are now growing AFB.  The patient actually was discharged from [facility] after receiving IV antibiotics for 6 days. All other cultures from her BAL were normal and her respiratory status actually stabilized some.  Unfortunately, several days after discharge she developed indolent weakness and loss of sensation in her hands and her distal extremities.  This progressed over the next several days and was not associated with any fevers.  She represented to [facility] and was diagnosed with Guillain-[place] for which she received plasmapheresis x 5 sessions. Her weakness has improved although she is not back to baseline yet.  Her anticipated discharge is on 01/11/2018.  The patient says that she is still somewhat short of breath, mildly so, worse with exertion, not associated with fevers but does have a dry cough.  Otherwise she  denies complaints at this time.    Past medical history: MAC, C. difficile in the past, hyperlipidemia, hypertension, hypothyroidism, sinusitis, SVT, Guillan [place]  FH: Dementia in mother and kidney disease in father.  SH:  and Lives alone in [place], [place].  Nonsmoker    Allergies   Allergen Reactions   • Alendronate Other (See Comments)     Headaches   • Ezetimibe Other (See Comments)     Leg Cramps   • Influenza Vaccines      Guillain-[place]   • Pravachol [Pravastatin] Other (See Comments)     Leg Cramps     Review of Systems  Pertinent items are noted in HPI, all other systems reviewed and negative    Objective     Physical Exam:   Vital Signs   Temp:  [97.6 °F (36.4 °C)-98.1 °F (36.7 °C)] 97.6 °F (36.4 °C)  Heart Rate:  [] 104  Resp:  [16-18] 18  BP: (121-132)/(59-73) 132/60    GENERAL: Awake and alert, in no acute distress.   HEENT: Oropharynx is clear. Hearing is grossly normal.   EYES: PERRL. No conjunctival injection. No lid lag.   LYMPHATICS: No lymphadenopathy of the neck or axillary regions.   HEART: Regular rate and rhythm. No peripheral edema.   LUNGS: Clear to auscultation anteriorly with normal respiratory effort.   GI: Soft, nontender, nondistended. No appreciable organomegaly.   SKIN: Warm and dry without cutaneous eruptions   PSYCHIATRIC: Appropriate mood, affect, insight, and judgment.     Results Review:   I reviewed the patient's new clinical results.  WBC 7.11 (p38, L 41, M4, E5)  H/H 10.9/35        Estimated Creatinine Clearance: 43.8 mL/min (by C-G formula based on Cr of 0.7).      Microbiology:  12/9 BAL cx: +AFB 1/2; fungal and bacterial cx were ngtd    Radiology:  CT chest from 12/8/17, personally interpreted:  Dense LLL consolidation with scattered ggo and reticulonodular changes in RUL appears progressed from sept scan    Assessment/Plan   1.  Recurrent MAC  --Obviously devastating complication following 21 MAC months of therapy.  We will send off AFB cultures for  formal susceptibilities to make sure she maintains sensitivity to macrolides.  Resume triple drug therapy with daily dosing instead intermittent.  We'll dose at azithromycin 250 mg per day, ethambutol 15 mg/kg per day,  and rifampin 600 mg daily.  I would like to give her 3-6 months of this therapy and then repeat bronchoscopy.  If she had not yet cleared her cultures by that time, would give consideration to adding an aminoglycoside. No need for airborne isolation in this patient with known MAC (which is not contagious).  Repeat CT chest and CMP.    2.  Guillain-Barré syndrome  --Was this secondary to her influenza-like illness in early December or antecedent viral infection?      James Del Angel MD  01/07/18  2:40 PM

## 2018-01-07 NOTE — PLAN OF CARE
Problem: Patient Care Overview (Adult)  Goal: Plan of Care Review  Outcome: Ongoing (interventions implemented as appropriate)   01/06/18 3468   Coping/Psychosocial Response Interventions   Plan Of Care Reviewed With patient   Patient Care Overview   Progress improving   Outcome Evaluation   Outcome Summary/Follow up Plan Pt. doing fine tonight. No complaints. Takes melatonin at hs for sleep.      Goal: Adult Individualization and Mutuality  Outcome: Ongoing (interventions implemented as appropriate)    Goal: Discharge Needs Assessment  Outcome: Ongoing (interventions implemented as appropriate)      Problem: Fall Risk (Adult)  Goal: Absence of Falls  Outcome: Ongoing (interventions implemented as appropriate)      Problem: Mobility, Physical Impaired (Adult)  Goal: Enhanced Functionality Ability  Outcome: Ongoing (interventions implemented as appropriate)

## 2018-01-07 NOTE — PROGRESS NOTES
Inpatient Rehabilitation Functional Measures Assessment    Functional Measures  KISHOR Eating:  [facility] Grooming: [facility] Bathing:  [facility] Upper Body Dressing:  [facility] Lower Body Dressing:  [facility] Toileting:  [facility] Bladder Management  Level of Assistance:  [place]  Frequency/Number of Accidents this Shift:  [facility] Bowel Management  Level of Assistance: [place]  Frequency/Number of Accidents this Shift: [facility] Bed/Chair/Wheelchair Transfer:  [facility] Toilet Transfer:  [facility] Tub/Shower Transfer:  [place]    Previously Documented Mode of Locomotion at Discharge: Field  KISHOR Expected Mode of Locomotion at Discharge: [facility] Walk/Wheelchair:  [facility] Stairs:  [facility] Comprehension:  Auditory comprehension is the usual mode. Comprehension  Score = 6, Modified [place].  Patient comprehends complex/abstract  information in their primary language with only mild difficulty.  KISHOR Expression:  Vocal expression is the usual mode. Expression Score = 6,  Modified Independent.  Patient expresses complex/abstract information in their  primary language with only mild difficulty with tasks.  KISHOR Social Interaction:  Social Interaction Score = 6, Modified Independent.  Patient is modified independent for social interaction, requiring: Requires  additional time.  KISHOR Problem Solving:  Problem Solving Score = 6, Modified [place].  Patient  makes appropriate decisions in order to solve complex problems, but requires  extra time.  KISHOR Memory:  Memory Score = 6, Modified [place].  Patient is modified  independent for memory, having only mild difficulty and using self-initiated or  environmental cues to remember.    Therapy Mode Minutes  Occupational Therapy: Branch  Physical Therapy: Branch  Speech Language Pathology:  Branch    Signed by: Braydon Taylor RN

## 2018-01-07 NOTE — PROGRESS NOTES
Inpatient Rehabilitation Plan of Care Note    Plan of Care  Care Plan Reviewed - No updates at this time.    Psychosocial    Performed Intervention(s)  Therapeutic enviroment  verbalizes needs and concerns.      Safety    Performed Intervention(s)  Falls protocol  hourly rounds, items in reach  Bed alarm chair alarm      Sphincter Control    Performed Intervention(s)  Monitor intake and output.  medication    Signed by: Evelyne Ornelas RN

## 2018-01-07 NOTE — PROGRESS NOTES
Inpatient Rehabilitation Functional Measures Assessment    Functional Measures  KISHOR Eating:  Eating Score = 7. Patient is completely independent for eating.  There are no activity limitations.  KISHOR Grooming: [place]  [facility] Bathing:  [place]  [facility] Upper Body Dressing:  [place]  [facility] Lower Body Dressing:  [place]  [facility] Toileting:  Toileting Score = 7.  Patient is completely independent for  toileting. There are no activity limitations.    KISHOR Bladder Management  Level of Assistance:  Bladder Score = 7.  Patient is completely independent for  bladder management. There are no activity limitations.  Frequency/Number of Accidents this Shift:  Bladder accidents this shift:  0 .  Patient has not had an accident this shift.    KISHOR Bowel Management  Level of Assistance: Bowel Score = 7.  Patient is completely independent for  bowel management. There are no activity limitations.  Frequency/Number of Accidents this Shift: Bowel accidents this shift: 0 .  Patient has not had an accident this shift.    KISHOR Bed/Chair/Wheelchair Transfer:  Bed/chair/wheelchair Transfer Score = 5.  Patient is supervision/set-up for transferring to and from the  bed/chair/wheelchair, requiring: Patient requires the following assistive  device(s): Grab bars.  KISHOR Toilet Transfer:  Toilet Transfer Score = 5.  Patient is supervision/set-up  for transferring to and from the toilet/commode, requiring: Stand by assistance.  Patient requires the following assistive device(s): Grab bars.  [facility] Tub/Shower Transfer:  [place]    Previously Documented Mode of Locomotion at Discharge: [place]  KISHOR Expected Mode of Locomotion at Discharge: [facility] Walk/Wheelchair:  Branch  [facility] Stairs:  [place]    [facility] Comprehension:  [place]  [facility] Expression:  [facility] Social Interaction:  [facility] Problem Solving:  [facility] Memory:  [place]    Therapy Mode Minutes  Occupational Therapy: Branch  Physical Therapy: Branch  Speech Language Pathology:  [place]    Signed by: Violeta Dasilva  NA

## 2018-01-07 NOTE — NURSING NOTE
Positive culture report for AFB called to RN.  Pt. placed in isolation and further discussion with MD's and Infection control if pt. needs negative pressure room. RN spoke with Dr. Pitts, Dr. Del Angel,  and Cari Betancourt with Infection Control.  Infection control and ID do not feel that the pt. needs to be placed in isolation given hx: VIVEK Ornelas RN

## 2018-01-07 NOTE — PROGRESS NOTES
Inpatient Rehabilitation Plan of Care Note    Plan of Care  Care Plan Reviewed - No updates at this time.    Psychosocial    Performed Intervention(s)  Therapeutic enviroment  verbalizes needs and concerns.      Safety    Performed Intervention(s)  Falls protocol  hourly rounds, items in reach  Bed alarm chair alarm      Sphincter Control    Performed Intervention(s)  Monitor intake and output.  medication    Signed by: Braydon Taylor RN

## 2018-01-07 NOTE — PROGRESS NOTES
Inpatient Rehabilitation Functional Measures Assessment    Functional Measures  KISHOR Eating:  [place]  [facility] Grooming: [facility] Bathing:  [facility] Upper Body Dressing:  [facility] Lower Body Dressing:  [facility] Toileting:  Toileting Score = 4.  Patient requires minimal assistance for  toileting, such as steadying for balance while cleansing or adjusting clothes.  Patient requires the following assistive device(s): Grab bar.    KISHOR Bladder Management  Level of Assistance:  Bladder Score = 7.  Patient is completely independent for  bladder management. There are no activity limitations.  Frequency/Number of Accidents this Shift:  Bladder accidents this shift:  0 .  Patient has not had an accident this shift.    KISHOR Bowel Management  Level of Assistance: [place]  Frequency/Number of Accidents this Shift: [facility] Bed/Chair/Wheelchair Transfer:  Bed/chair/wheelchair Transfer Score = 4.  Patient performs 75% or more of effort and minimal assistance (little/incidental  help/lifting of one limb/steadying) for transferring to and from the  bed/chair/wheelchair, requiring: Contact guard. Patient requires the following  assistive device(s): Bed rails.  KISHOR Toilet Transfer:  Toilet Transfer Score = 4.  Patient performs 75% or more  of effort and minimal assistance (little/incidental help/steadying) for  transferring to and from the toilet/commode, requiring: Contact guard. Patient  requires the following assistive device(s): Grab bars.  [facility] Tub/Shower Transfer:  [place]    Previously Documented Mode of Locomotion at Discharge: Field  KISHOR Expected Mode of Locomotion at Discharge: [facility] Walk/Wheelchair:  [facility] Stairs:  [facility] Comprehension:  [facility] Expression:  [facility] Social Interaction:  [facility] Problem Solving:  [facility] Memory:  [place]    Therapy Mode Minutes  Occupational Therapy: Branch  Physical Therapy: Branch  Speech Language Pathology:  [place]    Signed by: Scarlett Hoyt  NA

## 2018-01-07 NOTE — PLAN OF CARE
Problem: Patient Care Overview (Adult)  Goal: Plan of Care Review  Outcome: Ongoing (interventions implemented as appropriate)   01/07/18 4836   Coping/Psychosocial Response Interventions   Plan Of Care Reviewed With patient   Patient Care Overview   Progress improving   Outcome Evaluation   Outcome Summary/Follow up Plan Sat up in chair for awhile today. Dr. Del Angel saw pt. regarding + AF[employer] culture. New orders received. No isolation required. Pt. upset for a little while but talked with RN. Friend visited this afternoon. Uses call light for assistance. No safety issues noted.     Goal: Adult Individualization and Mutuality  Outcome: Ongoing (interventions implemented as appropriate)      Problem: Fall Risk (Adult)  Goal: Absence of Falls  Outcome: Ongoing (interventions implemented as appropriate)      Problem: Mobility, Physical Impaired (Adult)  Goal: Enhanced Functionality Ability  Outcome: Ongoing (interventions implemented as appropriate)

## 2018-01-07 NOTE — PROGRESS NOTES
LOS: 10 days   Patient Care Team:  Tatiana Bellamy MD as PCP - General (Internal Medicine)  James Del Angel MD as Consulting Physician (Infectious Diseases)  Ezekiel Verde MD as Consulting Physician (Otolaryngology)  Aisha Curiel MD as Consulting Physician (Cardiology)  Sinan Braga MD as Consulting Physician (Pulmonary Disease)  Shawn Felton MD as Consulting Physician (Allergy)    Chief Complaint: same    Subjective     History of Present Illness    Subjective Pt is awake and alert. She is discouraged re results from 12/9 bronch bx. And possible ramifications. Awaiting ID eval today    History taken from: patient    Objective     Vital Signs  Temp:  [97.9 °F (36.6 °C)-98.1 °F (36.7 °C)] 98.1 °F (36.7 °C)  Heart Rate:  [67-75] 67  Resp:  [16-18] 16  BP: (121-133)/(59-73) 125/73    Objective exam unchanged    Results Review:     I reviewed the patient's new clinical results.    Medication Review:     Assessment/Plan     Active Problems:    Guillain Barré syndrome      Assessment & Plan Continue to prepare for dc.     Chris Chávez MD  01/07/18  12:52 PM    Time:

## 2018-01-07 NOTE — PROGRESS NOTES
Inpatient Rehabilitation Functional Measures Assessment    Functional Measures  KISHOR Eating:  [facility] Grooming: [facility] Bathing:  [facility] Upper Body Dressing:  [facility] Lower Body Dressing:  [facility] Toileting:  [facility] Bladder Management  Level of Assistance:  [place]  Frequency/Number of Accidents this Shift:  [facility] Bowel Management  Level of Assistance: [place]  Frequency/Number of Accidents this Shift: [facility] Bed/Chair/Wheelchair Transfer:  [facility] Toilet Transfer:  [facility] Tub/Shower Transfer:  [place]    Previously Documented Mode of Locomotion at Discharge: Field  KISHOR Expected Mode of Locomotion at Discharge: [facility] Walk/Wheelchair:  [facility] Stairs:  [facility] Comprehension:  Auditory comprehension is the usual mode. Comprehension  Score = 7, Independent.  Patient comprehends complex/abstract information in  their primary language.  Patient is completely independent for auditory  comprehension.  There are no activity limitations.  KISHOR Expression:  Vocal expression is the usual mode. Expression Score = 7,  Independent.  Patient expresses complex/abstract information in their primary  language.  Patient is completely independent for vocal expression.  There are no  activity limitations.  KISHOR Social Interaction:  Social Interaction Score = 7, Independent. Patient is  completely independent for social interaction.  There are no activity  limitations.  KISHOR Problem Solving:  Problem Solving Score = 7, Independent.  Patient makes  appropriate decisions in order to solve complex problems.  Patient is completely  independent for problem solving.  There are no activity limitations.  KISHOR Memory:  Memory Score = 7, Independent.  Patient is completely independent  for memory.  There are no activity limitations.    Therapy Mode Minutes  Occupational Therapy: Branch  Physical Therapy: Branch  Speech Language Pathology:  Branch    Signed by: Evelyne Ornelas RN

## 2018-01-08 ENCOUNTER — APPOINTMENT (OUTPATIENT)
Dept: CT IMAGING | Facility: HOSPITAL | Age: 83
End: 2018-01-08
Attending: INTERNAL MEDICINE

## 2018-01-08 LAB
ALBUMIN SERPL-MCNC: 3.8 G/DL (ref 3.5–5.2)
ALBUMIN/GLOB SERPL: 1.3 G/DL
ALP SERPL-CCNC: 54 U/L (ref 39–117)
ALT SERPL W P-5'-P-CCNC: 8 U/L (ref 1–33)
ANION GAP SERPL CALCULATED.3IONS-SCNC: 7.7 MMOL/L
AST SERPL-CCNC: 15 U/L (ref 1–32)
BASOPHILS # BLD AUTO: 0.02 10*3/MM3 (ref 0–0.2)
BASOPHILS NFR BLD AUTO: 0.3 % (ref 0–1.5)
BILIRUB SERPL-MCNC: 0.2 MG/DL (ref 0.1–1.2)
BUN BLD-MCNC: 23 MG/DL (ref 8–23)
BUN/CREAT SERPL: 32.9 (ref 7–25)
CALCIUM SPEC-SCNC: 9.2 MG/DL (ref 8.6–10.5)
CHLORIDE SERPL-SCNC: 102 MMOL/L (ref 98–107)
CO2 SERPL-SCNC: 30.3 MMOL/L (ref 22–29)
CREAT BLD-MCNC: 0.7 MG/DL (ref 0.57–1)
DEPRECATED RDW RBC AUTO: 49 FL (ref 37–54)
EOSINOPHIL # BLD AUTO: 0.25 10*3/MM3 (ref 0–0.7)
EOSINOPHIL NFR BLD AUTO: 4 % (ref 0.3–6.2)
ERYTHROCYTE [DISTWIDTH] IN BLOOD BY AUTOMATED COUNT: 13.6 % (ref 11.7–13)
GFR SERPL CREATININE-BSD FRML MDRD: 80 ML/MIN/1.73
GLOBULIN UR ELPH-MCNC: 2.9 GM/DL
GLUCOSE BLD-MCNC: 88 MG/DL (ref 65–99)
HCT VFR BLD AUTO: 36.4 % (ref 35.6–45.5)
HGB BLD-MCNC: 11.6 G/DL (ref 11.9–15.5)
IMM GRANULOCYTES # BLD: 0.08 10*3/MM3 (ref 0–0.03)
IMM GRANULOCYTES NFR BLD: 1.3 % (ref 0–0.5)
LYMPHOCYTES # BLD AUTO: 2.92 10*3/MM3 (ref 0.9–4.8)
LYMPHOCYTES NFR BLD AUTO: 46.9 % (ref 19.6–45.3)
MCH RBC QN AUTO: 31.7 PG (ref 26.9–32)
MCHC RBC AUTO-ENTMCNC: 31.9 G/DL (ref 32.4–36.3)
MCV RBC AUTO: 99.5 FL (ref 80.5–98.2)
MONOCYTES # BLD AUTO: 0.66 10*3/MM3 (ref 0.2–1.2)
MONOCYTES NFR BLD AUTO: 10.6 % (ref 5–12)
NEUTROPHILS # BLD AUTO: 2.3 10*3/MM3 (ref 1.9–8.1)
NEUTROPHILS NFR BLD AUTO: 36.9 % (ref 42.7–76)
PLATELET # BLD AUTO: 350 10*3/MM3 (ref 140–500)
PMV BLD AUTO: 8.7 FL (ref 6–12)
POTASSIUM BLD-SCNC: 4.2 MMOL/L (ref 3.5–5.2)
PROT SERPL-MCNC: 6.7 G/DL (ref 6–8.5)
RBC # BLD AUTO: 3.66 10*6/MM3 (ref 3.9–5.2)
SODIUM BLD-SCNC: 140 MMOL/L (ref 136–145)
WBC NRBC COR # BLD: 6.23 10*3/MM3 (ref 4.5–10.7)

## 2018-01-08 PROCEDURE — 97535 SELF CARE MNGMENT TRAINING: CPT

## 2018-01-08 PROCEDURE — 25010000002 ENOXAPARIN PER 10 MG: Performed by: PHYSICAL MEDICINE & REHABILITATION

## 2018-01-08 PROCEDURE — 97110 THERAPEUTIC EXERCISES: CPT

## 2018-01-08 PROCEDURE — 80053 COMPREHEN METABOLIC PANEL: CPT | Performed by: INTERNAL MEDICINE

## 2018-01-08 PROCEDURE — 85025 COMPLETE CBC W/AUTO DIFF WBC: CPT | Performed by: PHYSICAL MEDICINE & REHABILITATION

## 2018-01-08 PROCEDURE — 71250 CT THORAX DX C-: CPT

## 2018-01-08 PROCEDURE — 99232 SBSQ HOSP IP/OBS MODERATE 35: CPT | Performed by: INTERNAL MEDICINE

## 2018-01-08 RX ORDER — ETHAMBUTOL HYDROCHLORIDE 400 MG/1
800 TABLET, FILM COATED ORAL DAILY
Qty: 60 TABLET | Refills: 3 | OUTPATIENT
Start: 2018-01-08 | End: 2018-01-11 | Stop reason: HOSPADM

## 2018-01-08 RX ORDER — AZITHROMYCIN 250 MG/1
250 TABLET, FILM COATED ORAL DAILY
Qty: 30 TABLET | Refills: 3 | OUTPATIENT
Start: 2018-01-08 | End: 2018-01-11 | Stop reason: HOSPADM

## 2018-01-08 RX ORDER — RIFAMPIN 300 MG/1
600 CAPSULE ORAL DAILY
Qty: 60 CAPSULE | Refills: 3 | OUTPATIENT
Start: 2018-01-08 | End: 2018-01-11 | Stop reason: HOSPADM

## 2018-01-08 RX ADMIN — LOSARTAN POTASSIUM 100 MG: 50 TABLET, FILM COATED ORAL at 08:00

## 2018-01-08 RX ADMIN — LEVOTHYROXINE SODIUM 75 MCG: 75 TABLET ORAL at 06:13

## 2018-01-08 RX ADMIN — RIFAMPIN 600 MG: 300 CAPSULE ORAL at 08:02

## 2018-01-08 RX ADMIN — DOCUSATE SODIUM 100 MG: 100 CAPSULE, LIQUID FILLED ORAL at 08:00

## 2018-01-08 RX ADMIN — METOPROLOL SUCCINATE 25 MG: 25 TABLET, FILM COATED, EXTENDED RELEASE ORAL at 08:00

## 2018-01-08 RX ADMIN — Medication 3 MG: at 20:35

## 2018-01-08 RX ADMIN — Medication 250 MG: at 08:02

## 2018-01-08 RX ADMIN — ETHAMBUTOL HYDROCHLORIDE 800 MG: 400 TABLET, FILM COATED ORAL at 08:01

## 2018-01-08 RX ADMIN — AZITHROMYCIN 250 MG: 250 TABLET, FILM COATED ORAL at 08:00

## 2018-01-08 RX ADMIN — DOCUSATE SODIUM 100 MG: 100 CAPSULE, LIQUID FILLED ORAL at 20:35

## 2018-01-08 RX ADMIN — Medication 250 MG: at 20:35

## 2018-01-08 RX ADMIN — ENOXAPARIN SODIUM 30 MG: 30 INJECTION SUBCUTANEOUS at 20:35

## 2018-01-08 RX ADMIN — AMLODIPINE BESYLATE 5 MG: 5 TABLET ORAL at 08:00

## 2018-01-08 NOTE — PROGRESS NOTES
Inpatient Rehabilitation Plan of Care Note    Plan of Care  Care Plan Reviewed - No updates at this time.    Psychosocial    Performed Intervention(s)  Therapeutic enviroment  verbalizes needs and concerns.      Safety    Performed Intervention(s)  Falls protocol  hourly rounds, items in reach  Bed alarm chair alarm      Sphincter Control    Performed Intervention(s)  Monitor intake and output.  medication    Signed by: Hazel Cheung RN

## 2018-01-08 NOTE — THERAPY TREATMENT NOTE
"Inpatient Rehabilitation - Occupational Therapy Treatment Note  [facility]     Patient Name: Stacie Vieira  : 1935  MRN: [id number]  Today's Date: 2018  Onset of Illness/Injury or Date of Surgery Date: 17     Referring Physician: Dr Houston      Admit Date: 2017    Visit Dx:     ICD-10-CM ICD-9-CM   1. Generalized weakness R53.1 780.79     Patient Active Problem List   Diagnosis   • Mycobacterium avium complex   • Essential hypertension   • Palpitations   • Allergic rhinitis   • Hypothyroidism   • Hyperlipidemia   • Community acquired pneumonia of left lower lobe of lung   • Acute respiratory failure with hypoxia   • Bilateral hand numbness   • DNR (do not resuscitate)   • Polyneuropathy   • Oropharyngeal dysphagia   • Esophageal dysmotility   • Severe malnutrition due to acute illness   • Guillain-[place] syndrome following vaccination   • Bradycardia   • Syncope   • Guillain Barré syndrome             Adult Rehabilitation Note       18 1203 18 1019 18 1017    Rehab Assessment/Intervention    Discipline occupational therapist  -CC physical therapy assistant  -LB physical therapy assistant  -LB    Document Type therapy note (daily note)  -CC therapy note (daily note)  -LB therapy note (daily note)  -LB    Subjective Information agree to therapy  -CC agree to therapy  -LB agree to therapy   \"It felt my my heart flipped earlir. .....fine now\"  -LB    Patient Effort, Rehab Treatment good  -CC good  -LB good  -LB    Precautions/Limitations fall precautions  -CC fall precautions  -LB fall precautions  -LB    Recorded by [CC] Jyoti Roblero, OTR [LB] Lakia Menjivar PTA [LB] Lakia Menjivar PTA    Pain Assessment    Pain Assessment No/denies pain  -CC No/denies pain  -LB No/denies pain  -LB    Recorded by [CC] Jyoti Roblero OTR [LB] Lakia Menjivar PTA [LB] Lakia Menjivar PTA    Vision Assessment/Intervention    Visual Impairment WFL with corrective lenses  " -CC      Recorded by [CC] AMAURI Rojas      Cognitive Assessment/Intervention    Current Cognitive/Communication Assessment functional  -CC      Orientation Status oriented x 4  -CC      Follows Commands/Answers Questions able to follow single-step instructions  -CC      Personal Safety WNL/WFL  -CC      Personal Safety Interventions gait belt;nonskid shoes/slippers when out of bed;fall prevention program maintained  -CC  fall prevention program maintained;gait belt;muscle strengthening facilitated;nonskid shoes/slippers when out of bed  -LB    Recorded by [CC] Jyoti Roblero OTR  [LB] Lakia Menjivar PTA    Bed Mobility, Assessment/Treatment    Bed Mobility, Assistive Device  bed rails  -LB     Bed Mob, Supine to Sit, [place]  conditional independence  -LB     Bed Mobility, Comment --   up in w/c  -CC  up in chair  -LB    Recorded by [CC] AMAURI Rojas [LB] Lakia Menjivar PTA [LB] Lakia Menjivar PTA    Transfer Assessment/Treatment    Transfers, Bed-Chair [place]  stand by assist  -LB     Transfers, Sit-Stand [place] stand by assist  -CC stand by assist  -LB contact guard assist;stand by assist  -LB    Transfers, Stand-Sit [place] stand by assist  -CC stand by assist  -LB contact guard assist;stand by assist  -LB    Transfers, Sit-Stand-Sit, Assist Device rolling walker  -CC rolling walker  -LB rolling walker  -LB    Toilet Transfer, [place] supervision required  -CC      Toilet Transfer, Assistive Device rolling walker  -CC      Walk-In Shower Transfer, [place] supervision required  -CC      Walk-In Shower Transfer, Assist Device standard walker;standard shower chair  -CC      Recorded by [CC] AMAURI Rojas [LB] Lakia Menjivar PTA [LB] Lakia Menjivar PTA    Gait Assessment/Treatment    Gait, [place] Level  supervision required  -LB contact guard assist;verbal cues required  -LB    Gait, Assistive Device  rolling walker  -LB straight cane    w tripod base.  -LB    Gait, Distance (Feet)  240  -  -LB    Gait, Gait Deviations   kee decreased;forward flexed posture;step length decreased;decreased heel strike;toe-to-floor clearance decreased   kee extremely slow  -LB    Recorded by  [LB] Lakia Menjivar PTA [LB] Lakia Menjivar PTA    Stairs Assessment/Treatment    Number of Stairs  4  -LB 4  -LB    Stairs, Handrail Location  left side (ascending)   both hands on left rail  -LB both sides  -LB    Stairs, [place] Level  contact guard assist  -LB contact guard assist  -LB    Stairs, Technique Used  step to step (ascending);step to step (descending)  -LB step over step (ascending);step over step (descending)  -LB    Stairs, Comment  curb, rwx, CGA. Also tried up curb backward, down forward as pnt reports that she has a single step that rwx will not fit on.  -LB     Recorded by  [LB] Lakia Menjivar PTA [LB] Lakia Menjivar PTA    Functional Mobility    Functional Mobility- Ind. Level   --   SBA  -LB    Functional Mobility- Device   rolling walker  -LB    Functional Mobility-Distance (Feet)   200  -LB    Functional Mobility- Safety Issues   step length decreased;other (see comments)   decreased kee, decreased HS  -LB    Recorded by   [LB] Lakia Menjivar PTA    Upper Body Bathing Assessment/Training    UB Bathing Assess/Train Assistive Device hand-held shower head;shower chair with back  -CC      UB Bathing Assess/Train, Position sitting  -CC      UB Bathing Assess/Train, [place] Level supervision required  -CC      Recorded by [CC] AMAURI Rojas      Lower Body Bathing Assessment/Training    LB Bathing Assess/Train Assistive Device grab bars;hand-held shower head;shower chair with back  -CC      LB Bathing Assess/Train, Position sitting;standing  -CC      LB Bathing Assess/Train, [place] Level supervision required  -CC      Recorded by [CC] AMAURI Rojas      Upper Body Dressing Assessment/Training     UB Dressing Assess/Train, Clothing Type donning:;doffing:;bra;pull over  -CC      UB Dressing Assess/Train, Position sitting  -CC      UB Dressing Assess/Train, [place] supervision required  -CC      Recorded by [CC] Jyoti Roblero OTR      Lower Body Dressing Assessment/Training    LB Dressing Assess/Train, Clothing Type doffing:;donning:;pants;socks;shoes  -CC      LB Dressing Assess/Train, Position sitting;standing  -CC      LB Dressing Assess/Train, [place] supervision required  -CC      Recorded by [CC] Jyoti Roblero OTR      Toileting Assessment/Training    Toileting Assess/Train, Position sitting;standing  -CC      Toileting Assess/Train, [place] Level supervision required  -CC      Recorded by [CC] Jyoti Roblero OTR      Grooming Assessment/Training    Grooming Assess/Train, Position standing  -CC      Grooming Assess/Train, [place] Level set up required;supervision required  -CC      Recorded by [CC] Jyoti Roblero OTR      Positioning and Restraints    Pre-Treatment Position sitting in chair/recliner  -CC      Post Treatment Position wheelchair  -CC wheelchair  -LB wheelchair  -LB    In Wheelchair sitting;call light within reach  -CC call light within reach;sitting  -LB sitting;call light within reach  -LB    Recorded by [CC] Jyoti Roblero OTR [LB] Lakia Menjivar, PTA [LB] Lakia Menjivar, PTA      01/05/18 1524          Rehab Assessment/Intervention    Discipline occupational therapist  -CC      Document Type therapy note (daily note)  -CC      Subjective Information agree to therapy  -CC      Patient Effort, Rehab Treatment good  -CC      Precautions/Limitations fall precautions  -CC      Recorded by [CC] Jyoti Roblero OTR      Pain Assessment    Pain Assessment No/denies pain  -CC      Recorded by [CC] Jyoti Roblero OTR      Vision Assessment/Intervention    Visual Impairment WFL with corrective lenses  -CC      Recorded by [CC] Jyoti Roblero OTR      Cognitive  Assessment/Intervention    Current Cognitive/Communication Assessment functional  -CC      Orientation Status oriented x 4  -CC      Personal Safety WNL/WFL  -CC      Personal Safety Interventions fall prevention program maintained;gait belt  -CC      Recorded by [CC] AMAURI Rojas      Bed Mobility, Assessment/Treatment    Bed Mobility, Roll Left, [place] independent  -CC      Bed Mob, Supine to Sit, [place] supervision required  -CC      Bed Mob, Sit to Supine, [place] supervision required  -CC      Recorded by [CC] AMAURI Rojas      Transfer Assessment/Treatment    Transfers, Bed-Chair [place] contact guard assist  -CC      Transfers, Sit-Stand [place] contact guard assist  -CC      Transfers, Stand-Sit [place] contact guard assist  -CC      Toilet Transfer, [place] contact guard assist  -CC      Walk-In Shower Transfer, [place] contact guard assist  -CC      Walk-In Shower Transfer, Assist Device standard shower chair  -CC      Transfer, Impairments strength decreased;impaired balance  -CC      Recorded by [CC] AMAURI Rojas      Upper Body Bathing Assessment/Training    UB Bathing Assess/Train Assistive Device hand-held shower head;shower chair with back  -CC      UB Bathing Assess/Train, Position sitting  -CC      UB Bathing Assess/Train, [place] Level set up required  -CC      Recorded by [CC] AMAURI Rojas      Lower Body Bathing Assessment/Training    LB Bathing Assess/Train Assistive Device hand-held shower head;grab bars;shower chair with back  -CC      LB Bathing Assess/Train, Position standing;sitting  -CC      LB Bathing Assess/Train, [place] Level minimum assist (75% patient effort)  -CC      Recorded by [CC] AMAURI Rojas      Upper Body Dressing Assessment/Training    UB Dressing Assess/Train, Clothing Type donning:;bra;pull over  -CC      UB Dressing Assess/Train, Position sitting  -CC      UB Dressing  Assess/Train, [place] supervision required  -CC      Recorded by [CC] Jyoti Roblero OTR      Lower Body Dressing Assessment/Training    LB Dressing Assess/Train, Clothing Type donning:;doffing:  -CC      LB Dressing Assess/Train, Position sitting;standing  -CC      LB Dressing Assess/Train, [place] contact guard assist;supervision required  -CC      Recorded by [CC] Jyoti Roblero OTR      Toileting Assessment/Training    Toileting Assess/Train, Position sitting;standing  -CC      Toileting Assess/Train, [place] Level contact guard assist  -CC      Recorded by [CC] Jyoti Roblero OTR      Grooming Assessment/Training    Grooming Assess/Train, Position sitting;standing  -CC      Grooming Assess/Train, [place] Level contact guard assist  -CC      Recorded by [CC] Jyoti Roblero OTR      Balance Skills Training    Sitting-Level of Assistance Independent  -CC      Standing-Level of Assistance Close supervision;Contact guard  -CC      Recorded by [CC] Jyoti Roblero OTR      Therapy Exercises    Bilateral Upper Extremity --   arm bike 5 min x 2' yellow theraband 15x2; 1# wt 15x2 wrist  -CC      Recorded by [CC] Jyoti Roblero OTR        User Key  (r) = Recorded By, (t) = Taken By, (c) = Cosigned By    Initials Name Effective Dates    CC Jyoti Roblero, OTR 04/13/15 -     LB Lakia Menjivar, PTA 02/18/16 -                 OT Goals       12/29/17 1511 12/27/17 1545       Transfer Training OT STG    Transfer Training OT STG, Date Established 12/29/17  -KP      Transfer Training OT STG, Time to Achieve 5 - 7 days  -KP      Transfer Training OT STG, Activity Type toilet;sit to stand/stand to sit;shower chair;walk-in shower  -KP      Transfer Training OT STG, [place] Level supervision required  -KP      Transfer Training OT STG, Assist Device shower chair  -KP      Transfer Training OT LTG    Transfer Training OT LTG, Date Established 12/29/17  -KP 12/27/17  -AF     Transfer Training OT LTG, Time  to Achieve by discharge  -KP by discharge  -AF     Transfer Training OT LTG, Activity Type toilet;walk-in shower;shower chair;sit to stand/stand to sit  - toilet  -AF     Transfer Training OT LTG, [place] Level independent;conditional independence  -KP supervision required  -AF     Transfer Training OT LTG, Assist Device shower chair  -KP      Transfer Training OT LTG, Additional Goal  appropriate AD  -AF     Transfer Training OT LTG, Outcome  goal ongoing  -AF     Strength OT STG    Strength Goal OT STG, Date Established 12/29/17  -      Strength Goal OT STG, Time to Achieve 5 - 7 days  -KP      Strength Goal OT STG, Measure to Achieve to increase B UE to 4/5  -KP      Strength Goal OT STG, Functional Goal to increase functional tsf and self-care skills  -KP      Strength OT LTG    Strength Goal OT LTG, Date Established 12/29/17  -      Strength Goal OT LTG, Time to Achieve by discharge  -KP      Strength Goal OT LTG, Measure to Achieve to increase to 4+/5   -KP      Strength Goal OT LTG, Functional Goal to increase functional tsf and self-care skills  -KP      Dynamic Standing Balance OT STG    Dynamic Standing Balance OT STG, Date Established 12/29/17  -      Dynamic Standing Balance OT STG, Time to Achieve 5 - 7 days  -KP      Dynamic Standing Balance OT STG, [place] Level supervision required  -KP      Dynamic Standing Balance OT STG, Assist Device UE Support   to increase independence w. self care and IADLs  -      Dynamic Standing Balance OT LTG    Dynamic Standing Balance OT LTG, Date Established 12/29/17  -      Dynamic Standing Balance OT LTG, Time to Achieve by discharge  -      Dynamic Standing Balance OT LTG, [place] Level independent;conditional independence  -KP      Patient Education OT LTG    Patient Education OT LTG, Date Established 12/29/17  -      Patient Education OT LTG, Time to Achieve by discharge  -KP      Patient Education OT LTG, Education Type written  program;HEP;home safety  -      Patient Education OT LTG, Education Understanding independent;demonstrates adequately;verbalizes understanding  -      ADL OT STG    ADL OT STG, Date Established 12/29/17  -      ADL OT STG, Time to Achieve 1 wk  -      ADL OT STG, Activity Type ADL skills  -      ADL OT STG, [place] Level standby assist;setup;assistive device  -      ADL OT LTG    ADL OT LTG, Date Established 12/29/17  -KP 12/27/17  -AF     ADL OT LTG, Time to Achieve by discharge  - by discharge  -AF     ADL OT LTG, Activity Type ADL skills  - ADL skills  -AF     ADL OT LTG, [place] Level independent;independent with device;assistive device  - standby assist  -     ADL OT LTG, Outcome  goal ongoing  -AF     Functional Mobility OT LTG    Functional Mobility Goal OT LTG, Date Established  12/27/17  -     Functional Mobility Goal OT LTG, Time to Achieve  by discharge  -AF     Functional Mobility Goal OT LTG, [place] Level  supervision  -AF     Functional Mobility Goal OT LTG, Assist Device  appropriate AD  -AF     Functional Mobility Goal OT LTG, Distance to Achieve  to the bathroom  -AF     Functional Mobility Goal OT LTG, Outcome  goal ongoing  -AF     Endurance OT LTG    Endurance Goal OT LTG, Date Established 12/29/17  -      Endurance Goal OT LTG, Time to Achieve by discharge  -      Endurance Goal OT LTG, Activity Level to increase;endurance 2 good  -KP        User Key  (r) = Recorded By, (t) = Taken By, (c) = Cosigned By    Initials Name Provider Type     Maggie Gilbert, OTR Occupational Therapist    AF Elissa Malone, OTR Occupational Therapist          Occupational Therapy Education     Title: PT OT SLP Therapies (Active)     Topic: Occupational Therapy (Active)     Point: ADL training (Done)    Description: Instruct learner(s) on proper safety adaptation and remediation techniques during self care or transfers.   Instruct in proper use of assistive devices.     Learning Progress Summary    Learner Readiness Method Response Comment Documented by Status   Patient Acceptance E,TB,D VU,DU ed pt on role of OT, benefit of therapy. POC w. OT.  12/29/17 1510 Done               Point: Precautions (Done)    Description: Instruct learner(s) on prescribed precautions during self-care and functional transfers.    Learning Progress Summary    Learner Readiness Method Response Comment Documented by Status   Patient Acceptance E,TB,D VU,DU ed pt on role of OT, benefit of therapy. POC w. OT.  12/29/17 1510 Done               Point: Body mechanics (Done)    Description: Instruct learner(s) on proper positioning and spine alignment during self-care, functional mobility activities and/or exercises.    Learning Progress Summary    Learner Readiness Method Response Comment Documented by Status   Patient Acceptance E,TB,D VU,DU ed pt on role of OT, benefit of therapy. POC w. OT.  12/29/17 1510 Done                      User Key     Initials Effective Dates Name Provider Type Discipline     04/13/15 -  Maggie Gilbert OTR Occupational Therapist OT                  OT Recommendation and Plan  Anticipated Discharge Disposition: home  Planned Therapy Interventions: activity intolerance, ADL retraining, balance training, IADL retraining, home exercise program, strengthening, transfer training  Therapy Frequency: 5 times/wk  Plan of Care Review  Outcome Summary/Follow up Plan: Pt showing steady progress. Increased activity tolerance and balance noted. ADLs at SBA/CGA level.        Time Calculation:         Time Calculation- OT       01/08/18 1030          Time Calculation- OT    OT Start Time 1030  -CC      OT Stop Time 1130  -CC      OT Time Calculation (min) 60 min  -CC        User Key  (r) = Recorded By, (t) = Taken By, (c) = Cosigned By    Initials Name Provider Type    CC Jyoti Roblero OTR Occupational Therapist           Therapy Charges for Today     Code Description Service  Date Service Provider Modifiers Qty    [id number] HC OT SELF CARE/MGMT/TRAIN EA 15 MIN 1/8/2018 Jyoti Roblero OTR GO 4               AMAURI Rojas  1/8/2018

## 2018-01-08 NOTE — PROGRESS NOTES
Occupational Therapy: Individual: 90 minutes.    Physical Therapy: Branch    Speech Language Pathology:  Branch    Signed by: Elissa Malone OT

## 2018-01-08 NOTE — PROGRESS NOTES
Inpatient Rehabilitation Functional Measures Assessment and Plan of Care    Plan of Care  Updated Problems/Interventions  Field    Functional Measures  [facility] Eating:  [facility] Grooming: [facility] Bathing:  [facility] Upper Body Dressing:  [facility] Lower Body Dressing:  [facility] Toileting:  [facility] Bladder Management  Level of Assistance:  [place]  Frequency/Number of Accidents this Shift:  [facility] Bowel Management  Level of Assistance: [place]  Frequency/Number of Accidents this Shift: [facility] Bed/Chair/Wheelchair Transfer:  Bed/chair/wheelchair Transfer Score = 5.  Patient is supervision/set-up for transferring to and from the  bed/chair/wheelchair, requiring: Patient requires the following assistive  device(s): Bed rails.  [facility] Toilet Transfer:  [facility] Tub/Shower Transfer:  [place]    Previously Documented Mode of Locomotion at Discharge: Field  [facility] Expected Mode of Locomotion at Discharge: [facility] Walk/Wheelchair:  WHEELCHAIR OBSERVATION   Activity was not observed.    WALK OBSERVATION   Walk Distance Scale = 3.  Distance walked is greater than 150 feet. Walk Score  = 5.  Patient requires supervision or set up for walking. Patient walked a  distance of  240 feet. Patient requires the following assistive device(s):  Rolling walker.  [facility] Stairs:  Stairs Score = 2.  Incidental assistance with lifting or lowering,  contact guard or steadying was provided. Patient performs 75% or more of effort  and requires minimal contact assistance. Patient negotiated  4 stairs. Patient  requires the following assistive device(s): Handrail(s).    [facility] Comprehension:  [facility] Expression:  [facility] Social Interaction:  [facility] Problem Solving:  [facility] Memory:  [place]    Therapy Mode Minutes  Occupational Therapy: [place]  Physical Therapy: Individual: 90 minutes.  Speech Language Pathology:  [place]    Signed by: Lakia Menjivar PTA

## 2018-01-08 NOTE — PROGRESS NOTES
"   LOS: 11 days   Patient Care Team:  Tatiana Bellamy MD as PCP - General (Internal Medicine)  James Del Angel MD as Consulting Physician (Infectious Diseases)  Ezekiel Verde MD as Consulting Physician (Otolaryngology)  Aisha Curiel MD as Consulting Physician (Cardiology)  Sinan Braga MD as Consulting Physician (Pulmonary Disease)  Shawn Felton MD as Consulting Physician (Allergy)    Chief Complaint:   Guillain-Barré syndrome    Subjective     History of Present Illness    Subjective  Hands feel normal .  Still altered sensation in feet.  No swallow or speech problems.  Patient reviewed with ID.     History taken from: patient    Objective     Vital Signs  Temp:  [97.6 °F (36.4 °C)-98.6 °F (37 °C)] 98.6 °F (37 °C)  Heart Rate:  [] 69  Resp:  [18] 18  BP: (116-132)/(60-74) 124/74    Objective:  Vital signs: (most recent): Blood pressure 124/74, pulse 69, temperature 98.6 °F (37 °C), temperature source Oral, resp. rate 18, height 160 cm (63\"), weight 50.3 kg (110 lb 12.8 oz), last menstrual period 01/01/1993, SpO2 99 %, not currently breastfeeding.            Physical Exam  Mental status-awake alert rest  HEENT-sclera nonicteric, conjunctiva pink.     No JVD.  .  Ears unremarkable externally.  Lungs-Normal respirations.  No accessory muscle use.  Heart-regular rate and rhythm    Abdomen- nondistended  Extremities -without edema or cyanosis.  Neurologic-oriented ×4.  Good historian.        Face symmetric.  No dysarthria.     Motor exam shows to take resistance proximally distally in the bilateral upper extremities and lower extremities.         Results Review:     I reviewed the patient's new clinical results.    Results from last 7 days  Lab Units 01/08/18  0606 01/04/18  0504   WBC 10*3/mm3 6.23 7.11   HEMOGLOBIN g/dL 11.6* 10.9*   HEMATOCRIT % 36.4 34.8*   PLATELETS 10*3/mm3 350 295         Results from last 7 days  Lab Units 01/08/18  0606   SODIUM mmol/L 140 "   POTASSIUM mmol/L 4.2   CHLORIDE mmol/L 102   CO2 mmol/L 30.3*   BUN mg/dL 23   CREATININE mg/dL 0.70   CALCIUM mg/dL 9.2   BILIRUBIN mg/dL 0.2   ALK PHOS U/L 54   ALT (SGPT) U/L 8   AST (SGOT) U/L 15   GLUCOSE mg/dL 88     CT chest Jan 8, 2018 -   IMPRESSION:  Previously demonstrated areas of consolidation within the  left lower lobe, right middle lobe demonstrate near complete interval  resolution. Small areas of residual atelectasis are seen in these  regions. There are persistent small reticulonodular opacities and  cluster of nodules demonstrated bilaterally, largely unchanged from CT  chest dated 09/2017, likely representing a chronic infectious process.  The bronchial wall thickening as well as the mucous plugging have also  resolved. Interval decrease in mediastinal lymphadenopathy.    Medication Review: done  Scheduled Meds:    amLODIPine 5 mg Oral Q24H   azithromycin 250 mg Oral Q24H   cetirizine 5 mg Oral Daily   docusate sodium 100 mg Oral BID   enoxaparin 30 mg Subcutaneous Q24H   ethambutol 800 mg Oral Q24H   levothyroxine 50 mcg Oral Q48H   levothyroxine 75 mcg Oral Every Other Day   losartan 100 mg Oral Q24H   metoprolol succinate XL 25 mg Oral Q24H   rifAMPin 600 mg Oral Q24H   saccharomyces boulardii 250 mg Oral BID     Continuous Infusions:   PRN Meds:.•  acetaminophen  •  calcium carbonate  •  ipratropium-albuterol  •  melatonin  •  nitroglycerin  •  ondansetron **OR** ondansetron ODT **OR** ondansetron      Assessment/Plan     Active Problems:    Guillain Barré syndrome      Assessment & Plan  Guillain-Barré syndrome  S/p plasmapheresis    HTN- multi-drug regimen    DVT prophylaxis - SCDs/Lovenox    Hypothyroidism - on replacement.     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    send off AF[facility] cultures for formal susceptibilities to make sure she maintains sensitivity to macrolides.  Resume triple drug therapy with daily dosing instead intermittent.   dose at azithromycin 250 mg per  day, ethambutol 15 mg/kg per day,  and rifampin 600 mg daily.    give her 3-6 months of this therapy and then repeat bronchoscopy.  If she had not yet cleared her cultures by that time, would give consideration to adding an aminoglycoside. No need for airborne isolation in this patient with known MAC (which is not contagious).     82-year-old female previously independent with Guillain-Barré syndrome with numbness and weakness in her extremities with impairments with her mobility and self-care.  She's also had bradycardia.     She's completed 5 sessions of plasmapheresis.  Recent pneumonia.  Recent dysphagia.  Given her functional impairments and comorbidities, current recommendation is to pursue acute inpatient rehabilitation.  This would be a comprehensive inpatient rehabilitation program with physical medicine rehabilitation and ongoing physician follow-up monitoring her neurologic and pulmonary and cardiac status, occupational therapy for functional mobility and activities daily living and physical therapy for transfers balance gait and strengthening.  Therapy 3 hours a day 5 days a week, PT 1.5 hours and OT 1.5 hours 5 days a week. .  Rehabilitation nursing for carryover and monitoring of her bowel bladder, skin, cardiopulmonary status, neurologic status.  Weekly team conferences.  The patient will require precertification.  Features of the rehabilitation program were reviewed with the patient.  The patient's in agreement with this plan. Goal mod I. Rehab prognosis fair. Medical prognosis fair. ELOS 10 days but only estimation.     January 2-transfers contact-guard.  Gait 160 feet contact guard with a rolling walker.  She still has some numbness in the hands and feet.  Tolerates therapies.  She feels he's making progress.    TEAM CONF - JAN 3 - transfers CTG..  Toilet transfers SBA. LB adls CTG SBA. Continent bowel and bladder.  ELOS- Jan 11.      Virgilio Amaya MD  01/08/18  11:35 AM    Time:

## 2018-01-08 NOTE — THERAPY TREATMENT NOTE
Inpatient Rehabilitation - Physical Therapy Treatment Note  [facility]     Patient Name: Stacie Vieira  : 1935  MRN: [id number]  Today's Date: 2018  Onset of Illness/Injury or Date of Surgery Date: 17  Date of Referral to PT: 17  Referring Physician: Dr Houston    Admit Date: 2017    Visit Dx:    ICD-10-CM ICD-9-CM   1. Generalized weakness R53.1 780.79     Patient Active Problem List   Diagnosis   • Mycobacterium avium complex   • Essential hypertension   • Palpitations   • Allergic rhinitis   • Hypothyroidism   • Hyperlipidemia   • Community acquired pneumonia of left lower lobe of lung   • Acute respiratory failure with hypoxia   • Bilateral hand numbness   • DNR (do not resuscitate)   • Polyneuropathy   • Oropharyngeal dysphagia   • Esophageal dysmotility   • Severe malnutrition due to acute illness   • Guillain-[place] syndrome following vaccination   • Bradycardia   • Syncope   • Guillain Barré syndrome               Adult Rehabilitation Note       18 1352 18 1203 18 1019    Rehab Assessment/Intervention    Discipline occupational therapist  -CC occupational therapist  -CC physical therapy assistant  -LB    Document Type therapy note (daily note)  -CC therapy note (daily note)  -CC therapy note (daily note)  -LB    Subjective Information agree to therapy  -CC agree to therapy  -CC agree to therapy  -LB    Patient Effort, Rehab Treatment good  -CC good  -CC good  -LB    Precautions/Limitations fall precautions  -CC fall precautions  -CC fall precautions  -LB    Recorded by [CC] Jyoti Roblero, OTR [CC] Jyoti Roblero, OTR [LB] Lakia Menjivar, DEJA    Pain Assessment    Pain Assessment No/denies pain  -CC No/denies pain  -CC No/denies pain  -LB    Recorded by [CC] Jyoti Roblero OTR [CC] Jyoti Roblero, OTR [LB] Lakia Menjivar PTA    Vision Assessment/Intervention    Visual Impairment WFL with corrective lenses  -CC WFL with corrective lenses  -CC      Recorded by [CC] Jyoti Roblero OTR [CC] Jyoti Roblero OTR     Cognitive Assessment/Intervention    Current Cognitive/Communication Assessment functional  -CC functional  -CC     Orientation Status oriented x 4  -CC oriented x 4  -CC     Follows Commands/Answers Questions able to follow single-step instructions  -CC able to follow single-step instructions  -CC     Personal Safety WNL/WFL  -CC WNL/WFL  -CC     Personal Safety Interventions gait belt;fall prevention program maintained;nonskid shoes/slippers when out of bed  -CC gait belt;nonskid shoes/slippers when out of bed;fall prevention program maintained  -CC fall prevention program maintained;gait belt;muscle strengthening facilitated;nonskid shoes/slippers when out of bed  -LB    Recorded by [CC] Jyoti Roblero, ROSAR [CC] Jyoti Roblero, ROSAR [LB] Lakia Menjivar, PTA    Bed Mobility, Assessment/Treatment    Bed Mobility, Assistive Device   bed rails  -LB    Bed Mob, Supine to Sit, [place]   conditional independence  -LB    Bed Mob, Sit to Supine, [place]   conditional independence  -LB    Bed Mobility, Comment --   up in w/c  -CC --   up in w/c  -CC     Recorded by [CC] Jyoti Roblero, ROSAR [CC] Jyoti Roblero, OTR [LB] Lakia Menjivar, PTA    Transfer Assessment/Treatment    Transfers, Bed-Chair [place]   stand by assist  -LB    Transfers, Chair-Bed [place]   stand by assist  -LB    Transfers, Sit-Stand [place]  stand by assist  -CC stand by assist  -LB    Transfers, Stand-Sit [place]  stand by assist  -CC stand by assist  -LB    Transfers, Sit-Stand-Sit, Assist Device  rolling walker  -CC rolling walker  -LB    Toilet Transfer, [place]  supervision required  -CC     Toilet Transfer, Assistive Device  rolling walker  -CC     Walk-In Shower Transfer, [place]  supervision required  -CC     Walk-In Shower Transfer, Assist Device  standard walker;standard shower chair  -CC     Transfer, Comment   car ts SBA,  rwx  -LB    Recorded by  [CC] Jyoti Roblero OTR [LB] Lakia Menjivar PTA    Gait Assessment/Treatment    Gait, [place] Level   supervision required  -LB    Gait, Assistive Device   rolling walker  -LB    Gait, Distance (Feet)   240  -LB    Recorded by   [LB] Lakia Menjivar PTA    Stairs Assessment/Treatment    Number of Stairs   4  -LB    Stairs, Handrail Location   left side (ascending)   both hands on left rail  -LB    Stairs, [place] Level   contact guard assist  -LB    Stairs, Technique Used   step to step (ascending);step to step (descending)  -LB    Stairs, Comment   curb, rwx, CGA. Also tried up curb backward, down forward as pnt reports that she has a single step that rwx will not fit on.  -LB    Recorded by   [LB] Lakia Menjivar PTA    Upper Body Bathing Assessment/Training    UB Bathing Assess/Train Assistive Device  hand-held shower head;shower chair with back  -CC     UB Bathing Assess/Train, Position  sitting  -CC     UB Bathing Assess/Train, [place] Level  supervision required  -CC     Recorded by  [CC] Jyoti Roblero OTR     Lower Body Bathing Assessment/Training    LB Bathing Assess/Train Assistive Device  grab bars;hand-held shower head;shower chair with back  -CC     LB Bathing Assess/Train, Position  sitting;standing  -CC     LB Bathing Assess/Train, [place] Level  supervision required  -CC     Recorded by  [CC] Jyoti Roblero OTR     Upper Body Dressing Assessment/Training    UB Dressing Assess/Train, Clothing Type  donning:;doffing:;bra;pull over  -CC     UB Dressing Assess/Train, Position  sitting  -CC     UB Dressing Assess/Train, [place]  supervision required  -CC     Recorded by  [CC] Jyoti Roblero OTR     Lower Body Dressing Assessment/Training    LB Dressing Assess/Train, Clothing Type  doffing:;donning:;pants;socks;shoes  -CC     LB Dressing Assess/Train, Position  sitting;standing  -CC     LB Dressing Assess/Train, [place]  supervision  "required  -CC     Recorded by  [CC] AMAURI Rojas     Toileting Assessment/Training    Toileting Assess/Train, Position  sitting;standing  -CC     Toileting Assess/Train, [place] Level  supervision required  -CC     Recorded by  [CC] AMAURI Rojas     Grooming Assessment/Training    Grooming Assess/Train, Position  standing  -CC     Grooming Assess/Train, [place] Level  set up required;supervision required  -CC     Recorded by  [CC] AMAURI Rojas     Therapy Exercises    Bilateral Lower Extremities   AROM:;15 reps;supine  -LB    Bilateral Upper Extremity --   arm bike 5 min x2; 2# hand wt 15x3  -CC      Recorded by [CC] AMAURI Rojas  [LB] Lakia Menjivar PTA    Positioning and Restraints    Pre-Treatment Position sitting in chair/recliner  -CC sitting in chair/recliner  -CC     Post Treatment Position wheelchair  -CC wheelchair  -CC wheelchair  -LB    In Wheelchair sitting;with PT  -CC sitting;call light within reach  -CC call light within reach;sitting  -LB    Recorded by [CC] AMAURI Rojas [CC] AMAURI Rojas [LB] Lakia Menjivar PTA      01/06/18 1017 01/05/18 1524       Rehab Assessment/Intervention    Discipline physical therapy assistant  -LB occupational therapist  -CC     Document Type therapy note (daily note)  -LB therapy note (daily note)  -CC     Subjective Information agree to therapy   \"It felt my my heart flipped earlir. .....fine now\"  -LB agree to therapy  -CC     Patient Effort, Rehab Treatment good  -LB good  -CC     Precautions/Limitations fall precautions  -LB fall precautions  -CC     Recorded by [LB] Lakia Menjivar PTA [CC] Jyoti Roblero OTR     Pain Assessment    Pain Assessment No/denies pain  -LB No/denies pain  -CC     Recorded by [LB] Lakia Menjivar PTA [CC] AMAURI Rojas     Vision Assessment/Intervention    Visual Impairment  WFL with corrective lenses  -CC     Recorded by  [CC] AMAURI Rojas     Cognitive " Assessment/Intervention    Current Cognitive/Communication Assessment  functional  -CC     Orientation Status  oriented x 4  -CC     Personal Safety  WNL/WFL  -CC     Personal Safety Interventions fall prevention program maintained;gait belt;muscle strengthening facilitated;nonskid shoes/slippers when out of bed  -LB fall prevention program maintained;gait belt  -CC     Recorded by [LB] Lakia Menjivar PTA [CC] Jyoti Roblero OTR     Bed Mobility, Assessment/Treatment    Bed Mobility, Roll Left, [place]  independent  -CC     Bed Mob, Supine to Sit, [place]  supervision required  -CC     Bed Mob, Sit to Supine, [place]  supervision required  -CC     Bed Mobility, Comment up in chair  -LB      Recorded by [LB] Lakia Menjivar PTA [CC] Jyoti Roblero OTR     Transfer Assessment/Treatment    Transfers, Bed-Chair [place]  contact guard assist  -CC     Transfers, Sit-Stand [place] contact guard assist;stand by assist  -LB contact guard assist  -CC     Transfers, Stand-Sit [place] contact guard assist;stand by assist  -LB contact guard assist  -CC     Transfers, Sit-Stand-Sit, Assist Device rolling walker  -LB      Toilet Transfer, [place]  contact guard assist  -CC     Walk-In Shower Transfer, [place]  contact guard assist  -CC     Walk-In Shower Transfer, Assist Device  standard shower chair  -CC     Transfer, Impairments  strength decreased;impaired balance  -CC     Recorded by [LB] Lakia Menjivar PTA [CC] Jyoti Roblero OTR     Gait Assessment/Treatment    Gait, [place] Level contact guard assist;verbal cues required  -LB      Gait, Assistive Device straight cane   w tripod base.  -LB      Gait, Distance (Feet) 160  -LB      Gait, Gait Deviations kee decreased;forward flexed posture;step length decreased;decreased heel strike;toe-to-floor clearance decreased   kee extremely slow  -LB      Recorded by [LB] Lakia Menjivar PTA      Stairs  Assessment/Treatment    Number of Stairs 4  -LB      Stairs, Handrail Location both sides  -LB      Stairs, [place] Level contact guard assist  -LB      Stairs, Technique Used step over step (ascending);step over step (descending)  -LB      Recorded by [LB] Lakia Menjivar PTA      Functional Mobility    Functional Mobility- Ind. Level --   SBA  -LB      Functional Mobility- Device rolling walker  -LB      Functional Mobility-Distance (Feet) 200  -LB      Functional Mobility- Safety Issues step length decreased;other (see comments)   decreased kee, decreased HS  -LB      Recorded by [LB] Lakia Menjivar PTA      Upper Body Bathing Assessment/Training    UB Bathing Assess/Train Assistive Device  hand-held shower head;shower chair with back  -CC     UB Bathing Assess/Train, Position  sitting  -CC     UB Bathing Assess/Train, [place] Level  set up required  -CC     Recorded by  [CC] Jyoti Roblero OTR     Lower Body Bathing Assessment/Training    LB Bathing Assess/Train Assistive Device  hand-held shower head;grab bars;shower chair with back  -CC     LB Bathing Assess/Train, Position  standing;sitting  -CC     LB Bathing Assess/Train, [place] Level  minimum assist (75% patient effort)  -CC     Recorded by  [CC] Jyoti Roblero OTR     Upper Body Dressing Assessment/Training    UB Dressing Assess/Train, Clothing Type  donning:;bra;pull over  -CC     UB Dressing Assess/Train, Position  sitting  -CC     UB Dressing Assess/Train, [place]  supervision required  -CC     Recorded by  [CC] Jyoti Roblero OTR     Lower Body Dressing Assessment/Training    LB Dressing Assess/Train, Clothing Type  donning:;doffing:  -CC     LB Dressing Assess/Train, Position  sitting;standing  -CC     LB Dressing Assess/Train, [place]  contact guard assist;supervision required  -CC     Recorded by  [CC] Jyoti Roblero OTR     Toileting Assessment/Training    Toileting Assess/Train, Position   sitting;standing  -CC     Toileting Assess/Train, [place] Level  contact guard assist  -CC     Recorded by  [CC] Jyoti Roblero OTR     Grooming Assessment/Training    Grooming Assess/Train, Position  sitting;standing  -CC     Grooming Assess/Train, [place] Level  contact guard assist  -CC     Recorded by  [CC] Jyoti Roblero OTR     Balance Skills Training    Sitting-Level of Assistance  Independent  -CC     Standing-Level of Assistance  Close supervision;Contact guard  -CC     Recorded by  [CC] Jyoti Roblero OTR     Therapy Exercises    Bilateral Upper Extremity  --   arm bike 5 min x 2' yellow theraband 15x2; 1# wt 15x2 wrist  -CC     Recorded by  [CC] Jyoti Roblero OTR     Positioning and Restraints    Post Treatment Position wheelchair  -LB      In Wheelchair sitting;call light within reach  -LB      Recorded by [LB] Lakia Menjivar PTA        User Key  (r) = Recorded By, (t) = Taken By, (c) = Cosigned By    Initials Name Effective Dates    CC Jyoti Roblero, OTR 04/13/15 -     LB Lakia Menjivar PTA 02/18/16 -                 IP PT Goals       01/05/18 1212 12/29/17 1225       Transfer Training 2 PT LTG    Transfer Training PT 2 LTG, Date Established  12/29/17  -KH     Transfer Training PT 2 LTG, Time to Achieve  1 wk  -KH     Transfer Training PT 2 LTG, Activity Type  all transfers  -KH     Transfer Training PT 2 LTG, [place] Level  independent  -KH     Transfer Training PT 2 LTG, Date Goal Reviewed 01/05/18  -LB      Transfer Training PT 2 LTG, Outcome goal ongoing  -LB      Gait Training PT LTG    Gait Training Goal PT LTG, Date Established  12/29/17  -KH     Gait Training Goal PT LTG, Time to Achieve  1 wk  -KH     Gait Training Goal PT LTG, [place] Level  independent  -KH     Gait Training Goal PT LTG, Distance to Achieve  150  -KH     Gait Training Goal PT LTG, Date Goal Reviewed 01/05/18  -LB      Gait Training Goal PT LTG, Outcome goal ongoing  -LB      Stair Training PT LTG     Stair Training Goal PT LTG, Date Established  12/29/17  -KH     Stair Training Goal PT LTG, Time to Achieve  1 wk  -KH     Stair Training Goal PT LTG, Number of Steps  12  -KH     Stair Training Goal PT LTG, [place] Level  conditional independence  -KH     Stair Training Goal PT LTG, Assist Device  1 handrail  -KH     Stair Training Goal PT LTG, Date Goal Reviewed 01/05/18  -LB      Stair Training Goal PT LTG, Outcome goal ongoing  -LB      Strength Goal PT LTG    Strength Goal PT LTG, Date Established  12/29/17  -KH     Strength Goal PT LTG, Time to Achieve  1 wk  -KH     Strength Goal PT LTG, Measure to Achieve  B LE grossly 4-/5 with MMT  -KH     Strength Goal PT LTG, Date Goal Reviewed 01/05/18  -LB      Strength Goal PT LTG, Outcome goal ongoing  -LB        User Key  (r) = Recorded By, (t) = Taken By, (c) = Cosigned By    Initials Name Provider Type    MELISA Menjivar, PTA Physical Therapy Assistant    TEMO Rosario, PT Physical Therapist          Physical Therapy Education     Title: PT OT SLP Therapies (Active)     Topic: Physical Therapy (Active)     Point: Mobility training (Done)    Learning Progress Summary    Learner Readiness Method Response Comment Documented by Status   Patient Acceptance E VU,NR  LB 01/08/18 1321 Done    Acceptance E VU,NR gait w STC, and w rwx  01/06/18 1056 Done    Acceptance E VU,NR   01/05/18 1425 Done    Acceptance E,D VU,NR Spoke at length with pt about usefulness of using a roll wx and WC upon return to home since pt will be independent to ensure pt safety and mobility with lessened risk of falls. JK 01/04/18 1323 Done    Acceptance E VU,NR  LB 01/03/18 1449 Done    Acceptance E VU,NR   01/02/18 1312 Done    Acceptance E VU Educated patient on safe hand placement and body mechanics with basic mobility including bed mobility, transfers, gait, and stairs  12/29/17 1224 Done               Point: Body mechanics (Done)    Learning Progress Summary    Learner  Readiness Method Response Comment Documented by Status   Patient Acceptance NAHEED BENSON Educated patient on safe hand placement and body mechanics with basic mobility including bed mobility, transfers, gait, and stairs  12/29/17 1224 Done               Point: Precautions (Done)    Learning Progress Summary    Learner Readiness Method Response Comment Documented by Status   Patient Acceptance PURNIMA FARFAN MD 01/01/18 1219 Done    Acceptance PURNIMA FARFAN MD 12/30/17 0932 Done                      User Key     Initials Effective Dates Name Provider Type Discipline     02/18/16 -  Lakia Menjivar PTA Physical Therapy Assistant PT    OZ 12/01/15 -  Darleen Lakhani, PT Physical Therapist PT    MD 12/01/15 -  Jennifer Vera, PT Physical Therapist PT     06/22/16 -  Maria Del Carmen Rosario, PT Physical Therapist PT                    PT Recommendation and Plan  Anticipated Discharge Disposition: home with home health  Planned Therapy Interventions: balance training, bed mobility training, gait training, home exercise program, stair training, strengthening, stretching, transfer training, patient/family education, postural re-education  PT Frequency: 2 times/day  Plan of Care Review  Plan Of Care Reviewed With: patient  Progress: progress toward functional goals is gradual  Outcome Summary/Follow up Plan: pnt is ambulating farther. She has improved with transfers and stairs. Pleasant and cooperative.         Time Calculation:         PT Charges       01/08/18 1438 01/08/18 1321 01/08/18 1019    Time Calculation    Start Time 1400  -LB 1300  -LB 1000  -LB    Stop Time 1430  -LB 1330  -LB 1030  -LB    Time Calculation (min) 30 min  -LB 30 min  -LB 30 min  -LB      User Key  (r) = Recorded By, (t) = Taken By, (c) = Cosigned By    Initials Name Provider Type    LB Lakia Menjivar PTA Physical Therapy Assistant          Therapy Charges for Today     Code Description Service Date Service Provider Modifiers Qty    [id number] HC PT THER PROC EA 15 MIN  1/8/2018 Lakia Menjivar, PTA GP 6               Lakia Menjivar, PTA  1/8/2018

## 2018-01-08 NOTE — PROGRESS NOTES
Inpatient Rehabilitation Functional Measures Assessment    Functional Measures  KISHOR Eating:  [facility] Grooming: [facility] Bathing:  [facility] Upper Body Dressing:  [facility] Lower Body Dressing:  [facility] Toileting:  [facility] Bladder Management  Level of Assistance:  [place]  Frequency/Number of Accidents this Shift:  [facility] Bowel Management  Level of Assistance: [place]  Frequency/Number of Accidents this Shift: [facility] Bed/Chair/Wheelchair Transfer:  [facility] Toilet Transfer:  [facility] Tub/Shower Transfer:  [place]    Previously Documented Mode of Locomotion at Discharge: [place]  KISHOR Expected Mode of Locomotion at Discharge: [facility] Walk/Wheelchair:  [facility] Stairs:  [facility] Comprehension:  Auditory comprehension is the usual mode. Comprehension  Score = 7, Independent.  Patient comprehends complex/abstract information in  their primary language.  Patient is completely independent for auditory  comprehension.  There are no activity limitations.  KISHOR Expression:  Vocal expression is the usual mode. Expression Score = 7,  Independent.  Patient expresses complex/abstract information in their primary  language.  Patient is completely independent for vocal expression.  There are no  activity limitations.  KISHOR Social Interaction:  Social Interaction Score = 7, Independent. Patient is  completely independent for social interaction.  There are no activity  limitations.  [place]ISH[place] Problem Solving:  Patient does not make appropriate decisions in order to  solve complex problems without assistance from a helper. Problem Solving Score =  4, Minimal Direction. Patient makes appropriate decisions in order to solve  routine problems 75-90% of the time. Patient requires minimal/occasional  direction for the following behavior(s): Difficulty completing tasks. Difficulty  with self-monitoring.  KISHOR Memory:  Activity was not observed.    Therapy Mode Minutes  Occupational Therapy:  [facility]  Physical Therapy: [facility]  Speech Language Pathology:  [facility]    Signed by: Hazel Cheung RN

## 2018-01-08 NOTE — PLAN OF CARE
Problem: Patient Care Overview (Adult)  Goal: Plan of Care Review  Outcome: Ongoing (interventions implemented as appropriate)   01/08/18 0953   Coping/Psychosocial Response Interventions   Plan Of Care Reviewed With patient   Patient Care Overview   Progress no change   Outcome Evaluation   Outcome Summary/Follow up Plan Pt is attending all therapies and is transfering with one staff.      Goal: Adult Individualization and Mutuality  Outcome: Ongoing (interventions implemented as appropriate)    Goal: Discharge Needs Assessment  Outcome: Ongoing (interventions implemented as appropriate)      Problem: Fall Risk (Adult)  Goal: Absence of Falls  Outcome: Ongoing (interventions implemented as appropriate)      Problem: Mobility, Physical Impaired (Adult)  Goal: Enhanced Functionality Ability  Outcome: Ongoing (interventions implemented as appropriate)

## 2018-01-08 NOTE — PROGRESS NOTES
Inpatient Rehabilitation Functional Measures Assessment    Functional Measures  KISHOR Eating:  [facility] Grooming: [facility] Bathing:  [facility] Upper Body Dressing:  [facility] Lower Body Dressing:  [facility] Toileting:  [facility] Bladder Management  Level of Assistance:  Bladder Score = 7.  Patient is completely independent for  bladder management. There are no activity limitations.  Frequency/Number of Accidents this Shift:  Bladder accidents this shift:  2 .    KISHOR Bowel Management  Level of Assistance: Activity was not observed.  Frequency/Number of Accidents this Shift: Bowel accidents this shift: 0 .  Patient has not had an accident this shift.    [facility] Bed/Chair/Wheelchair Transfer:  [facility] Toilet Transfer:  [facility] Tub/Shower Transfer:  [place]    Previously Documented Mode of Locomotion at Discharge: [place]  KISHOR Expected Mode of Locomotion at Discharge: [facility] Walk/Wheelchair:  [facility] Stairs:  [facility] Comprehension:  [facility] Expression:  [facility] Social Interaction:  [facility] Problem Solving:  [facility] Memory:  [place]    Therapy Mode Minutes  Occupational Therapy: [facility]  Physical Therapy: [facility]  Speech Language Pathology:  [place]    Signed by: GERMAN Marte

## 2018-01-08 NOTE — THERAPY TREATMENT NOTE
Inpatient Rehabilitation - Occupational Therapy Treatment Note  [facility]     Patient Name: Stacie Vieira  : 1935  MRN: [id number]  Today's Date: 2018  Onset of Illness/Injury or Date of Surgery Date: 17     Referring Physician: Dr Houston      Admit Date: 2017    Visit Dx:     ICD-10-CM ICD-9-CM   1. 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   • Bradycardia   • Syncope   • Guillain Barré syndrome             Adult Rehabilitation Note       18 1352 18 1203 18 1019    Rehab Assessment/Intervention    Discipline occupational therapist  -CC occupational therapist  -CC physical therapy assistant  -LB    Document Type therapy note (daily note)  -CC therapy note (daily note)  -CC therapy note (daily note)  -LB    Subjective Information agree to therapy  -CC agree to therapy  -CC agree to therapy  -LB    Patient Effort, Rehab Treatment good  -CC good  -CC good  -LB    Precautions/Limitations fall precautions  -CC fall precautions  -CC fall precautions  -LB    Recorded by [CC] Jyoti Roblero OTR [CC] Jyoti Roblero, OTR [LB] Lakia Menjivar, DEJA    Pain Assessment    Pain Assessment No/denies pain  -CC No/denies pain  -CC No/denies pain  -LB    Recorded by [CC] Jyoti Roblero OTR [CC] Jyoti Roblero, OTR [LB] Lakia Menjivar PTA    Vision Assessment/Intervention    Visual Impairment WFL with corrective lenses  -CC WFL with corrective lenses  -CC     Recorded by [CC] Jyoti  Osbaldo, ROSAR [CC] Jyoti Roblero OTR     Cognitive Assessment/Intervention    Current Cognitive/Communication Assessment functional  -CC functional  -CC     Orientation Status oriented x 4  -CC oriented x 4  -CC     Follows Commands/Answers Questions able to follow single-step instructions  -CC able to follow single-step instructions  -CC     Personal Safety WNL/WFL  -CC WNL/WFL  -CC     Personal Safety Interventions gait belt;fall prevention program maintained;nonskid shoes/slippers when out of bed  -CC gait belt;nonskid shoes/slippers when out of bed;fall prevention program maintained  -CC     Recorded by [CC] Jyoti Roblero, ROSAR [CC] Jyoti Roblero OTR     Bed Mobility, Assessment/Treatment    Bed Mobility, Assistive Device   bed rails  -LB    Bed Mob, Supine to Sit, [place]   conditional independence  -LB    Bed Mobility, Comment --   up in w/c  -CC --   up in w/c  -CC     Recorded by [CC] Jyoti Roblero, ROSAR [CC] Jyoti Roblero, ROSAR [LB] Lakia Menjivar PTA    Transfer Assessment/Treatment    Transfers, Bed-Chair [place]   stand by assist  -LB    Transfers, Sit-Stand [place]  stand by assist  -CC stand by assist  -LB    Transfers, Stand-Sit [place]  stand by assist  -CC stand by assist  -LB    Transfers, Sit-Stand-Sit, Assist Device  rolling walker  -CC rolling walker  -LB    Toilet Transfer, [place]  supervision required  -CC     Toilet Transfer, Assistive Device  rolling walker  -CC     Walk-In Shower Transfer, [place]  supervision required  -CC     Walk-In Shower Transfer, Assist Device  standard walker;standard shower chair  -CC     Transfer, Comment   car tsf SBA, rwx  -LB    Recorded by  [CC] Jyoti Roblero, OTR [LB] Lakia Menjivar PTA    Gait Assessment/Treatment    Gait, [place] Level   supervision required  -LB    Gait, Assistive Device   rolling walker  -LB    Gait, Distance (Feet)   240  -LB    Recorded by   [LB] Lakia Menjivar PTA    Stairs  Assessment/Treatment    Number of Stairs   4  -LB    Stairs, Handrail Location   left side (ascending)   both hands on left rail  -LB    Stairs, [place] Level   contact guard assist  -LB    Stairs, Technique Used   step to step (ascending);step to step (descending)  -LB    Stairs, Comment   curb, rwx, CGA. Also tried up curb backward, down forward as pnt reports that she has a single step that rwx will not fit on.  -LB    Recorded by   [LB] Lakia Menjivar, PTA    Upper Body Bathing Assessment/Training    UB Bathing Assess/Train Assistive Device  hand-held shower head;shower chair with back  -CC     UB Bathing Assess/Train, Position  sitting  -CC     UB Bathing Assess/Train, [place] Level  supervision required  -CC     Recorded by  [CC] Jyoti Roblero OTR     Lower Body Bathing Assessment/Training    LB Bathing Assess/Train Assistive Device  grab bars;hand-held shower head;shower chair with back  -CC     LB Bathing Assess/Train, Position  sitting;standing  -CC     LB Bathing Assess/Train, [place] Level  supervision required  -CC     Recorded by  [CC] Jyoti Roblero OTR     Upper Body Dressing Assessment/Training    UB Dressing Assess/Train, Clothing Type  donning:;doffing:;bra;pull over  -CC     UB Dressing Assess/Train, Position  sitting  -CC     UB Dressing Assess/Train, [place]  supervision required  -CC     Recorded by  [CC] Jyoti Roblero OTR     Lower Body Dressing Assessment/Training    LB Dressing Assess/Train, Clothing Type  doffing:;donning:;pants;socks;shoes  -CC     LB Dressing Assess/Train, Position  sitting;standing  -CC     LB Dressing Assess/Train, [place]  supervision required  -CC     Recorded by  [CC] Jyoti Roblero OTR     Toileting Assessment/Training    Toileting Assess/Train, Position  sitting;standing  -CC     Toileting Assess/Train, [place]ep[place] Level  supervision required  -CC     Recorded by  [CC] Jyoti Roblero OTR     Grooming Assessment/Training     "Grooming Assess/Train, Position  standing  -CC     Grooming Assess/Train, Indep[place] Level  set up required;supervision required  -CC     Recorded by  [CC] AMAURI Rojas     Therapy Exercises    Bilateral Upper Extremity --   arm bike 5 min x2; 2# hand wt 15x3  -CC      Recorded by [CC] AMAURI Rojas      Positioning and Restraints    Pre-Treatment Position sitting in chair/recliner  -CC sitting in chair/recliner  -CC     Post Treatment Position wheelchair  -CC wheelchair  -CC wheelchair  -LB    In Wheelchair sitting;with PT  -CC sitting;call light within reach  -CC call light within reach;sitting  -LB    Recorded by [CC] Jyoti Roblero OTR [CC] Jyoti Roblero OTR [LB] Lakia Menjivar PTA      01/06/18 1017 01/05/18 1524       Rehab Assessment/Intervention    Discipline physical therapy assistant  -LB occupational therapist  -CC     Document Type therapy note (daily note)  -LB therapy note (daily note)  -CC     Subjective Information agree to therapy   \"It felt my my heart flipped earlir. .....fine now\"  -LB agree to therapy  -CC     Patient Effort, Rehab Treatment good  -LB good  -CC     Precautions/Limitations fall precautions  -LB fall precautions  -CC     Recorded by [LB] Lakia Menjivar PTA [CC] Jyoti Roblero OTR     Pain Assessment    Pain Assessment No/denies pain  -LB No/denies pain  -CC     Recorded by [LB] Lakia Menjivar PTA [CC] Jyoti Roblero OTR     Vision Assessment/Intervention    Visual Impairment  WFL with corrective lenses  -CC     Recorded by  [CC] AMAURI Rojas     Cognitive Assessment/Intervention    Current Cognitive/Communication Assessment  functional  -CC     Orientation Status  oriented x 4  -CC     Personal Safety  WNL/WFL  -CC     Personal Safety Interventions fall prevention program maintained;gait belt;muscle strengthening facilitated;nonskid shoes/slippers when out of bed  -LB fall prevention program maintained;gait belt  -CC     Recorded by [LB] " Lakia Menjivar PTA [CC] Jyoti Roblero, OTR     Bed Mobility, Assessment/Treatment    Bed Mobility, Roll Left, [place]  independent  -CC     Bed Mob, Supine to Sit, [place]  supervision required  -CC     Bed Mob, Sit to Supine, [place]  supervision required  -CC     Bed Mobility, Comment up in chair  -LB      Recorded by [LB] Lakia Menjivar PTA [CC] Jyoti Roblero, OTR     Transfer Assessment/Treatment    Transfers, Bed-Chair [place]  contact guard assist  -CC     Transfers, Sit-Stand [place] contact guard assist;stand by assist  -LB contact guard assist  -CC     Transfers, Stand-Sit [place] contact guard assist;stand by assist  -LB contact guard assist  -CC     Transfers, Sit-Stand-Sit, Assist Device rolling walker  -LB      Toilet Transfer, [place]  contact guard assist  -CC     Walk-In Shower Transfer, [place]  contact guard assist  -CC     Walk-In Shower Transfer, Assist Device  standard shower chair  -CC     Transfer, Impairments  strength decreased;impaired balance  -CC     Recorded by [LB] Lakia Menjivar PTA [CC] Jyoti Roblero, OTR     Gait Assessment/Treatment    Gait, [place] Level contact guard assist;verbal cues required  -LB      Gait, Assistive Device straight cane   w tripod base.  -LB      Gait, Distance (Feet) 160  -LB      Gait, Gait Deviations kee decreased;forward flexed posture;step length decreased;decreased heel strike;toe-to-floor clearance decreased   kee extremely slow  -LB      Recorded by [LB] Lakia Menjivar PTA      Stairs Assessment/Treatment    Number of Stairs 4  -LB      Stairs, Handrail Location both sides  -LB      Stairs, [place] Level contact guard assist  -LB      Stairs, Technique Used step over step (ascending);step over step (descending)  -LB      Recorded by [LB] Lakia Menjivar PTA      Functional Mobility    Functional Mobility- Ind. Level --   SBA  -LB      Functional Mobility- Device rolling  walker  -LB      Functional Mobility-Distance (Feet) 200  -LB      Functional Mobility- Safety Issues step length decreased;other (see comments)   decreased kee, decreased HS  -LB      Recorded by [LB] Lakia Menjivar PTA      Upper Body Bathing Assessment/Training    UB Bathing Assess/Train Assistive Device  hand-held shower head;shower chair with back  -CC     UB Bathing Assess/Train, Position  sitting  -CC     UB Bathing Assess/Train, [place] Level  set up required  -CC     Recorded by  [CC] Jyoti Roblero OTR     Lower Body Bathing Assessment/Training    LB Bathing Assess/Train Assistive Device  hand-held shower head;grab bars;shower chair with back  -CC     LB Bathing Assess/Train, Position  standing;sitting  -CC     LB Bathing Assess/Train, [place] Level  minimum assist (75% patient effort)  -CC     Recorded by  [CC] Jyoti Roblero OTR     Upper Body Dressing Assessment/Training    UB Dressing Assess/Train, Clothing Type  donning:;bra;pull over  -CC     UB Dressing Assess/Train, Position  sitting  -CC     UB Dressing Assess/Train, [place]  supervision required  -CC     Recorded by  [CC] Jyoti Roblero OTR     Lower Body Dressing Assessment/Training    LB Dressing Assess/Train, Clothing Type  donning:;doffing:  -CC     LB Dressing Assess/Train, Position  sitting;standing  -CC     LB Dressing Assess/Train, [place]  contact guard assist;supervision required  -CC     Recorded by  [CC] Jyoti Roblero OTR     Toileting Assessment/Training    Toileting Assess/Train, Position  sitting;standing  -CC     Toileting Assess/Train, Indepen Level  contact guard assist  -CC     Recorded by  [CC] AMAURI Rojas     Grooming Assessment/Training    Grooming Assess/Train, Position  sitting;standing  -CC     Grooming Assess/Train, Indepen Level  contact guard assist  -CC     Recorded by  [CC] Jyoti Roblero, ROSAR     Balance Skills Training    Sitting-Level of Assistance  Independent  -CC      Standing-Level of Assistance  Close supervision;Contact guard  -CC     Recorded by  [CC] AMAURI Rojas     Therapy Exercises    Bilateral Upper Extremity  --   arm bike 5 min x 2' yellow theraband 15x2; 1# wt 15x2 wrist  -CC     Recorded by  [CC] AMAURI Rojas     Positioning and Restraints    Post Treatment Position wheelchair  -LB      In Wheelchair sitting;call light within reach  -LB      Recorded by [LB] Lakia Menjivar, PTA        User Key  (r) = Recorded By, (t) = Taken By, (c) = Cosigned By    Initials Name Effective Dates    CC Jyoti Roblero OTR 04/13/15 -     LB Lakia Menjivar, PTA 02/18/16 -                 OT Goals       12/29/17 1511 12/27/17 1545       Transfer Training OT STG    Transfer Training OT STG, Date Established 12/29/17  -KP      Transfer Training OT STG, Time to Achieve 5 - 7 days  -KP      Transfer Training OT STG, Activity Type toilet;sit to stand/stand to sit;shower chair;walk-in shower  -KP      Transfer Training OT STG, [place] Level supervision required  -KP      Transfer Training OT STG, Assist Device shower chair  -KP      Transfer Training OT LTG    Transfer Training OT LTG, Date Established 12/29/17  -KP 12/27/17  -AF     Transfer Training OT LTG, Time to Achieve by discharge  -KP by discharge  -AF     Transfer Training OT LTG, Activity Type toilet;walk-in shower;shower chair;sit to stand/stand to sit  -KP toilet  -AF     Transfer Training OT LTG, [place] Level independent;conditional independence  -KP supervision required  -AF     Transfer Training OT LTG, Assist Device shower chair  -KP      Transfer Training OT LTG, Additional Goal  appropriate AD  -AF     Transfer Training OT LTG, Outcome  goal ongoing  -AF     Strength OT STG    Strength Goal OT STG, Date Established 12/29/17  -KP      Strength Goal OT STG, Time to Achieve 5 - 7 days  -KP      Strength Goal OT STG, Measure to Achieve to increase B UE to 4/5  -KP      Strength Goal OT STG,  Functional Goal to increase functional tsf and self-care skills  -      Strength OT LTG    Strength Goal OT LTG, Date Established 12/29/17  -      Strength Goal OT LTG, Time to Achieve by discharge  -      Strength Goal OT LTG, Measure to Achieve to increase to 4+/5   -KP      Strength Goal OT LTG, Functional Goal to increase functional tsf and self-care skills  -      Dynamic Standing Balance OT STG    Dynamic Standing Balance OT STG, Date Established 12/29/17  -      Dynamic Standing Balance OT STG, Time to Achieve 5 - 7 days  -      Dynamic Standing Balance OT STG, [place] Level supervision required  -      Dynamic Standing Balance OT STG, Assist Device UE Support   to increase independence w. self care and IADLs  -      Dynamic Standing Balance OT LTG    Dynamic Standing Balance OT LTG, Date Established 12/29/17  -      Dynamic Standing Balance OT LTG, Time to Achieve by discharge  -      Dynamic Standing Balance OT LTG, [place] Level independent;conditional independence  -      Patient Education OT LTG    Patient Education OT LTG, Date Established 12/29/17  -      Patient Education OT LTG, Time to Achieve by discharge  -      Patient Education OT LTG, Education Type written program;HEP;home safety  -      Patient Education OT LTG, Education Understanding independent;demonstrates adequately;verbalizes understanding  -      ADL OT STG    ADL OT STG, Date Established 12/29/17  -      ADL OT STG, Time to Achieve 1 wk  -      ADL OT STG, Activity Type ADL skills  -      ADL OT STG, [place] Level standby assist;setup;assistive device  -      ADL OT LTG    ADL OT LTG, Date Established 12/29/17  - 12/27/17  -AF     ADL OT LTG, Time to Achieve by discharge  - by discharge  -     ADL OT LTG, Activity Type ADL skills  - ADL skills  -AF     ADL OT LTG, [place] Level independent;independent with device;assistive device  - standby assist  -     ADL OT LTG,  Outcome  goal ongoing  -AF     Functional Mobility OT LTG    Functional Mobility Goal OT LTG, Date Established  12/27/17  -AF     Functional Mobility Goal OT LTG, Time to Achieve  by discharge  -AF     Functional Mobility Goal OT LTG, [place] Level  supervision  -AF     Functional Mobility Goal OT LTG, Assist Device  appropriate AD  -AF     Functional Mobility Goal OT LTG, Distance to Achieve  to the bathroom  -AF     Functional Mobility Goal OT LTG, Outcome  goal ongoing  -AF     Endurance OT LTG    Endurance Goal OT LTG, Date Established 12/29/17  -KP      Endurance Goal OT LTG, Time to Achieve by discharge  -      Endurance Goal OT LTG, Activity Level to increase;endurance 2 good  -KP        User Key  (r) = Recorded By, (t) = Taken By, (c) = Cosigned By    Initials Name Provider Type     Maggie Gilbert, OTR Occupational Therapist    AF Elissa Malone, OTR Occupational Therapist          Occupational Therapy Education     Title: PT OT SLP Therapies (Active)     Topic: Occupational Therapy (Active)     Point: ADL training (Done)    Description: Instruct learner(s) on proper safety adaptation and remediation techniques during self care or transfers.   Instruct in proper use of assistive devices.    Learning Progress Summary    Learner Readiness Method Response Comment Documented by Status   Patient Acceptance E,TB,D VU,DU ed pt on role of OT, benefit of therapy. POC w. OT.  12/29/17 1510 Done               Point: Precautions (Done)    Description: Instruct learner(s) on prescribed precautions during self-care and functional transfers.    Learning Progress Summary    Learner Readiness Method Response Comment Documented by Status   Patient Acceptance E,TB,D VU,DU ed pt on role of OT, benefit of therapy. POC w. OT.  12/29/17 1510 Done               Point: Body mechanics (Done)    Description: Instruct learner(s) on proper positioning and spine alignment during self-care, functional mobility  activities and/or exercises.    Learning Progress Summary    Learner Readiness Method Response Comment Documented by Status   Patient Acceptance E,TB,D VU,DU ed pt on role of OT, benefit of therapy. POC w. OT.  12/29/17 1510 Done                      User Key     Initials Effective Dates Name Provider Type Discipline     04/13/15 -  Maggie Gilbert OTPERNELL Occupational Therapist OT                  OT Recommendation and Plan  Anticipated Discharge Disposition: home  Planned Therapy Interventions: activity intolerance, ADL retraining, balance training, IADL retraining, home exercise program, strengthening, transfer training  Therapy Frequency: 5 times/wk  Plan of Care Review  Outcome Summary/Follow up Plan: Pt showing steady progress. Increased activity tolerance and balance noted. ADLs at SBA/CGA level.        Time Calculation:         Time Calculation- OT       01/08/18 1330 01/08/18 1030       Time Calculation- OT    OT Start Time 1330  -CC 1030  -CC     OT Stop Time 1400  -CC 1130  -CC     OT Time Calculation (min) 30 min  -CC 60 min  -CC       User Key  (r) = Recorded By, (t) = Taken By, (c) = Cosigned By    Initials Name Provider Type    CC AMAURI Rojas Occupational Therapist           Therapy Charges for Today     Code Description Service Date Service Provider Modifiers Qty    46355359909 HC OT SELF CARE/MGMT/TRAIN EA 15 MIN 1/8/2018 AMAURI Rojas GO 4    85152697655 HC OT THER PROC EA 15 MIN 1/8/2018 AMAURI Rojas GO 2               AMAURI Rojas  1/8/2018

## 2018-01-08 NOTE — PROGRESS NOTES
INFECTIOUS DISEASES PROGRESS NOTE    CC: f/u recurrent MAC    S:   Minimal cough  Did okay with meds  No f/c/ns    O:  Physical Exam:  Temp:  [97.6 °F (36.4 °C)-98.6 °F (37 °C)] 98.6 °F (37 °C)  Heart Rate:  [] 69  Resp:  [18] 18  BP: (116-132)/(60-74) 124/74  Physical Exam   Constitutional: She appears well-developed. No distress.   Pulmonary/Chest: Effort normal and breath sounds normal.   Neurological: She is alert.   Skin: Skin is warm and dry.   Psychiatric: She has a normal mood and affect. Her behavior is normal.        Diagnostics:     LFTs nl  Cr 0.70  WBC 6.2 (P37, L 47, M 10, E4)  H/H 11.6/36  PLt 350          Assessment/Plan   1.  Recurrent MAC  --CT chest by my read shows resolved LLL pneumonia and minimal reticulodular infiltrates  --Await sensitivities of MAC  --cont triple drug therapy with daily azithromycin 250 mg, ethambutol 15 mg/kg,  and rifampin 600 mg (I electronically submitted Rx to her pharmacy for these meds)  --Will arrange ID f/u for February but she's to call if issues arise sooner     2.  Guillain-Barré syndrome  --Was this secondary to her influenza-like illness in early December or antecedent viral infection?    D/w Dr. Amaya and no objection to discharge when through with her rehab.  I will not plan to see daily but will be happy to reevaluate anytime you need.    James Del Angel MD  11:44 AM  01/08/18

## 2018-01-08 NOTE — PLAN OF CARE
Problem: Patient Care Overview (Adult)  Goal: Plan of Care Review  Outcome: Ongoing (interventions implemented as appropriate)   01/07/18 1616 01/07/18 2156 01/08/18 0430   Coping/Psychosocial Response Interventions   Plan Of Care Reviewed With --  patient --    Patient Care Overview   Progress improving --  --    Outcome Evaluation   Outcome Summary/Follow up Plan --  --  No c/o pain. No unsafe behavior. States numbness in hands/ feet/ above ankles getting better.     Goal: Adult Individualization and Mutuality  Outcome: Ongoing (interventions implemented as appropriate)    Goal: Discharge Needs Assessment  Outcome: Ongoing (interventions implemented as appropriate)      Problem: Fall Risk (Adult)  Goal: Absence of Falls  Outcome: Ongoing (interventions implemented as appropriate)      Problem: Mobility, Physical Impaired (Adult)  Goal: Enhanced Functionality Ability  Outcome: Ongoing (interventions implemented as appropriate)

## 2018-01-08 NOTE — CONSULTS
Consult to Infection Control received on 1/7/18. No Isolation Precautions necessary per Elizabeth Betancourt RN (Infection Control) and Dr. Garcia (ID).

## 2018-01-09 PROCEDURE — 97110 THERAPEUTIC EXERCISES: CPT

## 2018-01-09 PROCEDURE — 97535 SELF CARE MNGMENT TRAINING: CPT

## 2018-01-09 PROCEDURE — 25010000002 ENOXAPARIN PER 10 MG: Performed by: PHYSICAL MEDICINE & REHABILITATION

## 2018-01-09 RX ADMIN — RIFAMPIN 600 MG: 300 CAPSULE ORAL at 08:17

## 2018-01-09 RX ADMIN — DOCUSATE SODIUM 100 MG: 100 CAPSULE, LIQUID FILLED ORAL at 20:53

## 2018-01-09 RX ADMIN — ETHAMBUTOL HYDROCHLORIDE 800 MG: 400 TABLET, FILM COATED ORAL at 08:17

## 2018-01-09 RX ADMIN — AMLODIPINE BESYLATE 5 MG: 5 TABLET ORAL at 08:17

## 2018-01-09 RX ADMIN — METOPROLOL SUCCINATE 25 MG: 25 TABLET, FILM COATED, EXTENDED RELEASE ORAL at 08:17

## 2018-01-09 RX ADMIN — DOCUSATE SODIUM 100 MG: 100 CAPSULE, LIQUID FILLED ORAL at 08:17

## 2018-01-09 RX ADMIN — Medication 250 MG: at 20:53

## 2018-01-09 RX ADMIN — AZITHROMYCIN 250 MG: 250 TABLET, FILM COATED ORAL at 08:17

## 2018-01-09 RX ADMIN — LEVOTHYROXINE SODIUM 50 MCG: 50 TABLET ORAL at 05:32

## 2018-01-09 RX ADMIN — LOSARTAN POTASSIUM 100 MG: 50 TABLET, FILM COATED ORAL at 08:17

## 2018-01-09 RX ADMIN — Medication 250 MG: at 08:17

## 2018-01-09 RX ADMIN — ENOXAPARIN SODIUM 30 MG: 30 INJECTION SUBCUTANEOUS at 20:53

## 2018-01-09 NOTE — PROGRESS NOTES
Inpatient Rehabilitation Functional Measures Assessment and Plan of Care    Plan of Care  Updated Problems/Interventions  Mobility    [OT] Toilet Transfers(Active)  Current Status(01/09/2018): SBA/Mod I  Weekly Goal(01/11/2018): Mod I  Discharge Goal: Mod I    [OT] Tub/Shower Transfers(Active)  Current Status(01/09/2018): SBa/Mod I  Weekly Goal(01/11/2018): Mod I  Discharge Goal: Mod I        Self Care    [OT] Bathing(Active)  Current Status(01/09/2018): Mod I/SBA  Weekly Goal(01/11/2018): Mod I  Discharge Goal: mod I    [OT] Dressing (Lower)(Active)  Current Status(01/09/2018): Mod I  Weekly Goal(01/10/2018): Mod I  Discharge Goal: mod I    [OT] Dressing (Upper)(Active)  Current Status(01/09/2018): MOd I  Weekly Goal(01/11/2018): Mod I  Discharge Goal: mod I    [OT] Grooming(Active)  Current Status(01/09/2018): Mod I  Weekly Goal(01/09/2018): Mod I  Discharge Goal: mod I    [OT] Toileting(Active)  Current Status(01/09/2018): Mod I  Weekly Goal(01/10/2018): Mod I  Discharge Goal: mod I    Functional Measures  KISHOR Eating:  Branch  KISHOR Grooming: Branch  KISHOR Bathing:  Branch  KISHOR Upper Body Dressing:  Branch  KISHOR Lower Body Dressing:  Branch  KISHOR Toileting:  Branch    KISHOR Bladder Management  Level of Assistance:  Branch  Frequency/Number of Accidents this Shift:  Branch    KISHOR Bowel Management  Level of Assistance: Branch  Frequency/Number of Accidents this Shift: Branch    KISHOR Bed/Chair/Wheelchair Transfer:  Branch  KISHOR Toilet Transfer:  Branch  KISHOR Tub/Shower Transfer:  Branch    Previously Documented Mode of Locomotion at Discharge: Field  KISHOR Expected Mode of Locomotion at Discharge: Branch  KISHOR Walk/Wheelchair:  Branch  KISHOR Stairs:  Branch    KISHOR Comprehension:  Branch  KISHOR Expression:  Branch  KISHOR Social Interaction:  Branch  KISHOR Problem Solving:  Branch  KISHOR Memory:  Branch    Therapy Mode Minutes  Occupational Therapy: Individual: 90 minutes.  Physical Therapy: Branch  Speech Language Pathology:   Branch    Signed by: Jyoti Roblero, OTR/L

## 2018-01-09 NOTE — PROGRESS NOTES
Inpatient Rehabilitation Functional Measures Assessment    Functional Measures  KISHOR Eating:  [facility] Grooming: [facility] Bathing:  [facility] Upper Body Dressing:  [facility] Lower Body Dressing:  [facility] Toileting:  [facility] Bladder Management  Level of Assistance:  [place]  Frequency/Number of Accidents this Shift:  [facility] Bowel Management  Level of Assistance: [place]  Frequency/Number of Accidents this Shift: [facility] Bed/Chair/Wheelchair Transfer:  [facility] Toilet Transfer:  [facility] Tub/Shower Transfer:  [place]    Previously Documented Mode of Locomotion at Discharge: Field  KISHOR Expected Mode of Locomotion at Discharge: [facility] Walk/Wheelchair:  [facility] Stairs:  [facility] Comprehension:  Auditory comprehension is the usual mode. Comprehension  Score = 6, Modified [place].  Patient comprehends complex/abstract  information in their primary language with only mild difficulty.  KISHOR Expression:  Vocal expression is the usual mode. Expression Score = 6,  Modified Independent.  Patient expresses complex/abstract information in their  primary language with only mild difficulty with tasks.  KISHOR Social Interaction:  Social Interaction Score = 6, Modified Independent.  Patient is modified independent for social interaction, requiring:  KISHOR Problem Solving:  Problem Solving Score = 6, Modified [place].  Patient  makes appropriate decisions in order to solve complex problems with mild  difficulty but self-corrects.  KISHOR Memory:  Memory Score = 6, Modified [place].  Patient is modified  independent for memory, requiring:    Therapy Mode Minutes  Occupational Therapy: [place]  Physical Therapy: [place]  Speech Language Pathology:  [place]    Signed by: Braydon Taylor RN

## 2018-01-09 NOTE — THERAPY TREATMENT NOTE
Inpatient Rehabilitation - Occupational Therapy Treatment Note  [facility]     Patient Name: Stacie Vieira  : 1935  MRN: [id number]  Today's Date: 2018  Onset of Illness/Injury or Date of Surgery Date: 17     Referring Physician: Dr Houston      Admit Date: 2017    Visit Dx:     ICD-10-CM ICD-9-CM   1. Generalized weakness R53.1 780.79     Patient Active Problem List   Diagnosis   • Mycobacterium avium complex   • Essential hypertension   • Palpitations   • Allergic rhinitis   • Hypothyroidism   • Hyperlipidemia   • Community acquired pneumonia of left lower lobe of lung   • Acute respiratory failure with hypoxia   • Bilateral hand numbness   • DNR (do not resuscitate)   • Polyneuropathy   • Oropharyngeal dysphagia   • Esophageal dysmotility   • Severe malnutrition due to acute illness   • Guillain-[place] syndrome following vaccination   • Bradycardia   • Syncope   • Guillain Barré syndrome             Adult Rehabilitation Note       18 1439 18 0945 18 0939    Rehab Assessment/Intervention    Discipline occupational therapist  -CC occupational therapist  -CC physical therapy assistant  -LB    Document Type therapy note (daily note)  -CC therapy note (daily note)  -CC therapy note (daily note)  -LB    Subjective Information agree to therapy  -CC agree to therapy  -CC agree to therapy  -LB    Patient Effort, Rehab Treatment excellent  -CC excellent  -CC good  -LB    Symptoms Noted During/After Treatment none  -CC none  -CC     Precautions/Limitations  fall precautions  -CC fall precautions  -LB    Equipment Issued to Patient   --   Rwx ordered  -LB    Recorded by [CC] Jyoti Roblero, OTR [CC] Jyoti Roblero, OTR [LB] Lakia Menjivar, DEJA    Pain Assessment    Pain Assessment No/denies pain  -CC No/denies pain  -CC No/denies pain  -LB    Recorded by [CC] Jyoti Roblero, OTR [CC] Jyoti Roblero, OTR [LB] Lakia Menjivar PTA    Vision Assessment/Intervention     Visual Impairment WFL with corrective lenses  -CC WFL with corrective lenses  -CC     Recorded by [CC] Jyoti Roblero OTR [CC] AMAURI Rojas     Cognitive Assessment/Intervention    Current Cognitive/Communication Assessment functional  -CC functional  -CC     Orientation Status oriented x 4  -CC oriented x 4  -CC     Follows Commands/Answers Questions 100% of the time;able to follow single-step instructions  -% of the time;able to follow multi-step instructions  -CC     Personal Safety WNL/WFL  -CC WNL/WFL  -CC     Personal Safety Interventions gait belt;fall prevention program maintained  -CC gait belt;fall prevention program maintained;nonskid shoes/slippers when out of bed  -CC fall prevention program maintained;gait belt;muscle strengthening facilitated;nonskid shoes/slippers when out of bed  -LB    Recorded by [CC] Jyoti Roblero OTR [CC] AMAURI Rojas [LB] Lakia Menjivar, PTA    Bed Mobility, Assessment/Treatment    Bed Mobility, Assistive Device   --   assessed on txment mat  -LB    Bed Mobility, Roll Left, [place] independent  -CC independent  -CC independent  -LB    Bed Mobility, Roll Right, [place]   independent  -LB    Bed Mobility, Scoot/Bridge, [place]   independent  -LB    Bed Mob, Supine to Sit, [place] independent  -CC independent  -CC independent  -LB    Bed Mob, Sit to Supine, [place]   independent  -LB    Recorded by [CC] AMAURI Rojas [CC] AMAURI Rojas [LB] Lakia Menjivar, PTA    Transfer Assessment/Treatment    Transfers, Bed-Chair [place]   stand by assist  -LB    Transfers, Chair-Bed [place]   stand by assist  -LB    Transfers, Sit-Stand [place]  supervision required;stand by assist  -CC stand by assist  -LB    Transfers, Stand-Sit [place]  stand by assist  -CC stand by assist;verbal cues required   cues for hand placement  -LB    Transfers, Sit-Stand-Sit, Assist Device   rolling walker  -LB    Toilet  Transfer, [place]  supervision required  -CC     Walk-In Shower Transfer, [place]  supervision required  -CC     Walk-In Shower Transfer, Assist Device  standard shower chair  -CC     Transfer, Comment   Car tsf, rwx, CGA  -LB    Recorded by  [CC] AMAURI Rojas [LB] Lakia Menjivar PTA    Gait Assessment/Treatment    Gait, [place] Level   supervision required;verbal cues required  -LB    Gait, Assistive Device   rolling walker  -LB    Gait, Distance (Feet)   260  -LB    Gait, Gait Deviations   kee decreased;forward flexed posture;decreased heel strike;step length decreased  -LB    Recorded by   [LB] Lakia Menjivar PTA    Stairs Assessment/Treatment    Number of Stairs   4  -LB    Stairs, Handrail Location   both sides  -LB    Stairs, [place] Level   contact guard assist  -LB    Stairs, Technique Used   step over step (descending);step over step (ascending)  -LB    Stairs, Comment   curb, rwx, CGA  -LB    Recorded by   [LB] Lakia Menjivar PTA    Functional Mobility    Functional Mobility- Ind. Level   contact guard assist  -LB    Functional Mobility- Device   --   none  -LB    Functional Mobility-Distance (Feet)   60  -LB    Functional Mobility- Safety Issues   other (see comments)   LOB x 1  -LB    Recorded by   [LB] Lakia Menjivar PTA    Upper Body Bathing Assessment/Training    UB Bathing Assess/Train Assistive Device  hand-held shower head;shower chair with back  -CC     UB Bathing Assess/Train, Position  sitting  -CC     UB Bathing Assess/Train, [place] Level  set up required  -CC     Recorded by  [CC] Jyoti Roblero OTR     Lower Body Bathing Assessment/Training    LB Bathing Assess/Train Assistive Device  grab bars;hand-held shower head;shower chair with back  -CC     LB Bathing Assess/Train, Position  standing;sitting  -CC     LB Bathing Assess/Train, [place] Level  supervision required;set up required  -CC     Recorded by  [CC] Jyoti Roblero OTR      Upper Body Dressing Assessment/Training    UB Dressing Assess/Train, Clothing Type  donning:;doffing:;bra;pull over  -CC     UB Dressing Assess/Train, Position  sitting  -CC     UB Dressing Assess/Train, [place]  supervision required  -CC     Recorded by  [CC] AMAURI Rojas     Lower Body Dressing Assessment/Training    LB Dressing Assess/Train, Clothing Type  doffing:;donning:;pants;shoes;socks  -CC     LB Dressing Assess/Train, Position  sitting;standing  -CC     LB Dressing Assess/Train, [place]  supervision required;verbal cues required  -CC     LB Dressing Assess/Train, Comment  --   assist varun Denise  -CC     Recorded by  [CC] AMAURI Rojas     Grooming Assessment/Training    Grooming Assess/Train, Position  standing  -CC     Grooming Assess/Train, [place] Level  set up required  -CC     Recorded by  [CC] AMAURI Rojas     Balance Skills Training    Gait Balance-Level of Assistance   Contact guard;Close supervision  -LB    Gait Balance Support   assistive device  -LB    Gait Balance Activities   uneven surface  -LB    Recorded by   [LB] Lakia Menjivar PTA    Therapy Exercises    Bilateral Lower Extremities   AROM:;20 reps;supine;sidelying  -LB    Bilateral Upper Extremity --   arm bike 5 min x 2; 2# hand wt 15x3;   -CC      BUE Resistance --   hand helper 20x2  -CC      Recorded by [CC] AMAURI Rojas  [LB] Lakia Menjivar, DEJA    Positioning and Restraints    Pre-Treatment Position sitting in chair/recliner  -CC in bed  -CC     Post Treatment Position wheelchair  -CC wheelchair  -CC     In Wheelchair sitting;with PT  -CC sitting;call light within reach  -CC     Recorded by [CC] AMAURI Rojas [CC] AMAURI Rojas       01/08/18 1352 01/08/18 1203 01/08/18 1019    Rehab Assessment/Intervention    Discipline occupational therapist  -CC occupational therapist  -CC physical therapy assistant  -LB    Document Type therapy note (daily note)  -CC therapy note  (daily note)  -CC therapy note (daily note)  -LB    Subjective Information agree to therapy  -CC agree to therapy  -CC agree to therapy  -LB    Patient Effort, Rehab Treatment good  -CC good  -CC good  -LB    Precautions/Limitations fall precautions  -CC fall precautions  -CC fall precautions  -LB    Recorded by [CC] AMAURI Rojas [CC] AMAURI Rojas [LB] Lakia Menjivar, DEJA    Pain Assessment    Pain Assessment No/denies pain  -CC No/denies pain  -CC No/denies pain  -LB    Recorded by [CC] AMAURI Rojas [CC] AMAURI Rojas [LB] Lakia Menjivar PTA    Vision Assessment/Intervention    Visual Impairment WFL with corrective lenses  -CC WFL with corrective lenses  -CC     Recorded by [CC] AMAURI Rojas [CC] AMAURI Rojas     Cognitive Assessment/Intervention    Current Cognitive/Communication Assessment functional  -CC functional  -CC     Orientation Status oriented x 4  -CC oriented x 4  -CC     Follows Commands/Answers Questions able to follow single-step instructions  -CC able to follow single-step instructions  -CC     Personal Safety WNL/WFL  -CC WNL/WFL  -CC     Personal Safety Interventions gait belt;fall prevention program maintained;nonskid shoes/slippers when out of bed  -CC gait belt;nonskid shoes/slippers when out of bed;fall prevention program maintained  -CC fall prevention program maintained;gait belt;muscle strengthening facilitated;nonskid shoes/slippers when out of bed  -LB    Recorded by [CC] AMAURI Rojas [CC] Jyoti Roblero OTR [LB] Lakia Menjivar, DEJA    Bed Mobility, Assessment/Treatment    Bed Mobility, Assistive Device   bed rails  -LB    Bed Mob, Supine to Sit, [place]   conditional independence  -LB    Bed Mob, Sit to Supine, [place]   conditional independence  -LB    Bed Mobility, Comment --   up in w/c  -CC --   up in w/c  -CC     Recorded by [CC] AMAURI Rojas [CC] AMAURI Rojas [LB] Lakia Menjivar, DEJA     Transfer Assessment/Treatment    Transfers, Bed-Chair [place]   stand by assist  -LB    Transfers, Chair-Bed [place]   stand by assist  -LB    Transfers, Sit-Stand [place]  stand by assist  -CC stand by assist  -LB    Transfers, Stand-Sit [place]  stand by assist  -CC stand by assist  -LB    Transfers, Sit-Stand-Sit, Assist Device  rolling walker  -CC rolling walker  -LB    Toilet Transfer, [place]  supervision required  -CC     Toilet Transfer, Assistive Device  rolling walker  -CC     Walk-In Shower Transfer, [place]  supervision required  -CC     Walk-In Shower Transfer, Assist Device  standard walker;standard shower chair  -CC     Transfer, Comment   car tsf SBA, rwx  -LB    Recorded by  [CC] Jyoti Roblero OTR [LB] Lakia Menjivar, PTA    Gait Assessment/Treatment    Gait, [place] Level   supervision required  -LB    Gait, Assistive Device   rolling walker  -LB    Gait, Distance (Feet)   240  -LB    Recorded by   [LB] Lakia Menjivar PTA    Stairs Assessment/Treatment    Number of Stairs   4  -LB    Stairs, Handrail Location   left side (ascending)   both hands on left rail  -LB    Stairs, [place] Level   contact guard assist  -LB    Stairs, Technique Used   step to step (ascending);step to step (descending)  -LB    Stairs, Comment   curb, rwx, CGA. Also tried up curb backward, down forward as pnt reports that she has a single step that rwx will not fit on.  -LB    Recorded by   [LB] Lakia Menjivar, DEJA    Upper Body Bathing Assessment/Training    UB Bathing Assess/Train Assistive Device  hand-held shower head;shower chair with back  -CC     UB Bathing Assess/Train, Position  sitting  -CC     UB Bathing Assess/Train, [place] Level  supervision required  -CC     Recorded by  [CC] Jyoti Roblero, OTR     Lower Body Bathing Assessment/Training    LB Bathing Assess/Train Assistive Device  grab bars;hand-held shower head;shower chair with back  -CC     LB  Bathing Assess/Train, Position  sitting;standing  -CC     LB Bathing Assess/Train, [place] Level  supervision required  -CC     Recorded by  [CC] Jyoti Roblero OTR     Upper Body Dressing Assessment/Training    UB Dressing Assess/Train, Clothing Type  donning:;doffing:;bra;pull over  -CC     UB Dressing Assess/Train, Position  sitting  -CC     UB Dressing Assess/Train, [place]  supervision required  -CC     Recorded by  [CC] Jyoti Roblero OTR     Lower Body Dressing Assessment/Training    LB Dressing Assess/Train, Clothing Type  doffing:;donning:;pants;socks;shoes  -CC     LB Dressing Assess/Train, Position  sitting;standing  -CC     LB Dressing Assess/Train, [place]  supervision required  -CC     Recorded by  [CC] Jyoti Roblero OTR     Toileting Assessment/Training    Toileting Assess/Train, Position  sitting;standing  -CC     Toileting Assess/Train, [place]ep[place] Level  supervision required  -CC     Recorded by  [CC] Jyoti Roblero OTR     Grooming Assessment/Training    Grooming Assess/Train, Position  standing  -CC     Grooming Assess/Train, [place]ep[place] Level  set up required;supervision required  -CC     Recorded by  [CC] AMAURI Rojas     Therapy Exercises    Bilateral Lower Extremities   AROM:;15 reps;supine  -LB    Bilateral Upper Extremity --   arm bike 5 min x2; 2# hand wt 15x3  -CC      Recorded by [CC] AMAURI Rojas  [LB] Lakia Menjivar PTA    Positioning and Restraints    Pre-Treatment Position sitting in chair/recliner  -CC sitting in chair/recliner  -CC     Post Treatment Position wheelchair  -CC wheelchair  -CC wheelchair  -LB    In Wheelchair sitting;with PT  -CC sitting;call light within reach  -CC call light within reach;sitting  -LB    Recorded by [CC] AMAURI Rojas [CC] AMAURI Rojas [LB] Lakia Menjivar PTA      User Key  (r) = Recorded By, (t) = Taken By, (c) = Cosigned By    Initials Name Effective Dates    AMAURI Begum 04/13/15 -      MELISA Menjivar, PTA 02/18/16 -                 OT Goals       12/29/17 1511 12/27/17 1545       Transfer Training OT STG    Transfer Training OT STG, Date Established 12/29/17  -KP      Transfer Training OT STG, Time to Achieve 5 - 7 days  -KP      Transfer Training OT STG, Activity Type toilet;sit to stand/stand to sit;shower chair;walk-in shower  -KP      Transfer Training OT STG, [place] Level supervision required  -KP      Transfer Training OT STG, Assist Device shower chair  -KP      Transfer Training OT LTG    Transfer Training OT LTG, Date Established 12/29/17  -KP 12/27/17  -AF     Transfer Training OT LTG, Time to Achieve by discharge  -KP by discharge  -AF     Transfer Training OT LTG, Activity Type toilet;walk-in shower;shower chair;sit to stand/stand to sit  -KP toilet  -AF     Transfer Training OT LTG, [place] Level independent;conditional independence  -KP supervision required  -AF     Transfer Training OT LTG, Assist Device shower chair  -KP      Transfer Training OT LTG, Additional Goal  appropriate AD  -AF     Transfer Training OT LTG, Outcome  goal ongoing  -AF     Strength OT STG    Strength Goal OT STG, Date Established 12/29/17  -KP      Strength Goal OT STG, Time to Achieve 5 - 7 days  -KP      Strength Goal OT STG, Measure to Achieve to increase B UE to 4/5  -KP      Strength Goal OT STG, Functional Goal to increase functional tsf and self-care skills  -KP      Strength OT LTG    Strength Goal OT LTG, Date Established 12/29/17  -KP      Strength Goal OT LTG, Time to Achieve by discharge  -KP      Strength Goal OT LTG, Measure to Achieve to increase to 4+/5   -KP      Strength Goal OT LTG, Functional Goal to increase functional tsf and self-care skills  -KP      Dynamic Standing Balance OT STG    Dynamic Standing Balance OT STG, Date Established 12/29/17  -KP      Dynamic Standing Balance OT STG, Time to Achieve 5 - 7 days  -KP      Dynamic Standing Balance OT STG,  [place] Level supervision required  -      Dynamic Standing Balance OT STG, Assist Device UE Support   to increase independence w. self care and IADLs  -      Dynamic Standing Balance OT LTG    Dynamic Standing Balance OT LTG, Date Established 12/29/17  -      Dynamic Standing Balance OT LTG, Time to Achieve by discharge  -      Dynamic Standing Balance OT LTG, [place] Level independent;conditional independence  -      Patient Education OT LTG    Patient Education OT LTG, Date Established 12/29/17  -      Patient Education OT LTG, Time to Achieve by discharge  -      Patient Education OT LTG, Education Type written program;HEP;home safety  -      Patient Education OT LTG, Education Understanding independent;demonstrates adequately;verbalizes understanding  -      ADL OT STG    ADL OT STG, Date Established 12/29/17  -      ADL OT STG, Time to Achieve 1 wk  -      ADL OT STG, Activity Type ADL skills  -      ADL OT STG, [place] Level standby assist;setup;assistive device  -      ADL OT LTG    ADL OT LTG, Date Established 12/29/17  - 12/27/17  -     ADL OT LTG, Time to Achieve by discharge  - by discharge  -     ADL OT LTG, Activity Type ADL skills  - ADL skills  -     ADL OT LTG, [place] Level independent;independent with device;assistive device  - standby assist  -     ADL OT LTG, Outcome  goal ongoing  -AF     Functional Mobility OT LTG    Functional Mobility Goal OT LTG, Date Established  12/27/17  -     Functional Mobility Goal OT LTG, Time to Achieve  by discharge  -AF     Functional Mobility Goal OT LTG, [place] Level  supervision  -AF     Functional Mobility Goal OT LTG, Assist Device  appropriate AD  -AF     Functional Mobility Goal OT LTG, Distance to Achieve  to the bathroom  -AF     Functional Mobility Goal OT LTG, Outcome  goal ongoing  -AF     Endurance OT LTG    Endurance Goal OT LTG, Date Established 12/29/17  -      Endurance Goal  OT LTG, Time to Achieve by discharge  -      Endurance Goal OT LTG, Activity Level to increase;endurance 2 good  -KP        User Key  (r) = Recorded By, (t) = Taken By, (c) = Cosigned By    Initials Name Provider Type     Maggie Gilbert, OTR Occupational Therapist    SOPHIE Malone, OTR Occupational Therapist          Occupational Therapy Education     Title: PT OT SLP Therapies (Active)     Topic: Occupational Therapy (Done)     Point: ADL training (Done)    Description: Instruct learner(s) on proper safety adaptation and remediation techniques during self care or transfers.   Instruct in proper use of assistive devices.    Learning Progress Summary    Learner Readiness Method Response Comment Documented by Status   Patient Acceptance E VU Family conf with pt and grand dgt. Status, HEP, DME and home safety discused. Granddgt given info on shower seat needed and will order. CC 01/09/18 1446 Done    Acceptance E,TB,D MARCELINO,TOYIN ed pt on role of OT, benefit of therapy. POC w. OT.  12/29/17 1510 Done   Family Acceptance E VU Family conf with pt and grand dgt. Status, HEP, DME and home safety discused. Granddgt given info on shower seat needed and will order. CC 01/09/18 1446 Done               Point: Home exercise program (Done)    Description: Instruct learner(s) on appropriate technique for monitoring, assisting and/or progressing therapeutic exercises/activities.    Learning Progress Summary    Learner Readiness Method Response Comment Documented by Status   Patient Acceptance E VU Family conf with pt and grand dgt. Status, HEP, DME and home safety discused. Granddgt given info on shower seat needed and will order. CC 01/09/18 1446 Done   Family Acceptance E VU Family conf with pt and grand dgt. Status, HEP, DME and home safety discused. Granddgt given info on shower seat needed and will order. CC 01/09/18 1446 Done               Point: Precautions (Done)    Description: Instruct learner(s) on  prescribed precautions during self-care and functional transfers.    Learning Progress Summary    Learner Readiness Method Response Comment Documented by Status   Patient Acceptance E VU Family conf with pt and grand dgt. Status, HEP, DME and home safety discused. Granddgt given info on shower seat needed and will order.  01/09/18 1446 Done    Acceptance E,TB,D MARCELINO,DU ed pt on role of OT, benefit of therapy. POC w. OT.  12/29/17 1510 Done   Family Acceptance E VU Family conf with pt and grand dgt. Status, HEP, DME and home safety discused. Granddgt given info on shower seat needed and will order.  01/09/18 1446 Done               Point: Body mechanics (Done)    Description: Instruct learner(s) on proper positioning and spine alignment during self-care, functional mobility activities and/or exercises.    Learning Progress Summary    Learner Readiness Method Response Comment Documented by Status   Patient Acceptance E VU Family conf with pt and grand dgt. Status, HEP, DME and home safety discused. Granddgt given info on shower seat needed and will order.  01/09/18 1446 Done    Acceptance E,TB,D MARCELINO,DU ed pt on role of OT, benefit of therapy. POC w. OT.  12/29/17 1510 Done   Family Acceptance E VU Family conf with pt and grand dgt. Status, HEP, DME and home safety discused. Granddgt given info on shower seat needed and will order.  01/09/18 1446 Done                      User Key     Initials Effective Dates Name Provider Type Discipline     04/13/15 -  Jyoti Roblero, OTR Occupational Therapist OT     04/13/15 -  Maggie Gilbert, OTR Occupational Therapist OT                  OT Recommendation and Plan  Anticipated Discharge Disposition: home  Planned Therapy Interventions: activity intolerance, ADL retraining, balance training, IADL retraining, home exercise program, strengthening, transfer training  Therapy Frequency: 5 times/wk  Plan of Care Review  Outcome Summary/Follow up Plan: Pt showing  steady progress. Increased activity tolerance and balance noted. ADLs at SBA/CGA level.        Time Calculation:         Time Calculation- OT       01/09/18 1300 01/09/18 0830       Time Calculation- OT    OT Start Time 1300  -CC 0830  -CC     OT Stop Time 1400  -CC 0900  -CC     OT Time Calculation (min) 60 min  -CC 30 min  -CC     OT Non-Billable Time (min) 30 min   family conf  -CC        User Key  (r) = Recorded By, (t) = Taken By, (c) = Cosigned By    Initials Name Provider Type    CC AMAURI Rojas Occupational Therapist           Therapy Charges for Today     Code Description Service Date Service Provider Modifiers Qty    77392084457 HC OT SELF CARE/MGMT/TRAIN EA 15 MIN 1/8/2018 AMAURI Rojas GO 4    71076418452 HC OT THER PROC EA 15 MIN 1/8/2018 AMAURI Rojas GO 2    28242768787 HC OT SELF CARE/MGMT/TRAIN EA 15 MIN 1/9/2018 AMAURI Rojas GO 2    41078736299 HC OT CARE PLAN EA 15 MIN 1/9/2018 AMAURI Rojas GO 2    53074088492 HC OT THER PROC EA 15 MIN 1/9/2018 AMAURI Rojas GO 4               AMAURI Rojas  1/9/2018

## 2018-01-09 NOTE — PLAN OF CARE
Problem: Patient Care Overview (Adult)  Goal: Plan of Care Review  Outcome: Ongoing (interventions implemented as appropriate)   01/09/18 3926   Coping/Psychosocial Response Interventions   Plan Of Care Reviewed With patient   Patient Care Overview   Progress improving   Outcome Evaluation   Outcome Summary/Follow up Plan Participated in therapies and cooperative with staff. Amb with wx. Family conference held today with shannon and jerman. Uses call light for assistance. No safety issues noted.     Goal: Adult Individualization and Mutuality  Outcome: Ongoing (interventions implemented as appropriate)      Problem: Fall Risk (Adult)  Goal: Absence of Falls  Outcome: Ongoing (interventions implemented as appropriate)      Problem: Mobility, Physical Impaired (Adult)  Goal: Enhanced Functionality Ability  Outcome: Ongoing (interventions implemented as appropriate)

## 2018-01-09 NOTE — PROGRESS NOTES
Inpatient Rehabilitation Functional Measures Assessment    Functional Measures  KISHOR Eating:  Branch  KISHOR Grooming: Branch  KISHOR Bathing:  Branch  KISHOR Upper Body Dressing:  Branch  KISHOR Lower Body Dressing:  Branch  [facility] Toileting:  Toileting Score = 5.  Patient is supervision/set-up for  toileting, requiring: Patient requires the following assistive device(s): Grab  bar.    KISHOR Bladder Management  Level of Assistance:  Bladder Score = 7.  Patient is completely independent for  bladder management. There are no activity limitations.  Frequency/Number of Accidents this Shift:  Bladder accidents this shift:  0 .  Patient has not had an accident this shift.    KISHOR Bowel Management  Level of Assistance: [place]  Frequency/Number of Accidents this Shift: Branch    [facility] Bed/Chair/Wheelchair Transfer:  Bed/chair/wheelchair Transfer Score = 5.  Patient is supervision/set-up for transferring to and from the  bed/chair/wheelchair, requiring: Stand by assistance. Patient requires the  following assistive device(s): Bed rails. Walker.  KISHOR Toilet Transfer:  Toilet Transfer Score = 5.  Patient is supervision/set-up  for transferring to and from the toilet/commode, requiring: Stand by assistance.  Patient requires the following assistive device(s): Grab bars. Walker.  KISHOR Tub/Shower Transfer:  Branch    Previously Documented Mode of Locomotion at Discharge: Field  [facility] Expected Mode of Locomotion at Discharge: Branch  [facility] Walk/Wheelchair:  Branch  [facility] Stairs:  Branch    [facility] Comprehension:  Branch  [facility] Expression:  Branch  [facility] Social Interaction:  Branch  [facility] Problem Solving:  Branch  [facility] Memory:  Branch    Therapy Mode Minutes  Occupational Therapy: Branch  Physical Therapy: Branch  Speech Language Pathology:  Branch    Signed by: GERMAN Ernst

## 2018-01-09 NOTE — PROGRESS NOTES
Adult Nutrition  Assessment/PES    Patient Name:  Stacie Vieira  YOB: 1935  MRN: [id number]  Admit Date:  12/28/2017    Assessment Date:  1/9/2018    Comments: Intake 100% at meals. Continues to get Ensure BID.           Reason for Assessment       01/09/18 1332    Reason for Assessment    Reason For Assessment/Visit follow up protocol                  Labs/Tests/Procedures/Meds       01/09/18 1333    Labs/Tests/Procedures/Meds    Diagnostic Test/Procedure Review reviewed    Labs/Tests Review Reviewed    Medication Review Reviewed, pertinent    Significant Vitals reviewed                Nutrition Prescription Ordered       01/09/18 1333    Nutrition Prescription PO    Current PO Diet Regular    Fluid Consistency Thin    Common Modifiers Cardiac            Evaluation of Received Nutrient/Fluid Intake       01/09/18 1333    PO Evaluation    Number of Meals 5    % PO Intake 100            Problem/Interventions:                  Intervention Goal       01/09/18 1334    Intervention Goal    General Maintain nutrition    PO Maintain intake            Nutrition Intervention       01/09/18 1334    Nutrition Intervention    RD/Tech Action Follow Tx progress;Care plan reviewd;Encourage intake;Menu provided              Education/Evaluation       01/09/18 1334    Monitor/Evaluation    Monitor Per protocol        Electronically signed by:  Yaquelin Pozo RD  01/09/18 1:34 PM

## 2018-01-09 NOTE — PROGRESS NOTES
Inpatient Rehabilitation Functional Measures Assessment and Plan of Care    Plan of Care  Updated Problems/Interventions  Mobility    [PT] Bed/Chair/Wheelchair(Active)  Current Status(01/09/2018): SBA  Weekly Goal(01/11/2018): Indep  Discharge Goal: Independent    [PT] Stairs(Active)  Current Status(01/09/2018): CGA 4 steps with handrails  Weekly Goal(01/11/2018): PT only  Discharge Goal: CGA with one Handrail 12 steps    [PT] Car Transfers(Active)  Current Status(01/09/2018): CGA, Rwx  Weekly Goal(01/11/2018):  Discharge Goal: CGA, rwx    [PT] Walk(Active)  Current Status(01/09/2018): supervision 260 ft, rwx  Weekly Goal(01/11/2018): to BR Indep  Discharge Goal: Independent 260' Rwx    Functional Measures  KISHOR Eating:  Branch  [facility] Grooming: Branch  [facility] Bathing:  Branch  [facility] Upper Body Dressing:  Branch  [facility] Lower Body Dressing:  Branch  [facility] Toileting:  Branch    [facility] Bladder Management  Level of Assistance:  Branch  Frequency/Number of Accidents this Shift:  Branch    [facility] Bowel Management  Level of Assistance: [place]  Frequency/Number of Accidents this Shift: Branch    KISHOR Bed/Chair/Wheelchair Transfer:  Bed/chair/wheelchair Transfer Score = 5.  Patient is supervision/set-up for transferring to and from the  bed/chair/wheelchair, requiring: No assistive devices were required.  KISHOR Toilet Transfer:  Branch  [facility] Tub/Shower Transfer:  Branch    Previously Documented Mode of Locomotion at Discharge: Field  [facility] Expected Mode of Locomotion at Discharge: Branch  [facility] Walk/Wheelchair:  WHEELCHAIR OBSERVATION   Activity was not observed.    WALK OBSERVATION   Walk Distance Scale = 3.  Distance walked is greater than 150 feet. Walk Score  = 5.  Patient requires supervision or set up for walking. Patient walked a  distance of  260 feet. Patient requires the following assistive device(s):  Rolling walker.  KISHOR Stairs:  Stairs Score = 2.  Incidental assistance with lifting or lowering,  contact guard or steadying was provided.  Patient performs 75% or more of effort  and requires minimal contact assistance. Patient negotiated  4 stairs. Patient  requires the following assistive device(s): Handrail(s).    KISHOR Comprehension:  [place]  KISHOR Expression:  [place]  KISHOR Social Interaction:  [place]  KISHOR Problem Solving:  [facility] Memory:  [place]    Therapy Mode Minutes  Occupational Therapy: [place]  Physical Therapy: Individual: 90 minutes.  Speech Language Pathology:  [place]    Signed by: Lakia Menjivar PTA

## 2018-01-09 NOTE — THERAPY TREATMENT NOTE
Inpatient Rehabilitation - Physical Therapy Treatment Note  [facility]     Patient Name: Stacie Vieira  : 1935  MRN: [id number]  Today's Date: 2018  Onset of Illness/Injury or Date of Surgery Date: 17  Date of Referral to PT: 17  Referring Physician: Dr Houston    Admit Date: 2017    Visit Dx:    ICD-10-CM ICD-9-CM   1. 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   • Bradycardia   • Syncope   • Guillain Barré syndrome               Adult Rehabilitation Note       18 1439 18 0945 18 0939    Rehab Assessment/Intervention    Discipline occupational therapist  -CC occupational therapist  -CC physical therapy assistant  -LB    Document Type therapy note (daily note)  -CC therapy note (daily note)  -CC therapy note (daily note)  -LB    Subjective Information agree to therapy  -CC agree to therapy  -CC agree to therapy  -LB    Patient Effort, Rehab Treatment excellent  -CC excellent  -CC good  -LB    Symptoms Noted During/After Treatment none  -CC none  -CC     Precautions/Limitations  fall precautions  -CC fall precautions  -LB    Equipment Issued to Patient   --   Rwx ordered  -LB    Recorded by [CC] Jyoti Roblero OTR [CC] Jyoti Roblero OTR [LB] Lakia Menjivar, DEJA    Pain Assessment    Pain Assessment No/denies pain  -CC No/denies pain  -CC No/denies pain  -LB    Recorded by [CC] AMAURI Rojas [CC] Jyoti Roblero OTR [LB] Lakia Menjivar PTA    Vision  Assessment/Intervention    Visual Impairment WFL with corrective lenses  -CC WFL with corrective lenses  -CC     Recorded by [CC] Jyoti Roblero OTR [CC] AMAURI Rojas     Cognitive Assessment/Intervention    Current Cognitive/Communication Assessment functional  -CC functional  -CC     Orientation Status oriented x 4  -CC oriented x 4  -CC     Follows Commands/Answers Questions 100% of the time;able to follow single-step instructions  -% of the time;able to follow multi-step instructions  -CC     Personal Safety WNL/WFL  -CC WNL/WFL  -CC     Personal Safety Interventions gait belt;fall prevention program maintained  -CC gait belt;fall prevention program maintained;nonskid shoes/slippers when out of bed  -CC fall prevention program maintained;gait belt;muscle strengthening facilitated;nonskid shoes/slippers when out of bed  -LB    Recorded by [CC] Jyoti Roblero, ROSAR [CC] AMAURI Rojas [LB] Lakia Menjivar, PTA    Bed Mobility, Assessment/Treatment    Bed Mobility, Assistive Device   --   assessed on txment mat  -LB    Bed Mobility, Roll Left, [place] independent  -CC independent  -CC independent  -LB    Bed Mobility, Roll Right, [place]   independent  -LB    Bed Mobility, Scoot/Bridge, [place]   independent  -LB    Bed Mob, Supine to Sit, [place] independent  -CC independent  -CC independent  -LB    Bed Mob, Sit to Supine, [place]   independent  -LB    Recorded by [CC] Jyoti Roblero OTR [CC] AMAURI Rojas [LB] Lakia Menjivar, PTA    Transfer Assessment/Treatment    Transfers, Bed-Chair [place]   stand by assist  -LB    Transfers, Chair-Bed [place]   stand by assist  -LB    Transfers, Sit-Stand [place]  supervision required;stand by assist  -CC stand by assist  -LB    Transfers, Stand-Sit [place]  stand by assist  -CC stand by assist;verbal cues required   cues for hand placement  -LB    Transfers, Sit-Stand-Sit, Assist Device    rolling walker  -LB    Toilet Transfer, [place]  supervision required  -CC     Walk-In Shower Transfer, [place]  supervision required  -CC     Walk-In Shower Transfer, Assist Device  standard shower chair  -CC     Transfer, Comment   Car tsf, rwx, CGA  -LB    Recorded by  [CC] Jyoti Roblero OTR [LB] Lakia Menjivar PTA    Gait Assessment/Treatment    Gait, [place] Level   supervision required;verbal cues required  -LB    Gait, Assistive Device   rolling walker  -LB    Gait, Distance (Feet)   260  -LB    Gait, Gait Deviations   kee decreased;forward flexed posture;decreased heel strike;step length decreased  -LB    Recorded by   [LB] Lakia Menjivar PTA    Stairs Assessment/Treatment    Number of Stairs   4  -LB    Stairs, Handrail Location   both sides  -LB    Stairs, [place] Level   contact guard assist  -LB    Stairs, Technique Used   step over step (descending);step over step (ascending)  -LB    Stairs, Comment   curb, rwx, CGA  -LB    Recorded by   [LB] Lakia Menjivar PTA    Functional Mobility    Functional Mobility- Ind. Level   contact guard assist  -LB    Functional Mobility- Device   --   none  -LB    Functional Mobility-Distance (Feet)   60  -LB    Functional Mobility- Safety Issues   other (see comments)   LOB x 1  -LB    Recorded by   [LB] Lakia Menjivar PTA    Upper Body Bathing Assessment/Training    UB Bathing Assess/Train Assistive Device  hand-held shower head;shower chair with back  -CC     UB Bathing Assess/Train, Position  sitting  -CC     UB Bathing Assess/Train, [place] Level  set up required  -CC     Recorded by  [CC] Jyoti Roblero OTR     Lower Body Bathing Assessment/Training    LB Bathing Assess/Train Assistive Device  grab bars;hand-held shower head;shower chair with back  -CC     LB Bathing Assess/Train, Position  standing;sitting  -CC     LB Bathing Assess/Train, [place] Level  supervision required;set up required  -CC     Recorded by   [CC] Jyoti Roblero OTR     Upper Body Dressing Assessment/Training    UB Dressing Assess/Train, Clothing Type  donning:;doffing:;bra;pull over  -CC     UB Dressing Assess/Train, Position  sitting  -CC     UB Dressing Assess/Train, [place]  supervision required  -CC     Recorded by  [CC] Jyoti Roblero OTR     Lower Body Dressing Assessment/Training    LB Dressing Assess/Train, Clothing Type  doffing:;donning:;pants;shoes;socks  -CC     LB Dressing Assess/Train, Position  sitting;standing  -CC     LB Dressing Assess/Train, [place]  supervision required;verbal cues required  -CC     LB Dressing Assess/Train, Comment  --   assist heribertomary jo [place]  -CC     Recorded by  [CC] Jyoti Roblero OTR     Grooming Assessment/Training    Grooming Assess/Train, Position  standing  -CC     Grooming Assess/Train, [place] Level  set up required  -CC     Recorded by  [CC] Jyoti Roblero OTR     Balance Skills Training    Gait Balance-Level of Assistance   Contact guard;Close supervision  -LB    Gait Balance Support   assistive device  -LB    Gait Balance Activities   uneven surface  -LB    Recorded by   [LB] Lakia Menjivar PTA    Therapy Exercises    Bilateral Lower Extremities   AROM:;20 reps;supine;sidelying  -LB    Bilateral Upper Extremity --   arm bike 5 min x 2; 2# hand wt 15x3;   -CC      BUE Resistance --   hand helper 20x2  -CC      Recorded by [CC] Jyoti Roblero OTR  [LB] Lakia Menjivar PTA    Positioning and Restraints    Pre-Treatment Position sitting in chair/recliner  -CC in bed  -CC     Post Treatment Position wheelchair  -CC wheelchair  -CC wheelchair  -LB    In Wheelchair sitting;with PT  -CC sitting;call light within reach  -CC sitting;call light within reach;with nsg  -LB    Recorded by [CC] Jyoti Roblero OTR [CC] Jyoti Roblero OTR [LB] Lakia Menjivar PTA      01/08/18 1352 01/08/18 1203 01/08/18 1019    Rehab Assessment/Intervention    Discipline occupational therapist  -CC  occupational therapist  -CC physical therapy assistant  -LB    Document Type therapy note (daily note)  -CC therapy note (daily note)  -CC therapy note (daily note)  -LB    Subjective Information agree to therapy  -CC agree to therapy  -CC agree to therapy  -LB    Patient Effort, Rehab Treatment good  -CC good  -CC good  -LB    Precautions/Limitations fall precautions  -CC fall precautions  -CC fall precautions  -LB    Recorded by [CC] Jyoti Roblero OTR [CC] Jyoti Roblero OTR [LB] Lakia Menjivar PTA    Pain Assessment    Pain Assessment No/denies pain  -CC No/denies pain  -CC No/denies pain  -LB    Recorded by [CC] Jyoti Roblero OTR [CC] Jyoti Roblero OTR [LB] Lakia Menjivar PTA    Vision Assessment/Intervention    Visual Impairment WFL with corrective lenses  -CC WFL with corrective lenses  -CC     Recorded by [CC] AMAURI Rojas [CC] Jyoti Roblero OTR     Cognitive Assessment/Intervention    Current Cognitive/Communication Assessment functional  -CC functional  -CC     Orientation Status oriented x 4  -CC oriented x 4  -CC     Follows Commands/Answers Questions able to follow single-step instructions  -CC able to follow single-step instructions  -CC     Personal Safety WNL/WFL  -CC WNL/WFL  -CC     Personal Safety Interventions gait belt;fall prevention program maintained;nonskid shoes/slippers when out of bed  -CC gait belt;nonskid shoes/slippers when out of bed;fall prevention program maintained  -CC fall prevention program maintained;gait belt;muscle strengthening facilitated;nonskid shoes/slippers when out of bed  -LB    Recorded by [CC] Jyoti Roblero OTR [CC] Jyoti Roblero, ROSAR [LB] Lakia Menjivar, PTA    Bed Mobility, Assessment/Treatment    Bed Mobility, Assistive Device   bed rails  -LB    Bed Mob, Supine to Sit, [place]   conditional independence  -LB    Bed Mob, Sit to Supine, [place]   conditional independence  -LB    Bed Mobility, Comment --   up in w/c   -CC --   up in w/c  -CC     Recorded by [CC] Jyoti Roblero, OTR [CC] Jyoti Roblero, OTR [LB] Lakia Menjivar PTA    Transfer Assessment/Treatment    Transfers, Bed-Chair [place]   stand by assist  -LB    Transfers, Chair-Bed [place]   stand by assist  -LB    Transfers, Sit-Stand [place]  stand by assist  -CC stand by assist  -LB    Transfers, Stand-Sit [place]  stand by assist  -CC stand by assist  -LB    Transfers, Sit-Stand-Sit, Assist Device  rolling walker  -CC rolling walker  -LB    Toilet Transfer, [place]  supervision required  -CC     Toilet Transfer, Assistive Device  rolling walker  -CC     Walk-In Shower Transfer, [place]  supervision required  -CC     Walk-In Shower Transfer, Assist Device  standard walker;standard shower chair  -CC     Transfer, Comment   car tsf SBA, rwx  -LB    Recorded by  [CC] Jyoti Roblero OTR [LB] Lakia Menjivar PTA    Gait Assessment/Treatment    Gait, [place] Level   supervision required  -LB    Gait, Assistive Device   rolling walker  -LB    Gait, Distance (Feet)   240  -LB    Recorded by   [LB] Lakia Menjivar PTA    Stairs Assessment/Treatment    Number of Stairs   4  -LB    Stairs, Handrail Location   left side (ascending)   both hands on left rail  -LB    Stairs, [place] Level   contact guard assist  -LB    Stairs, Technique Used   step to step (ascending);step to step (descending)  -LB    Stairs, Comment   curb, rwx, CGA. Also tried up curb backward, down forward as pnt reports that she has a single step that rwx will not fit on.  -LB    Recorded by   [LB] Lakia Menjivar PTA    Upper Body Bathing Assessment/Training    UB Bathing Assess/Train Assistive Device  hand-held shower head;shower chair with back  -CC     UB Bathing Assess/Train, Position  sitting  -CC     UB Bathing Assess/Train, [place] Level  supervision required  -CC     Recorded by  [CC] Jyoti Roblero OTR     Lower Body Bathing  Assessment/Training    LB Bathing Assess/Train Assistive Device  grab bars;hand-held shower head;shower chair with back  -CC     LB Bathing Assess/Train, Position  sitting;standing  -CC     LB Bathing Assess/Train, [place] Level  supervision required  -CC     Recorded by  [CC] Jyoti Roblero OTR     Upper Body Dressing Assessment/Training    UB Dressing Assess/Train, Clothing Type  donning:;doffing:;bra;pull over  -CC     UB Dressing Assess/Train, Position  sitting  -CC     UB Dressing Assess/Train, [place]  supervision required  -CC     Recorded by  [CC] Jyoti Roblero OTR     Lower Body Dressing Assessment/Training    LB Dressing Assess/Train, Clothing Type  doffing:;donning:;pants;socks;shoes  -CC     LB Dressing Assess/Train, Position  sitting;standing  -CC     LB Dressing Assess/Train, [place]  supervision required  -CC     Recorded by  [CC] Jyoti Roblero OTR     Toileting Assessment/Training    Toileting Assess/Train, Position  sitting;standing  -CC     Toileting Assess/Train, [place] Level  supervision required  -CC     Recorded by  [CC] AMAURI Rojas     Grooming Assessment/Training    Grooming Assess/Train, Position  standing  -CC     Grooming Assess/Train, [place] Level  set up required;supervision required  -CC     Recorded by  [CC] AMAURI Rojas     Therapy Exercises    Bilateral Lower Extremities   AROM:;15 reps;supine  -LB    Bilateral Upper Extremity --   arm bike 5 min x2; 2# hand wt 15x3  -CC      Recorded by [CC] Jyoti Roblero OTR  [LB] Lakia Menjivar, DEJA    Positioning and Restraints    Pre-Treatment Position sitting in chair/recliner  -CC sitting in chair/recliner  -CC     Post Treatment Position wheelchair  -CC wheelchair  -CC wheelchair  -LB    In Wheelchair sitting;with PT  -CC sitting;call light within reach  -CC call light within reach;sitting  -LB    Recorded by [CC] AMAURI Rojas [CC] Jyoti Roblero OTR [LB] Lakia Menjivar, PTA       User Key  (r) = Recorded By, (t) = Taken By, (c) = Cosigned By    Initials Name Effective Dates    JAYE Jyoti Osbaldo, OTR 04/13/15 -     LB Lakia Menjivar, PTA 02/18/16 -                 IP PT Goals       01/05/18 1212 12/29/17 1225       Transfer Training 2 PT LTG    Transfer Training PT 2 LTG, Date Established  12/29/17  -KH     Transfer Training PT 2 LTG, Time to Achieve  1 wk  -KH     Transfer Training PT 2 LTG, Activity Type  all transfers  -KH     Transfer Training PT 2 LTG, [place] Level  independent  -KH     Transfer Training PT 2 LTG, Date Goal Reviewed 01/05/18  -LB      Transfer Training PT 2 LTG, Outcome goal ongoing  -LB      Gait Training PT LTG    Gait Training Goal PT LTG, Date Established  12/29/17  -KH     Gait Training Goal PT LTG, Time to Achieve  1 wk  -KH     Gait Training Goal PT LTG, [place] Level  independent  -KH     Gait Training Goal PT LTG, Distance to Achieve  150  -KH     Gait Training Goal PT LTG, Date Goal Reviewed 01/05/18  -LB      Gait Training Goal PT LTG, Outcome goal ongoing  -LB      Stair Training PT LTG    Stair Training Goal PT LTG, Date Established  12/29/17  -KH     Stair Training Goal PT LTG, Time to Achieve  1 wk  -KH     Stair Training Goal PT LTG, Number of Steps  12  -KH     Stair Training Goal PT LTG, [place] Level  conditional independence  -KH     Stair Training Goal PT LTG, Assist Device  1 handrail  -KH     Stair Training Goal PT LTG, Date Goal Reviewed 01/05/18  -LB      Stair Training Goal PT LTG, Outcome goal ongoing  -LB      Strength Goal PT LTG    Strength Goal PT LTG, Date Established  12/29/17  -KH     Strength Goal PT LTG, Time to Achieve  1 wk  -KH     Strength Goal PT LTG, Measure to Achieve  B LE grossly 4-/5 with MMT  -KH     Strength Goal PT LTG, Date Goal Reviewed 01/05/18  -LB      Strength Goal PT LTG, Outcome goal ongoing  -LB        User Key  (r) = Recorded By, (t) = Taken By, (c) = Cosigned By    Initials Name Provider  Type    LB Lakia Menjivar PTA Physical Therapy Assistant    TEMO Rosario, PT Physical Therapist          Physical Therapy Education     Title: PT OT SLP Therapies (Active)     Topic: Physical Therapy (Active)     Point: Mobility training (Done)    Learning Progress Summary    Learner Readiness Method Response Comment Documented by Status   Patient Acceptance E VU,NR  LB 01/09/18 1528 Done    Acceptance E VU,NR  LB 01/08/18 1321 Done    Acceptance E VU,NR gait w STC, and w rwx LB 01/06/18 1056 Done    Acceptance E VU,NR   01/05/18 1425 Done    Acceptance E,D VU,NR Spoke at length with pt about usefulness of using a roll wx and WC upon return to home since pt will be independent to ensure pt safety and mobility with lessened risk of falls. JK 01/04/18 1323 Done    Acceptance E VU,NR   01/03/18 1449 Done    Acceptance E VU,NR   01/02/18 1312 Done    Acceptance E VU Educated patient on safe hand placement and body mechanics with basic mobility including bed mobility, transfers, gait, and stairs  12/29/17 1224 Done               Point: Body mechanics (Done)    Learning Progress Summary    Learner Readiness Method Response Comment Documented by Status   Patient Acceptance E VU Educated patient on safe hand placement and body mechanics with basic mobility including bed mobility, transfers, gait, and stairs  12/29/17 1224 Done               Point: Precautions (Done)    Learning Progress Summary    Learner Readiness Method Response Comment Documented by Status   Patient Acceptance PURNIMA FARFAN MD 01/01/18 1219 Done    Acceptance PURNIMA FARFAN MD 12/30/17 0932 Done                      User Key     Initials Effective Dates Name Provider Type Discipline     02/18/16 -  Lakia Menjivar PTA Physical Therapy Assistant PT    JRAFAELA 12/01/15 -  Darleen Lakhani, PT Physical Therapist PT    MD 12/01/15 -  Jennifer Vera, PT Physical Therapist PT     06/22/16 -  Maria Del Carmen Rosario, PT Physical Therapist PT                    PT  Recommendation and Plan  Anticipated Discharge Disposition: home with home health  Planned Therapy Interventions: balance training, bed mobility training, gait training, home exercise program, stair training, strengthening, stretching, transfer training, patient/family education, postural re-education  PT Frequency: 2 times/day  Plan of Care Review  Plan Of Care Reviewed With: patient  Progress: progress toward functional goals is gradual  Outcome Summary/Follow up Plan: pnt is ambulating farther. She has improved with transfers and stairs. Pleasant and cooperative.         Time Calculation:         PT Charges       01/09/18 1526 01/09/18 0939       Time Calculation    Start Time 1415  -LB 0930  -LB     Stop Time 1445  -LB 1030  -LB     Time Calculation (min) 30 min  -LB 60 min  -LB       User Key  (r) = Recorded By, (t) = Taken By, (c) = Cosigned By    Initials Name Provider Type    MELISA Menjivar PTA Physical Therapy Assistant          Therapy Charges for Today     Code Description Service Date Service Provider Modifiers Qty    34096678893 HC PT THER PROC EA 15 MIN 1/8/2018 Lakia Menjivar PTA GP 6    78792998734 HC PT THER PROC EA 15 MIN 1/9/2018 Lakia Menjivar PTA GP 6    10124098274 HC PT CARE PLAN EACH 15 MIN 1/9/2018 Lakia Menjivar PTA GP 2               Lakia Menjivar PTA  1/9/2018

## 2018-01-09 NOTE — PLAN OF CARE
Problem: Patient Care Overview (Adult)  Goal: Plan of Care Review  Outcome: Ongoing (interventions implemented as appropriate)   01/09/18 0026   Coping/Psychosocial Response Interventions   Plan Of Care Reviewed With patient   Patient Care Overview   Progress progress toward functional goals as expected   Outcome Evaluation   Outcome Summary/Follow up Plan Pt. doing fine tonight. No c/o any pain. Ambulatory to the bathroom. No unsafe behaviors.      Goal: Adult Individualization and Mutuality  Outcome: Ongoing (interventions implemented as appropriate)    Goal: Discharge Needs Assessment  Outcome: Ongoing (interventions implemented as appropriate)      Problem: Fall Risk (Adult)  Goal: Absence of Falls  Outcome: Ongoing (interventions implemented as appropriate)      Problem: Mobility, Physical Impaired (Adult)  Goal: Enhanced Functionality Ability  Outcome: Ongoing (interventions implemented as appropriate)

## 2018-01-09 NOTE — THERAPY TREATMENT NOTE
Inpatient Rehabilitation - Occupational Therapy Treatment Note  [facility]     Patient Name: Stacie Vieira  : 1935  MRN: [id number]  Today's Date: 2018  Onset of Illness/Injury or Date of Surgery Date: 17     Referring Physician: Dr Houston      Admit Date: 2017    Visit Dx:     ICD-10-CM ICD-9-CM   1. Generalized weakness R53.1 780.79     Patient Active Problem List   Diagnosis   • Mycobacterium avium complex   • Essential hypertension   • Palpitations   • Allergic rhinitis   • Hypothyroidism   • Hyperlipidemia   • Community acquired pneumonia of left lower lobe of lung   • Acute respiratory failure with hypoxia   • Bilateral hand numbness   • DNR (do not resuscitate)   • Polyneuropathy   • Oropharyngeal dysphagia   • Esophageal dysmotility   • Severe malnutrition due to acute illness   • Guillain-[place] syndrome following vaccination   • Bradycardia   • Syncope   • Guillain Barré syndrome             Adult Rehabilitation Note       18 0945 18 1352 18 1203    Rehab Assessment/Intervention    Discipline occupational therapist  -CC occupational therapist  -CC occupational therapist  -CC    Document Type therapy note (daily note)  -CC therapy note (daily note)  -CC therapy note (daily note)  -CC    Subjective Information agree to therapy  -CC agree to therapy  -CC agree to therapy  -CC    Patient Effort, Rehab Treatment excellent  -CC good  -CC good  -CC    Symptoms Noted During/After Treatment none  -CC      Precautions/Limitations fall precautions  -CC fall precautions  -CC fall precautions  -CC    Recorded by [CC] Jyoti Roblero OTR [CC] Jyoti Roblero OTR [CC] AMAURI Rojas    Pain Assessment    Pain Assessment No/denies pain  -CC No/denies pain  -CC No/denies pain  -CC    Recorded by [CC] Jyoti Roblero OTR [CC] Jyoti Roblero OTR [CC] AMAURI Rojas    Vision Assessment/Intervention    Visual Impairment WFL with corrective lenses  -CC WFL with  corrective lenses  -CC WFL with corrective lenses  -CC    Recorded by [CC] AMAURI Rojas [CC] AMAURI Rojas [CC] Jyoti Roblero OTR    Cognitive Assessment/Intervention    Current Cognitive/Communication Assessment functional  -CC functional  -CC functional  -CC    Orientation Status oriented x 4  -CC oriented x 4  -CC oriented x 4  -CC    Follows Commands/Answers Questions 100% of the time;able to follow multi-step instructions  -CC able to follow single-step instructions  -CC able to follow single-step instructions  -CC    Personal Safety WNL/WFL  -CC WNL/WFL  -CC WNL/WFL  -CC    Personal Safety Interventions gait belt;fall prevention program maintained;nonskid shoes/slippers when out of bed  -CC gait belt;fall prevention program maintained;nonskid shoes/slippers when out of bed  -CC gait belt;nonskid shoes/slippers when out of bed;fall prevention program maintained  -CC    Recorded by [CC] AMAURI Rojas [CC] AMAURI Rojas [CC] Jyoti Roblero OTR    Bed Mobility, Assessment/Treatment    Bed Mobility, Roll Left, [place] independent  -CC      Bed Mob, Supine to Sit, [place] independent  -CC      Bed Mobility, Comment  --   up in w/c  -CC --   up in w/c  -CC    Recorded by [CC] AMAURI Rojas [CC] AMAURI Rojas [CC] Jyoti Roblero OTR    Transfer Assessment/Treatment    Transfers, Sit-Stand [place] supervision required;stand by assist  -CC  stand by assist  -CC    Transfers, Stand-Sit [place] stand by assist  -CC  stand by assist  -CC    Transfers, Sit-Stand-Sit, Assist Device   rolling walker  -CC    Toilet Transfer, [place] supervision required  -CC  supervision required  -CC    Toilet Transfer, Assistive Device   rolling walker  -CC    Walk-In Shower Transfer, [place] supervision required  -CC  supervision required  -CC    Walk-In Shower Transfer, Assist Device standard shower chair  -CC  standard walker;standard shower chair  -CC     Recorded by [CC] Jyoti Roblero OTR  [CC] Jyoti Roblero OTR    Upper Body Bathing Assessment/Training    UB Bathing Assess/Train Assistive Device hand-held shower head;shower chair with back  -CC  hand-held shower head;shower chair with back  -CC    UB Bathing Assess/Train, Position sitting  -CC  sitting  -CC    UB Bathing Assess/Train, [place] Level set up required  -CC  supervision required  -CC    Recorded by [CC] Jyoti Roblero OTR  [CC] Jyoti Roblero OTR    Lower Body Bathing Assessment/Training    LB Bathing Assess/Train Assistive Device grab bars;hand-held shower head;shower chair with back  -CC  grab bars;hand-held shower head;shower chair with back  -CC    LB Bathing Assess/Train, Position standing;sitting  -CC  sitting;standing  -CC    LB Bathing Assess/Train, [place] Level supervision required;set up required  -CC  supervision required  -CC    Recorded by [CC] Jyoti Roblero OTR  [CC] Jyoti Roblero OTR    Upper Body Dressing Assessment/Training    UB Dressing Assess/Train, Clothing Type donning:;doffing:;bra;pull over  -CC  donning:;doffing:;bra;pull over  -CC    UB Dressing Assess/Train, Position sitting  -CC  sitting  -CC    UB Dressing Assess/Train, [place] supervision required  -CC  supervision required  -CC    Recorded by [CC] Jyoti Roblero OTR  [CC] Jyoti Roblero OTR    Lower Body Dressing Assessment/Training    LB Dressing Assess/Train, Clothing Type doffing:;donning:;pants;shoes;socks  -CC  doffing:;donning:;pants;socks;shoes  -CC    LB Dressing Assess/Train, Position sitting;standing  -CC  sitting;standing  -CC    LB Dressing Assess/Train, [place] supervision required;verbal cues required  -CC  supervision required  -CC    LB Dressing Assess/Train, Comment --   assist varun Denise  -CC      Recorded by [CC] Jyoti Roblero OTR  [CC] Jyoti Roblero OTR    Toileting Assessment/Training    Toileting Assess/Train, Position   sitting;standing  -CC    Toileting  "Assess/Train, Indepen Level   supervision required  -CC    Recorded by   [CC] AMAURI Rojas    Grooming Assessment/Training    Grooming Assess/Train, Position standing  -CC  standing  -CC    Grooming Assess/Train, Indepen Level set up required  -CC  set up required;supervision required  -CC    Recorded by [CC] AMAURI Rojas  [CC] AMAURI Rojas    Therapy Exercises    Bilateral Upper Extremity  --   arm bike 5 min x2; 2# hand wt 15x3  -CC     Recorded by  [CC] AMAURI Rojas     Positioning and Restraints    Pre-Treatment Position in bed  -CC sitting in chair/recliner  -CC sitting in chair/recliner  -CC    Post Treatment Position wheelchair  -CC wheelchair  -CC wheelchair  -CC    In Wheelchair sitting;call light within reach  -CC sitting;with PT  -CC sitting;call light within reach  -CC    Recorded by [CC] AMAURI Rojas [CC] AMAURI Rojas [CC] Jyoti Roblero OTR      01/08/18 1019 01/06/18 1017       Rehab Assessment/Intervention    Discipline physical therapy assistant  -LB physical therapy assistant  -LB     Document Type therapy note (daily note)  -LB therapy note (daily note)  -LB     Subjective Information agree to therapy  -LB agree to therapy   \"It felt my my heart flipped earlir. .....fine now\"  -LB     Patient Effort, Rehab Treatment good  -LB good  -LB     Precautions/Limitations fall precautions  -LB fall precautions  -LB     Recorded by [LB] Lakia Menjivar PTA [LB] Lakia Menjivar PTA     Pain Assessment    Pain Assessment No/denies pain  -LB No/denies pain  -LB     Recorded by [LB] Lakia Menjivar PTA [LB] Lakia Menjivar PTA     Cognitive Assessment/Intervention    Personal Safety Interventions fall prevention program maintained;gait belt;muscle strengthening facilitated;nonskid shoes/slippers when out of bed  -LB fall prevention program maintained;gait belt;muscle strengthening facilitated;nonskid shoes/slippers when out of bed  -LB     Recorded " by [LB] Lakia Menjivar PTA [LB] Lakia Menjivar PTA     Bed Mobility, Assessment/Treatment    Bed Mobility, Assistive Device bed rails  -LB      Bed Mob, Supine to Sit, [place] conditional independence  -LB      Bed Mob, Sit to Supine, [place] conditional independence  -LB      Bed Mobility, Comment  up in chair  -LB     Recorded by [LB] Lakia Menjivar PTA [LB] Lakia Menjivar PTA     Transfer Assessment/Treatment    Transfers, Bed-Chair [place] stand by assist  -LB      Transfers, Chair-Bed [place] stand by assist  -LB      Transfers, Sit-Stand [place] stand by assist  -LB contact guard assist;stand by assist  -LB     Transfers, Stand-Sit [place] stand by assist  -LB contact guard assist;stand by assist  -LB     Transfers, Sit-Stand-Sit, Assist Device rolling walker  -LB rolling walker  -LB     Transfer, Comment car tsf SBA, rwx  -LB      Recorded by [LB] Lakia Menjivar PTA [LB] Lakia Menjivar PTA     Gait Assessment/Treatment    Gait, [place] Level supervision required  -LB contact guard assist;verbal cues required  -LB     Gait, Assistive Device rolling walker  -LB straight cane   w tripod base.  -LB     Gait, Distance (Feet) 240  -  -LB     Gait, Gait Deviations  kee decreased;forward flexed posture;step length decreased;decreased heel strike;toe-to-floor clearance decreased   kee extremely slow  -LB     Recorded by [LB] Lakia Menjivar PTA [LB] Lakia Menjivar PTA     Stairs Assessment/Treatment    Number of Stairs 4  -LB 4  -LB     Stairs, Handrail Location left side (ascending)   both hands on left rail  -LB both sides  -LB     Stairs, [place] Level contact guard assist  -LB contact guard assist  -LB     Stairs, Technique Used step to step (ascending);step to step (descending)  -LB step over step (ascending);step over step (descending)  -LB     Stairs, Comment curb, rwx, CGA. Also tried up curb backward, down forward as pnt  reports that she has a single step that rwx will not fit on.  -LB      Recorded by [LB] Lakia Menjivar PTA [LB] Lakia Menjivar PTA     Functional Mobility    Functional Mobility- Ind. Level  --   SBA  -LB     Functional Mobility- Device  rolling walker  -LB     Functional Mobility-Distance (Feet)  200  -LB     Functional Mobility- Safety Issues  step length decreased;other (see comments)   decreased kee, decreased HS  -LB     Recorded by  [LB] Lakia Menjivar PTA     Therapy Exercises    Bilateral Lower Extremities AROM:;15 reps;supine  -LB      Recorded by [LB] Lakia Menjivar PTA      Positioning and Restraints    Post Treatment Position wheelchair  -LB wheelchair  -LB     In Wheelchair call light within reach;sitting  -LB sitting;call light within reach  -LB     Recorded by [LB] Lakia Menjivar PTA [LB] Lakia Menjivar PTA       User Key  (r) = Recorded By, (t) = Taken By, (c) = Cosigned By    Initials Name Effective Dates    CC Jyoti Roblero, OTR 04/13/15 -     LB Lakia Menjivar PTA 02/18/16 -                 OT Goals       12/29/17 1511 12/27/17 1545       Transfer Training OT STG    Transfer Training OT STG, Date Established 12/29/17  -KP      Transfer Training OT STG, Time to Achieve 5 - 7 days  -KP      Transfer Training OT STG, Activity Type toilet;sit to stand/stand to sit;shower chair;walk-in shower  -KP      Transfer Training OT STG, [place] Level supervision required  -KP      Transfer Training OT STG, Assist Device shower chair  -KP      Transfer Training OT LTG    Transfer Training OT LTG, Date Established 12/29/17  -KP 12/27/17  -AF     Transfer Training OT LTG, Time to Achieve by discharge  -KP by discharge  -AF     Transfer Training OT LTG, Activity Type toilet;walk-in shower;shower chair;sit to stand/stand to sit  -KP toilet  -AF     Transfer Training OT LTG, [place] Level independent;conditional independence  -KP supervision required  -AF     Transfer  Training OT LTG, Assist Device shower chair  -      Transfer Training OT LTG, Additional Goal  appropriate AD  -AF     Transfer Training OT LTG, Outcome  goal ongoing  -AF     Strength OT STG    Strength Goal OT STG, Date Established 12/29/17  -      Strength Goal OT STG, Time to Achieve 5 - 7 days  -KP      Strength Goal OT STG, Measure to Achieve to increase B UE to 4/5  -KP      Strength Goal OT STG, Functional Goal to increase functional tsf and self-care skills  -      Strength OT LTG    Strength Goal OT LTG, Date Established 12/29/17  -      Strength Goal OT LTG, Time to Achieve by discharge  -      Strength Goal OT LTG, Measure to Achieve to increase to 4+/5   -KP      Strength Goal OT LTG, Functional Goal to increase functional tsf and self-care skills  -      Dynamic Standing Balance OT STG    Dynamic Standing Balance OT STG, Date Established 12/29/17  -      Dynamic Standing Balance OT STG, Time to Achieve 5 - 7 days  -      Dynamic Standing Balance OT STG, [place] Level supervision required  -      Dynamic Standing Balance OT STG, Assist Device UE Support   to increase independence w. self care and IADLs  -      Dynamic Standing Balance OT LTG    Dynamic Standing Balance OT LTG, Date Established 12/29/17  -      Dynamic Standing Balance OT LTG, Time to Achieve by discharge  -      Dynamic Standing Balance OT LTG, [place] Level independent;conditional independence  -      Patient Education OT LTG    Patient Education OT LTG, Date Established 12/29/17  -      Patient Education OT LTG, Time to Achieve by discharge  -      Patient Education OT LTG, Education Type written program;HEP;home safety  -      Patient Education OT LTG, Education Understanding independent;demonstrates adequately;verbalizes understanding  -      ADL OT STG    ADL OT STG, Date Established 12/29/17  -      ADL OT STG, Time to Achieve 1 wk  -      ADL OT STG, Activity Type ADL skills  -       ADL OT STG, [place] Level standby assist;setup;assistive device  -KP      ADL OT LTG    ADL OT LTG, Date Established 12/29/17  - 12/27/17  -     ADL OT LTG, Time to Achieve by discharge  - by discharge  -     ADL OT LTG, Activity Type ADL skills  - ADL skills  -AF     ADL OT LTG, [place] Level independent;independent with device;assistive device  - standby assist  -AF     ADL OT LTG, Outcome  goal ongoing  -AF     Functional Mobility OT LTG    Functional Mobility Goal OT LTG, Date Established  12/27/17  -AF     Functional Mobility Goal OT LTG, Time to Achieve  by discharge  -AF     Functional Mobility Goal OT LTG, [place] Level  supervision  -AF     Functional Mobility Goal OT LTG, Assist Device  appropriate AD  -AF     Functional Mobility Goal OT LTG, Distance to Achieve  to the bathroom  -AF     Functional Mobility Goal OT LTG, Outcome  goal ongoing  -AF     Endurance OT LTG    Endurance Goal OT LTG, Date Established 12/29/17  -      Endurance Goal OT LTG, Time to Achieve by discharge  -      Endurance Goal OT LTG, Activity Level to increase;endurance 2 good  -        User Key  (r) = Recorded By, (t) = Taken By, (c) = Cosigned By    Initials Name Provider Type     Maggie Gilbert, OTR Occupational Therapist    AF Elissa Malone, OTR Occupational Therapist          Occupational Therapy Education     Title: PT OT SLP Therapies (Active)     Topic: Occupational Therapy (Active)     Point: ADL training (Done)    Description: Instruct learner(s) on proper safety adaptation and remediation techniques during self care or transfers.   Instruct in proper use of assistive devices.    Learning Progress Summary    Learner Readiness Method Response Comment Documented by Status   Patient Acceptance E,TB,D VU,DU ed pt on role of OT, benefit of therapy. POC w. OT.  12/29/17 1510 Done               Point: Precautions (Done)    Description: Instruct learner(s) on prescribed precautions  during self-care and functional transfers.    Learning Progress Summary    Learner Readiness Method Response Comment Documented by Status   Patient Acceptance E,TB,D VU,DU ed pt on role of OT, benefit of therapy. POC w. OT.  12/29/17 1510 Done               Point: Body mechanics (Done)    Description: Instruct learner(s) on proper positioning and spine alignment during self-care, functional mobility activities and/or exercises.    Learning Progress Summary    Learner Readiness Method Response Comment Documented by Status   Patient Acceptance E,TB,D MARCELINO,DU ed pt on role of OT, benefit of therapy. POC w. OT.  12/29/17 1510 Done                      User Key     Initials Effective Dates Name Provider Type [facility] 04/13/15 -  AMAURI Au Occupational Therapist OT                  OT Recommendation and Plan  Anticipated Discharge Disposition: home  Planned Therapy Interventions: activity intolerance, ADL retraining, balance training, IADL retraining, home exercise program, strengthening, transfer training  Therapy Frequency: 5 times/wk  Plan of Care Review  Outcome Summary/Follow up Plan: Pt showing steady progress. Increased activity tolerance and balance noted. ADLs at SBA/CGA level.        Time Calculation:         Time Calculation- OT       01/09/18 0830          Time Calculation- OT    OT Start Time 0830  -CC      OT Stop Time 0900  -CC      OT Time Calculation (min) 30 min  -CC        User Key  (r) = Recorded By, (t) = Taken By, (c) = Cosigned By    Initials Name Provider Type    CC AMAURI Rojas Occupational Therapist           Therapy Charges for Today     Code Description Service Date Service Provider Modifiers Qty    47871185012 HC OT SELF CARE/MGMT/TRAIN EA 15 MIN 1/8/2018 AMAURI Rojas GO 4    45077222165 HC OT THER PROC EA 15 MIN 1/8/2018 AMAURI Rojas GO 2    85231967515 HC OT SELF CARE/MGMT/TRAIN EA 15 MIN 1/9/2018 AMAURI Rojas GO 2                Jyoti Roblero, OTR  1/9/2018

## 2018-01-09 NOTE — PROGRESS NOTES
Case Management  Inpatient Rehabilitation Plan of Care and Discharge Plan Note    Rehabilitation Diagnosis:  Branch  Date of Onset:  Branch    Medical Summary:  Branch  Past Medical History: Branch    Plan of Care  Updated Problems/Interventions  Field    Expected Intensity:  Branch  Interdisciplinary Team:  Branch  Estimated Length of Stay/Anticipated Discharge Date: [place]  Anticipated Discharge Destination:  Anticipated discharge destination from inpatient rehabilitation is community  discharge with assistance. Family conference held 1/9. Pt to be allowed  independence in her room today. Referral made to [facility] for physical  therapy.      Based on the patient's medical and functional status, their prognosis and  expected level of functional improvement is:  Branch    Signed by: JAVIER Salas

## 2018-01-09 NOTE — PROGRESS NOTES
Inpatient Rehabilitation Functional Measures Assessment    Functional Measures  KISHOR Eating:  [facility] Grooming: [facility] Bathing:  [facility] Upper Body Dressing:  [facility] Lower Body Dressing:  [facility] Toileting:  [facility] Bladder Management  Level of Assistance:  [place]  Frequency/Number of Accidents this Shift:  [facility] Bowel Management  Level of Assistance: [place]  Frequency/Number of Accidents this Shift: [facility] Bed/Chair/Wheelchair Transfer:  [facility] Toilet Transfer:  [facility] Tub/Shower Transfer:  [place]    Previously Documented Mode of Locomotion at Discharge: Field  KISHOR Expected Mode of Locomotion at Discharge: [facility] Walk/Wheelchair:  [facility] Stairs:  [facility] Comprehension:  Auditory comprehension is the usual mode. Comprehension  Score = 7, Independent.  Patient comprehends complex/abstract information in  their primary language.  Patient is completely independent for auditory  comprehension.  There are no activity limitations.  [place]ISH[place] Expression:  Vocal expression is the usual mode. Expression Score = 7,  Independent.  Patient expresses complex/abstract information in their primary  language.  Patient is completely independent for vocal expression.  There are no  activity limitations.  KISHOR Social Interaction:  Social Interaction Score = 7, Independent. Patient is  completely independent for social interaction.  There are no activity  limitations.  KISH[place] Problem Solving:  Problem Solving Score = 7, Independent.  Patient makes  appropriate decisions in order to solve complex problems.  Patient is completely  independent for problem solving.  There are no activity limitations.  KISH[place] Memory:  Memory Score = 7, Independent.  Patient is completely independent  for memory.  There are no activity limitations.    Therapy Mode Minutes  Occupational Therapy: Branch  Physical Therapy: Branch  Speech Language Pathology:  Branch    Signed by: Evelyne Ornelas RN

## 2018-01-09 NOTE — PROGRESS NOTES
"   LOS: 12 days   Patient Care Team:  Tatiana Bellamy MD as PCP - General (Internal Medicine)  James Del Angel MD as Consulting Physician (Infectious Diseases)  Ezekiel Verde MD as Consulting Physician (Otolaryngology)  Aisha Curiel MD as Consulting Physician (Cardiology)  Sinan Braga MD as Consulting Physician (Pulmonary Disease)  Shawn Felton MD as Consulting Physician (Allergy)    Chief Complaint:   Guillain-Barré syndrome    Subjective     History of Present Illness    Subjective   Hands are back to normal.  Good dexterity.  Able to do buttons and zippers.  Still some altered sensation in her feet.  With some tingling when her feet are in the shower.  She is going to be modified independent in the room tomorrow except for assistance with SHAWNA hose, which she will not need to wear when she goes home.  Needs to get set up with neurology follow-up as an outpatient as she was seen by hospitalist neurologist.  History taken from: patient    Objective     Vital Signs  Temp:  [98.1 °F (36.7 °C)-98.6 °F (37 °C)] 98.1 °F (36.7 °C)  Heart Rate:  [61-71] 61  Resp:  [16-18] 16  BP: (118-147)/(66-73) 147/66    Objective:  Vital signs: (most recent): Blood pressure 147/66, pulse 61, temperature 98.1 °F (36.7 °C), temperature source Oral, resp. rate 16, height 160 cm (63\"), weight 50.3 kg (110 lb 12.8 oz), last menstrual period 01/01/1993, SpO2 100 %, not currently breastfeeding.            Physical Exam  Mental status-awake alert rest  HEENT-sclera nonicteric, conjunctiva pink.     No JVD.  .  Ears unremarkable externally.  Lungs-Normal respirations.  No accessory muscle use.  Heart-regular rate and rhythm    Abdomen- nondistended  Extremities -without edema or cyanosis.  Neurologic-oriented ×4.  Good historian.        Face symmetric.  No dysarthria.     Motor exam shows to take resistance proximally distally in the bilateral upper extremities and lower extremities.         Results " Review:     I reviewed the patient's new clinical results.    Results from last 7 days  Lab Units 01/08/18  0606 01/04/18  0504   WBC 10*3/mm3 6.23 7.11   HEMOGLOBIN g/dL 11.6* 10.9*   HEMATOCRIT % 36.4 34.8*   PLATELETS 10*3/mm3 350 295         Results from last 7 days  Lab Units 01/08/18  0606   SODIUM mmol/L 140   POTASSIUM mmol/L 4.2   CHLORIDE mmol/L 102   CO2 mmol/L 30.3*   BUN mg/dL 23   CREATININE mg/dL 0.70   CALCIUM mg/dL 9.2   BILIRUBIN mg/dL 0.2   ALK PHOS U/L 54   ALT (SGPT) U/L 8   AST (SGOT) U/L 15   GLUCOSE mg/dL 88     CT chest Jan 8, 2018 -   IMPRESSION:  Previously demonstrated areas of consolidation within the  left lower lobe, right middle lobe demonstrate near complete interval  resolution. Small areas of residual atelectasis are seen in these  regions. There are persistent small reticulonodular opacities and  cluster of nodules demonstrated bilaterally, largely unchanged from CT  chest dated 09/2017, likely representing a chronic infectious process.  The bronchial wall thickening as well as the mucous plugging have also  resolved. Interval decrease in mediastinal lymphadenopathy.    Medication Review: done  Scheduled Meds:    amLODIPine 5 mg Oral Q24H   azithromycin 250 mg Oral Q24H   cetirizine 5 mg Oral Daily   docusate sodium 100 mg Oral BID   enoxaparin 30 mg Subcutaneous Q24H   ethambutol 800 mg Oral Q24H   levothyroxine 50 mcg Oral Q48H   levothyroxine 75 mcg Oral Every Other Day   losartan 100 mg Oral Q24H   metoprolol succinate XL 25 mg Oral Q24H   rifAMPin 600 mg Oral Q24H   saccharomyces boulardii 250 mg Oral BID     Continuous Infusions:   PRN Meds:.•  acetaminophen  •  calcium carbonate  •  ipratropium-albuterol  •  melatonin  •  nitroglycerin  •  ondansetron **OR** ondansetron ODT **OR** ondansetron      Assessment/Plan     Active Problems:    Guillain Barré syndrome      Assessment & Plan  Guillain-Barré syndrome  S/p plasmapheresis    HTN- multi-drug regimen    DVT prophylaxis -  SCDs/Lovenox    Hypothyroidism - on replacement.     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    send off AFB cultures for formal susceptibilities to make sure she maintains sensitivity to macrolides.  Resume triple drug therapy with daily dosing instead intermittent.   dose at azithromycin 250 mg per day, ethambutol 15 mg/kg per day,  and rifampin 600 mg daily.    give her 3-6 months of this therapy and then repeat bronchoscopy.  If she had not yet cleared her cultures by that time, would give consideration to adding an aminoglycoside. No need for airborne isolation in this patient with known MAC (which is not contagious).     82-year-old female previously independent with Guillain-Barré syndrome with numbness and weakness in her extremities with impairments with her mobility and self-care.  She's also had bradycardia.     She's completed 5 sessions of plasmapheresis.  Recent pneumonia.  Recent dysphagia.  Given her functional impairments and comorbidities, current recommendation is to pursue acute inpatient rehabilitation.  This would be a comprehensive inpatient rehabilitation program with physical medicine rehabilitation and ongoing physician follow-up monitoring her neurologic and pulmonary and cardiac status, occupational therapy for functional mobility and activities daily living and physical therapy for transfers balance gait and strengthening.  Therapy 3 hours a day 5 days a week, PT 1.5 hours and OT 1.5 hours 5 days a week. .  Rehabilitation nursing for carryover and monitoring of her bowel bladder, skin, cardiopulmonary status, neurologic status.  Weekly team conferences.  The patient will require precertification.  Features of the rehabilitation program were reviewed with the patient.  The patient's in agreement with this plan. Goal mod I. Rehab prognosis fair. Medical prognosis fair. ELOS 10 days but only estimation.     January 2-transfers contact-guard.  Gait 160 feet contact guard with  a rolling walker.  She still has some numbness in the hands and feet.  Tolerates therapies.  She feels he's making progress.    TEAM CONF - JAN 3 - transfers CTG..  Toilet transfers SBA. LB adls CTG SBA. Continent bowel and bladder.  ELOS- Jan 11.      Virgilio Amaya MD  01/09/18  9:15 AM    Time:

## 2018-01-09 NOTE — PROGRESS NOTES
Inpatient Rehabilitation Plan of Care Note    Plan of Care  Care Plan Reviewed - No updates at this time.    Safety    Performed Intervention(s)  hourly rounds, items in reach  Bed alarm chair alarm      Sphincter Control    Performed Intervention(s)  Monitor intake and output.  medication    Signed by: Evelyne Ornelas RN

## 2018-01-10 PROCEDURE — 97110 THERAPEUTIC EXERCISES: CPT

## 2018-01-10 PROCEDURE — 25010000002 ENOXAPARIN PER 10 MG: Performed by: PHYSICAL MEDICINE & REHABILITATION

## 2018-01-10 PROCEDURE — 97535 SELF CARE MNGMENT TRAINING: CPT

## 2018-01-10 RX ADMIN — Medication 3 MG: at 22:26

## 2018-01-10 RX ADMIN — ETHAMBUTOL HYDROCHLORIDE 800 MG: 400 TABLET, FILM COATED ORAL at 08:17

## 2018-01-10 RX ADMIN — DOCUSATE SODIUM 100 MG: 100 CAPSULE, LIQUID FILLED ORAL at 19:40

## 2018-01-10 RX ADMIN — AMLODIPINE BESYLATE 5 MG: 5 TABLET ORAL at 08:18

## 2018-01-10 RX ADMIN — LEVOTHYROXINE SODIUM 75 MCG: 75 TABLET ORAL at 06:09

## 2018-01-10 RX ADMIN — METOPROLOL SUCCINATE 25 MG: 25 TABLET, FILM COATED, EXTENDED RELEASE ORAL at 08:17

## 2018-01-10 RX ADMIN — Medication 250 MG: at 08:17

## 2018-01-10 RX ADMIN — AZITHROMYCIN 250 MG: 250 TABLET, FILM COATED ORAL at 08:18

## 2018-01-10 RX ADMIN — Medication 250 MG: at 19:40

## 2018-01-10 RX ADMIN — LOSARTAN POTASSIUM 100 MG: 50 TABLET, FILM COATED ORAL at 08:17

## 2018-01-10 RX ADMIN — DOCUSATE SODIUM 100 MG: 100 CAPSULE, LIQUID FILLED ORAL at 08:17

## 2018-01-10 RX ADMIN — RIFAMPIN 600 MG: 300 CAPSULE ORAL at 08:17

## 2018-01-10 RX ADMIN — Medication 3 MG: at 01:01

## 2018-01-10 RX ADMIN — ENOXAPARIN SODIUM 30 MG: 30 INJECTION SUBCUTANEOUS at 19:40

## 2018-01-10 NOTE — PROGRESS NOTES
SECTION GG      Mobility Performance Discharge:   Roll Left and Right: Patient completed the activities by him/herself with no  assistance from a helper.   Sit to Lying: Patient completed the activities by him/herself with no  assistance from a helper.   Lying to Sitting on Side of Bed: Patient completed the activities by  him/herself with no assistance from a helper.   Sit to Stand: Patient completed the activities by him/herself with no  assistance from a helper.   Chair/Bed to Chair Transfer: Patient completed the activities by him/herself  with no assistance from a helper.   Car Transfer: [place] provides verbal cues or touching/steadying assistance as  patient completes activity.    Patient Walks:  Yes   Walk 10 Feet: Patient completed the activities by him/herself with no  assistance from a helper.   Walk 50 Feet With 2 Turns: Patient completed the activities by him/herself with  no assistance from a helper.   Walk 150 Feet: Patient completed the activities by him/herself with no  assistance from a helper.   Walking 10 Feet on Uneven Surfaces: [place] provides verbal cues or  touching/steadying assistance as patient completes activity.   1 Step Over Curb or Up/Down Stair: [place] provides verbal cues or  touching/steadying assistance as patient completes activity.   4 Steps Up and Down, With/Without Rail: [place] provides verbal cues or  touching/steadying assistance as patient completes activity.   12 Steps Up and Down, With/Without Rail: [place] provides verbal cues or  touching/steadying assistance as patient completes activity.   Picking up an Object: [place] provides verbal cues or touching/steadying  assistance as patient completes activity.     Uses Wheelchair/Scooter: No    Signed by: Lakia Menjivar PTA

## 2018-01-10 NOTE — THERAPY DISCHARGE NOTE
Inpatient Rehabilitation - Occupational Therapy Treatment Note/Discharge  [facility]     Patient Name: Stacie Vieira  : 1935  MRN: [id number]  Today's Date: 1/10/2018  Onset of Illness/Injury or Date of Surgery Date: 17     Referring Physician: Dr Houston      Admit Date: 2017    Visit Dx:     ICD-10-CM ICD-9-CM   1. Generalized weakness R53.1 780.79     Patient Active Problem List   Diagnosis   • Mycobacterium avium complex   • Essential hypertension   • Palpitations   • Allergic rhinitis   • Hypothyroidism   • Hyperlipidemia   • Community acquired pneumonia of left lower lobe of lung   • Acute respiratory failure with hypoxia   • Bilateral hand numbness   • DNR (do not resuscitate)   • Polyneuropathy   • Oropharyngeal dysphagia   • Esophageal dysmotility   • Severe malnutrition due to acute illness   • Guillain-[place] syndrome following vaccination   • Bradycardia   • Syncope   • Guillain Barré syndrome             Adult Rehabilitation Note       01/10/18 1421 01/10/18 0950 18 1439    Rehab Assessment/Intervention    Discipline occupational therapist  -CC physical therapy assistant  -LB occupational therapist  -CC    Document Type discharge summary;therapy note (daily note)  -CC discharge summary;therapy note (daily note)  -LB therapy note (daily note)  -CC    Subjective Information no complaints;agree to therapy  -CC agree to therapy  -LB agree to therapy  -CC    Patient Effort, Rehab Treatment excellent  -CC excellent  -LB excellent  -CC    Symptoms Noted During/After Treatment none  -CC  none  -CC    Precautions/Limitations  fall precautions  -LB     Equipment Issued to Patient shower chair   granddaughter ordered  -CC      Recorded by [CC] Jyoti Roblero OTR [LB] Lakia Menjivar PTA [CC] Jyoti Roblero OTR    Pain Assessment    Pain Assessment No/denies pain  -CC No/denies pain  -LB No/denies pain  -CC    Recorded by [CC] AMAURI Rojas [LB] Lakia Menjivar PTA  [CC] AMAURI Rojas    Vision Assessment/Intervention    Visual Impairment WFL with corrective lenses  -CC  WFL with corrective lenses  -CC    Recorded by [CC] Jyoti Roblero OTR  [CC] Jyoti Roblero OTR    Cognitive Assessment/Intervention    Current Cognitive/Communication Assessment functional  -CC  functional  -CC    Orientation Status oriented x 4  -CC  oriented x 4  -CC    Follows Commands/Answers Questions 100% of the time;able to follow multi-step instructions  -CC  100% of the time;able to follow single-step instructions  -CC    Personal Safety WNL/WFL  -CC  WNL/WFL  -CC    Personal Safety Interventions fall prevention program maintained  -CC fall prevention program maintained;gait belt;muscle strengthening facilitated;nonskid shoes/slippers when out of bed  -LB gait belt;fall prevention program maintained  -CC    Recorded by [CC] AMAURI Rojas [LB] Lakia Menjivar PTA [CC] Jyoti Roblero OTR    ROM (Range of Motion)    General ROM no range of motion deficits identified   UE  -CC      Recorded by [CC] AMAURI Rojas      MMT (Manual Muscle Testing)    General MMT Assessment upper extremity strength deficits identified  -CC      General MMT Assessment Detail --   RUE G25 L8 3pt 7; LUE G15 L8 3pt7  -CC      Recorded by [CC] AMAURI Rojas      Left Shoulder    Flexion Gross Movement (4-/5) good minus  -CC      Recorded by [CC] Jyoti Roblero OTR      Right Shoulder    Flexion Gross Movement (4-/5) good minus  -CC      Recorded by [CC] AMAURI Rojas      Upper Extremity    Upper Ext Manual Muscle Testing left shoulder strength deficit;right shoulder strength deficit  -CC      Recorded by [CC] AMAURI Rojas      Bed Mobility, Assessment/Treatment    Bed Mobility, Roll Left, [place] independent  -CC independent  -LB independent  -CC    Bed Mobility, Roll Right, [place] independent  -CC independent  -LB     Bed Mobility, Scoot/Bridge, [place]  independent  -CC independent  -LB     Bed Mob, Supine to Sit, [place] independent  -CC independent  -LB independent  -CC    Bed Mob, Sit to Supine, [place] independent  -CC independent  -LB     Recorded by [CC] Jyoti Roblero OTR [LB] Lakia Menjivar PTA [CC] Jyoti Roblero OTR    Transfer Assessment/Treatment    Transfers, Bed-Chair [place] independent  -CC independent  -LB     Transfers, Chair-Bed [place] independent  -CC independent  -LB     Transfers, Bed-Chair-Bed, Assist Device rolling walker  -CC      Transfers, Sit-Stand [place] independent  -CC independent  -LB     Transfers, Stand-Sit [place] independent  -CC independent  -LB     Transfers, Sit-Stand-Sit, Assist Device rolling walker  -CC rolling walker  -LB     Toilet Transfer, [place] conditional independence  -CC      Toilet Transfer, Assistive Device rolling walker  -CC      Walk-In Shower Transfer, [place] conditional independence  -CC      Walk-In Shower Transfer, Assist Device rolling walker;standard shower chair   with back  -CC      Transfer, Comment  car tsf, rwx, supervision  -LB     Recorded by [CC] Jyoti Roblero OTR [LB] Lakia Menjivar PTA     Gait Assessment/Treatment    Gait, [place] Level  conditional independence  -LB     Gait, Assistive Device  rolling walker  -LB     Gait, Distance (Feet)  260  -LB     Gait, Gait Deviations  kee decreased;forward flexed posture;step length decreased;decreased heel strike  -LB     Recorded by  [LB] Lakia Menjivar PTA     Stairs Assessment/Treatment    Number of Stairs  12  -LB     Stairs, Handrail Location  left side (ascending)   both hands on 1 rail  -LB     Stairs, [place] Level  supervision required  -LB     Stairs, Technique Used  step to step (ascending);step to step (descending)  -LB     Stairs, Comment  curb, rwx, SBA  -LB     Recorded by  [LB] Lakia Menjivar PTA     Functional Mobility    Functional Mobility- Ind. Level  conditional independence  -CC      Functional Mobility- Device rolling walker  -CC      Functional Mobility-Distance (Feet) --   in room and to and from BR  -CC      Recorded by [CC] Jyoti Roblero OTR      ADL Assessment/Intervention    IADL Assess/Train, Comment --   Mod I with kitchen mobility wx level  -CC      Recorded by [CC] Jyoti Roblero OTR      Upper Body Bathing Assessment/Training    UB Bathing Assess/Train Assistive Device hand-held shower head;shower chair with back  -CC      UB Bathing Assess/Train, Position sitting  -CC      UB Bathing Assess/Train, [place] Level conditional independence  -CC      Recorded by [CC] Jyoti Roblero OTR      Lower Body Bathing Assessment/Training    LB Bathing Assess/Train Assistive Device hand-held shower head;grab bars;shower chair with back  -CC      LB Bathing Assess/Train, Position standing;sitting  -CC      LB Bathing Assess/Train, [place] Level conditional independence  -CC      Recorded by [CC] Jyoti Roblero OTR      Upper Body Dressing Assessment/Training    UB Dressing Assess/Train, Clothing Type donning:;doffing:;pull over;bra  -CC      UB Dressing Assess/Train, Position sitting  -CC      UB Dressing Assess/Train, [place] conditional independence  -CC      Recorded by [CC] Jyoti Roblero OTR      Lower Body Dressing Assessment/Training    LB Dressing Assess/Train, Clothing Type doffing:;donning:;pants;shoes;socks  -CC      LB Dressing Assess/Train, Position standing;sitting  -CC      LB Dressing Assess/Train, [place] conditional independence  -CC      Recorded by [CC] Jyoti Roblero OTR      Toileting Assessment/Training    Toileting Assess/Train, Position sitting;standing  -CC      Toileting Assess/Train, [place] Level conditional independence  -CC      Recorded by [CC] Jyoti Roblero OTR      Grooming Assessment/Training    Grooming Assess/Train, Position sink side;standing  -CC      Grooming Assess/Train, [place] Level  conditional independence  -CC      Grooming Assess/Train, Comment [place] level  -CC      Recorded by [CC] Jyoti Roblero OTR      Therapy Exercises    Bilateral Lower Extremities  AROM:;15 reps;20 reps;standing;supine;sitting  -LB     Bilateral Upper Extremity   --   arm bike 5 min x 2; 2# hand wt 15x3;   -CC    BUE Resistance   --   hand helper 20x2  -CC    Exercise Protocols --   UE HEP program reviewed  -CC      Recorded by [CC] AMAURI Rojas [LB] Lakia Menjivar PTA [CC] Jyoti Roblero OTR    Fine Motor Coordination Training    9-Hole Peg Right 22  -CC      9-Hole Peg Left 20  -CC      Box and Blocks Right 53  -CC      [place] Left 59  -CC      Recorded by [CC] AMAURI Rojas      Sensory Assessment/Intervention    LUE Light Touch WNL  -CC      RUE Light Touch WNL  -CC      Stereognosis LUE;RUE  -CC      LUE Stereognosis WNL  -CC      RUE Stereognosis WNL  -CC      Recorded by [CC] AMAURI Rojas      Positioning and Restraints    Pre-Treatment Position sitting in chair/recliner  -CC  sitting in chair/recliner  -CC    Post Treatment Position bed  -CC chair  -LB wheelchair  -CC    In Bed sitting EOB;call light within reach  -CC      In Chair  sitting;call light within reach  -LB     In Wheelchair   sitting;with PT  -CC    Recorded by [CC] AMAURI Rojas [LB] Lakia Menjivar PTA [CC] Jyoti Roblero OTR      01/09/18 0945 01/09/18 0939 01/08/18 1352    Rehab Assessment/Intervention    Discipline occupational therapist  -CC physical therapy assistant  -LB occupational therapist  -CC    Document Type therapy note (daily note)  -CC therapy note (daily note)  -LB therapy note (daily note)  -CC    Subjective Information agree to therapy  -CC agree to therapy  -LB agree to therapy  -CC    Patient Effort, Rehab Treatment excellent  -CC good  -LB good  -CC    Symptoms Noted During/After Treatment none  -CC      Precautions/Limitations fall precautions  -CC fall precautions  -LB fall  precautions  -CC    Equipment Issued to Patient  --   Rwx ordered  -LB     Recorded by [CC] Jyoti Roblero OTR [LB] Lakia Menjivar PTA [CC] Jyoti Roblero OTR    Pain Assessment    Pain Assessment No/denies pain  -CC No/denies pain  -LB No/denies pain  -CC    Recorded by [CC] AMAURI Rojas [LB] Lakia Menjivar PTA [CC] Jyoti Roblero OTR    Vision Assessment/Intervention    Visual Impairment WFL with corrective lenses  -CC  WFL with corrective lenses  -CC    Recorded by [CC] Jyoti Roblero OTR  [CC] Jyoti Roblero OTR    Cognitive Assessment/Intervention    Current Cognitive/Communication Assessment functional  -CC  functional  -CC    Orientation Status oriented x 4  -CC  oriented x 4  -CC    Follows Commands/Answers Questions 100% of the time;able to follow multi-step instructions  -CC  able to follow single-step instructions  -CC    Personal Safety WNL/WFL  -CC  WNL/WFL  -CC    Personal Safety Interventions gait belt;fall prevention program maintained;nonskid shoes/slippers when out of bed  -CC fall prevention program maintained;gait belt;muscle strengthening facilitated;nonskid shoes/slippers when out of bed  -LB gait belt;fall prevention program maintained;nonskid shoes/slippers when out of bed  -CC    Recorded by [CC] AMAURI Rojas [LB] Lakia Menjivar PTA [CC] Jyoti Roblero OTR    Bed Mobility, Assessment/Treatment    Bed Mobility, Assistive Device  --   assessed on txment mat  -LB     Bed Mobility, Roll Left, [place] independent  -CC independent  -LB     Bed Mobility, Roll Right, [place]  independent  -LB     Bed Mobility, Scoot/Bridge, [place]  independent  -LB     Bed Mob, Supine to Sit, [place] independent  -CC independent  -LB     Bed Mob, Sit to Supine, [place]  independent  -LB     Bed Mobility, Comment   --   up in w/c  -CC    Recorded by [CC] AMAURI Rojas [LB] Lakia Menjivar, DEJA [CC] Jyoti Roblero OTR    Transfer  Assessment/Treatment    Transfers, Bed-Chair [place]  stand by assist  -LB     Transfers, Chair-Bed [place]  stand by assist  -LB     Transfers, Sit-Stand [place] supervision required;stand by assist  -CC stand by assist  -LB     Transfers, Stand-Sit [place] stand by assist  -CC stand by assist;verbal cues required   cues for hand placement  -LB     Transfers, Sit-Stand-Sit, Assist Device  rolling walker  -LB     Toilet Transfer, [place] supervision required  -CC      Walk-In Shower Transfer, [place] supervision required  -CC      Walk-In Shower Transfer, Assist Device standard shower chair  -CC      Transfer, Comment  Car tsf, rwx, CGA  -LB     Recorded by [CC] Jyoti Roblero OTR [LB] Lakia Menjivar PTA     Gait Assessment/Treatment    Gait, [place] Level  supervision required;verbal cues required  -LB     Gait, Assistive Device  rolling walker  -LB     Gait, Distance (Feet)  260  -LB     Gait, Gait Deviations  kee decreased;forward flexed posture;decreased heel strike;step length decreased  -LB     Recorded by  [LB] Lakia Menjivar PTA     Stairs Assessment/Treatment    Number of Stairs  4  -LB     Stairs, Handrail Location  both sides  -LB     Stairs, [place] Level  contact guard assist  -LB     Stairs, Technique Used  step over step (descending);step over step (ascending)  -LB     Stairs, Comment  curb, rwx, CGA  -LB     Recorded by  [LB] Lakia Menjivar PTA     Functional Mobility    Functional Mobility- Ind. Level  contact guard assist  -LB     Functional Mobility- Device  --   none  -LB     Functional Mobility-Distance (Feet)  60  -LB     Functional Mobility- Safety Issues  other (see comments)   LOB x 1  -LB     Recorded by  [LB] Lakia Menjivar PTA     Upper Body Bathing Assessment/Training    UB Bathing Assess/Train Assistive Device hand-held shower head;shower chair with back  -CC      UB Bathing Assess/Train, Position sitting  -CC      UB Bathing  Assess/Train, [place] Level set up required  -CC      Recorded by [CC] Jyoti Roblero OTR      Lower Body Bathing Assessment/Training    LB Bathing Assess/Train Assistive Device grab bars;hand-held shower head;shower chair with back  -CC      LB Bathing Assess/Train, Position standing;sitting  -CC      LB Bathing Assess/Train, [place] Level supervision required;set up required  -CC      Recorded by [CC] Jyoti Roblero OTR      Upper Body Dressing Assessment/Training    UB Dressing Assess/Train, Clothing Type donning:;doffing:;bra;pull over  -CC      UB Dressing Assess/Train, Position sitting  -CC      UB Dressing Assess/Train, [place] supervision required  -CC      Recorded by [CC] Jyoti Roblero OTR      Lower Body Dressing Assessment/Training    LB Dressing Assess/Train, Clothing Type doffing:;donning:;pants;shoes;socks  -CC      LB Dressing Assess/Train, Position sitting;standing  -CC      LB Dressing Assess/Train, [place] supervision required;verbal cues required  -CC      LB Dressing Assess/Train, Comment --   assist varun Denise  -CC      Recorded by [CC] AMAURI Rojas      Grooming Assessment/Training    Grooming Assess/Train, Position standing  -CC      Grooming Assess/Train, [place] Level set up required  -CC      Recorded by [CC] AMAURI Rojas      Balance Skills Training    Gait Balance-Level of Assistance  Contact guard;Close supervision  -LB     Gait Balance Support  assistive device  -LB     Gait Balance Activities  uneven surface  -LB     Recorded by  [LB] Lakia Menjivar PTA     Therapy Exercises    Bilateral Lower Extremities  AROM:;20 reps;supine;sidelying  -LB     Bilateral Upper Extremity   --   arm bike 5 min x2; 2# hand wt 15x3  -CC    Recorded by  [LB] Lakia Menjivar PTA [CC] AMAURI Rojas    Positioning and Restraints    Pre-Treatment Position in bed  -CC  sitting in chair/recliner  -CC    Post Treatment Position wheelchair  -CC wheelchair   -LB wheelchair  -CC    In Wheelchair sitting;call light within reach  -CC sitting;call light within reach;with nsg  -LB sitting;with PT  -CC    Recorded by [CC] AMAURI Rojas [LB] Lakia Menjivar PTA [CC] AMAURI Rojas      01/08/18 1203 01/08/18 1019       Rehab Assessment/Intervention    Discipline occupational therapist  -CC physical therapy assistant  -LB     Document Type therapy note (daily note)  -CC therapy note (daily note)  -LB     Subjective Information agree to therapy  -CC agree to therapy  -LB     Patient Effort, Rehab Treatment good  -CC good  -LB     Precautions/Limitations fall precautions  -CC fall precautions  -LB     Recorded by [CC] AMAURI Rojas [LB] Lakia Menjivar PTA     Pain Assessment    Pain Assessment No/denies pain  -CC No/denies pain  -LB     Recorded by [CC] AMAURI Rojas [LB] Lakia Menjivar PTA     Vision Assessment/Intervention    Visual Impairment WFL with corrective lenses  -CC      Recorded by [CC] AMAURI Rojas      Cognitive Assessment/Intervention    Current Cognitive/Communication Assessment functional  -CC      Orientation Status oriented x 4  -CC      Follows Commands/Answers Questions able to follow single-step instructions  -CC      Personal Safety WNL/WFL  -CC      Personal Safety Interventions gait belt;nonskid shoes/slippers when out of bed;fall prevention program maintained  -CC fall prevention program maintained;gait belt;muscle strengthening facilitated;nonskid shoes/slippers when out of bed  -LB     Recorded by [CC] AMAURI Rojas [LB] Lakia Menjivar PTA     Bed Mobility, Assessment/Treatment    Bed Mobility, Assistive Device  bed rails  -LB     Bed Mob, Supine to Sit, [place]  conditional independence  -LB     Bed Mob, Sit to Supine, [place]  conditional independence  -LB     Bed Mobility, Comment --   up in w/c  -CC      Recorded by [CC] AMAURI Rojas [LB] Lakia Menjivar PTA     Transfer  Assessment/Treatment    Transfers, Bed-Chair [place]  stand by assist  -LB     Transfers, Chair-Bed [place]  stand by assist  -LB     Transfers, Sit-Stand [place] stand by assist  -CC stand by assist  -LB     Transfers, Stand-Sit [place] stand by assist  -CC stand by assist  -LB     Transfers, Sit-Stand-Sit, Assist Device rolling walker  -CC rolling walker  -LB     Toilet Transfer, [place] supervision required  -CC      Toilet Transfer, Assistive Device rolling walker  -CC      Walk-In Shower Transfer, [place] supervision required  -CC      Walk-In Shower Transfer, Assist Device standard walker;standard shower chair  -CC      Transfer, Comment  car tsf SBA, rwx  -LB     Recorded by [CC] Jyoti Roblero OTR [LB] Lakia Menjivar PTA     Gait Assessment/Treatment    Gait, [place] Level  supervision required  -LB     Gait, Assistive Device  rolling walker  -LB     Gait, Distance (Feet)  240  -LB     Recorded by  [LB] Lakia Menjivar PTA     Stairs Assessment/Treatment    Number of Stairs  4  -LB     Stairs, Handrail Location  left side (ascending)   both hands on left rail  -LB     Stairs, [place] Level  contact guard assist  -LB     Stairs, Technique Used  step to step (ascending);step to step (descending)  -LB     Stairs, Comment  curb, rwx, CGA. Also tried up curb backward, down forward as pnt reports that she has a single step that rwx will not fit on.  -LB     Recorded by  [LB] Lakia Menjivar, DEJA     Upper Body Bathing Assessment/Training    UB Bathing Assess/Train Assistive Device hand-held shower head;shower chair with back  -CC      UB Bathing Assess/Train, Position sitting  -CC      UB Bathing Assess/Train, [place] Level supervision required  -CC      Recorded by [CC] Jyoti Roblero, OTR      Lower Body Bathing Assessment/Training    LB Bathing Assess/Train Assistive Device grab bars;hand-held shower head;shower chair with back  -CC      LB Bathing  Assess/Train, Position sitting;standing  -CC      LB Bathing Assess/Train, [place] Level supervision required  -CC      Recorded by [CC] Jyoti Roblero OTR      Upper Body Dressing Assessment/Training    UB Dressing Assess/Train, Clothing Type donning:;doffing:;bra;pull over  -CC      UB Dressing Assess/Train, Position sitting  -CC      UB Dressing Assess/Train, [place] supervision required  -CC      Recorded by [CC] Jyoti Roblero OTR      Lower Body Dressing Assessment/Training    LB Dressing Assess/Train, Clothing Type doffing:;donning:;pants;socks;shoes  -CC      LB Dressing Assess/Train, Position sitting;standing  -CC      LB Dressing Assess/Train, [place] supervision required  -CC      Recorded by [CC] Jyoti Roblero OTR      Toileting Assessment/Training    Toileting Assess/Train, Position sitting;standing  -CC      Toileting Assess/Train, [place] Level supervision required  -CC      Recorded by [CC] Jyoti Roblero OTR      Grooming Assessment/Training    Grooming Assess/Train, Position standing  -CC      Grooming Assess/Train, [place] Level set up required;supervision required  -CC      Recorded by [CC] Jyoti Roblero OTR      Therapy Exercises    Bilateral Lower Extremities  AROM:;15 reps;supine  -LB     Recorded by  [LB] Lakia Menjivar PTA     Positioning and Restraints    Pre-Treatment Position sitting in chair/recliner  -CC      Post Treatment Position wheelchair  -CC wheelchair  -LB     In Wheelchair sitting;call light within reach  -CC call light within reach;sitting  -LB     Recorded by [CC] Jyoti Roblero OTR [LB] Lkaia Menjivar PTA       User Key  (r) = Recorded By, (t) = Taken By, (c) = Cosigned By    Initials Name Effective Dates    CC Jyoti Roblero OTR 04/13/15 -     LB Lakia Menjivar PTA 02/18/16 -                 OT Goals       12/29/17 1511          Transfer Training OT STG    Transfer Training OT STG, Date Established 12/29/17  -KP      Transfer  Training OT STG, Time to Achieve 5 - 7 days  -KP      Transfer Training OT STG, Activity Type toilet;sit to stand/stand to sit;shower chair;walk-in shower  -KP      Transfer Training OT STG, [place] Level supervision required  -KP      Transfer Training OT STG, Assist Device shower chair  -KP      Transfer Training OT LTG    Transfer Training OT LTG, Date Established 12/29/17  -KP      Transfer Training OT LTG, Time to Achieve by discharge  -KP      Transfer Training OT LTG, Activity Type toilet;walk-in shower;shower chair;sit to stand/stand to sit  -KP      Transfer Training OT LTG, [place] Level independent;conditional independence  -KP      Transfer Training OT LTG, Assist Device shower chair  -KP      Strength OT STG    Strength Goal OT STG, Date Established 12/29/17  -KP      Strength Goal OT STG, Time to Achieve 5 - 7 days  -KP      Strength Goal OT STG, Measure to Achieve to increase B UE to 4/5  -KP      Strength Goal OT STG, Functional Goal to increase functional tsf and self-care skills  -KP      Strength OT LTG    Strength Goal OT LTG, Date Established 12/29/17  -KP      Strength Goal OT LTG, Time to Achieve by discharge  -KP      Strength Goal OT LTG, Measure to Achieve to increase to 4+/5   -KP      Strength Goal OT LTG, Functional Goal to increase functional tsf and self-care skills  -KP      Dynamic Standing Balance OT STG    Dynamic Standing Balance OT STG, Date Established 12/29/17  -KP      Dynamic Standing Balance OT STG, Time to Achieve 5 - 7 days  -KP      Dynamic Standing Balance OT STG, [place] Level supervision required  -KP      Dynamic Standing Balance OT STG, Assist Device UE Support   to increase independence w. self care and IADLs  -KP      Dynamic Standing Balance OT LTG    Dynamic Standing Balance OT LTG, Date Established 12/29/17  -KP      Dynamic Standing Balance OT LTG, Time to Achieve by discharge  -KP      Dynamic Standing Balance OT LTG, [place] Level  independent;conditional independence  -      Patient Education OT LTG    Patient Education OT LTG, Date Established 12/29/17  -      Patient Education OT LTG, Time to Achieve by discharge  -      Patient Education OT LTG, Education Type written program;HEP;home safety  -      Patient Education OT LTG, Education Understanding independent;demonstrates adequately;verbalizes understanding  -      ADL OT STG    ADL OT STG, Date Established 12/29/17  -      ADL OT STG, Time to Achieve 1 wk  -      ADL OT STG, Activity Type ADL skills  -      ADL OT STG, [place] Level standby assist;setup;assistive device  -      ADL OT LTG    ADL OT LTG, Date Established 12/29/17  -      ADL OT LTG, Time to Achieve by discharge  -      ADL OT LTG, Activity Type ADL skills  -      ADL OT LTG, [place] Level independent;independent with device;assistive device  -      Endurance OT LTG    Endurance Goal OT LTG, Date Established 12/29/17  -      Endurance Goal OT LTG, Time to Achieve by discharge  -      Endurance Goal OT LTG, Activity Level to increase;endurance 2 good  -        User Key  (r) = Recorded By, (t) = Taken By, (c) = Cosigned By    Initials Name Provider Type     Maggie Gilbert, OTR Occupational Therapist          Occupational Therapy Education     Title: PT OT SLP Therapies (Resolved)     Topic: Occupational Therapy (Resolved)     Point: ADL training (Resolved)    Description: Instruct learner(s) on proper safety adaptation and remediation techniques during self care or transfers.   Instruct in proper use of assistive devices.    Learning Progress Summary    Learner Readiness Method Response Comment Documented by Status   Patient Acceptance E MARCELINO Family conf with pt and grand dgt. Status, HEP, DME and home safety discused. Granddgt given info on shower seat needed and will order. CC 01/09/18 5914 Done    Acceptance E,TB,D VU,DU ed pt on role of OT, benefit of therapy. POC w. OT.   12/29/17 1510 Done   Family Acceptance E VU Family conf with pt and grand dgt. Status, HEP, DME and home safety discused. Granddgt given info on shower seat needed and will order.  01/09/18 1446 Done               Point: Home exercise program (Resolved)    Description: Instruct learner(s) on appropriate technique for monitoring, assisting and/or progressing therapeutic exercises/activities.    Learning Progress Summary    Learner Readiness Method Response Comment Documented by Status   Patient Acceptance E,TB,TOYIN CANELA UE HEP CC 01/10/18 1429 Done    Acceptance E VU Family conf with pt and grand dgt. Status, HEP, DME and home safety discused. Granddgt given info on shower seat needed and will order.  01/09/18 1446 Done   Family Acceptance E VU Family conf with pt and grand dgt. Status, HEP, DME and home safety discused. Granddgt given info on shower seat needed and will order.  01/09/18 1446 Done               Point: Precautions (Resolved)    Description: Instruct learner(s) on prescribed precautions during self-care and functional transfers.    Learning Progress Summary    Learner Readiness Method Response Comment Documented by Status   Patient Acceptance E VU Family conf with pt and grand dgt. Status, HEP, DME and home safety discused. Granddgt given info on shower seat needed and will order.  01/09/18 1446 Done    Acceptance E,TBPURNIMA DU ed pt on role of OT, benefit of therapy. POC w. OT.  12/29/17 1510 Done   Family Acceptance E VU Family conf with pt and grand dgt. Status, HEP, DME and home safety discused. Granddgt given info on shower seat needed and will order.  01/09/18 1446 Done               Point: Body mechanics (Resolved)    Description: Instruct learner(s) on proper positioning and spine alignment during self-care, functional mobility activities and/or exercises.    Learning Progress Summary    Learner Readiness Method Response Comment Documented by Status   Patient Acceptance E VU Family  conf with pt and grand dgt. Status, HEP, DME and home safety discused. Granddgt given info on shower seat needed and will order.  01/09/18 1446 Done    Acceptance E,TB,D MARCELINO,TOYIN clark pt on role of OT, benefit of therapy. POC w. OT.  12/29/17 1510 Done   Family Acceptance E VU Family conf with pt and grand dgt. Status, HEP, DME and home safety discused. Granddgt given info on shower seat needed and will order.  01/09/18 1446 Done                      User Key     Initials Effective Dates Name Provider Type Discipline     04/13/15 -  Jyoti Roblero OTPERNELL Occupational Therapist OT     04/13/15 -  Maggie Gilbert OTR Occupational Therapist OT                OT Recommendation and Plan  Anticipated Discharge Disposition: home, other (see comments)  Planned Therapy Interventions: activity intolerance, ADL retraining, balance training, IADL retraining, home exercise program, strengthening, transfer training  Therapy Frequency: 5 times/wk  Plan of Care Review  Outcome Summary/Follow up Plan: Pt showing steady progress. Increased activity tolerance and balance noted. ADLs at SBA/CGA level.          Time Calculation:          Time Calculation- OT       01/10/18 1330 01/10/18 0830       Time Calculation- OT    OT Start Time 1330  -CC 0830  -CC     OT Stop Time 1400  -CC 0930  -CC     OT Time Calculation (min) 30 min  -CC 60 min  -CC       User Key  (r) = Recorded By, (t) = Taken By, (c) = Cosigned By    Initials Name Provider Type     AMAURI Rojas Occupational Therapist          Therapy Charges for Today     Code Description Service Date Service Provider Modifiers Qty    79971958856 HC OT SELF CARE/MGMT/TRAIN EA 15 MIN 1/9/2018 AMAURI Rojas GO 2    55195628506 HC OT CARE PLAN EA 15 MIN 1/9/2018 AMAURI Rojas GO 2    13443653967 HC OT THER PROC EA 15 MIN 1/9/2018 AMAURI Rojas GO 4    75347950359 HC OT SELF CARE/MGMT/TRAIN EA 15 MIN 1/10/2018 AMAURI Rojas GO 2    [id number]  HC OT THER PROC EA 15 MIN 1/10/2018 AMAURI Rojas GO 4               OT Discharge Summary  Anticipated Discharge Disposition: home, other (see comments)  Reason for Discharge: All goals achieved  Outcomes Achieved: Able to achieve all goals within established timeline  Discharge Destination: Home, other (comment) (UE HEP; Intermittemnt assist from family)    AMAURI Rojas  1/10/2018

## 2018-01-10 NOTE — PROGRESS NOTES
Inpatient Rehabilitation Plan of Care Note    Plan of Care  Care Plan Reviewed - No updates at this time.    Psychosocial    Performed Intervention(s)  Therapeutic enviroment  verbalizes needs and concerns.      Safety    Performed Intervention(s)  Falls protocol  hourly rounds, items in reach  Bed alarm chair alarm      Sphincter Control    Performed Intervention(s)  Monitor intake and output.  medication    Signed by: Xavi Casanova RN

## 2018-01-10 NOTE — PROGRESS NOTES
Inpatient Rehabilitation Functional Measures Assessment    Functional Measures  KISHOR Eating:  [facility] Grooming: [facility] Bathing:  [facility] Upper Body Dressing:  [facility] Lower Body Dressing:  [facility] Toileting:  [facility] Bladder Management  Level of Assistance:  [place]  Frequency/Number of Accidents this Shift:  [facility] Bowel Management  Level of Assistance: [place]  Frequency/Number of Accidents this Shift: [facility] Bed/Chair/Wheelchair Transfer:  [facility] Toilet Transfer:  [facility] Tub/Shower Transfer:  [place]    Previously Documented Mode of Locomotion at Discharge: Field  KISHOR Expected Mode of Locomotion at Discharge: [facility] Walk/Wheelchair:  [facility] Stairs:  [facility] Comprehension:  Auditory comprehension is the usual mode. Comprehension  Score = 7, Independent.  Patient comprehends complex/abstract information in  their primary language.  Patient is completely independent for auditory  comprehension.  There are no activity limitations.  KISHOR Expression:  Vocal expression is the usual mode. Expression Score = 7,  Independent.  Patient expresses complex/abstract information in their primary  language.  Patient is completely independent for vocal expression.  There are no  activity limitations.  KISHOR Social Interaction:  Social Interaction Score = 7, Independent. Patient is  completely independent for social interaction.  There are no activity  limitations.  KISHOR Problem Solving:  Problem Solving Score = 7, Independent.  Patient makes  appropriate decisions in order to solve complex problems.  Patient is completely  independent for problem solving.  There are no activity limitations.  KISHOR Memory:  Memory Score = 7, Independent.  Patient is completely independent  for memory.  There are no activity limitations.    Therapy Mode Minutes  Occupational Therapy: Branch  Physical Therapy: Branch  Speech Language Pathology:  Branch    Signed by: Evelyne Ornelas RN

## 2018-01-10 NOTE — PROGRESS NOTES
Inpatient Rehabilitation Functional Measures Assessment and Plan of Care    Plan of Care  Updated Problems/Interventions  Field    Functional Measures  KISHOR Eating:  Eating Score = 7. Patient is completely independent for eating.  There are no activity limitations.  KISHOR Grooming: Grooming Score = 6.  Patient is modified independent for grooming,  requiring:  KISHOR Bathing:  Patient bathed in shower. Bathing Score = 6.  Patient is modified  independent for bathing, requiring: Hand held shower.  KISHOR Upper Body Dressing:  Upper Body Dressing Score = 6 Patient is modified  independent for upper body dressing, requiring:  KISHOR Lower Body Dressing:  Lower Body Dressing  Score = 6. Patient is modified  independent for lower body dressing, requiring:  KISHOR Toileting:  Toileting Score = 6.  Patient is modified independent for  toileting, requiring:    KISHOR Bladder Management  Level of Assistance:  [place]  Frequency/Number of Accidents this Shift:  [facility] Bowel Management  Level of Assistance: [place]  Frequency/Number of Accidents this Shift: [facility] Bed/Chair/Wheelchair Transfer:  [facility] Toilet Transfer:  Toilet Transfer Score = 6.  Patient is modified  independent for transferring to and from the toilet/commode, requiring: Walker.  KISHOR Tub/Shower Transfer:  Shower Transfer Score = 6.  Patient is modified  independent for transferring to and from the shower, requiring: Walker. Shower  chair    Previously Documented Mode of Locomotion at Discharge: Field  [facility] Expected Mode of Locomotion at Discharge: [facility] Walk/Wheelchair:  [facility] Stairs:  [facility] Comprehension:  [facility] Expression:  [facility] Social Interaction:  [facility] Problem Solving:  [facility] Memory:  [place]    Therapy Mode Minutes  Occupational Therapy: Individual: 90 minutes.  Physical Therapy: Branch  Speech Language Pathology:  Branch    Signed by: AMAURI Rojas/MECHE

## 2018-01-10 NOTE — PROGRESS NOTES
Inpatient Rehabilitation Functional Measures Assessment and Plan of Care    Plan of Care  Updated Problems/Interventions  Field    Functional Measures  KISHOR Eating:  [facility] Grooming: [facility] Bathing:  [facility] Upper Body Dressing:  [facility] Lower Body Dressing:  [facility] Toileting:  [facility] Bladder Management  Level of Assistance:  [place]  Frequency/Number of Accidents this Shift:  [facility] Bowel Management  Level of Assistance: [place]  Frequency/Number of Accidents this Shift: [facility] Bed/Chair/Wheelchair Transfer:  Bed/chair/wheelchair Transfer Score = 7.  Patient is completely independent for transferring to and from the  bed/chair/wheelchair. There are no activity limitations.  KISHOR Toilet Transfer:  [facility] Tub/Shower Transfer:  [place]    Previously Documented Mode of Locomotion at Discharge: Field  KISHOR Expected Mode of Locomotion at Discharge: [facility] Walk/Wheelchair:  WHEELCHAIR OBSERVATION   Activity was not observed.    WALK OBSERVATION   Walk Distance Scale = 3.  Distance walked is greater than 150 feet. Walk Score  = 6.  Patient is modified independence with walking, requiring: Rolling walker.  Patient walked a distance of 260 feet.  KISHOR Stairs:  Stairs Score = 5.  Patient requires supervision or set up for  negotiating stairs. 12 stairs. Patient requires the following assistive  device(s): Handrail(s).    KISHOR Comprehension:  [place]  KISHOR Expression:  [facility] Social Interaction:  [facility] Problem Solving:  [facility] Memory:  [place]    Therapy Mode Minutes  Occupational Therapy: [place]  Physical Therapy: Individual: 90 minutes.  Speech Language Pathology:  [place]    Signed by: Lakia Menjivar PTA

## 2018-01-10 NOTE — PROGRESS NOTES
"   LOS: 13 days   Patient Care Team:  Tatiana Bellamy MD as PCP - General (Internal Medicine)  James Del Angel MD as Consulting Physician (Infectious Diseases)  Ezekiel Verde MD as Consulting Physician (Otolaryngology)  Aisha Curiel MD as Consulting Physician (Cardiology)  Sinan Braga MD as Consulting Physician (Pulmonary Disease)  Shawn Felton MD as Consulting Physician (Allergy)    Chief Complaint:   Guillain-Barré syndrome    Subjective     History of Present Illness    Subjective   She continues stronger overall.  Continues work on her endurance, gait.  Still has some altered sensation at the feet to the area just above the ankles.  History taken from: patient    Objective     Vital Signs  Temp:  [97.5 °F (36.4 °C)-97.8 °F (36.6 °C)] 97.8 °F (36.6 °C)  Heart Rate:  [66-76] 66  Resp:  [18] 18  BP: (134-151)/(66-72) 141/72    Objective:  Vital signs: (most recent): Blood pressure 141/72, pulse 66, temperature 97.8 °F (36.6 °C), temperature source Oral, resp. rate 18, height 160 cm (63\"), weight 50.3 kg (110 lb 12.8 oz), last menstrual period 01/01/1993, SpO2 100 %, not currently breastfeeding.            Physical Exam  Mental status-awake alert rest  HEENT-sclera nonicteric, conjunctiva pink.     No JVD.  .  Ears unremarkable externally.  Lungs-Normal respirations.  No accessory muscle use.  Heart-regular rate and rhythm    Abdomen- nondistended  Extremities -without edema or cyanosis.  Neurologic-oriented ×4.  Good historian.        Face symmetric.  No dysarthria.     Motor exam shows to take resistance proximally distally in the bilateral upper extremities and lower extremities.         Results Review:     I reviewed the patient's new clinical results.    Results from last 7 days  Lab Units 01/08/18  0606 01/04/18  0504   WBC 10*3/mm3 6.23 7.11   HEMOGLOBIN g/dL 11.6* 10.9*   HEMATOCRIT % 36.4 34.8*   PLATELETS 10*3/mm3 350 295         Results from last 7 days  Lab Units " 01/08/18  0606   SODIUM mmol/L 140   POTASSIUM mmol/L 4.2   CHLORIDE mmol/L 102   CO2 mmol/L 30.3*   BUN mg/dL 23   CREATININE mg/dL 0.70   CALCIUM mg/dL 9.2   BILIRUBIN mg/dL 0.2   ALK PHOS U/L 54   ALT (SGPT) U/L 8   AST (SGOT) U/L 15   GLUCOSE mg/dL 88     CT chest Jan 8, 2018 -   IMPRESSION:  Previously demonstrated areas of consolidation within the  left lower lobe, right middle lobe demonstrate near complete interval  resolution. Small areas of residual atelectasis are seen in these  regions. There are persistent small reticulonodular opacities and  cluster of nodules demonstrated bilaterally, largely unchanged from CT  chest dated 09/2017, likely representing a chronic infectious process.  The bronchial wall thickening as well as the mucous plugging have also  resolved. Interval decrease in mediastinal lymphadenopathy.    Medication Review: done  Scheduled Meds:    amLODIPine 5 mg Oral Q24H   azithromycin 250 mg Oral Q24H   cetirizine 5 mg Oral Daily   docusate sodium 100 mg Oral BID   enoxaparin 30 mg Subcutaneous Q24H   ethambutol 800 mg Oral Q24H   levothyroxine 50 mcg Oral Q48H   levothyroxine 75 mcg Oral Every Other Day   losartan 100 mg Oral Q24H   metoprolol succinate XL 25 mg Oral Q24H   rifAMPin 600 mg Oral Q24H   saccharomyces boulardii 250 mg Oral BID     Continuous Infusions:   PRN Meds:.•  acetaminophen  •  calcium carbonate  •  ipratropium-albuterol  •  melatonin  •  nitroglycerin  •  ondansetron **OR** ondansetron ODT **OR** ondansetron      Assessment/Plan     Active Problems:    Guillain Barré syndrome      Assessment & Plan  Guillain-Barré syndrome  S/p plasmapheresis    HTN- multi-drug regimen    DVT prophylaxis - SCDs/Lovenox    Hypothyroidism - on replacement.     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    send off AFB cultures for formal susceptibilities to make sure she maintains sensitivity to macrolides.  Resume triple drug therapy with daily dosing instead  intermittent.   dose at azithromycin 250 mg per day, ethambutol 15 mg/kg per day,  and rifampin 600 mg daily.    give her 3-6 months of this therapy and then repeat bronchoscopy.  If she had not yet cleared her cultures by that time, would give consideration to adding an aminoglycoside. No need for airborne isolation in this patient with known MAC (which is not contagious).     82-year-old female previously independent with Guillain-Barré syndrome with numbness and weakness in her extremities with impairments with her mobility and self-care.  She's also had bradycardia.     She's completed 5 sessions of plasmapheresis.  Recent pneumonia.  Recent dysphagia.  Given her functional impairments and comorbidities, current recommendation is to pursue acute inpatient rehabilitation.  This would be a comprehensive inpatient rehabilitation program with physical medicine rehabilitation and ongoing physician follow-up monitoring her neurologic and pulmonary and cardiac status, occupational therapy for functional mobility and activities daily living and physical therapy for transfers balance gait and strengthening.  Therapy 3 hours a day 5 days a week, PT 1.5 hours and OT 1.5 hours 5 days a week. .  Rehabilitation nursing for carryover and monitoring of her bowel bladder, skin, cardiopulmonary status, neurologic status.  Weekly team conferences.  The patient will require precertification.  Features of the rehabilitation program were reviewed with the patient.  The patient's in agreement with this plan. Goal mod I. Rehab prognosis fair. Medical prognosis fair. ELOS 10 days but only estimation.     January 2-transfers contact-guard.  Gait 160 feet contact guard with a rolling walker.  She still has some numbness in the hands and feet.  Tolerates therapies.  She feels he's making progress.    TEAM CONF - JAN 3 - transfers CTG..  Toilet transfers SBA. LB adls CTG SBA. Continent bowel and bladder.  ELOS- Jan 11.     FAMILY CONF - MER  "9 -   Family conference held today with pt, pt's granddaughter, Elham, PT,OT,RN and SW. Discussed pt's progress, current status and discharge recommendations.  Pt has made very good progress in all therapies. PT reports pt completes bed mobility and transfers with SBA and occasional cues for technique. She ambulates 260' with rolling walker and SBA. She can ascend/descend 4 steps with rails and CG.    In OT, pt completes commode and shower transfers at a MOD-I level.  She bathes and dresses upper and lower body with distant supervision and grooms standing at the sink. Pt does need assistance to jacob/doff her SHAWNA hose. Kitchen mobility to be evaluated today. Strength and endurance have improved and pt has been instructed in energy conservation techniques. Upper extremity coordination is WFL.   Nursing reviewed current medications and MD follow up after discharge. She is continent of bowel and bladder. Pt has good safety awareness.  The team expects to allow pt independence in her room tomorrow.   DME discussed. Pt to receive a rolling walker and family is to purchase a shower seat with a back.  Home health PT is recommended and referral has been made to [facility] per pt request.    At discharge, pt will have daily intermittent assistance at home from family and friends. Pt became tearful and seems to be struggling with a variety of emotions. She doesn't want to ask for help and is feeling a little anxiety about going home alone. She also expresses anger about being ill.   Allowed pt to express her concerns and normalized her feelings. Encouraged her to journal her thoughts as a way to process her experience and she is receptive to this idea. Pt does acknowledge that she has good support from her family and her \"Alevism family\".    Discussed discharge options, such has hired assistance or temporary ECF such as assisted living facility, but pt feels going home is her best option.  Pt does have an emergency " "call system at home and wears her \"button\" at all times. Granddaughter and son can check on pt daily.   Plan to discharge on Thursday with home health services.      TEAM CONF - MER 10- TRANSFERS SBA.GAIT 300 FEET RW SUP. ADLS SUP. TO BE INDEPENDENT IN ROOM TODAY.   ELOS- HOME TOMORROW WITH HOME HEALTH PT.    Virgilio Amaya MD  01/10/18  7:34 AM    Time:       "

## 2018-01-10 NOTE — PROGRESS NOTES
Inpatient Rehabilitation Plan of Care Note    Plan of Care  Care Plan Reviewed - Updates as Follows    Psychosocial    [RN] Coping/Adjustment(Active)  Current Status(01/10/2018): Very pleasant, verbalizes easily. No coping issues  voiced.  Weekly Goal(01/17/2018): Allow opportunity to express concerns regarding status.  Discharge Goal: Adequate coping regarding life changes with ongoing support    Performed Intervention(s)  Therapeutic enviroment  verbalizes needs and concerns.      Safety    [RN] Potential for Injury(Active)  Current Status(01/10/2018): weakness of LEs, numbness bilateral feet. Fatiques  easily.  Weekly Goal(01/17/2018): No unsafe behavior. Calls for assist.  Discharge Goal: Pt/family aware of fall/safety in the home setting.    Performed Intervention(s)  Falls protocol  hourly rounds, items in reach  Bed alarm chair alarm      Sphincter Control    [RN] Bladder and bowel management(Active)  Current Status(01/10/2018): Continent  Weekly Goal(01/17/2018): Continent of bladder, bowel 100% with BM q1-3 days and  no difficulty voiding.  Discharge Goal: Same as weekly    Performed Intervention(s)  Monitor intake and output.  medication    Signed by: Evelyne Ornelas RN

## 2018-01-10 NOTE — PROGRESS NOTES
Inpatient Rehabilitation Functional Measures Assessment    Functional Measures  KISHOR Eating:  [facility] Grooming: [facility] Bathing:  [facility] Upper Body Dressing:  [facility] Lower Body Dressing:  [facility] Toileting:  [facility] Bladder Management  Level of Assistance:  [place]  Frequency/Number of Accidents this Shift:  [facility] Bowel Management  Level of Assistance: [place]  Frequency/Number of Accidents this Shift: [facility] Bed/Chair/Wheelchair Transfer:  [facility] Toilet Transfer:  [facility] Tub/Shower Transfer:  [place]    Previously Documented Mode of Locomotion at Discharge: Field  KISHOR Expected Mode of Locomotion at Discharge: [facility] Walk/Wheelchair:  [facility] Stairs:  [facility] Comprehension:  Both ( auditory and visual) modes of comprehension are used  equally. Comprehension Score = 6, Modified [place].  Patient comprehends  complex/abstract information in their primary language, requiring: Additional  time. Glasses.  KISHOR Expression:  Vocal expression is the usual mode. Expression Score = 6,  Modified Independent.  Patient expresses complex/abstract information in their  primary language, requiring: Additional time.  KISHOR Social Interaction:  Social Interaction Score = 7, Independent. Patient is  completely independent for social interaction.  There are no activity  limitations.  KISHOR Problem Solving:  Problem Solving Score = 7, Independent.  Patient makes  appropriate decisions in order to solve complex problems.  Patient is completely  independent for problem solving.  There are no activity limitations.  KISHOR Memory:  Memory Score = 6, Modified [place].  Patient is modified  independent for memory, requiring: Requires additional time.    Therapy Mode Minutes  Occupational Therapy: Branch  Physical Therapy: Branch  Speech Language Pathology:  [place]    Signed by: Xavi Casanova RN

## 2018-01-10 NOTE — PROGRESS NOTES
Weekly progress report discussed with pt.  She is allowed independence in her room today and states she is doing well.  Discharge planned for tomorrow.  [facility] to follow for PT only.

## 2018-01-10 NOTE — PLAN OF CARE
Problem: Patient Care Overview (Adult)  Goal: Plan of Care Review  Outcome: Ongoing (interventions implemented as appropriate)   01/10/18 0047   Coping/Psychosocial Response Interventions   Plan Of Care Reviewed With patient   Patient Care Overview   Progress improving   Outcome Evaluation   Outcome Summary/Follow up Plan Pt. is pleasant and cooperative with staff. No complained of any pain. Call light used at night. No further issues to note at this time. Hand off Hazel Cheung RN to continue to monitor.       Problem: Fall Risk (Adult)  Goal: Absence of Falls  Outcome: Ongoing (interventions implemented as appropriate)

## 2018-01-10 NOTE — PROGRESS NOTES
SECTION GG      Self Care Performance Discharge:   Oral Hygiene: Patient completed the activities by him/herself with no  assistance from a helper.   Toileting Hygiene: : Patient completed the activities by him/herself with no  assistance from a helper.   Shower/Bathe Self: Patient completed the activities by him/herself with no  assistance from a helper.   Upper Body Dressing: Patient completed the activities by him/herself with no  assistance from a helper.   Lower Body Dressing: Patient completed the activities by him/herself with no  assistance from a helper.   Putting On/Taking Off Footwear: Patient completed the activities by him/herself  with no assistance from a helper.    Mobility Toilet Transfer Discharge: Patient completed the activities by  him/herself with no assistance from a helper.    Signed by: AMAURI Rojas/MECHE

## 2018-01-10 NOTE — THERAPY TREATMENT NOTE
Inpatient Rehabilitation - Physical Therapy Treatment Note  [facility]     Patient Name: Stacie Vieira  : 1935  MRN: [id number]  Today's Date: 1/10/2018  Onset of Illness/Injury or Date of Surgery Date: 17  Date of Referral to PT: 17  Referring Physician: Dr Houston    Admit Date: 2017    Visit Dx:    ICD-10-CM ICD-9-CM   1. 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   • Bradycardia   • Syncope   • Guillain Barré syndrome               Adult Rehabilitation Note       01/10/18 1421 01/10/18 0950 18 1439    Rehab Assessment/Intervention    Discipline occupational therapist  -CC physical therapy assistant  -LB occupational therapist  -CC    Document Type discharge summary;therapy note (daily note)  -CC therapy note (daily note)  -LB therapy note (daily note)  -CC    Subjective Information no complaints;agree to therapy  -CC agree to therapy  -LB agree to therapy  -CC    Patient Effort, Rehab Treatment excellent  -CC excellent  -LB excellent  -CC    Symptoms Noted During/After Treatment none  -CC  none  -CC    Precautions/Limitations  fall precautions  -LB     Equipment Issued to Patient shower chair   granddaughter ordered  -CC      Recorded by [CC] Jyoti Roblero OTR [LB] Lakia Menjivar, PTA [CC] Jyoti Roblero OTR    Pain Assessment    Pain Assessment No/denies pain  -CC No/denies pain  -LB No/denies pain  -CC    Recorded by [CC] AMAURI Rojas [LB] Lakia Menjivar PTA [CC]  AMAURI Rojas    Vision Assessment/Intervention    Visual Impairment WFL with corrective lenses  -CC  WFL with corrective lenses  -CC    Recorded by [CC] AMAURI Rojas  [CC] AMAURI Rojas    Cognitive Assessment/Intervention    Current Cognitive/Communication Assessment functional  -CC  functional  -CC    Orientation Status oriented x 4  -CC  oriented x 4  -CC    Follows Commands/Answers Questions 100% of the time;able to follow multi-step instructions  -CC  100% of the time;able to follow single-step instructions  -CC    Personal Safety WNL/WFL  -CC  WNL/WFL  -CC    Personal Safety Interventions fall prevention program maintained  -CC fall prevention program maintained;gait belt;muscle strengthening facilitated;nonskid shoes/slippers when out of bed  -LB gait belt;fall prevention program maintained  -CC    Recorded by [CC] AMAURI Rojas [LB] Lakia Menjivar, DEJA [CC] AMAURI Rojas    ROM (Range of Motion)    General ROM no range of motion deficits identified   UE  -CC      Recorded by [CC] AMAURI Rojas      MMT (Manual Muscle Testing)    General MMT Assessment upper extremity strength deficits identified  -CC      General MMT Assessment Detail --   RUE G25 L8 3pt 7; LUE G15 L8 3pt7  -CC      Recorded by [CC] AMAURI Rojas      Left Shoulder    Flexion Gross Movement (4-/5) good minus  -CC      Recorded by [CC] AMAURI Rojas      Right Shoulder    Flexion Gross Movement (4-/5) good minus  -CC      Recorded by [CC] AMAURI Rojas      Upper Extremity    Upper Ext Manual Muscle Testing left shoulder strength deficit;right shoulder strength deficit  -CC      Recorded by [CC] AMAURI Rojas      Bed Mobility, Assessment/Treatment    Bed Mobility, Roll Left, [place] independent  -CC independent  -LB independent  -CC    Bed Mobility, Roll Right, [place] independent  -CC independent  -LB     Bed Mobility, Scoot/Bridge, [place] independent   -CC independent  -LB     Bed Mob, Supine to Sit, [place] independent  -CC independent  -LB independent  -CC    Bed Mob, Sit to Supine, [place] independent  -CC independent  -LB     Recorded by [CC] Jyoti Roblero OTR [LB] Lakia Menjivar PTA [CC] Jyoti Roblero OTR    Transfer Assessment/Treatment    Transfers, Bed-Chair [place] independent  -CC independent  -LB     Transfers, Chair-Bed [place] independent  -CC independent  -LB     Transfers, Bed-Chair-Bed, Assist Device rolling walker  -CC      Transfers, Sit-Stand [place] independent  -CC independent  -LB     Transfers, Stand-Sit [place] independent  -CC independent  -LB     Transfers, Sit-Stand-Sit, Assist Device rolling walker  -CC rolling walker  -LB     Toilet Transfer, [place] conditional independence  -CC      Toilet Transfer, Assistive Device rolling walker  -CC      Walk-In Shower Transfer, [place] conditional independence  -CC      Walk-In Shower Transfer, Assist Device rolling walker;standard shower chair   with back  -CC      Transfer, Comment  car tsf, rwx, supervision  -LB     Recorded by [CC] Jyoti Roblero OTR [LB] Lakia Menjivar PTA     Gait Assessment/Treatment    Gait, [place] Level  conditional independence  -LB     Gait, Assistive Device  rolling walker  -LB     Gait, Distance (Feet)  260  -LB     Gait, Gait Deviations  kee decreased;forward flexed posture;step length decreased;decreased heel strike  -LB     Recorded by  [LB] Lakia Menjivar PTA     Stairs Assessment/Treatment    Number of Stairs  12  -LB     Stairs, Handrail Location  left side (ascending)   both hands on 1 rail  -LB     Stairs, [place] Level  supervision required  -LB     Stairs, Technique Used  step to step (ascending);step to step (descending)  -LB     Stairs, Comment  curb, rwx, SBA  -LB     Recorded by  [LB] Lakia Menjivar PTA     Functional Mobility    Functional Mobility- Ind. Level conditional  independence  -CC      Functional Mobility- Device rolling walker  -CC      Functional Mobility-Distance (Feet) --   in room and to and from BR  -CC      Recorded by [CC] Jyoti Roblero OTR      ADL Assessment/Intervention    IADL Assess/Train, Comment --   Mod I with kitchen mobility wx level  -CC      Recorded by [CC] Jyoti Roblero OTR      Upper Body Bathing Assessment/Training    UB Bathing Assess/Train Assistive Device hand-held shower head;shower chair with back  -CC      UB Bathing Assess/Train, Position sitting  -CC      UB Bathing Assess/Train, [place] conditional independence  -CC      Recorded by [CC] Jyoti Roblero OTR      Lower Body Bathing Assessment/Training    LB Bathing Assess/Train Assistive Device hand-held shower head;grab bars;shower chair with back  -CC      LB Bathing Assess/Train, Position standing;sitting  -CC      LB Bathing Assess/Train, [place] Level conditional independence  -CC      Recorded by [CC] Jyoti Roblero OTR      Upper Body Dressing Assessment/Training    UB Dressing Assess/Train, Clothing Type donning:;doffing:;pull over;bra  -CC      UB Dressing Assess/Train, Position sitting  -CC      UB Dressing Assess/Train, [place] conditional independence  -CC      Recorded by [CC] Jyoti Roblero OTR      Lower Body Dressing Assessment/Training    LB Dressing Assess/Train, Clothing Type doffing:;donning:;pants;shoes;socks  -CC      LB Dressing Assess/Train, Position standing;sitting  -CC      LB Dressing Assess/Train, [place] conditional independence  -CC      Recorded by [CC] Jyoti Roblero OTR      Toileting Assessment/Training    Toileting Assess/Train, Position sitting;standing  -CC      Toileting Assess/Train, [place] Level conditional independence  -CC      Recorded by [CC] Jyoti Roblero OTR      Grooming Assessment/Training    Grooming Assess/Train, Position sink side;standing  -CC      Grooming Assess/Train, [place] Level conditional  independence  -CC      Grooming Assess/Train, Comment wx level  -CC      Recorded by [CC] AMAURI Rojas      Balance Skills Training    Standing-Level of Assistance  Close supervision  -LB     Static Standing Balance Support  assistive device  -LB     Standing-Balance Activities  Retrieve object from floor   w reacher  -LB     Gait Balance-Level of Assistance  Contact guard;Close supervision  -LB     Gait Balance Support  assistive device  -LB     Gait Balance Activities  uneven surface   x 12 ft  -LB     Recorded by  [LB] Lakia Menjivar PTA     Therapy Exercises    Bilateral Lower Extremities  AROM:;15 reps;20 reps;standing;supine;sitting  -LB     Bilateral Upper Extremity   --   arm bike 5 min x 2; 2# hand wt 15x3;   -CC    BUE Resistance   --   hand helper 20x2  -CC    Exercise Protocols --   UE HEP program reviewed  -CC      Recorded by [CC] AMAURI Rojas [LB] Lakia Menjivar PTA [CC] AMAURI Rojas    Fine Motor Coordination Training    9-Hole Peg Right 22  -CC      9-Hole Peg Left 20  -CC      Box and Blocks Right 53  -CC      Box and Blocks Left 59  -CC      Recorded by [CC] AMAURI Rojas      Sensory Assessment/Intervention    LUE Light Touch WNL  -CC      RUE Light Touch WNL  -CC      Stereognosis LUE;RUE  -CC      LUE Stereognosis WNL  -CC      RUE Stereognosis WNL  -CC      Recorded by [CC] AMAURI Rojas      Positioning and Restraints    Pre-Treatment Position sitting in chair/recliner  -CC  sitting in chair/recliner  -CC    Post Treatment Position bed  -CC chair  -LB wheelchair  -CC    In Bed sitting EOB;call light within reach  -CC      In Chair  sitting;call light within reach  -LB     In Wheelchair   sitting;with PT  -CC    Recorded by [CC] AMAURI Rojas [LB] Lakia Menjivar PTA [CC] AMAURI Rojas      01/09/18 0945 01/09/18 0939 01/08/18 1352    Rehab Assessment/Intervention    Discipline occupational therapist  -CC physical therapy assistant   -LB occupational therapist  -CC    Document Type therapy note (daily note)  -CC therapy note (daily note)  -LB therapy note (daily note)  -CC    Subjective Information agree to therapy  -CC agree to therapy  -LB agree to therapy  -CC    Patient Effort, Rehab Treatment excellent  -CC good  -LB good  -CC    Symptoms Noted During/After Treatment none  -CC      Precautions/Limitations fall precautions  -CC fall precautions  -LB fall precautions  -CC    Equipment Issued to Patient  --   Rwx ordered  -LB     Recorded by [CC] AMAURI Rojas [LB] Lakia Menjivar PTA [CC] Jyoti Roblero OTR    Pain Assessment    Pain Assessment No/denies pain  -CC No/denies pain  -LB No/denies pain  -CC    Recorded by [CC] AMAURI Rojas [LB] Lakia Menjivar PTA [CC] Jyoti Roblero OTR    Vision Assessment/Intervention    Visual Impairment WFL with corrective lenses  -CC  WFL with corrective lenses  -CC    Recorded by [CC] AMAURI Rojas  [CC] AMAURI Rojas    Cognitive Assessment/Intervention    Current Cognitive/Communication Assessment functional  -CC  functional  -CC    Orientation Status oriented x 4  -CC  oriented x 4  -CC    Follows Commands/Answers Questions 100% of the time;able to follow multi-step instructions  -CC  able to follow single-step instructions  -CC    Personal Safety WNL/WFL  -CC  WNL/WFL  -CC    Personal Safety Interventions gait belt;fall prevention program maintained;nonskid shoes/slippers when out of bed  -CC fall prevention program maintained;gait belt;muscle strengthening facilitated;nonskid shoes/slippers when out of bed  -LB gait belt;fall prevention program maintained;nonskid shoes/slippers when out of bed  -CC    Recorded by [CC] AMAURI Rojas [LB] Lakia Menjivar, PTA [CC] Jyoti Roblero OTR    Bed Mobility, Assessment/Treatment    Bed Mobility, Assistive Device  --   assessed on txment mat  -LB     Bed Mobility, Roll Left, [place] independent  -CC  independent  -LB     Bed Mobility, Roll Right, [place]  independent  -LB     Bed Mobility, Scoot/Bridge, [place]  independent  -LB     Bed Mob, Supine to Sit, [place] independent  -CC independent  -LB     Bed Mob, Sit to Supine, [place]  independent  -LB     Bed Mobility, Comment   --   up in w/c  -CC    Recorded by [CC] Jyoti Roblero OTR [LB] Lakia Menjivar PTA [CC] Jyoti Roblero OTR    Transfer Assessment/Treatment    Transfers, Bed-Chair [place]  stand by assist  -LB     Transfers, Chair-Bed [place]  stand by assist  -LB     Transfers, Sit-Stand [place] supervision required;stand by assist  -CC stand by assist  -LB     Transfers, Stand-Sit [place] stand by assist  -CC stand by assist;verbal cues required   cues for hand placement  -LB     Transfers, Sit-Stand-Sit, Assist Device  rolling walker  -LB     Toilet Transfer, [place] supervision required  -CC      Walk-In Shower Transfer, [place] supervision required  -CC      Walk-In Shower Transfer, Assist Device standard shower chair  -CC      Transfer, Comment  Car tsf, rwx, CGA  -LB     Recorded by [CC] Jyoti Roblero OTR [LB] Lakia Menjivar PTA     Gait Assessment/Treatment    Gait, [place] Level  supervision required;verbal cues required  -LB     Gait, Assistive Device  rolling walker  -LB     Gait, Distance (Feet)  260  -LB     Gait, Gait Deviations  kee decreased;forward flexed posture;decreased heel strike;step length decreased  -LB     Recorded by  [LB] Lakia Menjivar PTA     Stairs Assessment/Treatment    Number of Stairs  4  -LB     Stairs, Handrail Location  both sides  -LB     Stairs, [place] Level  contact guard assist  -LB     Stairs, Technique Used  step over step (descending);step over step (ascending)  -LB     Stairs, Comment  curb, rwx, CGA  -LB     Recorded by  [LB] Lakia Menjivar PTA     Functional Mobility    Functional Mobility- Ind. Level  contact guard  assist  -LB     Functional Mobility- Device  --   none  -LB     Functional Mobility-Distance (Feet)  60  -LB     Functional Mobility- Safety Issues  other (see comments)   LOB x 1  -LB     Recorded by  [LB] Lakia Menjivar PTA     Upper Body Bathing Assessment/Training    UB Bathing Assess/Train Assistive Device hand-held shower head;shower chair with back  -CC      UB Bathing Assess/Train, Position sitting  -CC      UB Bathing Assess/Train, [place] Level set up required  -CC      Recorded by [CC] Jyoti Roblero OTR      Lower Body Bathing Assessment/Training    LB Bathing Assess/Train Assistive Device grab bars;hand-held shower head;shower chair with back  -CC      LB Bathing Assess/Train, Position standing;sitting  -CC      LB Bathing Assess/Train, [place] Level supervision required;set up required  -CC      Recorded by [CC] AMAURI Rojas      Upper Body Dressing Assessment/Training    UB Dressing Assess/Train, Clothing Type donning:;doffing:;bra;pull over  -CC      UB Dressing Assess/Train, Position sitting  -CC      UB Dressing Assess/Train, [place] supervision required  -CC      Recorded by [CC] AMAURI Rojas      Lower Body Dressing Assessment/Training    LB Dressing Assess/Train, Clothing Type doffing:;donning:;pants;shoes;socks  -CC      LB Dressing Assess/Train, Position sitting;standing  -CC      LB Dressing Assess/Train, [place] supervision required;verbal cues required  -CC      LB Dressing Assess/Train, Comment --   assist varun Denise  -CC      Recorded by [CC] AMAURI Rojas      Grooming Assessment/Training    Grooming Assess/Train, Position standing  -CC      Grooming Assess/Train, [place] Level set up required  -CC      Recorded by [CC] AMAURI Rojas      Balance Skills Training    Gait Balance-Level of Assistance  Contact guard;Close supervision  -LB     Gait Balance Support  assistive device  -LB     Gait Balance Activities  uneven surface  -LB      Recorded by  [LB] Lakia Menjivar PTA     Therapy Exercises    Bilateral Lower Extremities  AROM:;20 reps;supine;sidelying  -LB     Bilateral Upper Extremity   --   arm bike 5 min x2; 2# hand wt 15x3  -CC    Recorded by  [LB] Lakia Menjivar PTA [CC] AMAURI Rojas    Positioning and Restraints    Pre-Treatment Position in bed  -CC  sitting in chair/recliner  -CC    Post Treatment Position wheelchair  -CC wheelchair  -LB wheelchair  -CC    In Wheelchair sitting;call light within reach  -CC sitting;call light within reach;with nsg  -LB sitting;with PT  -CC    Recorded by [CC] AMAURI Rojas [LB] Lakia Menjivar PTA [CC] AMAURI Rojas      01/08/18 1203 01/08/18 1019       Rehab Assessment/Intervention    Discipline occupational therapist  -CC physical therapy assistant  -LB     Document Type therapy note (daily note)  -CC therapy note (daily note)  -LB     Subjective Information agree to therapy  -CC agree to therapy  -LB     Patient Effort, Rehab Treatment good  -CC good  -LB     Precautions/Limitations fall precautions  -CC fall precautions  -LB     Recorded by [CC] AMAURI Rojas [LB] Lakia Menjivar PTA     Pain Assessment    Pain Assessment No/denies pain  -CC No/denies pain  -LB     Recorded by [CC] AMAURI Rojas [LB] Lakia Menjivar PTA     Vision Assessment/Intervention    Visual Impairment WFL with corrective lenses  -CC      Recorded by [CC] AMAURI Rojas      Cognitive Assessment/Intervention    Current Cognitive/Communication Assessment functional  -CC      Orientation Status oriented x 4  -CC      Follows Commands/Answers Questions able to follow single-step instructions  -CC      Personal Safety WNL/WFL  -CC      Personal Safety Interventions gait belt;nonskid shoes/slippers when out of bed;fall prevention program maintained  -CC fall prevention program maintained;gait belt;muscle strengthening facilitated;nonskid shoes/slippers when out of bed   -LB     Recorded by [CC] Jyoti Roblero, OTR [LB] Lakia Menjivar PTA     Bed Mobility, Assessment/Treatment    Bed Mobility, Assistive Device  bed rails  -LB     Bed Mob, Supine to Sit, [place]  conditional independence  -LB     Bed Mob, Sit to Supine, [place]  conditional independence  -LB     Bed Mobility, Comment --   up in w/c  -CC      Recorded by [CC] Jyoti Roblero, OTR [LB] Lakia Menjivar PTA     Transfer Assessment/Treatment    Transfers, Bed-Chair [place]  stand by assist  -LB     Transfers, Chair-Bed [place]  stand by assist  -LB     Transfers, Sit-Stand [place] stand by assist  -CC stand by assist  -LB     Transfers, Stand-Sit [place] stand by assist  -CC stand by assist  -LB     Transfers, Sit-Stand-Sit, Assist Device rolling walker  -CC rolling walker  -LB     Toilet Transfer, [place] supervision required  -CC      Toilet Transfer, Assistive Device rolling walker  -CC      Walk-In Shower Transfer, [place] supervision required  -CC      Walk-In Shower Transfer, Assist Device standard walker;standard shower chair  -CC      Transfer, Comment  car tsf SBA, rwx  -LB     Recorded by [CC] Jyoti Roblero, OTR [LB] Lakia Menjivar PTA     Gait Assessment/Treatment    Gait, [place] Level  supervision required  -LB     Gait, Assistive Device  rolling walker  -LB     Gait, Distance (Feet)  240  -LB     Recorded by  [LB] Lakia Menjivar PTA     Stairs Assessment/Treatment    Number of Stairs  4  -LB     Stairs, Handrail Location  left side (ascending)   both hands on left rail  -LB     Stairs, [place] Level  contact guard assist  -LB     Stairs, Technique Used  step to step (ascending);step to step (descending)  -LB     Stairs, Comment  curb, rwx, CGA. Also tried up curb backward, down forward as pnt reports that she has a single step that rwx will not fit on.  -LB     Recorded by  [LB] Lakia Menjivar PTA     Upper Body Bathing  Assessment/Training    UB Bathing Assess/Train Assistive Device hand-held shower head;shower chair with back  -CC      UB Bathing Assess/Train, Position sitting  -CC      UB Bathing Assess/Train, [place] Level supervision required  -CC      Recorded by [CC] Jyoti Roblero OTR      Lower Body Bathing Assessment/Training    LB Bathing Assess/Train Assistive Device grab bars;hand-held shower head;shower chair with back  -CC      LB Bathing Assess/Train, Position sitting;standing  -CC      LB Bathing Assess/Train, [place] Level supervision required  -CC      Recorded by [CC] Jyoti Roblero OTR      Upper Body Dressing Assessment/Training    UB Dressing Assess/Train, Clothing Type donning:;doffing:;bra;pull over  -CC      UB Dressing Assess/Train, Position sitting  -CC      UB Dressing Assess/Train, [place] supervision required  -CC      Recorded by [CC] Jyoti Roblero OTR      Lower Body Dressing Assessment/Training    LB Dressing Assess/Train, Clothing Type doffing:;donning:;pants;socks;shoes  -CC      LB Dressing Assess/Train, Position sitting;standing  -CC      LB Dressing Assess/Train, [place] supervision required  -CC      Recorded by [CC] Jyoti Roblero OTR      Toileting Assessment/Training    Toileting Assess/Train, Position sitting;standing  -CC      Toileting Assess/Train, [place] Level supervision required  -CC      Recorded by [CC] AMAURI Rojas      Grooming Assessment/Training    Grooming Assess/Train, Position standing  -CC      Grooming Assess/Train, [place] Level set up required;supervision required  -CC      Recorded by [CC] AMAURI Rojas      Therapy Exercises    Bilateral Lower Extremities  AROM:;15 reps;supine  -LB     Recorded by  [LB] Lakia Menjivar PTA     Positioning and Restraints    Pre-Treatment Position sitting in chair/recliner  -CC      Post Treatment Position wheelchair  -CC wheelchair  -LB     In Wheelchair sitting;call light within reach   -CC call light within reach;sitting  -LB     Recorded by [CC] Jyoti Roblero, OTR [LB] Lakia Menjivar, PTA       User Key  (r) = Recorded By, (t) = Taken By, (c) = Cosigned By    Initials Name Effective Dates    JAYE Roblero, OTR 04/13/15 -     LB Lakia Menjivar, PTA 02/18/16 -                 IP PT Goals       01/05/18 1212 12/29/17 1225       Transfer Training 2 PT LTG    Transfer Training PT 2 LTG, Date Established  12/29/17  -KH     Transfer Training PT 2 LTG, Time to Achieve  1 wk  -KH     Transfer Training PT 2 LTG, Activity Type  all transfers  -KH     Transfer Training PT 2 LTG, [place] Level  independent  -KH     Transfer Training PT 2 LTG, Date Goal Reviewed 01/05/18  -LB      Transfer Training PT 2 LTG, Outcome goal ongoing  -LB      Gait Training PT LTG    Gait Training Goal PT LTG, Date Established  12/29/17  -KH     Gait Training Goal PT LTG, Time to Achieve  1 wk  -KH     Gait Training Goal PT LTG, [place] Level  independent  -KH     Gait Training Goal PT LTG, Distance to Achieve  150  -KH     Gait Training Goal PT LTG, Date Goal Reviewed 01/05/18  -LB      Gait Training Goal PT LTG, Outcome goal ongoing  -LB      Stair Training PT LTG    Stair Training Goal PT LTG, Date Established  12/29/17  -KH     Stair Training Goal PT LTG, Time to Achieve  1 wk  -KH     Stair Training Goal PT LTG, Number of Steps  12  -KH     Stair Training Goal PT LTG, [place] Level  conditional independence  -KH     Stair Training Goal PT LTG, Assist Device  1 handrail  -KH     Stair Training Goal PT LTG, Date Goal Reviewed 01/05/18  -LB      Stair Training Goal PT LTG, Outcome goal ongoing  -LB      Strength Goal PT LTG    Strength Goal PT LTG, Date Established  12/29/17  -KH     Strength Goal PT LTG, Time to Achieve  1 wk  -KH     Strength Goal PT LTG, Measure to Achieve  B LE grossly 4-/5 with MMT  -KH     Strength Goal PT LTG, Date Goal Reviewed 01/05/18  -LB      Strength Goal PT LTG, Outcome  goal ongoing  -LB        User Key  (r) = Recorded By, (t) = Taken By, (c) = Cosigned By    Initials Name Provider Type    LB Lakia Menjivar PTA Physical Therapy Assistant    TEMO Rosario, PT Physical Therapist          Physical Therapy Education     Title: PT OT SLP Therapies (Resolved)     Topic: Physical Therapy (Resolved)     Point: Mobility training (Resolved)    Learning Progress Summary    Learner Readiness Method Response Comment Documented by Status   Patient Acceptance E VU,DU bed mob transfers, gait LB 01/10/18 1021 Done    Acceptance E VU,NR  LB 01/09/18 1528 Done    Acceptance E VU,NR  LB 01/08/18 1321 Done    Acceptance E VU,NR gait w STC, and w rwx LB 01/06/18 1056 Done    Acceptance E VU,NR  LB 01/05/18 1425 Done    Acceptance E,D VU,NR Spoke at length with pt about usefulness of using a roll wx and WC upon return to home since pt will be independent to ensure pt safety and mobility with lessened risk of falls. JK 01/04/18 1323 Done    Acceptance E VU,NR  LB 01/03/18 1449 Done    Acceptance E VU,NR  LB 01/02/18 1312 Done    Acceptance E VU Educated patient on safe hand placement and body mechanics with basic mobility including bed mobility, transfers, gait, and stairs  12/29/17 1224 Done               Point: Home exercise program (Resolved)    Learning Progress Summary    Learner Readiness Method Response Comment Documented by Status   Patient Acceptance E,H VU,DU  LB 01/10/18 1020 Done               Point: Body mechanics (Resolved)    Learning Progress Summary    Learner Readiness Method Response Comment Documented by Status   Patient Acceptance E VU Educated patient on safe hand placement and body mechanics with basic mobility including bed mobility, transfers, gait, and stairs  12/29/17 1224 Done               Point: Precautions (Resolved)    Learning Progress Summary    Learner Readiness Method Response Comment Documented by Status   Patient Acceptance E VU  LB 01/10/18 1022 Done     Acceptance ED MARCELINO DANIELSON 01/01/18 1219 Done    Acceptance ED MARCELINO DANIELSON 12/30/17 0932 Done                      User Key     Initials Effective Dates Name Provider Type Discipline    LB 02/18/16 -  Lakia Menjivar PTA Physical Therapy Assistant PT    OZ 12/01/15 -  Darleen Lakhani, PT Physical Therapist PT    MD 12/01/15 -  Jennifer Vera, PT Physical Therapist PT     06/22/16 -  Maria Del Carmen Rosario, PT Physical Therapist PT                    PT Recommendation and Plan  Anticipated Discharge Disposition: home with home health  Planned Therapy Interventions: balance training, bed mobility training, gait training, home exercise program, stair training, strengthening, stretching, transfer training, patient/family education, postural re-education  PT Frequency: 2 times/day  Plan of Care Review  Plan Of Care Reviewed With: patient  Progress: progress toward functional goals is gradual  Outcome Summary/Follow up Plan: pnt is ambulating farther. She has improved with transfers and stairs. Pleasant and cooperative.         Time Calculation:         PT Charges       01/10/18 1323 01/10/18 1115       Time Calculation    Start Time 1300  -LB 0930  -LB     Stop Time 1330  -LB 1030  -LB     Time Calculation (min) 30 min  -LB 60 min  -LB       User Key  (r) = Recorded By, (t) = Taken By, (c) = Cosigned By    Initials Name Provider Type    LB Lakia Menjivar PTA Physical Therapy Assistant          Therapy Charges for Today     Code Description Service Date Service Provider Modifiers Qty    58047415866 HC PT THER PROC EA 15 MIN 1/9/2018 Lakia Menjivar PTA GP 6    77752671286 HC PT CARE PLAN EACH 15 MIN 1/9/2018 Lakia Menjivar PTA GP 2    55962915543 HC PT THER PROC EA 15 MIN 1/10/2018 Lakia Menjivar PTA GP 6    07727725844 HC PT CARE PLAN EACH 15 MIN 1/10/2018 Lakia Menjivar PTA GP 2               Lakia Menjivar PTA  1/10/2018

## 2018-01-11 VITALS
RESPIRATION RATE: 16 BRPM | BODY MASS INDEX: 19.63 KG/M2 | HEART RATE: 64 BPM | HEIGHT: 63 IN | OXYGEN SATURATION: 99 % | DIASTOLIC BLOOD PRESSURE: 60 MMHG | WEIGHT: 110.8 LBS | TEMPERATURE: 97.7 F | SYSTOLIC BLOOD PRESSURE: 123 MMHG

## 2018-01-11 LAB
BASOPHILS # BLD AUTO: 0.01 10*3/MM3 (ref 0–0.2)
BASOPHILS NFR BLD AUTO: 0.2 % (ref 0–1.5)
DEPRECATED RDW RBC AUTO: 48.9 FL (ref 37–54)
EOSINOPHIL # BLD AUTO: 0.12 10*3/MM3 (ref 0–0.7)
EOSINOPHIL NFR BLD AUTO: 2.3 % (ref 0.3–6.2)
ERYTHROCYTE [DISTWIDTH] IN BLOOD BY AUTOMATED COUNT: 13.4 % (ref 11.7–13)
HCT VFR BLD AUTO: 36.5 % (ref 35.6–45.5)
HGB BLD-MCNC: 11.5 G/DL (ref 11.9–15.5)
IMM GRANULOCYTES # BLD: 0.12 10*3/MM3 (ref 0–0.03)
IMM GRANULOCYTES NFR BLD: 2.3 % (ref 0–0.5)
LYMPHOCYTES # BLD AUTO: 2.06 10*3/MM3 (ref 0.9–4.8)
LYMPHOCYTES NFR BLD AUTO: 39.5 % (ref 19.6–45.3)
MCH RBC QN AUTO: 31.3 PG (ref 26.9–32)
MCHC RBC AUTO-ENTMCNC: 31.5 G/DL (ref 32.4–36.3)
MCV RBC AUTO: 99.5 FL (ref 80.5–98.2)
MONOCYTES # BLD AUTO: 0.69 10*3/MM3 (ref 0.2–1.2)
MONOCYTES NFR BLD AUTO: 13.2 % (ref 5–12)
NEUTROPHILS # BLD AUTO: 2.21 10*3/MM3 (ref 1.9–8.1)
NEUTROPHILS NFR BLD AUTO: 42.5 % (ref 42.7–76)
PLATELET # BLD AUTO: 288 10*3/MM3 (ref 140–500)
PMV BLD AUTO: 8.9 FL (ref 6–12)
RBC # BLD AUTO: 3.67 10*6/MM3 (ref 3.9–5.2)
WBC NRBC COR # BLD: 5.21 10*3/MM3 (ref 4.5–10.7)

## 2018-01-11 PROCEDURE — 85025 COMPLETE CBC W/AUTO DIFF WBC: CPT | Performed by: PHYSICAL MEDICINE & REHABILITATION

## 2018-01-11 PROCEDURE — 97110 THERAPEUTIC EXERCISES: CPT

## 2018-01-11 RX ORDER — PSEUDOEPHEDRINE HCL 30 MG
100 TABLET ORAL 2 TIMES DAILY
Start: 2018-01-11 | End: 2018-07-26

## 2018-01-11 RX ORDER — RIFAMPIN 300 MG/1
600 CAPSULE ORAL
Qty: 52 CAPSULE | Refills: 0
Start: 2018-01-12 | End: 2018-02-07

## 2018-01-11 RX ORDER — LANOLIN ALCOHOL/MO/W.PET/CERES
3 CREAM (GRAM) TOPICAL NIGHTLY PRN
Start: 2018-01-11 | End: 2018-06-18 | Stop reason: HOSPADM

## 2018-01-11 RX ORDER — AMLODIPINE BESYLATE 5 MG/1
5 TABLET ORAL
Qty: 30 TABLET | Refills: 1 | Status: SHIPPED | OUTPATIENT
Start: 2018-01-12 | End: 2018-03-07 | Stop reason: SDUPTHER

## 2018-01-11 RX ORDER — AZITHROMYCIN 250 MG/1
TABLET, FILM COATED ORAL
Start: 2018-01-12 | End: 2018-02-01

## 2018-01-11 RX ORDER — ETHAMBUTOL HYDROCHLORIDE 400 MG/1
800 TABLET, FILM COATED ORAL
Qty: 52 TABLET | Refills: 0
Start: 2018-01-12 | End: 2018-02-07

## 2018-01-11 RX ORDER — METOPROLOL SUCCINATE 25 MG/1
25 TABLET, EXTENDED RELEASE ORAL
Qty: 30 TABLET | Refills: 1 | Status: SHIPPED | OUTPATIENT
Start: 2018-01-12 | End: 2018-03-07 | Stop reason: SDUPTHER

## 2018-01-11 RX ADMIN — ETHAMBUTOL HYDROCHLORIDE 800 MG: 400 TABLET, FILM COATED ORAL at 08:44

## 2018-01-11 RX ADMIN — AMLODIPINE BESYLATE 5 MG: 5 TABLET ORAL at 08:41

## 2018-01-11 RX ADMIN — DOCUSATE SODIUM 100 MG: 100 CAPSULE, LIQUID FILLED ORAL at 08:41

## 2018-01-11 RX ADMIN — LEVOTHYROXINE SODIUM 50 MCG: 50 TABLET ORAL at 05:25

## 2018-01-11 RX ADMIN — RIFAMPIN 600 MG: 300 CAPSULE ORAL at 08:41

## 2018-01-11 RX ADMIN — AZITHROMYCIN 250 MG: 250 TABLET, FILM COATED ORAL at 08:41

## 2018-01-11 RX ADMIN — LOSARTAN POTASSIUM 100 MG: 50 TABLET, FILM COATED ORAL at 08:41

## 2018-01-11 RX ADMIN — Medication 250 MG: at 08:41

## 2018-01-11 RX ADMIN — METOPROLOL SUCCINATE 25 MG: 25 TABLET, FILM COATED, EXTENDED RELEASE ORAL at 08:41

## 2018-01-11 NOTE — PROGRESS NOTES
Inpatient Rehabilitation Plan of Care Note    Plan of Care  Care Plan Reviewed - No updates at this time.    Signed by: Carole Candelario RN

## 2018-01-11 NOTE — PROGRESS NOTES
"   LOS: 14 days   Patient Care Team:  Tatiana Bellamy MD as PCP - General (Internal Medicine)  James Del Angel MD as Consulting Physician (Infectious Diseases)  Ezekiel Verde MD as Consulting Physician (Otolaryngology)  Aisha Curiel MD as Consulting Physician (Cardiology)  Sinan Braga MD as Consulting Physician (Pulmonary Disease)  Shawn Felton MD as Consulting Physician (Allergy)    Chief Complaint:   Guillain-Barré syndrome    Subjective     History of Present Illness    Subjective   Ready for home today. Still altered sensation in feet. Did well with modified independent in room.      Objective     Vital Signs  Temp:  [97.4 °F (36.3 °C)-97.7 °F (36.5 °C)] 97.7 °F (36.5 °C)  Heart Rate:  [61-66] 64  Resp:  [16-18] 16  BP: (123-139)/(60-67) 123/60    Objective:  Vital signs: (most recent): Blood pressure 123/60, pulse 64, temperature 97.7 °F (36.5 °C), temperature source Oral, resp. rate 16, height 160 cm (63\"), weight 50.3 kg (110 lb 12.8 oz), last menstrual period 01/01/1993, SpO2 99 %, not currently breastfeeding.            Physical Exam  Mental status-awake alert rest  HEENT-sclera nonicteric, conjunctiva pink.     No JVD.  .  Ears unremarkable externally.  Lungs-Normal respirations.  No accessory muscle use.  Heart-regular rate and rhythm    Abdomen- nondistended  Extremities -without edema or cyanosis.  Neurologic-oriented ×4.  Good historian.        Face symmetric.  No dysarthria.     Motor exam shows to take resistance proximally distally in the bilateral upper extremities and lower extremities.         Results Review:     I reviewed the patient's new clinical results.    Results from last 7 days  Lab Units 01/11/18  0731 01/08/18  0606   WBC 10*3/mm3 5.21 6.23   HEMOGLOBIN g/dL 11.5* 11.6*   HEMATOCRIT % 36.5 36.4   PLATELETS 10*3/mm3 288 350         Results from last 7 days  Lab Units 01/08/18  0606   SODIUM mmol/L 140   POTASSIUM mmol/L 4.2   CHLORIDE mmol/L 102 "   CO2 mmol/L 30.3*   BUN mg/dL 23   CREATININE mg/dL 0.70   CALCIUM mg/dL 9.2   BILIRUBIN mg/dL 0.2   ALK PHOS U/L 54   ALT (SGPT) U/L 8   AST (SGOT) U/L 15   GLUCOSE mg/dL 88     CT chest Jan 8, 2018 -   IMPRESSION:  Previously demonstrated areas of consolidation within the  left lower lobe, right middle lobe demonstrate near complete interval  resolution. Small areas of residual atelectasis are seen in these  regions. There are persistent small reticulonodular opacities and  cluster of nodules demonstrated bilaterally, largely unchanged from CT  chest dated 09/2017, likely representing a chronic infectious process.  The bronchial wall thickening as well as the mucous plugging have also  resolved. Interval decrease in mediastinal lymphadenopathy.    Medication Review: done  Scheduled Meds:    amLODIPine 5 mg Oral Q24H   azithromycin 250 mg Oral Q24H   docusate sodium 100 mg Oral BID   ethambutol 800 mg Oral Q24H   levothyroxine 50 mcg Oral Q48H   levothyroxine 75 mcg Oral Every Other Day   losartan 100 mg Oral Q24H   metoprolol succinate XL 25 mg Oral Q24H   rifAMPin 600 mg Oral Q24H   saccharomyces boulardii 250 mg Oral BID     Continuous Infusions:   PRN Meds:.melatonin      Assessment/Plan     Active Problems:    Guillain Barré syndrome      Assessment & Plan  Guillain-Barré syndrome  S/p plasmapheresis    HTN- multi-drug regimen    DVT prophylaxis - SCDs/Lovenox    Hypothyroidism - on replacement.     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    send off AFB cultures for formal susceptibilities to make sure she maintains sensitivity to macrolides.  Resume triple drug therapy with daily dosing instead intermittent.   dose at azithromycin 250 mg per day, ethambutol 15 mg/kg per day,  and rifampin 600 mg daily.    give her 3-6 months of this therapy and then repeat bronchoscopy.  If she had not yet cleared her cultures by that time, would give consideration to adding an aminoglycoside. No need for  airborne isolation in this patient with known MAC (which is not contagious).     82-year-old female previously independent with Guillain-Barré syndrome with numbness and weakness in her extremities with impairments with her mobility and self-care.  She's also had bradycardia.     She's completed 5 sessions of plasmapheresis.  Recent pneumonia.  Recent dysphagia.  Given her functional impairments and comorbidities, current recommendation is to pursue acute inpatient rehabilitation.  This would be a comprehensive inpatient rehabilitation program with physical medicine rehabilitation and ongoing physician follow-up monitoring her neurologic and pulmonary and cardiac status, occupational therapy for functional mobility and activities daily living and physical therapy for transfers balance gait and strengthening.  Therapy 3 hours a day 5 days a week, PT 1.5 hours and OT 1.5 hours 5 days a week. .  Rehabilitation nursing for carryover and monitoring of her bowel bladder, skin, cardiopulmonary status, neurologic status.  Weekly team conferences.  The patient will require precertification.  Features of the rehabilitation program were reviewed with the patient.  The patient's in agreement with this plan. Goal mod I. Rehab prognosis fair. Medical prognosis fair. ELOS 10 days but only estimation.     January 2-transfers contact-guard.  Gait 160 feet contact guard with a rolling walker.  She still has some numbness in the hands and feet.  Tolerates therapies.  She feels he's making progress.    TEAM CONF - JAN 3 - transfers CTG..  Toilet transfers SBA. LB adls CTG SBA. Continent bowel and bladder.  ELOS- Jan 11.     FAMILY CONF - JAN 9 -   Family conference held today with pt, pt's granddaughter, Elham, PT,OT,RN and SW. Discussed pt's progress, current status and discharge recommendations.  Pt has made very good progress in all therapies. PT reports pt completes bed mobility and transfers with SBA and occasional cues for  "technique. She ambulates 260' with rolling walker and SBA. She can ascend/descend 4 steps with rails and CG.    In OT, pt completes commode and shower transfers at a MOD-I level.  She bathes and dresses upper and lower body with distant supervision and grooms standing at the sink. Pt does need assistance to jacob/doff her SHAWNA hose. Kitchen mobility to be evaluated today. Strength and endurance have improved and pt has been instructed in energy conservation techniques. Upper extremity coordination is WFL.   Nursing reviewed current medications and MD follow up after discharge. She is continent of bowel and bladder. Pt has good safety awareness.  The team expects to allow pt independence in her room tomorrow.   DME discussed. Pt to receive a rolling walker and family is to purchase a shower seat with a back.  Home health PT is recommended and referral has been made to [facility] per pt request.    At discharge, pt will have daily intermittent assistance at home from family and friends. Pt became tearful and seems to be struggling with a variety of emotions. She doesn't want to ask for help and is feeling a little anxiety about going home alone. She also expresses anger about being ill.   Allowed pt to express her concerns and normalized her feelings. Encouraged her to journal her thoughts as a way to process her experience and she is receptive to this idea. Pt does acknowledge that she has good support from her family and her \"Quaker family\".    Discussed discharge options, such has hired assistance or temporary ECF such as assisted living facility, but pt feels going home is her best option.  Pt does have an emergency call system at home and wears her \"button\" at all times. Granddaughter and son can check on pt daily.   Plan to discharge on Thursday with home health services.      TEAM CONF - JAN 10- TRANSFERS SBA.GAIT 300 FEET RW SUP. ADLS SUP. TO BE INDEPENDENT IN ROOM TODAY.   ELOS- HOME TOMORROW WITH HOME " HEALTH PT.    Jan 11- home today  [facility] to follow for PT only.    No driving.    Name Relationship Specialty Phone Fax Address Order                 Aisha Curiel MD Consulting Physician Cardiology [phone] [address]  [facility] [address]      Next Steps: Go on 2/27/2018       Instructions: 10:40a.m.        Virgilio Amaya MD  Physical Medicine and Rehabilitation [phone] [address]  [facility] [address]      Next Steps: Follow up on 2/7/2018       Instructions: 2:40 pm       Tatiana Bellamy MD PCP - General Internal Medicine [phone] [address]  [facility] [address]      Next Steps: Go on 1/25/2018       Instructions: 10:45a.m.        James Del Angel MD Consulting Physician Infectious Diseases [phone] [address]  [place] [id number]      Next Steps: Go on 2/27/2018       Instructions: 2:00p.m.        Ezekiel Verde MD Consulting Physician Otolaryngology [phone] [address]  [facility] [address]      Next Steps: Go on 1/29/2018       Instructions: 1:20p.m.        Sinan Braga MD Consulting Physician Pulmonary Disease [phone] [address]  [facility] [address]      Next Steps: Go on 2/1/2018       Instructions: 12:45p.m.        Jass Li MD  Neurology [phone] [address]  [facility] [address]      Next Steps: Go on 2/22/2018       Instructions: 12:30p.m.        [facility] [phone] [address]  [facility] [address]      Instructions: [facility] will contact you to clauuel in home Physical Therapy visits.         Stacie Vieira   Home Medication Instructions JASMYN:[id number]    Printed on:01/11/18 1119   Medication Information                       amLODIPine (NORVASC) 5 MG tablet  Take 1 tablet by mouth Daily.             azithromycin (ZITHROMAX) 250 MG tablet  Take 2 tablets the first day, then 1 tablet daily for 4 days.  Indications: Pneumonia             CALCIUM-MAGNESIUM-VITAMIN D PO  Take 1 tablet by mouth Daily.             Cholecalciferol (VITAMIN D-3) 1000 UNITS capsule  Take by mouth.             docusate sodium 100 MG capsule  Take 100 mg by mouth 2 (Two) Times a Day.             ethambutol (MYAMBUTOL) 400 MG tablet  Take 2 tablets by mouth Daily for 26 doses.             levothyroxine (LEVOXYL) 50 MCG tablet  Take 1 tablet by mouth Every Other Day.             levothyroxine (LEVOXYL) 75 MCG tablet  Take 1 tablet by mouth Every Other Day.             loratadine (CLARITIN) 10 MG tablet  Take 10 mg by mouth Daily As Needed for Allergies.             melatonin 3 MG tablet  Take 1 tablet by mouth At Night As Needed for Sleep.             metoprolol succinate XL (TOPROL-XL) 25 MG 24 hr tablet  Take 1 tablet by mouth Daily.             Misc Natural Products (JOINT SUPPORT PO)  Take 1 tablet by mouth Daily.             montelukast (SINGULAIR) 10 MG tablet  Take 1 mg by mouth As Needed.             Multiple Vitamin (MULTI VITAMIN DAILY PO)  Take 1 tablet by mouth Daily.             olmesartan (BENICAR) 40 MG tablet  Take 40 mg by mouth Daily.             rifAMPin (RIFADIN) 300 MG capsule  Take 2 capsules by mouth Daily for 26 doses. Indications: Pneumonia             saccharomyces boulardii (FLORASTOR) 250 MG capsule  Take 250 mg by mouth 2 (two) times a day.               >30 on discharge.       Virgilio Amaya MD  01/11/18  11:14 AM    Time:

## 2018-01-11 NOTE — PROGRESS NOTES
SECTION GG    Eating Performance Discharge: Patient completed the activities by him/herself  with no assistance from a helper.    Signed by: Carole Candelario RN

## 2018-01-11 NOTE — PROGRESS NOTES
Inpatient Rehabilitation Functional Measures Assessment    Functional Measures  KISHOR Eating:  [facility] Grooming: [facility] Bathing:  [facility] Upper Body Dressing:  [facility] Lower Body Dressing:  [facility] Toileting:  [facility] Bladder Management  Level of Assistance:  [place]  Frequency/Number of Accidents this Shift:  [facility] Bowel Management  Level of Assistance: [place]  Frequency/Number of Accidents this Shift: [facility] Bed/Chair/Wheelchair Transfer:  [facility] Toilet Transfer:  [facility] Tub/Shower Transfer:  [place]    Previously Documented Mode of Locomotion at Discharge: Field  KISHOR Expected Mode of Locomotion at Discharge: [facility] Walk/Wheelchair:  [facility] Stairs:  [facility] Comprehension:  Both ( auditory and visual) modes of comprehension are used  equally. Comprehension Score = 6, Modified [place].  Patient comprehends  complex/abstract information in their primary language, requiring: Glasses.  Additional time.  KISHOR Expression:  Vocal expression is the usual mode. Expression Score = 6,  Modified Independent.  Patient expresses complex/abstract information in their  primary language, requiring: Additional time.  KISHOR Social Interaction:  Social Interaction Score = 6, Modified Independent.  Patient is modified independent for social interaction, requiring: Requires  additional time.  KISHOR Problem Solving:  Problem Solving Score = 6, Modified [place].  Patient  makes appropriate decisions in order to solve complex problems, but requires  extra time.  KISHOR Memory:  Memory Score = 6, Modified [place].  Patient is modified  independent for memory, requiring: Requires additional time.    Therapy Mode Minutes  Occupational Therapy: Branch  Physical Therapy: Branch  Speech Language Pathology:  Branch    Signed by: Xavi Casanova RN

## 2018-01-11 NOTE — PROGRESS NOTES
Occupational Therapy: Branch    Physical Therapy: Individual: 30 minutes.    Speech Language Pathology:  Branch    Signed by: Lakia Menjivar PTA

## 2018-01-11 NOTE — DISCHARGE SUMMARY
MIA DORMAN  - 1935    ADMIT- 2017  DISCHARGE - 2018    Chief complaint   Guillain-Barré syndrome  Recurrent MAC           History of Present Illness  The patient is a 82-year-old female with a history of MAC, recent pneumonia with hospitalization from  through , who presented on  with a 5 day history of numbness in her hands and a raspy voice and dysphagia starting on the December 15.  On  she developed numbness in her feet.  This was accompanied by weakness in her arms and legs.  She had inability to raise a small plastic basket with toiletries onto a shelf in the bathroom.  She presented to [facility] and diagnosed with Guillain-Barré syndrome.  She had a Shiley catheter placed and underwent plasmapheresis ×5.  Shiley catheter was removed Dec 25.  Dec 25, she had an episode of lightheadedness and then syncope and was unresponsive with bradycardia, chest compressions ×2-3 and RECEIVED atropine and quickly recovered.  She's been seein by internal medicine and cardiology.   .  She had an echocardiogram done.  EKG Dec 25 did not show any R-R variability, which can be seen in Guillain-Barré patient's with autonomic instability. Beta blocker was adjusted.  Her speech and swallowing has improved.  She still has numbness in the feet, but better in the hands. Transfers were contact guardsupervision.  Gait 55-75 feet contact guard minimum assist.  She previously was functionally independent without any device.  Now admit for acute inpt rehab.      Review of Systems   10 point ROS as per HPI. Full code per discussion with patient.    Medical History         Past Medical History:   Diagnosis Date   • Acute stress disorder     • Allergic rhinitis     • C. difficile colitis       hx in past   • Elevated fasting glucose     • Essential hypertension     • H/O: pneumonia     • Health care maintenance     • History of palpitations     •  Hyperlipidemia     • Hypertension     • Hypoactive thyroid     • Hypokalemia     • Hypothyroidism     • Insomnia     • MAC (mycobacterium avium-intracellulare complex)       reason for bronch.  dx with this nov 2015   • Osteoporosis     • Palpitations     • Pneumonia       past hx of freq.   • Sinusitis     • Staph infection       in sinus area  recent sx jan 2016   • SVT (supraventricular tachycardia)       past hx  on medicine   • Syncope and collapse            Surgical History          Past Surgical History:   Procedure Laterality Date   • BREAST BIOPSY         PERCUTATNEOUS NEEDLE CORE   • BRONCHOSCOPY         nov 2015   • BRONCHOSCOPY N/A 4/8/2016     Procedure: BRONCHOSCOPY WITH BAL;  Surgeon: Sinan Braga MD;  Location: [facility] ENDOSCOPY;  Service:    • BRONCHOSCOPY N/A 12/9/2017     Procedure: BRONCHOSCOPY;  Surgeon: Real Rousseau MD;  Location: [facility] ENDOSCOPY;  Service:    • COLONOSCOPY N/A 07/08/2005   • DILATATION AND CURETTAGE N/A 01/01/1990   • PAP SMEAR N/A 03/30/2004   • SINUS SURGERY   01/2016   • TONSILLECTOMY                   Family History   Problem Relation Age of Onset   • Hypertension Brother     • Dementia Mother     • Kidney disease Father     • Thyroid disease Other               Social History   Substance Use Topics   • Smoking status: Never Smoker   • Smokeless tobacco: Never Used         Comment: caffine use   • Alcohol use No   Lives alone. 1 + 2 step entry.   Allergies:  Alendronate; Ezetimibe; Influenza vaccines; and Pravachol [pravastatin]    HOSPITAL COURSE:  Guillain-Barré syndrome  S/p plasmapheresis  Neuro improved. No speech or swallow or upper extremity complaints. Still some altered sensation in feet and ankles. Good strength proximally and distally BUE and BLE.      HTN- multi-drug regimen     DVT prophylaxis - SCDs/Lovenox     Hypothyroidism - on replacement.      Recurrent MAC  --  Per ID - following 21 MAC months of therapy.    send off AFB cultures for formal  susceptibilities to make sure she maintains sensitivity to macrolides.  Resume triple drug therapy with daily dosing instead intermittent.   dose at azithromycin 250 mg per day, ethambutol 15 mg/kg per day,  and rifampin 600 mg daily.    give her 3-6 months of this therapy and then repeat bronchoscopy.  If she had not yet cleared her cultures by that time, would give consideration to adding an aminoglycoside. No need for airborne isolation in this patient with known MAC (which is not contagious).      -----------------------------------------------------------------------------    82-year-old female previously independent with Guillain-Barré syndrome with numbness and weakness in her extremities with impairments with her mobility and self-care.  She's also had bradycardia.     She's completed 5 sessions of plasmapheresis.  Recent pneumonia.  Recent dysphagia.  Given her functional impairments and comorbidities, current recommendation is to pursue acute inpatient rehabilitation.  This would be a comprehensive inpatient rehabilitation program with physical medicine rehabilitation and ongoing physician follow-up monitoring her neurologic and pulmonary and cardiac status, occupational therapy for functional mobility and activities daily living and physical therapy for transfers balance gait and strengthening.  Therapy 3 hours a day 5 days a week, PT 1.5 hours and OT 1.5 hours 5 days a week. .  Rehabilitation nursing for carryover and monitoring of her bowel bladder, skin, cardiopulmonary status, neurologic status.  Weekly team conferences.  The patient will require precertification.  Features of the rehabilitation program were reviewed with the patient.  The patient's in agreement with this plan. Goal mod I. Rehab prognosis fair. Medical prognosis fair. ELOS 10 days but only estimation.      January 2-transfers contact-guard.  Gait 160 feet contact guard with a rolling walker.  She still has some numbness in the hands  and feet.  Tolerates therapies.  She feels he's making progress.     TEAM CONF - JAN 3 - transfers CTG..  Toilet transfers SBA. LB adls CTG SBA. Continent bowel and bladder.  ELOS- Jan 11.      FAMILY CONF - JAN 9 -   Family conference held today with pt, pt's granddaughter, Elham, PT,OT,RN and SW. Discussed pt's progress, current status and discharge recommendations.  Pt has made very good progress in all therapies. PT reports pt completes bed mobility and transfers with SBA and occasional cues for technique. She ambulates 260' with rolling walker and SBA. She can ascend/descend 4 steps with rails and CG.    In OT, pt completes commode and shower transfers at a MOD-I level.  She bathes and dresses upper and lower body with distant supervision and grooms standing at the sink. Pt does need assistance to jacob/doff her SHAWNA hose. Kitchen mobility to be evaluated today. Strength and endurance have improved and pt has been instructed in energy conservation techniques. Upper extremity coordination is WFL.   Nursing reviewed current medications and MD follow up after discharge. She is continent of bowel and bladder. Pt has good safety awareness.  The team expects to allow pt independence in her room tomorrow.   DME discussed. Pt to receive a rolling walker and family is to purchase a shower seat with a back.  Home health PT is recommended and referral has been made to [facility], [facility] per pt request.    At discharge, pt will have daily intermittent assistance at home from family and friends. Pt became tearful and seems to be struggling with a variety of emotions. She doesn't want to ask for help and is feeling a little anxiety about going home alone. She also expresses anger about being ill.   Allowed pt to express her concerns and normalized her feelings. Encouraged her to journal her thoughts as a way to process her experience and she is receptive to this idea. Pt does acknowledge that she has good support from her  "family and her \"Rastafarian family\".    Discussed discharge options, such has hired assistance or temporary ECF such as assisted living facility, but pt feels going home is her best option.  Pt does have an emergency call system at home and wears her \"button\" at all times. Granddaughter and son can check on pt daily.   Plan to discharge on Thursday with home health services.       TEAM CONF - MER 10- TRANSFERS SBA.GAIT 300 FEET RW SUP. ADLS SUP. TO BE INDEPENDENT IN ROOM TODAY.   ELOS- HOME TOMORROW WITH HOME HEALTH PT.     Jan 11- home today  [facility] to follow for PT only.     No driving.                 Name Relationship Specialty Phone Fax Address Order                               Aisha Curiel MD Consulting Physician Cardiology [phone] [address] [facility] [address]  [place] [address]          Next Steps: Go on 2/27/2018         Instructions: 10:40a.m.          Virgilio Amaya MD   Physical Medicine and Rehabilitation [phone] [address] [facility] [address]  [place] [address]          Next Steps: Follow up on 2/7/2018         Instructions: 2:40 pm         Tatiana Bellamy MD PCP - General Internal Medicine [phone] [address] [facility] [address]  [place] [address]          Next Steps: Go on 1/25/2018         Instructions: 10:45a.m.          James Del Angel MD Consulting Physician Infectious Diseases [phone] [address] [facility] [address]  [place] [address]          Next Steps: Go on 2/27/2018         Instructions: 2:00p.m.          Ezekiel Verde MD Consulting Physician Otolaryngology [phone] [address] [facility]  [place] [address]          Next Steps: Go on 1/29/2018         Instructions: 1:20p.m.          Sinan Braga MD Consulting Physician Pulmonary Disease [phone] [address] [facility] [address]  [place] [address]          Next Steps: Go on 2/1/2018         Instructions: " 12:45p.m.          Jass Li MD   Neurology [phone] [address]  [facility] [address]  [facility] [address]          Next Steps: Go on 2/22/2018         Instructions: 12:30p.m.          [facility] [phone] [address]  [facility] [address]          Instructions: [facility] will contact you to radha in home Physical Therapy visits.                        Stacie Vieira   Home Medication Instructions JASMYN:[id number]     Printed on:01/11/18 5785   Medication Information                                       amLODIPine (NORVASC) 5 MG tablet  Take 1 tablet by mouth Daily.                   azithromycin (ZITHROMAX) 250 MG tablet  Take 2 tablets the first day, then 1 tablet daily for 4 days.  Indications: Pneumonia                   CALCIUM-MAGNESIUM-VITAMIN D PO  Take 1 tablet by mouth Daily.                   Cholecalciferol (VITAMIN D-3) 1000 UNITS capsule  Take by mouth.                   docusate sodium 100 MG capsule  Take 100 mg by mouth 2 (Two) Times a Day.                   ethambutol (MYAMBUTOL) 400 MG tablet  Take 2 tablets by mouth Daily for 26 doses.                   levothyroxine (LEVOXYL) 50 MCG tablet  Take 1 tablet by mouth Every Other Day.                   levothyroxine (LEVOXYL) 75 MCG tablet  Take 1 tablet by mouth Every Other Day.                   loratadine (CLARITIN) 10 MG tablet  Take 10 mg by mouth Daily As Needed for Allergies.                   melatonin 3 MG tablet  Take 1 tablet by mouth At Night As Needed for Sleep.                   metoprolol succinate XL (TOPROL-XL) 25 MG 24 hr tablet  Take 1 tablet by mouth Daily.                   Misc Natural Products (JOINT SUPPORT PO)  Take 1 tablet by mouth Daily.                   montelukast (SINGULAIR) 10 MG tablet  Take 1 mg by mouth As Needed.                   Multiple Vitamin (MULTI VITAMIN DAILY PO)  Take 1 tablet by mouth  Daily.                   olmesartan (BENICAR) 40 MG tablet  Take 40 mg by mouth Daily.                   rifAMPin (RIFADIN) 300 MG capsule  Take 2 capsules by mouth Daily for 26 doses. Indications: Pneumonia                   saccharomyces boulardii (FLORASTOR) 250 MG capsule  Take 250 mg by mouth 2 (two) times a day.                      >30 on discharge.       Physical Exam  Mental status-awake alert rest  HEENT-sclera nonicteric, conjunctiva pink.     No JVD.  .  Ears unremarkable externally.  Lungs-Normal respirations.  No accessory muscle use.  Heart-regular rate and rhythm    Abdomen- nondistended  Extremities -without edema or cyanosis.  Neurologic-oriented ×4.  Good historian.        Face symmetric.  No dysarthria.     Motor exam shows to take resistance proximally distally in the bilateral upper extremities and lower extremities.          Results Review:                          I reviewed the patient's new clinical results.     Results from last 7 days  Lab Units 01/11/18  0731 01/08/18  0606   WBC 10*3/mm3 5.21 6.23   HEMOGLOBIN g/dL 11.5* 11.6*   HEMATOCRIT % 36.5 36.4   PLATELETS 10*3/mm3 288 350            Results from last 7 days  Lab Units 01/08/18  0606   SODIUM mmol/L 140   POTASSIUM mmol/L 4.2   CHLORIDE mmol/L 102   CO2 mmol/L 30.3*   BUN mg/dL 23   CREATININE mg/dL 0.70   CALCIUM mg/dL 9.2   BILIRUBIN mg/dL 0.2   ALK PHOS U/L 54   ALT (SGPT) U/L 8   AST (SGOT) U/L 15   GLUCOSE mg/dL 88      CT chest Jan 8, 2018 -   IMPRESSION:  Previously demonstrated areas of consolidation within the  left lower lobe, right middle lobe demonstrate near complete interval  resolution. Small areas of residual atelectasis are seen in these  regions. There are persistent small reticulonodular opacities and  cluster of nodules demonstrated bilaterally, largely unchanged from CT  chest dated 09/2017, likely representing a chronic infectious process.  The bronchial wall thickening as well as the mucous plugging have  also  resolved. Interval decrease in mediastinal lymphadenopathy.

## 2018-01-11 NOTE — PLAN OF CARE
Problem: Patient Care Overview (Adult)  Goal: Plan of Care Review  Outcome: Ongoing (interventions implemented as appropriate)   01/11/18 0151   Coping/Psychosocial Response Interventions   Plan Of Care Reviewed With patient   Patient Care Overview   Progress improving   Outcome Evaluation   Outcome Summary/Follow up Plan Pt. is pleasant and cooperative. No complained of any pain. Meds with Apple Sauce. Required sleeping medicine and given. Sleeping good. Pt. will discharge today. No new issues noted at this time.       Problem: Fall Risk (Adult)  Goal: Absence of Falls  Outcome: Ongoing (interventions implemented as appropriate)   01/11/18 0151   Fall Risk (Adult)   Absence of Falls making progress toward outcome       Problem: Mobility, Physical Impaired (Adult)  Goal: Enhanced Functionality Ability  Outcome: Ongoing (interventions implemented as appropriate)   01/11/18 0151   Mobility, Physical Impaired (Adult)   Enhanced Functionality Ability making progress toward outcome

## 2018-01-11 NOTE — PROGRESS NOTES
Inpatient Rehabilitation Functional Measures Assessment    Functional Measures  KISHOR Eating:  [facility] Grooming: [facility] Bathing:  [facility] Upper Body Dressing:  [facility] Lower Body Dressing:  [facility] Toileting:  [facility] Bladder Management  Level of Assistance:  [place]  Frequency/Number of Accidents this Shift:  [facility] Bowel Management  Level of Assistance: [place]  Frequency/Number of Accidents this Shift: [facility] Bed/Chair/Wheelchair Transfer:  [facility] Toilet Transfer:  [facility] Tub/Shower Transfer:  [place]    Previously Documented Mode of Locomotion at Discharge: Field  KISHOR Expected Mode of Locomotion at Discharge: [facility] Walk/Wheelchair:  [facility] Stairs:  [facility] Comprehension:  Auditory comprehension is the usual mode. Comprehension  Score = 6, Modified [place].  Patient comprehends complex/abstract  information in their primary language with only mild difficulty.  KISHOR Expression:  Vocal expression is the usual mode. Expression Score = 6,  Modified Independent.  Patient expresses complex/abstract information in their  primary language with only mild difficulty with tasks.  KISHOR Social Interaction:  Social Interaction Score = 7, Independent. Patient is  completely independent for social interaction.  There are no activity  limitations.  KISHOR Problem Solving:  Activity was not observed.  KISHOR Memory:  Memory Score = 6, Modified [place].  Patient is modified  independent for memory, having only mild difficulty and using self-initiated or  environmental cues to remember.    Therapy Mode Minutes  Occupational Therapy: [place]  Physical Therapy: [place]  Speech Language Pathology:  [place]    Signed by: Carole Candelario RN

## 2018-01-11 NOTE — PLAN OF CARE
Problem: Inpatient Physical Therapy  Goal: Transfer Training Goal 2 LTG- PT  Outcome: Outcome(s) achieved Date Met: 01/11/18 01/11/18 1016   Transfer Training 2 PT LTG   Transfer Training PT 2 LTG, Date Goal Reviewed 01/11/18   Transfer Training PT 2 LTG, Outcome goal met     Goal: Gait Training Goal LTG- PT  Outcome: Outcome(s) achieved Date Met: 01/11/18 01/11/18 1016   Gait Training PT LTG   Gait Training Goal PT LTG, Date Goal Reviewed 01/11/18   Gait Training Goal PT LTG, Outcome goal met  (with rwx)     Goal: Stair Training Goal LTG- PT  Outcome: Outcome(s) achieved Date Met: 01/11/18 01/11/18 1016   Stair Training PT LTG   Stair Training Goal PT LTG, Date Goal Reviewed 01/11/18   Stair Training Goal PT LTG, Outcome goal met     Goal: Strength Goal LTG- PT  Outcome: Unable to achieve outcome(s) by discharge Date Met: 01/11/18 01/11/18 1016   Strength Goal PT LTG   Strength Goal PT LTG, Outcome (not assessed)   Strength Goal PT LTG, Reason Goal Not Met (not assessed)

## 2018-01-11 NOTE — THERAPY DISCHARGE NOTE
Inpatient Rehabilitation - Physical Therapy Progress Note/Discharge  [facility]     Patient Name: Stacie Vieira  : 1935  MRN: [id number]  Today's Date: 2018  Onset of Illness/Injury or Date of Surgery Date: 17  Date of Referral to PT: 17  Referring Physician: Dr Houston    Admit Date: 2017    Visit Dx:    ICD-10-CM ICD-9-CM   1. 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   • Bradycardia   • Syncope   • Guillain Barré syndrome       Physical Therapy Education     Title: PT OT SLP Therapies (Resolved)     Topic: Physical Therapy (Resolved)     Point: Mobility training (Resolved)    Learning Progress Summary    Learner Readiness Method Response Comment Documented by Status   Patient Acceptance E VU,DU bed mob transfers, gait  01/10/18 1021 Done    Acceptance E VU,NR  LB 18 1528 Done    Acceptance E VU,NR  LB 18 1321 Done    Acceptance E VU,NR gait w STC, and w rwx LB 18 1056 Done    Acceptance E VU,NR  LB 18 1425 Done    Acceptance E,D VU,NR Spoke at length with pt about usefulness of using a roll wx and WC upon return to home since pt will be independent to ensure pt safety and mobility with lessened risk of falls. JK 18 1323 Done    Acceptance E VU,NR  LB 18 1449 Done    Acceptance E VU,NR  LB 18 1312 Done    Acceptance E VU Educated patient on safe hand placement and body mechanics with basic mobility including bed mobility, transfers, gait, and  stairs KH 12/29/17 1224 Done               Point: Home exercise program (Resolved)    Learning Progress Summary    Learner Readiness Method Response Comment Documented by Status   Patient Acceptance DOUGLAS FARFAN MACRELINOTOYIN  LB 01/10/18 1020 Done               Point: Body mechanics (Resolved)    Learning Progress Summary    Learner Readiness Method Response Comment Documented by Status   Patient Acceptance E VU Educated patient on safe hand placement and body mechanics with basic mobility including bed mobility, transfers, gait, and stairs  12/29/17 1224 Done               Point: Precautions (Resolved)    Learning Progress Summary    Learner Readiness Method Response Comment Documented by Status   Patient Acceptance E MARCELINO  LB 01/10/18 1022 Done    Acceptance PURNIMA FARFAN MD 01/01/18 1219 Done    Acceptance PURNIMA FARFAN MD 12/30/17 0932 Done                      User Key     Initials Effective Dates Name Provider Type Discipline    LB 02/18/16 -  Lakia Menjivar, PTA Physical Therapy Assistant PT    JK 12/01/15 -  Darleen Lakhani, PT Physical Therapist PT    MD 12/01/15 -  Jennifer Vera, PT Physical Therapist PT     06/22/16 -  Maria Del Carmen Rosario, PT Physical Therapist PT                    IP PT Goals       01/11/18 1016 01/05/18 1212 12/29/17 1225    Transfer Training 2 PT LTG    Transfer Training PT 2 LTG, Date Established   12/29/17  -KH    Transfer Training PT 2 LTG, Time to Achieve   1 wk  -KH    Transfer Training PT 2 LTG, Activity Type   all transfers  -KH    Transfer Training PT 2 LTG, [place] Level   independent  -KH    Transfer Training PT 2 LTG, Date Goal Reviewed 01/11/18  -LB 01/05/18  -LB     Transfer Training PT 2 LTG, Outcome goal met  -LB goal ongoing  -LB     Gait Training PT LTG    Gait Training Goal PT LTG, Date Established   12/29/17  -KH    Gait Training Goal PT LTG, Time to Achieve   1 wk  -KH    Gait Training Goal PT LTG, [place] Level   independent  -KH    Gait Training Goal PT LTG, Distance to Achieve   150  -KH     Gait Training Goal PT LTG, Date Goal Reviewed 01/11/18  -LB 01/05/18  -LB     Gait Training Goal PT LTG, Outcome goal met   with rwx  -LB goal ongoing  -LB     Stair Training PT LTG    Stair Training Goal PT LTG, Date Established   12/29/17  -KH    Stair Training Goal PT LTG, Time to Achieve   1 wk  -KH    Stair Training Goal PT LTG, Number of Steps   12  -KH    Stair Training Goal PT LTG, [place] Level   conditional independence  -KH    Stair Training Goal PT LTG, Assist Device   1 handrail  -KH    Stair Training Goal PT LTG, Date Goal Reviewed 01/11/18  -LB 01/05/18  -LB     Stair Training Goal PT LTG, Outcome goal met  -LB goal ongoing  -LB     Strength Goal PT LTG    Strength Goal PT LTG, Date Established   12/29/17  -KH    Strength Goal PT LTG, Time to Achieve   1 wk  -KH    Strength Goal PT LTG, Measure to Achieve   B LE grossly 4-/5 with MMT  -KH    Strength Goal PT LTG, Date Goal Reviewed  01/05/18  -LB     Strength Goal PT LTG, Outcome --   not assessed  -LB goal ongoing  -LB     Strength Goal PT LTG, Reason Goal Not Met --   not assessed  -LB        User Key  (r) = Recorded By, (t) = Taken By, (c) = Cosigned By    Initials Name Provider Type    LB Lakia Menjivar, PTA Physical Therapy Assistant    TEMO Rosario, PT Physical Therapist              Adult Rehabilitation Note       01/11/18 0930 01/10/18 1421 01/10/18 0950    Rehab Assessment/Intervention    Discipline physical therapy assistant  -LB occupational therapist  -CC physical therapy assistant  -LB    Document Type discharge summary;therapy note (daily note)  -LB discharge summary;therapy note (daily note)  -CC therapy note (daily note)  -LB    Subjective Information agree to therapy   Reports indep in room going well (Indep given yest)  -LB no complaints;agree to therapy  -CC agree to therapy  -LB    Patient Effort, Rehab Treatment  excellent  -CC excellent  -LB    Symptoms Noted During/After Treatment  none  -CC      Precautions/Limitations   fall precautions  -LB    Equipment Issued to Patient  shower chair   granddaughter ordered  -CC     Recorded by [LB] Lakia Menjivar PTA [CC] AMAURI Rojas [LB] Lakia Menjivar PTA    Pain Assessment    Pain Assessment No/denies pain  -LB No/denies pain  -CC No/denies pain  -LB    Recorded by [LB] Lakia Menjivar PTA [CC] Jyoti Roblero OTR [LB] Lakia Menjivar PTA    Vision Assessment/Intervention    Visual Impairment  WFL with corrective lenses  -CC     Recorded by  [CC] AMAURI Rojas     Cognitive Assessment/Intervention    Current Cognitive/Communication Assessment  functional  -CC     Orientation Status  oriented x 4  -CC     Follows Commands/Answers Questions  100% of the time;able to follow multi-step instructions  -CC     Personal Safety  WNL/WFL  -CC     Personal Safety Interventions fall prevention program maintained;gait belt;muscle strengthening facilitated;nonskid shoes/slippers when out of bed  -LB fall prevention program maintained  -CC fall prevention program maintained;gait belt;muscle strengthening facilitated;nonskid shoes/slippers when out of bed  -LB    Recorded by [LB] Lakia Menjivar PTA [CC] Jyoti Roblero OTR [LB] Lakia Menjivar PTA    ROM (Range of Motion)    General ROM  no range of motion deficits identified   UE  -CC     Recorded by  [CC] AMAURI Rojas     MMT (Manual Muscle Testing)    General MMT Assessment  upper extremity strength deficits identified  -CC     General MMT Assessment Detail  --   RUE G25 L8 3pt 7; LUE G15 L8 3pt7  -CC     Recorded by  [CC] AMAURI Rojas     Left Shoulder    Flexion Gross Movement  (4-/5) good minus  -CC     Recorded by  [CC] AMAURI Rojas     Right Shoulder    Flexion Gross Movement  (4-/5) good minus  -CC     Recorded by  [CC] AMAURI Rojas     Upper Extremity    Upper Ext Manual Muscle Testing  left shoulder strength deficit;right shoulder strength deficit  -CC      Recorded by  [CC] AMAURI Rojas     Bed Mobility, Assessment/Treatment    Bed Mobility, Roll Left, [place]  independent  -CC independent  -LB    Bed Mobility, Roll Right, [place]  independent  -CC independent  -LB    Bed Mobility, Scoot/Bridge, [place]  independent  -CC independent  -LB    Bed Mob, Supine to Sit, [place] independent  -LB independent  -CC independent  -LB    Bed Mob, Sit to Supine, [place]  independent  -CC independent  -LB    Recorded by [LB] Lakia Menjivar PTA [CC] Jyoti Roblero OTR [LB] Lakia Menjivar PTA    Transfer Assessment/Treatment    Transfers, Bed-Chair [place] independent  -LB independent  -CC independent  -LB    Transfers, Chair-Bed [place]  independent  -CC independent  -LB    Transfers, Bed-Chair-Bed, Assist Device  rolling walker  -CC     Transfers, Sit-Stand [place]  independent  -CC independent  -LB    Transfers, Stand-Sit [place]  independent  -CC independent  -LB    Transfers, Sit-Stand-Sit, Assist Device  rolling walker  -CC rolling walker  -LB    Toilet Transfer, [place]  conditional independence  -CC     Toilet Transfer, Assistive Device  rolling walker  -CC     Walk-In Shower Transfer, [place]  conditional independence  -CC     Walk-In Shower Transfer, Assist Device  rolling walker;standard shower chair   with back  -CC     Transfer, Comment   car tsf, rwx, supervision  -LB    Recorded by [LB] Lakia Menjivar PTA [CC] Jyoti Roblero, OTR [LB] Lakia Menjivar PTA    Gait Assessment/Treatment    Gait, [place] Level   conditional independence  -LB    Gait, Assistive Device   rolling walker  -LB    Gait, Distance (Feet)   260  -LB    Gait, Gait Deviations   kee decreased;forward flexed posture;step length decreased;decreased heel strike  -LB    Recorded by   [LB] Lakia Menjivar PTA    Stairs Assessment/Treatment    Number of Stairs   12  -LB    Stairs, Handrail Location   left side  (ascending)   both hands on 1 rail  -LB    Stairs, [place] Level   supervision required  -LB    Stairs, Technique Used   step to step (ascending);step to step (descending)  -LB    Stairs, Comment   curb, rwx, SBA  -LB    Recorded by   [LB] Lakia Menjivar PTA    Functional Mobility    Functional Mobility- Ind. Level contact guard assist  -LB conditional independence  -CC     Functional Mobility- Device other (see comments)   none  -LB rolling walker  -CC     Functional Mobility-Distance (Feet) 160  -LB --   in room and to and from BR  -CC     Functional Mobility- Safety Issues step length decreased;other (see comments)   wide LEDA, very slow pace  -LB      Recorded by [LB] Lakia Menjivar PTA [CC] Jyoti Roblero OTR     ADL Assessment/Intervention    IADL Assess/Train, Comment  --   Mod I with kitchen mobility wx level  -CC     Recorded by  [CC] AMAURI Rojas     Upper Body Bathing Assessment/Training    UB Bathing Assess/Train Assistive Device  hand-held shower head;shower chair with back  -CC     UB Bathing Assess/Train, Position  sitting  -CC     UB Bathing Assess/Train, [place] Level  conditional independence  -CC     Recorded by  [CC] Jyoti Roblero OTR     Lower Body Bathing Assessment/Training    LB Bathing Assess/Train Assistive Device  hand-held shower head;grab bars;shower chair with back  -CC     LB Bathing Assess/Train, Position  standing;sitting  -CC     LB Bathing Assess/Train, [place] Level  conditional independence  -CC     Recorded by  [CC] Jyoti Roblero OTR     Upper Body Dressing Assessment/Training    UB Dressing Assess/Train, Clothing Type  donning:;doffing:;pull over;bra  -CC     UB Dressing Assess/Train, Position  sitting  -CC     UB Dressing Assess/Train, [place]  conditional independence  -CC     Recorded by  [CC] Jyoti Roblero OTR     Lower Body Dressing Assessment/Training    LB Dressing Assess/Train, Clothing Type   doffing:;donning:;pants;shoes;socks  -CC     LB Dressing Assess/Train, Position  standing;sitting  -CC     LB Dressing Assess/Train, [place]  conditional independence  -CC     Recorded by  [CC] Jyoti Roblero OTR     Toileting Assessment/Training    Toileting Assess/Train, Position  sitting;standing  -CC     Toileting Assess/Train, [place] Level  conditional independence  -CC     Recorded by  [CC] Jyoti Roblero OTR     Grooming Assessment/Training    Grooming Assess/Train, Position  sink side;standing  -CC     Grooming Assess/Train, [place] Level  conditional independence  -CC     Grooming Assess/Train, Comment  wx level  -CC     Recorded by  [CC] Jyoti Roblero OTR     Balance Skills Training    Standing-Level of Assistance Contact guard  -LB  Close supervision  -LB    Static Standing Balance Support No upper extremity supported  -LB  assistive device  -LB    Standing-Balance Activities Trampoline;Compliant surfaces;Tandem Stance;Single Limb Stance  -LB  Retrieve object from floor   w reacher  -LB    Gait Balance-Level of Assistance Contact guard  -LB  Contact guard;Close supervision  -LB    Gait Balance Support No upper extremity supported  -LB  assistive device  -LB    Gait Balance Activities --   sidestepping over hurdles  -LB  uneven surface   x 12 ft  -LB    Recorded by [LB] Lakia Menjivar PTA  [LB] Lakia Menjivar PTA    Therapy Exercises    Bilateral Lower Extremities AROM:;standing;10 reps;heel raises;mini squats  -LB  AROM:;15 reps;20 reps;standing;supine;sitting  -LB    Exercise Protocols  --   UE HEP program reviewed  -CC     Recorded by [LB] Lakia Menjivar PTA [CC] Jyoti Roblero OTR [LB] Lakia Menjivar PTA    Fine Motor Coordination Training    9-Hole Peg Right  22  -CC     9-Hole Peg Left  20  -CC     Box and Blocks Right  53  -CC     Box and Blocks Left  59  -CC     Recorded by  [CC] Jyoti Roblero OTR     Sensory Assessment/Intervention    LUE Light Touch  WNL  -CC      RUE Light Touch  WNL  -CC     Stereognosis  LUE;RUE  -CC     LUE Stereognosis  WNL  -CC     RUE Stereognosis  WNL  -CC     Recorded by  [CC] AMAURI Rojas     Positioning and Restraints    Pre-Treatment Position  sitting in chair/recliner  -CC     Post Treatment Position bed  -LB bed  -CC chair  -LB    In Bed call light within reach;sitting EOB  -LB sitting EOB;call light within reach  -CC     In Chair   sitting;call light within reach  -LB    Recorded by [LB] Lakia Menjivar PTA [CC] Jyoti Roblero OTR [LB] Lakia Menjivar PTA      01/09/18 1439 01/09/18 0945 01/09/18 0939    Rehab Assessment/Intervention    Discipline occupational therapist  -JAYE occupational therapist  -CC physical therapy assistant  -LB    Document Type therapy note (daily note)  -CC therapy note (daily note)  -CC therapy note (daily note)  -LB    Subjective Information agree to therapy  -CC agree to therapy  -CC agree to therapy  -LB    Patient Effort, Rehab Treatment excellent  -CC excellent  -CC good  -LB    Symptoms Noted During/After Treatment none  -CC none  -CC     Precautions/Limitations  fall precautions  -CC fall precautions  -LB    Equipment Issued to Patient   --   Rwx ordered  -LB    Recorded by [CC] Jyoti Roblero OTR [CC] Jyoti Roblero OTR [LB] Lakia Menjivar PTA    Pain Assessment    Pain Assessment No/denies pain  -CC No/denies pain  -CC No/denies pain  -LB    Recorded by [CC] AMAURI Rojas [CC] AMAURI Rojas [LB] Lakia Menjivar PTA    Vision Assessment/Intervention    Visual Impairment WFL with corrective lenses  -CC WFL with corrective lenses  -CC     Recorded by [CC] AMAURI Rojas [CC] Jyoti Roblero OTR     Cognitive Assessment/Intervention    Current Cognitive/Communication Assessment functional  -CC functional  -CC     Orientation Status oriented x 4  -CC oriented x 4  -CC     Follows Commands/Answers Questions 100% of the time;able to follow single-step instructions  -CC  100% of the time;able to follow multi-step instructions  -CC     Personal Safety WNL/WFL  -CC WNL/WFL  -CC     Personal Safety Interventions gait belt;fall prevention program maintained  -CC gait belt;fall prevention program maintained;nonskid shoes/slippers when out of bed  -CC fall prevention program maintained;gait belt;muscle strengthening facilitated;nonskid shoes/slippers when out of bed  -LB    Recorded by [CC] Jyoti Roblero OTR [CC] Jyoti Roblero OTR [LB] Lakia Menjivar, PTA    Bed Mobility, Assessment/Treatment    Bed Mobility, Assistive Device   --   assessed on txment mat  -LB    Bed Mobility, Roll Left, [place] independent  -CC independent  -CC independent  -LB    Bed Mobility, Roll Right, [place]   independent  -LB    Bed Mobility, Scoot/Bridge, [place]   independent  -LB    Bed Mob, Supine to Sit, [place] independent  -CC independent  -CC independent  -LB    Bed Mob, Sit to Supine, [place]   independent  -LB    Recorded by [CC] Jyoti Roblero OTR [CC] Jyoti Roblero OTR [LB] Lakia Menjivar, DEJA    Transfer Assessment/Treatment    Transfers, Bed-Chair [place]   stand by assist  -LB    Transfers, Chair-Bed [place]   stand by assist  -LB    Transfers, Sit-Stand [place]  supervision required;stand by assist  -CC stand by assist  -LB    Transfers, Stand-Sit [place]  stand by assist  -CC stand by assist;verbal cues required   cues for hand placement  -LB    Transfers, Sit-Stand-Sit, Assist Device   rolling walker  -LB    Toilet Transfer, [place]  supervision required  -CC     Walk-In Shower Transfer, [place]  supervision required  -CC     Walk-In Shower Transfer, Assist Device  standard shower chair  -CC     Transfer, Comment   Car tsf, rwx, CGA  -LB    Recorded by  [CC] Jyoti Roblero OTR [LB] Lakia Menjivar, DEJA    Gait Assessment/Treatment    Gait, [place] Level   supervision required;verbal cues required  -LB    Gait,  Assistive Device   rolling walker  -LB    Gait, Distance (Feet)   260  -LB    Gait, Gait Deviations   kee decreased;forward flexed posture;decreased heel strike;step length decreased  -LB    Recorded by   [LB] Lakia Menjivar PTA    Stairs Assessment/Treatment    Number of Stairs   4  -LB    Stairs, Handrail Location   both sides  -LB    Stairs, [place] Level   contact guard assist  -LB    Stairs, Technique Used   step over step (descending);step over step (ascending)  -LB    Stairs, Comment   curb, rwx, CGA  -LB    Recorded by   [LB] Lakia Menjivar PTA    Functional Mobility    Functional Mobility- Ind. Level   contact guard assist  -LB    Functional Mobility- Device   --   none  -LB    Functional Mobility-Distance (Feet)   60  -LB    Functional Mobility- Safety Issues   other (see comments)   LOB x 1  -LB    Recorded by   [LB] Lakia Menjivar PTA    Upper Body Bathing Assessment/Training    UB Bathing Assess/Train Assistive Device  hand-held shower head;shower chair with back  -CC     UB Bathing Assess/Train, Position  sitting  -CC     UB Bathing Assess/Train, [place] Level  set up required  -CC     Recorded by  [CC] AMAURI Rojas     Lower Body Bathing Assessment/Training    LB Bathing Assess/Train Assistive Device  grab bars;hand-held shower head;shower chair with back  -CC     LB Bathing Assess/Train, Position  standing;sitting  -CC     LB Bathing Assess/Train, [place] Level  supervision required;set up required  -CC     Recorded by  [CC] AMAURI Rojas     Upper Body Dressing Assessment/Training    UB Dressing Assess/Train, Clothing Type  donning:;doffing:;bra;pull over  -CC     UB Dressing Assess/Train, Position  sitting  -CC     UB Dressing Assess/Train, [place]  supervision required  -CC     Recorded by  [CC] AMAURI Rojas     Lower Body Dressing Assessment/Training    LB Dressing Assess/Train, Clothing Type  doffing:;donning:;pants;shoes;socks  -CC      LB Dressing Assess/Train, Position  sitting;standing  -CC     LB Dressing Assess/Train, [place]  supervision required;verbal cues required  -CC     LB Dressing Assess/Train, Comment  --   assist varun [place]  -CC     Recorded by  [CC] Jyoti Roblero OTR     Grooming Assessment/Training    Grooming Assess/Train, Position  standing  -CC     Grooming Assess/Train, [place] Level  set up required  -CC     Recorded by  [CC] Jyoti Roblero OTR     Balance Skills Training    Gait Balance-Level of Assistance   Contact guard;Close supervision  -LB    Gait Balance Support   assistive device  -LB    Gait Balance Activities   uneven surface  -LB    Recorded by   [LB] Lakia Menjivar PTA    Therapy Exercises    Bilateral Lower Extremities   AROM:;20 reps;supine;sidelying  -LB    Bilateral Upper Extremity --   arm bike 5 min x 2; 2# hand wt 15x3;   -CC      BUE Resistance --   hand helper 20x2  -CC      Recorded by [CC] Jyoti Roblero OTR  [LB] Lakia Menjivar, DEJA    Positioning and Restraints    Pre-Treatment Position sitting in chair/recliner  -CC in bed  -CC     Post Treatment Position wheelchair  -CC wheelchair  -CC wheelchair  -LB    In Wheelchair sitting;with PT  -CC sitting;call light within reach  -CC sitting;call light within reach;with nsg  -LB    Recorded by [CC] Jyoti Roblero OTR [CC] Jyoti Roblero OTR [LB] Lakia Menjivar, DEJA      01/08/18 1352 01/08/18 1203       Rehab Assessment/Intervention    Discipline occupational therapist  -CC occupational therapist  -CC     Document Type therapy note (daily note)  -CC therapy note (daily note)  -CC     Subjective Information agree to therapy  -CC agree to therapy  -CC     Patient Effort, Rehab Treatment good  -CC good  -CC     Precautions/Limitations fall precautions  -CC fall precautions  -CC     Recorded by [CC] Jyoti Roblero OTR [CC] Jyoti Roblero OTR     Pain Assessment    Pain Assessment No/denies pain  -CC No/denies pain  -CC     Recorded  by [CC] AMAURI Rojas [CC] AMAURI Rojas     Vision Assessment/Intervention    Visual Impairment WFL with corrective lenses  -CC WFL with corrective lenses  -CC     Recorded by [CC] AMAURI Rojas [CC] AMAURI Rojas     Cognitive Assessment/Intervention    Current Cognitive/Communication Assessment functional  -CC functional  -CC     Orientation Status oriented x 4  -CC oriented x 4  -CC     Follows Commands/Answers Questions able to follow single-step instructions  -CC able to follow single-step instructions  -CC     Personal Safety WNL/WFL  -CC WNL/WFL  -CC     Personal Safety Interventions gait belt;fall prevention program maintained;nonskid shoes/slippers when out of bed  -CC gait belt;nonskid shoes/slippers when out of bed;fall prevention program maintained  -CC     Recorded by [CC] AMAURI Rojas [CC] AMAURI Rojas     Bed Mobility, Assessment/Treatment    Bed Mobility, Comment --   up in w/c  -CC --   up in w/c  -CC     Recorded by [CC] AMAURI Rojas [CC] AMUARI Rojas     Transfer Assessment/Treatment    Transfers, Sit-Stand [place]  stand by assist  -CC     Transfers, Stand-Sit [place]  stand by assist  -CC     Transfers, Sit-Stand-Sit, Assist Device  rolling walker  -CC     Toilet Transfer, [place]  supervision required  -CC     Toilet Transfer, Assistive Device  rolling walker  -CC     Walk-In Shower Transfer, [place]  supervision required  -CC     Walk-In Shower Transfer, Assist Device  standard walker;standard shower chair  -CC     Recorded by  [CC] AMAURI Rojas     Upper Body Bathing Assessment/Training    UB Bathing Assess/Train Assistive Device  hand-held shower head;shower chair with back  -CC     UB Bathing Assess/Train, Position  sitting  -CC     UB Bathing Assess/Train, [place] Level  supervision required  -CC     Recorded by  [CC] AMAURI Rojas     Lower Body Bathing Assessment/Training    LB Bathing  Assess/Train Assistive Device  grab bars;hand-held shower head;shower chair with back  -CC     LB Bathing Assess/Train, Position  sitting;standing  -CC     LB Bathing Assess/Train, [place] Level  supervision required  -CC     Recorded by  [CC] AMAURI Rojas     Upper Body Dressing Assessment/Training    UB Dressing Assess/Train, Clothing Type  donning:;doffing:;bra;pull over  -CC     UB Dressing Assess/Train, Position  sitting  -CC     UB Dressing Assess/Train, [place]  supervision required  -CC     Recorded by  [CC] AMAURI Rojas     Lower Body Dressing Assessment/Training    LB Dressing Assess/Train, Clothing Type  doffing:;donning:;pants;socks;shoes  -CC     LB Dressing Assess/Train, Position  sitting;standing  -CC     LB Dressing Assess/Train, [place]  supervision required  -CC     Recorded by  [CC] AMAURI Rojas     Toileting Assessment/Training    Toileting Assess/Train, Position  sitting;standing  -CC     Toileting Assess/Train, [place] Level  supervision required  -CC     Recorded by  [CC] AMAURI Rojas     Grooming Assessment/Training    Grooming Assess/Train, Position  standing  -CC     Grooming Assess/Train, [place] Level  set up required;supervision required  -CC     Recorded by  [CC] AMAURI Rojas     Therapy Exercises    Bilateral Upper Extremity --   arm bike 5 min x2; 2# hand wt 15x3  -CC      Recorded by [CC] AMAURI Rojas      Positioning and Restraints    Pre-Treatment Position sitting in chair/recliner  -CC sitting in chair/recliner  -CC     Post Treatment Position wheelchair  -CC wheelchair  -CC     In Wheelchair sitting;with PT  -CC sitting;call light within reach  -CC     Recorded by [CC] AMAURI Rojas [CC] AMAURI Rojas       User Key  (r) = Recorded By, (t) = Taken By, (c) = Cosigned By    Initials Name Effective Dates    CC AMAURI Rojas 04/13/15 -     LB Lakia Menjivar, PTA 02/18/16 -           PT  Recommendation and Plan  Anticipated Discharge Disposition: home with home health  Planned Therapy Interventions: balance training, bed mobility training, gait training, home exercise program, stair training, strengthening, stretching, transfer training, patient/family education, postural re-education  PT Frequency: 2 times/day  Plan of Care Review  Plan Of Care Reviewed With: patient  Progress: progress toward functional goals is gradual  Outcome Summary/Follow up Plan: pnt is ambulating farther. She has improved with transfers and stairs. Pleasant and cooperative.         Time Calculation:         PT Charges       01/11/18 1007          Time Calculation    Start Time 0930  -LB      Stop Time 1000  -LB      Time Calculation (min) 30 min  -LB        User Key  (r) = Recorded By, (t) = Taken By, (c) = Cosigned By    Initials Name Provider Type    MELISA Menjivar PTA Physical Therapy Assistant          Therapy Charges for Today     Code Description Service Date Service Provider Modifiers Qty    09675189314 HC PT THER PROC EA 15 MIN 1/10/2018 Lakia Menjivar PTA GP 6    30328130005 HC PT CARE PLAN EACH 15 MIN 1/10/2018 Lakia Menjivar PTA GP 2    94236184797 HC PT THER PROC EA 15 MIN 1/11/2018 Lakia Menjivar PTA GP 2               PT Discharge Summary  Reason for Discharge: All goals achieved (except for strength goal. Not assessed.)  Discharge Destination: Home with home health, Home    Lakia Menjivar PTA  1/11/2018

## 2018-01-11 NOTE — PLAN OF CARE
Problem: Patient Care Overview (Adult)  Goal: Plan of Care Review  Outcome: Outcome(s) achieved Date Met: 01/11/18 01/11/18 1147   Coping/Psychosocial Response Interventions   Plan Of Care Reviewed With patient   Patient Care Overview   Progress improving   Outcome Evaluation   Outcome Summary/Follow up Plan Ready for discharge today . Has been independent in room with no issues.       Problem: Fall Risk (Adult)  Goal: Absence of Falls  Outcome: Outcome(s) achieved Date Met: 01/11/18      Problem: Mobility, Physical Impaired (Adult)  Goal: Enhanced Functionality Ability  Outcome: Outcome(s) achieved Date Met: 01/11/18

## 2018-01-20 LAB
MYCOBACTERIUM SPEC CULT: NORMAL
MYCOBACTERIUM SPEC CULT: NORMAL
NIGHT BLUE STAIN TISS: NORMAL
NIGHT BLUE STAIN TISS: NORMAL

## 2018-01-20 NOTE — PROGRESS NOTES
Inpatient Rehabilitation Functional Measures Assessment    Functional Measures  KISHOR Eating:  [facility] Grooming: [facility] Bathing:  [facility] Upper Body Dressing:  [facility] Lower Body Dressing:  [facility] Toileting:  [facility] Bladder Management  Level of Assistance:  Bladder Score = 5.  Patient is supervision/set-up for  bladder management, requiring: Stand by assistance. No assistive devices were  required.  Frequency/Number of Accidents this Shift:  Bladder accidents this shift:  0 .  Patient has not had an accident this shift.    [facility] Bowel Management  Level of Assistance: [place]  Frequency/Number of Accidents this Shift: [facility] Bed/Chair/Wheelchair Transfer:  [facility] Toilet Transfer:  [facility] Tub/Shower Transfer:  [place]    Previously Documented Mode of Locomotion at Discharge: Field  KISHOR Expected Mode of Locomotion at Discharge: [facility] Walk/Wheelchair:  [facility] Stairs:  [facility] Comprehension:  [facility] Expression:  [facility] Social Interaction:  [facility] Problem Solving:  [facility] Memory:  [place]    Therapy Mode Minutes  Occupational Therapy: [place]  Physical Therapy: [facility]  Speech Language Pathology:  [place]    Signed by: GERMAN Marte

## 2018-01-23 LAB
MYCOBACTERIUM SPEC CULT: ABNORMAL
MYCOBACTERIUM SPEC CULT: ABNORMAL
NIGHT BLUE STAIN TISS: ABNORMAL

## 2018-01-25 NOTE — PROGRESS NOTES
PPS CMG Coordinator  Inpatient Rehabilitation Discharge    Mode of Locomotion: Walking.    Discharge Against Medical Advice:  No.  Discharge Information  Patient Discharged Alive:  Yes  Discharge Destination/Living Setting: Home with Home Health Services  Diagnosis for Interruption/Death: ICD    Impairment Group: 03.4 Guillain-[place] Syndrome    Comorbidities: ICD    Complications: ICD      KISHOR Bladder Accidents: 0  - Accidents.  Bladder Score = 7. Patient has not had an accident.  KISHOR Bowel Accident: 0  - Accidents.  Bowel Score = 7. Patient has no accidents.      QUALITY INDICATORS  Health Conditions: Fall(s) Since Admission:  No  Section M. Skin Conditions Discharge:  Unhealed Pressure Ulcer(s) at Stage 1 or  Higher:  No    . Current Number of Unhealed Pressure Ulcers  [place]    . Worsening in Pressure Ulcer Status Since Admission:  [place]    . Healed Pressure Ulcer(s):    Number of Healed Stage 1: 0  Number of Healed Stage 2: 0  Number of Healed Stage 3: 0  Number of Healed Stage 4: 0    . Influenza Vaccine - Discharge  Received in this facility for this year's influenza vaccination season:  No.  Influenza Vaccine Not Received Due To: Received outside of this facility.    Date Influenza Vaccine Received (if applicable)  Text Entry    Signed by: Adam Gallegos RN

## 2018-02-01 ENCOUNTER — OFFICE VISIT (OUTPATIENT)
Dept: INTERNAL MEDICINE | Facility: CLINIC | Age: 83
End: 2018-02-01

## 2018-02-01 VITALS
HEIGHT: 63 IN | BODY MASS INDEX: 20.09 KG/M2 | TEMPERATURE: 98.6 F | OXYGEN SATURATION: 98 % | WEIGHT: 113.4 LBS | RESPIRATION RATE: 24 BRPM | HEART RATE: 107 BPM | SYSTOLIC BLOOD PRESSURE: 158 MMHG | DIASTOLIC BLOOD PRESSURE: 98 MMHG

## 2018-02-01 DIAGNOSIS — I10 ESSENTIAL HYPERTENSION: Primary | ICD-10-CM

## 2018-02-01 DIAGNOSIS — G61.0 GUILLAIN-BARRE SYNDROME FOLLOWING VACCINATION (HCC): ICD-10-CM

## 2018-02-01 DIAGNOSIS — E03.9 ACQUIRED HYPOTHYROIDISM: ICD-10-CM

## 2018-02-01 DIAGNOSIS — A31.0 MYCOBACTERIUM AVIUM COMPLEX (HCC): ICD-10-CM

## 2018-02-01 DIAGNOSIS — T88.1XXA GUILLAIN-BARRE SYNDROME FOLLOWING VACCINATION (HCC): ICD-10-CM

## 2018-02-01 DIAGNOSIS — F41.9 ANXIETY: ICD-10-CM

## 2018-02-01 DIAGNOSIS — E78.2 MIXED HYPERLIPIDEMIA: ICD-10-CM

## 2018-02-01 PROBLEM — E43 SEVERE MALNUTRITION: Status: RESOLVED | Noted: 2017-12-21 | Resolved: 2018-02-01

## 2018-02-01 PROBLEM — K22.4 ESOPHAGEAL DYSMOTILITY: Status: RESOLVED | Noted: 2017-12-20 | Resolved: 2018-02-01

## 2018-02-01 PROBLEM — J96.01 ACUTE RESPIRATORY FAILURE WITH HYPOXIA: Status: RESOLVED | Noted: 2017-12-13 | Resolved: 2018-02-01

## 2018-02-01 PROBLEM — R00.1 BRADYCARDIA: Status: RESOLVED | Noted: 2017-12-25 | Resolved: 2018-02-01

## 2018-02-01 PROBLEM — Z66 DNR (DO NOT RESUSCITATE): Status: RESOLVED | Noted: 2017-12-20 | Resolved: 2018-02-01

## 2018-02-01 PROBLEM — R20.0 BILATERAL HAND NUMBNESS: Status: RESOLVED | Noted: 2017-12-20 | Resolved: 2018-02-01

## 2018-02-01 PROBLEM — J18.9 COMMUNITY ACQUIRED PNEUMONIA OF LEFT LOWER LOBE OF LUNG: Status: RESOLVED | Noted: 2017-12-08 | Resolved: 2018-02-01

## 2018-02-01 PROCEDURE — 99213 OFFICE O/P EST LOW 20 MIN: CPT | Performed by: INTERNAL MEDICINE

## 2018-02-01 RX ORDER — AZITHROMYCIN 250 MG/1
250 TABLET, FILM COATED ORAL DAILY
COMMUNITY
End: 2018-05-24

## 2018-02-01 NOTE — PROGRESS NOTES
"Subjective   Stacie Vieira is a 82 y.o. female here for   Chief Complaint   Patient presents with   • Guillain-[place]' Syndrome     [facility] f/u 12/28/17-1/11/18   • Hypertension   • Hyperlipidemia   .    Vitals:    02/01/18 1019   BP: 158/98   BP Location: Left arm   Patient Position: Sitting   Cuff Size: Adult   Pulse: 107   Resp: 24   Temp: 98.6 °F (37 °C)   TempSrc: Temporal Artery    SpO2: 98%   Weight: 51.4 kg (113 lb 6.4 oz)   Height: 160 cm (63\")       Body mass index is 20.09 kg/(m^2).    Hypertension   This is a chronic problem. The current episode started more than 1 year ago. The problem is unchanged. The problem is controlled. Pertinent negatives include no chest pain, palpitations or shortness of breath.        The following portions of the patient's history were reviewed and updated as appropriate: allergies, current medications, past social history and problem list.    Review of Systems   Constitutional: Positive for fatigue (better). Negative for chills and fever.   Respiratory: Positive for cough. Negative for shortness of breath and wheezing.    Cardiovascular: Negative for chest pain, palpitations and leg swelling.   Neurological: Positive for weakness (80% stronger with PT).   Psychiatric/Behavioral: Negative for dysphoric mood and sleep disturbance. The patient is nervous/anxious.      BP always less than 150 systolic at home (usu 130's)    Objective   Physical Exam   Constitutional: She appears well-developed and well-nourished. No distress.   Cardiovascular: Normal rate, regular rhythm and normal heart sounds.    Pulmonary/Chest: No respiratory distress. She has no wheezes. She has no rales. She exhibits no tenderness.   Musculoskeletal: She exhibits no edema.   Psychiatric: She has a normal mood and affect. Her behavior is normal.   Nursing note and vitals reviewed.      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           "

## 2018-02-02 ENCOUNTER — TELEPHONE (OUTPATIENT)
Dept: INTERNAL MEDICINE | Facility: CLINIC | Age: 83
End: 2018-02-02

## 2018-02-02 NOTE — TELEPHONE ENCOUNTER
----- Message from Ayesha Servin sent at 2/2/2018 10:02 AM EST -----  Contact: Marilee GRANADOS:  Marilee physical therapist, [facility] called to inform Dr. Bellamy patient has met all goals and is being discharged.      Marilee:  [phone]

## 2018-02-22 ENCOUNTER — OFFICE VISIT (OUTPATIENT)
Dept: NEUROLOGY | Facility: CLINIC | Age: 83
End: 2018-02-22

## 2018-02-22 VITALS
DIASTOLIC BLOOD PRESSURE: 90 MMHG | HEIGHT: 63 IN | WEIGHT: 112 LBS | BODY MASS INDEX: 19.84 KG/M2 | SYSTOLIC BLOOD PRESSURE: 160 MMHG

## 2018-02-22 DIAGNOSIS — R55 VASOVAGAL SYNCOPE: ICD-10-CM

## 2018-02-22 DIAGNOSIS — G61.0 GUILLAIN BARRÉ SYNDROME (HCC): Primary | ICD-10-CM

## 2018-02-22 PROCEDURE — 99213 OFFICE O/P EST LOW 20 MIN: CPT | Performed by: PSYCHIATRY & NEUROLOGY

## 2018-02-22 RX ORDER — ETHAMBUTOL HYDROCHLORIDE 400 MG/1
400 TABLET, FILM COATED ORAL DAILY
COMMUNITY
End: 2018-05-24

## 2018-02-22 RX ORDER — RIFAMPIN 300 MG/1
300 CAPSULE ORAL
COMMUNITY
End: 2018-05-24

## 2018-02-22 NOTE — PROGRESS NOTES
Subjective:     Patient ID: Stacie Vieira is a 83 y.o. female.    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  Since the first time I'm seeing this patient.  Patient was hospitalized in late December 2017 and seen by neurologist Dr. Aldana and Dr. Haile at that time.  Normal CSF protein.  Still diagnosis of Guillain-Barré was made.  She was hospitalized for a week and then was in rehabilitation for another 2 weeks.  She feels that she is at least 90% back to normal at this point.  She did have a flu shot prior to the hospitalization which is a likely etiology.  When she had Guillain-Barré in affected arms and legs and also her respiratory status she had trouble swallowing but never went on a ventilator.  He was treated with plasmapheresis.  He had a syncopal event in the hospital around [place] which has not recurred.  The following portions of the patient's history were reviewed and updated as appropriate: allergies, current medications, past family history, past medical history, past social history, past surgical history and problem list.      Current Outpatient Prescriptions:   •  amLODIPine (NORVASC) 5 MG tablet, Take 1 tablet by mouth Daily., Disp: 30 tablet, Rfl: 1  •  azithromycin (ZITHROMAX) 250 MG tablet, Take 250 mg by mouth Daily., Disp: , Rfl:   •  CALCIUM-MAGNESIUM-VITAMIN D PO, Take 1 tablet by mouth Daily., Disp: , Rfl:   •  Cholecalciferol (VITAMIN D-3) 1000 UNITS capsule, Take by mouth., Disp: , Rfl:   •  docusate sodium 100 MG capsule, Take 100 mg by mouth 2 (Two) Times a Day., Disp: , Rfl:   •  ethambutol (MYAMBUTOL) 400 MG tablet, Take 400 mg by mouth Daily., Disp: , Rfl:   •  levothyroxine (LEVOXYL) 50 MCG tablet, Take 1 tablet by mouth Every Other Day., Disp: 45 tablet, Rfl: 3  •  levothyroxine (LEVOXYL) 75 MCG tablet, Take 1 tablet by mouth Every Other Day., Disp: 45 tablet, Rfl: 3  •  loratadine  (CLARITIN) 10 MG tablet, Take 10 mg by mouth Daily As Needed for Allergies., Disp: , Rfl:   •  melatonin 3 MG tablet, Take 1 tablet by mouth At Night As Needed for Sleep., Disp: , Rfl:   •  metoprolol succinate XL (TOPROL-XL) 25 MG 24 hr tablet, Take 1 tablet by mouth Daily., Disp: 30 tablet, Rfl: 1  •  Misc Natural Products (JOINT SUPPORT PO), Take 1 tablet by mouth Daily., Disp: , Rfl:   •  montelukast (SINGULAIR) 10 MG tablet, Take 1 mg by mouth As Needed., Disp: , Rfl:   •  Multiple Vitamin (MULTI VITAMIN DAILY PO), Take 1 tablet by mouth Daily., Disp: , Rfl:   •  olmesartan (BENICAR) 40 MG tablet, Take 40 mg by mouth Daily., Disp: , Rfl:   •  rifAMPin (RIFADIN) 300 MG capsule, Take 300 mg by mouth., Disp: , Rfl:   •  saccharomyces boulardii (FLORASTOR) 250 MG capsule, Take 250 mg by mouth 2 (two) times a day., Disp: , Rfl:     Review of Systems   Constitutional: Positive for fatigue. Negative for activity change, appetite change, chills, diaphoresis, fever and unexpected weight change.   HENT: Negative.    Eyes: Negative.    Neurological: Positive for tremors, weakness and numbness. Negative for dizziness, seizures, syncope, facial asymmetry, speech difficulty, light-headedness and headaches.   Psychiatric/Behavioral: Negative.         Objective:    Neurologic Exam  Mental status was appropriate for age.  Fundoscopy showed no papilledema. Visual fields were full to OKN.  Eye movements were full and conjugate.  Pupillary reflexes were mid-range and symmetric.  No facial weakness was noted.  Tongue was midline.  There was no pattern of focal weakness.  Gait was appropriate for age.  No pathologic reflexes were noted.  No cerebellar signs were noted.  Tone was normal.  Deep tendon reflexes were 2+ and symmetric.  Physical Exam    Assessment/Plan:     Stacie was seen today for neurologic problem.    Diagnoses and all orders for this visit:    Guillain Barré syndrome    Vasovagal syncope     I would agree with the  diagnosis of Guillain-Barré syndrome.  He is at least 90% better.  She also has well-maintained reflexes.  I would think a full or near full recovery is most likely.  I plan to see her as needed in the office.  I've told her to call me if she is worsening in any way.  Have told her that she can continue to improve for up to a year.  Thank you for allowing me to share in the care of this patient.  Jass Li M.D.

## 2018-02-23 ENCOUNTER — APPOINTMENT (OUTPATIENT)
Dept: LAB | Facility: HOSPITAL | Age: 83
End: 2018-02-23
Attending: INTERNAL MEDICINE

## 2018-02-23 ENCOUNTER — OFFICE VISIT (OUTPATIENT)
Dept: INFECTIOUS DISEASES | Facility: CLINIC | Age: 83
End: 2018-02-23

## 2018-02-23 VITALS
WEIGHT: 112.8 LBS | HEART RATE: 80 BPM | HEIGHT: 63 IN | SYSTOLIC BLOOD PRESSURE: 208 MMHG | TEMPERATURE: 97.7 F | BODY MASS INDEX: 19.99 KG/M2 | DIASTOLIC BLOOD PRESSURE: 94 MMHG | RESPIRATION RATE: 12 BRPM

## 2018-02-23 DIAGNOSIS — Z79.2 LONG TERM CURRENT USE OF ANTIBIOTICS: ICD-10-CM

## 2018-02-23 DIAGNOSIS — A31.0 MYCOBACTERIUM AVIUM COMPLEX (HCC): Primary | ICD-10-CM

## 2018-02-23 LAB
ALBUMIN SERPL-MCNC: 4.6 G/DL (ref 3.5–5.2)
ALBUMIN/GLOB SERPL: 1.6 G/DL
ALP SERPL-CCNC: 69 U/L (ref 39–117)
ALT SERPL W P-5'-P-CCNC: 8 U/L (ref 1–33)
ANION GAP SERPL CALCULATED.3IONS-SCNC: 11.4 MMOL/L
AST SERPL-CCNC: 17 U/L (ref 1–32)
BASOPHILS # BLD AUTO: 0.02 10*3/MM3 (ref 0–0.2)
BASOPHILS NFR BLD AUTO: 0.3 % (ref 0–1.5)
BILIRUB SERPL-MCNC: 0.5 MG/DL (ref 0.1–1.2)
BUN BLD-MCNC: 17 MG/DL (ref 8–23)
BUN/CREAT SERPL: 19.8 (ref 7–25)
CALCIUM SPEC-SCNC: 9.2 MG/DL (ref 8.6–10.5)
CHLORIDE SERPL-SCNC: 98 MMOL/L (ref 98–107)
CO2 SERPL-SCNC: 27.6 MMOL/L (ref 22–29)
CREAT BLD-MCNC: 0.86 MG/DL (ref 0.57–1)
DEPRECATED RDW RBC AUTO: 47.2 FL (ref 37–54)
EOSINOPHIL # BLD AUTO: 0.11 10*3/MM3 (ref 0–0.7)
EOSINOPHIL NFR BLD AUTO: 1.9 % (ref 0.3–6.2)
ERYTHROCYTE [DISTWIDTH] IN BLOOD BY AUTOMATED COUNT: 13.2 % (ref 11.7–13)
GFR SERPL CREATININE-BSD FRML MDRD: 63 ML/MIN/1.73
GLOBULIN UR ELPH-MCNC: 2.8 GM/DL
GLUCOSE BLD-MCNC: 85 MG/DL (ref 65–99)
HCT VFR BLD AUTO: 43.3 % (ref 35.6–45.5)
HGB BLD-MCNC: 14.1 G/DL (ref 11.9–15.5)
IMM GRANULOCYTES # BLD: 0.02 10*3/MM3 (ref 0–0.03)
IMM GRANULOCYTES NFR BLD: 0.3 % (ref 0–0.5)
LYMPHOCYTES # BLD AUTO: 1.6 10*3/MM3 (ref 0.9–4.8)
LYMPHOCYTES NFR BLD AUTO: 27.8 % (ref 19.6–45.3)
MCH RBC QN AUTO: 31.9 PG (ref 26.9–32)
MCHC RBC AUTO-ENTMCNC: 32.6 G/DL (ref 32.4–36.3)
MCV RBC AUTO: 98 FL (ref 80.5–98.2)
MONOCYTES # BLD AUTO: 0.99 10*3/MM3 (ref 0.2–1.2)
MONOCYTES NFR BLD AUTO: 17.2 % (ref 5–12)
NEUTROPHILS # BLD AUTO: 3.02 10*3/MM3 (ref 1.9–8.1)
NEUTROPHILS NFR BLD AUTO: 52.5 % (ref 42.7–76)
PLATELET # BLD AUTO: 247 10*3/MM3 (ref 140–500)
PMV BLD AUTO: 8.9 FL (ref 6–12)
POTASSIUM BLD-SCNC: 4.5 MMOL/L (ref 3.5–5.2)
PROT SERPL-MCNC: 7.4 G/DL (ref 6–8.5)
RBC # BLD AUTO: 4.42 10*6/MM3 (ref 3.9–5.2)
SODIUM BLD-SCNC: 137 MMOL/L (ref 136–145)
WBC NRBC COR # BLD: 5.76 10*3/MM3 (ref 4.5–10.7)

## 2018-02-23 PROCEDURE — 36415 COLL VENOUS BLD VENIPUNCTURE: CPT | Performed by: INTERNAL MEDICINE

## 2018-02-23 PROCEDURE — 80053 COMPREHEN METABOLIC PANEL: CPT | Performed by: INTERNAL MEDICINE

## 2018-02-23 PROCEDURE — 85025 COMPLETE CBC W/AUTO DIFF WBC: CPT | Performed by: INTERNAL MEDICINE

## 2018-02-23 PROCEDURE — 99213 OFFICE O/P EST LOW 20 MIN: CPT | Performed by: INTERNAL MEDICINE

## 2018-03-07 DIAGNOSIS — I10 BENIGN ESSENTIAL HTN: Primary | ICD-10-CM

## 2018-03-07 RX ORDER — METOPROLOL SUCCINATE 25 MG/1
25 TABLET, EXTENDED RELEASE ORAL
Qty: 30 TABLET | Refills: 1 | Status: SHIPPED | OUTPATIENT
Start: 2018-03-07 | End: 2018-04-17

## 2018-03-07 RX ORDER — AMLODIPINE BESYLATE 5 MG/1
5 TABLET ORAL
Qty: 30 TABLET | Refills: 1 | Status: SHIPPED | OUTPATIENT
Start: 2018-03-07 | End: 2018-05-12 | Stop reason: SDUPTHER

## 2018-03-07 NOTE — TELEPHONE ENCOUNTER
----- Message from Matilde Palma sent at 3/7/2018 11:25 AM EST -----  Contact: pt - Dr Bellamy's pt - RE: new Rx refill  Pt calling and would like a refill on Rx. Pt informs was given Rx's by rehab dr Scott Bauer. Could you please call Rx's to pt's pharmacy? please advise. Thanks    metoprolol succinate XL (TOPROL-XL) 25 MG 24 hr tablet 30 tablet       Sig - Route: Take 1 tablet by mouth Daily. - Oral    amLODIPine (NORVASC) 5 MG tablet 30 tablet      Sig - Route: Take 1 tablet by mouth Daily. - Oral    [facility] [address] AT [facility] [phone] [facility] [phone] FX    Pt # [phone]

## 2018-04-17 ENCOUNTER — OFFICE VISIT (OUTPATIENT)
Dept: CARDIOLOGY | Facility: CLINIC | Age: 83
End: 2018-04-17

## 2018-04-17 VITALS
HEIGHT: 63 IN | WEIGHT: 113.2 LBS | SYSTOLIC BLOOD PRESSURE: 160 MMHG | DIASTOLIC BLOOD PRESSURE: 80 MMHG | HEART RATE: 82 BPM | BODY MASS INDEX: 20.06 KG/M2

## 2018-04-17 DIAGNOSIS — I10 ESSENTIAL HYPERTENSION: ICD-10-CM

## 2018-04-17 DIAGNOSIS — E78.2 MIXED HYPERLIPIDEMIA: Primary | ICD-10-CM

## 2018-04-17 DIAGNOSIS — I10 BENIGN ESSENTIAL HTN: ICD-10-CM

## 2018-04-17 DIAGNOSIS — R00.2 PALPITATIONS: ICD-10-CM

## 2018-04-17 PROCEDURE — 99214 OFFICE O/P EST MOD 30 MIN: CPT | Performed by: INTERNAL MEDICINE

## 2018-04-17 PROCEDURE — 93000 ELECTROCARDIOGRAM COMPLETE: CPT | Performed by: INTERNAL MEDICINE

## 2018-04-17 RX ORDER — METOPROLOL SUCCINATE 50 MG/1
50 TABLET, EXTENDED RELEASE ORAL
Qty: 90 TABLET | Refills: 3 | Status: SHIPPED | OUTPATIENT
Start: 2018-04-17 | End: 2019-04-19 | Stop reason: SDUPTHER

## 2018-04-17 NOTE — PROGRESS NOTES
Date of Office Visit: 2018  Encounter Provider: Aisha Curiel MD  Place of Service: [facility] CARDIOLOGY  Patient Name: Stacie Vieira  :1935      Patient ID:  Stacie Vieira is a 83 y.o. female is here for  followup for hypertension         History of Present Illness     She has had SVT since the 1970s and at that time was put on propranolol.  She had four  hospitalizations associated with this it sounds like, and at some point, they also put her  on digitalis also to control the SVT.  She really has not had any episodes since  that  she knows of.          She has been a .  She says it is very difficult because at  night she gets anxious.  Her neighbors, whom she has known for quite some time, no longer  live beside her due to some deaths and so the evening time is difficult for her.       She does have a son who lives in [place], [place] and is an intensivist at the [employer].      She saw Shelly vessels 16.  That time she was doing well.  No changes were made.    She had an echocardiogram done 17 to ejection fraction 67% grade 1 diastolic dysfunction, calcified aortic valve, mild tricuspid insufficiency.  At that time, she was in the hospital for pneumonia.  She has a history of MAC.  During hospitalization, she was diagnosed with Guillain-Barré syndrome and started plasmapheresis.  She did have an episode on 17 where she had a syncopal episode due to bradycardia.  She did receive chest compressions and got atropine.  She recovered quickly from this.    She's now struggles with fatigue and dyspnea.  She doesn't feel her heart racing or skipping.  She's had no dizziness or syncope.  She has no chest pain or pressure.  She is taking her medications as directed.  Her blood pressure has been high.      Past Medical History:   Diagnosis Date   • Acute stress disorder    • Allergic rhinitis    • C. difficile colitis      hx in past   • Elevated fasting glucose    • Essential hypertension    • Guillain Barré syndrome    • H/O: pneumonia    • Health care maintenance    • History of palpitations    • Hyperlipidemia    • Hypertension    • Hypoactive thyroid    • Hypokalemia    • Hypothyroidism    • Insomnia    • MAC (mycobacterium avium-intracellulare complex)     reason for bronch.  dx with this nov 2015   • Osteoporosis    • Palpitations    • Pneumonia     past hx of freq.   • Sinusitis    • Staph infection     in sinus area  recent sx jan 2016   • SVT (supraventricular tachycardia)     past hx  on medicine   • Syncope and collapse          Past Surgical History:   Procedure Laterality Date   • BREAST BIOPSY      PERCUTATNEOUS NEEDLE CORE   • BRONCHOSCOPY      nov 2015   • BRONCHOSCOPY N/A 4/8/2016    Procedure: BRONCHOSCOPY WITH BAL;  Surgeon: Sinan Braga MD;  Location: [facility] ENDOSCOPY;  Service:    • BRONCHOSCOPY N/A 12/9/2017    Procedure: BRONCHOSCOPY;  Surgeon: Real Rousseau MD;  Location: [facility] ENDOSCOPY;  Service:    • COLONOSCOPY N/A 07/08/2005   • DILATATION AND CURETTAGE N/A 01/01/1990   • PAP SMEAR N/A 03/30/2004   • SINUS SURGERY  01/2016   • TONSILLECTOMY         Current Outpatient Prescriptions on File Prior to Visit   Medication Sig Dispense Refill   • amLODIPine (NORVASC) 5 MG tablet Take 1 tablet by mouth Daily. 30 tablet 1   • azithromycin (ZITHROMAX) 250 MG tablet Take 250 mg by mouth Daily.     • CALCIUM-MAGNESIUM-VITAMIN D PO Take 1 tablet by mouth Daily.     • Cholecalciferol (VITAMIN D-3) 1000 UNITS capsule Take by mouth.     • docusate sodium 100 MG capsule Take 100 mg by mouth 2 (Two) Times a Day.     • ethambutol (MYAMBUTOL) 400 MG tablet Take 400 mg by mouth Daily.     • levothyroxine (LEVOXYL) 50 MCG tablet Take 1 tablet by mouth Every Other Day. 45 tablet 3   • levothyroxine (LEVOXYL) 75 MCG tablet Take 1 tablet by mouth Every Other Day. 45 tablet 3   • loratadine (CLARITIN) 10 MG tablet Take 10 mg  by mouth Daily As Needed for Allergies.     • melatonin 3 MG tablet Take 1 tablet by mouth At Night As Needed for Sleep.     • metoprolol succinate XL (TOPROL-XL) 25 MG 24 hr tablet Take 1 tablet by mouth Daily. 30 tablet 1   • Misc Natural Products (JOINT SUPPORT PO) Take 1 tablet by mouth Daily.     • Multiple Vitamin (MULTI VITAMIN DAILY PO) Take 1 tablet by mouth Daily.     • olmesartan (BENICAR) 40 MG tablet Take 40 mg by mouth Daily.     • rifAMPin (RIFADIN) 300 MG capsule Take 300 mg by mouth.     • saccharomyces boulardii (FLORASTOR) 250 MG capsule Take 250 mg by mouth 2 (two) times a day.       No current facility-administered medications on file prior to visit.        Social History     Social History   • Marital status:      Spouse name: N/A   • Number of children: N/A   • Years of education: N/A     Occupational History   • Not on file.     Social History Main Topics   • Smoking status: Never Smoker   • Smokeless tobacco: Never Used      Comment: caffine use   • Alcohol use No   • Drug use: No   • Sexual activity: No     Other Topics Concern   • Not on file     Social History Narrative   • No narrative on file           Review of Systems   Constitution: Negative.   HENT: Negative for congestion.    Eyes: Negative for vision loss in left eye and vision loss in right eye.   Respiratory: Negative.  Negative for cough, hemoptysis, shortness of breath, sleep disturbances due to breathing, snoring, sputum production and wheezing.    Endocrine: Negative.    Hematologic/Lymphatic: Negative.    Skin: Negative for poor wound healing and rash.   Musculoskeletal: Negative for falls, gout, muscle cramps and myalgias.   Gastrointestinal: Negative for abdominal pain, diarrhea, dysphagia, hematemesis, melena, nausea and vomiting.   Neurological: Negative for excessive daytime sleepiness, dizziness, headaches, light-headedness, loss of balance, seizures and vertigo.   Psychiatric/Behavioral: Negative for  "depression and substance abuse. The patient is not nervous/anxious.        Procedures    ECG 12 Lead  Date/Time: 4/17/2018 10:42 AM  Performed by: CARROL RIVAS  Authorized by: CARROL RIVAS   Comparison: compared with previous ECG   Similar to previous ECG  Rhythm: sinus rhythm  Clinical impression: normal ECG                Objective:      Vitals:    04/17/18 1030   BP: 160/80   BP Location: Right arm   Patient Position: Sitting   Pulse: 82   Weight: 51.3 kg (113 lb 3.2 oz)   Height: 160 cm (63\")     Body mass index is 20.05 kg/m².    Physical Exam   Constitutional: She is oriented to person, place, and time. She appears well-developed and well-nourished. No distress.   HENT:   Head: Normocephalic and atraumatic.   Eyes: Conjunctivae are normal. No scleral icterus.   Neck: Neck supple. No JVD present. Carotid bruit is not present. No thyromegaly present.   Cardiovascular: Normal rate, regular rhythm, S1 normal, S2 normal, normal heart sounds and intact distal pulses.   No extrasystoles are present. PMI is not displaced.  Exam reveals no gallop.    No murmur heard.  Pulses:       Carotid pulses are 2+ on the right side, and 2+ on the left side.       Radial pulses are 2+ on the right side, and 2+ on the left side.        Dorsalis pedis pulses are 2+ on the right side, and 2+ on the left side.        Posterior tibial pulses are 2+ on the right side, and 2+ on the left side.   Pulmonary/Chest: Effort normal and breath sounds normal. No respiratory distress. She has no wheezes. She has no rhonchi. She has no rales. She exhibits no tenderness.   Abdominal: Soft. Bowel sounds are normal. She exhibits no distension, no abdominal bruit and no mass. There is no tenderness.   Musculoskeletal: She exhibits no edema or deformity.   Lymphadenopathy:     She has no cervical adenopathy.   Neurological: She is alert and oriented to person, place, and time. No cranial nerve deficit.   Skin: Skin is warm and dry. No " rash noted. She is not diaphoretic. No cyanosis. No pallor. Nails show no clubbing.   Psychiatric: She has a normal mood and affect. Judgment normal.   Vitals reviewed.      Lab Review:       Assessment:      Diagnosis Plan   1. Mixed hyperlipidemia     2. Essential hypertension     3. Palpitations       1. Hypertension.  BP is high.   2. Palpitations.  These are very infrequent and overall have been very well controlled on  the propranolol.    3. History of SVT.  No recurrence off of digoxin.   4. Hyperlipidemia, well controlled.   5. Hypothyroidism, stable.   6. Anxiety, particularly in the evenings.    7. Chronic MAC  8. Guillain barre 12/2017.      Plan:       See fili in 8 weeks, increase toprol xl to 50mg daily.

## 2018-05-12 DIAGNOSIS — I10 BENIGN ESSENTIAL HTN: ICD-10-CM

## 2018-05-14 RX ORDER — AMLODIPINE BESYLATE 5 MG/1
TABLET ORAL
Qty: 30 TABLET | Refills: 3 | Status: SHIPPED | OUTPATIENT
Start: 2018-05-14 | End: 2018-05-24 | Stop reason: DRUGHIGH

## 2018-05-24 ENCOUNTER — OFFICE VISIT (OUTPATIENT)
Dept: INFECTIOUS DISEASES | Facility: CLINIC | Age: 83
End: 2018-05-24

## 2018-05-24 ENCOUNTER — APPOINTMENT (OUTPATIENT)
Dept: LAB | Facility: HOSPITAL | Age: 83
End: 2018-05-24

## 2018-05-24 VITALS
WEIGHT: 113 LBS | TEMPERATURE: 97.8 F | SYSTOLIC BLOOD PRESSURE: 205 MMHG | RESPIRATION RATE: 12 BRPM | DIASTOLIC BLOOD PRESSURE: 91 MMHG | BODY MASS INDEX: 20.02 KG/M2 | HEART RATE: 72 BPM | HEIGHT: 63 IN

## 2018-05-24 DIAGNOSIS — Z79.2 LONG TERM CURRENT USE OF ANTIBIOTICS: ICD-10-CM

## 2018-05-24 DIAGNOSIS — A31.0 MYCOBACTERIUM AVIUM COMPLEX (HCC): Primary | ICD-10-CM

## 2018-05-24 LAB
ALBUMIN SERPL-MCNC: 4.5 G/DL (ref 3.5–5.2)
ALBUMIN/GLOB SERPL: 1.6 G/DL
ALP SERPL-CCNC: 71 U/L (ref 39–117)
ALT SERPL W P-5'-P-CCNC: 11 U/L (ref 1–33)
ANION GAP SERPL CALCULATED.3IONS-SCNC: 13.7 MMOL/L
AST SERPL-CCNC: 21 U/L (ref 1–32)
BASOPHILS # BLD AUTO: 0.01 10*3/MM3 (ref 0–0.2)
BASOPHILS NFR BLD AUTO: 0.1 % (ref 0–1.5)
BILIRUB SERPL-MCNC: 0.4 MG/DL (ref 0.1–1.2)
BUN BLD-MCNC: 14 MG/DL (ref 8–23)
BUN/CREAT SERPL: 15.7 (ref 7–25)
CALCIUM SPEC-SCNC: 9.6 MG/DL (ref 8.6–10.5)
CHLORIDE SERPL-SCNC: 102 MMOL/L (ref 98–107)
CO2 SERPL-SCNC: 27.3 MMOL/L (ref 22–29)
CREAT BLD-MCNC: 0.89 MG/DL (ref 0.57–1)
DEPRECATED RDW RBC AUTO: 45.9 FL (ref 37–54)
EOSINOPHIL # BLD AUTO: 0.1 10*3/MM3 (ref 0–0.7)
EOSINOPHIL NFR BLD AUTO: 1.4 % (ref 0.3–6.2)
ERYTHROCYTE [DISTWIDTH] IN BLOOD BY AUTOMATED COUNT: 13.1 % (ref 11.7–13)
GFR SERPL CREATININE-BSD FRML MDRD: 61 ML/MIN/1.73
GLOBULIN UR ELPH-MCNC: 2.9 GM/DL
GLUCOSE BLD-MCNC: 74 MG/DL (ref 65–99)
HCT VFR BLD AUTO: 42.7 % (ref 35.6–45.5)
HGB BLD-MCNC: 14.2 G/DL (ref 11.9–15.5)
IMM GRANULOCYTES # BLD: 0.02 10*3/MM3 (ref 0–0.03)
IMM GRANULOCYTES NFR BLD: 0.3 % (ref 0–0.5)
LYMPHOCYTES # BLD AUTO: 2.45 10*3/MM3 (ref 0.9–4.8)
LYMPHOCYTES NFR BLD AUTO: 34.1 % (ref 19.6–45.3)
MCH RBC QN AUTO: 31.8 PG (ref 26.9–32)
MCHC RBC AUTO-ENTMCNC: 33.3 G/DL (ref 32.4–36.3)
MCV RBC AUTO: 95.5 FL (ref 80.5–98.2)
MONOCYTES # BLD AUTO: 0.76 10*3/MM3 (ref 0.2–1.2)
MONOCYTES NFR BLD AUTO: 10.6 % (ref 5–12)
NEUTROPHILS # BLD AUTO: 3.86 10*3/MM3 (ref 1.9–8.1)
NEUTROPHILS NFR BLD AUTO: 53.8 % (ref 42.7–76)
PLATELET # BLD AUTO: 270 10*3/MM3 (ref 140–500)
PMV BLD AUTO: 8.9 FL (ref 6–12)
POTASSIUM BLD-SCNC: 4.8 MMOL/L (ref 3.5–5.2)
PROT SERPL-MCNC: 7.4 G/DL (ref 6–8.5)
RBC # BLD AUTO: 4.47 10*6/MM3 (ref 3.9–5.2)
SODIUM BLD-SCNC: 143 MMOL/L (ref 136–145)
WBC NRBC COR # BLD: 7.18 10*3/MM3 (ref 4.5–10.7)

## 2018-05-24 PROCEDURE — 80053 COMPREHEN METABOLIC PANEL: CPT | Performed by: INTERNAL MEDICINE

## 2018-05-24 PROCEDURE — 99213 OFFICE O/P EST LOW 20 MIN: CPT | Performed by: INTERNAL MEDICINE

## 2018-05-24 PROCEDURE — 85025 COMPLETE CBC W/AUTO DIFF WBC: CPT | Performed by: INTERNAL MEDICINE

## 2018-05-24 PROCEDURE — 36415 COLL VENOUS BLD VENIPUNCTURE: CPT | Performed by: INTERNAL MEDICINE

## 2018-05-24 RX ORDER — ETHAMBUTOL HYDROCHLORIDE 400 MG/1
800 TABLET, FILM COATED ORAL DAILY
Qty: 660 TABLET | Refills: 11 | Status: SHIPPED | OUTPATIENT
Start: 2018-05-24 | End: 2019-02-04

## 2018-05-24 RX ORDER — AMLODIPINE BESYLATE 10 MG/1
5 TABLET ORAL DAILY
COMMUNITY
End: 2018-07-26 | Stop reason: DRUGHIGH

## 2018-05-24 RX ORDER — AZITHROMYCIN 250 MG/1
250 TABLET, FILM COATED ORAL DAILY
Qty: 30 TABLET | Refills: 11 | Status: SHIPPED | OUTPATIENT
Start: 2018-05-24 | End: 2018-10-08

## 2018-05-24 RX ORDER — RIFAMPIN 300 MG/1
600 CAPSULE ORAL DAILY
Qty: 60 CAPSULE | Refills: 11 | Status: SHIPPED | OUTPATIENT
Start: 2018-05-24 | End: 2019-02-04

## 2018-05-24 NOTE — PROGRESS NOTES
cc: Follow-up (MAC)      Stacie returns for follow-up MAC.  She had been on therapy since January 2016-September 2017.  Unfortunately developed recurrence in December 2017 when she also was diagnosed with Guillain-Barré syndrome.  She was started on daily triple drug therapy in January 2018.  She is tolerated this nicely so far but actually stopped about a week ago.  She feels well.  Cough is stable.  No f/c/ns, dyspnea.  BP up but has white coat HTN, is asx and had normal BP at home    Past medical history: MAC, C. difficile in the past, hyperlipidemia, hypertension, hypothyroidism, sinusitis, SVT, gullian barre    Review of Systems: All other reviewed and negative except as per HPI    Vitals:    05/24/18 1336   BP: (!) 205/91   Pulse: 72   Resp: 12   Temp: 97.8 °F (36.6 °C)       GENERAL: Awake and alert, in no acute distress.   HEART: Regular rate and rhythm. No peripheral edema.   LUNGS: Clear to auscultation anteriorly with normal respiratory effort.   PSYCHIATRIC: Appropriate mood, affect, insight, and judgment.       DIAGNOSTICS:  AFB cultures from 12/9/17 grew MAC amikacin 8, clarithromycin 0.5, linezolid 8, moxifloxacin 0.5    Mycobacterium avium complex on 4/8/16: Sensitivities as below    Mycobacterium avium complex       DISK DIFFUSION       Amikacin 8.0        Ciprofloxacin 16.0        Clarithromycin 4.0  Susceptible       Ethambutol 8.0        Linezolid 64.0        Moxifloxacin 4.0        Rifampin >8.0        Streptomycin 32.0           Assessment and Plan  Mycobacterium avium complex  Long term current use of antibiotics    She had stopped her meds but needs to stay on. Restart triple drug thearpy.    We'll dose at azithromycin 250 mg per day, ethambutol 15 mg/kg per day,  and rifampin 600 mg daily.      She is due to see Dr Braga next month and I would like to see if he'd be willing to repeat bronch to see if she has sterilized her cultures.    CMP and CBC/diff today  [facility]  She will think about this.  RTC 3 months

## 2018-06-18 ENCOUNTER — OFFICE VISIT (OUTPATIENT)
Dept: CARDIOLOGY | Facility: CLINIC | Age: 83
End: 2018-06-18

## 2018-06-18 VITALS
HEIGHT: 63 IN | HEART RATE: 68 BPM | DIASTOLIC BLOOD PRESSURE: 98 MMHG | SYSTOLIC BLOOD PRESSURE: 138 MMHG | WEIGHT: 112.8 LBS | BODY MASS INDEX: 19.99 KG/M2

## 2018-06-18 DIAGNOSIS — R06.09 DYSPNEA ON EXERTION: ICD-10-CM

## 2018-06-18 DIAGNOSIS — G61.0 GUILLAIN BARRÉ SYNDROME (HCC): ICD-10-CM

## 2018-06-18 DIAGNOSIS — R09.89 LABILE BLOOD PRESSURE: Primary | ICD-10-CM

## 2018-06-18 DIAGNOSIS — I47.1 PSVT (PAROXYSMAL SUPRAVENTRICULAR TACHYCARDIA) (HCC): ICD-10-CM

## 2018-06-18 DIAGNOSIS — E03.9 ACQUIRED HYPOTHYROIDISM: ICD-10-CM

## 2018-06-18 PROBLEM — I47.10 PSVT (PAROXYSMAL SUPRAVENTRICULAR TACHYCARDIA): Status: ACTIVE | Noted: 2018-06-18

## 2018-06-18 PROCEDURE — 93000 ELECTROCARDIOGRAM COMPLETE: CPT | Performed by: NURSE PRACTITIONER

## 2018-06-18 PROCEDURE — 99214 OFFICE O/P EST MOD 30 MIN: CPT | Performed by: NURSE PRACTITIONER

## 2018-06-18 RX ORDER — SPIRONOLACTONE 25 MG/1
25 TABLET ORAL DAILY
Qty: 30 TABLET | Refills: 1 | Status: SHIPPED | OUTPATIENT
Start: 2018-06-18 | End: 2018-07-03 | Stop reason: SDUPTHER

## 2018-06-18 NOTE — PROGRESS NOTES
"  Date of Office Visit: 2018  Encounter Provider: ANNABEL Miranda  Place of Service: [facility] CARDIOLOGY  Patient Name: Stacie Vieira  :1935    Chief Complaint   Patient presents with   • Hypertension   :     HPI: Stacie Vieira is a 83 y.o. female who presents today for Follow-up.  She is a new patient to me and her previous records have been reviewed.  She has a history of paroxysmal supraventricular tachycardia dating back to the 1970s and was recently placed on propanolol.  Her last episode was in .  She also has a history of hypertension, thyroid disease, and MAC.     She was hospitalized in 2017 and was diagnosed with pneumonia and Guillain-[place] syndrome.  She had an episode of syncope which was associated with bradycardia.  She received chest compressions and atropine.  Showed an echocardiogram completed which revealed an EF of 67%, grade 1 diastolic dysfunction, aortic valve calcification, and mild tricuspid insufficiency.  She is an established patient of Dr. Aisha Curiel and was last seen in the office in 2018.  At that time her blood pressure was elevated at 160/80 and her Toprol XL was increased to 50 mg daily.    She presents today for follow-up and feels that she is tolerating the medication just fine without any adverse effects.  I've reviewed her blood pressures which have ranged from 99/49 to 185/66 with heart rates in the 60s and 70s.  She says she has \"white coat syndrome\" although her blood pressure is quite elevated at home often.  She says she is very afraid to increase her blood pressure medicines because she does get hypotensive at times.  She has chronic shortness of breath which she states is unchanged.  She denies chest pain, PND, orthopnea, cough, edema, palpitations, dizziness, or syncope.    The following portion of the patient's history were reviewed and updated as appropriate: past medical history, past " surgical history, past social history, past family history, allergies, current medications, and problem list.    Past Medical History:   Diagnosis Date   • Acute stress disorder    • Allergic rhinitis    • C. difficile colitis     hx in past   • Elevated fasting glucose    • Essential hypertension    • Guillain Barré syndrome    • H/O: pneumonia    • Health care maintenance    • History of palpitations    • Hyperlipidemia    • Hypertension    • Hypoactive thyroid    • Hypokalemia    • Hypothyroidism    • Insomnia    • MAC (mycobacterium avium-intracellulare complex)     reason for bronch.  dx with this nov 2015   • Osteoporosis    • Palpitations    • Pneumonia     past hx of freq.   • Sinusitis    • Staph infection     in sinus area  recent sx jan 2016   • SVT (supraventricular tachycardia)     past hx  on medicine   • Syncope and collapse    • Thyroiditis        Past Surgical History:   Procedure Laterality Date   • BREAST BIOPSY      PERCUTATNEOUS NEEDLE CORE   • BRONCHOSCOPY      nov 2015   • BRONCHOSCOPY N/A 4/8/2016    Procedure: BRONCHOSCOPY WITH BAL;  Surgeon: Sinan Braga MD;  Location: [facility] ENDOSCOPY;  Service:    • BRONCHOSCOPY N/A 12/9/2017    Procedure: BRONCHOSCOPY;  Surgeon: Real Rousseau MD;  Location: [facility] ENDOSCOPY;  Service:    • COLONOSCOPY N/A 07/08/2005   • DILATATION AND CURETTAGE N/A 01/01/1990   • PAP SMEAR N/A 03/30/2004   • SINUS SURGERY  01/2016   • TONSILLECTOMY         Social History     Social History   • Marital status:      Spouse name: N/A   • Number of children: N/A   • Years of education: N/A     Occupational History   • Not on file.     Social History Main Topics   • Smoking status: Never Smoker   • Smokeless tobacco: Never Used      Comment: caffine use   • Alcohol use No   • Drug use: No   • Sexual activity: No       Family History   Problem Relation Age of Onset   • Hypertension Brother    • Dementia Mother    • Kidney disease Father    • Thyroid disease Other         Review of Systems   Constitution: Negative for chills, diaphoresis, fever, malaise/fatigue, night sweats, weight gain and weight loss.   HENT: Negative for hearing loss, nosebleeds, sore throat and tinnitus.    Eyes: Negative for blurred vision, double vision, pain and visual disturbance.   Cardiovascular: Positive for dyspnea on exertion. Negative for chest pain, claudication, cyanosis, irregular heartbeat, leg swelling, near-syncope, orthopnea, palpitations, paroxysmal nocturnal dyspnea and syncope.   Respiratory: Negative for cough, hemoptysis, shortness of breath, snoring and wheezing.    Endocrine: Negative for cold intolerance, heat intolerance and polyuria.   Hematologic/Lymphatic: Negative for bleeding problem. Does not bruise/bleed easily.   Skin: Negative for color change, dry skin, flushing and itching.   Musculoskeletal: Negative for falls, joint pain, joint swelling, muscle cramps, muscle weakness and myalgias.   Gastrointestinal: Negative for abdominal pain, constipation, heartburn, melena, nausea and vomiting.   Genitourinary: Negative for dysuria and hematuria.   Neurological: Negative for excessive daytime sleepiness, dizziness, light-headedness, loss of balance, numbness, paresthesias, seizures and vertigo.   Psychiatric/Behavioral: Negative for altered mental status, depression, memory loss and substance abuse. The patient does not have insomnia and is not nervous/anxious.    Allergic/Immunologic: Negative for environmental allergies.       Allergies   Allergen Reactions   • Influenza Vaccines Mental Status Change     Guillain-[place]   • Alendronate Other (See Comments)     Headaches   • Ezetimibe Other (See Comments)     Leg Cramps   • Pravachol [Pravastatin] Other (See Comments)     Leg Cramps         Current Outpatient Prescriptions:   •  amLODIPine (NORVASC) 10 MG tablet, Take 10 mg by mouth Daily., Disp: , Rfl:   •  azithromycin (ZITHROMAX) 250 MG tablet, Take 1 tablet by mouth Daily.,  "Disp: 30 tablet, Rfl: 11  •  docusate sodium 100 MG capsule, Take 100 mg by mouth 2 (Two) Times a Day. (Patient taking differently: Take 100 mg by mouth As Needed.), Disp: , Rfl:   •  ethambutol (MYAMBUTOL) 400 MG tablet, Take 2 tablets by mouth Daily., Disp: 660 tablet, Rfl: 11  •  levothyroxine (LEVOXYL) 50 MCG tablet, Take 1 tablet by mouth Every Other Day., Disp: 45 tablet, Rfl: 3  •  levothyroxine (LEVOXYL) 75 MCG tablet, Take 1 tablet by mouth Every Other Day., Disp: 45 tablet, Rfl: 3  •  loratadine (CLARITIN) 10 MG tablet, Take 10 mg by mouth Daily As Needed for Allergies., Disp: , Rfl:   •  metoprolol succinate XL (TOPROL-XL) 50 MG 24 hr tablet, Take 1 tablet by mouth Daily., Disp: 90 tablet, Rfl: 3  •  Misc Natural Products (JOINT SUPPORT PO), Take 1 tablet by mouth Daily., Disp: , Rfl:   •  Multiple Vitamin (MULTI VITAMIN DAILY PO), Take 1 tablet by mouth Daily., Disp: , Rfl:   •  olmesartan (BENICAR) 40 MG tablet, Take 40 mg by mouth Daily., Disp: , Rfl:   •  rifAMPin (RIFADIN) 300 MG capsule, Take 2 capsules by mouth Daily., Disp: 60 capsule, Rfl: 11  •  spironolactone (ALDACTONE) 25 MG tablet, Take 1 tablet by mouth Daily., Disp: 30 tablet, Rfl: 1      Objective:     Vitals:    06/18/18 1534   BP: 138/98   Pulse: 68   Weight: 51.2 kg (112 lb 12.8 oz)   Height: 160 cm (63\")     Body mass index is 19.98 kg/m².    PHYSICAL EXAM:    Vitals Reviewed.   General Appearance: No acute distress, well developed and well nourished. Thin.   Eyes: Conjunctiva and lids: No erythema, swelling, or discharge. Sclera non-icteric.   HENT: Atraumatic, normocephalic. External eyes, ears, and nose normal. No hearing loss noted. Mucous membranes normal. Lips not cyanotic. Neck supple with no tenderness.  Respiratory: No signs of respiratory distress. Respiration rhythm and depth normal.   Clear to auscultation. No rales, crackles, rhonchi, or wheezing auscultated.   Cardiovascular:  Jugular Venous Pressure: Normal  Heart Rate " and Rhythm: Normal, Heart Sounds: Normal S1 and S2. No S3 or S4 noted.  Murmurs: No murmurs noted. No rubs, thrills, or gallops.   Arterial Pulses: Carotid pulses normal. No carotid bruit noted. Posterior tibialis and dorsalis pedis pulses normal.   Lower Extremities: No edema noted.  Gastrointestinal:  Abdomen soft, non-distended, non-tender. Normal bowel sounds. No hepatomegaly.   Musculoskeletal: Normal movement of extremities  Skin and Nails: General appearance normal. No pallor, cyanosis, diaphoresis. Skin temperature normal. No clubbing of fingernails.   Psychiatric: Patient alert and oriented to person, place, and time. Speech and behavior appropriate. Normal mood and affect.       ECG 12 Lead  Date/Time: 6/18/2018 3:26 PM  Performed by: LEONEL MOSES  Authorized by: LEONEL MOSES   Comparison: compared with previous ECG from 4/17/2018  Similar to previous ECG  Rhythm: sinus rhythm  Rate: normal  BPM: 68  Conduction: conduction normal  ST Segments: ST segments normal  T Waves: T waves normal  QRS axis: normal  Clinical impression: non-specific ECG  Comments: U wave present              Assessment:       Diagnosis Plan   1. Labile blood pressure  Duplex Renal Artery - Bilateral Complete CAR    spironolactone (ALDACTONE) 25 MG tablet   2. Dyspnea on exertion     3. PSVT (paroxysmal supraventricular tachycardia)     4. Acquired hypothyroidism     5. Guillain Barré syndrome            Plan:       1. Labile Blood Pressure: Her blood pressure is been quite elevated at home and sometimes she is more hypotensive.  The diastolic portion of her blood pressure is elevated today.  Her blood pressure machine was double checked against our manual and her home machine is accurate.  I've recommended a renal duplex to be completed because of the labile blood pressures and I'm going to initiate spironolactone 25 mg 1 tablet daily.  She'll return in approximately 10 days for an office visit with me, repeat blood pressure  check, and a BMP.    2.  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 Chronic and unchanged.     3.  PSVT: Patient states last episode was in 1994 and she was previously on propanolol.  She's now on Toprol-XL with no reoccurrence of palpitations.    4.  Hypothyroidism: On levothyroxine followed by PCP.    5.  Hyperlipidemia: Followed by PCP.    6. Guillain [place] Syndrome: Diagnosed in December 2017.    As always, it has been a pleasure to participate in your patient's care.      Sincerely,         ANNABEL Wu

## 2018-07-02 ENCOUNTER — LAB (OUTPATIENT)
Dept: LAB | Facility: HOSPITAL | Age: 83
End: 2018-07-02

## 2018-07-02 ENCOUNTER — OFFICE VISIT (OUTPATIENT)
Dept: CARDIOLOGY | Facility: CLINIC | Age: 83
End: 2018-07-02

## 2018-07-02 ENCOUNTER — HOSPITAL ENCOUNTER (OUTPATIENT)
Dept: CARDIOLOGY | Facility: HOSPITAL | Age: 83
Discharge: HOME | End: 2018-07-02
Admitting: NURSE PRACTITIONER

## 2018-07-02 VITALS
WEIGHT: 112.6 LBS | HEART RATE: 62 BPM | DIASTOLIC BLOOD PRESSURE: 80 MMHG | SYSTOLIC BLOOD PRESSURE: 144 MMHG | BODY MASS INDEX: 19.95 KG/M2 | HEIGHT: 63 IN

## 2018-07-02 DIAGNOSIS — I47.1 PSVT (PAROXYSMAL SUPRAVENTRICULAR TACHYCARDIA) (HCC): ICD-10-CM

## 2018-07-02 DIAGNOSIS — I10 ESSENTIAL HYPERTENSION: ICD-10-CM

## 2018-07-02 DIAGNOSIS — E78.2 MIXED HYPERLIPIDEMIA: ICD-10-CM

## 2018-07-02 DIAGNOSIS — R09.89 LABILE BLOOD PRESSURE: ICD-10-CM

## 2018-07-02 DIAGNOSIS — I10 ESSENTIAL HYPERTENSION: Primary | ICD-10-CM

## 2018-07-02 DIAGNOSIS — E03.9 ACQUIRED HYPOTHYROIDISM: ICD-10-CM

## 2018-07-02 DIAGNOSIS — G61.0 GUILLAIN BARRÉ SYNDROME (HCC): ICD-10-CM

## 2018-07-02 DIAGNOSIS — R06.09 DYSPNEA ON EXERTION: ICD-10-CM

## 2018-07-02 LAB
ANION GAP SERPL CALCULATED.3IONS-SCNC: 10.8 MMOL/L
BUN BLD-MCNC: 15 MG/DL (ref 8–23)
BUN/CREAT SERPL: 16.1 (ref 7–25)
CALCIUM SPEC-SCNC: 10 MG/DL (ref 8.6–10.5)
CHLORIDE SERPL-SCNC: 103 MMOL/L (ref 98–107)
CO2 SERPL-SCNC: 27.2 MMOL/L (ref 22–29)
CREAT BLD-MCNC: 0.93 MG/DL (ref 0.57–1)
GFR SERPL CREATININE-BSD FRML MDRD: 58 ML/MIN/1.73
GLUCOSE BLD-MCNC: 145 MG/DL (ref 65–99)
POTASSIUM BLD-SCNC: 5.6 MMOL/L (ref 3.5–5.2)
SODIUM BLD-SCNC: 141 MMOL/L (ref 136–145)

## 2018-07-02 PROCEDURE — 93975 VASCULAR STUDY: CPT | Performed by: INTERNAL MEDICINE

## 2018-07-02 PROCEDURE — 36415 COLL VENOUS BLD VENIPUNCTURE: CPT

## 2018-07-02 PROCEDURE — 80048 BASIC METABOLIC PNL TOTAL CA: CPT

## 2018-07-02 PROCEDURE — 93000 ELECTROCARDIOGRAM COMPLETE: CPT | Performed by: NURSE PRACTITIONER

## 2018-07-02 PROCEDURE — 93975 VASCULAR STUDY: CPT

## 2018-07-02 PROCEDURE — 99213 OFFICE O/P EST LOW 20 MIN: CPT | Performed by: NURSE PRACTITIONER

## 2018-07-02 NOTE — PROGRESS NOTES
"  Date of Office Visit: 2018  Encounter Provider: ANNABEL Miranda  Place of Service: [facility] CARDIOLOGY  Patient Name: Stacie Vieira  :1935    Chief Complaint   Patient presents with   • Labile blood pressure     2 week follow up   :     HPI: Stacie Vieira is a 83 y.o. female who presents today for follow up. She has a history of paroxysmal supraventricular tachycardia (PSVT) dating back to the 1970s and was on propanolol. Her last episode of PSVT was in .  She also has a history of hypertension, thyroid disease, and MAC.      She was hospitalized in 2017 and was diagnosed with pneumonia and Guillain-[place] syndrome.  She had an episode of syncope which was associated with bradycardia.  She received chest compressions and atropine. Echocardiogram completed showed an EF of 67%, grade 1 diastolic dysfunction, aortic valve calcification, and mild tricuspid insufficiency. She was seen by Dr. Aisha Curiel in 2018 and Toprol XL was increased to 50 mg daily for elevated blood pressure.    I evaluated her on 18 and her blood pressures at that time were ranging from 99/49 to 185/66 with heart rates in the 60s and 70s.  She felt that she had \"white coat syndrome\" although I noted that her blood pressures at home were also elevated.  She was very apprehensive to increase her blood pressure medications because of the potential risk of hypotension.  I double checked her home blood pressure machine and it was accurate.  I ordered a renal duplex to be completed because the labile blood pressures and that was done today in the office.  I also started her on spironolactone 25 mg 1 tablet daily.    The following portion of the patient's history were reviewed and updated as appropriate: past medical history, past surgical history, past social history, past family history, allergies, current medications, and problem list.    Past Medical History: "   Diagnosis Date   • Acute stress disorder    • Allergic rhinitis    • C. difficile colitis     hx in past   • Elevated fasting glucose    • Essential hypertension    • Guillain Barré syndrome    • H/O: pneumonia    • Health care maintenance    • History of palpitations    • Hyperlipidemia    • Hypokalemia    • Hypothyroidism    • Insomnia    • MAC (mycobacterium avium-intracellulare complex)     reason for bronch.  dx with this nov 2015   • Osteoporosis    • Palpitations    • Pneumonia     past hx of freq.   • Polyneuropathy 12/20/2017   • Sinusitis    • Staph infection     in sinus area  recent sx jan 2016   • SVT (supraventricular tachycardia)     past hx  on medicine   • Syncope and collapse    • Thyroiditis        Past Surgical History:   Procedure Laterality Date   • BREAST BIOPSY      PERCUTATNEOUS NEEDLE CORE   • BRONCHOSCOPY      nov 2015   • BRONCHOSCOPY N/A 4/8/2016    Procedure: BRONCHOSCOPY WITH BAL;  Surgeon: Sinan Braga MD;  Location: [facility] ENDOSCOPY;  Service:    • BRONCHOSCOPY N/A 12/9/2017    Procedure: BRONCHOSCOPY;  Surgeon: Real Rousseau MD;  Location: [facility] ENDOSCOPY;  Service:    • COLONOSCOPY N/A 07/08/2005   • DILATATION AND CURETTAGE N/A 01/01/1990   • PAP SMEAR N/A 03/30/2004   • SINUS SURGERY  01/2016   • TONSILLECTOMY         Social History     Social History   • Marital status:      Spouse name: N/A   • Number of children: N/A   • Years of education: N/A     Occupational History   • Not on file.     Social History Main Topics   • Smoking status: Never Smoker   • Smokeless tobacco: Never Used      Comment: caffeine use   • Alcohol use No   • Drug use: No   • Sexual activity: No     Other Topics Concern   • Not on file     Social History Narrative   • No narrative on file       Family History   Problem Relation Age of Onset   • Hypertension Brother    • Dementia Mother    • Kidney disease Father    • Thyroid disease Other        Review of Systems   Constitution: Negative for  chills, diaphoresis, fever, malaise/fatigue, night sweats, weight gain and weight loss.   HENT: Negative for hearing loss, nosebleeds, sore throat and tinnitus.    Eyes: Negative for blurred vision, double vision, pain and visual disturbance.   Cardiovascular: Negative for chest pain, claudication, cyanosis, dyspnea on exertion, irregular heartbeat, leg swelling, near-syncope, orthopnea, palpitations, paroxysmal nocturnal dyspnea and syncope.   Respiratory: Negative for cough, hemoptysis, shortness of breath, snoring and wheezing.    Endocrine: Negative for cold intolerance, heat intolerance and polyuria.   Hematologic/Lymphatic: Negative for bleeding problem. Does not bruise/bleed easily.   Skin: Negative for color change, dry skin, flushing and itching.   Musculoskeletal: Negative for falls, joint pain, joint swelling, muscle cramps, muscle weakness and myalgias.   Gastrointestinal: Negative for abdominal pain, constipation, heartburn, melena, nausea and vomiting.   Genitourinary: Negative for dysuria and hematuria.   Neurological: Negative for excessive daytime sleepiness, dizziness, light-headedness, loss of balance, numbness, paresthesias, seizures and vertigo.   Psychiatric/Behavioral: Negative for altered mental status, depression, memory loss and substance abuse. The patient does not have insomnia and is not nervous/anxious.    Allergic/Immunologic: Negative for environmental allergies.       Allergies   Allergen Reactions   • Influenza Vaccines Mental Status Change     Guillain-[place]   • Alendronate Other (See Comments)     Headaches   • Ezetimibe Other (See Comments)     Leg Cramps   • Pravachol [Pravastatin] Other (See Comments)     Leg Cramps         Current Outpatient Prescriptions:   •  amLODIPine (NORVASC) 10 MG tablet, Take 10 mg by mouth Daily., Disp: , Rfl:   •  azithromycin (ZITHROMAX) 250 MG tablet, Take 1 tablet by mouth Daily., Disp: 30 tablet, Rfl: 11  •  docusate sodium 100 MG capsule, Take  "100 mg by mouth 2 (Two) Times a Day. (Patient taking differently: Take 100 mg by mouth As Needed.), Disp: , Rfl:   •  ethambutol (MYAMBUTOL) 400 MG tablet, Take 2 tablets by mouth Daily., Disp: 660 tablet, Rfl: 11  •  levothyroxine (LEVOXYL) 50 MCG tablet, Take 1 tablet by mouth Every Other Day., Disp: 45 tablet, Rfl: 3  •  levothyroxine (LEVOXYL) 75 MCG tablet, Take 1 tablet by mouth Every Other Day., Disp: 45 tablet, Rfl: 3  •  loratadine (CLARITIN) 10 MG tablet, Take 10 mg by mouth Daily As Needed for Allergies., Disp: , Rfl:   •  metoprolol succinate XL (TOPROL-XL) 50 MG 24 hr tablet, Take 1 tablet by mouth Daily., Disp: 90 tablet, Rfl: 3  •  Misc Natural Products (JOINT SUPPORT PO), Take 1 tablet by mouth Daily., Disp: , Rfl:   •  Multiple Vitamin (MULTI VITAMIN DAILY PO), Take 1 tablet by mouth Daily., Disp: , Rfl:   •  olmesartan (BENICAR) 40 MG tablet, Take 40 mg by mouth Daily., Disp: , Rfl:   •  rifAMPin (RIFADIN) 300 MG capsule, Take 2 capsules by mouth Daily., Disp: 60 capsule, Rfl: 11  •  spironolactone (ALDACTONE) 25 MG tablet, Take 1 tablet by mouth Daily., Disp: 30 tablet, Rfl: 1      Objective:     Vitals:    07/02/18 0931   BP: 144/80   BP Location: Left arm   Pulse: 62   Weight: 51.1 kg (112 lb 9.6 oz)   Height: 160 cm (63\")     Body mass index is 19.95 kg/m².    PHYSICAL EXAM:    Vitals Reviewed.   General Appearance: No acute distress, well developed and well nourished. Thin.   Eyes: Conjunctiva and lids: No erythema, swelling, or discharge. Sclera non-icteric. Wears glasses.    HENT: Atraumatic, normocephalic. External eyes, ears, and nose normal. No hearing loss noted. Mucous membranes normal. Lips not cyanotic. Neck supple with no tenderness.  Respiratory: No signs of respiratory distress. Respiration rhythm and depth normal.   Clear to auscultation. No rales, crackles, rhonchi, or wheezing auscultated.   Cardiovascular:  Jugular Venous Pressure: Normal  Heart Rate and Rhythm: Normal, Heart " Sounds: Normal S1 and S2. No S3 or S4 noted.  Murmurs: No murmurs noted. No rubs, thrills, or gallops.   Arterial Pulses: Carotid pulses normal. No carotid bruit noted. Posterior tibialis and dorsalis pedis pulses normal.   Lower Extremities: No edema noted.  Gastrointestinal:  Abdomen soft, non-distended, non-tender. Normal bowel sounds. No hepatomegaly.   Musculoskeletal: Normal movement of extremities  Skin and Nails: General appearance normal. No pallor, cyanosis, diaphoresis. Skin temperature normal. No clubbing of fingernails.   Psychiatric: Patient alert and oriented to person, place, and time. Speech and behavior appropriate. Normal mood and affect.       ECG 12 Lead  Date/Time: 7/2/2018 9:29 AM  Performed by: LEONEL MOSES  Authorized by: LEONEL MOSES   Comparison: compared with previous ECG from 6/18/2018  Similar to previous ECG  Rhythm: sinus rhythm  Rate: normal  BPM: 62  Conduction: conduction normal  T Waves: T waves normal  QRS axis: normal  Other findings: U wave  Clinical impression: abnormal ECG  Comments: Nonspecific ST-T abnormality              Assessment:       Diagnosis Plan   1. Essential hypertension  Basic Metabolic Panel   2. Dyspnea on exertion     3. PSVT (paroxysmal supraventricular tachycardia)     4. Acquired hypothyroidism     5. Mixed hyperlipidemia     6. Guillain Barré syndrome            Plan:       1. Labile Blood Pressure: Her blood pressure is averaging 140s over 60s-70s at home.  She says on her blood pressure is systolic 120s she does not feel well.  She has remained on spironolactone 25 mg daily without any adverse effects and we'll repeat a BMP today.  Her renal duplex ultrasound is pending.  I will review both test and further recommendations will follow.    2.  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 Chronic and unchanged.      3.  PSVT: Patient states last episode was in 1994 and she was previously on propanolol.  She's now on  Toprol-XL with no reoccurrence of palpitations.     4.  Hypothyroidism: On levothyroxine followed by PCP.     5.  Hyperlipidemia: Followed by PCP.     6. Guillain [place] Syndrome: Diagnosed in December 2017.    7.  Follow up with Dr. Aisha Curiel 3-4 months.    As always, it has been a pleasure to participate in your patient's care.      Sincerely,         ANNABEL Wu

## 2018-07-03 ENCOUNTER — TELEPHONE (OUTPATIENT)
Dept: CARDIOLOGY | Facility: CLINIC | Age: 83
End: 2018-07-03

## 2018-07-03 DIAGNOSIS — R09.89 LABILE BLOOD PRESSURE: ICD-10-CM

## 2018-07-03 DIAGNOSIS — E87.6 HYPOKALEMIA: Primary | ICD-10-CM

## 2018-07-03 LAB
BH CV ECHO MEAS - DIST REN A EDV LEFT: -19 CM/SEC
BH CV ECHO MEAS - DIST REN A PSV LEFT: -90.1 CM/SEC
BH CV ECHO MEAS - DIST REN A RI LEFT: 0.79
BH CV ECHO MEAS - MID REN A EDV LEFT: -23.2 CM/SEC
BH CV ECHO MEAS - MID REN A PSV LEFT: -85.2 CM/SEC
BH CV ECHO MEAS - MID REN A RI LEFT: 0.73
BH CV ECHO MEAS - PROX REN A EDV LEFT: -28.3 CM/SEC
BH CV ECHO MEAS - PROX REN A PSV LEFT: -100.8 CM/SEC
BH CV ECHO MEAS - PROX REN A RI LEFT: 0.72
BH CV VAS KIDNEY HEIGHT LEFT: 4.2 CM
BH CV VAS RENAL AORTIC MID PSV: 87 CM/S
BH CV XLRA MEAS - KID L LEFT: 9.5 CM
BH CV XLRA MEAS DIST REN A EDV RIGHT: -31.4 CM/SEC
BH CV XLRA MEAS DIST REN A PSV RIGHT: -147.9 CM/SEC
BH CV XLRA MEAS DIST REN A RI RIGHT: 0.79
BH CV XLRA MEAS KID H RIGHT: 3.2 CM
BH CV XLRA MEAS KID L RIGHT: 9.6 CM
BH CV XLRA MEAS MID REN A EDV RIGHT: 73.7 CM/SEC
BH CV XLRA MEAS MID REN A PSV RIGHT: 285.1 CM/SEC
BH CV XLRA MEAS MID REN A RI RIGHT: 0.74
BH CV XLRA MEAS PROX REN A EDV RIGHT: 108.1 CM/SEC
BH CV XLRA MEAS PROX REN A PSV RIGHT: 376 CM/SEC
BH CV XLRA MEAS PROX REN A RI RIGHT: 0.71
BH CV XLRA MEAS RAR LEFT: 1.2
BH CV XLRA MEAS RAR RIGHT: 4.3

## 2018-07-03 RX ORDER — SPIRONOLACTONE 25 MG/1
12.5 TABLET ORAL DAILY
Qty: 15 TABLET | Refills: 1 | Status: SHIPPED | OUTPATIENT
Start: 2018-07-03 | End: 2018-07-09

## 2018-07-03 NOTE — TELEPHONE ENCOUNTER
----- Message from ANNABEL Jacob sent at 7/2/2018 10:17 AM EDT -----    Follow-up on renal duplex and BMP results

## 2018-07-03 NOTE — TELEPHONE ENCOUNTER
Dr. Cecilio Rhodes, vascular reviewed the renal duplex images.  He said it looks like the right was greater than 60% stenosis.  He said he was surprised the left artery wasn't worse when compared to CT scan of the chest which shows calcium in the midportion of the aorta.  The kidney sizes 9 x 3 cm bilaterally which are both normal.    Please advise.

## 2018-07-03 NOTE — TELEPHONE ENCOUNTER
BMP Results:    Basic Metabolic Panel   Order: [id number]   Status:  Final result   Visible to patient:  No (Not Released) Dx:  Essential hypertension     Ref Range & Units 1d ago 1mo ago 4mo ago    Glucose 65 - 99 mg/dL 145   74  85     BUN 8 - 23 mg/dL 15  14  17     Creatinine 0.57 - 1.00 mg/dL 0.93  0.89  0.86     Sodium 136 - 145 mmol/L 141  143  137     Potassium 3.5 - 5.2 mmol/L 5.6   4.8  4.5     Chloride 98 - 107 mmol/L 103  102  98     CO2 22.0 - 29.0 mmol/L 27.2  27.3  27.6     Calcium 8.6 - 10.5 mg/dL 10.0  9.6  9.2     eGFR Non African Amer >60 mL/min/1.73 58   61  63     BUN/Creatinine Ratio 7.0 - 25.0 16.1  15.7  19.8     Anion Gap mmol/L 10.8  13.7  11.4            I spoke with patient via telephone about lab results.  I've recommended that she decrease the spironolactone to half tablet daily.  Repeat BMP in one week.  Renal duplex pending.

## 2018-07-05 PROBLEM — I70.1 STENOSIS OF RIGHT RENAL ARTERY (HCC): Status: ACTIVE | Noted: 2018-07-05

## 2018-07-05 NOTE — TELEPHONE ENCOUNTER
Patient given renal duplex results and verbalizes understanding. Her BP has been better on lower dose of aldactone. She will have a BMP next week.

## 2018-07-09 ENCOUNTER — TELEPHONE (OUTPATIENT)
Dept: INFECTIOUS DISEASES | Facility: CLINIC | Age: 83
End: 2018-07-09

## 2018-07-09 ENCOUNTER — TELEPHONE (OUTPATIENT)
Dept: CARDIOLOGY | Facility: CLINIC | Age: 83
End: 2018-07-09

## 2018-07-09 ENCOUNTER — LAB (OUTPATIENT)
Dept: LAB | Facility: HOSPITAL | Age: 83
End: 2018-07-09

## 2018-07-09 DIAGNOSIS — E87.6 HYPOKALEMIA: ICD-10-CM

## 2018-07-09 DIAGNOSIS — E87.5 HYPERKALEMIA: Primary | ICD-10-CM

## 2018-07-09 LAB
ANION GAP SERPL CALCULATED.3IONS-SCNC: 11.8 MMOL/L
BUN BLD-MCNC: 12 MG/DL (ref 8–23)
BUN/CREAT SERPL: 11.9 (ref 7–25)
CALCIUM SPEC-SCNC: 9.9 MG/DL (ref 8.6–10.5)
CHLORIDE SERPL-SCNC: 102 MMOL/L (ref 98–107)
CO2 SERPL-SCNC: 26.2 MMOL/L (ref 22–29)
CREAT BLD-MCNC: 1.01 MG/DL (ref 0.57–1)
GFR SERPL CREATININE-BSD FRML MDRD: 52 ML/MIN/1.73
GLUCOSE BLD-MCNC: 178 MG/DL (ref 65–99)
POTASSIUM BLD-SCNC: 5.6 MMOL/L (ref 3.5–5.2)
SODIUM BLD-SCNC: 140 MMOL/L (ref 136–145)

## 2018-07-09 PROCEDURE — 80048 BASIC METABOLIC PNL TOTAL CA: CPT

## 2018-07-09 NOTE — TELEPHONE ENCOUNTER
Basic Metabolic Panel   Order: [id number]   Status:  Final result   Visible to patient:  No (Not Released) Dx:  Hypokalemia     Ref Range & Units 11:34 7d ago 1mo ago    Glucose 65 - 99 mg/dL 178   145   74     BUN 8 - 23 mg/dL 12  15  14     Creatinine 0.57 - 1.00 mg/dL 1.01   0.93  0.89     Sodium 136 - 145 mmol/L 140  141  143     Potassium 3.5 - 5.2 mmol/L 5.6   5.6   4.8     Chloride 98 - 107 mmol/L 102  103  102     CO2 22.0 - 29.0 mmol/L 26.2  27.2  27.3     Calcium 8.6 - 10.5 mg/dL 9.9  10.0  9.6     eGFR Non African Amer >60 mL/min/1.73 52   58   61     BUN/Creatinine Ratio 7.0 - 25.0 11.9  16.1  15.7     Anion Gap mmol/L 11.8  10.8  13.7            Please call patient held her to stop her spironolactone.  Her potassium remains elevated.  Recheck BMP on Friday.  Make sure she is not taking a potassium supplement.  Okay to continue on Benicar.

## 2018-07-09 NOTE — TELEPHONE ENCOUNTER
No it is not too soon for bronchoscopy and no I do not think that the medicines are effecting her kidneys.

## 2018-07-09 NOTE — TELEPHONE ENCOUNTER
Pt returned my call.  I gave pt lab results and she understands verbally.  She is unable to go on Friday to have her labs drawn but she plans to go Thursday evening./nohelia

## 2018-07-09 NOTE — TELEPHONE ENCOUNTER
Pt came into office and asked I pass along two questions for Dr Del Angel.   1. She is due for her bronchoscopy on July 20th, is this too soon?   2. Are any of her meds effecting her kidneys? 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   Thanks. Rosa

## 2018-07-12 ENCOUNTER — LAB (OUTPATIENT)
Dept: LAB | Facility: HOSPITAL | Age: 83
End: 2018-07-12

## 2018-07-12 DIAGNOSIS — E87.5 HYPERKALEMIA: ICD-10-CM

## 2018-07-12 LAB
ANION GAP SERPL CALCULATED.3IONS-SCNC: 11.9 MMOL/L
BUN BLD-MCNC: 13 MG/DL (ref 8–23)
BUN/CREAT SERPL: 15.1 (ref 7–25)
CALCIUM SPEC-SCNC: 9.8 MG/DL (ref 8.6–10.5)
CHLORIDE SERPL-SCNC: 101 MMOL/L (ref 98–107)
CO2 SERPL-SCNC: 27.1 MMOL/L (ref 22–29)
CREAT BLD-MCNC: 0.86 MG/DL (ref 0.57–1)
GFR SERPL CREATININE-BSD FRML MDRD: 63 ML/MIN/1.73
GLUCOSE BLD-MCNC: 98 MG/DL (ref 65–99)
POTASSIUM BLD-SCNC: 5.4 MMOL/L (ref 3.5–5.2)
SODIUM BLD-SCNC: 140 MMOL/L (ref 136–145)

## 2018-07-12 PROCEDURE — 80048 BASIC METABOLIC PNL TOTAL CA: CPT

## 2018-07-12 PROCEDURE — 36415 COLL VENOUS BLD VENIPUNCTURE: CPT

## 2018-07-13 ENCOUNTER — TELEPHONE (OUTPATIENT)
Dept: CARDIOLOGY | Facility: CLINIC | Age: 83
End: 2018-07-13

## 2018-07-13 NOTE — TELEPHONE ENCOUNTER
Potassium now 5.4 with the stop of aldactone. She is not taking a potassium supplement. She does not eat potassium rich foods. She is not taking her vitamin supplement that contains potassium. She has hand and leg cramping but this is not new.     Blood pressures this week: 143/75, 179/87, 166/73.    I told her I will further discuss with Dr. Curiel upon her return.

## 2018-07-18 ENCOUNTER — TELEPHONE (OUTPATIENT)
Dept: CARDIOLOGY | Facility: CLINIC | Age: 83
End: 2018-07-18

## 2018-07-18 DIAGNOSIS — I10 ESSENTIAL HYPERTENSION: Primary | ICD-10-CM

## 2018-07-18 NOTE — TELEPHONE ENCOUNTER
This patient has been experiencing transient elevated blood pressures for quite some time.  I initially put her on spironolactone 25 mg daily and her potassium became elevated.  I reduced it to 12.5 mg daily and remained elevated so I discontinued it.    On her last check her potassium remains at 5.4 and the only thing that be contributing to this is her Benicar.    Other meds:  - Amlodipine 10 mg daily  - Toprol-XL 50 mg daily    She was the one that had the renal ultrasound that we discussed in Dr. Cecilio Rhodes reviewed the findings.    · Hemodynamically significant stenosis right renal artery stenosis.  · Normal left renal artery.  · Reveals a large cyst measuring 5.1 cm x 5.4 cm on the left kidney     Dr. Rhodes's interpretation:  He said it looks like the right was greater than 60% stenosis.  He said he was surprised the left artery wasn't worse when compared to CT scan of the chest which shows calcium in the midportion of the aorta.  The kidney sizes 9 x 3 cm bilaterally which are both normal.    Blood pressures this week: 143/75, 179/87, 166/73.    Please advise.

## 2018-07-18 NOTE — TELEPHONE ENCOUNTER
----- Message from ANNABEL Jacob sent at 7/13/2018  4:00 PM EDT -----    Discuss potassium and blood pressure with Dr. Curiel

## 2018-07-19 RX ORDER — HYDROCHLOROTHIAZIDE 25 MG/1
25 TABLET ORAL DAILY
Qty: 30 TABLET | Refills: 1 | Status: SHIPPED | OUTPATIENT
Start: 2018-07-19 | End: 2018-09-08 | Stop reason: SDUPTHER

## 2018-07-19 NOTE — TELEPHONE ENCOUNTER
I called and s/w pt.  She understands verbally.  She is scheduled for a BP/HR and labs check on 7/26/18.  Ms. Vieira states she does not remember ever taking the HCTZ./nohelia

## 2018-07-19 NOTE — TELEPHONE ENCOUNTER
Please call patient and see if she's ever been on hydrochlorothiazide 25 mg daily.  Dr. Curiel would like to start that medication for better blood pressure control.  She will need to have a follow-up blood pressure/heart rate check in one week and a BMP.

## 2018-07-20 ENCOUNTER — HOSPITAL ENCOUNTER (OUTPATIENT)
Facility: HOSPITAL | Age: 83
Setting detail: HOSPITAL OUTPATIENT SURGERY
Discharge: HOME | End: 2018-07-20
Attending: INTERNAL MEDICINE | Admitting: INTERNAL MEDICINE

## 2018-07-20 ENCOUNTER — ANESTHESIA (OUTPATIENT)
Dept: GASTROENTEROLOGY | Facility: HOSPITAL | Age: 83
End: 2018-07-20

## 2018-07-20 ENCOUNTER — ANESTHESIA EVENT (OUTPATIENT)
Dept: GASTROENTEROLOGY | Facility: HOSPITAL | Age: 83
End: 2018-07-20

## 2018-07-20 VITALS
SYSTOLIC BLOOD PRESSURE: 179 MMHG | WEIGHT: 114 LBS | HEIGHT: 63 IN | HEART RATE: 64 BPM | OXYGEN SATURATION: 99 % | TEMPERATURE: 97.5 F | BODY MASS INDEX: 20.2 KG/M2 | DIASTOLIC BLOOD PRESSURE: 96 MMHG | RESPIRATION RATE: 16 BRPM

## 2018-07-20 DIAGNOSIS — A31.9 MYCOBACTERIA, ATYPICAL: ICD-10-CM

## 2018-07-20 LAB
APPEARANCE FLD: ABNORMAL
B PERT DNA SPEC QL NAA+PROBE: NOT DETECTED
C PNEUM DNA NPH QL NAA+NON-PROBE: NOT DETECTED
COLOR FLD: COLORLESS
FLUAV H1 2009 PAND RNA NPH QL NAA+PROBE: NOT DETECTED
FLUAV H1 HA GENE NPH QL NAA+PROBE: NOT DETECTED
FLUAV H3 RNA NPH QL NAA+PROBE: NOT DETECTED
FLUAV SUBTYP SPEC NAA+PROBE: NOT DETECTED
FLUBV RNA ISLT QL NAA+PROBE: NOT DETECTED
GIE STN SPEC: NORMAL
HADV DNA SPEC NAA+PROBE: NOT DETECTED
HCOV 229E RNA SPEC QL NAA+PROBE: NOT DETECTED
HCOV HKU1 RNA SPEC QL NAA+PROBE: NOT DETECTED
HCOV NL63 RNA SPEC QL NAA+PROBE: NOT DETECTED
HCOV OC43 RNA SPEC QL NAA+PROBE: NOT DETECTED
HMPV RNA NPH QL NAA+NON-PROBE: NOT DETECTED
HPIV1 RNA SPEC QL NAA+PROBE: NOT DETECTED
HPIV2 RNA SPEC QL NAA+PROBE: NOT DETECTED
HPIV3 RNA NPH QL NAA+PROBE: NOT DETECTED
HPIV4 P GENE NPH QL NAA+PROBE: NOT DETECTED
LYMPHOCYTES NFR FLD MANUAL: 6 %
M PNEUMO IGG SER IA-ACNC: NOT DETECTED
MONOCYTES NFR FLD: 3 %
NEUTROPHILS NFR FLD MANUAL: 91 %
RBC # FLD AUTO: 113 /MM3
RHINOVIRUS RNA SPEC NAA+PROBE: NOT DETECTED
RSV RNA NPH QL NAA+NON-PROBE: NOT DETECTED
WBC # FLD: 213 /MM3

## 2018-07-20 PROCEDURE — 87071 CULTURE AEROBIC QUANT OTHER: CPT | Performed by: INTERNAL MEDICINE

## 2018-07-20 PROCEDURE — 87206 SMEAR FLUORESCENT/ACID STAI: CPT | Performed by: INTERNAL MEDICINE

## 2018-07-20 PROCEDURE — 87205 SMEAR GRAM STAIN: CPT | Performed by: INTERNAL MEDICINE

## 2018-07-20 PROCEDURE — 87633 RESP VIRUS 12-25 TARGETS: CPT | Performed by: INTERNAL MEDICINE

## 2018-07-20 PROCEDURE — 87102 FUNGUS ISOLATION CULTURE: CPT | Performed by: INTERNAL MEDICINE

## 2018-07-20 PROCEDURE — 87581 M.PNEUMON DNA AMP PROBE: CPT | Performed by: INTERNAL MEDICINE

## 2018-07-20 PROCEDURE — 88305 TISSUE EXAM BY PATHOLOGIST: CPT | Performed by: INTERNAL MEDICINE

## 2018-07-20 PROCEDURE — 87116 MYCOBACTERIA CULTURE: CPT | Performed by: INTERNAL MEDICINE

## 2018-07-20 PROCEDURE — 88112 CYTOPATH CELL ENHANCE TECH: CPT | Performed by: INTERNAL MEDICINE

## 2018-07-20 PROCEDURE — 25010000002 PROPOFOL 10 MG/ML EMULSION: Performed by: ANESTHESIOLOGY

## 2018-07-20 PROCEDURE — 87798 DETECT AGENT NOS DNA AMP: CPT | Performed by: INTERNAL MEDICINE

## 2018-07-20 PROCEDURE — 87486 CHLMYD PNEUM DNA AMP PROBE: CPT | Performed by: INTERNAL MEDICINE

## 2018-07-20 PROCEDURE — 89051 BODY FLUID CELL COUNT: CPT | Performed by: INTERNAL MEDICINE

## 2018-07-20 PROCEDURE — 88312 SPECIAL STAINS GROUP 1: CPT | Performed by: INTERNAL MEDICINE

## 2018-07-20 RX ORDER — LIDOCAINE HYDROCHLORIDE 20 MG/ML
INJECTION, SOLUTION EPIDURAL; INFILTRATION; INTRACAUDAL; PERINEURAL AS NEEDED
Status: DISCONTINUED | OUTPATIENT
Start: 2018-07-20 | End: 2018-07-20 | Stop reason: HOSPADM

## 2018-07-20 RX ORDER — LIDOCAINE HYDROCHLORIDE 10 MG/ML
INJECTION, SOLUTION EPIDURAL; INFILTRATION; INTRACAUDAL; PERINEURAL AS NEEDED
Status: DISCONTINUED | OUTPATIENT
Start: 2018-07-20 | End: 2018-07-20 | Stop reason: HOSPADM

## 2018-07-20 RX ORDER — PROPOFOL 10 MG/ML
VIAL (ML) INTRAVENOUS AS NEEDED
Status: DISCONTINUED | OUTPATIENT
Start: 2018-07-20 | End: 2018-07-20 | Stop reason: SURG

## 2018-07-20 RX ORDER — PROPOFOL 10 MG/ML
VIAL (ML) INTRAVENOUS CONTINUOUS PRN
Status: DISCONTINUED | OUTPATIENT
Start: 2018-07-20 | End: 2018-07-20 | Stop reason: SURG

## 2018-07-20 RX ORDER — SODIUM CHLORIDE, SODIUM LACTATE, POTASSIUM CHLORIDE, CALCIUM CHLORIDE 600; 310; 30; 20 MG/100ML; MG/100ML; MG/100ML; MG/100ML
30 INJECTION, SOLUTION INTRAVENOUS CONTINUOUS PRN
Status: DISCONTINUED | OUTPATIENT
Start: 2018-07-20 | End: 2018-07-20 | Stop reason: HOSPADM

## 2018-07-20 RX ADMIN — PROPOFOL 200 MG: 10 INJECTION, EMULSION INTRAVENOUS at 09:31

## 2018-07-20 RX ADMIN — SODIUM CHLORIDE, POTASSIUM CHLORIDE, SODIUM LACTATE AND CALCIUM CHLORIDE 30 ML/HR: 600; 310; 30; 20 INJECTION, SOLUTION INTRAVENOUS at 08:27

## 2018-07-20 RX ADMIN — PROPOFOL 100 MCG/KG/MIN: 10 INJECTION, EMULSION INTRAVENOUS at 09:32

## 2018-07-20 RX ADMIN — SODIUM CHLORIDE, POTASSIUM CHLORIDE, SODIUM LACTATE AND CALCIUM CHLORIDE: 600; 310; 30; 20 INJECTION, SOLUTION INTRAVENOUS at 09:28

## 2018-07-20 NOTE — ANESTHESIA POSTPROCEDURE EVALUATION
Patient: Stacie Vieira    Procedure Summary     Date:  07/20/18 Room / Location:  [facility] ENDOSCOPY 7 / [facility] ENDOSCOPY    Anesthesia Start:  0928 Anesthesia Stop:  1006    Procedure:  BRONCHOSCOPY with BAL (N/A Bronchus) Diagnosis:      Surgeon:  Sinan Braga MD Provider:  Lakia Elmore MD    Anesthesia Type:  general ASA Status:  3          Anesthesia Type: general  Last vitals  BP   (!) 178/114 (07/20/18 1004)   Temp   36.4 °C (97.5 °F) (07/20/18 1004)   Pulse   70 (07/20/18 1004)   Resp   16 (07/20/18 1004)     SpO2   100 % (07/20/18 1004)     Post Anesthesia Care and Evaluation    Patient location during evaluation: bedside  Patient participation: complete - patient participated  Level of consciousness: awake  Pain management: adequate  Airway patency: patent  Anesthetic complications: No anesthetic complications    Cardiovascular status: acceptable  Respiratory status: acceptable  Hydration status: acceptable

## 2018-07-20 NOTE — ANESTHESIA PREPROCEDURE EVALUATION
Anesthesia Evaluation     no history of anesthetic complications:               Airway   Mallampati: II  TM distance: >3 FB  Neck ROM: full  Dental - normal exam     Pulmonary    (+) pneumonia , shortness of breath,   Cardiovascular     (+) hypertension, dysrhythmias, PVD (renal art), hyperlipidemia,       Neuro/Psych  (+) syncope,       ROS Comment: guillian mathew  GI/Hepatic/Renal/Endo    (+)   hypothyroidism,     Musculoskeletal     Abdominal    Substance History      OB/GYN          Other                        Anesthesia Plan    ASA 3     general     intravenous induction   Anesthetic plan and risks discussed with patient.

## 2018-07-20 NOTE — ANESTHESIA PROCEDURE NOTES
Airway  Urgency: elective    Airway not difficult    General Information and Staff    Patient location during procedure: OR  Anesthesiologist: TOM GUTIERREZ    Indications and Patient Condition  Indications for airway management: airway protection    Preoxygenated: yes  MILS maintained throughout  Mask difficulty assessment: 1 - vent by mask    Final Airway Details  Final airway type: supraglottic airway      Successful airway: unique  Size 3    Number of attempts at approach: 1

## 2018-07-22 LAB
BACTERIA SPEC AEROBE CULT: NORMAL
GRAM STN SPEC: NORMAL

## 2018-07-23 LAB — REF LAB TEST METHOD: NORMAL

## 2018-07-24 LAB
CYTO UR: NORMAL
LAB AP CASE REPORT: NORMAL
LAB AP CLINICAL INFORMATION: NORMAL
PATH REPORT.ADDENDUM SPEC: NORMAL
PATH REPORT.FINAL DX SPEC: NORMAL
PATH REPORT.GROSS SPEC: NORMAL

## 2018-07-26 ENCOUNTER — CLINICAL SUPPORT (OUTPATIENT)
Dept: CARDIOLOGY | Facility: CLINIC | Age: 83
End: 2018-07-26

## 2018-07-26 ENCOUNTER — TELEPHONE (OUTPATIENT)
Dept: CARDIOLOGY | Facility: CLINIC | Age: 83
End: 2018-07-26

## 2018-07-26 ENCOUNTER — LAB (OUTPATIENT)
Dept: LAB | Facility: HOSPITAL | Age: 83
End: 2018-07-26

## 2018-07-26 DIAGNOSIS — R09.89 LABILE BLOOD PRESSURE: ICD-10-CM

## 2018-07-26 DIAGNOSIS — R06.02 SHORTNESS OF BREATH: Primary | ICD-10-CM

## 2018-07-26 DIAGNOSIS — I10 ESSENTIAL HYPERTENSION: ICD-10-CM

## 2018-07-26 LAB
ANION GAP SERPL CALCULATED.3IONS-SCNC: 14 MMOL/L
BUN BLD-MCNC: 20 MG/DL (ref 8–23)
BUN/CREAT SERPL: 19.2 (ref 7–25)
CALCIUM SPEC-SCNC: 10 MG/DL (ref 8.6–10.5)
CHLORIDE SERPL-SCNC: 97 MMOL/L (ref 98–107)
CO2 SERPL-SCNC: 27 MMOL/L (ref 22–29)
CREAT BLD-MCNC: 1.04 MG/DL (ref 0.57–1)
GFR SERPL CREATININE-BSD FRML MDRD: 51 ML/MIN/1.73
GLUCOSE BLD-MCNC: 151 MG/DL (ref 65–99)
POTASSIUM BLD-SCNC: 5 MMOL/L (ref 3.5–5.2)
SODIUM BLD-SCNC: 138 MMOL/L (ref 136–145)

## 2018-07-26 PROCEDURE — 80048 BASIC METABOLIC PNL TOTAL CA: CPT

## 2018-07-26 PROCEDURE — 36415 COLL VENOUS BLD VENIPUNCTURE: CPT

## 2018-07-26 RX ORDER — AMLODIPINE BESYLATE 5 MG/1
5 TABLET ORAL DAILY
COMMUNITY
Start: 2018-07-07 | End: 2018-09-09 | Stop reason: SDUPTHER

## 2018-07-26 RX ORDER — CHOLECALCIFEROL (VITAMIN D3) 50 MCG
2000 TABLET ORAL DAILY
COMMUNITY

## 2018-07-26 NOTE — TELEPHONE ENCOUNTER
I called and s/w pt.  She understands verbally.  Ms. Vieira did forget to mention at her appointment this morning that she has been experiencing leg cramps since starting the HCTZ 25mg daily./nohelia

## 2018-07-26 NOTE — TELEPHONE ENCOUNTER
Her potassium level was normal.  If she wants to stop the hydrochlorothiazide she can and we can further discuss with Dr. Curiel during her next appointment.

## 2018-07-26 NOTE — PROGRESS NOTES
Patient continues to feel poorly.  She is having labile blood pressures and shortness of breath.  Her echocardiogram in December 2017 was stable.  I have recommended a Lexiscan Myoview stress test for further evaluation.  I also want her to be evaluated see him by Dr. Curiel.

## 2018-07-26 NOTE — PROGRESS NOTES
Patient here for B/P heart rate check after adding HCTZ to regimen.  B/P today was 132/76 with a HR of 68 & patient reports being SOA and just not feeling well, she did bring B/P log with her & will scan into her chart.    Per Winifred RUSSO, stay on current regimen, get BMP today, schedule stress test & f/u with Dr. Curiel on Aug 9th.

## 2018-07-26 NOTE — TELEPHONE ENCOUNTER
Please call patient and let her know that blood work today showed an elevated blood glucose of 151.  Kidney function and potassium stable.  Follow-up with Dr. Curiel for further recommendations.

## 2018-07-26 NOTE — PROGRESS NOTES
Patient then told MA that she is experiencing leg cramps on the hydrochlorothiazide.  Her potassium was normal today.  If she wants to stop the HCTZ she may.  Follow-up with Dr. Curiel as scheduled.

## 2018-07-27 NOTE — TELEPHONE ENCOUNTER
I called and s/w Ms. Vieira.  She understands verbally.  She states her leg cramps have subsided some.  She is going to try to take a 1/2 tablet to see if that helps.  She will call back with any concerns./nohelia

## 2018-07-30 LAB
FUNGUS WND CULT: ABNORMAL
Lab: ABNORMAL

## 2018-08-02 ENCOUNTER — OFFICE VISIT (OUTPATIENT)
Dept: INTERNAL MEDICINE | Facility: CLINIC | Age: 83
End: 2018-08-02

## 2018-08-02 VITALS
DIASTOLIC BLOOD PRESSURE: 86 MMHG | SYSTOLIC BLOOD PRESSURE: 126 MMHG | BODY MASS INDEX: 19.63 KG/M2 | HEIGHT: 63 IN | WEIGHT: 110.8 LBS

## 2018-08-02 DIAGNOSIS — R06.09 DYSPNEA ON EXERTION: ICD-10-CM

## 2018-08-02 DIAGNOSIS — R73.09 ELEVATED GLUCOSE: ICD-10-CM

## 2018-08-02 DIAGNOSIS — I10 ESSENTIAL HYPERTENSION: ICD-10-CM

## 2018-08-02 DIAGNOSIS — E03.9 ACQUIRED HYPOTHYROIDISM: ICD-10-CM

## 2018-08-02 DIAGNOSIS — G61.0 GUILLAIN BARRÉ SYNDROME (HCC): ICD-10-CM

## 2018-08-02 DIAGNOSIS — A31.0 MYCOBACTERIUM AVIUM COMPLEX (HCC): ICD-10-CM

## 2018-08-02 DIAGNOSIS — R53.82 CHRONIC FATIGUE: Primary | ICD-10-CM

## 2018-08-02 DIAGNOSIS — E78.2 MIXED HYPERLIPIDEMIA: ICD-10-CM

## 2018-08-02 LAB
FOLATE SERPL-MCNC: >20 NG/ML (ref 4.78–24.2)
HBA1C MFR BLD: 5.35 % (ref 4.8–5.6)
TSH SERPL-ACNC: 1.92 MIU/ML (ref 0.27–4.2)
VIT B12 SERPL-MCNC: 638 PG/ML (ref 211–946)

## 2018-08-02 PROCEDURE — 99214 OFFICE O/P EST MOD 30 MIN: CPT | Performed by: INTERNAL MEDICINE

## 2018-08-02 NOTE — PROGRESS NOTES
"Subjective   Stacie Vieira is a 83 y.o. female here for   Chief Complaint   Patient presents with   • Hyperlipidemia     6 month follow-up   • Hypertension   • Hypothyroidism   • Fatigue   • Shortness of Breath     On exertion- Having a stress test next week   .    Vitals:    08/02/18 1257   BP: 126/86   BP Location: Left arm   Patient Position: Sitting   Cuff Size: Adult   Weight: 50.3 kg (110 lb 12.8 oz)   Height: 160 cm (63\")       Body mass index is 19.63 kg/m².    Hyperlipidemia   This is a chronic problem. The current episode started more than 1 year ago. The problem is controlled. Exacerbating diseases include hypothyroidism. Associated symptoms include myalgias and shortness of breath. Pertinent negatives include no chest pain.   Hypertension   This is a chronic problem. The current episode started more than 1 year ago. The problem has been waxing and waning since onset. Associated symptoms include shortness of breath. Pertinent negatives include no chest pain or palpitations.   Hypothyroidism   This is a chronic problem. The current episode started more than 1 year ago. The problem occurs constantly. The problem has been unchanged. Associated symptoms include coughing, fatigue and myalgias. Pertinent negatives include no chest pain, chills or fever. Nausea: leg cramps.   Fatigue   This is a chronic problem. The current episode started more than 1 year ago. The problem has been gradually worsening. Associated symptoms include coughing, fatigue and myalgias. Pertinent negatives include no chest pain, chills or fever. Nausea: leg cramps.   Shortness of Breath   This is a chronic problem. The current episode started more than 1 year ago. The problem occurs daily. The problem has been gradually worsening. Pertinent negatives include no chest pain, fever, leg swelling or wheezing.        The following portions of the patient's history were reviewed and updated as appropriate: allergies, current medications, " past social history and problem list.    Review of Systems   Constitutional: Positive for fatigue. Negative for chills and fever.   Respiratory: Positive for cough and shortness of breath. Negative for wheezing.    Cardiovascular: Negative for chest pain, palpitations and leg swelling.   Gastrointestinal: Nausea: leg cramps.   Musculoskeletal: Positive for myalgias.   Psychiatric/Behavioral: Negative for dysphoric mood and sleep disturbance. The patient is not nervous/anxious.        Objective   Physical Exam   Constitutional: She appears well-developed and well-nourished. No distress.   Cardiovascular: Normal rate, regular rhythm and normal heart sounds.    Pulmonary/Chest: No respiratory distress. She has no wheezes. She has no rales. She exhibits no tenderness.   Musculoskeletal: She exhibits no edema.   Psychiatric: She has a normal mood and affect. Her behavior is normal.   Nursing note and vitals reviewed.      Assessment/Plan   Diagnoses and all orders for this visit:    Chronic fatigue  Comments:  worse - need labs today  Orders:  -     Vitamin B12 & Folate; Future  -     Vitamin B12 & Folate    Mixed hyperlipidemia    Essential hypertension  Comments:  stable - call if bp over 140/90  Orders:  -     Cancel: Urinalysis With Microscopic If Indicated (No Culture) - Urine, Clean Catch; Future    Dyspnea on exertion  Comments:  f/u with pul & cardiol    Acquired hypothyroidism  Comments:  need rechk  Orders:  -     TSH Rfx On Abnormal To Free T4; Future  -     TSH Rfx On Abnormal To Free T4    Guillain Barré syndrome (CMS/HCC)  Comments:  recovered    Mycobacterium avium complex (CMS/HCC)  Comments:  f/u with pul    Elevated glucose  Comments:  need lab today  Orders:  -     Hemoglobin A1c; Future  -     Hemoglobin A1c

## 2018-08-09 ENCOUNTER — HOSPITAL ENCOUNTER (OUTPATIENT)
Dept: CARDIOLOGY | Facility: HOSPITAL | Age: 83
Discharge: HOME | End: 2018-08-09
Admitting: NURSE PRACTITIONER

## 2018-08-09 ENCOUNTER — OFFICE VISIT (OUTPATIENT)
Dept: CARDIOLOGY | Facility: CLINIC | Age: 83
End: 2018-08-09

## 2018-08-09 ENCOUNTER — TELEPHONE (OUTPATIENT)
Dept: CARDIOLOGY | Facility: CLINIC | Age: 83
End: 2018-08-09

## 2018-08-09 VITALS
DIASTOLIC BLOOD PRESSURE: 90 MMHG | SYSTOLIC BLOOD PRESSURE: 170 MMHG | WEIGHT: 110 LBS | HEIGHT: 63 IN | HEART RATE: 63 BPM | BODY MASS INDEX: 19.49 KG/M2

## 2018-08-09 DIAGNOSIS — R06.02 SHORTNESS OF BREATH: ICD-10-CM

## 2018-08-09 DIAGNOSIS — I47.1 PSVT (PAROXYSMAL SUPRAVENTRICULAR TACHYCARDIA) (HCC): ICD-10-CM

## 2018-08-09 DIAGNOSIS — R09.89 LABILE BLOOD PRESSURE: Primary | ICD-10-CM

## 2018-08-09 DIAGNOSIS — R09.89 LABILE BLOOD PRESSURE: ICD-10-CM

## 2018-08-09 DIAGNOSIS — I70.1 STENOSIS OF RIGHT RENAL ARTERY (HCC): ICD-10-CM

## 2018-08-09 DIAGNOSIS — E78.2 MIXED HYPERLIPIDEMIA: ICD-10-CM

## 2018-08-09 LAB
BH CV NUCLEAR PRIOR STUDY: 2
BH CV STRESS BP STAGE 1: NORMAL
BH CV STRESS COMMENTS STAGE 1: NORMAL
BH CV STRESS DOSE REGADENOSON STAGE 1: 0.4
BH CV STRESS DURATION MIN STAGE 1: 0
BH CV STRESS DURATION SEC STAGE 1: 10
BH CV STRESS HR STAGE 1: 101
BH CV STRESS PROTOCOL 1: NORMAL
BH CV STRESS RECOVERY BP: NORMAL MMHG
BH CV STRESS RECOVERY HR: 79 BPM
BH CV STRESS STAGE 1: 1
LV EF NUC BP: 76 %
MAXIMAL PREDICTED HEART RATE: 138 BPM
PERCENT MAX PREDICTED HR: 73.19 %
STRESS BASELINE BP: NORMAL MMHG
STRESS BASELINE HR: 63 BPM
STRESS PERCENT HR: 86 %
STRESS POST EXERCISE DUR SEC: 10 SEC
STRESS POST PEAK BP: NORMAL MMHG
STRESS POST PEAK HR: 101 BPM
STRESS TARGET HR: 117 BPM

## 2018-08-09 PROCEDURE — 93000 ELECTROCARDIOGRAM COMPLETE: CPT | Performed by: INTERNAL MEDICINE

## 2018-08-09 PROCEDURE — 93016 CV STRESS TEST SUPVJ ONLY: CPT | Performed by: INTERNAL MEDICINE

## 2018-08-09 PROCEDURE — A9502 TC99M TETROFOSMIN: HCPCS | Performed by: NURSE PRACTITIONER

## 2018-08-09 PROCEDURE — 99214 OFFICE O/P EST MOD 30 MIN: CPT | Performed by: INTERNAL MEDICINE

## 2018-08-09 PROCEDURE — 25010000002 REGADENOSON 0.4 MG/5ML SOLUTION: Performed by: NURSE PRACTITIONER

## 2018-08-09 PROCEDURE — 93018 CV STRESS TEST I&R ONLY: CPT | Performed by: INTERNAL MEDICINE

## 2018-08-09 PROCEDURE — 0 TECHNETIUM TETROFOSMIN KIT: Performed by: NURSE PRACTITIONER

## 2018-08-09 PROCEDURE — 78452 HT MUSCLE IMAGE SPECT MULT: CPT

## 2018-08-09 PROCEDURE — 93017 CV STRESS TEST TRACING ONLY: CPT

## 2018-08-09 PROCEDURE — 78452 HT MUSCLE IMAGE SPECT MULT: CPT | Performed by: INTERNAL MEDICINE

## 2018-08-09 RX ADMIN — TETROFOSMIN 1 DOSE: 1.38 INJECTION, POWDER, LYOPHILIZED, FOR SOLUTION INTRAVENOUS at 08:57

## 2018-08-09 RX ADMIN — REGADENOSON 0.4 MG: 0.08 INJECTION, SOLUTION INTRAVENOUS at 08:57

## 2018-08-09 RX ADMIN — TETROFOSMIN 1 DOSE: 1.38 INJECTION, POWDER, LYOPHILIZED, FOR SOLUTION INTRAVENOUS at 08:05

## 2018-08-09 NOTE — PROGRESS NOTES
Date of Office Visit: 2018  Encounter Provider: Aisha Curiel MD  Place of Service: [facility] CARDIOLOGY  Patient Name: Stacie Vieira  :1935      Patient ID:  Stacie Vieira is a 83 y.o. female is here for  followup for labile hypertension.         History of Present Illness    She has had SVT since the 1970s and at that time was put on propranolol.  She had four  hospitalizations associated with this it sounds like, and at some point, they also put her  on digitalis also to control the SVT.  She really has not had any episodes since  that  she knows of.          She has been a .  She says it is very difficult because at  night she gets anxious.  Her neighbors, whom she has known for quite some time, no longer  live beside her due to some deaths and so the evening time is difficult for her.       She does have a son who lives in [place], [place] and is an intensivist at the [employer].       She had an echocardiogram done 17 to ejection fraction 67% grade 1 diastolic dysfunction, calcified aortic valve, mild tricuspid insufficiency.  At that time, she was in the hospital for pneumonia.  She has a history of MAC.  During hospitalization, she was diagnosed with Guillain-Barré syndrome and started plasmapheresis.  She did have an episode on 17 where she had a syncopal episode due to bradycardia.  She did receive chest compressions and got atropine.  She recovered quickly from this.    She had a nonischemic stress nuclear perfusion study done 18.  She had a renal artery duplex on 18 showing normal left renal artery, large left renal cyst measuring 5.1 x 5.4, hemodynamically significant right renal artery stenosis.  She had an echocardiogram done 17 showed ejection fraction of 67% with grade 1 diastolic dysfunction, mild tricuspid insufficiency and calcification of the aortic valve.    She is taking  amlodipine, hydrochlorothiazide, metoprolol in the morning and taking Benicar at night.  She tends to have fatigue mid afternoon.  Her blood pressures her highest first thing in the morning before her medications or in the evening.  She's had no syncope.  The lowest blood pressure readings she's seen is 107/70.  The highest that she seen as 190/90.  She doesn't feel her heart racing or skipping.  Having chest pain or pressure.  Having no difficulty breathing.    Past Medical History:   Diagnosis Date   • Acute stress disorder    • Allergic rhinitis    • C. difficile colitis     hx in past   • Dyspnea on exertion    • Elevated fasting glucose    • Essential hypertension    • Guillain Barré syndrome (CMS/HCC)    • H/O: pneumonia    • Health care maintenance    • History of palpitations    • Hyperlipidemia    • Hypokalemia    • Hypothyroidism    • Insomnia    • MAC (mycobacterium avium-intracellulare complex)     reason for bronch.  dx with this nov 2015   • Osteoporosis    • Palpitations    • Pneumonia     past hx of freq.   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    Per renal duplex - 60% stenosis, left normal    • SVT (supraventricular tachycardia) (CMS/HCC)     past hx  on medicine   • Syncope and collapse    • Thyroiditis          Past Surgical History:   Procedure Laterality Date   • BREAST BIOPSY      PERCUTATNEOUS NEEDLE CORE   • BRONCHOSCOPY      nov 2015   • BRONCHOSCOPY N/A 4/8/2016    Procedure: BRONCHOSCOPY WITH BAL;  Surgeon: Sinan Braga MD;  Location: [facility] ENDOSCOPY;  Service:    • BRONCHOSCOPY N/A 12/9/2017    Procedure: BRONCHOSCOPY;  Surgeon: Real Rousseau MD;  Location: [facility] ENDOSCOPY;  Service:    • BRONCHOSCOPY N/A 7/20/2018    Procedure: BRONCHOSCOPY with BAL;  Surgeon: Sinan Braga MD;  Location: [facility];   Service: Pulmonary   • COLONOSCOPY N/A 07/08/2005   • DILATATION AND CURETTAGE N/A 01/01/1990   • PAP SMEAR N/A 03/30/2004   • SINUS SURGERY  01/2016   • TONSILLECTOMY         Current Outpatient Prescriptions on File Prior to Visit   Medication Sig Dispense Refill   • amLODIPine (NORVASC) 5 MG tablet Take 5 mg by mouth Daily.     • azithromycin (ZITHROMAX) 250 MG tablet Take 1 tablet by mouth Daily. 30 tablet 11   • Cholecalciferol (VITAMIN D) 2000 units tablet Take 2,000 Units by mouth Daily.     • ethambutol (MYAMBUTOL) 400 MG tablet Take 2 tablets by mouth Daily. 660 tablet 11   • hydrochlorothiazide (HYDRODIURIL) 25 MG tablet Take 1 tablet by mouth Daily. 30 tablet 1   • levothyroxine (LEVOXYL) 50 MCG tablet Take 1 tablet by mouth Every Other Day. 45 tablet 3   • levothyroxine (LEVOXYL) 75 MCG tablet Take 1 tablet by mouth Every Other Day. 45 tablet 3   • loratadine (CLARITIN) 10 MG tablet Take 10 mg by mouth Daily As Needed for Allergies.     • metoprolol succinate XL (TOPROL-XL) 50 MG 24 hr tablet Take 1 tablet by mouth Daily. 90 tablet 3   • Misc Natural Products (JOINT SUPPORT PO) Take 2 tablets by mouth Daily.     • Multiple Vitamin (MULTI VITAMIN DAILY PO) Take 1 tablet by mouth Daily.     • olmesartan (BENICAR) 40 MG tablet Take 40 mg by mouth Daily.     • Probiotic Product (PROBIOTIC ADVANCED PO) Take 2 capsules by mouth Daily.     • rifAMPin (RIFADIN) 300 MG capsule Take 2 capsules by mouth Daily. 60 capsule 11   • Zinc 50 MG capsule Take 50 mg by mouth Daily.       No current facility-administered medications on file prior to visit.        Social History     Social History   • Marital status:      Spouse name: N/A   • Number of children: N/A   • Years of education: N/A     Occupational History   • Not on file.     Social History Main Topics   • Smoking status: Never Smoker   • Smokeless tobacco: Never Used      Comment: caffeine use   • Alcohol use No   • Drug use: No   • Sexual activity: No  "    Other Topics Concern   • Not on file     Social History Narrative   • No narrative on file           Review of Systems   Constitution: Negative.   HENT: Negative for congestion.    Eyes: Negative for vision loss in left eye and vision loss in right eye.   Respiratory: Negative.  Negative for cough, hemoptysis, shortness of breath, sleep disturbances due to breathing, snoring, sputum production and wheezing.    Endocrine: Negative.    Hematologic/Lymphatic: Negative.    Skin: Negative for poor wound healing and rash.   Musculoskeletal: Negative for falls, gout, muscle cramps and myalgias.   Gastrointestinal: Negative for abdominal pain, diarrhea, dysphagia, hematemesis, melena, nausea and vomiting.   Neurological: Negative for excessive daytime sleepiness, dizziness, headaches, light-headedness, loss of balance, seizures and vertigo.   Psychiatric/Behavioral: Negative for depression and substance abuse. The patient is not nervous/anxious.        Procedures    ECG 12 Lead  Date/Time: 8/9/2018 10:09 AM  Performed by: CARROL RIVAS  Authorized by: CARROL RIVAS   Comparison: compared with previous ECG   Similar to previous ECG  Rhythm: sinus rhythm  T flattening: aVL, aVF, V5 and V6  Clinical impression: abnormal ECG                Objective:      Vitals:    08/09/18 0952   BP: 170/90   BP Location: Right arm   Patient Position: Sitting   Pulse: 63   Weight: 49.9 kg (110 lb)   Height: 160 cm (62.99\")     Body mass index is 19.49 kg/m².    Physical Exam   Constitutional: She is oriented to person, place, and time. She appears well-developed and well-nourished. No distress.   HENT:   Head: Normocephalic and atraumatic.   Eyes: Conjunctivae are normal. No scleral icterus.   Neck: Neck supple. No JVD present. Carotid bruit is not present. No thyromegaly present.   Cardiovascular: Normal rate, regular rhythm, S1 normal, S2 normal, normal heart sounds and intact distal pulses.   No extrasystoles are present. " PMI is not displaced.  Exam reveals no gallop.    No murmur heard.  Pulses:       Carotid pulses are 2+ on the right side, and 2+ on the left side.       Radial pulses are 2+ on the right side, and 2+ on the left side.        Dorsalis pedis pulses are 2+ on the right side, and 2+ on the left side.        Posterior tibial pulses are 2+ on the right side, and 2+ on the left side.   Pulmonary/Chest: Effort normal and breath sounds normal. No respiratory distress. She has no wheezes. She has no rhonchi. She has no rales. She exhibits no tenderness.   Abdominal: Soft. Bowel sounds are normal. She exhibits no distension, no abdominal bruit and no mass. There is no tenderness.   Musculoskeletal: She exhibits no edema or deformity.   Lymphadenopathy:     She has no cervical adenopathy.   Neurological: She is alert and oriented to person, place, and time. No cranial nerve deficit.   Skin: Skin is warm and dry. No rash noted. She is not diaphoretic. No cyanosis. No pallor. Nails show no clubbing.   Psychiatric: She has a normal mood and affect. Judgment normal.   Vitals reviewed.      Lab Review:       Assessment:      Diagnosis Plan   1. Labile blood pressure     2. Mixed hyperlipidemia     3. PSVT (paroxysmal supraventricular tachycardia) (CMS/HCC)     4. Stenosis of right renal artery (CMS/HCC)          1. Hypertension.  BP is labile.  Try adjusting timing.   2. Palpitations.  These are very infrequent and overall have been very well controlled     3. History of SVT.  No recurrence off of digoxin.   4. Hyperlipidemia, well controlled.   5. Hypothyroidism, stable.   6. Anxiety, particularly in the evenings.    7. Chronic MAC  8. Guillain barre 12/2017.      Plan:       F/u with fili in 3 months.  Take toprol at night.  Remain on current doses of meds.

## 2018-08-09 NOTE — TELEPHONE ENCOUNTER
Stress Test Results:    · Left ventricular ejection fraction is hyperdynamic (Calculated EF > 70%).  · Myocardial perfusion imaging indicates a normal myocardial perfusion study with no evidence of ischemia.  · Impressions are consistent with a low risk study.    Patient informed.

## 2018-08-28 ENCOUNTER — APPOINTMENT (OUTPATIENT)
Dept: LAB | Facility: HOSPITAL | Age: 83
End: 2018-08-28

## 2018-08-28 ENCOUNTER — OFFICE VISIT (OUTPATIENT)
Dept: INFECTIOUS DISEASES | Facility: CLINIC | Age: 83
End: 2018-08-28

## 2018-08-28 VITALS
RESPIRATION RATE: 12 BRPM | HEIGHT: 63 IN | WEIGHT: 111.8 LBS | DIASTOLIC BLOOD PRESSURE: 66 MMHG | TEMPERATURE: 98 F | SYSTOLIC BLOOD PRESSURE: 185 MMHG | HEART RATE: 69 BPM | BODY MASS INDEX: 19.81 KG/M2

## 2018-08-28 DIAGNOSIS — Z79.2 LONG TERM CURRENT USE OF ANTIBIOTICS: ICD-10-CM

## 2018-08-28 DIAGNOSIS — A31.0 MYCOBACTERIUM AVIUM COMPLEX (HCC): Primary | ICD-10-CM

## 2018-08-28 DIAGNOSIS — R06.09 DYSPNEA ON EXERTION: ICD-10-CM

## 2018-08-28 LAB
ALBUMIN SERPL-MCNC: 4.5 G/DL (ref 3.5–5.2)
ALBUMIN/GLOB SERPL: 1.4 G/DL
ALP SERPL-CCNC: 67 U/L (ref 39–117)
ALT SERPL W P-5'-P-CCNC: 12 U/L (ref 1–33)
ANION GAP SERPL CALCULATED.3IONS-SCNC: 12.2 MMOL/L
AST SERPL-CCNC: 18 U/L (ref 1–32)
BASOPHILS # BLD AUTO: 0.02 10*3/MM3 (ref 0–0.2)
BASOPHILS NFR BLD AUTO: 0.3 % (ref 0–1.5)
BILIRUB SERPL-MCNC: 0.4 MG/DL (ref 0.1–1.2)
BUN BLD-MCNC: 15 MG/DL (ref 8–23)
BUN/CREAT SERPL: 16.5 (ref 7–25)
CALCIUM SPEC-SCNC: 9.9 MG/DL (ref 8.6–10.5)
CHLORIDE SERPL-SCNC: 99 MMOL/L (ref 98–107)
CO2 SERPL-SCNC: 27.8 MMOL/L (ref 22–29)
CREAT BLD-MCNC: 0.91 MG/DL (ref 0.57–1)
DEPRECATED RDW RBC AUTO: 43.9 FL (ref 37–54)
EOSINOPHIL # BLD AUTO: 0.09 10*3/MM3 (ref 0–0.7)
EOSINOPHIL NFR BLD AUTO: 1.5 % (ref 0.3–6.2)
ERYTHROCYTE [DISTWIDTH] IN BLOOD BY AUTOMATED COUNT: 12.7 % (ref 11.7–13)
GFR SERPL CREATININE-BSD FRML MDRD: 59 ML/MIN/1.73
GLOBULIN UR ELPH-MCNC: 3.3 GM/DL
GLUCOSE BLD-MCNC: 88 MG/DL (ref 65–99)
HCT VFR BLD AUTO: 41 % (ref 35.6–45.5)
HGB BLD-MCNC: 14 G/DL (ref 11.9–15.5)
IMM GRANULOCYTES # BLD: 0.06 10*3/MM3 (ref 0–0.03)
IMM GRANULOCYTES NFR BLD: 1 % (ref 0–0.5)
LYMPHOCYTES # BLD AUTO: 1.89 10*3/MM3 (ref 0.9–4.8)
LYMPHOCYTES NFR BLD AUTO: 31.1 % (ref 19.6–45.3)
MCH RBC QN AUTO: 32.7 PG (ref 26.9–32)
MCHC RBC AUTO-ENTMCNC: 34.1 G/DL (ref 32.4–36.3)
MCV RBC AUTO: 95.8 FL (ref 80.5–98.2)
MONOCYTES # BLD AUTO: 0.9 10*3/MM3 (ref 0.2–1.2)
MONOCYTES NFR BLD AUTO: 14.8 % (ref 5–12)
NEUTROPHILS # BLD AUTO: 3.17 10*3/MM3 (ref 1.9–8.1)
NEUTROPHILS NFR BLD AUTO: 52.3 % (ref 42.7–76)
PLATELET # BLD AUTO: 331 10*3/MM3 (ref 140–500)
PMV BLD AUTO: 8.6 FL (ref 6–12)
POTASSIUM BLD-SCNC: 5 MMOL/L (ref 3.5–5.2)
PROT SERPL-MCNC: 7.8 G/DL (ref 6–8.5)
RBC # BLD AUTO: 4.28 10*6/MM3 (ref 3.9–5.2)
SODIUM BLD-SCNC: 139 MMOL/L (ref 136–145)
WBC NRBC COR # BLD: 6.07 10*3/MM3 (ref 4.5–10.7)

## 2018-08-28 PROCEDURE — 36415 COLL VENOUS BLD VENIPUNCTURE: CPT | Performed by: INTERNAL MEDICINE

## 2018-08-28 PROCEDURE — 85025 COMPLETE CBC W/AUTO DIFF WBC: CPT | Performed by: INTERNAL MEDICINE

## 2018-08-28 PROCEDURE — 80053 COMPREHEN METABOLIC PANEL: CPT | Performed by: INTERNAL MEDICINE

## 2018-08-28 PROCEDURE — 99214 OFFICE O/P EST MOD 30 MIN: CPT | Performed by: INTERNAL MEDICINE

## 2018-08-28 NOTE — PROGRESS NOTES
cc: Follow-up (MAC)      Stacie returns for follow-up MAC.  She had been on therapy since January 2016-September 2017.  Unfortunately developed recurrence in December 2017 when she also was diagnosed with Guillain-Barré syndrome.  She was started on daily triple drug therapy in January 2018.      Since her last visit, she underwent bronchoscopy in July 2018 with AFB cultures -3 weeks.  She reports that she is just not felt well.  She doesn't have the energy that she normally had.  She has some dyspnea which is worse with exertion and associated with a dry cough.  She's not had any fevers or chills or night sweats.  Actually visited her cardiologist and had a Nessa scan which was normal and also her PCP who performed thyroid testing which was also normal.  She continues to take her triple drug therapy without side effects or missed doses.  She may have some mild nausea.  She was previously on these medicines for about 2 years, but did not have any of the problems of low energy or dyspnea while on the medications.    Past medical history: MAC, C. difficile in the past, hyperlipidemia, hypertension, hypothyroidism, sinusitis, SVT, gullian barre    Review of Systems: All other reviewed and negative except as per HPI    Vitals:    08/28/18 1243   BP: (!) 185/66   Pulse: 69   Resp: 12   Temp: 98 °F (36.7 °C)       GENERAL: Awake and alert, in no acute distress.   HEART: Regular rate and rhythm. No peripheral edema.   LUNGS: Clear to auscultation anteriorly with normal respiratory effort.   PSYCHIATRIC: Appropriate mood, affect, insight, and judgment.       DIAGNOSTICS:  AFB cultures from 12/9/17 grew MAC amikacin 8, clarithromycin 0.5, linezolid 8, moxifloxacin 0.5    Mycobacterium avium complex on 4/8/16: Sensitivities as below           Mycobacterium avium complex       DISK DIFFUSION       Amikacin 8.0        Ciprofloxacin 16.0        Clarithromycin 4.0  Susceptible       Ethambutol 8.0        Linezolid 64.0         Moxifloxacin 4.0        Rifampin >8.0        Streptomycin 32.0         7/20/18 BAL culture: No growth to date    Assessment and Plan  Mycobacterium avium complex  Long term current use of antibiotics  Dyspnea on exertion    Patient not feeling well, but given negative BAL cultures and improvement in symptoms, I am uncertain if this is related to progressive MAC.  In fact I think it's unlikely to be her MAC is causing her symptoms.  Probably multifactorial may be due to medication including her MAC therapy and her beta blocker.  I think she needs to stay on her triple drug therapy with  azithromycin 250 mg per day, ethambutol 15 mg/kg per day,  and rifampin 600 mg daily  For at least a year after her negative cultures.  I'll go ahead and check a CT of her chest to make sure there is no new explanation for her dyspnea.    CMP and CBC/diff today  RTC 3 months

## 2018-09-05 ENCOUNTER — HOSPITAL ENCOUNTER (OUTPATIENT)
Dept: CT IMAGING | Facility: HOSPITAL | Age: 83
Discharge: HOME | End: 2018-09-05
Attending: INTERNAL MEDICINE | Admitting: INTERNAL MEDICINE

## 2018-09-05 DIAGNOSIS — A31.0 MYCOBACTERIUM AVIUM COMPLEX (HCC): ICD-10-CM

## 2018-09-05 DIAGNOSIS — R06.09 DYSPNEA ON EXERTION: ICD-10-CM

## 2018-09-05 PROCEDURE — 71250 CT THORAX DX C-: CPT

## 2018-09-09 DIAGNOSIS — I10 BENIGN ESSENTIAL HTN: Primary | ICD-10-CM

## 2018-09-10 RX ORDER — AMLODIPINE BESYLATE 5 MG/1
TABLET ORAL
Qty: 30 TABLET | Refills: 2 | Status: SHIPPED | OUTPATIENT
Start: 2018-09-10 | End: 2018-12-01 | Stop reason: SDUPTHER

## 2018-09-10 RX ORDER — HYDROCHLOROTHIAZIDE 25 MG/1
TABLET ORAL
Qty: 30 TABLET | Refills: 3 | Status: SHIPPED | OUTPATIENT
Start: 2018-09-10 | End: 2019-01-12 | Stop reason: SDUPTHER

## 2018-10-08 ENCOUNTER — OFFICE VISIT (OUTPATIENT)
Dept: INTERNAL MEDICINE | Facility: CLINIC | Age: 83
End: 2018-10-08

## 2018-10-08 VITALS
OXYGEN SATURATION: 97 % | HEART RATE: 63 BPM | WEIGHT: 111 LBS | BODY MASS INDEX: 19.67 KG/M2 | SYSTOLIC BLOOD PRESSURE: 130 MMHG | DIASTOLIC BLOOD PRESSURE: 88 MMHG | HEIGHT: 63 IN

## 2018-10-08 DIAGNOSIS — M25.551 RIGHT HIP PAIN: Primary | ICD-10-CM

## 2018-10-08 DIAGNOSIS — M54.5 ACUTE RIGHT-SIDED LOW BACK PAIN, WITH SCIATICA PRESENCE UNSPECIFIED: ICD-10-CM

## 2018-10-08 PROCEDURE — 99213 OFFICE O/P EST LOW 20 MIN: CPT | Performed by: NURSE PRACTITIONER

## 2018-10-08 RX ORDER — TRIAMCINOLONE ACETONIDE 0.25 MG/G
CREAM TOPICAL
COMMUNITY
Start: 2018-09-13 | End: 2019-05-08

## 2018-10-09 NOTE — PROGRESS NOTES
Subjective   Stacie Vieira is a 83 y.o. female who presents due to right hip pain.    Hip Pain    There was no injury mechanism. The pain is present in the right hip. The quality of the pain is described as aching and burning. The pain is moderate. The pain has been intermittent since onset. Associated symptoms include a loss of motion. Pertinent negatives include no numbness or tingling. The symptoms are aggravated by weight bearing (sitting).        The following portions of the patient's history were reviewed and updated as appropriate: allergies, current medications, past social history and problem list.    Past Medical History:   Diagnosis Date   • Acute stress disorder    • Allergic rhinitis    • C. difficile colitis     hx in past   • Dyspnea on exertion    • Elevated fasting glucose    • Essential hypertension    • Guillain Barré syndrome (CMS/HCC)    • H/O: pneumonia    • Health care maintenance    • History of palpitations    • Hyperlipidemia    • Hypokalemia    • Hypothyroidism    • Insomnia    • MAC (mycobacterium avium-intracellulare complex)     reason for bronch.  dx with this nov 2015   • Osteoporosis    • Palpitations    • Pneumonia     past hx of freq.   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    Per renal duplex - 60% stenosis, left normal    • SVT (supraventricular tachycardia) (CMS/HCC)     past hx  on medicine   • Syncope and collapse    • Thyroiditis          Current Outpatient Prescriptions:   •  amLODIPine (NORVASC) 5 MG tablet, TAKE ONE TABLET BY MOUTH DAILY, Disp: 30 tablet, Rfl: 2  •  Cholecalciferol (VITAMIN D) 2000 units tablet, Take 2,000 Units by mouth Daily., Disp: , Rfl:   •  ethambutol (MYAMBUTOL) 400 MG tablet, Take 2 tablets by mouth Daily., Disp: 660 tablet, Rfl: 11  •  hydrochlorothiazide  (HYDRODIURIL) 25 MG tablet, TAKE ONE TABLET BY MOUTH DAILY, Disp: 30 tablet, Rfl: 3  •  levothyroxine (LEVOXYL) 50 MCG tablet, Take 1 tablet by mouth Every Other Day., Disp: 45 tablet, Rfl: 3  •  levothyroxine (LEVOXYL) 75 MCG tablet, Take 1 tablet by mouth Every Other Day., Disp: 45 tablet, Rfl: 3  •  metoprolol succinate XL (TOPROL-XL) 50 MG 24 hr tablet, Take 1 tablet by mouth Daily., Disp: 90 tablet, Rfl: 3  •  Misc Natural Products (JOINT SUPPORT PO), Take 2 tablets by mouth Daily., Disp: , Rfl:   •  Multiple Vitamin (MULTI VITAMIN DAILY PO), Take 1 tablet by mouth Daily., Disp: , Rfl:   •  olmesartan (BENICAR) 40 MG tablet, Take 40 mg by mouth Daily., Disp: , Rfl:   •  Probiotic Product (PROBIOTIC ADVANCED PO), Take 2 capsules by mouth Daily., Disp: , Rfl:   •  rifAMPin (RIFADIN) 300 MG capsule, Take 2 capsules by mouth Daily., Disp: 60 capsule, Rfl: 11  •  Zinc 50 MG capsule, Take 50 mg by mouth Daily., Disp: , Rfl:   •  triamcinolone (KENALOG) 0.025 % cream, , Disp: , Rfl:     Allergies   Allergen Reactions   • Influenza Vaccines Mental Status Change     Guillain-[place]   • Alendronate Other (See Comments)     Headaches   • Ezetimibe Other (See Comments)     Leg Cramps   • Pravachol [Pravastatin] Other (See Comments)     Leg Cramps       Review of Systems   Constitutional: Negative for chills, fatigue, fever and unexpected weight change.   HENT: Negative for congestion, ear pain, postnasal drip, sinus pressure, sore throat and trouble swallowing.    Eyes: Negative for visual disturbance.   Respiratory: Negative for cough, chest tightness and wheezing.    Cardiovascular: Negative for chest pain, palpitations and leg swelling.   Gastrointestinal: Negative for abdominal pain and blood in stool.   Genitourinary: Negative for dysuria, frequency and urgency.   Musculoskeletal: Positive for arthralgias and back pain (pain begins in right gluteal area and radiates into right hip and leg). Negative for joint swelling.  "  Neurological: Negative for tingling, syncope, weakness, numbness and headaches.       Objective   Vitals:    10/08/18 0857   BP: 130/88   BP Location: Left arm   Patient Position: Sitting   Pulse: 63   SpO2: 97%   Weight: 50.3 kg (111 lb)   Height: 160 cm (62.99\")     Physical Exam   Constitutional: She appears well-developed and well-nourished. She is cooperative. She does not have a sickly appearance. She does not appear ill.   HENT:   Head: Normocephalic.   Right Ear: Hearing, tympanic membrane and external ear normal.   Left Ear: Hearing, tympanic membrane and external ear normal.   Nose: Nose normal. No mucosal edema, rhinorrhea, sinus tenderness or nasal deformity. Right sinus exhibits no maxillary sinus tenderness and no frontal sinus tenderness. Left sinus exhibits no maxillary sinus tenderness and no frontal sinus tenderness.   Mouth/Throat: Oropharynx is clear and moist and mucous membranes are normal. Normal dentition.   Eyes: Conjunctivae and lids are normal. Right eye exhibits no discharge and no exudate. Left eye exhibits no discharge and no exudate.   Neck: Trachea normal. Carotid bruit is not present. No edema present. No thyroid mass present.   Cardiovascular: Regular rhythm, normal heart sounds and normal pulses.    No murmur heard.  Pulmonary/Chest: Breath sounds normal. No respiratory distress. She has no decreased breath sounds. She has no wheezes. She has no rhonchi. She has no rales.   Musculoskeletal:        Right hip: She exhibits tenderness (increased pain with abduction and adduction of right leg). She exhibits no swelling.        Lumbar back: She exhibits tenderness.   Lymphadenopathy:        Head (right side): No submental, no submandibular, no tonsillar, no preauricular, no posterior auricular and no occipital adenopathy present.        Head (left side): No submental, no submandibular, no tonsillar, no preauricular, no posterior auricular and no occipital adenopathy present. "   Neurological: She is alert.   Skin: Skin is warm, dry and intact. No cyanosis. Nails show no clubbing.       Assessment/Plan   Stacie was seen today for hip pain.    Diagnoses and all orders for this visit:    Right hip pain  -     Ambulatory Referral to Physical Therapy Evaluate and treat    Acute right-sided low back pain, with sciatica presence unspecified  -     Ambulatory Referral to Physical Therapy Evaluate and treat    She has taken Ibuprofen for pain and inflammation, sx suggestive of bursitis. She will be referred to PT for further treatment, consider further evaluation if sx persist/worsen.

## 2018-11-09 DIAGNOSIS — I10 ESSENTIAL HYPERTENSION: ICD-10-CM

## 2018-11-12 RX ORDER — OLMESARTAN MEDOXOMIL 40 MG/1
TABLET ORAL
Qty: 90 TABLET | Refills: 2 | Status: SHIPPED | OUTPATIENT
Start: 2018-11-12 | End: 2019-08-01 | Stop reason: SDUPTHER

## 2018-11-17 DIAGNOSIS — E03.9 ACQUIRED HYPOTHYROIDISM: ICD-10-CM

## 2018-11-19 RX ORDER — LEVOTHYROXINE SODIUM 50 UG/1
TABLET ORAL
Qty: 45 TABLET | Refills: 2 | Status: SHIPPED | OUTPATIENT
Start: 2018-11-19 | End: 2019-08-14 | Stop reason: SDUPTHER

## 2018-11-20 ENCOUNTER — APPOINTMENT (OUTPATIENT)
Dept: LAB | Facility: HOSPITAL | Age: 83
End: 2018-11-20

## 2018-11-20 ENCOUNTER — OFFICE VISIT (OUTPATIENT)
Dept: INFECTIOUS DISEASES | Facility: CLINIC | Age: 83
End: 2018-11-20

## 2018-11-20 VITALS
BODY MASS INDEX: 19.56 KG/M2 | DIASTOLIC BLOOD PRESSURE: 87 MMHG | HEIGHT: 63 IN | TEMPERATURE: 97.8 F | WEIGHT: 110.4 LBS | SYSTOLIC BLOOD PRESSURE: 164 MMHG | HEART RATE: 79 BPM

## 2018-11-20 DIAGNOSIS — A31.0 MYCOBACTERIUM AVIUM COMPLEX (HCC): Primary | ICD-10-CM

## 2018-11-20 DIAGNOSIS — Z79.2 LONG TERM CURRENT USE OF ANTIBIOTICS: ICD-10-CM

## 2018-11-20 LAB
ALBUMIN SERPL-MCNC: 4.8 G/DL (ref 3.5–5.2)
ALBUMIN/GLOB SERPL: 1.5 G/DL
ALP SERPL-CCNC: 71 U/L (ref 39–117)
ALT SERPL W P-5'-P-CCNC: 11 U/L (ref 1–33)
ANION GAP SERPL CALCULATED.3IONS-SCNC: 12 MMOL/L
AST SERPL-CCNC: 18 U/L (ref 1–32)
BASOPHILS # BLD AUTO: 0.02 10*3/MM3 (ref 0–0.2)
BASOPHILS NFR BLD AUTO: 0.3 % (ref 0–1.5)
BILIRUB SERPL-MCNC: 0.8 MG/DL (ref 0.1–1.2)
BUN BLD-MCNC: 18 MG/DL (ref 8–23)
BUN/CREAT SERPL: 17.8 (ref 7–25)
CALCIUM SPEC-SCNC: 9.7 MG/DL (ref 8.6–10.5)
CHLORIDE SERPL-SCNC: 95 MMOL/L (ref 98–107)
CO2 SERPL-SCNC: 27 MMOL/L (ref 22–29)
CREAT BLD-MCNC: 1.01 MG/DL (ref 0.57–1)
DEPRECATED RDW RBC AUTO: 46.9 FL (ref 37–54)
EOSINOPHIL # BLD AUTO: 0.09 10*3/MM3 (ref 0–0.7)
EOSINOPHIL NFR BLD AUTO: 1.4 % (ref 0.3–6.2)
ERYTHROCYTE [DISTWIDTH] IN BLOOD BY AUTOMATED COUNT: 13 % (ref 11.7–13)
GFR SERPL CREATININE-BSD FRML MDRD: 52 ML/MIN/1.73
GLOBULIN UR ELPH-MCNC: 3.3 GM/DL
GLUCOSE BLD-MCNC: 103 MG/DL (ref 65–99)
HCT VFR BLD AUTO: 42.4 % (ref 35.6–45.5)
HGB BLD-MCNC: 14.2 G/DL (ref 11.9–15.5)
IMM GRANULOCYTES # BLD: 0.03 10*3/MM3 (ref 0–0.03)
IMM GRANULOCYTES NFR BLD: 0.5 % (ref 0–0.5)
LYMPHOCYTES # BLD AUTO: 1.68 10*3/MM3 (ref 0.9–4.8)
LYMPHOCYTES NFR BLD AUTO: 25.3 % (ref 19.6–45.3)
MCH RBC QN AUTO: 33.1 PG (ref 26.9–32)
MCHC RBC AUTO-ENTMCNC: 33.5 G/DL (ref 32.4–36.3)
MCV RBC AUTO: 98.8 FL (ref 80.5–98.2)
MONOCYTES # BLD AUTO: 0.96 10*3/MM3 (ref 0.2–1.2)
MONOCYTES NFR BLD AUTO: 14.5 % (ref 5–12)
NEUTROPHILS # BLD AUTO: 3.86 10*3/MM3 (ref 1.9–8.1)
NEUTROPHILS NFR BLD AUTO: 58 % (ref 42.7–76)
PLATELET # BLD AUTO: 282 10*3/MM3 (ref 140–500)
PMV BLD AUTO: 8.5 FL (ref 6–12)
POTASSIUM BLD-SCNC: 4.7 MMOL/L (ref 3.5–5.2)
PROT SERPL-MCNC: 8.1 G/DL (ref 6–8.5)
RBC # BLD AUTO: 4.29 10*6/MM3 (ref 3.9–5.2)
SODIUM BLD-SCNC: 134 MMOL/L (ref 136–145)
WBC NRBC COR # BLD: 6.64 10*3/MM3 (ref 4.5–10.7)

## 2018-11-20 PROCEDURE — 85025 COMPLETE CBC W/AUTO DIFF WBC: CPT | Performed by: INTERNAL MEDICINE

## 2018-11-20 PROCEDURE — 36415 COLL VENOUS BLD VENIPUNCTURE: CPT | Performed by: INTERNAL MEDICINE

## 2018-11-20 PROCEDURE — 80053 COMPREHEN METABOLIC PANEL: CPT | Performed by: INTERNAL MEDICINE

## 2018-11-20 PROCEDURE — 99213 OFFICE O/P EST LOW 20 MIN: CPT | Performed by: INTERNAL MEDICINE

## 2018-11-20 NOTE — PROGRESS NOTES
cc: Follow-up      Stacie returns for follow-up MAC.  She had been on therapy since January 2016-September 2017.  Unfortunately developed recurrence in December 2017 when she also was diagnosed with Guillain-Barré syndrome.  She was started on daily triple drug therapy in January 2018.  She underwent bronchoscopy in July 2018 with negative AFB cultures    Since her last visit in July 2018 went ahead and got a CT scan of the chest given some complaints of dyspnea on exertion.  This was stable.  Recommended repeat CT scan in one year.  Otherwise doing okay.  No side effects or missed doses from the antibiotics.  No significant cough although she does have some intermittent paroxysms of cough.  No significant shortness of breath.  Weight has been stable.          Past medical history: MAC, C. difficile in the past, hyperlipidemia, hypertension, hypothyroidism, sinusitis, SVT, gullian barre    Review of Systems: All other reviewed and negative except as per HPI    Vitals:    11/20/18 1316   BP: 164/87   Pulse: 79   Temp: 97.8 °F (36.6 °C)       GENERAL: Awake and alert, in no acute distress.   HEART: Regular rate and rhythm. No peripheral edema.   LUNGS: Clear to auscultation anteriorly with normal respiratory effort.   PSYCHIATRIC: Appropriate mood, affect, insight, and judgment.       DIAGNOSTICS:  AFB cultures from 12/9/17 grew MAC amikacin 8, clarithromycin 0.5, linezolid 8, moxifloxacin 0.5    Mycobacterium avium complex on 4/8/16: Sensitivities as below           Mycobacterium avium complex       DISK DIFFUSION       Amikacin 8.0        Ciprofloxacin 16.0        Clarithromycin 4.0  Susceptible       Ethambutol 8.0        Linezolid 64.0        Moxifloxacin 4.0        Rifampin >8.0        Streptomycin 32.0         7/20/18 BAL culture: Neg    CT chest shows unchanged appearance since January 2018 with scattered pulmonary nodularity stable since September 2017    Assessment and Plan  Mycobacterium avium complex  Long  term current use of antibiotics      On triple drug therapy since Jan 2018.  Negative BAL in July 2018  azithromycin 250 mg per day, ethambutol 15 mg/kg per day,  and rifampin 600 mg daily for at least a year after her negative cultures.   Repeat CT chest in summer 2019    CMP and CBC/diff today  RTC 3 months

## 2018-11-28 ENCOUNTER — APPOINTMENT (OUTPATIENT)
Dept: WOMENS IMAGING | Facility: HOSPITAL | Age: 83
End: 2018-11-28

## 2018-11-28 DIAGNOSIS — R92.1 BREAST CALCIFICATIONS: Primary | ICD-10-CM

## 2018-11-28 PROCEDURE — 77067 SCR MAMMO BI INCL CAD: CPT | Performed by: RADIOLOGY

## 2018-11-28 PROCEDURE — 76641 ULTRASOUND BREAST COMPLETE: CPT | Performed by: RADIOLOGY

## 2018-11-28 PROCEDURE — MDREVIEWSP: Performed by: RADIOLOGY

## 2018-11-28 PROCEDURE — 77063 BREAST TOMOSYNTHESIS BI: CPT | Performed by: RADIOLOGY

## 2018-11-28 PROCEDURE — 77066 DX MAMMO INCL CAD BI: CPT | Performed by: RADIOLOGY

## 2018-11-28 NOTE — PROGRESS NOTES
Marlin w/ [facility] called and stated an order is needed for Ms. Kim for a diagnostic bilateral mammogram w/ ultrasound due to patient having bilateral breast calcifications.     An order was placed and sent to Dr. Bellamy to sign off on electronically.

## 2018-12-01 DIAGNOSIS — E03.9 ACQUIRED HYPOTHYROIDISM: ICD-10-CM

## 2018-12-01 DIAGNOSIS — I10 BENIGN ESSENTIAL HTN: ICD-10-CM

## 2018-12-04 RX ORDER — LEVOTHYROXINE SODIUM 75 UG/1
TABLET ORAL
Qty: 45 TABLET | Refills: 2 | Status: SHIPPED | OUTPATIENT
Start: 2018-12-04 | End: 2019-08-31 | Stop reason: SDUPTHER

## 2018-12-04 RX ORDER — AMLODIPINE BESYLATE 5 MG/1
TABLET ORAL
Qty: 30 TABLET | Refills: 1 | Status: SHIPPED | OUTPATIENT
Start: 2018-12-04 | End: 2019-02-04 | Stop reason: SDUPTHER

## 2018-12-28 ENCOUNTER — OFFICE VISIT (OUTPATIENT)
Dept: INTERNAL MEDICINE | Facility: CLINIC | Age: 83
End: 2018-12-28

## 2018-12-28 VITALS
WEIGHT: 110.6 LBS | TEMPERATURE: 98.6 F | BODY MASS INDEX: 19.6 KG/M2 | HEIGHT: 63 IN | DIASTOLIC BLOOD PRESSURE: 90 MMHG | SYSTOLIC BLOOD PRESSURE: 144 MMHG

## 2018-12-28 DIAGNOSIS — J34.89 FRONTAL SINUS PAIN: ICD-10-CM

## 2018-12-28 DIAGNOSIS — R11.0 NAUSEA: ICD-10-CM

## 2018-12-28 DIAGNOSIS — I10 ESSENTIAL HYPERTENSION: ICD-10-CM

## 2018-12-28 DIAGNOSIS — R05.9 COUGH: ICD-10-CM

## 2018-12-28 DIAGNOSIS — R52 GENERALIZED BODY ACHES: Primary | ICD-10-CM

## 2018-12-28 LAB
EXPIRATION DATE: NORMAL
FLUAV AG NPH QL: NEGATIVE
FLUBV AG NPH QL: NEGATIVE
INTERNAL CONTROL: NORMAL
Lab: NORMAL

## 2018-12-28 PROCEDURE — 99214 OFFICE O/P EST MOD 30 MIN: CPT | Performed by: INTERNAL MEDICINE

## 2018-12-28 PROCEDURE — 87804 INFLUENZA ASSAY W/OPTIC: CPT | Performed by: INTERNAL MEDICINE

## 2018-12-28 RX ORDER — AZITHROMYCIN 250 MG/1
1 TABLET, FILM COATED ORAL DAILY
COMMUNITY
Start: 2018-12-18 | End: 2019-02-04

## 2018-12-28 RX ORDER — ONDANSETRON 4 MG/1
4 TABLET, FILM COATED ORAL EVERY 8 HOURS PRN
Qty: 10 TABLET | Refills: 0 | Status: SHIPPED | OUTPATIENT
Start: 2018-12-28 | End: 2019-02-04

## 2018-12-28 RX ORDER — SULFAMETHOXAZOLE AND TRIMETHOPRIM 800; 160 MG/1; MG/1
1 TABLET ORAL 2 TIMES DAILY
Qty: 20 TABLET | Refills: 0 | Status: SHIPPED | OUTPATIENT
Start: 2018-12-28 | End: 2019-02-04

## 2018-12-28 NOTE — PROGRESS NOTES
"Subjective   Stacie Vieira is a 83 y.o. female here for   Chief Complaint   Patient presents with   • URI     x 1 day   • Cough     x 1 day   • Generalized Body Aches     x 1 day   • Hypertension   .    Vitals:    12/28/18 0917   BP: 144/90   BP Location: Left arm   Patient Position: Sitting   Cuff Size: Adult   Temp: 98.6 °F (37 °C)   TempSrc: Temporal   Weight: 50.2 kg (110 lb 9.6 oz)   Height: 160 cm (63\")       Body mass index is 19.59 kg/m².    URI    This is a new problem. The current episode started yesterday. The problem has been unchanged. There has been no fever. Associated symptoms include congestion, coughing, ear pain, nausea and sneezing. Pertinent negatives include no chest pain, sore throat, vomiting or wheezing.   Cough   This is a new problem. The current episode started yesterday. The problem has been unchanged. The cough is non-productive. Associated symptoms include ear pain, postnasal drip and shortness of breath. Pertinent negatives include no chest pain, chills, fever, sore throat or wheezing.   Facial Pain   This is a new problem. The current episode started yesterday. The problem occurs constantly. The problem has been unchanged. Associated symptoms include congestion, coughing, fatigue and nausea. Pertinent negatives include no chest pain, chills, fever, sore throat or vomiting.   Hypertension   This is a chronic problem. The current episode started more than 1 year ago. The problem is unchanged. The problem is controlled. Associated symptoms include shortness of breath. Pertinent negatives include no chest pain or palpitations.        The following portions of the patient's history were reviewed and updated as appropriate: allergies, current medications, past social history and problem list.    Review of Systems   Constitutional: Positive for appetite change and fatigue. Negative for chills and fever.   HENT: Positive for congestion, ear pain, postnasal drip, sinus pressure and " sneezing. Negative for sore throat, trouble swallowing and voice change.    Respiratory: Positive for cough and shortness of breath. Negative for chest tightness and wheezing.    Cardiovascular: Negative for chest pain, palpitations and leg swelling.   Gastrointestinal: Positive for nausea. Negative for vomiting.       Objective   Physical Exam   Constitutional: She appears well-developed and well-nourished. No distress.   HENT:   Head: Normocephalic.   Right Ear: External ear normal. Tympanic membrane is bulging. Tympanic membrane is not erythematous.   Left Ear: External ear normal. Tympanic membrane is bulging. Tympanic membrane is not erythematous.   Nose: Right sinus exhibits no frontal sinus tenderness. Left sinus exhibits no frontal sinus tenderness.   Mouth/Throat: Oropharynx is clear and moist. No oropharyngeal exudate.   Neck: Normal range of motion. Neck supple.   Cardiovascular: Normal rate, regular rhythm and normal heart sounds.   Pulmonary/Chest: Effort normal and breath sounds normal. No respiratory distress. She has no wheezes. She has no rales. She exhibits no tenderness.   Musculoskeletal: She exhibits no edema.   Lymphadenopathy:     She has no cervical adenopathy.   Psychiatric: She has a normal mood and affect. Her behavior is normal.   Nursing note and vitals reviewed.    Negative for flu today.    Assessment/Plan   Diagnoses and all orders for this visit:    Generalized body aches  Comments:  viral syndrome? - need incr fluids -need to go to ER if severe sx  Orders:  -     POCT Influenza A/B    Cough  Comments:  normal lung exam today - she already takes 3 abs b/c COOKIE - only take bactrim if worsening sx - call 911 if severe    Nausea  Comments:  mild - needs fluids -will go to ER if not able to drink fluids    Frontal sinus pain  Comments:  she worries about sinus infec & requests antibiotic-do not stop 3 abs per pulmonary for COOKIE - ok to take bactrim if no better with mucinex, nasal  saline/flonase    Essential hypertension  Comments:  borderline high today - need to call if bp over 140/90 at home    Other orders  -     azithromycin (ZITHROMAX) 250 MG tablet; Take 1 tablet by mouth Daily.  -     ondansetron (ZOFRAN) 4 MG tablet; Take 1 tablet by mouth Every 8 (Eight) Hours As Needed for Nausea or Vomiting.  -     sulfamethoxazole-trimethoprim (BACTRIM DS) 800-160 MG per tablet; Take 1 tablet by mouth 2 (Two) Times a Day.

## 2019-01-14 RX ORDER — HYDROCHLOROTHIAZIDE 25 MG/1
TABLET ORAL
Qty: 30 TABLET | Refills: 2 | Status: SHIPPED | OUTPATIENT
Start: 2019-01-14 | End: 2019-05-11 | Stop reason: SDUPTHER

## 2019-01-15 ENCOUNTER — TRANSCRIBE ORDERS (OUTPATIENT)
Dept: ADMINISTRATIVE | Facility: HOSPITAL | Age: 84
End: 2019-01-15

## 2019-01-15 DIAGNOSIS — H46.9 BILATERAL OPTIC NEUROPATHY: Primary | ICD-10-CM

## 2019-01-16 ENCOUNTER — TRANSCRIBE ORDERS (OUTPATIENT)
Dept: ADMINISTRATIVE | Facility: HOSPITAL | Age: 84
End: 2019-01-16

## 2019-01-16 ENCOUNTER — HOSPITAL ENCOUNTER (OUTPATIENT)
Dept: MRI IMAGING | Facility: HOSPITAL | Age: 84
Discharge: HOME | End: 2019-01-16
Admitting: OPHTHALMOLOGY

## 2019-01-16 ENCOUNTER — LAB (OUTPATIENT)
Dept: LAB | Facility: HOSPITAL | Age: 84
End: 2019-01-16

## 2019-01-16 ENCOUNTER — TELEPHONE (OUTPATIENT)
Dept: INFECTIOUS DISEASES | Facility: CLINIC | Age: 84
End: 2019-01-16

## 2019-01-16 ENCOUNTER — HOSPITAL ENCOUNTER (OUTPATIENT)
Dept: MRI IMAGING | Facility: HOSPITAL | Age: 84
Discharge: HOME | End: 2019-01-16

## 2019-01-16 DIAGNOSIS — H46.9 BILATERAL OPTIC NEUROPATHY: ICD-10-CM

## 2019-01-16 DIAGNOSIS — H46.9 OPTIC NEUROPATHY, BILATERAL: Primary | ICD-10-CM

## 2019-01-16 DIAGNOSIS — H46.9 OPTIC NEUROPATHY, BILATERAL: ICD-10-CM

## 2019-01-16 LAB
DEPRECATED RDW RBC AUTO: 46.4 FL (ref 37–54)
ERYTHROCYTE [DISTWIDTH] IN BLOOD BY AUTOMATED COUNT: 12.9 % (ref 11.7–13)
HCT VFR BLD AUTO: 42.9 % (ref 35.6–45.5)
HGB BLD-MCNC: 14.2 G/DL (ref 11.9–15.5)
MCH RBC QN AUTO: 32.6 PG (ref 26.9–32)
MCHC RBC AUTO-ENTMCNC: 33.1 G/DL (ref 32.4–36.3)
MCV RBC AUTO: 98.6 FL (ref 80.5–98.2)
PLATELET # BLD AUTO: 401 10*3/MM3 (ref 140–500)
PMV BLD AUTO: 8.2 FL (ref 6–12)
RBC # BLD AUTO: 4.35 10*6/MM3 (ref 3.9–5.2)
VIT B12 BLD-MCNC: 893 PG/ML (ref 211–946)
WBC NRBC COR # BLD: 7.47 10*3/MM3 (ref 4.5–10.7)

## 2019-01-16 PROCEDURE — 36415 COLL VENOUS BLD VENIPUNCTURE: CPT

## 2019-01-16 PROCEDURE — 82747 ASSAY OF FOLIC ACID RBC: CPT

## 2019-01-16 PROCEDURE — 0 GADOBENATE DIMEGLUMINE 529 MG/ML SOLUTION: Performed by: OPHTHALMOLOGY

## 2019-01-16 PROCEDURE — 85014 HEMATOCRIT: CPT

## 2019-01-16 PROCEDURE — 82607 VITAMIN B-12: CPT

## 2019-01-16 PROCEDURE — 82565 ASSAY OF CREATININE: CPT

## 2019-01-16 PROCEDURE — 85027 COMPLETE CBC AUTOMATED: CPT

## 2019-01-16 PROCEDURE — 70553 MRI BRAIN STEM W/O & W/DYE: CPT

## 2019-01-16 PROCEDURE — A9577 INJ MULTIHANCE: HCPCS | Performed by: OPHTHALMOLOGY

## 2019-01-16 PROCEDURE — 70543 MRI ORBT/FAC/NCK W/O &W/DYE: CPT

## 2019-01-16 RX ADMIN — GADOBENATE DIMEGLUMINE 10 ML: 529 INJECTION, SOLUTION INTRAVENOUS at 16:04

## 2019-01-16 NOTE — TELEPHONE ENCOUNTER
Pt wanted to know if Dr. Del Angel spoke with Ophthalmologist yesterday.  Pt is coming to hospital for MRI of brain and labs today.  She is wanting to know if Dr. Del Angel feels as he needs to order any additional labs and addition to those ordered by her Ophthalmologist  or if he needs to see her in clinic while she is on campus.

## 2019-01-17 LAB
CREAT BLDA-MCNC: 0.9 MG/DL (ref 0.6–1.3)
FOLATE BLD-MCNC: 607.9 NG/ML
FOLATE RBC-MCNC: 1486 NG/ML
HCT VFR BLD AUTO: 40.9 % (ref 34–46.6)

## 2019-01-18 NOTE — TELEPHONE ENCOUNTER
We are going to hold her medicines until she sees me in clinic next month  D/w her on phone this afternoon

## 2019-02-01 ENCOUNTER — TELEPHONE (OUTPATIENT)
Dept: INTERNAL MEDICINE | Facility: CLINIC | Age: 84
End: 2019-02-01

## 2019-02-01 DIAGNOSIS — R92.1 BREAST CALCIFICATIONS: ICD-10-CM

## 2019-02-04 ENCOUNTER — OFFICE VISIT (OUTPATIENT)
Dept: INTERNAL MEDICINE | Facility: CLINIC | Age: 84
End: 2019-02-04

## 2019-02-04 VITALS
SYSTOLIC BLOOD PRESSURE: 142 MMHG | HEIGHT: 63 IN | WEIGHT: 107 LBS | BODY MASS INDEX: 18.96 KG/M2 | DIASTOLIC BLOOD PRESSURE: 90 MMHG

## 2019-02-04 DIAGNOSIS — I10 BENIGN ESSENTIAL HTN: Primary | ICD-10-CM

## 2019-02-04 DIAGNOSIS — R73.09 ELEVATED GLUCOSE: ICD-10-CM

## 2019-02-04 DIAGNOSIS — H54.7 VISION DECREASED: ICD-10-CM

## 2019-02-04 DIAGNOSIS — E03.9 ACQUIRED HYPOTHYROIDISM: ICD-10-CM

## 2019-02-04 DIAGNOSIS — E78.2 MIXED HYPERLIPIDEMIA: ICD-10-CM

## 2019-02-04 PROCEDURE — 99213 OFFICE O/P EST LOW 20 MIN: CPT | Performed by: INTERNAL MEDICINE

## 2019-02-04 RX ORDER — AMLODIPINE BESYLATE 5 MG/1
5 TABLET ORAL DAILY
Qty: 90 TABLET | Refills: 2 | Status: SHIPPED | OUTPATIENT
Start: 2019-02-04 | End: 2019-08-14 | Stop reason: SDUPTHER

## 2019-02-04 NOTE — PROGRESS NOTES
"Subjective   Stacie Vieira is a 83 y.o. female here for   Chief Complaint   Patient presents with   • Hyperlipidemia     1 month follow-up   • Hypertension   • Hypothyroidism   .    Vitals:    02/04/19 1348 02/04/19 1432   BP: 162/98 142/90   BP Location: Left arm    Patient Position: Sitting    Cuff Size: Adult    Weight: 48.5 kg (107 lb)    Height: 160 cm (63\")        Body mass index is 18.95 kg/m².    Hyperlipidemia   This is a chronic problem. The current episode started more than 1 year ago. The problem is controlled. Recent lipid tests were reviewed and are normal. Exacerbating diseases include hypothyroidism. She has no history of diabetes. Pertinent negatives include no chest pain or shortness of breath.   Hypertension   This is a chronic problem. The current episode started more than 1 year ago. The problem is unchanged. The problem is controlled. Pertinent negatives include no chest pain, palpitations or shortness of breath.   Hypothyroidism   This is a chronic problem. The current episode started more than 1 year ago. The problem occurs constantly. The problem has been unchanged. Associated symptoms include coughing. Pertinent negatives include no chest pain, chills, fatigue or fever.        The following portions of the patient's history were reviewed and updated as appropriate: allergies, current medications, past social history and problem list.    Review of Systems   Constitutional: Negative for chills, fatigue and fever.   Eyes: Positive for visual disturbance (decreased near & far vision).   Respiratory: Positive for cough. Negative for shortness of breath and wheezing.    Cardiovascular: Negative for chest pain, palpitations and leg swelling.   Psychiatric/Behavioral: Negative for dysphoric mood and sleep disturbance. The patient is not nervous/anxious.        Objective   Physical Exam   Constitutional: She is oriented to person, place, and time. She appears well-developed and well-nourished. No " distress.   Eyes: No scleral icterus.   Cardiovascular: Normal rate, regular rhythm, normal heart sounds and intact distal pulses.   No murmur heard.  Pulmonary/Chest: Effort normal and breath sounds normal. No stridor. No respiratory distress. She has no wheezes. She has no rales. She exhibits no tenderness.   Musculoskeletal: She exhibits no edema, tenderness or deformity.   Neurological: She is alert and oriented to person, place, and time. She has normal reflexes. She displays normal reflexes. No cranial nerve deficit. She exhibits normal muscle tone. Coordination normal.   Skin: Skin is warm and dry. No rash noted. No erythema. No pallor.   Psychiatric: She has a normal mood and affect. Her behavior is normal. Judgment and thought content normal.   Nursing note and vitals reviewed.      Assessment/Plan   Diagnoses and all orders for this visit:    Benign essential HTN  Comments:  controlled (borderline today) - need diet/ex - need weekly chk & call if bp over 140/90  Orders:  -     amLODIPine (NORVASC) 5 MG tablet; Take 1 tablet by mouth Daily.    Mixed hyperlipidemia  Comments:  need diet/ex    Elevated glucose  Comments:  normal A1c 2018    Acquired hypothyroidism  Comments:  continue synthroid 75 mcg alt with 50 mcg qod    Vision decreased  Comments:  from ethambutol - she has stopped all abs - she will f/u with opthal next wk

## 2019-02-21 ENCOUNTER — OFFICE VISIT (OUTPATIENT)
Dept: INFECTIOUS DISEASES | Facility: CLINIC | Age: 84
End: 2019-02-21

## 2019-02-21 VITALS
WEIGHT: 108.2 LBS | HEART RATE: 97 BPM | DIASTOLIC BLOOD PRESSURE: 74 MMHG | BODY MASS INDEX: 19.17 KG/M2 | SYSTOLIC BLOOD PRESSURE: 171 MMHG | HEIGHT: 63 IN | TEMPERATURE: 97.5 F

## 2019-02-21 DIAGNOSIS — H46.9 OPTIC NEUROPATHY: ICD-10-CM

## 2019-02-21 DIAGNOSIS — A31.0 MYCOBACTERIUM AVIUM COMPLEX (HCC): Primary | ICD-10-CM

## 2019-02-21 PROCEDURE — 99214 OFFICE O/P EST MOD 30 MIN: CPT | Performed by: INTERNAL MEDICINE

## 2019-02-21 NOTE — PROGRESS NOTES
cc: Follow-up      Stacie returns for follow-up MAC.  She had been on therapy since January 2016-September 2017.  Unfortunately developed recurrence in December 2017 when she also was diagnosed with Guillain-Barré syndrome.  She was started on daily triple drug therapy in January 2018.  She underwent bronchoscopy in July 2018 with negative AFB cultures    Since her last visit in October 2018, she had acute severe vision loss and was diagnosed with ethambutol ocular toxicity.  Discussed with ophthalmology last month and we course held her ethambutol as well as the rest of her MAC medications.  She reports that after stopping her back medication she has not had any additional vision loss but has not had any return quite yet.  She saw a retina specialist who said that she may have some rejuvenation in her nerves but is just too early to tell and this can be a long-term problem for her.  She has stable cough and shortness of breath which he thinks may be more related to the Guyon Barré.  She has not had any fevers or chills or night sweats.  MRI of the orbits was within normal limits        Past medical history: MAC, C. difficile in the past, hyperlipidemia, hypertension, hypothyroidism, sinusitis, SVT, gullian barre    Review of Systems: All other reviewed and negative except as per HPI    Vitals:    02/21/19 1310   BP: 171/74   Pulse: 97   Temp: 97.5 °F (36.4 °C)       GENERAL: Awake and alert, in no acute distress.   HEART: Regular rate and rhythm. No peripheral edema.   LUNGS: Clear to auscultation anteriorly with normal respiratory effort.   PSYCHIATRIC: Appropriate mood, affect, insight, and judgment.       DIAGNOSTICS:  AFB cultures from 12/9/17 grew MAC amikacin 8, clarithromycin 0.5, linezolid 8, moxifloxacin 0.5    Mycobacterium avium complex on 4/8/16: Sensitivities as below           Mycobacterium avium complex       DISK DIFFUSION       Amikacin 8.0        Ciprofloxacin 16.0        Clarithromycin 4.0   Susceptible       Ethambutol 8.0        Linezolid 64.0        Moxifloxacin 4.0        Rifampin >8.0        Streptomycin 32.0         7/20/18 BAL culture: Neg    CT chest shows unchanged appearance since January 2018 with scattered pulmonary nodularity stable since September 2017    Assessment and Plan  Mycobacterium avium complex  Bilateral optic neuropathy ethambutol ocular toxicity      On triple drug therapy since Jan 2018.  Negative BAL in July 2018.  Discontinued medications due to bilateral optic neuropathy/ethambutol toxicity in January 2018.  Currently monitoring off antibiotics.  All questions answered and counseled patient on signs and symptoms of recrudescent infection.  She is going to continue to closely follow-up with ophthalmology      RTC 4-6 months or sooner if needed

## 2019-04-19 DIAGNOSIS — I10 BENIGN ESSENTIAL HTN: ICD-10-CM

## 2019-04-19 RX ORDER — METOPROLOL SUCCINATE 50 MG/1
TABLET, EXTENDED RELEASE ORAL
Qty: 90 TABLET | Refills: 0 | Status: SHIPPED | OUTPATIENT
Start: 2019-04-19 | End: 2019-07-16 | Stop reason: SDUPTHER

## 2019-05-08 ENCOUNTER — OFFICE VISIT (OUTPATIENT)
Dept: CARDIOLOGY | Facility: CLINIC | Age: 84
End: 2019-05-08

## 2019-05-08 VITALS
DIASTOLIC BLOOD PRESSURE: 70 MMHG | SYSTOLIC BLOOD PRESSURE: 130 MMHG | WEIGHT: 114.4 LBS | HEART RATE: 73 BPM | HEIGHT: 63 IN | BODY MASS INDEX: 20.27 KG/M2

## 2019-05-08 DIAGNOSIS — I47.1 PSVT (PAROXYSMAL SUPRAVENTRICULAR TACHYCARDIA) (HCC): ICD-10-CM

## 2019-05-08 DIAGNOSIS — N28.1 RENAL CYST: ICD-10-CM

## 2019-05-08 DIAGNOSIS — R09.89 LABILE BLOOD PRESSURE: Primary | ICD-10-CM

## 2019-05-08 DIAGNOSIS — E03.9 ACQUIRED HYPOTHYROIDISM: ICD-10-CM

## 2019-05-08 DIAGNOSIS — G61.0 GUILLAIN-BARRE SYNDROME FOLLOWING VACCINATION (HCC): ICD-10-CM

## 2019-05-08 DIAGNOSIS — E87.1 HYPONATREMIA: ICD-10-CM

## 2019-05-08 DIAGNOSIS — I70.1 STENOSIS OF RIGHT RENAL ARTERY (HCC): ICD-10-CM

## 2019-05-08 DIAGNOSIS — R06.09 DYSPNEA ON EXERTION: ICD-10-CM

## 2019-05-08 DIAGNOSIS — E78.2 MIXED HYPERLIPIDEMIA: ICD-10-CM

## 2019-05-08 DIAGNOSIS — T88.1XXA GUILLAIN-BARRE SYNDROME FOLLOWING VACCINATION (HCC): ICD-10-CM

## 2019-05-08 PROBLEM — R55 SYNCOPE: Status: RESOLVED | Noted: 2017-12-25 | Resolved: 2019-05-08

## 2019-05-08 PROBLEM — R53.82 CHRONIC FATIGUE: Status: RESOLVED | Noted: 2018-08-02 | Resolved: 2019-05-08

## 2019-05-08 PROCEDURE — 93000 ELECTROCARDIOGRAM COMPLETE: CPT | Performed by: NURSE PRACTITIONER

## 2019-05-08 PROCEDURE — 99214 OFFICE O/P EST MOD 30 MIN: CPT | Performed by: NURSE PRACTITIONER

## 2019-05-08 RX ORDER — LORATADINE 10 MG/1
10 CAPSULE, LIQUID FILLED ORAL DAILY
COMMUNITY

## 2019-05-08 NOTE — PROGRESS NOTES
"  Date of Office Visit: 2019  Encounter Provider: ANNABEL Miranad  Place of Service: [facility] CARDIOLOGY  Patient Name: Stacie Vieira  :1935    Chief Complaint   Patient presents with   • Follow-up   :     HPI: Stacie Vieira is a 84 y.o. female who presents today for follow up. She has a history of paroxysmal supraventricular tachycardia (PSVT) dating back to the 1970s and was on propanolol. Her last episode of PSVT was in .  She also has been diagnosed with hypertension, thyroid disease, and mycobacterium avium-intracellulare complex.     She was hospitalized in 2017 and was diagnosed with pneumonia and Guillain-[place] syndrome.  She had an episode of syncope which was associated with bradycardia.  She received chest compressions and atropine. Echocardiogram completed showed an EF of 67%, grade 1 diastolic dysfunction, aortic valve calcification, and mild tricuspid insufficiency. She was seen by Dr. Aisha Curiel in 2018 and Toprol XL was increased to 50 mg daily for elevated blood pressure.    She followed up with me in 2018 and felt that her blood pressures were very labile and that she had \"white coat syndrome\".  I double checked her blood pressure machine from home and it was accurate.  I ordered a renal duplex completed 2019 which showed right renal artery greater than 60% stenosis present, normal left renal artery, and large cyst measuring 5.1 cm x 5.4 cm in the left kidney. Dr. Cecilio Rhodes, vascular reviewed the renal duplex images.  He said it looks like the right was greater than 60% stenosis.  He said he was surprised the left artery wasn't worse when compared to CT scan of the chest which shows calcium in the midportion of the aorta.  The kidney sizes 9 x 3 cm bilaterally which are both normal.     She was started on spironolactone, but then developed hyperkalemia. I lowered the spironolactone. but she remained " hyperkalemic. I discontinued the spironolactone in July 2018.  She was started on hydrochlorothiazide.  She was last seen in the office by Dr. Curiel in August 2018 and her blood pressure remained labile.  She was recommended to continue her medications but to take the Toprol-XL at nighttime.  She had a nuclear stress test completed 8/9/2018 which showed no evidence of ischemia and EF hyperdynamic greater than 70%.    She presents today for her follow-up appointment and her blood pressure is well controlled today.  In January 2019, she had 3 medications discontinued that were for treatment of MAC because of damage to her eyes and hearing loss.  Since being off those 3 medication she states overall she is feeling better and has more energy.  She did not realize the told that the medications and MAC put on her over the past 3 years.  She is so delighted to be off this medications.  She has even noted that her blood pressures have been under better control than 120-140/50-60s.  She has some mild dyspnea on occasion that resolves with rest, but this is also improved.  She denies chest pain, PND, orthopnea, cough, palpitations, edema, dizziness, syncope, or bleeding.  I reviewed her previous renal duplex results with her once again.      Past Medical History:   Diagnosis Date   • Acute stress disorder    • Allergic rhinitis    • C. difficile colitis     hx in past   • Dyspnea on exertion    • Elevated fasting glucose    • Essential hypertension    • Guillain Barré syndrome (CMS/HCC)    • H/O: pneumonia    • Health care maintenance    • History of palpitations    • Hyperlipidemia    • Hypokalemia    • Hypothyroidism    • Insomnia    • Labile blood pressure    • MAC (mycobacterium avium-intracellulare complex)     reason for bronch.  dx with this nov 2015   • Osteoporosis    • Palpitations    • Pneumonia     past hx of freq.   • Polyneuropathy 12/20/2017   • PSVT (paroxysmal supraventricular tachycardia) (CMS/HCC)    •  Sinusitis    • Staph infection     in sinus area  recent sx jan 2016   • Stenosis of right renal artery (CMS/HCC) 7/5/2018    Per renal duplex - 60% stenosis, left normal    • SVT (supraventricular tachycardia) (CMS/HCC)     past hx  on medicine   • Syncope 12/25/2017   • Syncope and collapse    • Thyroiditis        Past Surgical History:   Procedure Laterality Date   • BREAST BIOPSY      PERCUTATNEOUS NEEDLE CORE   • BRONCHOSCOPY      nov 2015   • BRONCHOSCOPY N/A 4/8/2016    Procedure: BRONCHOSCOPY WITH BAL;  Surgeon: Sinan Braga MD;  Location: [facility] ENDOSCOPY;  Service:    • BRONCHOSCOPY N/A 12/9/2017    Procedure: BRONCHOSCOPY;  Surgeon: Real Rousseau MD;  Location: [facility] ENDOSCOPY;  Service:    • BRONCHOSCOPY N/A 7/20/2018    Procedure: BRONCHOSCOPY with BAL;  Surgeon: Sinan Braga MD;  Location: [facility] ENDOSCOPY;  Service: Pulmonary   • COLONOSCOPY N/A 07/08/2005   • DILATATION AND CURETTAGE N/A 01/01/1990   • PAP SMEAR N/A 03/30/2004   • SINUS SURGERY  01/2016   • TONSILLECTOMY         Social History     Socioeconomic History   • Marital status:      Spouse name: Not on file   • Number of children: Not on file   • Years of education: Not on file   • Highest education level: Not on file   Tobacco Use   • Smoking status: Never Smoker   • Smokeless tobacco: Never Used   Substance and Sexual Activity   • Alcohol use: No     Comment: caffeine use   • Drug use: No   • Sexual activity: No       Family History   Problem Relation Age of Onset   • Hypertension Brother    • Dementia Mother    • Kidney disease Father    • Thyroid disease Other        Review of Systems   Constitution: Negative for chills, diaphoresis, fever, malaise/fatigue, night sweats, weight gain and weight loss.   HENT: Positive for hearing loss. Negative for nosebleeds, sore throat and tinnitus.    Eyes: Positive for double vision. Negative for blurred vision, pain and visual disturbance.   Cardiovascular: Negative for chest pain,  claudication, cyanosis, dyspnea on exertion, irregular heartbeat, leg swelling, near-syncope, orthopnea, palpitations, paroxysmal nocturnal dyspnea and syncope.   Respiratory: Positive for cough. Negative for hemoptysis, shortness of breath, snoring and wheezing.    Endocrine: Negative for cold intolerance, heat intolerance and polyuria.   Hematologic/Lymphatic: Negative for bleeding problem. Does not bruise/bleed easily.   Skin: Negative for color change, dry skin, flushing and itching.   Musculoskeletal: Negative for falls, joint pain, joint swelling, muscle cramps, muscle weakness and myalgias.   Gastrointestinal: Negative for abdominal pain, constipation, heartburn, melena, nausea and vomiting.   Genitourinary: Negative for dysuria and hematuria.   Neurological: Positive for numbness. Negative for excessive daytime sleepiness, dizziness, light-headedness, loss of balance, paresthesias, seizures and vertigo.   Psychiatric/Behavioral: Negative for altered mental status, depression, memory loss and substance abuse. The patient does not have insomnia and is not nervous/anxious.    Allergic/Immunologic: Negative for environmental allergies.       Allergies   Allergen Reactions   • Influenza Vaccines Mental Status Change     Guillain-Amber   • Alendronate Other (See Comments)     Headaches   • Ezetimibe Other (See Comments)     Leg Cramps   • Pravachol [Pravastatin] Other (See Comments)     Leg Cramps         Current Outpatient Medications:   •  amLODIPine (NORVASC) 5 MG tablet, Take 1 tablet by mouth Daily., Disp: 90 tablet, Rfl: 2  •  Cholecalciferol (VITAMIN D) 2000 units tablet, Take 2,000 Units by mouth Daily., Disp: , Rfl:   •  hydrochlorothiazide (HYDRODIURIL) 25 MG tablet, TAKE ONE TABLET BY MOUTH DAILY, Disp: 30 tablet, Rfl: 2  •  LEVOXYL 50 MCG tablet, TAKE ONE TABLET BY MOUTH EVERY OTHER DAY, Disp: 45 tablet, Rfl: 2  •  LEVOXYL 75 MCG tablet, TAKE ONE TABLET BY MOUTH EVERY OTHER DAY, Disp: 45 tablet, Rfl:  "2  •  Loratadine 10 MG capsule, Take 10 mg by mouth Daily., Disp: , Rfl:   •  metoprolol succinate XL (TOPROL-XL) 50 MG 24 hr tablet, TAKE ONE TABLET BY MOUTH DAILY, Disp: 90 tablet, Rfl: 0  •  olmesartan (BENICAR) 40 MG tablet, TAKE ONE TABLET BY MOUTH DAILY, Disp: 90 tablet, Rfl: 2  •  Probiotic Product (PROBIOTIC ADVANCED PO), Take 2 capsules by mouth Daily., Disp: , Rfl:       Objective:     Vitals:    05/08/19 0855   BP: 130/70   BP Location: Left arm   Pulse: 73   Weight: 51.9 kg (114 lb 6.4 oz)   Height: 160 cm (63\")     Body mass index is 20.27 kg/m².    PHYSICAL EXAM:    Vitals Reviewed.   General Appearance: No acute distress, well developed and well nourished. Thin.   Eyes: Conjunctiva and lids: No erythema, swelling, or discharge. Sclera non-icteric. Wears glasses.    HENT: Atraumatic, normocephalic. External eyes, ears, and nose normal. No hearing loss noted. Mucous membranes normal. Lips not cyanotic. Neck supple with no tenderness.  Respiratory: No signs of respiratory distress. Respiration rhythm and depth normal.   Clear to auscultation. No rales, crackles, rhonchi, or wheezing auscultated.   Cardiovascular:  Jugular Venous Pressure: Normal  Heart Rate and Rhythm: Normal, Heart Sounds: Normal S1 and S2. No S3 or S4 noted.  Murmurs: No murmurs noted. No rubs, thrills, or gallops.   Arterial Pulses: Carotid pulses normal. No carotid bruit noted. Posterior tibialis and dorsalis pedis pulses normal.   Lower Extremities: No edema noted.  Gastrointestinal:  Abdomen soft, non-distended, non-tender. Normal bowel sounds. No hepatomegaly.   Musculoskeletal: Normal movement of extremities  Skin and Nails: General appearance normal. No pallor, cyanosis, diaphoresis. Skin temperature normal. No clubbing of fingernails.   Psychiatric: Patient alert and oriented to person, place, and time. Speech and behavior appropriate. Normal mood and affect.       ECG 12 Lead  Date/Time: 5/8/2019 9:00 AM  Performed by: " Winifred Zaldivar APRN  Authorized by: Winifred Zaldivar, ANNABEL   Comparison: compared with previous ECG from 8/9/2018  Rhythm: sinus rhythm  Rate: normal  BPM: 73  Conduction: conduction normal  QRS axis: normal  Other findings: non-specific ST-T wave changes    Clinical impression: non-specific ECG              Assessment:       Diagnosis Plan   1. Labile blood pressure  ECG 12 Lead    Duplex Renal Artery - Bilateral Complete CAR   2. Hyponatremia     3. Stenosis of right renal artery (CMS/HCC)  Duplex Renal Artery - Bilateral Complete CAR   4. Renal cyst  Duplex Renal Artery - Bilateral Complete CAR   5. Dyspnea on exertion     6. PSVT (paroxysmal supraventricular tachycardia) (CMS/HCC)  ECG 12 Lead   7. Acquired hypothyroidism     8. Mixed hyperlipidemia     9. Guillain-[place] syndrome following vaccination (CMS/HCC)            Plan:       1/2. Labile Blood Pressure and Hyponatremia: Her blood pressure has been better controlled ranging 120s-140/50-60s.  We will continue with her current medication regimen.  She was started on hydrochlorothiazide and her sodium level was 134 back in November.  We will need to keep a close eye on her sodium level.     3/4. Renal Artery Stenosis & Renal Cyst: Repeat renal duplex at hospital.     5.  Dyspnea upon exertion: She feels that this is improved since she has been off medications for treatment of MAC.      6.  PSVT: Patient states last episode was in 1994 and she was previously on propanolol.  She's now on Toprol-XL with no reoccurrence of palpitations.     7.  Hypothyroidism: On levothyroxine followed by PCP.     8.  Hyperlipidemia: Followed by PCP.     9.  Guillain [place] Syndrome: Diagnosed in December 2017.    10.  Follow up with Dr. Aisha Curiel in 6 months.    As always, it has been a pleasure to participate in your patient's care.      Sincerely,         ANNABEL Wu

## 2019-05-13 RX ORDER — HYDROCHLOROTHIAZIDE 25 MG/1
TABLET ORAL
Qty: 30 TABLET | Refills: 5 | Status: SHIPPED | OUTPATIENT
Start: 2019-05-13 | End: 2019-08-14 | Stop reason: SDUPTHER

## 2019-05-15 ENCOUNTER — HOSPITAL ENCOUNTER (OUTPATIENT)
Dept: CARDIOLOGY | Facility: HOSPITAL | Age: 84
Discharge: HOME | End: 2019-05-15
Admitting: NURSE PRACTITIONER

## 2019-05-15 DIAGNOSIS — I70.1 STENOSIS OF RIGHT RENAL ARTERY (HCC): ICD-10-CM

## 2019-05-15 DIAGNOSIS — R09.89 LABILE BLOOD PRESSURE: ICD-10-CM

## 2019-05-15 DIAGNOSIS — N28.1 RENAL CYST: ICD-10-CM

## 2019-05-15 LAB
BH CV ECHO MEAS - DIST REN A EDV LEFT: 16.4 CM/SEC
BH CV ECHO MEAS - DIST REN A PSV LEFT: 80 CM/SEC
BH CV ECHO MEAS - DIST REN A RI LEFT: 0.8
BH CV ECHO MEAS - HILAR A EDV LEFT: 8.3 CM/SEC
BH CV ECHO MEAS - HILAR A PSV LEFT: 30.9 CM/SEC
BH CV ECHO MEAS - HILAR A RI LEFT: 0.73
BH CV ECHO MEAS - MID REN A EDV LEFT: 24.6 CM/SEC
BH CV ECHO MEAS - MID REN A PSV LEFT: 93.4 CM/SEC
BH CV ECHO MEAS - MID REN A RI LEFT: 0.74
BH CV ECHO MEAS - PROX REN A EDV LEFT: 23.9 CM/SEC
BH CV ECHO MEAS - PROX REN A PSV LEFT: 127 CM/SEC
BH CV ECHO MEAS - PROX REN A RI LEFT: 0.81
BH CV VAS BP LEFT ARM: NORMAL MMHG
BH CV VAS BP RIGHT ARM: NORMAL MMHG
BH CV VAS RENAL AORTIC MID EDV: 0 CM/S
BH CV VAS RENAL AORTIC MID PSV: 77 CM/S
BH CV VAS RENAL HILUM LEFT EDV: 8.25 CM/S
BH CV VAS RENAL HILUM LEFT PSV: 30.9 CM/S
BH CV VAS RENAL HILUM RIGHT EDV: 6.19 CM/S
BH CV VAS RENAL HILUM RIGHT PSV: 27.5 CM/S
BH CV XLRA MEAS - KID L LEFT: 11.3 CM
BH CV XLRA MEAS - RENAL A ORG RI LEFT: 0.81
BH CV XLRA MEAS DIST REN A EDV RIGHT: 22.5 CM/SEC
BH CV XLRA MEAS DIST REN A PSV RIGHT: 71.7 CM/SEC
BH CV XLRA MEAS DIST REN A RI RIGHT: 0.69
BH CV XLRA MEAS HILAR A EDV RIGHT: 6.2 CM/SEC
BH CV XLRA MEAS HILAR A PSV RIGHT: 27.5 CM/SEC
BH CV XLRA MEAS HILAR A RI RIGHT: 0.77
BH CV XLRA MEAS KID L RIGHT: 9.4 CM
BH CV XLRA MEAS KID W RIGHT: 2.5 CM
BH CV XLRA MEAS MID REN A EDV RIGHT: 22.2 CM/SEC
BH CV XLRA MEAS MID REN A PSV RIGHT: 93.8 CM/SEC
BH CV XLRA MEAS MID REN A RI RIGHT: 0.76
BH CV XLRA MEAS PROX REN A EDV RIGHT: 71.6 CM/SEC
BH CV XLRA MEAS PROX REN A PSV RIGHT: 253 CM/SEC
BH CV XLRA MEAS PROX REN A RI RIGHT: 0.72
BH CV XLRA MEAS RAR LEFT: 3.3
BH CV XLRA MEAS RAR RIGHT: 3.3
BH CV XLRA MEAS RENAL A ORG EDV LEFT: 31.6 CM/SEC
BH CV XLRA MEAS RENAL A ORG EDV RIGHT: 45.7 CM/SEC
BH CV XLRA MEAS RENAL A ORG PSV LEFT: 170 CM/SEC
BH CV XLRA MEAS RENAL A ORG PSV RIGHT: 213 CM/SEC
BH CV XLRA MEAS RENAL A ORG RI RIGHT: 0.79
LEFT KIDNEY WIDTH: 4.2 CM
LEFT RENAL UPPER PARENCHYMA MAX: 23.7 CM/S
LEFT RENAL UPPER PARENCHYMA MIN: 5 CM/S
LEFT RENAL UPPER PARENCHYMA RI: 0.79
RIGHT RENAL UPPER PARENCHYMA MAX: 13.8 CM/S
RIGHT RENAL UPPER PARENCHYMA MIN: 0 CM/S
RIGHT RENAL UPPER PARENCHYMA RI: 1

## 2019-05-15 PROCEDURE — 93975 VASCULAR STUDY: CPT

## 2019-05-29 DIAGNOSIS — R92.1 BREAST CALCIFICATIONS ON MAMMOGRAM: Primary | ICD-10-CM

## 2019-05-29 NOTE — PROGRESS NOTES
Per request order placed for diagnostic bilateral mammogram and us bilateral complete (6 month follow due to breast calcifications). Order sent to provider for review and signature.

## 2019-06-14 ENCOUNTER — APPOINTMENT (OUTPATIENT)
Dept: WOMENS IMAGING | Facility: HOSPITAL | Age: 84
End: 2019-06-14

## 2019-06-14 PROCEDURE — 77066 DX MAMMO INCL CAD BI: CPT | Performed by: RADIOLOGY

## 2019-06-14 PROCEDURE — G0279 TOMOSYNTHESIS, MAMMO: HCPCS | Performed by: RADIOLOGY

## 2019-06-19 DIAGNOSIS — R92.1 BREAST CALCIFICATIONS ON MAMMOGRAM: ICD-10-CM

## 2019-07-16 DIAGNOSIS — I10 BENIGN ESSENTIAL HTN: ICD-10-CM

## 2019-07-16 RX ORDER — METOPROLOL SUCCINATE 50 MG/1
TABLET, EXTENDED RELEASE ORAL
Qty: 90 TABLET | Refills: 0 | Status: SHIPPED | OUTPATIENT
Start: 2019-07-16 | End: 2019-10-16 | Stop reason: SDUPTHER

## 2019-08-01 DIAGNOSIS — I10 ESSENTIAL HYPERTENSION: ICD-10-CM

## 2019-08-01 RX ORDER — OLMESARTAN MEDOXOMIL 40 MG/1
TABLET ORAL
Qty: 90 TABLET | Refills: 1 | Status: SHIPPED | OUTPATIENT
Start: 2019-08-01 | End: 2020-01-28

## 2019-08-14 ENCOUNTER — OFFICE VISIT (OUTPATIENT)
Dept: INTERNAL MEDICINE | Facility: CLINIC | Age: 84
End: 2019-08-14

## 2019-08-14 VITALS
WEIGHT: 115 LBS | SYSTOLIC BLOOD PRESSURE: 140 MMHG | HEIGHT: 63 IN | DIASTOLIC BLOOD PRESSURE: 82 MMHG | OXYGEN SATURATION: 97 % | HEART RATE: 78 BPM | BODY MASS INDEX: 20.38 KG/M2

## 2019-08-14 DIAGNOSIS — E78.2 MIXED HYPERLIPIDEMIA: ICD-10-CM

## 2019-08-14 DIAGNOSIS — E03.9 ACQUIRED HYPOTHYROIDISM: ICD-10-CM

## 2019-08-14 DIAGNOSIS — R06.09 DYSPNEA ON EXERTION: ICD-10-CM

## 2019-08-14 DIAGNOSIS — I10 BENIGN ESSENTIAL HTN: Primary | ICD-10-CM

## 2019-08-14 DIAGNOSIS — I10 BENIGN ESSENTIAL HTN: ICD-10-CM

## 2019-08-14 DIAGNOSIS — J30.89 NON-SEASONAL ALLERGIC RHINITIS, UNSPECIFIED TRIGGER: ICD-10-CM

## 2019-08-14 DIAGNOSIS — R73.09 ELEVATED GLUCOSE: ICD-10-CM

## 2019-08-14 DIAGNOSIS — Z78.0 MENOPAUSE: ICD-10-CM

## 2019-08-14 LAB
ALBUMIN SERPL-MCNC: 4.8 G/DL (ref 3.5–5.2)
ALBUMIN/GLOB SERPL: 1.5 G/DL
ALP SERPL-CCNC: 62 U/L (ref 39–117)
ALT SERPL-CCNC: 8 U/L (ref 1–33)
AST SERPL-CCNC: 24 U/L (ref 1–32)
BACTERIA UR QL AUTO: ABNORMAL /HPF
BASOPHILS # BLD AUTO: 0.03 10*3/MM3 (ref 0–0.2)
BASOPHILS NFR BLD AUTO: 0.5 % (ref 0–1.5)
BILIRUB SERPL-MCNC: 0.5 MG/DL (ref 0.2–1.2)
BILIRUB UR QL STRIP: NEGATIVE
BUN SERPL-MCNC: 14 MG/DL (ref 8–23)
BUN/CREAT SERPL: 13.3 (ref 7–25)
CALCIUM SERPL-MCNC: 10.3 MG/DL (ref 8.6–10.5)
CHLORIDE SERPL-SCNC: 98 MMOL/L (ref 98–107)
CHOLEST SERPL-MCNC: 246 MG/DL (ref 0–200)
CLARITY UR: CLEAR
CO2 SERPL-SCNC: 28.7 MMOL/L (ref 22–29)
COLOR UR: YELLOW
CREAT SERPL-MCNC: 1.05 MG/DL (ref 0.57–1)
EOSINOPHIL # BLD AUTO: 0.27 10*3/MM3 (ref 0–0.4)
EOSINOPHIL # BLD AUTO: 4.3 % (ref 0.3–6.2)
ERYTHROCYTE [DISTWIDTH] IN BLOOD BY AUTOMATED COUNT: 13.2 % (ref 12.3–15.4)
GLOBULIN SER CALC-MCNC: 3.3 GM/DL
GLUCOSE SERPL-MCNC: 115 MG/DL (ref 65–99)
GLUCOSE UR STRIP-MCNC: NEGATIVE MG/DL
HCT VFR BLD AUTO: 42.8 % (ref 34–46.6)
HDLC SERPL-MCNC: 92 MG/DL (ref 40–60)
HGB BLD-MCNC: 13.6 G/DL (ref 12–15.9)
HGB UR QL STRIP.AUTO: ABNORMAL
HYALINE CASTS UR QL AUTO: ABNORMAL /LPF
IMM GRANULOCYTES # BLD: 0.02 10*3/MM3 (ref 0–0.05)
IMM GRANULOCYTES NFR BLD: 0.3 % (ref 0–0.5)
KETONES UR QL STRIP: NEGATIVE
LDLC SERPL CALC-MCNC: 124 MG/DL (ref 0–100)
LEUKOCYTE ESTERASE UR QL STRIP.AUTO: ABNORMAL
LYMPHOCYTES # BLD AUTO: 2.06 10*3/MM3 (ref 0.7–3.1)
LYMPHOCYTES NFR BLD AUTO: 33 % (ref 19.6–45.3)
MCH RBC QN AUTO: 31.2 PG (ref 26.6–33)
MCHC RBC AUTO-ENTMCNC: 31.8 G/DL (ref 31.5–35.7)
MCV RBC AUTO: 98.2 FL (ref 79–97)
MONOCYTES # BLD AUTO: 0.8 10*3/MM3 (ref 0.1–0.9)
MONOCYTES NFR BLD AUTO: 12.8 % (ref 5–12)
MUCOUS THREADS URNS QL MICRO: ABNORMAL /HPF
NEUTROPHILS # BLD AUTO: 3.06 10*3/MM3 (ref 1.7–7)
NEUTROPHILS NFR BLD AUTO: 49.1 % (ref 42.7–76)
NITRITE UR QL STRIP: NEGATIVE
NRBC BLD AUTO-RTO: 0 /100 WBC (ref 0–0.2)
PH UR STRIP.AUTO: 6 [PH] (ref 5–8)
PLATELET # BLD AUTO: 316 10*3/MM3 (ref 140–450)
POTASSIUM SERPL-SCNC: 5 MMOL/L (ref 3.5–5.2)
PROT SERPL-MCNC: 8.1 G/DL (ref 6–8.5)
PROT UR QL STRIP: NEGATIVE
RBC # BLD AUTO: 4.36 10*6/MM3 (ref 3.77–5.28)
RBC # UR: ABNORMAL /HPF
REF LAB TEST METHOD: ABNORMAL
SODIUM SERPL-SCNC: 142 MMOL/L (ref 136–145)
SP GR UR STRIP: 1.02 (ref 1–1.03)
SQUAMOUS #/AREA URNS HPF: ABNORMAL /HPF
TRIGL SERPL-MCNC: 151 MG/DL (ref 0–150)
UROBILINOGEN UR QL STRIP: ABNORMAL
VLDLC SERPL-MCNC: 30.2 MG/DL
WBC # BLD AUTO: 6.24 10*3/MM3 (ref 3.4–10.8)
WBC UR QL AUTO: ABNORMAL /HPF

## 2019-08-14 PROCEDURE — G0439 PPPS, SUBSEQ VISIT: HCPCS | Performed by: INTERNAL MEDICINE

## 2019-08-14 PROCEDURE — 81001 URINALYSIS AUTO W/SCOPE: CPT | Performed by: INTERNAL MEDICINE

## 2019-08-14 PROCEDURE — 96160 PT-FOCUSED HLTH RISK ASSMT: CPT | Performed by: INTERNAL MEDICINE

## 2019-08-14 PROCEDURE — 99213 OFFICE O/P EST LOW 20 MIN: CPT | Performed by: INTERNAL MEDICINE

## 2019-08-14 RX ORDER — HYDROCHLOROTHIAZIDE 12.5 MG/1
25 TABLET ORAL DAILY
Qty: 90 TABLET | Refills: 3 | Status: SHIPPED | OUTPATIENT
Start: 2019-08-14 | End: 2020-09-08

## 2019-08-14 RX ORDER — AMLODIPINE BESYLATE 2.5 MG/1
5 TABLET ORAL DAILY
Qty: 90 TABLET | Refills: 3 | Status: SHIPPED | OUTPATIENT
Start: 2019-08-14 | End: 2020-07-27

## 2019-08-14 RX ORDER — LEVOTHYROXINE SODIUM 0.05 MG/1
50 TABLET ORAL EVERY OTHER DAY
Qty: 45 TABLET | Refills: 2 | Status: SHIPPED | OUTPATIENT
Start: 2019-08-14 | End: 2020-05-11

## 2019-08-14 NOTE — PROGRESS NOTES
The ABCs of the Annual Wellness Visit  Subsequent Medicare Wellness Visit    Chief Complaint   Patient presents with   • Medicare Wellness-subsequent   • Hypertension       Subjective   History of Present Illness:  Stacie Vieira is a 84 y.o. female who presents for a Subsequent Medicare Wellness Visit.    HEALTH RISK ASSESSMENT    Recent Hospitalizations:  No hospitalization(s) within the last year.    Current Medical Providers:  Patient Care Team:  Tatiana Bellamy MD as PCP - General (Internal Medicine)  James Del Angel MD as Consulting Physician (Infectious Diseases)  Ezekiel Verde MD as Consulting Physician (Otolaryngology)  Aisha Curiel MD as Consulting Physician (Cardiology)  Sinan Braga MD as Consulting Physician (Pulmonary Disease)  Shawn Felton MD as Consulting Physician (Allergy)  Luis Whitehead MD (Ophthalmology)    Smoking Status:  Social History     Tobacco Use   Smoking Status Never Smoker   Smokeless Tobacco Never Used       Alcohol Consumption:  Social History     Substance and Sexual Activity   Alcohol Use No    Comment: caffeine use       Depression Screen:   PHQ-2/PHQ-9 Depression Screening 8/14/2019   Little interest or pleasure in doing things 0   Feeling down, depressed, or hopeless 0   Trouble falling or staying asleep, or sleeping too much 1   Feeling tired or having little energy 1   Poor appetite or overeating 0   Feeling bad about yourself - or that you are a failure or have let yourself or your family down 0   Trouble concentrating on things, such as reading the newspaper or watching television 0   Moving or speaking so slowly that other people could have noticed. Or the opposite - being so fidgety or restless that you have been moving around a lot more than usual 0   Thoughts that you would be better off dead, or of hurting yourself in some way 0   Total Score 2   If you checked off any problems, how difficult have these problems made  it for you to do your work, take care of things at home, or get along with other people? Not difficult at all       Fall Risk Screen:  ALVAREZ Fall Risk Assessment was completed, and patient is at LOW risk for falls.Assessment completed on:8/14/2019    Health Habits and Functional and Cognitive Screening:  Functional & Cognitive Status 8/14/2019   Do you have difficulty preparing food and eating? No   Do you have difficulty bathing yourself, getting dressed or grooming yourself? No   Do you have difficulty using the toilet? No   Do you have difficulty moving around from place to place? No   Do you have trouble with steps or getting out of a bed or a chair? No   Current Diet Well Balanced Diet   Dental Exam Up to date   Eye Exam Up to date   Exercise (times per week) 4 times per week   Current Exercise Activities Include Weightlifting   Do you need help using the phone?  No   Are you deaf or do you have serious difficulty hearing?  Yes   Do you need help with transportation? No   Do you need help shopping? No   Do you need help preparing meals?  No   Do you need help with housework?  No   Do you need help with laundry? No   Do you need help taking your medications? No   Do you need help managing money? No   Do you ever drive or ride in a car without wearing a seat belt? No   Have you felt unusual stress, anger or loneliness in the last month? No   Who do you live with? Alone   If you need help, do you have trouble finding someone available to you? No   Have you been bothered in the last four weeks by sexual problems? No   Do you have difficulty concentrating, remembering or making decisions? No         Does the patient have evidence of cognitive impairment? No    Asprin use counseling:Does not need ASA (and currently is not on it)    Age-appropriate Screening Schedule:  Refer to the list below for future screening recommendations based on patient's age, sex and/or medical conditions. Orders for these recommended tests  are listed in the plan section. The patient has been provided with a written plan.    Health Maintenance   Topic Date Due   • ZOSTER VACCINE (2 of 2) 02/26/2014   • LIPID PANEL  10/10/2018   • DXA SCAN  03/15/2019   • MAMMOGRAM  06/14/2021   • TDAP/TD VACCINES (2 - Td) 09/13/2026   • PNEUMOCOCCAL VACCINES (65+ LOW/MEDIUM RISK)  Completed   • INFLUENZA VACCINE  Discontinued   • COLONOSCOPY  Discontinued          The following portions of the patient's history were reviewed and updated as appropriate: allergies, current medications, past family history, past medical history, past social history, past surgical history and problem list.    Outpatient Medications Prior to Visit   Medication Sig Dispense Refill   • amLODIPine (NORVASC) 5 MG tablet Take 1 tablet by mouth Daily. 90 tablet 2   • Cholecalciferol (VITAMIN D) 2000 units tablet Take 2,000 Units by mouth Daily.     • hydrochlorothiazide (HYDRODIURIL) 25 MG tablet TAKE ONE TABLET BY MOUTH DAILY 30 tablet 5   • LEVOXYL 50 MCG tablet TAKE ONE TABLET BY MOUTH EVERY OTHER DAY 45 tablet 2   • LEVOXYL 75 MCG tablet TAKE ONE TABLET BY MOUTH EVERY OTHER DAY 45 tablet 2   • Loratadine 10 MG capsule Take 10 mg by mouth Daily.     • metoprolol succinate XL (TOPROL-XL) 50 MG 24 hr tablet TAKE ONE TABLET BY MOUTH DAILY 90 tablet 0   • olmesartan (BENICAR) 40 MG tablet TAKE ONE TABLET BY MOUTH DAILY 90 tablet 1   • Probiotic Product (PROBIOTIC ADVANCED PO) Take 2 capsules by mouth Daily.       No facility-administered medications prior to visit.        Patient Active Problem List   Diagnosis   • Mycobacterium avium complex (CMS/HCC)   • Benign essential HTN   • Allergic rhinitis   • Hypothyroidism   • Hyperlipidemia   • Polyneuropathy   • Oropharyngeal dysphagia   • Guillain-[place] syndrome following vaccination (CMS/HCC)   • Anxiety   • Labile blood pressure   • Dyspnea on exertion   • PSVT (paroxysmal supraventricular tachycardia) (CMS/HCC)   • Stenosis of right renal artery  "(CMS/[facility], located [facility])   • Elevated glucose       Advanced Care Planning:  Patient has an advance directive - a copy has been provided and is visible in patient header    Review of Systems    Compared to one year ago, the patient feels her physical health is better.  Compared to one year ago, the patient feels her mental health is better.    Reviewed chart for potential of high risk medication in the elderly: not applicable  Reviewed chart for potential of harmful drug interactions in the elderly:not applicable    Objective         Vitals:    08/14/19 0757   BP: 140/82   BP Location: Left arm   Patient Position: Sitting   Cuff Size: Adult   Pulse: 78   SpO2: 97%   Weight: 52.2 kg (115 lb)   Height: 160 cm (63\")   PainSc: 0-No pain       Body mass index is 20.37 kg/m².  Discussed the patient's BMI with her. The BMI is in the acceptable range.    Physical Exam          Assessment/Plan   Medicare Risks and Personalized Health Plan  CMS Preventative Services Quick Reference  Osteoprorosis Risk    The above risks/problems have been discussed with the patient.  Pertinent information has been shared with the patient in the After Visit Summary.  Follow up plans and orders are seen below in the Assessment/Plan Section.    There are no diagnoses linked to this encounter.  Follow Up:  No Follow-up on file.     An After Visit Summary and PPPS were given to the patient.             "

## 2019-08-14 NOTE — PROGRESS NOTES
"Subjective   Stacie Vieira is a 84 y.o. female here for   Chief Complaint   Patient presents with   • Medicare Wellness-subsequent   • Hypertension   .    Vitals:    08/14/19 0757   BP: 140/82   BP Location: Left arm   Patient Position: Sitting   Cuff Size: Adult   Pulse: 78   SpO2: 97%   Weight: 52.2 kg (115 lb)   Height: 160 cm (63\")       Body mass index is 20.37 kg/m².    Hypertension   This is a chronic problem. The current episode started more than 1 year ago. The problem is unchanged. The problem is controlled. Associated symptoms include shortness of breath. Pertinent negatives include no chest pain or palpitations.        The following portions of the patient's history were reviewed and updated as appropriate: allergies, current medications, past social history and problem list.    Review of Systems   Constitutional: Positive for fatigue (mild). Negative for chills and fever.   Respiratory: Positive for shortness of breath. Negative for cough and wheezing.    Cardiovascular: Negative for chest pain, palpitations and leg swelling.   Psychiatric/Behavioral: Positive for sleep disturbance (occ). Negative for dysphoric mood. The patient is not nervous/anxious.        Objective   Physical Exam   Constitutional: She appears well-developed and well-nourished. No distress.   Cardiovascular: Normal rate, regular rhythm and normal heart sounds.   Pulmonary/Chest: No respiratory distress. She has no wheezes. She has no rales. She exhibits no tenderness.   Musculoskeletal: She exhibits no edema.   Psychiatric: She has a normal mood and affect. Her behavior is normal.   Nursing note and vitals reviewed.      Assessment/Plan   Diagnoses and all orders for this visit:    Benign essential HTN  Comments:  controlled - call if bp over 140/90  Orders:  -     amLODIPine (NORVASC) 2.5 MG tablet; Take 2 tablets by mouth Daily.  -     CBC Auto Differential; Future  -     Comprehensive Metabolic Panel; Future  -     Lipid " Panel; Future  -     Urinalysis With Microscopic If Indicated (No Culture) - Urine, Clean Catch; Future    Mixed hyperlipidemia  Comments:  continue diet/ex  Orders:  -     Comprehensive Metabolic Panel; Future  -     Lipid Panel; Future    Non-seasonal allergic rhinitis, unspecified trigger    Dyspnea on exertion  Comments:  call if worse    Acquired hypothyroidism  Comments:  need rechk  Orders:  -     levothyroxine (LEVOXYL) 50 MCG tablet; Take 1 tablet by mouth Every Other Day.  -     TSH Rfx On Abnormal To Free T4; Future    Elevated glucose  Comments:  need low sugar diet    Acquired hypothyroidism  Comments:  need yearly TSH  Orders:  -     levothyroxine (LEVOXYL) 50 MCG tablet; Take 1 tablet by mouth Every Other Day.  -     TSH Rfx On Abnormal To Free T4; Future    Benign essential HTN  Comments:  controlled (borderline today) - need diet/ex - need weekly chk & call if bp over 140/90  Orders:  -     amLODIPine (NORVASC) 2.5 MG tablet; Take 2 tablets by mouth Daily.  -     CBC Auto Differential; Future  -     Comprehensive Metabolic Panel; Future  -     Lipid Panel; Future  -     Urinalysis With Microscopic If Indicated (No Culture) - Urine, Clean Catch; Future    Menopause  -     DEXA Bone Density Axial; Future    Other orders  -     hydrochlorothiazide (HYDRODIURIL) 12.5 MG tablet; Take 2 tablets by mouth Daily.       Wellness today.   Need daily strengthening & balance exercises (shown today).   Need screening for AAA & carotid disease.  Information given today.

## 2019-08-14 NOTE — PATIENT INSTRUCTIONS
Medicare Wellness  Personal Prevention Plan of Service     Date of Office Visit:  2019  Encounter Provider:  Tatiana Bellamy MD  Place of Service:  [facility] INTERNAL MEDICINE  Patient Name: Stacie Vieira  :  1935    As part of the Medicare Wellness portion of your visit today, we are providing you with this personalized preventive plan of services (PPPS). This plan is based upon recommendations of the [place] Preventive Services Task Force (USPSTF) and the Advisory Committee on Immunization Practices (ACIP).    This lists the preventive care services that should be considered, and provides dates of when you are due. Items listed as completed are up-to-date and do not require any further intervention.    Health Maintenance   Topic Date Due   • DXA SCAN  03/15/2019   • MEDICARE ANNUAL WELLNESS  2020   • LIPID PANEL  2020   • COLOGUARD  10/18/2020   • MAMMOGRAM  2021   • TDAP/TD VACCINES (2 - Td) 2026   • PNEUMOCOCCAL VACCINES (65+ LOW/MEDIUM RISK)  Completed   • INFLUENZA VACCINE  Discontinued   • ZOSTER VACCINE  Discontinued       No orders of the defined types were placed in this encounter.      No Follow-up on file.

## 2019-08-15 LAB — TSH SERPL-ACNC: 1.1 MIU/ML (ref 0.27–4.2)

## 2019-08-19 ENCOUNTER — TRANSCRIBE ORDERS (OUTPATIENT)
Dept: ADMINISTRATIVE | Facility: HOSPITAL | Age: 84
End: 2019-08-19

## 2019-08-19 DIAGNOSIS — Z13.6 ENCOUNTER FOR SCREENING FOR VASCULAR DISEASE: Primary | ICD-10-CM

## 2019-08-21 NOTE — PROGRESS NOTES
High sugar/cholesterol/fat - need low fat/sugar diet & more exercise.  Small amount of blood in urine–need recheck urine in 1 to 2 months.  You were slightly dehydrated which could have made this urine look abnormal.  Need increased water intake.  Normal thyroid.

## 2019-08-22 ENCOUNTER — OFFICE VISIT (OUTPATIENT)
Dept: INFECTIOUS DISEASES | Facility: CLINIC | Age: 84
End: 2019-08-22

## 2019-08-22 VITALS
BODY MASS INDEX: 20.48 KG/M2 | RESPIRATION RATE: 12 BRPM | DIASTOLIC BLOOD PRESSURE: 81 MMHG | HEART RATE: 76 BPM | TEMPERATURE: 97.8 F | WEIGHT: 115.6 LBS | SYSTOLIC BLOOD PRESSURE: 135 MMHG | HEIGHT: 63 IN

## 2019-08-22 DIAGNOSIS — H46.9 OPTIC NEUROPATHY: ICD-10-CM

## 2019-08-22 DIAGNOSIS — A31.0 MYCOBACTERIUM AVIUM COMPLEX (HCC): Primary | ICD-10-CM

## 2019-08-22 PROCEDURE — 99213 OFFICE O/P EST LOW 20 MIN: CPT | Performed by: INTERNAL MEDICINE

## 2019-08-22 NOTE — PROGRESS NOTES
cc: Follow-up      Stacie returns for follow-up MAC.  She had been on therapy since January 2016-September 2017.  Unfortunately developed recurrence in December 2017 when she also was diagnosed with Guillain-Barré syndrome.  She was started on daily triple drug therapy in January 2018.  She underwent bronchoscopy in July 2018 with negative AFB cultures      In January 2018 she developed optic neuropathy/it then he will ethambutol toxicity and medications were discontinued.  Fortunately she is made a complete recovery.  She is actually feeling exceedingly well off of the antibiotics.  No impressive respiratory symptoms.  No fevers or chills or night sweats.  Her vision has returned to baseline.      Past medical history: MAC, C. difficile in the past, hyperlipidemia, hypertension, hypothyroidism, sinusitis, SVT, gullian barre    Review of Systems: All other reviewed and negative except as per HPI    Vitals:    08/22/19 1324   BP: 135/81   Pulse: 76   Resp: 12   Temp: 97.8 °F (36.6 °C)       GENERAL: Awake and alert, in no acute distress.   HEART: Regular rate and rhythm. No peripheral edema.   LUNGS: Clear to auscultation anteriorly with normal respiratory effort.   PSYCHIATRIC: Appropriate mood, affect, insight, and judgment.       DIAGNOSTICS:  AFB cultures from 12/9/17 grew MAC amikacin 8, clarithromycin 0.5, linezolid 8, moxifloxacin 0.5    Mycobacterium avium complex on 4/8/16: Sensitivities as below           Mycobacterium avium complex       DISK DIFFUSION       Amikacin 8.0        Ciprofloxacin 16.0        Clarithromycin 4.0  Susceptible       Ethambutol 8.0        Linezolid 64.0        Moxifloxacin 4.0        Rifampin >8.0        Streptomycin 32.0         7/20/18 BAL culture: Neg    CT chest shows unchanged appearance since January 2018 with scattered pulmonary nodularity stable since September 2017    Assessment and Plan  Mycobacterium avium complex  Bilateral optic neuropathy ethambutol ocular  toxicity      On triple drug therapy since Jan 2018.  Negative BAL in July 2018.  Discontinued medications due to bilateral optic neuropathy/ethambutol toxicity in January 2018.  Currently monitoring off antibiotics.  Now all vision restored and feeling well. Obviously higher threshold to restart any medications and will need to avoid ethambutol if we ever did.  That said, I did tell her that there are some new medications out and we could design a regimen without ethambutol if her symptoms ever became so burdensome she wanted to rethink about therapy.  Hopefully we never come to that.    RTC 12 months or sooner if needed

## 2019-08-31 DIAGNOSIS — E03.9 ACQUIRED HYPOTHYROIDISM: ICD-10-CM

## 2019-09-03 ENCOUNTER — HOSPITAL ENCOUNTER (OUTPATIENT)
Dept: CARDIOLOGY | Facility: HOSPITAL | Age: 84
Discharge: HOME | End: 2019-09-03
Admitting: INTERNAL MEDICINE

## 2019-09-03 VITALS
HEART RATE: 64 BPM | DIASTOLIC BLOOD PRESSURE: 60 MMHG | WEIGHT: 113 LBS | BODY MASS INDEX: 21.34 KG/M2 | HEIGHT: 61 IN | SYSTOLIC BLOOD PRESSURE: 144 MMHG

## 2019-09-03 DIAGNOSIS — Z13.6 ENCOUNTER FOR SCREENING FOR VASCULAR DISEASE: ICD-10-CM

## 2019-09-03 LAB
BH CV ECHO MEAS - DIST AO DIAM: 1.35 CM
BH CV VAS BP LEFT ARM: NORMAL MMHG
BH CV VAS BP RIGHT ARM: NORMAL MMHG
BH CV XLRA MEAS - MID AO DIAM: 1.65 CM
BH CV XLRA MEAS - PAD LEFT ABI DP: 1.04
BH CV XLRA MEAS - PAD LEFT ABI PT: 1.11
BH CV XLRA MEAS - PAD LEFT ARM: 144 MMHG
BH CV XLRA MEAS - PAD LEFT LEG DP: 150 MMHG
BH CV XLRA MEAS - PAD LEFT LEG PT: 160 MMHG
BH CV XLRA MEAS - PAD RIGHT ABI DP: 1.04
BH CV XLRA MEAS - PAD RIGHT ABI PT: 1.11
BH CV XLRA MEAS - PAD RIGHT ARM: 140 MMHG
BH CV XLRA MEAS - PAD RIGHT LEG DP: 150 MMHG
BH CV XLRA MEAS - PAD RIGHT LEG PT: 160 MMHG
BH CV XLRA MEAS - PROX AO DIAM: 1.79 CM
BH CV XLRA MEAS LEFT ICA/CCA RATIO: 1.89
BH CV XLRA MEAS LEFT MID CCA PSV: NORMAL CM/SEC
BH CV XLRA MEAS LEFT MID ICA PSV: NORMAL CM/SEC
BH CV XLRA MEAS LEFT PROX ECA PSV: NORMAL CM/SEC
BH CV XLRA MEAS RIGHT ICA/CCA RATIO: 1.07
BH CV XLRA MEAS RIGHT MID CCA PSV: NORMAL CM/SEC
BH CV XLRA MEAS RIGHT MID ICA PSV: NORMAL CM/SEC
BH CV XLRA MEAS RIGHT PROX ECA PSV: NORMAL CM/SEC

## 2019-09-03 PROCEDURE — 93799 UNLISTED CV SVC/PROCEDURE: CPT

## 2019-09-03 RX ORDER — LEVOTHYROXINE SODIUM 75 UG/1
TABLET ORAL
Qty: 45 TABLET | Refills: 1 | Status: SHIPPED | OUTPATIENT
Start: 2019-09-03 | End: 2020-03-02

## 2019-09-11 ENCOUNTER — OFFICE VISIT (OUTPATIENT)
Dept: INTERNAL MEDICINE | Facility: CLINIC | Age: 84
End: 2019-09-11

## 2019-09-11 VITALS
OXYGEN SATURATION: 99 % | HEART RATE: 66 BPM | SYSTOLIC BLOOD PRESSURE: 136 MMHG | WEIGHT: 115 LBS | HEIGHT: 61 IN | BODY MASS INDEX: 21.71 KG/M2 | DIASTOLIC BLOOD PRESSURE: 74 MMHG

## 2019-09-11 DIAGNOSIS — J06.9 UPPER RESPIRATORY TRACT INFECTION, UNSPECIFIED TYPE: Primary | ICD-10-CM

## 2019-09-11 DIAGNOSIS — J02.9 SORE THROAT: ICD-10-CM

## 2019-09-11 LAB
EXPIRATION DATE: NORMAL
INTERNAL CONTROL: NORMAL
Lab: NORMAL
S PYO AG THROAT QL: NEGATIVE

## 2019-09-11 PROCEDURE — 87880 STREP A ASSAY W/OPTIC: CPT | Performed by: NURSE PRACTITIONER

## 2019-09-11 PROCEDURE — 99213 OFFICE O/P EST LOW 20 MIN: CPT | Performed by: NURSE PRACTITIONER

## 2019-09-11 RX ORDER — AZITHROMYCIN 250 MG/1
TABLET, FILM COATED ORAL
Qty: 6 TABLET | Refills: 0 | Status: SHIPPED | OUTPATIENT
Start: 2019-09-11 | End: 2019-11-12

## 2019-09-11 NOTE — PROGRESS NOTES
Subjective   Stacie Vieira is a 84 y.o. female.     PMH of  and MAC. Her family has been sick. She does take allergy shots      Sore Throat    This is a new problem. The current episode started in the past 7 days. There has been no fever (low grade). The pain is mild. Associated symptoms include coughing (dry) and ear pain. Pertinent negatives include no abdominal pain, congestion, diarrhea, headaches, shortness of breath or vomiting. Associated symptoms comments: sneezing. She has tried nothing for the symptoms.   Earache    There is pain in both (R more than L) ears. Associated symptoms include coughing (dry), rhinorrhea and a sore throat. Pertinent negatives include no abdominal pain, diarrhea, headaches or vomiting.        The following portions of the patient's history were reviewed and updated as appropriate: allergies, current medications, past family history, past medical history, past social history, past surgical history and problem list.    Review of Systems   Constitutional: Negative for fatigue and fever.   HENT: Positive for ear pain, postnasal drip, rhinorrhea, sneezing and sore throat. Negative for congestion, sinus pressure and sinus pain.    Respiratory: Positive for cough (dry). Negative for shortness of breath.    Gastrointestinal: Negative for abdominal pain, diarrhea and vomiting.   Neurological: Negative for headaches.       Objective   Physical Exam   Constitutional: She appears well-developed and well-nourished. No distress.   HENT:   Head: Normocephalic and atraumatic.   Right Ear: Hearing and tympanic membrane normal.   Left Ear: Hearing and tympanic membrane normal.   Nose: Rhinorrhea present. No mucosal edema. Right sinus exhibits no maxillary sinus tenderness and no frontal sinus tenderness. Left sinus exhibits no maxillary sinus tenderness.   Mouth/Throat: Uvula is midline and mucous membranes are normal. Posterior oropharyngeal erythema (clear hypersecretions noted) present. No  oropharyngeal exudate. No tonsillar exudate.   Eyes: Conjunctivae are normal. Right eye exhibits no discharge. Left eye exhibits no discharge.   Cardiovascular: Normal rate, regular rhythm and normal heart sounds.   No murmur heard.  Pulmonary/Chest: Effort normal and breath sounds normal. No tachypnea. She has no wheezes.   Lymphadenopathy:        Head (right side): No submental, no submandibular and no tonsillar adenopathy present.        Head (left side): No submental, no submandibular and no tonsillar adenopathy present.   Neurological: She is alert.   Skin: She is not diaphoretic.   Psychiatric: She has a normal mood and affect.   Vitals reviewed.      Assessment/Plan   Stacie was seen today for sore throat and earache.    Diagnoses and all orders for this visit:    Upper respiratory tract infection, unspecified type  -     azithromycin (ZITHROMAX Z-KWAME) 250 MG tablet; Take 2 tablets the first day, then 1 tablet daily for 4 days.    Sore throat  -     POCT rapid strep A      Rapid strep neg, discussed with pt. Due to her PMH of MAC and GB we will treat with abx to prevent worsening or bacterial infection.    She has tessalon perles at home that she will start using for cough.    Return for worsening of sx.

## 2019-10-16 DIAGNOSIS — I10 BENIGN ESSENTIAL HTN: ICD-10-CM

## 2019-10-17 RX ORDER — METOPROLOL SUCCINATE 50 MG/1
TABLET, EXTENDED RELEASE ORAL
Qty: 90 TABLET | Refills: 0 | Status: SHIPPED | OUTPATIENT
Start: 2019-10-17 | End: 2020-01-16

## 2019-11-12 ENCOUNTER — OFFICE VISIT (OUTPATIENT)
Dept: CARDIOLOGY | Facility: CLINIC | Age: 84
End: 2019-11-12

## 2019-11-12 VITALS
BODY MASS INDEX: 20.7 KG/M2 | HEIGHT: 63 IN | HEART RATE: 67 BPM | DIASTOLIC BLOOD PRESSURE: 84 MMHG | WEIGHT: 116.8 LBS | SYSTOLIC BLOOD PRESSURE: 140 MMHG

## 2019-11-12 DIAGNOSIS — E78.2 MIXED HYPERLIPIDEMIA: ICD-10-CM

## 2019-11-12 DIAGNOSIS — I70.1 STENOSIS OF RIGHT RENAL ARTERY (HCC): ICD-10-CM

## 2019-11-12 DIAGNOSIS — I10 BENIGN ESSENTIAL HTN: ICD-10-CM

## 2019-11-12 DIAGNOSIS — I47.1 PSVT (PAROXYSMAL SUPRAVENTRICULAR TACHYCARDIA) (HCC): Primary | ICD-10-CM

## 2019-11-12 PROCEDURE — 99214 OFFICE O/P EST MOD 30 MIN: CPT | Performed by: INTERNAL MEDICINE

## 2019-11-12 PROCEDURE — 93000 ELECTROCARDIOGRAM COMPLETE: CPT | Performed by: INTERNAL MEDICINE

## 2019-11-12 NOTE — PROGRESS NOTES
Date of Office Visit: 2019  Encounter Provider: Aisha Curiel MD  Place of Service: [facility] CARDIOLOGY  Patient Name: Stacie Vieira  :1935      Patient ID:  Stacie Vieira is a 84 y.o. female is here for  followup for pSVT, hypertension.         History of Present Illness    She has had SVT since the 1970s and at that time was put on propranolol.  She had four  hospitalizations associated with this it sounds like, and at some point, they also put her  on digitalis also to control the SVT.  She really has not had any episodes since  that  she knows of.          She has been a .  She says it is very difficult because at  night she gets anxious.  Her neighbors, whom she has known for quite some time, no longer  live beside her due to some deaths and so the evening time is difficult for her.       She does have a son who lives in [place], [place] and is an intensivist at the [employer].       She had an echocardiogram done 17 to ejection fraction 67% grade 1 diastolic dysfunction, calcified aortic valve, mild tricuspid insufficiency.  At that time, she was in the hospital for pneumonia.  She has a history of MAC.  During hospitalization, she was diagnosed with Guillain-Barré syndrome and started plasmapheresis.  She did have an episode on 17 where she had a syncopal episode due to bradycardia.  She did receive chest compressions and got atropine.  She recovered quickly from this.     She had a nonischemic stress nuclear perfusion study done 18.  She had a renal artery duplex on 18 showing normal left renal artery, large left renal cyst measuring 5.1 x 5.4, hemodynamically significant right renal artery stenosis.  She had an echocardiogram done 17 showed ejection fraction of 67% with grade 1 diastolic dysfunction, mild tricuspid insufficiency and calcification of the aortic valve.    Labs on 2019 show  glucose 115, creatinine 1.05, otherwise normal CMP, TSH 1.1, HDL 92, , normal CBC.  Vascular screening done 9/3/2019 showed less than 50% left internal carotid artery stenosis, normal abdominal aorta.  Renal artery duplex done 5/15/2019 showed no significant left renal artery stenosis and less than 60% right renal artery stenosis.    She has some mild balance issues since a Guillain Barré but is overall doing quite well.  She has had no falls, heart racing or skipping, dizziness or syncope.  She has no exertional chest tightness or pressure.  She has no orthopnea or PND.  She has no exertional dyspnea.  She checks her blood pressure at home and it runs 130s over 80s.    Past Medical History:   Diagnosis Date   • Acute stress disorder    • Allergic rhinitis    • C. difficile colitis     hx in past   • Dyspnea on exertion    • Elevated fasting glucose    • Essential hypertension    • Guillain Barré syndrome (CMS/HCC)    • H/O: pneumonia    • Health care maintenance    • History of palpitations    • Hyperlipidemia    • Hypokalemia    • Hypothyroidism    • Insomnia    • Labile blood pressure    • MAC (mycobacterium avium-intracellulare complex)     reason for bronch.  dx with this nov 2015   • Osteoporosis    • Palpitations    • Pneumonia     past hx of freq.   • Polyneuropathy 12/20/2017   • PSVT (paroxysmal supraventricular tachycardia) (CMS/HCC)    • Renal cyst    • Sinusitis    • Staph infection     in sinus area  recent sx jan 2016   • Stenosis of right renal artery (CMS/HCC) 07/05/2018    Per renal duplex - 60% stenosis, left normal; 5/2019 duplex unchanged    • SVT (supraventricular tachycardia) (CMS/HCC)     past hx  on medicine   • Syncope 12/25/2017   • Syncope and collapse    • Thyroiditis          Past Surgical History:   Procedure Laterality Date   • BREAST BIOPSY      PERCUTATNEOUS NEEDLE CORE   • BRONCHOSCOPY      nov 2015   • BRONCHOSCOPY N/A 4/8/2016    Procedure: BRONCHOSCOPY WITH BAL;  Surgeon:  Sinan Braga MD;  Location: [facility] ENDOSCOPY;  Service:    • BRONCHOSCOPY N/A 12/9/2017    Procedure: BRONCHOSCOPY;  Surgeon: Real Rousseau MD;  Location: [facility] ENDOSCOPY;  Service:    • BRONCHOSCOPY N/A 7/20/2018    Procedure: BRONCHOSCOPY with BAL;  Surgeon: Sinan Braga MD;  Location: [facility] ENDOSCOPY;  Service: Pulmonary   • COLONOSCOPY N/A 07/08/2005   • DILATATION AND CURETTAGE N/A 01/01/1990   • PAP SMEAR N/A 03/30/2004   • SINUS SURGERY  01/2016   • TONSILLECTOMY         Current Outpatient Medications on File Prior to Visit   Medication Sig Dispense Refill   • amLODIPine (NORVASC) 2.5 MG tablet Take 2 tablets by mouth Daily. 90 tablet 3   • Cholecalciferol (VITAMIN D) 2000 units tablet Take 2,000 Units by mouth Daily.     • hydrochlorothiazide (HYDRODIURIL) 12.5 MG tablet Take 2 tablets by mouth Daily. (Patient taking differently: Take 12.5 mg by mouth Daily.) 90 tablet 3   • levothyroxine (LEVOXYL) 50 MCG tablet Take 1 tablet by mouth Every Other Day. 45 tablet 2   • LEVOXYL 75 MCG tablet TAKE ONE TABLET BY MOUTH EVERY OTHER DAY 45 tablet 1   • Loratadine 10 MG capsule Take 10 mg by mouth Daily.     • metoprolol succinate XL (TOPROL-XL) 50 MG 24 hr tablet TAKE ONE TABLET BY MOUTH DAILY 90 tablet 0   • olmesartan (BENICAR) 40 MG tablet TAKE ONE TABLET BY MOUTH DAILY 90 tablet 1   • Probiotic Product (PROBIOTIC ADVANCED PO) Take 2 capsules by mouth Daily.     • [DISCONTINUED] azithromycin (ZITHROMAX Z-KWAME) 250 MG tablet Take 2 tablets the first day, then 1 tablet daily for 4 days. 6 tablet 0     No current facility-administered medications on file prior to visit.        Social History     Socioeconomic History   • Marital status:      Spouse name: Not on file   • Number of children: Not on file   • Years of education: Not on file   • Highest education level: Not on file   Tobacco Use   • Smoking status: Never Smoker   • Smokeless tobacco: Never Used   Substance and Sexual Activity   • Alcohol use:  "No     Comment: caffeine use   • Drug use: No   • Sexual activity: No           Review of Systems   Constitution: Negative.   HENT: Negative for congestion.    Eyes: Negative for vision loss in left eye and vision loss in right eye.   Respiratory: Negative.  Negative for cough, hemoptysis, shortness of breath, sleep disturbances due to breathing, snoring, sputum production and wheezing.    Endocrine: Negative.    Hematologic/Lymphatic: Negative.    Skin: Negative for poor wound healing and rash.   Musculoskeletal: Negative for falls, gout, muscle cramps and myalgias.   Gastrointestinal: Negative for abdominal pain, diarrhea, dysphagia, hematemesis, melena, nausea and vomiting.   Neurological: Negative for excessive daytime sleepiness, dizziness, headaches, light-headedness, loss of balance, seizures and vertigo.   Psychiatric/Behavioral: Negative for depression and substance abuse. The patient is not nervous/anxious.        Procedures    ECG 12 Lead  Date/Time: 11/12/2019 9:21 AM  Performed by: Aisha Curiel MD  Authorized by: Aisha Curiel MD   Comparison: compared with previous ECG   Similar to previous ECG  Rhythm: sinus rhythm    Clinical impression: normal ECG                Objective:      Vitals:    11/12/19 0912   BP: 140/84   BP Location: Left arm   Patient Position: Sitting   Pulse: 67   Weight: 53 kg (116 lb 12.8 oz)   Height: 160 cm (63\")     Body mass index is 20.69 kg/m².    Physical Exam   Constitutional: She is oriented to person, place, and time. She appears well-developed and well-nourished. No distress.   HENT:   Head: Normocephalic and atraumatic.   Eyes: Conjunctivae are normal. No scleral icterus.   Neck: Neck supple. No JVD present. Carotid bruit is not present. No thyromegaly present.   Cardiovascular: Normal rate, regular rhythm, S1 normal, S2 normal and intact distal pulses.  No extrasystoles are present. PMI is not displaced. Exam reveals no gallop.   No murmur " heard.  Pulses:       Carotid pulses are 2+ on the right side, and 2+ on the left side.       Radial pulses are 2+ on the right side, and 2+ on the left side.        Dorsalis pedis pulses are 2+ on the right side, and 2+ on the left side.        Posterior tibial pulses are 2+ on the right side, and 2+ on the left side.   Pulmonary/Chest: Effort normal and breath sounds normal. No respiratory distress. She has no wheezes. She has no rhonchi. She has no rales. She exhibits no tenderness.   Abdominal: Soft. Bowel sounds are normal. She exhibits no distension, no abdominal bruit and no mass. There is no tenderness.   Musculoskeletal: She exhibits no edema or deformity.   Lymphadenopathy:     She has no cervical adenopathy.   Neurological: She is alert and oriented to person, place, and time. No cranial nerve deficit.   Skin: Skin is warm and dry. No rash noted. She is not diaphoretic. No cyanosis. No pallor. Nails show no clubbing.   Psychiatric: She has a normal mood and affect. Judgment normal.   Vitals reviewed.      Lab Review:       Assessment:      Diagnosis Plan   1. PSVT (paroxysmal supraventricular tachycardia) (CMS/HCC)     2. Benign essential HTN     3. Mixed hyperlipidemia     4. Stenosis of right renal artery (CMS/HCC)       1. Hypertension.  BP is better and more stable - goal is <120/80.   2. Palpitations.  These are very infrequent and overall have been very well controlled     3. History of SVT.  No recurrence  4. Hyperlipidemia, well controlled.   5. Hypothyroidism, stable.   6. Anxiety, particularly in the evenings.    7. Chronic MAC - stable, sees Dr. Braga every 6 months for this.   8. Guillain-barre 12/2017 - really resolved, doing well.      Plan:       Really doing well, takes amlodipine and hydrochlorthiazide in the morning and olmesartan and metoprolol at night.  Continue this regimen.  See Winifred in 1 year.

## 2020-01-16 DIAGNOSIS — I10 BENIGN ESSENTIAL HTN: ICD-10-CM

## 2020-01-16 RX ORDER — METOPROLOL SUCCINATE 50 MG/1
50 TABLET, EXTENDED RELEASE ORAL NIGHTLY
Qty: 90 TABLET | Refills: 3 | Status: SHIPPED | OUTPATIENT
Start: 2020-01-16 | End: 2020-10-22 | Stop reason: SDUPTHER

## 2020-01-28 DIAGNOSIS — I10 ESSENTIAL HYPERTENSION: ICD-10-CM

## 2020-01-28 RX ORDER — OLMESARTAN MEDOXOMIL 40 MG/1
TABLET ORAL
Qty: 90 TABLET | Refills: 0 | Status: SHIPPED | OUTPATIENT
Start: 2020-01-28 | End: 2020-04-27

## 2020-02-29 DIAGNOSIS — E03.9 ACQUIRED HYPOTHYROIDISM: ICD-10-CM

## 2020-03-02 RX ORDER — LEVOTHYROXINE SODIUM 75 UG/1
TABLET ORAL
Qty: 45 TABLET | Refills: 0 | Status: SHIPPED | OUTPATIENT
Start: 2020-03-02 | End: 2020-06-01

## 2020-03-16 ENCOUNTER — TELEPHONE (OUTPATIENT)
Dept: INTERNAL MEDICINE | Facility: CLINIC | Age: 85
End: 2020-03-16

## 2020-03-16 NOTE — TELEPHONE ENCOUNTER
Patient is calling because she rescheduled her appointment to see Dr. Bellamy because of the virus.  Patient is concerned about the following medication refills. Patient rescheduled to 05/19 and will need refills.    amLODIPine (NORVASC) 2.5 MG tablet- Besylate.    Patient is wanting to know if she is still to take this medication or can she get a comparable replacement.   Patient's Insurance will no longer cover this medication.    Efren [id number] at [facility] [place] confirmed    Patient is requesting a call before you call in any more medication     Patient call back [phone]

## 2020-03-16 NOTE — TELEPHONE ENCOUNTER
Discussed - she wants to stop norvasc b/c expensive. I sent message to cardiologist - ok to stop norvasc & go up on toprol if needed? (bp is borderline & pulse in 60s)

## 2020-03-17 ENCOUNTER — TELEPHONE (OUTPATIENT)
Dept: INTERNAL MEDICINE | Facility: CLINIC | Age: 85
End: 2020-03-17

## 2020-03-17 NOTE — TELEPHONE ENCOUNTER
PATIENT CALLED AND STATED THAT THE RX FOR amLODIPine (NORVASC) 2.5 MG tablet WAS NOT GOING TO BE TOO EXPENSIVE WITHOUT HER INSURANCE AT THE PHARMACY. SHE WILL BE PPURCHASING IT OUT OF POCKET.     PT CALLBACK [phone]

## 2020-04-27 DIAGNOSIS — I10 ESSENTIAL HYPERTENSION: ICD-10-CM

## 2020-04-27 RX ORDER — OLMESARTAN MEDOXOMIL 40 MG/1
TABLET ORAL
Qty: 90 TABLET | Refills: 0 | Status: SHIPPED | OUTPATIENT
Start: 2020-04-27 | End: 2020-07-27

## 2020-05-09 DIAGNOSIS — E03.9 ACQUIRED HYPOTHYROIDISM: ICD-10-CM

## 2020-05-11 RX ORDER — LEVOTHYROXINE SODIUM 50 UG/1
TABLET ORAL
Qty: 45 TABLET | Refills: 1 | Status: SHIPPED | OUTPATIENT
Start: 2020-05-11 | End: 2020-11-02

## 2020-05-30 DIAGNOSIS — E03.9 ACQUIRED HYPOTHYROIDISM: ICD-10-CM

## 2020-06-01 RX ORDER — LEVOTHYROXINE SODIUM 0.07 MG/1
TABLET ORAL
Qty: 45 TABLET | Refills: 1 | Status: SHIPPED | OUTPATIENT
Start: 2020-06-01 | End: 2020-11-30

## 2020-06-26 ENCOUNTER — APPOINTMENT (OUTPATIENT)
Dept: WOMENS IMAGING | Facility: HOSPITAL | Age: 85
End: 2020-06-26

## 2020-06-26 PROCEDURE — 77063 BREAST TOMOSYNTHESIS BI: CPT | Performed by: RADIOLOGY

## 2020-06-26 PROCEDURE — 77067 SCR MAMMO BI INCL CAD: CPT | Performed by: RADIOLOGY

## 2020-07-25 DIAGNOSIS — I10 BENIGN ESSENTIAL HTN: ICD-10-CM

## 2020-07-25 DIAGNOSIS — I10 ESSENTIAL HYPERTENSION: ICD-10-CM

## 2020-07-27 RX ORDER — OLMESARTAN MEDOXOMIL 40 MG/1
TABLET ORAL
Qty: 90 TABLET | Refills: 1 | Status: SHIPPED | OUTPATIENT
Start: 2020-07-27 | End: 2021-01-25

## 2020-07-27 RX ORDER — AMLODIPINE BESYLATE 2.5 MG/1
TABLET ORAL
Qty: 60 TABLET | Refills: 3 | Status: SHIPPED | OUTPATIENT
Start: 2020-07-27 | End: 2021-03-02

## 2020-08-20 ENCOUNTER — OFFICE VISIT (OUTPATIENT)
Dept: INFECTIOUS DISEASES | Facility: CLINIC | Age: 85
End: 2020-08-20

## 2020-08-20 VITALS
HEIGHT: 63 IN | WEIGHT: 114.4 LBS | DIASTOLIC BLOOD PRESSURE: 65 MMHG | SYSTOLIC BLOOD PRESSURE: 154 MMHG | TEMPERATURE: 98.1 F | HEART RATE: 68 BPM | BODY MASS INDEX: 20.27 KG/M2

## 2020-08-20 DIAGNOSIS — H46.9 OPTIC NEUROPATHY: ICD-10-CM

## 2020-08-20 DIAGNOSIS — A31.0 MYCOBACTERIUM AVIUM COMPLEX (HCC): Primary | ICD-10-CM

## 2020-08-20 PROCEDURE — 99213 OFFICE O/P EST LOW 20 MIN: CPT | Performed by: INTERNAL MEDICINE

## 2020-08-20 RX ORDER — ASCORBIC ACID 500 MG
500 TABLET ORAL DAILY
COMMUNITY
End: 2023-03-15

## 2020-08-20 NOTE — PROGRESS NOTES
cc: Follow-up IDA Quinones returns for follow-up MAC.  She had been on therapy since January 2016-September 2017.  Unfortunately developed recurrence in December 2017 when she also was diagnosed with Guillain-Barré syndrome.  She was started on daily triple drug therapy in January 2018.  She underwent bronchoscopy in July 2018 with negative AFB cultures      In January 2019 she developed optic neuropathy/it then he will ethambutol toxicity and medications were discontinued.  I took her a while but she gained her vision back and she has been feeling well.  She says she is felt better in 2020 than she has in several years.  Is not having any constitutional symptoms of infection such as fever, chills, night sweats.  She did an occasional nonproductive cough but this really is not too bothersome.  Her vision remains stable.  She has basically been isolated from COVID-19 and is nervous about getting the vaccine assuming one becomes available given past history Guyon Barré with influenza      Past medical history: MAC, C. difficile in the past, hyperlipidemia, hypertension, hypothyroidism, sinusitis, SVT, gullian barre    Review of Systems: All other reviewed and negative except as per HPI    Vitals:    08/20/20 1301   BP: 154/65   Pulse: 68   Temp: 98.1 °F (36.7 °C)       GENERAL: Awake and alert, in no acute distress.   LUNGS: normal respiratory effort.   PSYCHIATRIC: Appropriate mood, affect, insight, and judgment.       DIAGNOSTICS:  AFB cultures from 12/9/17 grew MAC amikacin 8, clarithromycin 0.5, linezolid 8, moxifloxacin 0.5    Mycobacterium avium complex on 4/8/16: Sensitivities as below           Mycobacterium avium complex       DISK DIFFUSION       Amikacin 8.0        Ciprofloxacin 16.0        Clarithromycin 4.0  Susceptible       Ethambutol 8.0        Linezolid 64.0        Moxifloxacin 4.0        Rifampin >8.0        Streptomycin 32.0         7/20/18 BAL culture: Neg    CT chest shows unchanged appearance  since January 2018 with scattered pulmonary nodularity stable since September 2017    Assessment and Plan  Mycobacterium avium complex  Bilateral optic neuropathy ethambutol ocular toxicity      On triple drug therapy since Jan 2018.  Negative BAL in July 2018.  Discontinued medications due to bilateral optic neuropathy/ethambutol toxicity in January 2019.  Currently monitoring off antibiotics.  Vision restored.  No signs or symptoms of recurrent MAC.  Discussed with her that we are just going to hold on therapy and a testing although she developed some more disabling symptoms.  Of course we're going to have a very high threshold to initiate antibiotics given her prior toxicities.  RTC 12 months or sooner if needed

## 2020-09-08 RX ORDER — HYDROCHLOROTHIAZIDE 12.5 MG/1
12.5 TABLET ORAL DAILY
Qty: 90 TABLET | Refills: 3 | Status: SHIPPED | OUTPATIENT
Start: 2020-09-08 | End: 2022-10-18

## 2020-10-05 ENCOUNTER — OFFICE VISIT (OUTPATIENT)
Dept: INTERNAL MEDICINE | Facility: CLINIC | Age: 85
End: 2020-10-05

## 2020-10-05 VITALS
OXYGEN SATURATION: 98 % | BODY MASS INDEX: 20.62 KG/M2 | HEART RATE: 69 BPM | SYSTOLIC BLOOD PRESSURE: 130 MMHG | HEIGHT: 63 IN | WEIGHT: 116.4 LBS | TEMPERATURE: 97.1 F | DIASTOLIC BLOOD PRESSURE: 82 MMHG

## 2020-10-05 DIAGNOSIS — I10 BENIGN ESSENTIAL HTN: Primary | ICD-10-CM

## 2020-10-05 DIAGNOSIS — E03.9 ACQUIRED HYPOTHYROIDISM: ICD-10-CM

## 2020-10-05 DIAGNOSIS — Z78.0 MENOPAUSE: ICD-10-CM

## 2020-10-05 DIAGNOSIS — E78.2 MIXED HYPERLIPIDEMIA: ICD-10-CM

## 2020-10-05 PROCEDURE — 99213 OFFICE O/P EST LOW 20 MIN: CPT | Performed by: INTERNAL MEDICINE

## 2020-10-05 NOTE — PROGRESS NOTES
"Subjective   Stacie Vieira is a 85 y.o. female here for   Chief Complaint   Patient presents with   • Hypertension     6 month follow up   • Hyperlipidemia   • Hypothyroidism   .    Vitals:    10/05/20 1307   BP: 130/82   BP Location: Left arm   Patient Position: Sitting   Cuff Size: Adult   Pulse: 69   Temp: 97.1 °F (36.2 °C)   SpO2: 98%   Weight: 52.8 kg (116 lb 6.4 oz)   Height: 160 cm (63\")       Body mass index is 20.62 kg/m².    Hypertension  This is a chronic problem. The current episode started more than 1 year ago. The problem is unchanged. The problem is controlled. Associated symptoms include shortness of breath (occ - not worse). Pertinent negatives include no chest pain or palpitations.   Hyperlipidemia  This is a chronic problem. The current episode started more than 1 year ago. The problem is controlled. Recent lipid tests were reviewed and are normal. Exacerbating diseases include hypothyroidism. She has no history of diabetes. Associated symptoms include shortness of breath (occ - not worse). Pertinent negatives include no chest pain.   Hypothyroidism  This is a chronic problem. Pertinent negatives include no chest pain, chills, coughing, fatigue or fever.        The following portions of the patient's history were reviewed and updated as appropriate: allergies, current medications, past social history and problem list.    Review of Systems   Constitutional: Negative for chills, fatigue and fever.   Respiratory: Positive for shortness of breath (occ - not worse). Negative for cough and wheezing.    Cardiovascular: Negative for chest pain, palpitations and leg swelling.   Psychiatric/Behavioral: Positive for sleep disturbance (occ). Negative for dysphoric mood. The patient is not nervous/anxious.        Objective   Physical Exam  Vitals signs and nursing note reviewed.   Constitutional:       General: She is not in acute distress.     Appearance: She is well-developed.   Cardiovascular:      Rate " and Rhythm: Normal rate and regular rhythm.      Heart sounds: Normal heart sounds.   Pulmonary:      Effort: No respiratory distress.      Breath sounds: No wheezing or rales.   Chest:      Chest wall: No tenderness.   Psychiatric:         Behavior: Behavior normal.         Assessment/Plan   Diagnoses and all orders for this visit:    Benign essential HTN  Comments:  controlled - call if bp over 140/90  Orders:  -     CBC & Differential; Future  -     Comprehensive Metabolic Panel; Future  -     Lipid Panel; Future  -     Urinalysis With Microscopic If Indicated (No Culture) - Urine, Clean Catch; Future    Acquired hypothyroidism  Comments:  return for labs  Orders:  -     TSH Rfx On Abnormal To Free T4; Future    Mixed hyperlipidemia  Comments:  continue diet/ex    Menopause  -     DEXA Bone Density Axial; Future

## 2020-10-12 ENCOUNTER — LAB (OUTPATIENT)
Dept: INTERNAL MEDICINE | Facility: CLINIC | Age: 85
End: 2020-10-12

## 2020-10-12 DIAGNOSIS — I10 BENIGN ESSENTIAL HTN: ICD-10-CM

## 2020-10-12 DIAGNOSIS — E03.9 ACQUIRED HYPOTHYROIDISM: ICD-10-CM

## 2020-10-13 LAB
ALBUMIN SERPL-MCNC: 4.5 G/DL (ref 3.6–4.6)
ALBUMIN/GLOB SERPL: 1.7 {RATIO} (ref 1.2–2.2)
ALP SERPL-CCNC: 69 IU/L (ref 39–117)
ALT SERPL-CCNC: 10 IU/L (ref 0–32)
APPEARANCE UR: CLEAR
AST SERPL-CCNC: 23 IU/L (ref 0–40)
BASOPHILS # BLD AUTO: 0 X10E3/UL (ref 0–0.2)
BASOPHILS NFR BLD AUTO: 1 %
BILIRUB SERPL-MCNC: 0.4 MG/DL (ref 0–1.2)
BILIRUB UR QL STRIP: NEGATIVE
BUN SERPL-MCNC: 18 MG/DL (ref 8–27)
BUN/CREAT SERPL: 17 (ref 12–28)
CALCIUM SERPL-MCNC: 9.6 MG/DL (ref 8.7–10.3)
CHLORIDE SERPL-SCNC: 97 MMOL/L (ref 96–106)
CHOLEST SERPL-MCNC: 232 MG/DL (ref 100–199)
CO2 SERPL-SCNC: 25 MMOL/L (ref 20–29)
COLOR UR: YELLOW
CREAT SERPL-MCNC: 1.07 MG/DL (ref 0.57–1)
EOSINOPHIL # BLD AUTO: 0.2 X10E3/UL (ref 0–0.4)
EOSINOPHIL NFR BLD AUTO: 3 %
ERYTHROCYTE [DISTWIDTH] IN BLOOD BY AUTOMATED COUNT: 12.3 % (ref 11.7–15.4)
GLOBULIN SER CALC-MCNC: 2.6 G/DL (ref 1.5–4.5)
GLUCOSE SERPL-MCNC: 103 MG/DL (ref 65–99)
GLUCOSE UR QL: NEGATIVE
HCT VFR BLD AUTO: 43 % (ref 34–46.6)
HDLC SERPL-MCNC: 87 MG/DL
HGB BLD-MCNC: 14.3 G/DL (ref 11.1–15.9)
HGB UR QL STRIP: NEGATIVE
IMM GRANULOCYTES # BLD AUTO: 0 X10E3/UL (ref 0–0.1)
IMM GRANULOCYTES NFR BLD AUTO: 0 %
KETONES UR QL STRIP: NEGATIVE
LDLC SERPL CALC-MCNC: 121 MG/DL (ref 0–99)
LEUKOCYTE ESTERASE UR QL STRIP: NEGATIVE
LYMPHOCYTES # BLD AUTO: 2.3 X10E3/UL (ref 0.7–3.1)
LYMPHOCYTES NFR BLD AUTO: 37 %
MCH RBC QN AUTO: 31.6 PG (ref 26.6–33)
MCHC RBC AUTO-ENTMCNC: 33.3 G/DL (ref 31.5–35.7)
MCV RBC AUTO: 95 FL (ref 79–97)
MICRO URNS: NORMAL
MONOCYTES # BLD AUTO: 0.8 X10E3/UL (ref 0.1–0.9)
MONOCYTES NFR BLD AUTO: 13 %
NEUTROPHILS # BLD AUTO: 2.9 X10E3/UL (ref 1.4–7)
NEUTROPHILS NFR BLD AUTO: 46 %
NITRITE UR QL STRIP: NEGATIVE
PH UR STRIP: 7.5 [PH] (ref 5–7.5)
PLATELET # BLD AUTO: 333 X10E3/UL (ref 150–450)
POTASSIUM SERPL-SCNC: 4.8 MMOL/L (ref 3.5–5.2)
PROT SERPL-MCNC: 7.1 G/DL (ref 6–8.5)
PROT UR QL STRIP: NEGATIVE
RBC # BLD AUTO: 4.52 X10E6/UL (ref 3.77–5.28)
SODIUM SERPL-SCNC: 138 MMOL/L (ref 134–144)
SP GR UR: 1.01 (ref 1–1.03)
TRIGL SERPL-MCNC: 138 MG/DL (ref 0–149)
TSH SERPL DL<=0.005 MIU/L-ACNC: 1.04 UIU/ML (ref 0.45–4.5)
UROBILINOGEN UR STRIP-MCNC: 0.2 MG/DL (ref 0.2–1)
VLDLC SERPL CALC-MCNC: 24 MG/DL (ref 5–40)
WBC # BLD AUTO: 6.2 X10E3/UL (ref 3.4–10.8)

## 2020-10-13 NOTE — PROGRESS NOTES
Slightly high sugar/cholesterol/fat - need low fat/sugar diet & more exercise. Normal thyroid. Need more water to protect kidneys.

## 2020-10-22 ENCOUNTER — OFFICE VISIT (OUTPATIENT)
Dept: CARDIOLOGY | Facility: CLINIC | Age: 85
End: 2020-10-22

## 2020-10-22 VITALS
WEIGHT: 116.8 LBS | HEIGHT: 63 IN | DIASTOLIC BLOOD PRESSURE: 80 MMHG | HEART RATE: 69 BPM | SYSTOLIC BLOOD PRESSURE: 144 MMHG | BODY MASS INDEX: 20.7 KG/M2

## 2020-10-22 DIAGNOSIS — I70.1 STENOSIS OF RIGHT RENAL ARTERY (HCC): ICD-10-CM

## 2020-10-22 DIAGNOSIS — R06.09 DYSPNEA ON EXERTION: ICD-10-CM

## 2020-10-22 DIAGNOSIS — I47.1 PSVT (PAROXYSMAL SUPRAVENTRICULAR TACHYCARDIA) (HCC): ICD-10-CM

## 2020-10-22 DIAGNOSIS — I65.21 STENOSIS OF RIGHT CAROTID ARTERY: ICD-10-CM

## 2020-10-22 DIAGNOSIS — R09.89 LABILE BLOOD PRESSURE: Primary | ICD-10-CM

## 2020-10-22 DIAGNOSIS — I10 BENIGN ESSENTIAL HTN: ICD-10-CM

## 2020-10-22 PROCEDURE — 93000 ELECTROCARDIOGRAM COMPLETE: CPT | Performed by: NURSE PRACTITIONER

## 2020-10-22 PROCEDURE — 99214 OFFICE O/P EST MOD 30 MIN: CPT | Performed by: NURSE PRACTITIONER

## 2020-10-22 RX ORDER — METOPROLOL SUCCINATE 50 MG/1
50 TABLET, EXTENDED RELEASE ORAL NIGHTLY
Qty: 90 TABLET | Refills: 3 | Status: SHIPPED | OUTPATIENT
Start: 2020-10-22 | End: 2022-01-10

## 2020-10-22 NOTE — PROGRESS NOTES
Date of Office Visit: 10/22/2020  Encounter Provider: ANNABEL Miranda  Place of Service: [facility] CARDIOLOGY  Patient Name: Stacie Vieria  :1935  Primary Cardiologist: Dr. Aisha Curiel     Chief Complaint   Patient presents with   • Follow-up   :     Dear Dr. Tatiana Bellamy,     HPI: Stacie Vieira is a pleasant 85 y.o. female who presents today for cardiac follow up.     She has a history of paroxysmal supraventricular tachycardia (PSVT) dating back to the 1970s and was on propanolol. Her last episode of PSVT was in .  She also has been diagnosed with hypertension, thyroid disease, and mycobacterium avium-intracellulare complex.     In 2017, she was diagnosed with pneumonia and Guillain-[place] syndrome.  She had an episode of syncope which was associated with bradycardia. She received chest compressions and atropine. Echocardiogram completed showed an EF of 67%, grade 1 diastolic dysfunction, aortic valve calcification, and mild tricuspid insufficiency. On 18, a nuclear stress test completed showed no evidence of ischemia and EF hyperdynamic greater than 70%.    On 19, a renal duplex showed right renal artery greater than 60% stenosis present, normal left renal artery, and large cyst measuring 5.1 cm x 5.4 cm in the left kidney. Dr. Cecilio Rhodes, vascular reviewed the renal duplex images.  He said it looks like the right was greater than 60% stenosis.  He said he was surprised the left artery wasn't worse when compared to CT scan of the chest which shows calcium in the midportion of the aorta.  The kidney sizes 9 x 3 cm bilaterally which are both normal.     On 9/3/2019 vascular screening showed the following: Right carotid plaque, left carotid stenosis less than 50%, no abdominal aortic aneurysm, and normal PAD screening.    She was tried on spironolactone, but developed hyperkalemia.    She presents today for annual follow-up.  She  has recently been checking her blood pressures and they have ranged from 99/49 to 168/54.  For the most part they are running less than 130/60.  Heart rates have been in the 70s.  She reports mild dyspnea with exertion when going up steps or carrying groceries.  She denies dyspnea with her daily activities.  She further denies chest pain, palpitations, edema, dizziness, syncope, or bleeding.    Past Medical History:   Diagnosis Date   • Acute stress disorder    • Allergic rhinitis    • C. difficile colitis     hx in past   • Dyspnea on exertion    • Elevated fasting glucose    • Essential hypertension    • Guillain Barré syndrome (CMS/HCC)    • H/O: pneumonia    • Health care maintenance    • History of palpitations    • Hyperlipidemia    • Hypokalemia    • Hypothyroidism    • Insomnia    • Labile blood pressure    • MAC (mycobacterium avium-intracellulare complex)     reason for bronch.  dx with this nov 2015   • Osteoporosis    • Palpitations    • Pneumonia     past hx of freq.   • Polyneuropathy 12/20/2017   • PSVT (paroxysmal supraventricular tachycardia) (CMS/HCC)    • Renal cyst    • Sinusitis    • Staph infection     in sinus area  recent sx jan 2016   • Stenosis of right carotid artery 10/22/2020   • Stenosis of right renal artery (CMS/HCC) 07/05/2018    Per renal duplex - 60% stenosis, left normal; 5/2019 duplex unchanged    • SVT (supraventricular tachycardia) (CMS/HCC)     past hx  on medicine   • Syncope 12/25/2017   • Syncope and collapse    • Thyroiditis        Past Surgical History:   Procedure Laterality Date   • BREAST BIOPSY      PERCUTATNEOUS NEEDLE CORE   • BRONCHOSCOPY      nov 2015   • BRONCHOSCOPY N/A 4/8/2016    Procedure: BRONCHOSCOPY WITH BAL;  Surgeon: Sinan Braga MD;  Location: [facility] ENDOSCOPY;  Service:    • BRONCHOSCOPY N/A 12/9/2017    Procedure: BRONCHOSCOPY;  Surgeon: Real Rousseau MD;  Location: [facility] ENDOSCOPY;  Service:    • BRONCHOSCOPY N/A 7/20/2018    Procedure:  BRONCHOSCOPY with BAL;  Surgeon: Sinan Braga MD;  Location: [facility] ENDOSCOPY;  Service: Pulmonary   • COLONOSCOPY N/A 07/08/2005   • DILATATION AND CURETTAGE N/A 01/01/1990   • PAP SMEAR N/A 03/30/2004   • SINUS SURGERY  01/2016   • TONSILLECTOMY         Social History     Socioeconomic History   • Marital status:      Spouse name: Not on file   • Number of children: Not on file   • Years of education: Not on file   • Highest education level: Not on file   Tobacco Use   • Smoking status: Never Smoker   • Smokeless tobacco: Never Used   Substance and Sexual Activity   • Alcohol use: No     Comment: caffeine use: 1 cup daily   • Drug use: No   • Sexual activity: Never       Family History   Problem Relation Age of Onset   • Hypertension Brother    • Dementia Mother    • Kidney disease Father    • Thyroid disease Other        The following portion of the patient's history were reviewed and updated as appropriate: past medical history, past surgical history, past social history, past family history, allergies, current medications, and problem list.    Review of Systems   Constitution: Negative for chills, diaphoresis, fever, malaise/fatigue, night sweats, weight gain and weight loss.   HENT: Negative for hearing loss, nosebleeds, sore throat and tinnitus.    Eyes: Negative for blurred vision, double vision, pain and visual disturbance.   Cardiovascular: Positive for dyspnea on exertion. Negative for chest pain, claudication, cyanosis, irregular heartbeat, leg swelling, near-syncope, orthopnea, palpitations, paroxysmal nocturnal dyspnea and syncope.   Respiratory: Negative for cough, hemoptysis, shortness of breath, snoring and wheezing.    Endocrine: Negative for cold intolerance, heat intolerance and polyuria.   Hematologic/Lymphatic: Negative for bleeding problem. Does not bruise/bleed easily.   Skin: Negative for color change, dry skin, flushing and itching.   Musculoskeletal: Negative for falls, joint  pain, joint swelling, muscle cramps, muscle weakness and myalgias.   Gastrointestinal: Negative for abdominal pain, constipation, heartburn, melena, nausea and vomiting.   Genitourinary: Negative for dysuria and hematuria.   Neurological: Negative for excessive daytime sleepiness, dizziness, light-headedness, loss of balance, numbness, paresthesias, seizures and vertigo.   Psychiatric/Behavioral: Negative for altered mental status, depression, memory loss and substance abuse. The patient does not have insomnia and is not nervous/anxious.    Allergic/Immunologic: Negative for environmental allergies.       Allergies   Allergen Reactions   • Influenza Vaccines Mental Status Change     Guillain-[place]   • Alendronate Other (See Comments)     Headaches   • Ezetimibe Other (See Comments)     Leg Cramps   • Pravachol [Pravastatin] Other (See Comments)     Leg Cramps         Current Outpatient Medications:   •  amLODIPine (NORVASC) 2.5 MG tablet, TAKE TWO TABLETS BY MOUTH DAILY, Disp: 60 tablet, Rfl: 3  •  Cholecalciferol (VITAMIN D) 2000 units tablet, Take 2,000 Units by mouth Daily., Disp: , Rfl:   •  hydroCHLOROthiazide (HYDRODIURIL) 12.5 MG tablet, Take 1 tablet by mouth Daily., Disp: 90 tablet, Rfl: 3  •  levothyroxine (SYNTHROID, LEVOTHROID) 75 MCG tablet, TAKE ONE TABLET BY MOUTH EVERY OTHER DAY, Disp: 45 tablet, Rfl: 1  •  LEVOXYL 50 MCG tablet, TAKE ONE TABLET BY MOUTH EVERY OTHER DAY, Disp: 45 tablet, Rfl: 1  •  Loratadine 10 MG capsule, Take 10 mg by mouth Daily., Disp: , Rfl:   •  metoprolol succinate XL (TOPROL-XL) 50 MG 24 hr tablet, Take 1 tablet by mouth Every Night., Disp: 90 tablet, Rfl: 3  •  Misc Natural Products (JOINT SUPPORT PO), Take  by mouth., Disp: , Rfl:   •  Multiple Vitamins-Minerals (CENTRUM SILVER 50+WOMEN PO), Take  by mouth., Disp: , Rfl:   •  olmesartan (BENICAR) 40 MG tablet, TAKE ONE TABLET BY MOUTH DAILY, Disp: 90 tablet, Rfl: 1  •  Probiotic Product (PROBIOTIC ADVANCED PO), Take 2  "capsules by mouth Daily., Disp: , Rfl:   •  vitamin C (ASCORBIC ACID) 500 MG tablet, Take 500 mg by mouth Daily., Disp: , Rfl:         Objective:     Vitals:    10/22/20 1321 10/22/20 1322   BP: 140/80 144/80   BP Location: Left arm Right arm   Pulse: 69    Weight: 53 kg (116 lb 12.8 oz)    Height: 160 cm (63\")      Body mass index is 20.69 kg/m².    PHYSICAL EXAM:    Vitals Reviewed.   General Appearance: No acute distress, well developed and well nourished.  Thin.  Eyes: Conjunctiva and lids: No erythema, swelling, or discharge. Sclera non-icteric.   HENT: Atraumatic, normocephalic. External eyes, ears, and nose normal. No hearing loss noted. Mucous membranes normal. Lips not cyanotic. Neck supple with no tenderness.  Respiratory: No signs of respiratory distress. Respiration rhythm and depth normal.   Clear to auscultation. No rales, crackles, rhonchi, or wheezing auscultated.   Cardiovascular:  Jugular Venous Pressure: Normal  Heart Rate and Rhythm: Normal, Heart Sounds: Normal S1 and S2. No S3 or S4 noted.  Murmurs: No murmurs noted. No rubs, thrills, or gallops.   Lower Extremities: No edema noted.  Gastrointestinal:  Abdomen soft, non-distended, non-tender. Normal bowel sounds.    Musculoskeletal: Normal movement of extremities  Skin and Nails: General appearance normal. No pallor, cyanosis, diaphoresis. Skin temperature normal. No clubbing of fingernails.   Psychiatric: Patient alert and oriented to person, place, and time. Speech and behavior appropriate. Normal mood and affect.       ECG 12 Lead    Date/Time: 10/22/2020 1:19 PM  Performed by: Winifred Zaldivar APRN  Authorized by: Winifred Zaldivar APRN   Comparison: compared with previous ECG from 11/12/2019  Similar to previous ECG  Rhythm: sinus rhythm  Rate: normal  BPM: 69  Conduction: conduction normal  ST Segments: ST segments normal  T Waves: T waves normal  QRS axis: normal  Other: no other findings    Clinical impression: normal ECG            "   Assessment:       Diagnosis Plan   1. Labile blood pressure     2. PSVT (paroxysmal supraventricular tachycardia) (CMS/HCC)     3. Stenosis of right renal artery (CMS/HCC)     4. Dyspnea on exertion     5. Benign essential HTN  metoprolol succinate XL (TOPROL-XL) 50 MG 24 hr tablet   6. Stenosis of right carotid artery            Plan:       1. Labile Blood Pressure:.  Blood pressures been under good control at home.  Sometimes labile, but very stable.  We will continue with her current medication regimen.    2/3. Renal Artery Stenosis & Renal Cyst: Renal duplex 7/2019 showed the following: renal duplex showed right renal artery greater than 60% stenosis present, normal left renal artery, and large cyst measuring 5.1 cm x 5.4 cm in the left kidney.      4.  PSVT: Patient states last episode was in 1994 and she was previously on propanolol.  She's now on metoprolol succinate and denies palpitations.     5.  Dyspnea with exertion: Only occurs with going up steps or carrying groceries.    6.  Hyperlipidemia: Followed by PCP.    7.  Carotid Artery Stenosis: Right carotid plaque in left carotid stenosis less than 50% per vascular screening 9/3/2019.    8.  If recommended follow-up with Dr. Aisha Curiel in 1 year, unless otherwise needed sooner.    As always, it has been a pleasure to participate in your patient's care. Thank you.       Sincerely,       ANNABEL Wu  [facility]      · COVID-19 Precautions - Patient was compliant in wearing a mask. When I saw the patient, I used appropriate personal protective equipment (PPE) including mask and eye shield (standard procedure).  Additionally, I used gown and gloves if indicated.  Hand hygiene was completed before and after seeing the patient.  · Dictated utilizing Dragon Dictation

## 2020-10-31 DIAGNOSIS — E03.9 ACQUIRED HYPOTHYROIDISM: ICD-10-CM

## 2020-11-02 RX ORDER — LEVOTHYROXINE SODIUM 50 UG/1
TABLET ORAL
Qty: 45 TABLET | Refills: 2 | Status: SHIPPED | OUTPATIENT
Start: 2020-11-02 | End: 2021-03-05 | Stop reason: SDUPTHER

## 2020-11-29 DIAGNOSIS — E03.9 ACQUIRED HYPOTHYROIDISM: ICD-10-CM

## 2020-11-30 RX ORDER — LEVOTHYROXINE SODIUM 0.07 MG/1
TABLET ORAL
Qty: 45 TABLET | Refills: 3 | Status: SHIPPED | OUTPATIENT
Start: 2020-11-30 | End: 2021-03-05

## 2021-01-24 DIAGNOSIS — I10 ESSENTIAL HYPERTENSION: ICD-10-CM

## 2021-01-25 RX ORDER — OLMESARTAN MEDOXOMIL 40 MG/1
TABLET ORAL
Qty: 90 TABLET | Refills: 0 | Status: SHIPPED | OUTPATIENT
Start: 2021-01-25 | End: 2021-04-26

## 2021-02-08 ENCOUNTER — TELEPHONE (OUTPATIENT)
Dept: INTERNAL MEDICINE | Facility: CLINIC | Age: 86
End: 2021-02-08

## 2021-02-08 DIAGNOSIS — E78.2 MIXED HYPERLIPIDEMIA: ICD-10-CM

## 2021-02-08 DIAGNOSIS — R73.09 ELEVATED GLUCOSE: ICD-10-CM

## 2021-02-08 DIAGNOSIS — I10 BENIGN ESSENTIAL HTN: Primary | ICD-10-CM

## 2021-02-08 DIAGNOSIS — E03.9 ACQUIRED HYPOTHYROIDISM: ICD-10-CM

## 2021-02-08 DIAGNOSIS — L65.9 HAIR LOSS: ICD-10-CM

## 2021-02-08 NOTE — TELEPHONE ENCOUNTER
Caller: Stacie Vieira    Relationship to patient: Self    Best call back number: [phone] ANY TIME BEFORE 3:00   [phone] AFTER 3 OCLOCK     Patient is needing:      PATIENT CALLED IN STATING HER THYROID MEDICATION RECENTLY  CHANGED TO:   levothyroxine (SYNTHROID, LEVOTHROID) 75 MCG tablet       levoxyl IS THE MEDICATION THE PATIENT WAS ON SINCE 1993/1994   AND THE  HAS RECENTLY HAD TROUBLE GETTING THAT BRAND, SINCE SHE'S BEEN ON   Levothyroxine (SYNTHROID, LEVOTHROID) 75 MCG tablet    AND IT IS CAUSING EXTREME HAIR LOSS. PATIENT STATED SHE'S GETTING HANDFULS OF HAIR WHEN SHE WASHES HER HAIR AND SHE'S CONCERNED AND CURIOUS ABOUT CHANGING MEDICATIONS TO SOMETHING THAT WONT CAUSE HAIR LOSS.      PATIENT STATED SHE TOOK A HANDFUL OF HAIR TO THE  YESTERDAY  AND SHOWED THEM AND THE PHARMACIST THINKS ITS HER MEDICATION  THAT IS CAUSING THIS, DUE TO IT BEING A SIDE EFFECT OF :     levothyroxine (SYNTHROID, LEVOTHROID) 75 MCG tablet         PLEASE CALL AND ADVISE PATIENT

## 2021-02-08 NOTE — TELEPHONE ENCOUNTER
Tatiana Bellamy MD Simpson, Shereha, MA   Caller: Unspecified (Today,  8:34 AM)     Pls call - she may call different pharmacies to see if they have the old .  In the meantime, I ordered repeat labs including thyroid, vitamin B, etc - she may come in anytime for labs - she may fast if wanting chol done or just come in nonfasting & cancel chol blood test. She may come here or see derm if wanting to be checked.     Detailed message left on v/m of Dr. Bellamy's comments.

## 2021-02-09 ENCOUNTER — LAB (OUTPATIENT)
Dept: INTERNAL MEDICINE | Facility: CLINIC | Age: 86
End: 2021-02-09

## 2021-02-09 DIAGNOSIS — E78.2 MIXED HYPERLIPIDEMIA: ICD-10-CM

## 2021-02-09 DIAGNOSIS — E03.9 ACQUIRED HYPOTHYROIDISM: ICD-10-CM

## 2021-02-09 DIAGNOSIS — I10 BENIGN ESSENTIAL HTN: ICD-10-CM

## 2021-02-09 DIAGNOSIS — L65.9 HAIR LOSS: ICD-10-CM

## 2021-02-09 LAB — ERYTHROCYTE [SEDIMENTATION RATE] IN BLOOD: 19 MM/HR (ref 0–20)

## 2021-02-10 DIAGNOSIS — R73.09 ELEVATED GLUCOSE: Primary | ICD-10-CM

## 2021-02-10 LAB
ALBUMIN SERPL-MCNC: 4.4 G/DL (ref 3.5–5.2)
ALBUMIN/GLOB SERPL: 1.6 G/DL
ALP SERPL-CCNC: 68 U/L (ref 39–117)
ALT SERPL-CCNC: 10 U/L (ref 1–33)
AST SERPL-CCNC: 23 U/L (ref 1–32)
BASOPHILS # BLD AUTO: 0.03 10*3/MM3 (ref 0–0.2)
BASOPHILS NFR BLD AUTO: 0.4 % (ref 0–1.5)
BILIRUB SERPL-MCNC: 0.3 MG/DL (ref 0–1.2)
BUN SERPL-MCNC: 17 MG/DL (ref 8–23)
BUN/CREAT SERPL: 17.9 (ref 7–25)
CALCIUM SERPL-MCNC: 9.6 MG/DL (ref 8.6–10.5)
CHLORIDE SERPL-SCNC: 97 MMOL/L (ref 98–107)
CHOLEST SERPL-MCNC: 235 MG/DL (ref 0–200)
CO2 SERPL-SCNC: 30.7 MMOL/L (ref 22–29)
CREAT SERPL-MCNC: 0.95 MG/DL (ref 0.57–1)
EOSINOPHIL # BLD AUTO: 0.15 10*3/MM3 (ref 0–0.4)
EOSINOPHIL NFR BLD AUTO: 2 % (ref 0.3–6.2)
ERYTHROCYTE [DISTWIDTH] IN BLOOD BY AUTOMATED COUNT: 12.3 % (ref 12.3–15.4)
FOLATE SERPL-MCNC: >20 NG/ML (ref 4.78–24.2)
GLOBULIN SER CALC-MCNC: 2.7 GM/DL
GLUCOSE SERPL-MCNC: 188 MG/DL (ref 65–99)
HCT VFR BLD AUTO: 42.6 % (ref 34–46.6)
HDLC SERPL-MCNC: 81 MG/DL (ref 40–60)
HGB BLD-MCNC: 14 G/DL (ref 12–15.9)
IMM GRANULOCYTES # BLD AUTO: 0.04 10*3/MM3 (ref 0–0.05)
IMM GRANULOCYTES NFR BLD AUTO: 0.5 % (ref 0–0.5)
LDLC SERPL CALC-MCNC: 124 MG/DL (ref 0–100)
LYMPHOCYTES # BLD AUTO: 2.19 10*3/MM3 (ref 0.7–3.1)
LYMPHOCYTES NFR BLD AUTO: 29.8 % (ref 19.6–45.3)
MCH RBC QN AUTO: 30.6 PG (ref 26.6–33)
MCHC RBC AUTO-ENTMCNC: 32.9 G/DL (ref 31.5–35.7)
MCV RBC AUTO: 93.2 FL (ref 79–97)
MONOCYTES # BLD AUTO: 0.86 10*3/MM3 (ref 0.1–0.9)
MONOCYTES NFR BLD AUTO: 11.7 % (ref 5–12)
NEUTROPHILS # BLD AUTO: 4.09 10*3/MM3 (ref 1.7–7)
NEUTROPHILS NFR BLD AUTO: 55.6 % (ref 42.7–76)
NRBC BLD AUTO-RTO: 0 /100 WBC (ref 0–0.2)
PLATELET # BLD AUTO: 337 10*3/MM3 (ref 140–450)
POTASSIUM SERPL-SCNC: 4.4 MMOL/L (ref 3.5–5.2)
PROT SERPL-MCNC: 7.1 G/DL (ref 6–8.5)
RBC # BLD AUTO: 4.57 10*6/MM3 (ref 3.77–5.28)
SODIUM SERPL-SCNC: 138 MMOL/L (ref 136–145)
TRIGL SERPL-MCNC: 173 MG/DL (ref 0–150)
TSH SERPL DL<=0.005 MIU/L-ACNC: 0.52 UIU/ML (ref 0.27–4.2)
VIT B12 SERPL-MCNC: 1068 PG/ML (ref 211–946)
VLDLC SERPL CALC-MCNC: 30 MG/DL (ref 5–40)
WBC # BLD AUTO: 7.36 10*3/MM3 (ref 3.4–10.8)

## 2021-02-10 NOTE — PROGRESS NOTES
High sugar/cholesterol/fat - need low fat/sugar diet & more exercise. Normal thyroid. Sed rate is normal (no inflammation).

## 2021-02-11 LAB
HBA1C MFR BLD: 5.5 % (ref 4.8–5.6)
Lab: NORMAL
REF LAB TEST METHOD: NORMAL
WRITTEN AUTHORIZATION: NORMAL

## 2021-02-16 ENCOUNTER — TELEPHONE (OUTPATIENT)
Dept: INTERNAL MEDICINE | Facility: CLINIC | Age: 86
End: 2021-02-16

## 2021-02-16 NOTE — TELEPHONE ENCOUNTER
PT CALLED REQUESTING A CALL BACK REGARDING LAB RESULTS AND MEDICATIONS    PT IS CONCERNED THAT:  levothyroxine (SYNTHROID, LEVOTHROID) 75 MCG tablet  IS CAUSING HAIR LOSS    PT WOULD LIKE TO DISCUSS TAKING THE NAME BRAND    PLEASE ADVISE At: [phone]     [facility] [address] - [address] AT [facility] - [phone]  - [phone] FX

## 2021-02-16 NOTE — TELEPHONE ENCOUNTER
Discussed - hair loss christopher at [facility] -need to discuss with phamacist -may go back to previous  or trade name.  Need to see dermatologist, christopher if hair loss continues.  Normal cbc & thyroid last week.

## 2021-02-17 NOTE — PLAN OF CARE
Problem: Patient Care Overview (Adult)  Goal: Plan of Care Review  Outcome: Ongoing (interventions implemented as appropriate)   01/10/18 1538   Coping/Psychosocial Response Interventions   Plan Of Care Reviewed With patient   Patient Care Overview   Progress improving   Outcome Evaluation   Outcome Summary/Follow up Plan Participated in therapies and cooperative with staff. Independent in room with walker. Discharge planned for tomorrow. No safety problems noted.     Goal: Adult Individualization and Mutuality  Outcome: Ongoing (interventions implemented as appropriate)      Problem: Fall Risk (Adult)  Goal: Absence of Falls  Outcome: Ongoing (interventions implemented as appropriate)      Problem: Mobility, Physical Impaired (Adult)  Goal: Enhanced Functionality Ability  Outcome: Ongoing (interventions implemented as appropriate)         Patient

## 2021-03-01 DIAGNOSIS — I10 BENIGN ESSENTIAL HTN: ICD-10-CM

## 2021-03-02 RX ORDER — AMLODIPINE BESYLATE 2.5 MG/1
TABLET ORAL
Qty: 60 TABLET | Refills: 2 | Status: SHIPPED | OUTPATIENT
Start: 2021-03-02 | End: 2021-08-23

## 2021-03-05 DIAGNOSIS — E03.9 ACQUIRED HYPOTHYROIDISM: ICD-10-CM

## 2021-03-05 RX ORDER — LEVOTHYROXINE SODIUM 75 UG/1
TABLET ORAL
Qty: 45 TABLET | Refills: 2 | Status: SHIPPED | OUTPATIENT
Start: 2021-03-05 | End: 2022-01-05 | Stop reason: SDUPTHER

## 2021-03-05 RX ORDER — LEVOTHYROXINE SODIUM 50 UG/1
50 TABLET ORAL EVERY OTHER DAY
Qty: 45 TABLET | Refills: 2 | Status: SHIPPED | OUTPATIENT
Start: 2021-03-05 | End: 2022-01-05 | Stop reason: SDUPTHER

## 2021-04-05 ENCOUNTER — CLINICAL SUPPORT (OUTPATIENT)
Dept: INTERNAL MEDICINE | Facility: CLINIC | Age: 86
End: 2021-04-05

## 2021-04-05 ENCOUNTER — OFFICE VISIT (OUTPATIENT)
Dept: INTERNAL MEDICINE | Facility: CLINIC | Age: 86
End: 2021-04-05

## 2021-04-05 VITALS
SYSTOLIC BLOOD PRESSURE: 126 MMHG | OXYGEN SATURATION: 99 % | HEART RATE: 80 BPM | TEMPERATURE: 97.1 F | BODY MASS INDEX: 20.13 KG/M2 | HEIGHT: 63 IN | WEIGHT: 113.6 LBS | DIASTOLIC BLOOD PRESSURE: 76 MMHG

## 2021-04-05 DIAGNOSIS — M81.0 AGE-RELATED OSTEOPOROSIS WITHOUT CURRENT PATHOLOGICAL FRACTURE: ICD-10-CM

## 2021-04-05 DIAGNOSIS — E78.2 MIXED HYPERLIPIDEMIA: ICD-10-CM

## 2021-04-05 DIAGNOSIS — I10 BENIGN ESSENTIAL HTN: Primary | ICD-10-CM

## 2021-04-05 DIAGNOSIS — Z78.0 MENOPAUSE: ICD-10-CM

## 2021-04-05 DIAGNOSIS — F41.9 ANXIETY: ICD-10-CM

## 2021-04-05 DIAGNOSIS — E03.9 ACQUIRED HYPOTHYROIDISM: ICD-10-CM

## 2021-04-05 PROCEDURE — 77080 DXA BONE DENSITY AXIAL: CPT | Performed by: INTERNAL MEDICINE

## 2021-04-05 PROCEDURE — 99214 OFFICE O/P EST MOD 30 MIN: CPT | Performed by: INTERNAL MEDICINE

## 2021-04-05 RX ORDER — ESCITALOPRAM OXALATE 10 MG/1
10 TABLET ORAL DAILY
Qty: 30 TABLET | Refills: 6 | Status: SHIPPED | OUTPATIENT
Start: 2021-04-05 | End: 2021-07-06

## 2021-04-05 RX ORDER — ESCITALOPRAM OXALATE 10 MG/1
10 TABLET ORAL DAILY
Qty: 30 TABLET | Refills: 6 | Status: SHIPPED | OUTPATIENT
Start: 2021-04-05 | End: 2021-04-05 | Stop reason: SDUPTHER

## 2021-04-05 RX ORDER — ESCITALOPRAM OXALATE 10 MG/1
10 TABLET ORAL DAILY
Qty: 30 TABLET | Refills: 6 | Status: SHIPPED | OUTPATIENT
Start: 2021-04-05 | End: 2021-04-05

## 2021-04-05 NOTE — ASSESSMENT & PLAN NOTE
Allergic to alendronate - need to consider prolia or evista - she declines for now - need daily walkint

## 2021-04-05 NOTE — PROGRESS NOTES
"Chief Complaint  Hypertension (6 month follow up), Hyperlipidemia, Hypothyroidism, and Anxiety    Subjective          Stacie Vieira presents to [facility] for   Hypertension  This is a chronic problem. The current episode started more than 1 year ago. The problem is unchanged. The problem is controlled. Associated symptoms include anxiety.   Hyperlipidemia  This is a chronic problem. The current episode started more than 1 year ago. The problem is controlled. Recent lipid tests were reviewed and are normal. Exacerbating diseases include hypothyroidism.   Hypothyroidism  This is a chronic problem. The current episode started more than 1 year ago. The problem occurs constantly. The problem has been unchanged.   Anxiety  Presents for follow-up visit. Symptoms include excessive worry. Symptoms occur most days.           Review of Systems     Objective   Vital Signs:   /76 (BP Location: Left arm, Patient Position: Sitting, Cuff Size: Adult)   Pulse 80   Temp 97.1 °F (36.2 °C)   Ht 160 cm (63\")   Wt 51.5 kg (113 lb 9.6 oz)   SpO2 99%   BMI 20.12 kg/m²     Physical Exam  Vitals and nursing note reviewed.   Constitutional:       General: She is not in acute distress.     Appearance: She is well-developed.   Cardiovascular:      Rate and Rhythm: Normal rate and regular rhythm.      Heart sounds: Normal heart sounds.   Pulmonary:      Effort: No respiratory distress.      Breath sounds: No wheezing or rales.   Chest:      Chest wall: No tenderness.   Psychiatric:         Behavior: Behavior normal.        Result Review :                 Assessment and Plan    Problem List Items Addressed This Visit        Unprioritized    Benign essential HTN - Primary    Current Assessment & Plan     Hypertension is unchanged.  Continue current treatment regimen.  Blood pressure will be reassessed at the next regular appointment.         Hypothyroidism    Current Assessment & Plan     Normal TSH " 1/2021         Hyperlipidemia    Current Assessment & Plan     Lipid abnormalities are unchanged.  Nutritional counseling was provided.  Lipids will be reassessed in 6 months.         Anxiety    Current Assessment & Plan     Family issues - she wants to try low dose medication         Relevant Medications    escitalopram (Lexapro) 10 MG tablet    Age-related osteoporosis without current pathological fracture    Current Assessment & Plan     Allergic to alendronate - need to consider prolia or evista - she declines for now - need daily walkint               Follow Up   Return in about 3 months (around 7/5/2021) for Medicare Wellness.  Patient was given instructions and counseling regarding her condition or for health maintenance advice. Please see specific information pulled into the AVS if appropriate.      We discussed bone density done today - which shows worsening of hip osteoporosis (-2.4 & -2.7) - need to build bone strength!

## 2021-04-13 DIAGNOSIS — Z12.11 COLON CANCER SCREENING: Primary | ICD-10-CM

## 2021-04-24 DIAGNOSIS — I10 ESSENTIAL HYPERTENSION: ICD-10-CM

## 2021-04-26 RX ORDER — OLMESARTAN MEDOXOMIL 40 MG/1
TABLET ORAL
Qty: 90 TABLET | Refills: 1 | Status: SHIPPED | OUTPATIENT
Start: 2021-04-26 | End: 2021-10-22

## 2021-05-17 ENCOUNTER — TELEPHONE (OUTPATIENT)
Dept: INTERNAL MEDICINE | Facility: CLINIC | Age: 86
End: 2021-05-17

## 2021-05-17 NOTE — TELEPHONE ENCOUNTER
Caller: Stacie Vieira    Relationship: Self    Best call back number: [phone]        What test was performed: COLON CANCER SCREEN-LABWORK    When was the test performed: PATIENT STATES IT HAS BEEN CLOSE TO A MONTH AGO    Where was the test performed: [employer]. [id number]     Additional notes: DONE AT HOME THEN SENT TO THE LAB

## 2021-07-06 ENCOUNTER — OFFICE VISIT (OUTPATIENT)
Dept: INTERNAL MEDICINE | Facility: CLINIC | Age: 86
End: 2021-07-06

## 2021-07-06 VITALS
OXYGEN SATURATION: 96 % | HEART RATE: 70 BPM | BODY MASS INDEX: 20.02 KG/M2 | RESPIRATION RATE: 17 BRPM | DIASTOLIC BLOOD PRESSURE: 88 MMHG | TEMPERATURE: 97.1 F | WEIGHT: 113 LBS | HEIGHT: 63 IN | SYSTOLIC BLOOD PRESSURE: 146 MMHG

## 2021-07-06 DIAGNOSIS — Z00.00 MEDICARE ANNUAL WELLNESS VISIT, SUBSEQUENT: Primary | ICD-10-CM

## 2021-07-06 PROCEDURE — G0439 PPPS, SUBSEQ VISIT: HCPCS | Performed by: INTERNAL MEDICINE

## 2021-07-06 PROCEDURE — 96160 PT-FOCUSED HLTH RISK ASSMT: CPT | Performed by: INTERNAL MEDICINE

## 2021-07-06 PROCEDURE — 1170F FXNL STATUS ASSESSED: CPT | Performed by: INTERNAL MEDICINE

## 2021-07-06 PROCEDURE — 1159F MED LIST DOCD IN RCRD: CPT | Performed by: INTERNAL MEDICINE

## 2021-07-06 NOTE — PROGRESS NOTES
The ABCs of the Annual Wellness Visit  Subsequent Medicare Wellness Visit    Chief Complaint   Patient presents with   • Medicare Wellness-subsequent       Subjective   History of Present Illness:  Stacie Vieira is a 86 y.o. female who presents for a Subsequent Medicare Wellness Visit.    HEALTH RISK ASSESSMENT    Recent Hospitalizations:  No hospitalization(s) within the last year.    Current Medical Providers:  Patient Care Team:  Tatiana Bellamy MD as PCP - General (Internal Medicine)  James Del Angel MD as Consulting Physician (Infectious Diseases)  Ezekiel Verde MD as Consulting Physician (Otolaryngology)  Aisha Curiel MD as Consulting Physician (Cardiology)  Sinan Braga MD as Consulting Physician (Pulmonary Disease)  Shawn Felton MD as Consulting Physician (Allergy)  Luis Whitehead MD (Ophthalmology)  Jasmine Salazar MD (Dermatology)    Smoking Status:  Social History     Tobacco Use   Smoking Status Never Smoker   Smokeless Tobacco Never Used       Alcohol Consumption:  Social History     Substance and Sexual Activity   Alcohol Use No    Comment: caffeine use: 1 cup daily       Depression Screen:   PHQ-2/PHQ-9 Depression Screening 7/6/2021   Little interest or pleasure in doing things 0   Feeling down, depressed, or hopeless 0   Trouble falling or staying asleep, or sleeping too much -   Feeling tired or having little energy -   Poor appetite or overeating -   Feeling bad about yourself - or that you are a failure or have let yourself or your family down -   Trouble concentrating on things, such as reading the newspaper or watching television -   Moving or speaking so slowly that other people could have noticed. Or the opposite - being so fidgety or restless that you have been moving around a lot more than usual -   Thoughts that you would be better off dead, or of hurting yourself in some way -   Total Score 0   If you checked off any problems, how  difficult have these problems made it for you to do your work, take care of things at home, or get along with other people? -       Fall Risk Screen:  ALVAREZ Fall Risk Assessment was completed, and patient is at LOW risk for falls.Assessment completed on:7/6/2021    Health Habits and Functional and Cognitive Screening:  Functional & Cognitive Status 7/6/2021   Do you have difficulty preparing food and eating? No   Do you have difficulty bathing yourself, getting dressed or grooming yourself? No   Do you have difficulty using the toilet? No   Do you have difficulty moving around from place to place? No   Do you have trouble with steps or getting out of a bed or a chair? No   Current Diet Well Balanced Diet   Dental Exam Up to date   Eye Exam Up to date   Exercise (times per week) 2 times per week   Current Exercises Include Walking   Current Exercise Activities Include -   Do you need help using the phone?  No   Are you deaf or do you have serious difficulty hearing?  No   Do you need help with transportation? No   Do you need help shopping? No   Do you need help preparing meals?  No   Do you need help with housework?  No   Do you need help with laundry? No   Do you need help taking your medications? No   Do you need help managing money? No   Do you ever drive or ride in a car without wearing a seat belt? No   Have you felt unusual stress, anger or loneliness in the last month? No   Who do you live with? Alone   If you need help, do you have trouble finding someone available to you? No   Have you been bothered in the last four weeks by sexual problems? No   Do you have difficulty concentrating, remembering or making decisions? No         Does the patient have evidence of cognitive impairment? No    Asprin use counseling:Does not need ASA (and currently is not on it)    Age-appropriate Screening Schedule:  Refer to the list below for future screening recommendations based on patient's age, sex and/or medical  conditions. Orders for these recommended tests are listed in the plan section. The patient has been provided with a written plan.    Health Maintenance   Topic Date Due   • LIPID PANEL  02/09/2022   • MAMMOGRAM  06/26/2022   • DXA SCAN  04/05/2023   • TDAP/TD VACCINES (3 - Td or Tdap) 09/13/2026   • ZOSTER VACCINE  Discontinued          The following portions of the patient's history were reviewed and updated as appropriate: allergies, current medications, past family history, past medical history, past social history, past surgical history and problem list.    Outpatient Medications Prior to Visit   Medication Sig Dispense Refill   • amLODIPine (NORVASC) 2.5 MG tablet TAKE TWO TABLETS BY MOUTH DAILY 60 tablet 2   • Cholecalciferol (VITAMIN D) 2000 units tablet Take 2,000 Units by mouth Daily.     • Levoxyl 50 MCG tablet Take 1 tablet by mouth Every Other Day. Trade name only- levoxyl 45 tablet 2   • Levoxyl 75 MCG tablet Every other day -Trade name LEVOXYL 45 tablet 2   • Loratadine 10 MG capsule Take 10 mg by mouth Daily.     • metoprolol succinate XL (TOPROL-XL) 50 MG 24 hr tablet Take 1 tablet by mouth Every Night. 90 tablet 3   • Misc Natural Products (JOINT SUPPORT PO) Take  by mouth.     • Multiple Vitamins-Minerals (CENTRUM SILVER 50+WOMEN PO) Take  by mouth.     • olmesartan (BENICAR) 40 MG tablet TAKE ONE TABLET BY MOUTH DAILY 90 tablet 1   • Probiotic Product (PROBIOTIC ADVANCED PO) Take 2 capsules by mouth Daily.     • vitamin C (ASCORBIC ACID) 500 MG tablet Take 500 mg by mouth Daily.     • hydroCHLOROthiazide (HYDRODIURIL) 12.5 MG tablet Take 1 tablet by mouth Daily. 90 tablet 3   • escitalopram (Lexapro) 10 MG tablet Take 1 tablet by mouth Daily. 1/2 tonight & tomorrow - then 1 qhs 30 tablet 6     No facility-administered medications prior to visit.       Patient Active Problem List   Diagnosis   • Mycobacterium avium complex (CMS/HCC)   • Benign essential HTN   • Allergic rhinitis   • Hypothyroidism  "  • Hyperlipidemia   • Polyneuropathy   • Oropharyngeal dysphagia   • Guillain-[place] syndrome following vaccination (CMS/[facility])   • Anxiety   • Labile blood pressure   • Dyspnea on exertion   • PSVT (paroxysmal supraventricular tachycardia) (CMS/[facility])   • Stenosis of right renal artery (CMS/HCC)   • Elevated glucose   • Stenosis of right carotid artery   • Age-related osteoporosis without current pathological fracture       Advanced Care Planning:  ACP discussion was held with the patient during this visit. Patient has an advance directive in EMR which is still valid.     Review of Systems    Compared to one year ago, the patient feels her physical health is the same.  Compared to one year ago, the patient feels her mental health is the same.    Reviewed chart for potential of high risk medication in the elderly: yes  Reviewed chart for potential of harmful drug interactions in the elderly:yes    Objective         Vitals:    07/06/21 1500 07/06/21 1541   BP: 151/67 146/88   BP Location: Left arm    Patient Position: Sitting    Cuff Size: Adult    Pulse: 70    Resp: 17    Temp: 97.1 °F (36.2 °C)    SpO2: 96%    Weight: 51.3 kg (113 lb)    Height: 160 cm (63\")        Body mass index is 20.02 kg/m².  Discussed the patient's BMI with her. The BMI is in the acceptable range.    Physical Exam          Assessment/Plan   Medicare Risks and Personalized Health Plan  CMS Preventative Services Quick Reference  Abdominal Aortic Aneurysm Screening  Osteoporosis Risk    The above risks/problems have been discussed with the patient.  Pertinent information has been shared with the patient in the After Visit Summary.  Follow up plans and orders are seen below in the Assessment/Plan Section.    Diagnoses and all orders for this visit:    1. Medicare annual wellness visit, subsequent (Primary)      Follow Up:  Return in about 5 months (around 12/6/2021) for Recheck.     An After Visit Summary and PPPS were given to the patient.      Need " screening for AAA & carotid disease.  Information given today.   Need daily strengthening & balance exercises (shown today).

## 2021-07-06 NOTE — PROGRESS NOTES
"Chief Complaint  Medicare Wellness-subsequent    Subjective     {CC  Problem List  Visit Diagnosis   Encounters  Notes  Medications  Labs  Result Review Imaging  Media :23}     Stacie Vieira presents to [facility] for   History of Present Illness    Review of Systems     Objective   Vital Signs:   /67 (BP Location: Left arm, Patient Position: Sitting, Cuff Size: Adult)   Pulse 70   Temp 97.1 °F (36.2 °C)   Resp 17   Ht 160 cm (63\")   Wt 51.3 kg (113 lb)   SpO2 96%   BMI 20.02 kg/m²     Physical Exam   Result Review :{ Labs  Result Review  Imaging  Med Tab  Media :23}   {The following data was reviewed by (Optional):81545}  {Ambulatory Labs (Optional):92780}  {Data reviewed (Optional):75686:::1}          Assessment and Plan {CC Problem List  Visit Diagnosis  ROS  Review (Popup)  Health Maintenance  Quality  BestPractice  Medications  SmartSets  SnapShot Encounters  Media :23}   Problem List Items Addressed This Visit     None        {Time Spent (Optional):58986}  Follow Up {Instructions Charge Capture  Follow-up Communications :23}  No follow-ups on file.  Patient was given instructions and counseling regarding her condition or for health maintenance advice. Please see specific information pulled into the AVS if appropriate.       "

## 2021-07-06 NOTE — PATIENT INSTRUCTIONS
Medicare Wellness  Personal Prevention Plan of Service     Date of Office Visit:  2021  Encounter Provider:  Tatiana Bellamy MD  Place of Service:  [facility]  Patient Name: Stacie Vieira  :  1935    As part of the Medicare Wellness portion of your visit today, we are providing you with this personalized preventive plan of services (PPPS). This plan is based upon recommendations of the [place] Preventive Services Task Force (USPSTF) and the Advisory Committee on Immunization Practices (ACIP).    This lists the preventive care services that should be considered, and provides dates of when you are due. Items listed as completed are up-to-date and do not require any further intervention.    Health Maintenance   Topic Date Due   • COVID-19 Vaccine (1) 2021 (Originally 1947)   • LIPID PANEL  2022   • MAMMOGRAM  2022   • ANNUAL WELLNESS VISIT  2022   • DXA SCAN  2023   • TDAP/TD VACCINES (3 - Td or Tdap) 2026   • Pneumococcal Vaccine 65+  Completed   • ZOSTER VACCINE  Discontinued   • COLORECTAL CANCER SCREENING  Discontinued       No orders of the defined types were placed in this encounter.      Return in about 5 months (around 2021).

## 2021-07-13 ENCOUNTER — APPOINTMENT (OUTPATIENT)
Dept: WOMENS IMAGING | Facility: HOSPITAL | Age: 86
End: 2021-07-13

## 2021-07-13 PROCEDURE — 77067 SCR MAMMO BI INCL CAD: CPT | Performed by: RADIOLOGY

## 2021-07-13 PROCEDURE — 77063 BREAST TOMOSYNTHESIS BI: CPT | Performed by: RADIOLOGY

## 2021-08-21 DIAGNOSIS — I10 BENIGN ESSENTIAL HTN: ICD-10-CM

## 2021-08-23 RX ORDER — AMLODIPINE BESYLATE 2.5 MG/1
TABLET ORAL
Qty: 60 TABLET | Refills: 2 | Status: SHIPPED | OUTPATIENT
Start: 2021-08-23 | End: 2021-12-06 | Stop reason: SDUPTHER

## 2021-08-27 ENCOUNTER — TELEPHONE (OUTPATIENT)
Dept: INTERNAL MEDICINE | Facility: CLINIC | Age: 86
End: 2021-08-27

## 2021-10-13 ENCOUNTER — TELEPHONE (OUTPATIENT)
Dept: CARDIOLOGY | Facility: CLINIC | Age: 86
End: 2021-10-13

## 2021-10-13 NOTE — TELEPHONE ENCOUNTER
I spoke to the patient and advised she take her BP 2 hours after taking amlodipine.  I will call her tomorrow between 8 and 9 am.      Thank you,  Tiarra Herring RN  [facility]  Triage

## 2021-10-13 NOTE — TELEPHONE ENCOUNTER
Dr. Curiel,    Ms. Vieira called to report a 7-10 day history of increasing BP, especially of the last 2 days,  as well as feeling like her heart was pounding.    This morning her BP was 189/76 HR 65, her heart was racing and she was feeling anxious and jittery. Normal BP for her is 140s/60s, HR 60s.  Before today, her highes BP ws 179/69.    She noticed she was having some ankle swelling and her PCP told her that may be due to the amlodipine she takes.  So, she stopped taking that about 3 days ago.    I advised she start taking the amlodipine now and for future reference, she should keep her feet elevated and can wear compression hose for the swelling rather than stopping the medication.  Also advised that she can call triage as well.    Thank you,  Tiarra Herring RN  [facility]  Triage

## 2021-10-14 NOTE — TELEPHONE ENCOUNTER
" states she \"feels so much better today.\"  She restarted her amlodipine yesterday around noon. At 5pm her BP was 128/55 HR 60s and said she felt \"back to her old self.    This morning she took her amlodipine at 0600, went back to sleep and at 0730 her BP was 170/65.  At 0830 her BP is 144/70.    Thank you,  Tiarra Herring RN  [facility]  Triage      "

## 2021-10-22 DIAGNOSIS — I10 ESSENTIAL HYPERTENSION: ICD-10-CM

## 2021-10-22 RX ORDER — OLMESARTAN MEDOXOMIL 40 MG/1
TABLET ORAL
Qty: 90 TABLET | Refills: 1 | Status: SHIPPED | OUTPATIENT
Start: 2021-10-22 | End: 2022-01-05 | Stop reason: SDUPTHER

## 2021-11-10 ENCOUNTER — OFFICE VISIT (OUTPATIENT)
Dept: CARDIOLOGY | Facility: CLINIC | Age: 86
End: 2021-11-10

## 2021-11-10 VITALS
HEART RATE: 84 BPM | DIASTOLIC BLOOD PRESSURE: 76 MMHG | SYSTOLIC BLOOD PRESSURE: 144 MMHG | BODY MASS INDEX: 19.67 KG/M2 | HEIGHT: 63 IN | WEIGHT: 111 LBS

## 2021-11-10 DIAGNOSIS — I10 BENIGN ESSENTIAL HTN: ICD-10-CM

## 2021-11-10 DIAGNOSIS — I65.22 CAROTID STENOSIS, ASYMPTOMATIC, LEFT: ICD-10-CM

## 2021-11-10 DIAGNOSIS — I47.1 PSVT (PAROXYSMAL SUPRAVENTRICULAR TACHYCARDIA) (HCC): Primary | ICD-10-CM

## 2021-11-10 DIAGNOSIS — E78.2 MIXED HYPERLIPIDEMIA: ICD-10-CM

## 2021-11-10 DIAGNOSIS — I70.1 STENOSIS OF RIGHT RENAL ARTERY (HCC): ICD-10-CM

## 2021-11-10 DIAGNOSIS — R06.09 DYSPNEA ON EXERTION: ICD-10-CM

## 2021-11-10 PROCEDURE — 93000 ELECTROCARDIOGRAM COMPLETE: CPT | Performed by: INTERNAL MEDICINE

## 2021-11-10 PROCEDURE — 99214 OFFICE O/P EST MOD 30 MIN: CPT | Performed by: INTERNAL MEDICINE

## 2021-11-10 NOTE — PROGRESS NOTES
Date of Office Visit: 11/10/2021  Encounter Provider: Aisha Curiel MD  Place of Service: [facility]  Patient Name: Stacie Vieira  :1935      Patient ID:  Stacie Vieira is a 86 y.o. female is here for  followup for PSVT and exertional dyspnea        History of Present Illness    She has had SVT since the 1970s and at that time was put on propranolol.  She had four  hospitalizations associated with this it sounds like, and at some point, they also put her  on digitalis also to control the SVT.  She really has not had any episodes since  that  she knows of.          She has been a .  She says it is very difficult because at  night she gets anxious.  Her neighbors, whom she has known for quite some time, no longer  live beside her due to some deaths and so the evening time is difficult for her.       She does have a son who lives in [place], [place] and is an intensivist at the [employer].       She had an echocardiogram done 17 to ejection fraction 67% grade 1 diastolic dysfunction, calcified aortic valve, mild tricuspid insufficiency.  At that time, she was in the hospital for pneumonia.  She has a history of MAC.  During hospitalization, she was diagnosed with Guillain-Barré syndrome and started plasmapheresis.  She did have an episode on 17 where she had a syncopal episode due to bradycardia.  She did receive chest compressions and got atropine.  She recovered quickly from this.     She had a nonischemic stress nuclear perfusion study done 18.  She had a renal artery duplex on 18 showing normal left renal artery, large left renal cyst measuring 5.1 x 5.4, hemodynamically significant right renal artery stenosis.  She had an echocardiogram done 17 showed ejection fraction of 67% with grade 1 diastolic dysfunction, mild tricuspid insufficiency and calcification of the aortic valve.     Vascular  screening done 9/3/2019 showed less than 50% left internal carotid artery stenosis, normal abdominal aorta.  Renal artery duplex done 5/15/2019 showed no significant left renal artery stenosis and less than 60% right renal artery stenosis.     She has some mild balance issues since a Guillain Barré but is overall doing quite well.   Labs on 2/9/2021 show glucose 188, otherwise normal CMP, normal hemoglobin A1c, normal TSH, total cholesterol 235, HDL 81, , triglycerides 173, normal CBC.    She has noticed increased exertional dyspnea specifically when she does steps or is carrying groceries.  If she is walking on a flat surface, she does not have much in the way of dyspnea.  She is had no exertional chest tightness or pressure.  She does not feel her heart racing or skipping with exertion consistently.  She has more random short-lived episodes of tachycardia.  She is had no dizziness or syncope.  She denies orthopnea or PND.  When she is really short winded, she says she feels fatigued but has had no falls.  She is not sure if this is cardiac or fits her lungs causing her symptoms but it seems to be getting worse.    Past Medical History:   Diagnosis Date   • Acute stress disorder    • Allergic rhinitis    • C. difficile colitis     hx in past   • Dyspnea on exertion    • Elevated fasting glucose    • Essential hypertension    • Guillain Barré syndrome (HCC)    • H/O: pneumonia    • Health care maintenance    • History of palpitations    • Hyperlipidemia    • Hypokalemia    • Hypothyroidism    • Insomnia    • Labile blood pressure    • MAC (mycobacterium avium-intracellulare complex)     reason for bronch.  dx with this nov 2015   • Osteoporosis    • Palpitations    • Pneumonia     past hx of freq.   • Polyneuropathy 12/20/2017   • PSVT (paroxysmal supraventricular tachycardia) ([facility])    • Renal cyst    • Sinusitis    • Staph infection     in sinus area  recent sx jan 2016   • Stenosis of right carotid artery  10/22/2020   • Stenosis of right renal artery (HCC) 07/05/2018    Per renal duplex - 60% stenosis, left normal; 5/2019 duplex unchanged    • SVT (supraventricular tachycardia) (HCC)     past hx  on medicine   • Syncope 12/25/2017   • Syncope and collapse    • Thyroiditis          Past Surgical History:   Procedure Laterality Date   • BREAST BIOPSY      PERCUTATNEOUS NEEDLE CORE   • BRONCHOSCOPY      nov 2015   • BRONCHOSCOPY N/A 4/8/2016    Procedure: BRONCHOSCOPY WITH BAL;  Surgeon: Sinan Braga MD;  Location: [facility] ENDOSCOPY;  Service:    • BRONCHOSCOPY N/A 12/9/2017    Procedure: BRONCHOSCOPY;  Surgeon: Real Rousseau MD;  Location: [facility] ENDOSCOPY;  Service:    • BRONCHOSCOPY N/A 7/20/2018    Procedure: BRONCHOSCOPY with BAL;  Surgeon: Sinan Braga MD;  Location: [facility] ENDOSCOPY;  Service: Pulmonary   • COLONOSCOPY N/A 07/08/2005   • DILATATION AND CURETTAGE N/A 01/01/1990   • PAP SMEAR N/A 03/30/2004   • SINUS SURGERY  01/2016   • TONSILLECTOMY         Current Outpatient Medications on File Prior to Visit   Medication Sig Dispense Refill   • amLODIPine (NORVASC) 2.5 MG tablet TAKE TWO TABLETS BY MOUTH DAILY 60 tablet 2   • Cholecalciferol (VITAMIN D) 2000 units tablet Take 2,000 Units by mouth Daily.     • hydroCHLOROthiazide (HYDRODIURIL) 12.5 MG tablet Take 1 tablet by mouth Daily. (Patient taking differently: Take 12.5 mg by mouth As Needed.) 90 tablet 3   • Levoxyl 50 MCG tablet Take 1 tablet by mouth Every Other Day. Trade name only- levoxyl 45 tablet 2   • Levoxyl 75 MCG tablet Every other day -Trade name LEVOXYL 45 tablet 2   • Loratadine 10 MG capsule Take 10 mg by mouth Daily.     • metoprolol succinate XL (TOPROL-XL) 50 MG 24 hr tablet Take 1 tablet by mouth Every Night. 90 tablet 3   • Misc Natural Products (JOINT SUPPORT PO) Take  by mouth.     • Multiple Vitamins-Minerals (CENTRUM SILVER 50+WOMEN PO) Take  by mouth.     • olmesartan (BENICAR) 40 MG tablet TAKE ONE TABLET BY MOUTH DAILY 90  "tablet 1   • Probiotic Product (PROBIOTIC ADVANCED PO) Take 2 capsules by mouth Daily.     • vitamin C (ASCORBIC ACID) 500 MG tablet Take 500 mg by mouth Daily.       No current facility-administered medications on file prior to visit.       Social History     Socioeconomic History   • Marital status:    Tobacco Use   • Smoking status: Never Smoker   • Smokeless tobacco: Never Used   Substance and Sexual Activity   • Alcohol use: No     Comment: caffeine use: 1 cup daily   • Drug use: No   • Sexual activity: Never           ROS    Procedures    ECG 12 Lead    Date/Time: 11/10/2021 12:47 PM  Performed by: Aisha Curiel MD  Authorized by: Aisha Curiel MD   Comparison: compared with previous ECG   Similar to previous ECG  Rhythm: sinus rhythm  T flattening: all    Clinical impression: non-specific ECG                Objective:      Vitals:    11/10/21 1238 11/10/21 1242   BP: 138/70 144/76   BP Location: Left arm Right arm   Pulse: 84    Weight: 50.3 kg (111 lb)    Height: 160 cm (63\")      Body mass index is 19.66 kg/m².    Vitals reviewed.   Constitutional:       General: Not in acute distress.     Appearance: Well-developed. Not diaphoretic.   Eyes:      General: No scleral icterus.     Conjunctiva/sclera: Conjunctivae normal.   HENT:      Head: Normocephalic and atraumatic.   Neck:      Thyroid: No thyromegaly.      Vascular: No carotid bruit or JVD.      Lymphadenopathy: No cervical adenopathy.   Pulmonary:      Effort: Pulmonary effort is normal. No respiratory distress.      Breath sounds: Normal breath sounds. No wheezing. No rhonchi. No rales.   Chest:      Chest wall: Not tender to palpatation.   Cardiovascular:      Normal rate. Regular rhythm.      Murmurs: There is no murmur.      No gallop.   Pulses:     Intact distal pulses.   Edema:     Peripheral edema absent.   Abdominal:      General: Bowel sounds are normal. There is no distension or abdominal bruit.      Palpations: Abdomen " is soft. There is no abdominal mass.      Tenderness: There is no abdominal tenderness.   Musculoskeletal:         General: No deformity.      Extremities: No clubbing present.     Cervical back: Neck supple. Skin:     General: Skin is warm and dry. There is no cyanosis.      Coloration: Skin is not pale.      Findings: No rash.   Neurological:      Mental Status: Alert and oriented to person, place, and time.      Cranial Nerves: No cranial nerve deficit.   Psychiatric:         Judgment: Judgment normal.         Lab Review:       Assessment:      Diagnosis Plan   1. PSVT (paroxysmal supraventricular tachycardia) (HCC)  ECG 12 Lead   2. Benign essential HTN  ECG 12 Lead    Adult Transthoracic Echo Complete W/ Cont if Necessary Per Protocol    Stress Test With Myocardial Perfusion One Day   3. Mixed hyperlipidemia  ECG 12 Lead    Adult Transthoracic Echo Complete W/ Cont if Necessary Per Protocol    Stress Test With Myocardial Perfusion One Day   4. Stenosis of right renal artery (HCC)     5. Dyspnea on exertion  Adult Transthoracic Echo Complete W/ Cont if Necessary Per Protocol    Stress Test With Myocardial Perfusion One Day   6. Carotid stenosis, asymptomatic, left  Duplex Carotid Ultrasound CAR       1. Hypertension.  BP is better and more stable - goal is <120/80.   2. Palpitations.  These are very infrequent and overall have been very well controlled     3. History of SVT.  No recurrence  4. Hyperlipidemia, well controlled.   5. Hypothyroidism, stable.   6. Anxiety, particularly in the evenings.    7. Chronic MAC - stable, sees Dr. Braga every 6 months for this.   8. Guillain-barre 12/2017 - really resolved, doing well.      Plan:       Set up stress and echocardiogram for exertional dyspnea.  No medication changes.  Repeat carotid duplex for left carotid stenosis.  If these are normal, see Svitlana in 1 year.    Trying to figure out whether her exertional dyspnea is cardiac or pulmonary.

## 2021-11-18 ENCOUNTER — TRANSCRIBE ORDERS (OUTPATIENT)
Dept: ADMINISTRATIVE | Facility: HOSPITAL | Age: 86
End: 2021-11-18

## 2021-11-18 DIAGNOSIS — A31.9 MYCOBACTERIUM, ATYPICAL: ICD-10-CM

## 2021-11-18 DIAGNOSIS — R05.9 COUGH: Primary | ICD-10-CM

## 2021-11-24 ENCOUNTER — HOSPITAL ENCOUNTER (OUTPATIENT)
Dept: CARDIOLOGY | Facility: HOSPITAL | Age: 86
Discharge: HOME | End: 2021-11-24

## 2021-11-24 VITALS
DIASTOLIC BLOOD PRESSURE: 76 MMHG | SYSTOLIC BLOOD PRESSURE: 114 MMHG | HEART RATE: 68 BPM | WEIGHT: 111 LBS | HEIGHT: 63 IN | BODY MASS INDEX: 19.67 KG/M2

## 2021-11-24 VITALS
HEIGHT: 63 IN | WEIGHT: 111 LBS | SYSTOLIC BLOOD PRESSURE: 144 MMHG | BODY MASS INDEX: 19.67 KG/M2 | DIASTOLIC BLOOD PRESSURE: 76 MMHG

## 2021-11-24 DIAGNOSIS — I10 BENIGN ESSENTIAL HTN: ICD-10-CM

## 2021-11-24 DIAGNOSIS — R06.09 DYSPNEA ON EXERTION: ICD-10-CM

## 2021-11-24 DIAGNOSIS — E78.2 MIXED HYPERLIPIDEMIA: ICD-10-CM

## 2021-11-24 DIAGNOSIS — I65.22 CAROTID STENOSIS, ASYMPTOMATIC, LEFT: ICD-10-CM

## 2021-11-24 LAB
BH CV NUCLEAR PRIOR STUDY: 1
BH CV REST NUCLEAR ISOTOPE DOSE: 6.3 MCI
BH CV STRESS BP STAGE 1: NORMAL
BH CV STRESS COMMENTS STAGE 1: NORMAL
BH CV STRESS DOSE REGADENOSON STAGE 1: 0.4
BH CV STRESS DURATION MIN STAGE 1: 0
BH CV STRESS DURATION SEC STAGE 1: 10
BH CV STRESS HR STAGE 1: 98
BH CV STRESS NUCLEAR ISOTOPE DOSE: 23.7 MCI
BH CV STRESS PROTOCOL 1: NORMAL
BH CV STRESS RECOVERY BP: NORMAL MMHG
BH CV STRESS RECOVERY HR: 76 BPM
BH CV STRESS STAGE 1: 1
BH CV XLRA MEAS LEFT DIST CCA EDV: -23.4 CM/SEC
BH CV XLRA MEAS LEFT DIST CCA PSV: -79.5 CM/SEC
BH CV XLRA MEAS LEFT DIST ICA EDV: -25.6 CM/SEC
BH CV XLRA MEAS LEFT DIST ICA PSV: -84.6 CM/SEC
BH CV XLRA MEAS LEFT ICA/CCA RATIO: 1.5
BH CV XLRA MEAS LEFT MID CCA EDV: -23.4 CM/SEC
BH CV XLRA MEAS LEFT MID CCA PSV: -83.2 CM/SEC
BH CV XLRA MEAS LEFT MID ICA EDV: -37.4 CM/SEC
BH CV XLRA MEAS LEFT MID ICA PSV: -119.7 CM/SEC
BH CV XLRA MEAS LEFT PROX ECA PSV: -97.2 CM/SEC
BH CV XLRA MEAS LEFT PROX ICA EDV: -23.4 CM/SEC
BH CV XLRA MEAS LEFT PROX ICA PSV: -92.6 CM/SEC
BH CV XLRA MEAS LEFT PROX SCLA PSV: 136 CM/SEC
BH CV XLRA MEAS LEFT VERTEBRAL A PSV: -39.2 CM/SEC
BH CV XLRA MEAS RIGHT DIST CCA EDV: 19.3 CM/SEC
BH CV XLRA MEAS RIGHT DIST CCA PSV: 69 CM/SEC
BH CV XLRA MEAS RIGHT DIST ICA EDV: -27.3 CM/SEC
BH CV XLRA MEAS RIGHT DIST ICA PSV: -72.9 CM/SEC
BH CV XLRA MEAS RIGHT ICA/CCA RATIO: 1.05
BH CV XLRA MEAS RIGHT MID CCA EDV: 18.6 CM/SEC
BH CV XLRA MEAS RIGHT MID CCA PSV: 73.9 CM/SEC
BH CV XLRA MEAS RIGHT MID ICA EDV: -20.3 CM/SEC
BH CV XLRA MEAS RIGHT MID ICA PSV: -67.3 CM/SEC
BH CV XLRA MEAS RIGHT PROX ECA PSV: -123 CM/SEC
BH CV XLRA MEAS RIGHT PROX ICA EDV: -19.6 CM/SEC
BH CV XLRA MEAS RIGHT PROX ICA PSV: -63.1 CM/SEC
BH CV XLRA MEAS RIGHT PROX SCLA PSV: 97 CM/SEC
BH CV XLRA MEAS RIGHT VERTEBRAL A PSV: -38.5 CM/SEC
LV EF NUC BP: 76 %
MAXIMAL PREDICTED HEART RATE: 135 BPM
MAXIMAL PREDICTED HEART RATE: 135 BPM
PERCENT MAX PREDICTED HR: 72.59 %
STRESS BASELINE BP: NORMAL MMHG
STRESS BASELINE HR: 67 BPM
STRESS PERCENT HR: 85 %
STRESS POST EXERCISE DUR SEC: 10 SEC
STRESS POST PEAK BP: NORMAL MMHG
STRESS POST PEAK HR: 98 BPM
STRESS TARGET HR: 115 BPM
STRESS TARGET HR: 115 BPM

## 2021-11-24 PROCEDURE — 93017 CV STRESS TEST TRACING ONLY: CPT

## 2021-11-24 PROCEDURE — 25010000002 AMINOPHYLLINE PER 250 MG: Performed by: INTERNAL MEDICINE

## 2021-11-24 PROCEDURE — 93880 EXTRACRANIAL BILAT STUDY: CPT

## 2021-11-24 PROCEDURE — 93016 CV STRESS TEST SUPVJ ONLY: CPT | Performed by: INTERNAL MEDICINE

## 2021-11-24 PROCEDURE — 93306 TTE W/DOPPLER COMPLETE: CPT | Performed by: INTERNAL MEDICINE

## 2021-11-24 PROCEDURE — 25010000002 REGADENOSON 0.4 MG/5ML SOLUTION: Performed by: INTERNAL MEDICINE

## 2021-11-24 PROCEDURE — 78452 HT MUSCLE IMAGE SPECT MULT: CPT | Performed by: INTERNAL MEDICINE

## 2021-11-24 PROCEDURE — 93306 TTE W/DOPPLER COMPLETE: CPT

## 2021-11-24 PROCEDURE — 78452 HT MUSCLE IMAGE SPECT MULT: CPT

## 2021-11-24 PROCEDURE — 93018 CV STRESS TEST I&R ONLY: CPT | Performed by: INTERNAL MEDICINE

## 2021-11-24 PROCEDURE — 93880 EXTRACRANIAL BILAT STUDY: CPT | Performed by: INTERNAL MEDICINE

## 2021-11-24 PROCEDURE — 0 TECHNETIUM TETROFOSMIN KIT: Performed by: INTERNAL MEDICINE

## 2021-11-24 PROCEDURE — A9502 TC99M TETROFOSMIN: HCPCS | Performed by: INTERNAL MEDICINE

## 2021-11-24 RX ORDER — AMINOPHYLLINE DIHYDRATE 25 MG/ML
125 INJECTION, SOLUTION INTRAVENOUS ONCE
Status: COMPLETED | OUTPATIENT
Start: 2021-11-24 | End: 2021-11-24

## 2021-11-24 RX ADMIN — TETROFOSMIN 1 DOSE: 1.38 INJECTION, POWDER, LYOPHILIZED, FOR SOLUTION INTRAVENOUS at 11:32

## 2021-11-24 RX ADMIN — REGADENOSON 0.4 MG: 0.08 INJECTION, SOLUTION INTRAVENOUS at 12:12

## 2021-11-24 RX ADMIN — TETROFOSMIN 1 DOSE: 1.38 INJECTION, POWDER, LYOPHILIZED, FOR SOLUTION INTRAVENOUS at 12:12

## 2021-11-24 RX ADMIN — AMINOPHYLLINE 125 MG: 25 INJECTION, SOLUTION INTRAVENOUS at 12:20

## 2021-11-25 ENCOUNTER — TELEPHONE (OUTPATIENT)
Dept: CARDIOLOGY | Facility: CLINIC | Age: 86
End: 2021-11-25

## 2021-11-25 NOTE — TELEPHONE ENCOUNTER
Overall good carotid study - very mild stenosis - no med changes and would recheck in 2 years.      rm

## 2021-11-29 LAB
AORTIC ARCH: 1.6 CM
ASCENDING AORTA: 3.2 CM
BH CV ECHO MEAS - ACS: 1.9 CM
BH CV ECHO MEAS - AO MAX PG (FULL): 2 MMHG
BH CV ECHO MEAS - AO MAX PG: 5.9 MMHG
BH CV ECHO MEAS - AO MEAN PG (FULL): 1.6 MMHG
BH CV ECHO MEAS - AO MEAN PG: 3.4 MMHG
BH CV ECHO MEAS - AO ROOT AREA (BSA CORRECTED): 1.8
BH CV ECHO MEAS - AO ROOT AREA: 5.9 CM^2
BH CV ECHO MEAS - AO ROOT DIAM: 2.7 CM
BH CV ECHO MEAS - AO V2 MAX: 121.6 CM/SEC
BH CV ECHO MEAS - AO V2 MEAN: 86.1 CM/SEC
BH CV ECHO MEAS - AO V2 VTI: 29.3 CM
BH CV ECHO MEAS - ASC AORTA: 3.2 CM
BH CV ECHO MEAS - AVA(I,A): 1.8 CM^2
BH CV ECHO MEAS - AVA(I,D): 1.8 CM^2
BH CV ECHO MEAS - AVA(V,A): 1.9 CM^2
BH CV ECHO MEAS - AVA(V,D): 1.9 CM^2
BH CV ECHO MEAS - BSA(HAYCOCK): 1.5 M^2
BH CV ECHO MEAS - BSA: 1.5 M^2
BH CV ECHO MEAS - BZI_BMI: 19.7 KILOGRAMS/M^2
BH CV ECHO MEAS - BZI_METRIC_HEIGHT: 160 CM
BH CV ECHO MEAS - BZI_METRIC_WEIGHT: 50.3 KG
BH CV ECHO MEAS - EDV(CUBED): 66.7 ML
BH CV ECHO MEAS - EDV(MOD-SP2): 44 ML
BH CV ECHO MEAS - EDV(MOD-SP4): 44 ML
BH CV ECHO MEAS - EDV(TEICH): 72.3 ML
BH CV ECHO MEAS - EF(CUBED): 82.8 %
BH CV ECHO MEAS - EF(MOD-BP): 64.9 %
BH CV ECHO MEAS - EF(MOD-SP2): 63.6 %
BH CV ECHO MEAS - EF(MOD-SP4): 65.9 %
BH CV ECHO MEAS - EF(TEICH): 76.2 %
BH CV ECHO MEAS - ESV(CUBED): 11.5 ML
BH CV ECHO MEAS - ESV(MOD-SP2): 16 ML
BH CV ECHO MEAS - ESV(MOD-SP4): 15 ML
BH CV ECHO MEAS - ESV(TEICH): 17.2 ML
BH CV ECHO MEAS - FS: 44.4 %
BH CV ECHO MEAS - IVS/LVPW: 1.1
BH CV ECHO MEAS - IVSD: 0.87 CM
BH CV ECHO MEAS - LAT PEAK E' VEL: 7.3 CM/SEC
BH CV ECHO MEAS - LV DIASTOLIC VOL/BSA (35-75): 29.2 ML/M^2
BH CV ECHO MEAS - LV MASS(C)D: 100.9 GRAMS
BH CV ECHO MEAS - LV MASS(C)DI: 67 GRAMS/M^2
BH CV ECHO MEAS - LV MAX PG: 4 MMHG
BH CV ECHO MEAS - LV MEAN PG: 1.8 MMHG
BH CV ECHO MEAS - LV SYSTOLIC VOL/BSA (12-30): 10 ML/M^2
BH CV ECHO MEAS - LV V1 MAX: 99.4 CM/SEC
BH CV ECHO MEAS - LV V1 MEAN: 60.1 CM/SEC
BH CV ECHO MEAS - LV V1 VTI: 22.4 CM
BH CV ECHO MEAS - LVIDD: 4.1 CM
BH CV ECHO MEAS - LVIDS: 2.3 CM
BH CV ECHO MEAS - LVLD AP2: 6.1 CM
BH CV ECHO MEAS - LVLD AP4: 5.6 CM
BH CV ECHO MEAS - LVLS AP2: 5.6 CM
BH CV ECHO MEAS - LVLS AP4: 5.2 CM
BH CV ECHO MEAS - LVOT AREA (M): 2.3 CM^2
BH CV ECHO MEAS - LVOT AREA: 2.4 CM^2
BH CV ECHO MEAS - LVOT DIAM: 1.7 CM
BH CV ECHO MEAS - LVPWD: 0.8 CM
BH CV ECHO MEAS - MED PEAK E' VEL: 7.9 CM/SEC
BH CV ECHO MEAS - MR MAX PG: 96.4 MMHG
BH CV ECHO MEAS - MR MAX VEL: 490.8 CM/SEC
BH CV ECHO MEAS - MV A DUR: 0.1 SEC
BH CV ECHO MEAS - MV A MAX VEL: 99.8 CM/SEC
BH CV ECHO MEAS - MV DEC SLOPE: 546.9 CM/SEC^2
BH CV ECHO MEAS - MV DEC TIME: 0.17 SEC
BH CV ECHO MEAS - MV E MAX VEL: 92.1 CM/SEC
BH CV ECHO MEAS - MV E/A: 0.92
BH CV ECHO MEAS - MV MAX PG: 5.3 MMHG
BH CV ECHO MEAS - MV MEAN PG: 1.7 MMHG
BH CV ECHO MEAS - MV P1/2T MAX VEL: 98.9 CM/SEC
BH CV ECHO MEAS - MV P1/2T: 53 MSEC
BH CV ECHO MEAS - MV V2 MAX: 114.6 CM/SEC
BH CV ECHO MEAS - MV V2 MEAN: 57 CM/SEC
BH CV ECHO MEAS - MV V2 VTI: 30.1 CM
BH CV ECHO MEAS - MVA P1/2T LCG: 2.2 CM^2
BH CV ECHO MEAS - MVA(P1/2T): 4.2 CM^2
BH CV ECHO MEAS - MVA(VTI): 1.8 CM^2
BH CV ECHO MEAS - PA MAX PG (FULL): 0.32 MMHG
BH CV ECHO MEAS - PA MAX PG: 1.6 MMHG
BH CV ECHO MEAS - PA V2 MAX: 62.4 CM/SEC
BH CV ECHO MEAS - PI END-D VEL: 71.9 CM/SEC
BH CV ECHO MEAS - PULM A REVS DUR: 0.1 SEC
BH CV ECHO MEAS - PULM A REVS VEL: 32.6 CM/SEC
BH CV ECHO MEAS - PULM DIAS VEL: 37.7 CM/SEC
BH CV ECHO MEAS - PULM S/D: 2
BH CV ECHO MEAS - PULM SYS VEL: 75 CM/SEC
BH CV ECHO MEAS - PVA(V,A): 1.9 CM^2
BH CV ECHO MEAS - PVA(V,D): 1.9 CM^2
BH CV ECHO MEAS - QP/QS: 0.52
BH CV ECHO MEAS - RAP SYSTOLE: 8 MMHG
BH CV ECHO MEAS - RV MAX PG: 1.2 MMHG
BH CV ECHO MEAS - RV MEAN PG: 0.73 MMHG
BH CV ECHO MEAS - RV V1 MAX: 55.7 CM/SEC
BH CV ECHO MEAS - RV V1 MEAN: 40.1 CM/SEC
BH CV ECHO MEAS - RV V1 VTI: 13.1 CM
BH CV ECHO MEAS - RVOT AREA: 2.1 CM^2
BH CV ECHO MEAS - RVOT DIAM: 1.6 CM
BH CV ECHO MEAS - RVSP: 37 MMHG
BH CV ECHO MEAS - SI(AO): 114.9 ML/M^2
BH CV ECHO MEAS - SI(CUBED): 36.7 ML/M^2
BH CV ECHO MEAS - SI(LVOT): 35.4 ML/M^2
BH CV ECHO MEAS - SI(MOD-SP2): 18.6 ML/M^2
BH CV ECHO MEAS - SI(MOD-SP4): 19.3 ML/M^2
BH CV ECHO MEAS - SI(TEICH): 36.6 ML/M^2
BH CV ECHO MEAS - SUP REN AO DIAM: 1.4 CM
BH CV ECHO MEAS - SV(AO): 173 ML
BH CV ECHO MEAS - SV(CUBED): 55.2 ML
BH CV ECHO MEAS - SV(LVOT): 53.4 ML
BH CV ECHO MEAS - SV(MOD-SP2): 28 ML
BH CV ECHO MEAS - SV(MOD-SP4): 29 ML
BH CV ECHO MEAS - SV(RVOT): 27.7 ML
BH CV ECHO MEAS - SV(TEICH): 55.1 ML
BH CV ECHO MEAS - TAPSE (>1.6): 3.2 CM
BH CV ECHO MEAS - TR MAX VEL: 269.3 CM/SEC
BH CV ECHO MEASUREMENTS AVERAGE E/E' RATIO: 12.12
BH CV XLRA - RV BASE: 2.9 CM
BH CV XLRA - RV LENGTH: 5.3 CM
BH CV XLRA - RV MID: 2.3 CM
BH CV XLRA - TDI S': 13.2 CM/SEC
LEFT ATRIUM VOLUME INDEX: 24 ML/M2
MAXIMAL PREDICTED HEART RATE: 135 BPM
SINUS: 3 CM
STJ: 2.6 CM
STRESS TARGET HR: 115 BPM

## 2021-12-03 ENCOUNTER — APPOINTMENT (OUTPATIENT)
Dept: CT IMAGING | Facility: HOSPITAL | Age: 86
End: 2021-12-03

## 2021-12-06 ENCOUNTER — OFFICE VISIT (OUTPATIENT)
Dept: INTERNAL MEDICINE | Facility: CLINIC | Age: 86
End: 2021-12-06

## 2021-12-06 VITALS
HEART RATE: 81 BPM | HEIGHT: 63 IN | WEIGHT: 111.4 LBS | SYSTOLIC BLOOD PRESSURE: 148 MMHG | DIASTOLIC BLOOD PRESSURE: 80 MMHG | RESPIRATION RATE: 17 BRPM | TEMPERATURE: 98.5 F | OXYGEN SATURATION: 98 % | BODY MASS INDEX: 19.74 KG/M2

## 2021-12-06 DIAGNOSIS — I10 BENIGN ESSENTIAL HTN: ICD-10-CM

## 2021-12-06 DIAGNOSIS — I10 BENIGN ESSENTIAL HTN: Primary | ICD-10-CM

## 2021-12-06 DIAGNOSIS — E78.2 MIXED HYPERLIPIDEMIA: ICD-10-CM

## 2021-12-06 DIAGNOSIS — E03.9 ACQUIRED HYPOTHYROIDISM: ICD-10-CM

## 2021-12-06 PROCEDURE — 99213 OFFICE O/P EST LOW 20 MIN: CPT | Performed by: INTERNAL MEDICINE

## 2021-12-06 RX ORDER — AMLODIPINE BESYLATE 2.5 MG/1
5 TABLET ORAL DAILY
Qty: 60 TABLET | Refills: 2 | Status: SHIPPED | OUTPATIENT
Start: 2021-12-06 | End: 2022-01-05 | Stop reason: SDUPTHER

## 2021-12-06 NOTE — ASSESSMENT & PLAN NOTE
Lipid abnormalities are unchanged.  Nutritional counseling was provided.  Lipids will be reassessed f/u with new doctor.

## 2021-12-06 NOTE — ASSESSMENT & PLAN NOTE
Hypertension is improving with treatment.  Continue current meds.  Blood pressure will be reassessed at the next regular appointment.

## 2021-12-06 NOTE — PROGRESS NOTES
"Chief Complaint  Hypertension (5 month f/u.), Hyperlipidemia, and Hypothyroidism    Subjective          Stacie Vieira presents to [facility] for   Hypertension  This is a chronic problem. The current episode started more than 1 year ago. The problem is unchanged. The problem is controlled.   Hyperlipidemia  This is a chronic problem. The current episode started more than 1 year ago. The problem is controlled. Recent lipid tests were reviewed and are normal. Exacerbating diseases include hypothyroidism.   Hypothyroidism  This is a chronic problem. The current episode started more than 1 year ago. The problem occurs constantly. The problem has been unchanged.       Review of Systems     Objective   Vital Signs:   /80 (BP Location: Left arm, Patient Position: Sitting, Cuff Size: Adult)   Pulse 81   Temp 98.5 °F (36.9 °C)   Resp 17   Ht 160 cm (63\")   Wt 50.5 kg (111 lb 6.4 oz)   SpO2 98%   BMI 19.73 kg/m²     Physical Exam  Vitals and nursing note reviewed.   Constitutional:       General: She is not in acute distress.     Appearance: She is well-developed.   Cardiovascular:      Rate and Rhythm: Normal rate and regular rhythm.      Heart sounds: Normal heart sounds.   Pulmonary:      Effort: No respiratory distress.      Breath sounds: No wheezing or rales.   Chest:      Chest wall: No tenderness.   Psychiatric:         Behavior: Behavior normal.        Result Review :                 Assessment and Plan    Problem List Items Addressed This Visit        Unprioritized    Benign essential HTN - Primary    Current Assessment & Plan     Hypertension is improving with treatment.  Continue current meds.  Blood pressure will be reassessed at the next regular appointment.         Relevant Medications    amLODIPine (NORVASC) 2.5 MG tablet    Hypothyroidism    Current Assessment & Plan     Continue daily synthroid         Hyperlipidemia    Current Assessment & Plan     Lipid " abnormalities are unchanged.  Nutritional counseling was provided.  Lipids will be reassessed f/u with new doctor.               Follow Up   No follow-ups on file.  Patient was given instructions and counseling regarding her condition or for health maintenance advice. Please see specific information pulled into the AVS if appropriate.

## 2021-12-07 ENCOUNTER — HOSPITAL ENCOUNTER (OUTPATIENT)
Dept: CT IMAGING | Facility: HOSPITAL | Age: 86
Discharge: HOME | End: 2021-12-07
Admitting: INTERNAL MEDICINE

## 2021-12-07 DIAGNOSIS — A31.9 MYCOBACTERIUM, ATYPICAL: ICD-10-CM

## 2021-12-07 DIAGNOSIS — R05.9 COUGH: ICD-10-CM

## 2021-12-07 PROCEDURE — 71250 CT THORAX DX C-: CPT

## 2022-01-05 ENCOUNTER — OFFICE VISIT (OUTPATIENT)
Dept: FAMILY MEDICINE CLINIC | Facility: CLINIC | Age: 87
End: 2022-01-05

## 2022-01-05 VITALS
HEIGHT: 63 IN | WEIGHT: 112 LBS | BODY MASS INDEX: 19.84 KG/M2 | DIASTOLIC BLOOD PRESSURE: 90 MMHG | TEMPERATURE: 98.1 F | SYSTOLIC BLOOD PRESSURE: 138 MMHG

## 2022-01-05 DIAGNOSIS — I10 BENIGN ESSENTIAL HTN: ICD-10-CM

## 2022-01-05 DIAGNOSIS — I10 BENIGN ESSENTIAL HTN: Primary | ICD-10-CM

## 2022-01-05 DIAGNOSIS — R73.09 ELEVATED GLUCOSE: ICD-10-CM

## 2022-01-05 DIAGNOSIS — E78.2 MIXED HYPERLIPIDEMIA: ICD-10-CM

## 2022-01-05 DIAGNOSIS — E03.9 ACQUIRED HYPOTHYROIDISM: ICD-10-CM

## 2022-01-05 DIAGNOSIS — I10 ESSENTIAL HYPERTENSION: ICD-10-CM

## 2022-01-05 PROBLEM — T37.1X5A: Status: ACTIVE | Noted: 2019-01-15

## 2022-01-05 PROBLEM — H35.379 EPIRETINAL MEMBRANE: Status: ACTIVE | Noted: 2019-02-12

## 2022-01-05 PROCEDURE — 99214 OFFICE O/P EST MOD 30 MIN: CPT | Performed by: INTERNAL MEDICINE

## 2022-01-05 RX ORDER — AMLODIPINE BESYLATE 2.5 MG/1
5 TABLET ORAL DAILY
Qty: 180 TABLET | Refills: 2 | Status: SHIPPED | OUTPATIENT
Start: 2022-01-05 | End: 2022-12-30 | Stop reason: SDUPTHER

## 2022-01-05 RX ORDER — LEVOTHYROXINE SODIUM 50 UG/1
50 TABLET ORAL EVERY OTHER DAY
Qty: 45 TABLET | Refills: 2 | Status: SHIPPED | OUTPATIENT
Start: 2022-01-05 | End: 2022-10-18 | Stop reason: SDUPTHER

## 2022-01-05 RX ORDER — BIOTIN 1 MG
1000 TABLET ORAL 3 TIMES DAILY
COMMUNITY
End: 2022-01-05

## 2022-01-05 RX ORDER — LEVOTHYROXINE SODIUM 75 UG/1
TABLET ORAL
Qty: 45 TABLET | Refills: 2 | Status: SHIPPED | OUTPATIENT
Start: 2022-01-05 | End: 2022-11-21

## 2022-01-05 RX ORDER — OLMESARTAN MEDOXOMIL 40 MG/1
40 TABLET ORAL DAILY
Qty: 90 TABLET | Refills: 1 | Status: SHIPPED | OUTPATIENT
Start: 2022-01-05 | End: 2022-10-18 | Stop reason: SDUPTHER

## 2022-01-05 NOTE — PROGRESS NOTES
Subjective   Stacie Vieira is a 86 y.o. female.     Chief Complaint   Patient presents with   • Hypertension       History of Present Illness   This is an 86-year-old white female establishing with our office from within the [facility] where she was previously seeing Dr. Tatiana Bellamy.  Patient has a history of hypertension with hyperlipidemia, right renal artery stenosis, hypothyroidism, PSVT, anxiety, and an apparent mild carotid artery stenosis and plaques.  Is being followed by multiple specialist including cardiology (Dr. Curiel), ENT, allergy, dermatology, pulmonology, ophthalmology.  Mammogram performed on 7/13/2021 showed coarse heterogenous calcifications in both breasts that were unchanged from prior exam.  Bone density on 10/5/2020 demonstrated osteopenia in the left hip and spine but osteoporosis in the right hip.  CT of the chest with history of cough and atypical Mycobacterium on December 7, 2021 demonstrated nodules in the posterior right upper lobe that had resolved and decreasing size of right middle lobe nodule is now calcified otherwise scattered tiny nodules and no new infiltrates.  Upon review of labs from 2/9/2021 noted to have a blood sugar of 188 GFR 56 normal LFTs TSH 0.518 with a lipid panel showing a total cholesterol 235, triglycerides 173, HDL 81, .  Follow-up A1c on 2/9/2021 demonstrated an A1c of 5.5%.    Follow-up for hypertension.  Currently, has been feeling well and asymptomatic without any headaches, vision changes, cough, chest pain, shortness of breath, swelling, focal neurologic deficit, memory loss or syncope.  Has been taking the medications regularly and adherent with the regimen amlodipine 2.5 mg daily but sometimes 5 mg total, HCTZ 12.5 mg PRN, metoprolol XL 50 mg daily and olmesartan 40 mg.  Denies medication side effects and no significant interval events.      Follow-up for cholesterol.  Currently, has been feeling well without any  myalgias, muscle aches, weakness, numbness, chest pain, short of breath or other issues.  Currently, is adherent with medication regimen of  and denies medication side effects. Is due for lab follow-up.    Follow-up for thyroid.  Denies fatigue, weakness, constipation/diarrhea, hair/skin changes, weight gain/loss, depression/anxiety, rashes, palpitations, swelling, chest pain, shortness of breath or other issues.  Has been compliant with taking the medication of levoxyl 50 mcg and 75 mcg every other day alternating  with no recent changes.  Denies any difficulty with the current medication.  Is due for labs.      The following portions of the patient's history were reviewed and updated as appropriate: allergies, current medications, past family history, past medical history, past social history, past surgical history and problem list.    Depression Screen:  PHQ-2/PHQ-9 Depression Screening 7/6/2021   Little interest or pleasure in doing things 0   Feeling down, depressed, or hopeless 0   Trouble falling or staying asleep, or sleeping too much -   Feeling tired or having little energy -   Poor appetite or overeating -   Feeling bad about yourself - or that you are a failure or have let yourself or your family down -   Trouble concentrating on things, such as reading the newspaper or watching television -   Moving or speaking so slowly that other people could have noticed. Or the opposite - being so fidgety or restless that you have been moving around a lot more than usual -   Thoughts that you would be better off dead, or of hurting yourself in some way -   Total Score 0   If you checked off any problems, how difficult have these problems made it for you to do your work, take care of things at home, or get along with other people? -       Past Medical History:   Diagnosis Date   • Acute stress disorder    • Allergic rhinitis    • C. difficile colitis     hx in past   • Dyspnea on exertion    • Elevated fasting  glucose    • Essential hypertension    • Guillain Barré syndrome (HCC)    • H/O: pneumonia    • Health care maintenance    • History of palpitations    • Hyperlipidemia    • Hypokalemia    • Hypothyroidism    • Insomnia    • Labile blood pressure    • MAC (mycobacterium avium-intracellulare complex)     reason for bronch.  dx with this nov 2015   • Osteoporosis    • Palpitations    • Pneumonia     past hx of freq.   • Polyneuropathy 12/20/2017   • PSVT (paroxysmal supraventricular tachycardia) (HCC)    • Renal cyst    • Sinusitis    • Staph infection     in sinus area  recent sx jan 2016   • Stenosis of right carotid artery 10/22/2020   • Stenosis of right renal artery (HCC) 07/05/2018    Per renal duplex - 60% stenosis, left normal; 5/2019 duplex unchanged    • SVT (supraventricular tachycardia) (HCC)     past hx  on medicine   • Syncope 12/25/2017   • Syncope and collapse    • Thyroiditis        Past Surgical History:   Procedure Laterality Date   • BREAST BIOPSY      PERCUTATNEOUS NEEDLE CORE   • BRONCHOSCOPY      nov 2015   • BRONCHOSCOPY N/A 4/8/2016    Procedure: BRONCHOSCOPY WITH BAL;  Surgeon: Sinan Braga MD;  Location: [facility] ENDOSCOPY;  Service:    • BRONCHOSCOPY N/A 12/9/2017    Procedure: BRONCHOSCOPY;  Surgeon: Real Rousseau MD;  Location: [facility] ENDOSCOPY;  Service:    • BRONCHOSCOPY N/A 7/20/2018    Procedure: BRONCHOSCOPY with BAL;  Surgeon: Sinan Braga MD;  Location: [facility] ENDOSCOPY;  Service: Pulmonary   • COLONOSCOPY N/A 07/08/2005   • DILATATION AND CURETTAGE N/A 01/01/1990   • PAP SMEAR N/A 03/30/2004   • SINUS SURGERY  01/2016   • TONSILLECTOMY         Family History   Problem Relation Age of Onset   • Hypertension Brother    • Dementia Mother    • Kidney disease Father    • Thyroid disease Other        Social History     Socioeconomic History   • Marital status:    Tobacco Use   • Smoking status: Never Smoker   • Smokeless tobacco: Never Used   Substance and Sexual Activity   •  Alcohol use: No     Comment: caffeine use: 1 cup daily   • Drug use: No   • Sexual activity: Never       Current Outpatient Medications   Medication Sig Dispense Refill   • amLODIPine (NORVASC) 2.5 MG tablet Take 2 tablets by mouth Daily. If BP over 140/90 180 tablet 2   • Cholecalciferol (VITAMIN D) 2000 units tablet Take 2,000 Units by mouth Daily.     • hydroCHLOROthiazide (HYDRODIURIL) 12.5 MG tablet Take 1 tablet by mouth Daily. (Patient taking differently: Take 12.5 mg by mouth As Needed.) 90 tablet 3   • Levoxyl 50 MCG tablet Take 1 tablet by mouth Every Other Day. Trade name only- levoxyl 45 tablet 2   • Levoxyl 75 MCG tablet Every other day -Trade name LEVOXYL 45 tablet 2   • Loratadine 10 MG capsule Take 10 mg by mouth Daily.     • metoprolol succinate XL (TOPROL-XL) 50 MG 24 hr tablet Take 1 tablet by mouth Every Night. 90 tablet 3   • Misc Natural Products (JOINT SUPPORT PO) Take  by mouth.     • Multiple Vitamins-Minerals (CENTRUM SILVER 50+WOMEN PO) Take  by mouth.     • olmesartan (BENICAR) 40 MG tablet Take 1 tablet by mouth Daily. 90 tablet 1   • Probiotic Product (PROBIOTIC ADVANCED PO) Take 2 capsules by mouth Daily.     • vitamin C (ASCORBIC ACID) 500 MG tablet Take 500 mg by mouth Daily.     • Zinc 50 MG capsule Take  by mouth.     • promethazine-dextromethorphan (PROMETHAZINE-DM) 6.25-15 MG/5ML syrup Take 5 mL by mouth 4 (Four) Times a Day As Needed for Cough. 118 mL 0     No current facility-administered medications for this visit.       Review of Systems   Constitutional: Negative for activity change, appetite change, fatigue, fever, unexpected weight gain and unexpected weight loss.   HENT: Negative for nosebleeds, rhinorrhea, trouble swallowing and voice change.    Eyes: Negative for visual disturbance.   Respiratory: Negative for cough, chest tightness, shortness of breath and wheezing.    Cardiovascular: Negative for chest pain, palpitations and leg swelling.   Gastrointestinal: Negative  "for abdominal pain, blood in stool, constipation, diarrhea, nausea, vomiting, GERD and indigestion.   Genitourinary: Negative for dysuria, frequency and hematuria.   Musculoskeletal: Negative for arthralgias, back pain and myalgias.   Skin: Negative for rash and bruise.   Neurological: Negative for dizziness, tremors, weakness, light-headedness, numbness, headache and memory problem.   Hematological: Negative for adenopathy. Does not bruise/bleed easily.   Psychiatric/Behavioral: Negative for sleep disturbance and depressed mood. The patient is not nervous/anxious.        Objective   /90 (BP Location: Left arm, Patient Position: Sitting, Cuff Size: Adult)   Temp 98.1 °F (36.7 °C) (Temporal)   Ht 160 cm (62.99\")   Wt 50.8 kg (112 lb)   LMP 01/01/1993 (LMP Unknown)   BMI 19.84 kg/m²     Physical Exam  Vitals and nursing note reviewed.   Constitutional:       General: She is not in acute distress.     Appearance: She is well-developed. She is not diaphoretic.   HENT:      Head: Normocephalic and atraumatic.      Right Ear: External ear normal.      Left Ear: External ear normal.      Nose: Nose normal.   Eyes:      Conjunctiva/sclera: Conjunctivae normal.      Pupils: Pupils are equal, round, and reactive to light.   Neck:      Thyroid: No thyromegaly.      Trachea: No tracheal deviation.   Cardiovascular:      Rate and Rhythm: Normal rate and regular rhythm.      Heart sounds: Normal heart sounds. No murmur heard.  No friction rub. No gallop.    Pulmonary:      Effort: Pulmonary effort is normal. No respiratory distress.      Breath sounds: Normal breath sounds.   Abdominal:      General: Bowel sounds are normal.      Palpations: Abdomen is soft. There is no mass.      Tenderness: There is no abdominal tenderness. There is no guarding.   Musculoskeletal:         General: Normal range of motion.      Cervical back: Normal range of motion and neck supple.   Lymphadenopathy:      Cervical: No cervical " adenopathy.   Skin:     General: Skin is warm and dry.      Capillary Refill: Capillary refill takes less than 2 seconds.      Findings: No rash.   Neurological:      Mental Status: She is alert and oriented to person, place, and time.      Motor: No abnormal muscle tone.      Deep Tendon Reflexes: Reflexes normal.   Psychiatric:         Behavior: Behavior normal.         Thought Content: Thought content normal.         Judgment: Judgment normal.           Assessment/Plan   Diagnoses and all orders for this visit:    1. Benign essential HTN (Primary)  -     Comprehensive Metabolic Panel  -     Lipid Panel  -     amLODIPine (NORVASC) 2.5 MG tablet; Take 2 tablets by mouth Daily. If BP over 140/90  Dispense: 180 tablet; Refill: 2    2. Mixed hyperlipidemia  -     Comprehensive Metabolic Panel  -     Lipid Panel    3. Acquired hypothyroidism  -     TSH Rfx On Abnormal To Free T4  -     Levoxyl 50 MCG tablet; Take 1 tablet by mouth Every Other Day. Trade name only- levoxyl  Dispense: 45 tablet; Refill: 2  -     Levoxyl 75 MCG tablet; Every other day -Trade name LEVOXYL  Dispense: 45 tablet; Refill: 2    4. Elevated glucose  -     Hemoglobin A1c    5. Acquired hypothyroidism  Comments:  need yearly TSH  Orders:  -     TSH Rfx On Abnormal To Free T4  -     Levoxyl 50 MCG tablet; Take 1 tablet by mouth Every Other Day. Trade name only- levoxyl  Dispense: 45 tablet; Refill: 2  -     Levoxyl 75 MCG tablet; Every other day -Trade name LEVOXYL  Dispense: 45 tablet; Refill: 2    6. Benign essential HTN  Comments:  controlled (borderline today) - need diet/ex - need weekly chk & call if bp over 140/90  Orders:  -     Comprehensive Metabolic Panel  -     Lipid Panel  -     amLODIPine (NORVASC) 2.5 MG tablet; Take 2 tablets by mouth Daily. If BP over 140/90  Dispense: 180 tablet; Refill: 2    7. Essential hypertension  Comments:  stable - call if bp over 140/90  Orders:  -     olmesartan (BENICAR) 40 MG tablet; Take 1 tablet by  mouth Daily.  Dispense: 90 tablet; Refill: 1    Within the [facility] medical group.  Appears to be doing well with borderline blood pressure.  After discussion with the patient I recommend to go back on the amlodipine 2.5 mg but take BID to try to avoid swelling issues she had in the past.  Continue the other medications unchanged at this time.  Discussed the thyroid for which she is alternating every other day 50 mcg with the 75 mcg.  Recommended to stop the biotin as may affect the thyroid hormone.  If the thyroid hormone levels are off by the labs we will recheck in 2 to 3 months with her off of the biotin.  History of hyperlipidemia but unable to tolerate the statins or Zetia.  I encouraged her to follow her diet and exercise best that she can we may consider future medication such as Nexletol if indicated.  History of an elevated blood sugar in past we will check the A1c for the prediabetes.  If elevated A1c then consider Metformin otherwise follow diet.       · COVID-19 Precautions - Patient was compliant in wearing a mask. When I saw the patient, I used appropriate personal protective equipment (PPE) including mask and eye shield (standard procedure).  Additionally, I used gown and gloves if indicated.  Hand hygiene was completed before and after seeing the patient.  · Dictated utilizing Dragon Dictation

## 2022-01-06 LAB
ALBUMIN SERPL-MCNC: 4.6 G/DL (ref 3.6–4.6)
ALBUMIN/GLOB SERPL: 1.4 {RATIO} (ref 1.2–2.2)
ALP SERPL-CCNC: 93 IU/L (ref 44–121)
ALT SERPL-CCNC: 9 IU/L (ref 0–32)
AST SERPL-CCNC: 17 IU/L (ref 0–40)
BILIRUB SERPL-MCNC: 0.5 MG/DL (ref 0–1.2)
BUN SERPL-MCNC: 17 MG/DL (ref 8–27)
BUN/CREAT SERPL: 18 (ref 12–28)
CALCIUM SERPL-MCNC: 9.8 MG/DL (ref 8.7–10.3)
CHLORIDE SERPL-SCNC: 100 MMOL/L (ref 96–106)
CHOLEST SERPL-MCNC: 241 MG/DL (ref 100–199)
CO2 SERPL-SCNC: 24 MMOL/L (ref 20–29)
CREAT SERPL-MCNC: 0.96 MG/DL (ref 0.57–1)
GLOBULIN SER CALC-MCNC: 3.2 G/DL (ref 1.5–4.5)
GLUCOSE SERPL-MCNC: 101 MG/DL (ref 65–99)
HBA1C MFR BLD: 5.4 % (ref 4.8–5.6)
HDLC SERPL-MCNC: 84 MG/DL
LDLC SERPL CALC-MCNC: 130 MG/DL (ref 0–99)
POTASSIUM SERPL-SCNC: 4.8 MMOL/L (ref 3.5–5.2)
PROT SERPL-MCNC: 7.8 G/DL (ref 6–8.5)
SODIUM SERPL-SCNC: 141 MMOL/L (ref 134–144)
TRIGL SERPL-MCNC: 159 MG/DL (ref 0–149)
TSH SERPL DL<=0.005 MIU/L-ACNC: 0.58 UIU/ML (ref 0.45–4.5)
VLDLC SERPL CALC-MCNC: 27 MG/DL (ref 5–40)

## 2022-01-10 DIAGNOSIS — I10 BENIGN ESSENTIAL HTN: ICD-10-CM

## 2022-01-10 RX ORDER — METOPROLOL SUCCINATE 50 MG/1
TABLET, EXTENDED RELEASE ORAL
Qty: 90 TABLET | Refills: 1 | Status: SHIPPED | OUTPATIENT
Start: 2022-01-10 | End: 2022-07-06

## 2022-01-25 ENCOUNTER — TELEPHONE (OUTPATIENT)
Dept: INTERNAL MEDICINE | Facility: CLINIC | Age: 87
End: 2022-01-25

## 2022-01-25 ENCOUNTER — TELEPHONE (OUTPATIENT)
Dept: FAMILY MEDICINE CLINIC | Facility: CLINIC | Age: 87
End: 2022-01-25

## 2022-01-25 NOTE — TELEPHONE ENCOUNTER
Caller: Stacie Vieira    Relationship to patient: Self    Best call back number: [phone]    Date of positive COVID19 test: YESTERDAY, 01/24/2022    COVID19 symptoms: COUGH, FATIGUE, LOSS OF APPETITE BUT STILL EATING    YESTERDAY- FELT LIKE SHE WAS GOING TO PASS OUT, DOES NOT FEEL THIS WAY TODAY    Date of initial quarantine: 01/14/2022    Additional information or concerns: PATIENT JUST WANTED TO LET HER PROVIDER KNOW THAT SHE HAD TESTED POSITIVE. PATIENT STATED THAT SHE DOES NOT FEEL THAT BAD, SHE JUST REALLY DOES NOT HAVE MUCH ENERGY.     What is the patients preferred pharmacy:     [facility] [address]  AT [facility] [phone] [facility] [phone] [facility]

## 2022-07-06 DIAGNOSIS — I10 BENIGN ESSENTIAL HTN: ICD-10-CM

## 2022-07-06 RX ORDER — METOPROLOL SUCCINATE 50 MG/1
TABLET, EXTENDED RELEASE ORAL
Qty: 90 TABLET | Refills: 3 | Status: SHIPPED | OUTPATIENT
Start: 2022-07-06 | End: 2023-02-23

## 2022-07-14 ENCOUNTER — APPOINTMENT (OUTPATIENT)
Dept: WOMENS IMAGING | Facility: HOSPITAL | Age: 87
End: 2022-07-14

## 2022-07-14 PROCEDURE — 77067 SCR MAMMO BI INCL CAD: CPT | Performed by: RADIOLOGY

## 2022-07-14 PROCEDURE — 77063 BREAST TOMOSYNTHESIS BI: CPT | Performed by: RADIOLOGY

## 2022-10-18 ENCOUNTER — OFFICE VISIT (OUTPATIENT)
Dept: FAMILY MEDICINE CLINIC | Facility: CLINIC | Age: 87
End: 2022-10-18

## 2022-10-18 VITALS
DIASTOLIC BLOOD PRESSURE: 80 MMHG | OXYGEN SATURATION: 94 % | HEART RATE: 76 BPM | WEIGHT: 112.5 LBS | BODY MASS INDEX: 19.93 KG/M2 | HEIGHT: 63 IN | TEMPERATURE: 97.1 F | SYSTOLIC BLOOD PRESSURE: 130 MMHG

## 2022-10-18 DIAGNOSIS — Z00.00 MEDICARE ANNUAL WELLNESS VISIT, SUBSEQUENT: Primary | ICD-10-CM

## 2022-10-18 DIAGNOSIS — I10 BENIGN ESSENTIAL HTN: ICD-10-CM

## 2022-10-18 DIAGNOSIS — E78.2 MIXED HYPERLIPIDEMIA: ICD-10-CM

## 2022-10-18 DIAGNOSIS — E03.9 ACQUIRED HYPOTHYROIDISM: ICD-10-CM

## 2022-10-18 DIAGNOSIS — I10 ESSENTIAL HYPERTENSION: ICD-10-CM

## 2022-10-18 DIAGNOSIS — N18.31 STAGE 3A CHRONIC KIDNEY DISEASE (CKD): ICD-10-CM

## 2022-10-18 PROBLEM — Z78.9 STATIN INTOLERANCE: Status: ACTIVE | Noted: 2022-10-18

## 2022-10-18 PROBLEM — Z88.7 HISTORY OF INFLUENZA VACCINE ALLERGY: Status: ACTIVE | Noted: 2022-10-18

## 2022-10-18 PROCEDURE — 1170F FXNL STATUS ASSESSED: CPT | Performed by: INTERNAL MEDICINE

## 2022-10-18 PROCEDURE — G0439 PPPS, SUBSEQ VISIT: HCPCS | Performed by: INTERNAL MEDICINE

## 2022-10-18 PROCEDURE — 1159F MED LIST DOCD IN RCRD: CPT | Performed by: INTERNAL MEDICINE

## 2022-10-18 PROCEDURE — 96160 PT-FOCUSED HLTH RISK ASSMT: CPT | Performed by: INTERNAL MEDICINE

## 2022-10-18 RX ORDER — LEVOTHYROXINE SODIUM 50 UG/1
TABLET ORAL
COMMUNITY
Start: 2022-08-13 | End: 2022-11-21

## 2022-10-18 RX ORDER — OLMESARTAN MEDOXOMIL 40 MG/1
40 TABLET ORAL DAILY
Qty: 90 TABLET | Refills: 3 | Status: SHIPPED | OUTPATIENT
Start: 2022-10-18 | End: 2023-02-23

## 2022-10-18 RX ORDER — LEVOTHYROXINE SODIUM 75 UG/1
TABLET ORAL
COMMUNITY
Start: 2022-08-13 | End: 2022-10-18 | Stop reason: SDUPTHER

## 2022-10-18 NOTE — PROGRESS NOTES
Subsequent Medicare Wellness Visit   The ABC's of the Annual Wellness Visit    Chief Complaint   Patient presents with   • Follow-up     HTN , PATIENT ALSO CONCERN ABOUT LOSING VOICE       HPI:  Stacie Vieira, -1935, is a 87 y.o. female who presents for a Subsequent Medicare Wellness Visit.    Patient is concerned about loosing her voice.  2-3 weeks ago with cough, runny nose, loosing voice but improving.   Patient has had episodes of SOA with going up stairs or active.  Checks BP and drops and HR increases.  Denies palpitations or CP.  Episodes are sporadic and not all the time and self resolve with rest.  Present for several years and followed by cardiology with normal echo and stress test last year.  Follow up in 3 weeks pending.    Follow-up for hypertension.  Currently, has been feeling well and asymptomatic without any headaches, vision changes, cough, chest pain, shortness of breath, swelling, focal neurologic deficit, memory loss or syncope.  Has been taking the medications regularly and adherent with the regimen amlodipine 2.5 mg daily but sometimes 5 mg total, HCTZ 12.5 mg PRN, metoprolol XL 50 mg daily and olmesartan 40 mg.  Denies medication side effects and no significant interval events.       Follow-up for cholesterol.  Currently, has been feeling well without any myalgias, muscle aches, weakness, numbness, chest pain, short of breath or other issues.  Currently, is adherent with medication regimen of  and denies medication side effects. Last lab on 22.     Follow-up for thyroid.  Denies fatigue, weakness, constipation/diarrhea, hair/skin changes, weight gain/loss, depression/anxiety, rashes, palpitations, swelling, chest pain, shortness of breath or other issues.  Has been compliant with taking the medication of levoxyl 50 mcg and 75 mcg every other day alternating  with no recent changes.  Denies any difficulty with the current medication.  Last lab on 22.     History of stage 3a  CKD and stable for > 3 years    Recent Hospitalizations:  No hospitalization(s) within the last year..    Current Medical Providers:  Patient Care Team:  Eber Hau MD as PCP - General (Internal Medicine)  James Del Angel MD as Consulting Physician (Infectious Diseases)  Ezekiel Verde MD as Consulting Physician (Otolaryngology)  Aisha Curiel MD as Consulting Physician (Cardiology)  Sinan Braga MD as Consulting Physician (Pulmonary Disease)  Shawn Felton MD as Consulting Physician (Allergy)  Luis Whitehead MD (Ophthalmology)  Jasmine Salazar MD (Dermatology)  Neal Benjamin MD as Consulting Physician (Orthopedic Surgery)    Health Habits and Functional and Cognitive Screening and Depression Screening:  Functional & Cognitive Status 10/18/2022   Do you have difficulty preparing food and eating? No   Do you have difficulty bathing yourself, getting dressed or grooming yourself? No   Do you have difficulty using the toilet? No   Do you have difficulty moving around from place to place? No   Do you have trouble with steps or getting out of a bed or a chair? No   Current Diet Well Balanced Diet   Dental Exam Up to date   Eye Exam Up to date   Exercise (times per week) 2 times per week   Current Exercises Include Walking   Current Exercise Activities Include -   Do you need help using the phone?  No   Are you deaf or do you have serious difficulty hearing?  No   Do you need help with transportation? No   Do you need help shopping? No   Do you need help preparing meals?  No   Do you need help with housework?  No   Do you need help with laundry? No   Do you need help taking your medications? No   Do you need help managing money? No   Do you ever drive or ride in a car without wearing a seat belt? No   Have you felt unusual stress, anger or loneliness in the last month? No   Who do you live with? Alone   If you need help, do you have trouble finding  someone available to you? No   Have you been bothered in the last four weeks by sexual problems? No   Do you have difficulty concentrating, remembering or making decisions? No     Depression Screen:  PHQ-2/PHQ-9 Depression Screening 10/18/2022   Retired PHQ-9 Total Score -   Retired Total Score -   Little Interest or Pleasure in Doing Things 0-->not at all   Feeling Down, Depressed or Hopeless 0-->not at all   PHQ-9: Brief Depression Severity Measure Score 0       Falls Risk Assessment:  ALVAREZ Fall Risk Clinician Key Questions   Have you fallen in the past year?: No  Do you feel unsteady with walking?: No  Are you worried about falling?: No    Past Medical/Family/Social History:  The following portions of the patient's history were reviewed and updated as appropriate: allergies, current medications, past family history, past medical history, past social history, past surgical history and problem list.    Allergies   Allergen Reactions   • Influenza Vaccines Mental Status Change     Guillain-[place]   • Ethambutol Other (See Comments)     Vision loss   • Statins Myalgia   • Alendronate Other (See Comments)     Headaches   • Ezetimibe Other (See Comments)     Leg Cramps   • Pravachol [Pravastatin] Other (See Comments)     Leg Cramps         Current Outpatient Medications:   •  amLODIPine (NORVASC) 2.5 MG tablet, Take 2 tablets by mouth Daily. If BP over 140/90, Disp: 180 tablet, Rfl: 2  •  Cholecalciferol (VITAMIN D) 2000 units tablet, Take 2,000 Units by mouth Daily., Disp: , Rfl:   •  Levoxyl 50 MCG tablet, , Disp: , Rfl:   •  Levoxyl 75 MCG tablet, Every other day -Trade name LEVOXYL, Disp: 45 tablet, Rfl: 2  •  Loratadine 10 MG capsule, Take 10 mg by mouth Daily., Disp: , Rfl:   •  metoprolol succinate XL (TOPROL-XL) 50 MG 24 hr tablet, TAKE ONE TABLET BY MOUTH ONCE NIGHTLY, Disp: 90 tablet, Rfl: 3  •  Misc Natural Products (JOINT SUPPORT PO), Take  by mouth., Disp: , Rfl:   •  Multiple Vitamins-Minerals (CENTRUM  SILVER 50+WOMEN PO), Take  by mouth., Disp: , Rfl:   •  olmesartan (BENICAR) 40 MG tablet, Take 1 tablet by mouth Daily., Disp: 90 tablet, Rfl: 3  •  Probiotic Product (PROBIOTIC ADVANCED PO), Take 2 capsules by mouth Daily., Disp: , Rfl:   •  vitamin C (ASCORBIC ACID) 500 MG tablet, Take 500 mg by mouth Daily., Disp: , Rfl:   •  Zinc 50 MG capsule, Take  by mouth., Disp: , Rfl:   •  benzonatate (TESSALON) 100 MG capsule, Take 1-2 capsules by mouth 3 (Three) Times a Day As Needed for Cough., Disp: 30 capsule, Rfl: 0    Aspirin use counseling: Does not need ASA (and currently is not on it)    Current medication list contains no high risk medications.  No harmful drug interactions have been identified.     Family History   Problem Relation Age of Onset   • Hypertension Brother    • Dementia Mother    • Kidney disease Father    • Thyroid disease Other        Social History     Tobacco Use   • Smoking status: Never   • Smokeless tobacco: Never   Substance Use Topics   • Alcohol use: No     Comment: caffeine use: 1 cup daily       Past Surgical History:   Procedure Laterality Date   • BREAST BIOPSY      PERCUTATNEOUS NEEDLE CORE   • BRONCHOSCOPY      nov 2015   • BRONCHOSCOPY N/A 4/8/2016    Procedure: BRONCHOSCOPY WITH BAL;  Surgeon: Sinan Braga MD;  Location: [facility] ENDOSCOPY;  Service:    • BRONCHOSCOPY N/A 12/9/2017    Procedure: BRONCHOSCOPY;  Surgeon: Real Rousseau MD;  Location: [facility] ENDOSCOPY;  Service:    • BRONCHOSCOPY N/A 7/20/2018    Procedure: BRONCHOSCOPY with BAL;  Surgeon: Sinan Braga MD;  Location: [facility] ENDOSCOPY;  Service: Pulmonary   • COLONOSCOPY N/A 07/08/2005   • DILATATION AND CURETTAGE N/A 01/01/1990   • PAP SMEAR N/A 03/30/2004   • SINUS SURGERY  01/2016   • TONSILLECTOMY         Patient Active Problem List   Diagnosis   • Mycobacterium avium complex (HCC)   • Benign essential HTN   • Allergic rhinitis   • Hypothyroidism   • Hyperlipidemia   • Polyneuropathy   • Oropharyngeal dysphagia  "  • 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[facility])   • Anxiety   • Labile blood pressure   • Dyspnea on exertion   • PSVT (paroxysmal supraventricular tachycardia) ([facility])   • Stenosis of right renal artery (HCC)   • Elevated glucose   • Age-related osteoporosis without current pathological fracture   • Carotid stenosis, asymptomatic, left   • Epiretinal membrane   • Ethambutol adverse reaction   • Medicare annual wellness visit, subsequent   • Statin intolerance   • Stage 3a chronic kidney disease (CKD) ([facility])   • History of influenza vaccine allergy       Review of Systems   Constitutional: Negative for activity change, appetite change, fatigue, fever, unexpected weight gain and unexpected weight loss.   HENT: Positive for voice change. Negative for nosebleeds, rhinorrhea and trouble swallowing.    Eyes: Negative for visual disturbance.   Respiratory: Positive for shortness of breath. Negative for cough, chest tightness and wheezing.    Cardiovascular: Negative for chest pain, palpitations and leg swelling.   Gastrointestinal: Negative for abdominal pain, blood in stool, constipation, diarrhea, nausea, vomiting, GERD and indigestion.   Genitourinary: Negative for dysuria, frequency and hematuria.   Musculoskeletal: Negative for arthralgias, back pain and myalgias.   Skin: Negative for rash and wound.   Neurological: Negative for dizziness, tremors, weakness, light-headedness, numbness, headache and memory problem.   Hematological: Negative for adenopathy. Does not bruise/bleed easily.   Psychiatric/Behavioral: Negative for sleep disturbance and depressed mood. The patient is not nervous/anxious.        Objective     Vitals:    10/18/22 0804   BP: 130/80   Pulse: 76   Temp: 97.1 °F (36.2 °C)   TempSrc: Temporal   SpO2: 94%   Weight: 51 kg (112 lb 8 oz)   Height: 160 cm (63\")       BMI is within normal parameters. No other follow-up for BMI required.      No results found.    The patient has no evidence of " cognitve impairment.     Physical Exam  Vitals and nursing note reviewed.   Constitutional:       General: She is not in acute distress.     Appearance: She is well-developed. She is not diaphoretic.   HENT:      Head: Normocephalic and atraumatic.      Right Ear: External ear normal.      Left Ear: External ear normal.      Nose: Nose normal.   Eyes:      Conjunctiva/sclera: Conjunctivae normal.      Pupils: Pupils are equal, round, and reactive to light.   Neck:      Thyroid: No thyromegaly.      Trachea: No tracheal deviation.   Cardiovascular:      Rate and Rhythm: Normal rate and regular rhythm.      Heart sounds: Normal heart sounds. No murmur heard.    No friction rub. No gallop.   Pulmonary:      Effort: Pulmonary effort is normal. No respiratory distress.      Breath sounds: Normal breath sounds.   Abdominal:      General: Bowel sounds are normal.      Palpations: Abdomen is soft. There is no mass.      Tenderness: There is no abdominal tenderness. There is no guarding.   Musculoskeletal:         General: Normal range of motion.      Cervical back: Normal range of motion and neck supple.   Lymphadenopathy:      Cervical: No cervical adenopathy.   Skin:     General: Skin is warm and dry.      Capillary Refill: Capillary refill takes less than 2 seconds.      Findings: No rash.   Neurological:      Mental Status: She is alert and oriented to person, place, and time.      Motor: No abnormal muscle tone.      Deep Tendon Reflexes: Reflexes normal.   Psychiatric:         Behavior: Behavior normal.         Thought Content: Thought content normal.         Judgment: Judgment normal.         Recent Lab Results:     Lab Results   Component Value Date    CHOL 274 (H) 09/06/2016    TRIG 159 (H) 01/05/2022    HDL 84 01/05/2022    VLDL 27 01/05/2022    LDLHDL 1.29 09/06/2016       Assessment & Plan   Age-appropriate Screening Schedule:  Refer to the list below for future screening recommendations based on patient's age,  sex and/or medical conditions.      Health Maintenance   Topic Date Due   • LIPID PANEL  01/05/2023   • DXA SCAN  04/05/2023   • MAMMOGRAM  07/14/2024   • TDAP/TD VACCINES (3 - Td or Tdap) 09/13/2026   • ZOSTER VACCINE  Discontinued       Medicare Risks and Personalized Health Plan:  Fall Risk  Glaucoma Risk    CMS-Preventive Services Quick Reference  Medicare Preventive Services Addressed:  Annual Wellness Visit (AWV)  Glaucoma screening (for individuals with diabetes mellitus, family history of glaucoma, -Americans (> or =) age 50, -Americans (> or =) age 65)    Advance Care Planning:  ACP discussion was held with the patient during this visit. Patient has an advance directive in EMR which is still valid.     Diagnoses and all orders for this visit:    1. Medicare annual wellness visit, subsequent (Primary)    2. Benign essential HTN  -     Comprehensive Metabolic Panel  -     Lipid Panel  -     CBC & Differential    3. Mixed hyperlipidemia  -     Comprehensive Metabolic Panel  -     Lipid Panel    4. Acquired hypothyroidism  -     TSH  -     T4, Free    5. Stage 3a chronic kidney disease (CKD) (HCC)  -     Comprehensive Metabolic Panel    6. Essential hypertension  Comments:  stable - call if bp over 140/90  Orders:  -     olmesartan (BENICAR) 40 MG tablet; Take 1 tablet by mouth Daily.  Dispense: 90 tablet; Refill: 3      Reviewed history and annual wellness visit with patient during office time.  Medications reviewed as appropriate.  Discussed advanced directives and living will.  Patient has living will: Living will: Directive already scanned in chart.  Discussed fall risk and precautions encourage removing throw rugs and using grab bars within the home and bathroom.  Will check the labs as ordered above to evaluate the blood sugars, kidney, liver, cholesterol for screening.  Discussed flu shot and patient cannot have secondary to past severe reactions with GB and coded and therefore cannot take  the covid vaccine.  Prevnar-13 and pneumovax-23 up to date and appropriate.  Encourage follow-up with the eye doctor on annual basis for glaucoma evaluation.  Discussed weight and encouraged exercise as tolerated while following a healthy diet.  Follow-up with cardiology specialists as scheduled in 3 weeks and may need event monitoring.  May consider PSK-9 injections for cholesterol through cardiology.    An After Visit Summary and PPPS with all of these plans were given to the patient.      Follow Up:  Return in about 1 year (around 10/18/2023) for Medicare Wellness.           · COVID-19 Precautions - Patient was compliant in wearing a mask. When I saw the patient, I used appropriate personal protective equipment (PPE) including mask and eye shield (standard procedure).  Additionally, I used gown and gloves if indicated.  Hand hygiene was completed before and after seeing the patient.  · Dictated utilizing Dragon Dictation

## 2022-10-19 LAB
ALBUMIN SERPL-MCNC: 4.4 G/DL (ref 3.5–5.2)
ALBUMIN/GLOB SERPL: 1.6 G/DL
ALP SERPL-CCNC: 77 U/L (ref 39–117)
ALT SERPL-CCNC: 12 U/L (ref 1–33)
AST SERPL-CCNC: 22 U/L (ref 1–32)
BASOPHILS # BLD AUTO: 0.06 10*3/MM3 (ref 0–0.2)
BASOPHILS NFR BLD AUTO: 0.8 % (ref 0–1.5)
BILIRUB SERPL-MCNC: 0.2 MG/DL (ref 0–1.2)
BUN SERPL-MCNC: 16 MG/DL (ref 8–23)
BUN/CREAT SERPL: 17.6 (ref 7–25)
CALCIUM SERPL-MCNC: 9.7 MG/DL (ref 8.6–10.5)
CHLORIDE SERPL-SCNC: 101 MMOL/L (ref 98–107)
CHOLEST SERPL-MCNC: 207 MG/DL (ref 0–200)
CO2 SERPL-SCNC: 29.4 MMOL/L (ref 22–29)
CREAT SERPL-MCNC: 0.91 MG/DL (ref 0.57–1)
EGFRCR SERPLBLD CKD-EPI 2021: 61.6 ML/MIN/1.73
EOSINOPHIL # BLD AUTO: 0.55 10*3/MM3 (ref 0–0.4)
EOSINOPHIL NFR BLD AUTO: 7.5 % (ref 0.3–6.2)
ERYTHROCYTE [DISTWIDTH] IN BLOOD BY AUTOMATED COUNT: 11.9 % (ref 12.3–15.4)
GLOBULIN SER CALC-MCNC: 2.7 GM/DL
GLUCOSE SERPL-MCNC: 102 MG/DL (ref 65–99)
HCT VFR BLD AUTO: 40.4 % (ref 34–46.6)
HDLC SERPL-MCNC: 71 MG/DL (ref 40–60)
HGB BLD-MCNC: 13.2 G/DL (ref 12–15.9)
IMM GRANULOCYTES # BLD AUTO: 0.09 10*3/MM3 (ref 0–0.05)
IMM GRANULOCYTES NFR BLD AUTO: 1.2 % (ref 0–0.5)
LDLC SERPL CALC-MCNC: 101 MG/DL (ref 0–100)
LYMPHOCYTES # BLD AUTO: 2.16 10*3/MM3 (ref 0.7–3.1)
LYMPHOCYTES NFR BLD AUTO: 29.3 % (ref 19.6–45.3)
MCH RBC QN AUTO: 31.4 PG (ref 26.6–33)
MCHC RBC AUTO-ENTMCNC: 32.7 G/DL (ref 31.5–35.7)
MCV RBC AUTO: 96.2 FL (ref 79–97)
MONOCYTES # BLD AUTO: 0.94 10*3/MM3 (ref 0.1–0.9)
MONOCYTES NFR BLD AUTO: 12.8 % (ref 5–12)
NEUTROPHILS # BLD AUTO: 3.56 10*3/MM3 (ref 1.7–7)
NEUTROPHILS NFR BLD AUTO: 48.4 % (ref 42.7–76)
NRBC BLD AUTO-RTO: 0 /100 WBC (ref 0–0.2)
PLATELET # BLD AUTO: 377 10*3/MM3 (ref 140–450)
POTASSIUM SERPL-SCNC: 5.7 MMOL/L (ref 3.5–5.2)
PROT SERPL-MCNC: 7.1 G/DL (ref 6–8.5)
RBC # BLD AUTO: 4.2 10*6/MM3 (ref 3.77–5.28)
SODIUM SERPL-SCNC: 140 MMOL/L (ref 136–145)
T4 FREE SERPL-MCNC: 1.4 NG/DL (ref 0.93–1.7)
TRIGL SERPL-MCNC: 209 MG/DL (ref 0–150)
TSH SERPL DL<=0.005 MIU/L-ACNC: 0.59 UIU/ML (ref 0.27–4.2)
VLDLC SERPL CALC-MCNC: 35 MG/DL (ref 5–40)
WBC # BLD AUTO: 7.36 10*3/MM3 (ref 3.4–10.8)

## 2022-10-21 ENCOUNTER — TELEPHONE (OUTPATIENT)
Dept: FAMILY MEDICINE CLINIC | Facility: CLINIC | Age: 87
End: 2022-10-21

## 2022-10-21 NOTE — TELEPHONE ENCOUNTER
Spoke to son, he is an ICU doctor  Gave patient's son results and voiced understanding.  Trish Shafer, RONNA/BRENDA

## 2022-11-10 ENCOUNTER — OFFICE VISIT (OUTPATIENT)
Dept: CARDIOLOGY | Facility: CLINIC | Age: 87
End: 2022-11-10

## 2022-11-10 VITALS
SYSTOLIC BLOOD PRESSURE: 118 MMHG | HEIGHT: 63 IN | DIASTOLIC BLOOD PRESSURE: 78 MMHG | HEART RATE: 79 BPM | WEIGHT: 111.8 LBS | BODY MASS INDEX: 19.81 KG/M2

## 2022-11-10 DIAGNOSIS — R00.2 PALPITATIONS: ICD-10-CM

## 2022-11-10 DIAGNOSIS — R06.09 DYSPNEA ON EXERTION: Primary | ICD-10-CM

## 2022-11-10 DIAGNOSIS — R00.0 TACHYCARDIA: ICD-10-CM

## 2022-11-10 DIAGNOSIS — I49.1 APC (ATRIAL PREMATURE CONTRACTIONS): ICD-10-CM

## 2022-11-10 DIAGNOSIS — I47.1 PSVT (PAROXYSMAL SUPRAVENTRICULAR TACHYCARDIA): ICD-10-CM

## 2022-11-10 DIAGNOSIS — I10 PRIMARY HYPERTENSION: ICD-10-CM

## 2022-11-10 DIAGNOSIS — R55 NEAR SYNCOPE: ICD-10-CM

## 2022-11-10 DIAGNOSIS — E87.5 HYPERKALEMIA: ICD-10-CM

## 2022-11-10 DIAGNOSIS — A31.0 MYCOBACTERIUM AVIUM COMPLEX: ICD-10-CM

## 2022-11-10 PROBLEM — R09.89 LABILE BLOOD PRESSURE: Status: RESOLVED | Noted: 2018-06-18 | Resolved: 2022-11-10

## 2022-11-10 PROCEDURE — 93000 ELECTROCARDIOGRAM COMPLETE: CPT | Performed by: NURSE PRACTITIONER

## 2022-11-10 PROCEDURE — 99214 OFFICE O/P EST MOD 30 MIN: CPT | Performed by: NURSE PRACTITIONER

## 2022-11-10 NOTE — PROGRESS NOTES
Date of Office Visit: 11/10/2022  Encounter Provider: ANNABEL Miranda  Place of Service: [facility] CARDIOLOGY  Patient Name: Stacie Vieira  :1935  Primary Cardiologist:      Chief Complaint   Patient presents with   • Follow-up   • Shortness of Breath   • Palpitations   :     HPI: Stacie Vieira is a pleasant 87 y.o. female who presents today for follow-up on shortness of breath and palpitations.  I reviewed her medical records.    In the , she was diagnosed with PSVT and was placed on propanolol. She has known hypertension, thyroid disease, and mycobacterium avium-intracellulare complex (MAC) followed by Dr. Sinan Braga.  She was tried on spironolactone for hypertension management, but developed hyperkalemia.     In 2017, she was diagnosed with pneumonia and Guillain-Newport syndrome (from the flu shot per patient).  She had an episode of syncope associate with bradycardia and received chest compressions and atropine.  Echocardiogram showed normal LVEF, grade 1 diastolic dysfunction, aortic valve calcification, and mild TR.  In 2018, myocardial perfusion study was normal.    In 2019, she was diagnosed with right renal artery stenosis and left was normal.  She was referred to Dr. Tato Rhodes.  Vascular screenings showed right carotid plaque, mild left carotid stenosis, no abdominal aortic aneurysm, and normal lower extremity screening.    In 2021, she saw Dr. Curiel and was experiencing exertional dyspnea.  Echocardiogram showed normal LVEF of 64%, mild TR, and mild MR.  Lexiscan Myoview stress test normal.  Carotid duplex showed bilateral plaque and mild stenosis in the proximal, mid, and distal left carotid artery.    She presents today for annual follow-up visit.  She still has these episodes of shortness of breath and palpitations.  She says these episodes do not occur daily or all the time.  She may do 1 activity 1 day  and feels fine in the next day does a similar activity and has symptoms.  She notices dyspnea when walking, sometimes going up steps, and this causes her blood pressure to drop to like 104/58 and her heart rate has been as high as 107.  She does feel palpitations.  When she is short of breath sometimes she feels near syncopal and has some occasional dizziness.  She has not recently passed out.  Her PCP mention to her possibly doing a longer Holter monitor.  Blood pressure elevated today, but typically well controlled at home.  She is taking care of her 3-year-old great granddaughter.    I reviewed her recent blood work and her potassium was elevated at 5.7.  Her PCP recommended rechecking in 2 to 4 weeks (order placed).  Total cholesterol 207, triglycerides 209, HDL 71, and .  CBC showed normal hemoglobin and hematocrit.  TSH and free T4 both normal.      Past Medical History:   Diagnosis Date   • Acute stress disorder    • Allergic rhinitis    • C. difficile colitis     hx in past   • Dyspnea on exertion    • Elevated fasting glucose    • Essential hypertension    • Guillain Barré syndrome (HCC)    • H/O: pneumonia    • Health care maintenance    • History of palpitations    • Hyperlipidemia    • Hypokalemia    • Hypothyroidism    • Insomnia    • Labile blood pressure    • MAC (mycobacterium avium-intracellulare complex)     reason for bronch.  dx with this nov 2015   • Osteoporosis    • Palpitations    • Pneumonia     past hx of freq.   • Polyneuropathy 12/20/2017   • PSVT (paroxysmal supraventricular tachycardia) ([facility])    • Renal cyst    • Sinusitis    • Staph infection     in sinus area  recent sx jan 2016   • Stenosis of right carotid artery 10/22/2020   • Stenosis of right renal artery ([facility]) 07/05/2018    Per renal duplex - 60% stenosis, left normal; 5/2019 duplex unchanged    • SVT (supraventricular tachycardia) ([facility])     past hx  on medicine   • Syncope 12/25/2017   • Syncope and collapse    •  Thyroiditis        Past Surgical History:   Procedure Laterality Date   • BREAST BIOPSY      PERCUTATNEOUS NEEDLE CORE   • BRONCHOSCOPY      nov 2015   • BRONCHOSCOPY N/A 4/8/2016    Procedure: BRONCHOSCOPY WITH BAL;  Surgeon: Sinan Braga MD;  Location: [facility] ENDOSCOPY;  Service:    • BRONCHOSCOPY N/A 12/9/2017    Procedure: BRONCHOSCOPY;  Surgeon: Real Rousseau MD;  Location: [facility] ENDOSCOPY;  Service:    • BRONCHOSCOPY N/A 7/20/2018    Procedure: BRONCHOSCOPY with BAL;  Surgeon: Sinan Braga MD;  Location: [facility] ENDOSCOPY;  Service: Pulmonary   • COLONOSCOPY N/A 07/08/2005   • DILATATION AND CURETTAGE N/A 01/01/1990   • PAP SMEAR N/A 03/30/2004   • SINUS SURGERY  01/2016   • TONSILLECTOMY         Social History     Socioeconomic History   • Marital status:    Tobacco Use   • Smoking status: Never   • Smokeless tobacco: Never   Vaping Use   • Vaping Use: Never used   Substance and Sexual Activity   • Alcohol use: No     Comment: caffeine use: 1 cup daily   • Drug use: No   • Sexual activity: Never       Family History   Problem Relation Age of Onset   • Hypertension Brother    • Dementia Mother    • Kidney disease Father    • Thyroid disease Other        The following portion of the patient's history were reviewed and updated as appropriate: past medical history, past surgical history, past social history, past family history, allergies, current medications, and problem list.    Review of Systems   Constitutional: Negative.   Cardiovascular: Positive for dyspnea on exertion and near-syncope.   Respiratory: Positive for shortness of breath.    Hematologic/Lymphatic: Negative.    Neurological: Positive for dizziness and light-headedness.       Allergies   Allergen Reactions   • Influenza Vaccines Mental Status Change     Guillain-Natchez   • Ethambutol Other (See Comments)     Vision loss   • Statins Myalgia   • Alendronate Other (See Comments)     Headaches   • Ezetimibe Other (See Comments)     Leg  "Cramps   • Pravachol [Pravastatin] Other (See Comments)     Leg Cramps         Current Outpatient Medications:   •  amLODIPine (NORVASC) 2.5 MG tablet, Take 2 tablets by mouth Daily. If BP over 140/90, Disp: 180 tablet, Rfl: 2  •  benzonatate (TESSALON) 100 MG capsule, Take 1-2 capsules by mouth 3 (Three) Times a Day As Needed for Cough., Disp: 30 capsule, Rfl: 0  •  Cholecalciferol (VITAMIN D) 2000 units tablet, Take 2,000 Units by mouth Daily., Disp: , Rfl:   •  Levoxyl 50 MCG tablet, , Disp: , Rfl:   •  Levoxyl 75 MCG tablet, Every other day -Trade name LEVOXYL, Disp: 45 tablet, Rfl: 2  •  Loratadine 10 MG capsule, Take 10 mg by mouth Daily., Disp: , Rfl:   •  metoprolol succinate XL (TOPROL-XL) 50 MG 24 hr tablet, TAKE ONE TABLET BY MOUTH ONCE NIGHTLY, Disp: 90 tablet, Rfl: 3  •  Misc Natural Products (JOINT SUPPORT PO), Take  by mouth., Disp: , Rfl:   •  Multiple Vitamins-Minerals (CENTRUM SILVER 50+WOMEN PO), Take  by mouth., Disp: , Rfl:   •  olmesartan (BENICAR) 40 MG tablet, Take 1 tablet by mouth Daily., Disp: 90 tablet, Rfl: 3  •  Probiotic Product (PROBIOTIC ADVANCED PO), Take 2 capsules by mouth Daily., Disp: , Rfl:   •  vitamin C (ASCORBIC ACID) 500 MG tablet, Take 500 mg by mouth Daily., Disp: , Rfl:   •  Zinc 50 MG capsule, Take  by mouth., Disp: , Rfl:         Objective:     Vitals:    11/10/22 1106 11/10/22 1138   BP: 148/90 118/78   BP Location: Left arm Left arm   Patient Position: Sitting Sitting   Cuff Size: Adult    Pulse: 79    Weight: 50.7 kg (111 lb 12.8 oz)    Height: 160 cm (63\")      Body mass index is 19.8 kg/m².    PHYSICAL EXAM:    Vitals Reviewed.   General Appearance: No acute distress, well developed and well nourished.  Thin.  Eyes: Conjunctiva and lids: No erythema, swelling, or discharge. Sclera non-icteric. Glasses.   HENT: Atraumatic, normocephalic. External eyes, ears, and nose normal. No hearing loss noted. Mucous membranes normal. Lips not cyanotic. Neck supple with no " tenderness. Wearing mask.   Respiratory: No signs of respiratory distress. Respiration rhythm and depth normal.   Clear to auscultation. No rales, crackles, rhonchi, or wheezing auscultated.   Cardiovascular:  Jugular Venous Pressure: Normal  Heart Rate and Rhythm: Normal, Heart Sounds: Normal S1 and S2. No S3 or S4 noted.  Murmurs: No murmurs noted. No rubs, thrills, or gallops.   Lower Extremities: No edema noted.  Gastrointestinal:  Abdomen soft, non-distended, non-tender.    Musculoskeletal: Normal movement of extremities.  Skin and Nails: General appearance normal. No pallor, cyanosis, diaphoresis. Skin temperature normal. No clubbing of fingernails.   Psychiatric: Patient alert and oriented to person, place, and time. Speech and behavior appropriate. Normal mood and affect.       ECG 12 Lead    Date/Time: 11/10/2022 10:59 AM  Performed by: Winifred Zaldivar APRN  Authorized by: Winifred Zaldivar APRN   Comparison: compared with previous ECG from 11/10/2021  Similar to previous ECG  Rhythm: sinus rhythm  Ectopy: atrial premature contractions  Rate: normal  BPM: 79  Conduction: conduction normal  QRS axis: normal  Other findings: non-specific ST-T wave changes    Clinical impression: non-specific ECG              Assessment:       Diagnosis Plan   1. Dyspnea on exertion  Cardiac Event Monitor      2. Tachycardia  Cardiac Event Monitor      3. Palpitations  Cardiac Event Monitor      4. Near syncope  Cardiac Event Monitor      5. Hyperkalemia        6. APC (atrial premature contractions)        7. PSVT (paroxysmal supraventricular tachycardia) ([facility])        8. Primary hypertension        9. Mycobacterium avium complex (HCC)               Plan:       1-4.  She has these episodes where sometimes she gets dyspneic which causes her heart rate to raise as high as 107 bpm, and she feels near syncopal.  She has palpitations and is aware of her heartbeat.  She has not truly passed out.  She had stress testing and  echocardiogram which were normal last year.  I have offered a 30-day event monitor to assess her symptoms when she is actually having an episode.    5.  Hyperkalemia: Recent potassium 5.7.  She is having a recheck of her potassium level at the end of November.    6.  Single APC noted on today's EKG.    7.  History of PSVT.  Reassess on monitor.    8.  Hypertension: Typically well controlled.  Elevated today and we will just continue to monitor.  Possibly we increase her beta-blocker and decrease her other hypertensive medications to help with the palpitations.  Further recommendations to follow.    9.  Mild bacterium AVM complex: Could this be causing her shortness of breath?  I will obtain her last office note from Dr. Sinan Braga.    10.  Further recommendations to follow after the 30-day event monitor is completed.  Follow-up with Dr. Curiel in 3 months.    ADDENDUM:  · I reviewed the office note from Dr. Sinan Braga in June 2022.  She reported some mild shortness of breath when carrying in groceries.  PFTs were normal and she was felt to be stable from a pulmonary standpoint.    As always, it has been a pleasure to participate in your patient's care. Thank you.       Sincerely,         ANNABEL Wu  [facility] Cardiology      · Dictated utilizing Dragon Dictation  · COVID-19 Precautions - Patient was compliant in wearing a mask. When I saw the patient, I used appropriate personal protective equipment (PPE) including mask and eye shield (standard procedure).  Additionally, I used gown and gloves if indicated.  Hand hygiene was completed before and after seeing the patient.  · I spent 35 minutes reviewing her medical records/testing/previous office notes/labs, face-to-face interaction with patient, physical examination, formulating the plan of care, and discussion of plan of care with patient.

## 2022-11-19 DIAGNOSIS — E03.9 ACQUIRED HYPOTHYROIDISM: ICD-10-CM

## 2022-11-21 RX ORDER — LEVOTHYROXINE SODIUM 75 UG/1
TABLET ORAL
Qty: 45 TABLET | Refills: 2 | Status: SHIPPED | OUTPATIENT
Start: 2022-11-21

## 2022-11-21 RX ORDER — LEVOTHYROXINE SODIUM 50 UG/1
TABLET ORAL
Qty: 45 TABLET | Refills: 1 | Status: SHIPPED | OUTPATIENT
Start: 2022-11-21

## 2022-11-28 DIAGNOSIS — E87.5 HYPERKALEMIA: Primary | ICD-10-CM

## 2022-12-06 LAB — POTASSIUM SERPL-SCNC: 5.4 MMOL/L (ref 3.5–5.2)

## 2022-12-13 ENCOUNTER — TELEPHONE (OUTPATIENT)
Dept: FAMILY MEDICINE CLINIC | Facility: CLINIC | Age: 87
End: 2022-12-13

## 2022-12-13 NOTE — TELEPHONE ENCOUNTER
Ok for hub and  can read message    The potassium level is improved but still slightly elevated. Please avoid potassium supplement and we can recheck in one 2-4 weeks.

## 2022-12-13 NOTE — TELEPHONE ENCOUNTER
Caller: Stacie Vieira    Relationship: Self    Best call back number: [phone]    What test was performed: LABS    When was the test performed: 12/05/2022    Where was the test performed: OFFICE    Additional notes: PATIENT IS CALLING FOR THE RESULTS OF HER LABS. PATIENT STATED THAT THE INFORMATION CAN BE LEFT ON HER VOICEMAIL. PLEASE ADVISE.

## 2022-12-14 ENCOUNTER — TELEPHONE (OUTPATIENT)
Dept: FAMILY MEDICINE CLINIC | Facility: CLINIC | Age: 87
End: 2022-12-14

## 2022-12-14 DIAGNOSIS — E87.5 HYPERKALEMIA: Primary | ICD-10-CM

## 2022-12-14 NOTE — TELEPHONE ENCOUNTER
Caller: Mia Vieira    Relationship: Self    Best call back number: [phone]    Caller requesting test results: MIA    What test was performed: LABS    When was the test performed: 12/5/22*    Where was the test performed: IN OFFICE    Additional notes: PLEASE CALL AND ADVISE

## 2022-12-28 ENCOUNTER — TELEPHONE (OUTPATIENT)
Dept: CARDIOLOGY | Facility: CLINIC | Age: 87
End: 2022-12-28

## 2022-12-28 NOTE — TELEPHONE ENCOUNTER
"  I spoke with patient via phone about results.  I reviewed the manual transmissions and she reported palpitations.  Sometimes she was in sinus rhythm with PVCs and sometimes tachycardic.  I recommended a trial of increasing metoprolol from 50 mg to 75 mg daily and she said \"this scares me\".  She wants to continue to monitor her symptoms and follow-up with Dr. Curiel in February.    "

## 2022-12-30 DIAGNOSIS — I10 BENIGN ESSENTIAL HTN: ICD-10-CM

## 2022-12-30 RX ORDER — AMLODIPINE BESYLATE 2.5 MG/1
5 TABLET ORAL DAILY
Qty: 180 TABLET | Refills: 0 | Status: SHIPPED | OUTPATIENT
Start: 2022-12-30 | End: 2023-02-23

## 2023-01-13 ENCOUNTER — TELEPHONE (OUTPATIENT)
Dept: FAMILY MEDICINE CLINIC | Facility: CLINIC | Age: 88
End: 2023-01-13

## 2023-01-13 NOTE — TELEPHONE ENCOUNTER
Caller: Stacie Vieira    Relationship: Self    Best call back number: [phone]    Caller requesting test results: YES     What test was performed: LABS     When was the test performed: 1/11/23     Where was the test performed: IN OFFICE     Additional notes: PATIENT STATES SHE WOULD LIKE TO KNOW THE RESULTS OF HER POTASSIUM.

## 2023-01-23 NOTE — TELEPHONE ENCOUNTER
Called patient and informed patient of her potassium results. We discussed some low potassium foods and something's to avoid.

## 2023-02-23 ENCOUNTER — OFFICE VISIT (OUTPATIENT)
Dept: CARDIOLOGY | Facility: CLINIC | Age: 88
End: 2023-02-23
Payer: MEDICARE

## 2023-02-23 VITALS
HEART RATE: 99 BPM | WEIGHT: 110 LBS | SYSTOLIC BLOOD PRESSURE: 122 MMHG | HEIGHT: 55 IN | BODY MASS INDEX: 25.46 KG/M2 | DIASTOLIC BLOOD PRESSURE: 74 MMHG

## 2023-02-23 DIAGNOSIS — G61.0 GUILLAIN-BARRE SYNDROME FOLLOWING VACCINATION: ICD-10-CM

## 2023-02-23 DIAGNOSIS — I47.1 PSVT (PAROXYSMAL SUPRAVENTRICULAR TACHYCARDIA): Primary | ICD-10-CM

## 2023-02-23 DIAGNOSIS — R06.09 DYSPNEA ON EXERTION: ICD-10-CM

## 2023-02-23 DIAGNOSIS — E87.5 HYPERKALEMIA: ICD-10-CM

## 2023-02-23 DIAGNOSIS — I10 ESSENTIAL HYPERTENSION: ICD-10-CM

## 2023-02-23 DIAGNOSIS — I70.1 STENOSIS OF RIGHT RENAL ARTERY: ICD-10-CM

## 2023-02-23 DIAGNOSIS — I65.22 CAROTID STENOSIS, ASYMPTOMATIC, LEFT: ICD-10-CM

## 2023-02-23 DIAGNOSIS — A31.0 MYCOBACTERIUM AVIUM COMPLEX: ICD-10-CM

## 2023-02-23 DIAGNOSIS — E78.2 MIXED HYPERLIPIDEMIA: ICD-10-CM

## 2023-02-23 DIAGNOSIS — N18.31 STAGE 3A CHRONIC KIDNEY DISEASE (CKD): ICD-10-CM

## 2023-02-23 DIAGNOSIS — T88.1XXA GUILLAIN-BARRE SYNDROME FOLLOWING VACCINATION: ICD-10-CM

## 2023-02-23 DIAGNOSIS — I10 BENIGN ESSENTIAL HTN: ICD-10-CM

## 2023-02-23 PROCEDURE — 99214 OFFICE O/P EST MOD 30 MIN: CPT | Performed by: NURSE PRACTITIONER

## 2023-02-23 PROCEDURE — 93000 ELECTROCARDIOGRAM COMPLETE: CPT | Performed by: NURSE PRACTITIONER

## 2023-02-23 RX ORDER — OLMESARTAN MEDOXOMIL 20 MG/1
20 TABLET ORAL DAILY
Start: 2023-02-23

## 2023-02-23 RX ORDER — OLMESARTAN MEDOXOMIL 40 MG/1
20 TABLET ORAL DAILY
Qty: 90 TABLET | Refills: 3
Start: 2023-02-23 | End: 2023-02-23

## 2023-02-23 RX ORDER — AMLODIPINE BESYLATE 2.5 MG/1
2.5 TABLET ORAL 2 TIMES DAILY
Qty: 180 TABLET | Refills: 0
Start: 2023-02-23 | End: 2023-03-06 | Stop reason: HOSPADM

## 2023-02-23 NOTE — PROGRESS NOTES
"      [facility] CARDIOLOGY  [address]  [facility] [address]  [facility] [address]  Phone: [phone]  Patient Name: Stacie Vieira  :1935  Age: 88 y.o.  Primary Cardiologist: Aisha Curiel MD  Encounter Provider:  ANNABEL Amaral    History of Present Illness     Stacie Vieira is a 88 y.o.  female whose medical history includes hypertension, hyperlipidemia, right renal artery stenosis, and left carotid artery stenosis.  She is followed in our office by Dr. Curiel for paroxysmal SVT. I have reviewed the past medical records in preparation of today's visit.     23 Follow-up:  She is here for 3-month follow-up and I am seeing her for the first time today.  She is quite frustrated.  She states she continues to have symptoms that feel exactly like what her previous SVT symptoms felt like.  They occur when she is walking up steps or when she is exerting herself.  They make her feel short of breath, shaky, and lightheaded.  Usually her blood pressure at home is 140s/60s with heart rate 60-70 but she has noticed that her heart rate will go up to  and her blood pressure is lower than.  She feels bad when this occurs.  She has not had any syncope for about 5 years.  She denies chest pain or leg swelling.  She is taking her medications as prescribed.    Data Review     The following data was reviewed by ANNABEL Amaral on 23:    Vital Signs:   /74   Pulse 99   Ht 63 cm (24.8\")   Wt 49.9 kg (110 lb)   .71 kg/m²       Weight:  Wt Readings from Last 3 Encounters:   23 49.9 kg (110 lb)   11/10/22 50.7 kg (111 lb 12.8 oz)   10/18/22 51 kg (112 lb 8 oz)     Body mass index is 125.71 kg/m².    Below is a summary of pertinent cardiology findings:  • She is , her long time neighbors are no longer close by, she has a son who lives in [facility] and is an intensivist at [employer]; she gets anxious " at night.  • She has had SVT since the 1970s and was treated with propanolol; she reported for previous hospitalizations with SVT.  • December 2017 echocardiogram showed EF 67%, grade 1 LV diastolic dysfunction, a calcified aortic valve, and mild tricuspid valve insufficiency; this was done when she was hospitalized with pneumonia.   • December 2017 1 hospitalized with pneumonia she also had Guillain-Barré and was treated with plasmapheresis.  She did have an episode of syncope due to bradycardia during this hospitalization and was treated with chest compressions and atropine.  • July 2018 renal artery duplex showed normal left renal artery, a large left renal cyst, hemodynamically significant right renal artery stenosis.  • August 2018 myocardial perfusion stress test showed no evidence of ischemia.  • May 2019 renal artery duplex showed no significant left renal artery stenosis and left and 60% right renal artery stenosis.  • September 2019 vascular screening showed less than 50% left internal carotid artery stenosis, normal abdominal aorta.  • November 2021 evaluation for exertional dyspnea included an echocardiogram which showed normal LVEF of 64%, mild tricuspid valve regurgitation, and mild mitral valve regurgitation.  Myocardial perfusion stress study was normal.  Carotid artery duplex study showed bilateral plaque, and mild stenosis in the proximal, mid, and distal left carotid artery.  • June 2022 she had normal PFTs with Dr. Sinan Braga; she has history of myobacterium avium complex.  • December 2022 30-day event monitor showed a 4 beat run of nonsustained VT that occurred on day 18.  Otherwise the predominant rhythm was normal sinus rhythm with rare PACs and rare PVCs.    Labs:  • 10/18/2022:  cr 0.9, K 5.7, otherwise unremarkable CMP, TSH 0.493, free T4  1.40, Hgb 13.2, Plt 377  • 01/11/2023: K 5.4      ECG 12 Lead    Date/Time: 2/23/2023 12:53 PM  Performed by: Cari Nunes,  APRN  Authorized by: Cari Nunes APRN   Comparison: compared with previous ECG from 11/10/2022  Similar to previous ECG  Rhythm: sinus rhythm  Rate: normal  BPM: 99  Conduction: 1st degree AV block            Medications     Allergies as of 02/23/2023 - Reviewed 02/23/2023   Allergen Reaction Noted   • Influenza vaccines Mental Status Change 12/21/2017   • Ethambutol Other (See Comments) 01/05/2022   • Statins Myalgia 01/05/2022   • Alendronate Other (See Comments) 02/15/2016   • Ezetimibe Other (See Comments) 02/15/2016   • Pravachol [pravastatin] Other (See Comments) 02/15/2016         Current Outpatient Medications:   •  amLODIPine (NORVASC) 2.5 MG tablet, Take 1 tablet by mouth 2 (Two) Times a Day. If BP over 140/90, Disp: 180 tablet, Rfl: 0  •  Cholecalciferol (VITAMIN D) 2000 units tablet, Take 2,000 Units by mouth Daily., Disp: , Rfl:   •  Levoxyl 50 MCG tablet, TAKE ONE TABLET BY MOUTH EVERY OTHER DAY, Disp: 45 tablet, Rfl: 1  •  Levoxyl 75 MCG tablet, TAKE ONE TABLET BY MOUTH EVERY OTHER DAY, Disp: 45 tablet, Rfl: 2  •  Loratadine 10 MG capsule, Take 10 mg by mouth Daily., Disp: , Rfl:   •  Misc Natural Products (JOINT SUPPORT PO), Take  by mouth., Disp: , Rfl:   •  Multiple Vitamins-Minerals (CENTRUM SILVER 50+WOMEN PO), Take  by mouth., Disp: , Rfl:   •  olmesartan (BENICAR) 20 MG tablet, Take 1 tablet by mouth Daily., Disp: , Rfl:   •  Probiotic Product (PROBIOTIC ADVANCED PO), Take 2 capsules by mouth Daily., Disp: , Rfl:   •  vitamin C (ASCORBIC ACID) 500 MG tablet, Take 500 mg by mouth Daily., Disp: , Rfl:   •  Zinc 50 MG capsule, Take  by mouth., Disp: , Rfl:   •  metoprolol tartrate (LOPRESSOR) 25 MG tablet, Take 3 tablets by mouth 2 (Two) Times a Day., Disp: 540 tablet, Rfl: 3     Past History, Review of Systems, Exam     Past Medical History:   Diagnosis Date   • Acute stress disorder    • Allergic rhinitis    • C. difficile colitis    • Dyspnea on exertion    • Elevated fasting  glucose    • Essential hypertension    • Guillain Barré syndrome ([facility])    • H/O: pneumonia    • Health care maintenance    • History of palpitations    • Hyperlipidemia    • Hypokalemia    • Hypothyroidism    • Insomnia    • Labile blood pressure    • MAC (mycobacterium avium-intracellulare complex)    • Osteoporosis    • Palpitations    • Pneumonia    • Polyneuropathy 12/20/2017   • PSVT (paroxysmal supraventricular tachycardia) ([facility])    • Renal cyst    • Sinusitis    • Staph infection    • Stenosis of right carotid artery 10/22/2020   • Stenosis of right renal artery ([facility]) 07/05/2018   • SVT (supraventricular tachycardia) ([facility])    • Syncope 12/25/2017   • Syncope and collapse    • Thyroiditis        Past Surgical History:   has a past surgical history that includes Tonsillectomy; Dilation and curettage of uterus (N/A, 01/01/1990); Bronchoscopy; Bronchoscopy (N/A, 4/8/2016); Colonoscopy (N/A, 07/08/2005); Pap Smear (N/A, 03/30/2004); Breast biopsy; Sinus surgery (01/2016); Bronchoscopy (N/A, 12/9/2017); and Bronchoscopy (N/A, 7/20/2018).     Social History     Socioeconomic History   • Marital status:    Tobacco Use   • Smoking status: Never   • Smokeless tobacco: Never   Vaping Use   • Vaping Use: Never used   Substance and Sexual Activity   • Alcohol use: No     Comment: caffeine use: 1 cup daily   • Drug use: No   • Sexual activity: Never       Review of Systems   Cardiovascular: Positive for dyspnea on exertion and palpitations. Negative for chest pain, claudication, cyanosis, irregular heartbeat, leg swelling, near-syncope, orthopnea, paroxysmal nocturnal dyspnea and syncope.   Neurological: Positive for light-headedness.   Psychiatric/Behavioral: The patient is nervous/anxious.        Vitals reviewed.   Constitutional:       Appearance: Not in distress.   Eyes:      Conjunctiva/sclera: Conjunctivae normal.      Pupils: Pupils are equal, round, and reactive to light.   HENT:      Head: Normocephalic.       Nose: Nose normal.    Mouth/Throat:      Pharynx: Oropharynx is clear.   Neck:      Vascular: JVD normal.   Pulmonary:      Effort: Pulmonary effort is normal.      Breath sounds: Normal breath sounds. No wheezing. No rhonchi. No rales.   Cardiovascular:      Tachycardia present. Regular rhythm. Normal S1. Normal S2.      Murmurs: There is no murmur.   Pulses:     Intact distal pulses.   Edema:     Peripheral edema absent.   Abdominal:      General: Bowel sounds are normal. There is no distension.      Palpations: Abdomen is soft.      Tenderness: There is no abdominal tenderness.   Musculoskeletal: Normal range of motion.      Cervical back: Normal range of motion and neck supple. Skin:     General: Skin is warm and dry.   Neurological:      Mental Status: Alert and oriented to person, place and time.   Psychiatric:         Attention and Perception: Attention normal.         Mood and Affect: Mood normal.         Speech: Speech normal.         Behavior: Behavior is cooperative.          Assessment and Plan     Assessment:  1. PSVT (paroxysmal supraventricular tachycardia) ([facility])    2. Dyspnea on exertion    3. Mixed hyperlipidemia    4. Carotid stenosis, asymptomatic, left    5. Stenosis of right renal artery (HCC)    6. Stage 3a chronic kidney disease (CKD) (HCC)    7. Guillain-[place] syndrome following vaccination (HCC)    8. Mycobacterium avium complex (HCC)    9. Benign essential HTN    10. Essential hypertension    11. Hyperkalemia         1. Paroxysmal SVT: She has had issues with SVT since 1988 and has had 3-4 hospitalizations.  She had a 30-day event monitor in December 2022 which was normal.  She is frustrated because she states she is having dyspnea with exertion, lightheadedness, and shakiness which are similar to her previous SVT symptoms.  She also notices when her heart rate goes up her blood pressure goes down and she also feels bad with this.  Her EKG shows sinus rhythm with first-degree block  today.  2. Dyspnea on exertion: She has dys Ms. Mckeon pnea with exertion when walking stairs or walking too fast.  She denies chest pain.  3. Hyperlipidemia: No recent lipid studies to review.  She is not on a statin.  This is managed by Dr. Bellamy.  4. Left carotid artery stenosis: Carotid artery duplex in November 2021 showed mild left carotid artery stenosis.  She is asymptomatic with this.  5. Right renal artery stenosis: May 2019 renal artery duplex showed less than 60% right renal artery stenosis.  Her renal function is normal.  She is having hyperkalemia on Benicar.  6. CKD 2: She has right renal artery stenosis but normal renal function.  7. Guillain-Barré syndrome: She had Guillain-Barré during December 2017 hospitalization and was treated with plasmapheresis.  8. Mycobacterium AVM complex: She follows with Dr. Sinan rBaga.  She had normal PFTs in June 2022.  9. Hypertension: Controlled in office today.  10. Hyperkalemia: Suspect this is due to right renal artery stenosis and olmesartan.    Ms. Vieira is a patient of Dr. Curiel's with history of SVT.  She has had normal cardiac testing of the last year but continues to have symptoms that she associates with SVT; dyspnea with exertion, shakiness, and lightheadedness.  She is also been having issues with hyperkalemia; she has less than 60% right renal artery stenosis.  She also has mild left carotid artery stenosis.  I am going to check a treadmill stress test to monitor for arrhythmia with exertion.  I am also going to reduce her olmesartan to 20 mg nightly.  I am going to stop her metoprolol succinate in favor of metoprolol to tartrate; I will start 75 mg metoprolol tartrate twice daily.  I am going to call her next week to see how she is feeling.  I will see her back in 4 weeks.    Return in about 4 weeks (around 3/23/2023) for Follow-up with ANNABEL Contreras.  Orders Placed This Encounter   Procedures   • Treadmill Stress Test   • ECG 12  Lead      New Medications Ordered This Visit   Medications   • amLODIPine (NORVASC) 2.5 MG tablet     Sig: Take 1 tablet by mouth 2 (Two) Times a Day. If BP over 140/90     Dispense:  180 tablet     Refill:  0   • metoprolol tartrate (LOPRESSOR) 25 MG tablet     Sig: Take 3 tablets by mouth 2 (Two) Times a Day.     Dispense:  540 tablet     Refill:  3   • olmesartan (BENICAR) 20 MG tablet     Sig: Take 1 tablet by mouth Daily.         Thank you for the opportunity to participate in this patient's care.    ANNABEL Contreras    This office note has been dictated.

## 2023-02-28 ENCOUNTER — TELEPHONE (OUTPATIENT)
Dept: CARDIOLOGY | Facility: CLINIC | Age: 88
End: 2023-02-28
Payer: MEDICARE

## 2023-02-28 NOTE — TELEPHONE ENCOUNTER
Rosa, Called and spoke with patient. She said she has been so tired since starting the starting the shorter acting metoprolol that she can barely do anything. She said her HRs were higher when she was on the longer acting metoprolol. Her most recent B/P and HR readings:    133/59, HR 58  118/55, HR 67  122/54, HR 52  117/51, HR 51  141/59, HR 59  115/49, HR 53    Do you have any recommendations for her?    Thank you,    Cristine Ceballos RN  Triage [facility]  02/28/23 12:35 EST

## 2023-02-28 NOTE — TELEPHONE ENCOUNTER
Can you call and see how she is doing with the switch from metoprolol succinate to metoprolol tartrate?    How is her blood pressure and heart rate?    Thanks!  ANNABEL Contreras

## 2023-03-02 ENCOUNTER — APPOINTMENT (OUTPATIENT)
Dept: GENERAL RADIOLOGY | Facility: HOSPITAL | Age: 88
End: 2023-03-02
Payer: MEDICARE

## 2023-03-02 ENCOUNTER — HOSPITAL ENCOUNTER (OUTPATIENT)
Facility: HOSPITAL | Age: 88
LOS: 3 days | Discharge: HOME | End: 2023-03-06
Attending: EMERGENCY MEDICINE | Admitting: HOSPITALIST
Payer: MEDICARE

## 2023-03-02 DIAGNOSIS — R06.09 DOE (DYSPNEA ON EXERTION): ICD-10-CM

## 2023-03-02 DIAGNOSIS — R77.8 ELEVATED TROPONIN: ICD-10-CM

## 2023-03-02 DIAGNOSIS — U07.1 COVID-19: Primary | ICD-10-CM

## 2023-03-02 DIAGNOSIS — R00.2 PALPITATIONS: ICD-10-CM

## 2023-03-02 DIAGNOSIS — R55 NEAR SYNCOPE: ICD-10-CM

## 2023-03-02 LAB
ALBUMIN SERPL-MCNC: 4.2 G/DL (ref 3.5–5.2)
ALBUMIN SERPL-MCNC: 4.2 G/DL (ref 3.5–5.2)
ALBUMIN/GLOB SERPL: 1.4 G/DL
ALP SERPL-CCNC: 71 U/L (ref 39–117)
ALP SERPL-CCNC: 74 U/L (ref 39–117)
ALT SERPL W P-5'-P-CCNC: 12 U/L (ref 1–33)
ALT SERPL W P-5'-P-CCNC: 13 U/L (ref 1–33)
ANION GAP SERPL CALCULATED.3IONS-SCNC: 11.8 MMOL/L (ref 5–15)
AST SERPL-CCNC: 16 U/L (ref 1–32)
AST SERPL-CCNC: 24 U/L (ref 1–32)
B PARAPERT DNA SPEC QL NAA+PROBE: NOT DETECTED
B PERT DNA SPEC QL NAA+PROBE: NOT DETECTED
BACTERIA UR QL AUTO: ABNORMAL /HPF
BASOPHILS # BLD AUTO: 0.03 10*3/MM3 (ref 0–0.2)
BASOPHILS NFR BLD AUTO: 0.3 % (ref 0–1.5)
BILIRUB CONJ SERPL-MCNC: <0.2 MG/DL (ref 0–0.3)
BILIRUB INDIRECT SERPL-MCNC: NORMAL MG/DL
BILIRUB SERPL-MCNC: 0.4 MG/DL (ref 0–1.2)
BILIRUB SERPL-MCNC: 0.5 MG/DL (ref 0–1.2)
BILIRUB UR QL STRIP: NEGATIVE
BUN SERPL-MCNC: 13 MG/DL (ref 8–23)
BUN/CREAT SERPL: 12.5 (ref 7–25)
C PNEUM DNA NPH QL NAA+NON-PROBE: NOT DETECTED
CALCIUM SPEC-SCNC: 9 MG/DL (ref 8.6–10.5)
CHLORIDE SERPL-SCNC: 100 MMOL/L (ref 98–107)
CLARITY UR: CLEAR
CO2 SERPL-SCNC: 23.2 MMOL/L (ref 22–29)
COLOR UR: YELLOW
CREAT SERPL-MCNC: 0.94 MG/DL (ref 0.57–1)
CREAT SERPL-MCNC: 1.04 MG/DL (ref 0.57–1)
DEPRECATED RDW RBC AUTO: 42.4 FL (ref 37–54)
EGFRCR SERPLBLD CKD-EPI 2021: 52.1 ML/MIN/1.73
EGFRCR SERPLBLD CKD-EPI 2021: 58.8 ML/MIN/1.73
EOSINOPHIL # BLD AUTO: 0.01 10*3/MM3 (ref 0–0.4)
EOSINOPHIL NFR BLD AUTO: 0.1 % (ref 0.3–6.2)
ERYTHROCYTE [DISTWIDTH] IN BLOOD BY AUTOMATED COUNT: 12.5 % (ref 12.3–15.4)
FLUAV SUBTYP SPEC NAA+PROBE: NOT DETECTED
FLUBV RNA ISLT QL NAA+PROBE: NOT DETECTED
GLOBULIN UR ELPH-MCNC: 2.9 GM/DL
GLUCOSE SERPL-MCNC: 150 MG/DL (ref 65–99)
GLUCOSE UR STRIP-MCNC: NEGATIVE MG/DL
HADV DNA SPEC NAA+PROBE: NOT DETECTED
HCOV 229E RNA SPEC QL NAA+PROBE: NOT DETECTED
HCOV HKU1 RNA SPEC QL NAA+PROBE: NOT DETECTED
HCOV NL63 RNA SPEC QL NAA+PROBE: NOT DETECTED
HCOV OC43 RNA SPEC QL NAA+PROBE: NOT DETECTED
HCT VFR BLD AUTO: 42.1 % (ref 34–46.6)
HGB BLD-MCNC: 13.8 G/DL (ref 12–15.9)
HGB UR QL STRIP.AUTO: ABNORMAL
HMPV RNA NPH QL NAA+NON-PROBE: NOT DETECTED
HOLD SPECIMEN: NORMAL
HOLD SPECIMEN: NORMAL
HPIV1 RNA ISLT QL NAA+PROBE: NOT DETECTED
HPIV2 RNA SPEC QL NAA+PROBE: NOT DETECTED
HPIV3 RNA NPH QL NAA+PROBE: NOT DETECTED
HPIV4 P GENE NPH QL NAA+PROBE: NOT DETECTED
HYALINE CASTS UR QL AUTO: ABNORMAL /LPF
IMM GRANULOCYTES # BLD AUTO: 0.04 10*3/MM3 (ref 0–0.05)
IMM GRANULOCYTES NFR BLD AUTO: 0.4 % (ref 0–0.5)
KETONES UR QL STRIP: ABNORMAL
LEUKOCYTE ESTERASE UR QL STRIP.AUTO: NEGATIVE
LYMPHOCYTES # BLD AUTO: 0.9 10*3/MM3 (ref 0.7–3.1)
LYMPHOCYTES NFR BLD AUTO: 8.2 % (ref 19.6–45.3)
M PNEUMO IGG SER IA-ACNC: NOT DETECTED
MAGNESIUM SERPL-MCNC: 1.9 MG/DL (ref 1.6–2.4)
MCH RBC QN AUTO: 30.4 PG (ref 26.6–33)
MCHC RBC AUTO-ENTMCNC: 32.8 G/DL (ref 31.5–35.7)
MCV RBC AUTO: 92.7 FL (ref 79–97)
MONOCYTES # BLD AUTO: 1.36 10*3/MM3 (ref 0.1–0.9)
MONOCYTES NFR BLD AUTO: 12.4 % (ref 5–12)
NEUTROPHILS NFR BLD AUTO: 78.6 % (ref 42.7–76)
NEUTROPHILS NFR BLD AUTO: 8.62 10*3/MM3 (ref 1.7–7)
NITRITE UR QL STRIP: NEGATIVE
NRBC BLD AUTO-RTO: 0 /100 WBC (ref 0–0.2)
PH UR STRIP.AUTO: 5.5 [PH] (ref 5–8)
PLATELET # BLD AUTO: 270 10*3/MM3 (ref 140–450)
PMV BLD AUTO: 8.8 FL (ref 6–12)
POTASSIUM SERPL-SCNC: 4.1 MMOL/L (ref 3.5–5.2)
PROT SERPL-MCNC: 6.9 G/DL (ref 6–8.5)
PROT SERPL-MCNC: 7.1 G/DL (ref 6–8.5)
PROT UR QL STRIP: ABNORMAL
QT INTERVAL: 335 MS
RBC # BLD AUTO: 4.54 10*6/MM3 (ref 3.77–5.28)
RBC # UR STRIP: ABNORMAL /HPF
REF LAB TEST METHOD: ABNORMAL
RHINOVIRUS RNA SPEC NAA+PROBE: NOT DETECTED
RSV RNA NPH QL NAA+NON-PROBE: NOT DETECTED
SARS-COV-2 RNA NPH QL NAA+NON-PROBE: DETECTED
SODIUM SERPL-SCNC: 135 MMOL/L (ref 136–145)
SP GR UR STRIP: 1.02 (ref 1–1.03)
SQUAMOUS #/AREA URNS HPF: ABNORMAL /HPF
TROPONIN T SERPL HS-MCNC: 16 NG/L
TROPONIN T SERPL HS-MCNC: 36 NG/L
UROBILINOGEN UR QL STRIP: ABNORMAL
WBC # UR STRIP: ABNORMAL /HPF
WBC NRBC COR # BLD: 10.96 10*3/MM3 (ref 3.4–10.8)
WHOLE BLOOD HOLD COAG: NORMAL
WHOLE BLOOD HOLD SPECIMEN: NORMAL

## 2023-03-02 PROCEDURE — 93010 ELECTROCARDIOGRAM REPORT: CPT | Performed by: INTERNAL MEDICINE

## 2023-03-02 PROCEDURE — G0378 HOSPITAL OBSERVATION PER HR: HCPCS

## 2023-03-02 PROCEDURE — 0202U NFCT DS 22 TRGT SARS-COV-2: CPT | Performed by: PHYSICIAN ASSISTANT

## 2023-03-02 PROCEDURE — 85025 COMPLETE CBC W/AUTO DIFF WBC: CPT | Performed by: EMERGENCY MEDICINE

## 2023-03-02 PROCEDURE — 99285 EMERGENCY DEPT VISIT HI MDM: CPT

## 2023-03-02 PROCEDURE — 81001 URINALYSIS AUTO W/SCOPE: CPT | Performed by: EMERGENCY MEDICINE

## 2023-03-02 PROCEDURE — 84484 ASSAY OF TROPONIN QUANT: CPT | Performed by: EMERGENCY MEDICINE

## 2023-03-02 PROCEDURE — 87086 URINE CULTURE/COLONY COUNT: CPT | Performed by: EMERGENCY MEDICINE

## 2023-03-02 PROCEDURE — 84145 PROCALCITONIN (PCT): CPT | Performed by: INTERNAL MEDICINE

## 2023-03-02 PROCEDURE — 84484 ASSAY OF TROPONIN QUANT: CPT | Performed by: PHYSICIAN ASSISTANT

## 2023-03-02 PROCEDURE — 93005 ELECTROCARDIOGRAM TRACING: CPT | Performed by: INTERNAL MEDICINE

## 2023-03-02 PROCEDURE — 80053 COMPREHEN METABOLIC PANEL: CPT | Performed by: EMERGENCY MEDICINE

## 2023-03-02 PROCEDURE — 25010000002 ENOXAPARIN PER 10 MG: Performed by: INTERNAL MEDICINE

## 2023-03-02 PROCEDURE — 71045 X-RAY EXAM CHEST 1 VIEW: CPT

## 2023-03-02 PROCEDURE — 82565 ASSAY OF CREATININE: CPT | Performed by: INTERNAL MEDICINE

## 2023-03-02 PROCEDURE — 82248 BILIRUBIN DIRECT: CPT | Performed by: EMERGENCY MEDICINE

## 2023-03-02 PROCEDURE — 83735 ASSAY OF MAGNESIUM: CPT | Performed by: EMERGENCY MEDICINE

## 2023-03-02 PROCEDURE — 25010000002 REMDESIVIR 100 MG/20ML SOLUTION 1 EACH VIAL: Performed by: INTERNAL MEDICINE

## 2023-03-02 PROCEDURE — 93005 ELECTROCARDIOGRAM TRACING: CPT | Performed by: EMERGENCY MEDICINE

## 2023-03-02 PROCEDURE — 96372 THER/PROPH/DIAG INJ SC/IM: CPT

## 2023-03-02 RX ORDER — ACETAMINOPHEN 325 MG/1
650 TABLET ORAL EVERY 6 HOURS PRN
Status: DISCONTINUED | OUTPATIENT
Start: 2023-03-02 | End: 2023-03-06 | Stop reason: HOSPADM

## 2023-03-02 RX ORDER — CETIRIZINE HYDROCHLORIDE 10 MG/1
10 TABLET ORAL DAILY
Status: DISCONTINUED | OUTPATIENT
Start: 2023-03-02 | End: 2023-03-06 | Stop reason: HOSPADM

## 2023-03-02 RX ORDER — SODIUM CHLORIDE 0.9 % (FLUSH) 0.9 %
10 SYRINGE (ML) INJECTION AS NEEDED
Status: DISCONTINUED | OUTPATIENT
Start: 2023-03-02 | End: 2023-03-06 | Stop reason: HOSPADM

## 2023-03-02 RX ORDER — ENOXAPARIN SODIUM 100 MG/ML
40 INJECTION SUBCUTANEOUS NIGHTLY
Status: DISCONTINUED | OUTPATIENT
Start: 2023-03-02 | End: 2023-03-06 | Stop reason: HOSPADM

## 2023-03-02 RX ORDER — NITROGLYCERIN 0.4 MG/1
0.4 TABLET SUBLINGUAL
Status: DISCONTINUED | OUTPATIENT
Start: 2023-03-02 | End: 2023-03-06 | Stop reason: HOSPADM

## 2023-03-02 RX ORDER — ACETAMINOPHEN 500 MG
1000 TABLET ORAL ONCE
Status: COMPLETED | OUTPATIENT
Start: 2023-03-02 | End: 2023-03-02

## 2023-03-02 RX ORDER — AMLODIPINE BESYLATE 5 MG/1
2.5 TABLET ORAL
Status: DISCONTINUED | OUTPATIENT
Start: 2023-03-02 | End: 2023-03-03

## 2023-03-02 RX ORDER — ACETAMINOPHEN 650 MG/1
650 SUPPOSITORY RECTAL EVERY 4 HOURS PRN
Status: DISCONTINUED | OUTPATIENT
Start: 2023-03-02 | End: 2023-03-06 | Stop reason: HOSPADM

## 2023-03-02 RX ORDER — LOSARTAN POTASSIUM 50 MG/1
50 TABLET ORAL
Status: DISCONTINUED | OUTPATIENT
Start: 2023-03-02 | End: 2023-03-06 | Stop reason: HOSPADM

## 2023-03-02 RX ORDER — L.ACID,PARA/B.BIFIDUM/S.THERM 8B CELL
1 CAPSULE ORAL DAILY
Status: DISCONTINUED | OUTPATIENT
Start: 2023-03-02 | End: 2023-03-06 | Stop reason: HOSPADM

## 2023-03-02 RX ORDER — LEVOTHYROXINE SODIUM 0.07 MG/1
75 TABLET ORAL EVERY OTHER DAY
Status: DISCONTINUED | OUTPATIENT
Start: 2023-03-03 | End: 2023-03-06 | Stop reason: HOSPADM

## 2023-03-02 RX ORDER — MELATONIN
2000 DAILY
Status: DISCONTINUED | OUTPATIENT
Start: 2023-03-02 | End: 2023-03-06 | Stop reason: HOSPADM

## 2023-03-02 RX ORDER — LEVOTHYROXINE SODIUM 0.05 MG/1
50 TABLET ORAL EVERY OTHER DAY
Status: DISCONTINUED | OUTPATIENT
Start: 2023-03-02 | End: 2023-03-06 | Stop reason: HOSPADM

## 2023-03-02 RX ORDER — ASCORBIC ACID 500 MG
500 TABLET ORAL DAILY
Status: DISCONTINUED | OUTPATIENT
Start: 2023-03-02 | End: 2023-03-06 | Stop reason: HOSPADM

## 2023-03-02 RX ADMIN — REMDESIVIR 200 MG: 100 INJECTION, POWDER, LYOPHILIZED, FOR SOLUTION INTRAVENOUS at 21:03

## 2023-03-02 RX ADMIN — ENOXAPARIN SODIUM 40 MG: 100 INJECTION SUBCUTANEOUS at 22:30

## 2023-03-02 RX ADMIN — CETIRIZINE HYDROCHLORIDE 10 MG: 10 TABLET ORAL at 20:53

## 2023-03-02 RX ADMIN — AMLODIPINE BESYLATE 2.5 MG: 5 TABLET ORAL at 20:52

## 2023-03-02 RX ADMIN — METOPROLOL TARTRATE 75 MG: 25 TABLET, FILM COATED ORAL at 20:51

## 2023-03-02 RX ADMIN — Medication 2000 UNITS: at 20:54

## 2023-03-02 RX ADMIN — ACETAMINOPHEN 1000 MG: 500 TABLET, FILM COATED ORAL at 09:54

## 2023-03-02 RX ADMIN — SODIUM CHLORIDE 500 ML: 9 INJECTION, SOLUTION INTRAVENOUS at 09:55

## 2023-03-02 RX ADMIN — LOSARTAN POTASSIUM 50 MG: 50 TABLET, FILM COATED ORAL at 20:54

## 2023-03-02 RX ADMIN — OXYCODONE HYDROCHLORIDE AND ACETAMINOPHEN 500 MG: 500 TABLET ORAL at 20:53

## 2023-03-02 RX ADMIN — ACETAMINOPHEN 650 MG: 325 TABLET, FILM COATED ORAL at 21:47

## 2023-03-02 RX ADMIN — Medication 1 CAPSULE: at 20:53

## 2023-03-02 NOTE — ED PROVIDER NOTES
EMERGENCY DEPARTMENT ENCOUNTER    Room Number:  S403/1  Date seen:  3/2/2023  PCP: Eber Hua MD  Discussed/ obtained information from independent historians: patient      HPI:  Chief Complaint: cough, lightheaded  A complete HPI/ROS/PMH/PSH/SH/FH are unobtainable due to: none  Context: Stacie Vieira is a 88 y.o. female who presents to the ED c/o lightheaded episode this morning and generalized weakness.  She states she woke up and sat up on the edge of her bed and felt like she was going to pass out and very fatigued.  For the last 1 week she has had cough and congestion and last night had a fever of 100.3.  She denies any chest pain shortness of breath abdominal pain nausea vomiting or urinary symptoms.      External (non-ED) record review: Office visit with cardiology on 2/23/2023, noted history of paroxysmal SVT and dyspnea on exertion.  30-day event monitor in December 2020 which was normal.  Treadmill stress test ordered to monitor for arrhythmia with exertion and olmesartan was decreased to 20 mg nightly, metoprolol changed from succinate to tartrate.      PAST MEDICAL HISTORY  Active Ambulatory Problems     Diagnosis Date Noted   • Mycobacterium avium complex (Formerly [facility]) 03/07/2016   • Allergic rhinitis 09/13/2016   • Hypothyroidism 09/13/2016   • Hyperlipidemia 09/13/2016   • Polyneuropathy 12/20/2017   • Oropharyngeal dysphagia 12/20/2017   • Guillain-[place] syndrome following vaccination (Formerly [facility]) 12/21/2017   • Anxiety 02/01/2018   • Dyspnea on exertion 06/18/2018   • PSVT (paroxysmal supraventricular tachycardia) (Formerly [facility]) 06/18/2018   • Stenosis of right renal artery (Formerly [facility]) 07/05/2018   • Elevated glucose 08/02/2018   • Age-related osteoporosis without current pathological fracture 04/05/2021   • Carotid stenosis, asymptomatic, left 11/10/2021   • Epiretinal membrane 02/12/2019   • Ethambutol adverse reaction 01/15/2019   • Medicare annual wellness visit, subsequent 10/18/2022   • Statin intolerance  10/18/2022   • Stage 3a chronic kidney disease (CKD) (Formerly [facility]) 10/18/2022   • History of influenza vaccine allergy 10/18/2022     Resolved Ambulatory Problems     Diagnosis Date Noted   • Long term current use of antibiotics 03/07/2016   • Benign essential HTN 03/07/2016   • Palpitations 09/13/2016   • Community acquired pneumonia of left lower lobe of lung 12/08/2017   • Acute respiratory failure with hypoxia (Formerly [facility]) 12/13/2017   • Bilateral hand numbness 12/20/2017   • DNR (do not resuscitate) 12/20/2017   • Esophageal dysmotility 12/20/2017   • Severe malnutrition due to acute illness 12/21/2017   • Bradycardia 12/25/2017   • Syncope 12/25/2017   • Guillain Barré syndrome (Formerly [facility]) 12/28/2017   • Labile blood pressure 06/18/2018   • Chronic fatigue 08/02/2018     Past Medical History:   Diagnosis Date   • Acute stress disorder    • C. difficile colitis    • Elevated fasting glucose    • Essential hypertension    • H/O: pneumonia    • Health care maintenance    • History of palpitations    • Hypokalemia    • Insomnia    • MAC (mycobacterium avium-intracellulare complex)    • Osteoporosis    • Pneumonia    • Renal cyst    • Sinusitis    • Staph infection    • Stenosis of right carotid artery 10/22/2020   • SVT (supraventricular tachycardia) (Formerly [facility])    • Syncope and collapse    • Thyroiditis          PAST SURGICAL HISTORY  Past Surgical History:   Procedure Laterality Date   • BREAST BIOPSY      PERCUTATNEOUS NEEDLE CORE   • BRONCHOSCOPY      nov 2015   • BRONCHOSCOPY N/A 4/8/2016    Procedure: BRONCHOSCOPY WITH BAL;  Surgeon: Sinan Braga MD;  Location: [facility] ENDOSCOPY;  Service:    • BRONCHOSCOPY N/A 12/9/2017    Procedure: BRONCHOSCOPY;  Surgeon: Real Rousseau MD;  Location: [facility] ENDOSCOPY;  Service:    • BRONCHOSCOPY N/A 7/20/2018    Procedure: BRONCHOSCOPY with BAL;  Surgeon: Sinan Braga MD;  Location: [facility] ENDOSCOPY;  Service: Pulmonary   • COLONOSCOPY N/A 07/08/2005   • DILATATION AND CURETTAGE N/A 01/01/1990    • PAP SMEAR N/A 03/30/2004   • SINUS SURGERY  01/2016   • TONSILLECTOMY           FAMILY HISTORY  Family History   Problem Relation Age of Onset   • Hypertension Brother    • Dementia Mother    • Kidney disease Father    • Thyroid disease Other          SOCIAL HISTORY  Social History     Socioeconomic History   • Marital status:    Tobacco Use   • Smoking status: Never   • Smokeless tobacco: Never   Vaping Use   • Vaping Use: Never used   Substance and Sexual Activity   • Alcohol use: No     Comment: caffeine use: 1 cup daily   • Drug use: No   • Sexual activity: Never         ALLERGIES  Influenza vaccines, Ethambutol, Statins, Alendronate, Ezetimibe, and Pravachol [pravastatin]        REVIEW OF SYSTEMS  Review of Systems         PHYSICAL EXAM  ED Triage Vitals   Temp Heart Rate Resp BP SpO2   03/02/23 0909 03/02/23 0909 03/02/23 0909 03/02/23 0909 03/02/23 0909   98.7 °F (37.1 °C) 96 20 150/76 95 %      Temp src Heart Rate Source Patient Position BP Location FiO2 (%)   03/02/23 0909 -- 03/02/23 0927 -- --   Tympanic  Lying         Physical Exam      GENERAL: no acute distress  HENT: normocephalic, atraumatic  EYES: no scleral icterus  CV: regular rhythm, normal rate, no lower extremity edema  RESPIRATORY: normal effort CTA B, oxygen is 96% on room air, dry nonproductive cough on exam, no rhonchi wheezes or crackles  ABDOMEN: nondistended, soft, nontender   MUSCULOSKELETAL: no deformity  NEURO: alert, moves all extremities, follows commands  PSYCH:  calm, cooperative  SKIN: warm, dry    Vital signs and nursing notes reviewed.          LAB RESULTS  Recent Results (from the past 24 hour(s))   Comprehensive Metabolic Panel    Collection Time: 03/02/23  9:21 AM    Specimen: Blood   Result Value Ref Range    Glucose 150 (H) 65 - 99 mg/dL    BUN 13 8 - 23 mg/dL    Creatinine 1.04 (H) 0.57 - 1.00 mg/dL    Sodium 135 (L) 136 - 145 mmol/L    Potassium 4.1 3.5 - 5.2 mmol/L    Chloride 100 98 - 107 mmol/L    CO2 23.2  22.0 - 29.0 mmol/L    Calcium 9.0 8.6 - 10.5 mg/dL    Total Protein 7.1 6.0 - 8.5 g/dL    Albumin 4.2 3.5 - 5.2 g/dL    ALT (SGPT) 13 1 - 33 U/L    AST (SGOT) 16 1 - 32 U/L    Alkaline Phosphatase 74 39 - 117 U/L    Total Bilirubin 0.5 0.0 - 1.2 mg/dL    Globulin 2.9 gm/dL    A/G Ratio 1.4 g/dL    BUN/Creatinine Ratio 12.5 7.0 - 25.0    Anion Gap 11.8 5.0 - 15.0 mmol/L    eGFR 52.1 (L) >60.0 mL/min/1.73   Single High Sensitivity Troponin T    Collection Time: 03/02/23  9:21 AM    Specimen: Blood   Result Value Ref Range    HS Troponin T 16 (H) <10 ng/L   Magnesium    Collection Time: 03/02/23  9:21 AM    Specimen: Blood   Result Value Ref Range    Magnesium 1.9 1.6 - 2.4 mg/dL   Green Top (Gel)    Collection Time: 03/02/23  9:21 AM   Result Value Ref Range    Extra Tube Hold for add-ons.    Lavender Top    Collection Time: 03/02/23  9:21 AM   Result Value Ref Range    Extra Tube hold for add-on    Gold Top - SST    Collection Time: 03/02/23  9:21 AM   Result Value Ref Range    Extra Tube Hold for add-ons.    Light Blue Top    Collection Time: 03/02/23  9:21 AM   Result Value Ref Range    Extra Tube Hold for add-ons.    CBC Auto Differential    Collection Time: 03/02/23  9:21 AM    Specimen: Blood   Result Value Ref Range    WBC 10.96 (H) 3.40 - 10.80 10*3/mm3    RBC 4.54 3.77 - 5.28 10*6/mm3    Hemoglobin 13.8 12.0 - 15.9 g/dL    Hematocrit 42.1 34.0 - 46.6 %    MCV 92.7 79.0 - 97.0 fL    MCH 30.4 26.6 - 33.0 pg    MCHC 32.8 31.5 - 35.7 g/dL    RDW 12.5 12.3 - 15.4 %    RDW-SD 42.4 37.0 - 54.0 fl    MPV 8.8 6.0 - 12.0 fL    Platelets 270 140 - 450 10*3/mm3    Neutrophil % 78.6 (H) 42.7 - 76.0 %    Lymphocyte % 8.2 (L) 19.6 - 45.3 %    Monocyte % 12.4 (H) 5.0 - 12.0 %    Eosinophil % 0.1 (L) 0.3 - 6.2 %    Basophil % 0.3 0.0 - 1.5 %    Immature Grans % 0.4 0.0 - 0.5 %    Neutrophils, Absolute 8.62 (H) 1.70 - 7.00 10*3/mm3    Lymphocytes, Absolute 0.90 0.70 - 3.10 10*3/mm3    Monocytes, Absolute 1.36 (H) 0.10 - 0.90  10*3/mm3    Eosinophils, Absolute 0.01 0.00 - 0.40 10*3/mm3    Basophils, Absolute 0.03 0.00 - 0.20 10*3/mm3    Immature Grans, Absolute 0.04 0.00 - 0.05 10*3/mm3    nRBC 0.0 0.0 - 0.2 /100 WBC   ECG 12 Lead ED Triage Standing Order; Weak / Dizzy / AMS    Collection Time: 03/02/23  9:23 AM   Result Value Ref Range    QT Interval 335 ms   Respiratory Panel PCR w/COVID-19(SARS-CoV-2) CLAUDY/MICHAELA/RASHAAD/PAD/COR/MAD/ANITHA In-House, NP Swab in UTM/VTM, 3-4 HR TAT - Swab, Nasopharynx    Collection Time: 03/02/23  9:55 AM    Specimen: Nasopharynx; Swab   Result Value Ref Range    ADENOVIRUS, PCR Not Detected Not Detected    Coronavirus 229E Not Detected Not Detected    Coronavirus HKU1 Not Detected Not Detected    Coronavirus NL63 Not Detected Not Detected    Coronavirus OC43 Not Detected Not Detected    COVID19 Detected (C) Not Detected - Ref. Range    Human Metapneumovirus Not Detected Not Detected    Human Rhinovirus/Enterovirus Not Detected Not Detected    Influenza A PCR Not Detected Not Detected    Influenza B PCR Not Detected Not Detected    Parainfluenza Virus 1 Not Detected Not Detected    Parainfluenza Virus 2 Not Detected Not Detected    Parainfluenza Virus 3 Not Detected Not Detected    Parainfluenza Virus 4 Not Detected Not Detected    RSV, PCR Not Detected Not Detected    Bordetella pertussis pcr Not Detected Not Detected    Bordetella parapertussis PCR Not Detected Not Detected    Chlamydophila pneumoniae PCR Not Detected Not Detected    Mycoplasma pneumo by PCR Not Detected Not Detected   Single High Sensitivity Troponin T    Collection Time: 03/02/23 11:39 AM    Specimen: Arm, Right; Blood   Result Value Ref Range    HS Troponin T 36 (H) <10 ng/L   Urinalysis With Culture If Indicated - Urine, Clean Catch    Collection Time: 03/02/23 12:06 PM    Specimen: Urine, Clean Catch   Result Value Ref Range    Color, UA Yellow Yellow, Straw    Appearance, UA Clear Clear    pH, UA 5.5 5.0 - 8.0    Specific Gravity, UA 1.017  1.005 - 1.030    Glucose, UA Negative Negative    Ketones, UA 15 mg/dL (1+) (A) Negative    Bilirubin, UA Negative Negative    Blood, UA Small (1+) (A) Negative    Protein, UA 30 mg/dL (1+) (A) Negative    Leuk Esterase, UA Negative Negative    Nitrite, UA Negative Negative    Urobilinogen, UA 0.2 E.U./dL 0.2 - 1.0 E.U./dL   Urinalysis, Microscopic Only - Urine, Clean Catch    Collection Time: 03/02/23 12:06 PM    Specimen: Urine, Clean Catch   Result Value Ref Range    RBC, UA 6-12 (A) None Seen, 0-2 /HPF    WBC, UA 3-5 (A) None Seen, 0-2 /HPF    Bacteria, UA None Seen None Seen /HPF    Squamous Epithelial Cells, UA 0-2 None Seen, 0-2 /HPF    Hyaline Casts, UA 0-2 None Seen /LPF    Methodology Automated Microscopy        Ordered the above labs and reviewed the results.        RADIOLOGY  XR Chest 1 View    Result Date: 3/2/2023  ONE VIEW PORTABLE CHEST  HISTORY: Weakness and dizziness.  FINDINGS: The lungs are well-expanded and clear. The heart is borderline enlarged without change from 12/25/2017. No acute abnormality is seen.  This report was finalized on 3/2/2023 10:22 AM by Dr. Art Hankins M.D.        Ordered the above noted radiological studies. Reviewed by me in PACS.            PROCEDURES  Procedures              MEDICATIONS GIVEN IN ER  Medications   sodium chloride 0.9 % flush 10 mL (has no administration in time range)   acetaminophen (TYLENOL) tablet 1,000 mg (1,000 mg Oral Given 3/2/23 1450)   sodium chloride 0.9 % bolus 500 mL (0 mL Intravenous Stopped 3/2/23 1025)                   MEDICAL DECISION MAKING, PROGRESS, and CONSULTS    All labs have been independently reviewed by me.  All radiology studies have been reviewed by me and I have also reviewed the radiology report.   EKG's independently viewed and interpreted by me.  Discussion below represents my analysis of pertinent findings related to patient's condition, differential diagnosis, treatment plan and final disposition.      Additional  sources:        Orders placed during this visit:  Orders Placed This Encounter   Procedures   • Respiratory Panel PCR w/COVID-19(SARS-CoV-2) CLAUDY/MICHAELA/RASHAAD/PAD/COR/MAD/ANITHA In-House, NP Swab in UTM/VTM, 3-4 HR TAT - Swab, Nasopharynx   • Urine Culture - Urine,   • XR Chest 1 View   • [place] Draw   • Comprehensive Metabolic Panel   • Single High Sensitivity Troponin T   • Magnesium   • Urinalysis With Culture If Indicated -   • CBC Auto Differential   • Single High Sensitivity Troponin T   • Urinalysis, Microscopic Only - Urine, Clean Catch   • Diet: Cardiac Diets; Healthy Heart (2-3 Na+); Texture: Regular Texture (IDDSI 7); Fluid Consistency: Thin (IDDSI 0)   • Undress & Gown   • Cardiac Monitoring   • Continuous Pulse Oximetry   • Vital Signs   • Orthostatic Blood Pressure   • LHA (on-call MD unless specified) Details   • Oxygen Therapy- Nasal Cannula; 2 LPM; Titrate for SPO2: 92%, Greater Than or Equal To   • POC Glucose Once   • ECG 12 Lead ED Triage Standing Order; Weak / Dizzy / AMS   • Insert Peripheral IV   • Initiate Observation Status   • Fall Precautions   • CBC & Differential   • Green Top (Gel)   • Lavender Top   • Gold Top - SST   • Light Blue Top             Differential diagnosis:  Anemia, dehydration, viral URI, infection such as pneumonia or UTI, deconditioning, arrhythmia      Independent interpretation of labs, radiology studies, and discussions with consultants:  ED Course as of 03/02/23 1637   Thu Mar 02, 2023   0935 WBC(!): 10.96 [KA]   0935 Hemoglobin: 13.8 [KA]   1015 EKG independently interpreted by myself.  Time 9:23 AM.  Sinus rhythm.  Incomplete right bundle branch block.  No acute ST abnormality. [TD]   1106 COVID19(!!): Detected [KA]   1223 HS Troponin T(!): 36  First troponin was 16 with a delta of 20, will plan to admit for further evaluation and treatment [KA]   1224 Bacteria, UA: None Seen [KA]   1234 I reassessed the patient, counseled her on all of her lab and imaging results.   Ultimately she does have COVID-19 which I suspect is primarily the source of her symptoms however she had this lightheaded episode today and significant delta in her serial troponins.  I counseled her on the plan for admission she is agreeable. [KA]   1326 I discussed patient with Dr. Dunne, hospitalist.  We discussed patient's history presentation labs and imaging and he agrees to admit to telemetry for further evaluation and treatment. [KA]      ED Course User Index  [KA] Gilda López PA  [TD] Carmelo Merino II, MD             Patient was wearing a face mask when I entered the room and they continued to wear a mask throughout their stay in the ED.  I wore PPE, including  gloves, face mask with shield or face mask with goggles whenever I was in the room with patient.     DIAGNOSIS  Final diagnoses:   COVID-19   Near syncope   Elevated troponin           Latest Documented Vital Signs:  As of 16:37 EST  BP- 138/70 HR- 66 Temp- 98.9 °F (37.2 °C) (Oral) O2 sat- 98%              --    Please note that portions of this were completed with a voice recognition program.       Note Disclaimer: At [facility], we believe that sharing information builds trust and better relationships. You are receiving this note because you are receiving care at [facility] or recently visited. It is possible you will see health information before a provider has talked with you about it. This kind of information can be easy to misunderstand. To help you fully understand what it means for your health, we urge you to discuss this note with your provider.           Gilda López PA  03/02/23 0409

## 2023-03-02 NOTE — ED PROVIDER NOTES
MD ATTESTATION NOTE    The CLIF and I have discussed this patient's history, physical exam, and treatment plan.    I provided a substantive portion of the care of this patient. I personally performed the physical exam, in its entirety. The attached note describes my personal findings.      Stacie Vieira is a 88 y.o. female who presents to the ED c/o upper respiratory symptoms for the past week.  She reports having a particularly bad cough last night with a fever 100.3 °F.  She states she woke up this morning and sat up and felt like she was going to pass out.  She states that she now feels well.      On exam:  GENERAL: not distressed  HENT: nares patent  EYES: no scleral icterus  CV: regular rhythm, regular rate  RESPIRATORY: normal effort, clear to auscultation bilaterally  ABDOMEN: soft, nontender  MUSCULOSKELETAL: no deformity  NEURO: alert, moves all extremities, follows commands  SKIN: warm, dry    Labs  Recent Results (from the past 24 hour(s))   Comprehensive Metabolic Panel    Collection Time: 03/02/23  9:21 AM    Specimen: Blood   Result Value Ref Range    Glucose 150 (H) 65 - 99 mg/dL    BUN 13 8 - 23 mg/dL    Creatinine 1.04 (H) 0.57 - 1.00 mg/dL    Sodium 135 (L) 136 - 145 mmol/L    Potassium 4.1 3.5 - 5.2 mmol/L    Chloride 100 98 - 107 mmol/L    CO2 23.2 22.0 - 29.0 mmol/L    Calcium 9.0 8.6 - 10.5 mg/dL    Total Protein 7.1 6.0 - 8.5 g/dL    Albumin 4.2 3.5 - 5.2 g/dL    ALT (SGPT) 13 1 - 33 U/L    AST (SGOT) 16 1 - 32 U/L    Alkaline Phosphatase 74 39 - 117 U/L    Total Bilirubin 0.5 0.0 - 1.2 mg/dL    Globulin 2.9 gm/dL    A/G Ratio 1.4 g/dL    BUN/Creatinine Ratio 12.5 7.0 - 25.0    Anion Gap 11.8 5.0 - 15.0 mmol/L    eGFR 52.1 (L) >60.0 mL/min/1.73   Single High Sensitivity Troponin T    Collection Time: 03/02/23  9:21 AM    Specimen: Blood   Result Value Ref Range    HS Troponin T 16 (H) <10 ng/L   Magnesium    Collection Time: 03/02/23  9:21 AM    Specimen: Blood   Result Value Ref Range     Magnesium 1.9 1.6 - 2.4 mg/dL   CBC Auto Differential    Collection Time: 03/02/23  9:21 AM    Specimen: Blood   Result Value Ref Range    WBC 10.96 (H) 3.40 - 10.80 10*3/mm3    RBC 4.54 3.77 - 5.28 10*6/mm3    Hemoglobin 13.8 12.0 - 15.9 g/dL    Hematocrit 42.1 34.0 - 46.6 %    MCV 92.7 79.0 - 97.0 fL    MCH 30.4 26.6 - 33.0 pg    MCHC 32.8 31.5 - 35.7 g/dL    RDW 12.5 12.3 - 15.4 %    RDW-SD 42.4 37.0 - 54.0 fl    MPV 8.8 6.0 - 12.0 fL    Platelets 270 140 - 450 10*3/mm3    Neutrophil % 78.6 (H) 42.7 - 76.0 %    Lymphocyte % 8.2 (L) 19.6 - 45.3 %    Monocyte % 12.4 (H) 5.0 - 12.0 %    Eosinophil % 0.1 (L) 0.3 - 6.2 %    Basophil % 0.3 0.0 - 1.5 %    Immature Grans % 0.4 0.0 - 0.5 %    Neutrophils, Absolute 8.62 (H) 1.70 - 7.00 10*3/mm3    Lymphocytes, Absolute 0.90 0.70 - 3.10 10*3/mm3    Monocytes, Absolute 1.36 (H) 0.10 - 0.90 10*3/mm3    Eosinophils, Absolute 0.01 0.00 - 0.40 10*3/mm3    Basophils, Absolute 0.03 0.00 - 0.20 10*3/mm3    Immature Grans, Absolute 0.04 0.00 - 0.05 10*3/mm3    nRBC 0.0 0.0 - 0.2 /100 WBC   ECG 12 Lead ED Triage Standing Order; Weak / Dizzy / AMS    Collection Time: 03/02/23  9:23 AM   Result Value Ref Range    QT Interval 335 ms       Radiology  No Radiology Exams Resulted Within Past 24 Hours    Medications given in the ED:  Medications   sodium chloride 0.9 % flush 10 mL (has no administration in time range)   sodium chloride 0.9 % bolus 500 mL (500 mL Intravenous New Bag 3/2/23 0955)   acetaminophen (TYLENOL) tablet 1,000 mg (1,000 mg Oral Given 3/2/23 1142)       Orders placed during this visit:  Orders Placed This Encounter   Procedures   • Respiratory Panel PCR w/COVID-19(SARS-CoV-2) CLAUDY/MICHAELA/RASHAAD/PAD/COR/MAD/ANITHA In-House, NP Swab in UTM/VTM, 3-4 HR TAT - Swab, Nasopharynx   • XR Chest 1 View   • [place] Draw   • Comprehensive Metabolic Panel   • Single High Sensitivity Troponin T   • Magnesium   • Urinalysis With Culture If Indicated -   • CBC Auto Differential   • NPO Diet NPO  Type: Strict NPO   • Undress & Gown   • Cardiac Monitoring   • Continuous Pulse Oximetry   • Vital Signs   • Orthostatic Blood Pressure   • Oxygen Therapy- Nasal Cannula; 2 LPM; Titrate for SPO2: 92%, Greater Than or Equal To   • POC Glucose Once   • ECG 12 Lead ED Triage Standing Order; Weak / Dizzy / AMS   • Insert Peripheral IV   • Fall Precautions   • CBC & Differential   • Green Top (Gel)   • Lavender Top   • Gold Top - SST   • Light Blue Top       Medical Decision Making:  ED Course as of 03/03/23 2238   Thu Mar 02, 2023   0935 WBC(!): 10.96 [KA]   0935 Hemoglobin: 13.8 [KA]   1015 EKG independently interpreted by myself.  Time 9:23 AM.  Sinus rhythm.  Incomplete right bundle branch block.  No acute ST abnormality. [TD]   1106 COVID19(!!): Detected [KA]   1223 HS Troponin T(!): 36  First troponin was 16 with a delta of 20, will plan to admit for further evaluation and treatment [KA]   1224 Bacteria, UA: None Seen [KA]   1234 I reassessed the patient, counseled her on all of her lab and imaging results.  Ultimately she does have COVID-19 which I suspect is primarily the source of her symptoms however she had this lightheaded episode today and significant delta in her serial troponins.  I counseled her on the plan for admission she is agreeable. [KA]   1326 I discussed patient with Dr. Dunne, hospitalist.  We discussed patient's history presentation labs and imaging and he agrees to admit to telemetry for further evaluation and treatment. [KA]      ED Course User Index  [KA] Gilda López PA  [TD] Carmelo Merino II, MD         PPE: Both the patient and I wore a surgical mask throughout the entire patient encounter.     Diagnosis  Final diagnoses:   COVID-19   Near syncope   Elevated troponin       Admit     Carmelo Merino II, MD  03/03/23 2238

## 2023-03-02 NOTE — ED NOTES
Nursing report ED to floor  Stacie Vieira  88 y.o.  female    HPI :   Chief Complaint   Patient presents with    Weakness - Generalized       Admitting doctor:   Samaria Dunne MD    Admitting diagnosis:   The primary encounter diagnosis was COVID-19. Diagnoses of Near syncope and Elevated troponin were also pertinent to this visit.    Code status:   Current Code Status       Date Active Code Status Order ID Comments User Context       Prior            Allergies:   Influenza vaccines, Ethambutol, Statins, Alendronate, Ezetimibe, and Pravachol [pravastatin]    Isolation:   Enhanced Droplet/Contact     Intake and Output    Intake/Output Summary (Last 24 hours) at 3/2/2023 1346  Last data filed at 3/2/2023 1025  Gross per 24 hour   Intake 500 ml   Output --   Net 500 ml       Weight:       03/02/23  1013   Weight: 49.4 kg (109 lb)       Most recent vitals:   Vitals:    03/02/23 1026 03/02/23 1156 03/02/23 1226 03/02/23 1326   BP: 106/54 116/63 112/66 113/53   BP Location:  Right arm     Patient Position:  Lying  Lying   Pulse: 90 64 60 61   Resp:  20 18 18   Temp:       TempSrc:       SpO2:  98% 97% 94%   Weight:       Height:           Active LDAs/IV Access:   Lines, Drains & Airways       Active LDAs       Name Placement date Placement time Site Days    Peripheral IV 03/02/23 Left Forearm 03/02/23  --  Forearm  less than 1                    Labs (abnormal labs have a star):   Labs Reviewed   RESPIRATORY PANEL PCR W/ COVID-19 (SARS-COV-2) CLAUDY/MICHAELA/RASHAAD/PAD/COR/MAD/ANITHA IN-HOUSE, NP SWAB IN UTM/VTP, 3-4 HR TAT - Abnormal; Notable for the following components:       Result Value    COVID19 Detected (*)     All other components within normal limits    Narrative:     In the setting of a positive respiratory panel with a viral infection PLUS a negative procalcitonin without other underlying concern for bacterial infection, consider observing off antibiotics or discontinuation of antibiotics and continue supportive care. If  the respiratory panel is positive for atypical bacterial infection (Bordetella pertussis, Chlamydophila pneumoniae, or Mycoplasma pneumoniae), consider antibiotic de-escalation to target atypical bacterial infection.   COMPREHENSIVE METABOLIC PANEL - Abnormal; Notable for the following components:    Glucose 150 (*)     Creatinine 1.04 (*)     Sodium 135 (*)     eGFR 52.1 (*)     All other components within normal limits    Narrative:     GFR Normal >60  Chronic Kidney Disease <60  Kidney Failure <15    The GFR formula is only valid for adults with stable renal function between ages 18 and 70.   SINGLE HSTROPONIN T - Abnormal; Notable for the following components:    HS Troponin T 16 (*)     All other components within normal limits    Narrative:     High Sensitive Troponin T Reference Range:  <10.0 ng/L- Negative Female for AMI  <15.0 ng/L- Negative Male for AMI  >=10 - Abnormal Female indicating possible myocardial injury.  >=15 - Abnormal Male indicating possible myocardial injury.   Clinicians would have to utilize clinical acumen, EKG, Troponin, and serial changes to determine if it is an Acute Myocardial Infarction or myocardial injury due to an underlying chronic condition.        URINALYSIS W/ CULTURE IF INDICATED - Abnormal; Notable for the following components:    Ketones, UA 15 mg/dL (1+) (*)     Blood, UA Small (1+) (*)     Protein, UA 30 mg/dL (1+) (*)     All other components within normal limits    Narrative:     In absence of clinical symptoms, the presence of pyuria, bacteria, and/or nitrites on the urinalysis result does not correlate with infection.   CBC WITH AUTO DIFFERENTIAL - Abnormal; Notable for the following components:    WBC 10.96 (*)     Neutrophil % 78.6 (*)     Lymphocyte % 8.2 (*)     Monocyte % 12.4 (*)     Eosinophil % 0.1 (*)     Neutrophils, Absolute 8.62 (*)     Monocytes, Absolute 1.36 (*)     All other components within normal limits   SINGLE HSTROPONIN T - Abnormal; Notable  for the following components:    HS Troponin T 36 (*)     All other components within normal limits    Narrative:     High Sensitive Troponin T Reference Range:  <10.0 ng/L- Negative Female for AMI  <15.0 ng/L- Negative Male for AMI  >=10 - Abnormal Female indicating possible myocardial injury.  >=15 - Abnormal Male indicating possible myocardial injury.   Clinicians would have to utilize clinical acumen, EKG, Troponin, and serial changes to determine if it is an Acute Myocardial Infarction or myocardial injury due to an underlying chronic condition.        URINALYSIS, MICROSCOPIC ONLY - Abnormal; Notable for the following components:    RBC, UA 6-12 (*)     WBC, UA 3-5 (*)     All other components within normal limits   MAGNESIUM - Normal   URINE CULTURE   RAINBOW DRAW    Narrative:     The following orders were created for panel order Foley Draw.  Procedure                               Abnormality         Status                     ---------                               -----------         ------                     Green Top (Gel)[[id number]]                                  Final result               Lavender Top[[id number]]                                     Final result               Gold Top - SST[[id number]]                                   Final result               Light Blue Top[[id number]]                                   Final result                 Please view results for these tests on the individual orders.   POCT GLUCOSE FINGERSTICK   CBC AND DIFFERENTIAL    Narrative:     The following orders were created for panel order CBC & Differential.  Procedure                               Abnormality         Status                     ---------                               -----------         ------                     CBC Auto Differential[[id number]]        Abnormal            Final result                 Please view results for these tests on the individual orders.   GREEN TOP   LAVENDER TOP   GOLD  TOP - [facility]   LIGHT BLUE TOP       EKG:   ECG 12 Lead ED Triage Standing Order; Weak / Dizzy / AMS   Final Result   HEART RATE= 97  bpm   RR Interval= 619  ms   [place] Interval=   ms   P Horizontal Axis=   deg   P Front Axis=   deg   QRSD Interval= 118  ms   QT Interval= 335  ms   QRS Axis= 26  deg   T Wave Axis= 4  deg   - ABNORMAL ECG -   Sinus rhythm   Incomplete right bundle branch block   No change from previous tracing   Electronically Signed By: Pili Garza ([facility]) 02-Mar-2023 09:54:55   Date and Time of Study: 2023-03-02 09:23:27          Meds given in ED:   Medications   sodium chloride 0.9 % flush 10 mL (has no administration in time range)   acetaminophen (TYLENOL) tablet 1,000 mg (1,000 mg Oral Given 3/2/23 0954)   sodium chloride 0.9 % bolus 500 mL (0 mL Intravenous Stopped 3/2/23 1025)       Imaging results:  No radiology results for the last day    Ambulatory status:   Up w/ assistance     Social issues:   Social History     Socioeconomic History    Marital status:    Tobacco Use    Smoking status: Never    Smokeless tobacco: Never   Vaping Use    Vaping Use: Never used   Substance and Sexual Activity    Alcohol use: No     Comment: caffeine use: 1 cup daily    Drug use: No    Sexual activity: Never       NIH Stroke Scale:         Nicolette Maki RN  03/02/23 13:46 EST

## 2023-03-02 NOTE — PROGRESS NOTES
[facility]  Clinical Pharmacy Department     Remdesivir Review Note    Stacie Vieira is a 88 y.o. female with confirmed COVID-19 infection on day 1 of hospitalization.     Consulting Provider:  Dr. joy  Date of Confirmed SARS-CoV-2: 3/2  Date of Symptom Onset: 3/2  Other Antimicrobials: none  Hydroxychloroquine or chloroquine prior to arrival: no    Allergies  Allergies as of 03/02/2023 - Reviewed 03/02/2023   Allergen Reaction Noted    Influenza vaccines Mental Status Change 12/21/2017    Ethambutol Other (See Comments) 01/05/2022    Statins Myalgia 01/05/2022    Alendronate Other (See Comments) 02/15/2016    Ezetimibe Other (See Comments) 02/15/2016    Pravachol [pravastatin] Other (See Comments) 02/15/2016       Microbiology:  Microbiology Results (last 10 days)       Procedure Component Value - Date/Time    Respiratory Panel PCR w/COVID-19(SARS-CoV-2) CLAUDY/MICHAELA/RASHAAD/PAD/COR/MAD/ANITHA In-House, NP Swab in UTM/VTM, 3-4 HR TAT - Swab, Nasopharynx [[id number]]  (Abnormal) Collected: 03/02/23 0955    Lab Status: Final result Specimen: Swab from Nasopharynx Updated: 03/02/23 1100     ADENOVIRUS, PCR Not Detected     Coronavirus 229E Not Detected     Coronavirus HKU1 Not Detected     Coronavirus NL63 Not Detected     Coronavirus OC43 Not Detected     COVID19 Detected     Human Metapneumovirus Not Detected     Human Rhinovirus/Enterovirus Not Detected     Influenza A PCR Not Detected     Influenza B PCR Not Detected     Parainfluenza Virus 1 Not Detected     Parainfluenza Virus 2 Not Detected     Parainfluenza Virus 3 Not Detected     Parainfluenza Virus 4 Not Detected     RSV, PCR Not Detected     Bordetella pertussis pcr Not Detected     Bordetella parapertussis PCR Not Detected     Chlamydophila pneumoniae PCR Not Detected     Mycoplasma pneumo by PCR Not Detected    Narrative:      In the setting of a positive respiratory panel with a viral infection PLUS a negative procalcitonin without other underlying concern  "for bacterial infection, consider observing off antibiotics or discontinuation of antibiotics and continue supportive care. If the respiratory panel is positive for atypical bacterial infection (Bordetella pertussis, Chlamydophila pneumoniae, or Mycoplasma pneumoniae), consider antibiotic de-escalation to target atypical bacterial infection.            Vitals/Labs/I&O  Visit Vitals  /70 (BP Location: Left arm, Patient Position: Lying)   Pulse 66   Temp 98.9 °F (37.2 °C) (Oral)   Resp 18   Ht 63 cm (24.8\")   Wt 49.4 kg (109 lb)   LMP 01/01/1993 (LMP Unknown)   SpO2 98%   .60 kg/m²       Results from last 7 days   Lab Units 03/02/23  0921   WBC 10*3/mm3 10.96*         Results from last 7 days   Lab Units 03/02/23  0921   AST (SGOT) U/L 16      Results from last 7 days   Lab Units 03/02/23  0921   ALT (SGPT) U/L 13       Estimated Creatinine Clearance: 15 mL/min (A) (by C-G formula based on SCr of 1.04 mg/dL (H)).  Results from last 7 days   Lab Units 03/02/23  0921   BUN mg/dL 13   CREATININE mg/dL 1.04*     Intake & Output (last 3 days)         02/27 0701 02/28 0700 02/28 0701 03/01 0700 03/01 0701 03/02 0700 03/02 0701 03/03 0700    IV Piggyback    500    Total Intake(mL/kg)    500 (10.1)    Net    +500                    Assessment/Plan:    Patient meets the following inclusion/exclusion criteria:  Patient is hospitalized with confirmed COVID-19 infection (and accompanying symptoms)  Patient meets one of the two below criteria:  Patient is requiring an increase in baseline supplemental oxygen requirements OR SpO2 </= 94% on room air secondary to COVID-19 infection OR  Patient is symptomatic but not requiring supplemental oxygen or an increase in baseline oxygen AND has at least one of the below high risk criteria for progression to severe illness. High risk criteria:   Age >/= 65  Cancer  Cardiovascular diseases (HF, CAD, or cardiomyopathies)  CKD  Chronic lung disease (COPD, CF, interstitial lung " disease, PE, pulmonary hypertension, bronchopulmonary dysplasia, bronchiectasis)  Diabetes mellitis (insulin dependent or poorly controlled)  Immunocompromising conditions or receipt of immunosuppressive medications  Obesity (BMI > 35 kg/m2)  Pregnancy and recent pregnancy (within 7 days of delivery)  Sickle cell disease  Baseline and daily LFTs and Scr have been ordered prior to remdesivir initiation  ALT is not ? 10 times the upper limit of normal  Patient is not on concomitant hydroxychloroquine or chloroquine  Patient does not require invasive mechanical ventilation (IMV) or ECMO  Patient is within 10 days from symptom onset (for criteria 2a) or within 7 days of symptom onset (for criteria 2b)    Remdesivir 200 mg IV x 1 dose, followed by 100 mg IV q24h for 4 days or until hospital discharge (whichever comes first) has been ordered.    Thank you for involving pharmacy in this patient's care. Please contact pharmacy with any questions or concerns.                           Leonora Mathew, [facility]  Clinical Pharmacist

## 2023-03-02 NOTE — H&P
Internal medicine history and physical  INTERNAL MEDICINE   [facility]       Patient Identification:  Name: Stacie Vieira  Age: 88 y.o.  Sex: female  :  1935  MRN: [id number]                   Primary Care Physician: Eber Hua MD                               Date of admission:3/2/2023    Chief Complaint: Off-and-on palpitation shortness of breath lightheadedness over the course of last year despite extensive cardiology visit and testing and intervention complicated by increasing sinus congestion since Tuesday with cough and fever last night and felt bad this morning and felt like she was going to pass out.    History of Present Illness:   Patient is a 88-year-old very frustrated historian with very unhappy that she is still have symptoms of SVT for years despite multiple visits with her cardiologist and recent changes in her medications.  She specifically describes her symptoms as impending passing out spell with palpitations and rapid heart rate and frustrated that nobody can fix these things.  Patient had recent visits with cardiologist and some adjustment in her medications were made with no improvement in her symptoms.  In this background couple days ago she started having increasing cough congestion and had fever last night and this morning she felt like she is going to pass out.  Because of this change in status in the background of ongoing issues and symptoms that she has been dealing with patient was brought to the emergency room where work-up revealed positive respiratory viral panel for COVID-19.  Patient is not hypoxic.  Upon review of her epic chart it is noted during her last cardiology visit that her symptoms are persistent dyspnea with exertion shakiness and lightheadedness.  Patient recently had medication changes on 2023 visit and her olmesartan was reduced to 20 mg nightly and her metoprolol succinate was changed to metoprolol tartrate with plans to be  seen by the cardiology service in 4 weeks.  Patient is a  who lives by herself and gets anxious at night.  Patient's son is a intensivist and anesthetist at [employer].  Patient used to have neighbors around her which are no longer close by.  It is reported in the last cardiology visit that patient gets very anxious.  Her last echocardiogram in November 2021 showed left ventricular ejection fraction to be 64% with normal systolic function and her stress test in November 2021 was normal as well as carotid duplex studies showing mild stenosis.  Patient had normal PFTs by pulmonary service and in December 2022 she had a 30-day event monitor placed which showed a 4 beat run of nonsustained V. tach on day 18 otherwise unremarkable study.    Past Medical History:  Past Medical History:   Diagnosis Date   • Acute stress disorder    • Allergic rhinitis    • C. difficile colitis     hx in past   • Dyspnea on exertion    • Elevated fasting glucose    • Essential hypertension    • Guillain Barré syndrome (HCC)    • H/O: pneumonia    • Health care maintenance    • History of palpitations    • Hyperlipidemia    • Hypokalemia    • Hypothyroidism    • Insomnia    • Labile blood pressure    • MAC (mycobacterium avium-intracellulare complex)     reason for bronch.  dx with this nov 2015   • Osteoporosis    • Palpitations    • Pneumonia     past hx of freq.   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 ENDOSCOPY;  Service:    • BRONCHOSCOPY N/A 12/9/2017    Procedure: BRONCHOSCOPY;  Surgeon: Real Rousseau MD;  Location: [facility] ENDOSCOPY;  Service:    • BRONCHOSCOPY N/A 7/20/2018    Procedure: BRONCHOSCOPY with BAL;  Surgeon: Sinan Braga MD;  Location: [facility] ENDOSCOPY;  Service: Pulmonary   • COLONOSCOPY N/A 07/08/2005   • DILATATION AND CURETTAGE N/A 01/01/1990   • PAP SMEAR N/A 03/30/2004   • SINUS SURGERY  01/2016   • TONSILLECTOMY        Home Meds:  Medications Prior to Admission   Medication Sig Dispense Refill Last Dose   • amLODIPine (NORVASC) 2.5 MG tablet Take 1 tablet by mouth 2 (Two) Times a Day. If BP over 140/90 180 tablet 0 3/1/2023   • Cholecalciferol (VITAMIN D) 2000 units tablet Take 2,000 Units by mouth Daily.   3/1/2023   • Levoxyl 50 MCG tablet TAKE ONE TABLET BY MOUTH EVERY OTHER DAY 45 tablet 1 3/1/2023   • Levoxyl 75 MCG tablet TAKE ONE TABLET BY MOUTH EVERY OTHER DAY 45 tablet 2 3/1/2023   • Loratadine 10 MG capsule Take 1 capsule by mouth Daily.   3/1/2023   • metoprolol tartrate (LOPRESSOR) 25 MG tablet Take 1 tablet by mouth 2 (Two) Times a Day. (Patient taking differently: Take 3 tablets by mouth 2 (Two) Times a Day.) 180 tablet 3 3/1/2023   • Misc Natural Products (JOINT SUPPORT PO) Take  by mouth.   3/1/2023   • olmesartan (BENICAR) 20 MG tablet Take 1 tablet by mouth Daily.   3/1/2023   • Probiotic Product (PROBIOTIC ADVANCED PO) Take 2 capsules by mouth Daily.   3/1/2023   • vitamin C (ASCORBIC ACID) 500 MG tablet Take 1 tablet by mouth Daily.   3/1/2023   • Zinc 50 MG capsule Take  by mouth.   3/1/2023     Current Meds:     Current Facility-Administered Medications:   •  sodium chloride 0.9 % flush 10 mL, 10 mL, Intravenous, PRN, Carmelo Merino II, MD  Allergies:  Allergies   Allergen  "Reactions   • Influenza Vaccines Mental Status Change     Guillain-[place]   • Ethambutol Other (See Comments)     Vision loss   • Statins Myalgia   • Alendronate Other (See Comments)     Headaches   • Ezetimibe Other (See Comments)     Leg Cramps   • Pravachol [Pravastatin] Other (See Comments)     Leg Cramps     Social History:   Social History     Tobacco Use   • Smoking status: Never   • Smokeless tobacco: Never   Substance Use Topics   • Alcohol use: No     Comment: caffeine use: 1 cup daily      Family History:  Family History   Problem Relation Age of Onset   • Hypertension Brother    • Dementia Mother    • Kidney disease Father    • Thyroid disease Other           Review of Systems  See history of present illness and past medical history.    As described above  Constitutional: Fever cough congestion and weakness for the last couple days  Cardiovascular: Positive for dyspnea on exertion and palpitations. Negative for chest pain, claudication, cyanosis, irregular heartbeat, leg swelling, near-syncope, orthopnea, paroxysmal nocturnal dyspnea and syncope.   Neurological: Positive for light-headedness.   Psychiatric/Behavioral: The patient is nervous/anxious.    Vitals:   /70 (BP Location: Left arm, Patient Position: Lying)   Pulse 66   Temp 98.9 °F (37.2 °C) (Oral)   Resp 18   Ht 63 cm (24.8\")   Wt 49.4 kg (109 lb)   LMP 01/01/1993 (LMP Unknown)   SpO2 98%   .60 kg/m²   I/O:     Intake/Output Summary (Last 24 hours) at 3/2/2023 1638  Last data filed at 3/2/2023 1025  Gross per 24 hour   Intake 500 ml   Output --   Net 500 ml     Exam:  Patient is examined using the personal protective equipment as per guidelines from infection control for this particular patient as enacted.  Hand washing was performed before and after patient interaction.  General Appearance:   Alert awake frustrated elderly female does not appear to be in any acute distress and does not appear to be toxic or septic.   Head:   "  Normocephalic, without obvious abnormality, atraumatic   Eyes:    PERRL, conjunctiva/corneas clear, EOM's intact, both eyes   Ears:    Normal external ear canals, both ears   Nose:   Nares normal, septum midline, mucosa normal, no drainage    or sinus tenderness   Throat:   Lips, tongue, gums normal; oral mucosa pink and moist   Neck:   Supple, symmetrical, trachea midline, no adenopathy;     thyroid:  no enlargement/tenderness/nodules; no carotid    bruit or JVD   Back:     Symmetric, no curvature, ROM normal, no CVA tenderness   Lungs:    Occasional rhonchi   Chest Wall:    No tenderness or deformity    Heart:   S1-S2 regular   Abdomen:    Soft nontender   Extremities:   Extremities normal, atraumatic, no cyanosis or edema   Pulses:   Pulses palpable in all extremities; symmetric all extremities   Skin:   Skin color normal, Skin is warm and dry,  no rashes or palpable lesions   Neurologic:  Anxious but grossly nonfocal examination ed       Data Review:      I reviewed the patient's new clinical results.  Results from last 7 days   Lab Units 03/02/23  0921   WBC 10*3/mm3 10.96*   HEMOGLOBIN g/dL 13.8   PLATELETS 10*3/mm3 270     Results from last 7 days   Lab Units 03/02/23  0921   SODIUM mmol/L 135*   POTASSIUM mmol/L 4.1   CHLORIDE mmol/L 100   CO2 mmol/L 23.2   BUN mg/dL 13   CREATININE mg/dL 1.04*   CALCIUM mg/dL 9.0   GLUCOSE mg/dL 150*     Microbiology Results (last 10 days)     Procedure Component Value - Date/Time    Respiratory Panel PCR w/COVID-19(SARS-CoV-2) CLAUDY/MICHAELA/RASHAAD/PAD/COR/MAD/ANITHA In-House, NP Swab in [facility], 3-4 HR TAT - Swab, Nasopharynx [[id number]]  (Abnormal) Collected: 03/02/23 0955    Lab Status: Final result Specimen: Swab from Nasopharynx Updated: 03/02/23 1100     ADENOVIRUS, PCR Not Detected     Coronavirus 229E Not Detected     Coronavirus HKU1 Not Detected     Coronavirus NL63 Not Detected     Coronavirus OC43 Not Detected     COVID19 Detected     Human Metapneumovirus Not Detected      Human Rhinovirus/Enterovirus Not Detected     Influenza A PCR Not Detected     Influenza B PCR Not Detected     Parainfluenza Virus 1 Not Detected     Parainfluenza Virus 2 Not Detected     Parainfluenza Virus 3 Not Detected     Parainfluenza Virus 4 Not Detected     RSV, PCR Not Detected     Bordetella pertussis pcr Not Detected     Bordetella parapertussis PCR Not Detected     Chlamydophila pneumoniae PCR Not Detected     Mycoplasma pneumo by PCR Not Detected    Narrative:      In the setting of a positive respiratory panel with a viral infection PLUS a negative procalcitonin without other underlying concern for bacterial infection, consider observing off antibiotics or discontinuation of antibiotics and continue supportive care. If the respiratory panel is positive for atypical bacterial infection (Bordetella pertussis, Chlamydophila pneumoniae, or Mycoplasma pneumoniae), consider antibiotic de-escalation to target atypical bacterial infection.        No radiology results for the last 30 days.  ECG 12 Lead Other; verify rhythm   Preliminary Result   HEART RATE= 99  bpm   RR Interval= 606  ms   NM Interval=   ms   P Horizontal Axis=   deg   P Front Axis=   deg   QRSD Interval= 101  ms   QT Interval= 342  ms   QRS Axis= -64  deg   T Wave Axis= 79  deg   - ABNORMAL ECG -   Atrial fibrillation   Ventricular premature complex   Incomplete RBBB and LAFB   Consider right ventricular hypertrophy   LVH with secondary repolarization abnormality   Anterior Q waves, possibly due to LVH   Electronically Signed By:    Date and Time of Study: 2023-03-02 21:12:41      ECG 12 Lead ED Triage Standing Order; Weak / Dizzy / AMS   Final Result   HEART RATE= 97  bpm   RR Interval= 619  ms   NM Interval=   ms   P Horizontal Axis=   deg   P Front Axis=   deg   QRSD Interval= 118  ms   QT Interval= 335  ms   QRS Axis= 26  deg   T Wave Axis= 4  deg   - ABNORMAL ECG -   Sinus rhythm   Incomplete right bundle branch block   No change from  previous tracing   Electronically Signed By: Pili Garza ([facility]) 02-Mar-2023 09:54:55   Date and Time of Study: 2023-03-02 09:23:27      SCANNED - TELEMETRY     Final Result          Assessment:  Active Hospital Problems    Diagnosis  POA   • **COVID-19 [U07.1]  Yes   • Stage 3a chronic kidney disease (CKD) ([facility]) [N18.31]  Yes   • PSVT (paroxysmal supraventricular tachycardia) ([facility]) [I47.1]  Yes   • Hypothyroidism [E03.9]  Yes   • Mycobacterium avium complex ([facility]) [A31.0]  Yes       Medical decision making/care plan: See admitting orders  · Nonhypoxic COVID-19 infection with symptoms fairly acute in the background of chronic dizziness, palpitation and shortness of breath-plan is to admit the patient continue with COVID-19 infection control measures and start her on 3-day course of IV remdesivir given the risk factors and acuteness of presentation to prevent future complications.  Continue with symptomatic management of cough and fever and watch her need for oxygen supplementation.  · History of SVT with extensive work-up in the last year or so unremarkable including 30-day event monitor with current EKG showing atrial fibrillation which I am not sure about we will repeat the EKG-plan is to monitor her cardiac rhythm and get cardiology consult.  · Elevated troponin with T delta of 20 from 16-36 in the setting of cardiac symptoms though has had negative myocardial perfusion study in November 2021-check serial troponin and get cardiology consultation.  · Hypothyroidism-continue thyroid replacement therapy.    Samaria Dunne MD   3/2/2023  16:38 EST    Parts of this note may be an electronic transcription/translation of spoken language to printed text using the Dragon dictation system.

## 2023-03-02 NOTE — ED NOTES
Pt asked this RN to call granddaughter (Elham) and give update. Updated family member per pt request.

## 2023-03-02 NOTE — ED TRIAGE NOTES
Patient to er from home per ems with c/o increasing in weakness over the last week along with cough. Patient reported when standing up this am it felt like she was going to pass out. Patient has mask on in triage along with staff. Patient has mask on in triage along with staff.

## 2023-03-03 LAB
ALBUMIN SERPL-MCNC: 3.5 G/DL (ref 3.5–5.2)
ALP SERPL-CCNC: 62 U/L (ref 39–117)
ALT SERPL W P-5'-P-CCNC: 8 U/L (ref 1–33)
AST SERPL-CCNC: 19 U/L (ref 1–32)
BACTERIA SPEC AEROBE CULT: NORMAL
BILIRUB CONJ SERPL-MCNC: <0.2 MG/DL (ref 0–0.3)
BILIRUB INDIRECT SERPL-MCNC: NORMAL MG/DL
BILIRUB SERPL-MCNC: 0.4 MG/DL (ref 0–1.2)
CREAT SERPL-MCNC: 1.05 MG/DL (ref 0.57–1)
EGFRCR SERPLBLD CKD-EPI 2021: 51.5 ML/MIN/1.73
PROCALCITONIN SERPL-MCNC: 0.15 NG/ML (ref 0–0.25)
PROT SERPL-MCNC: 6.4 G/DL (ref 6–8.5)
QT INTERVAL: 342 MS

## 2023-03-03 PROCEDURE — 25010000002 REMDESIVIR 100 MG/20ML SOLUTION 1 EACH VIAL: Performed by: HOSPITALIST

## 2023-03-03 PROCEDURE — 25010000002 ENOXAPARIN PER 10 MG: Performed by: INTERNAL MEDICINE

## 2023-03-03 PROCEDURE — G0378 HOSPITAL OBSERVATION PER HR: HCPCS

## 2023-03-03 PROCEDURE — 99214 OFFICE O/P EST MOD 30 MIN: CPT | Performed by: INTERNAL MEDICINE

## 2023-03-03 PROCEDURE — 25010000002 REMDESIVIR 100 MG/20ML SOLUTION 1 EACH VIAL: Performed by: INTERNAL MEDICINE

## 2023-03-03 PROCEDURE — 96372 THER/PROPH/DIAG INJ SC/IM: CPT

## 2023-03-03 PROCEDURE — 82565 ASSAY OF CREATININE: CPT | Performed by: INTERNAL MEDICINE

## 2023-03-03 PROCEDURE — 80076 HEPATIC FUNCTION PANEL: CPT | Performed by: INTERNAL MEDICINE

## 2023-03-03 RX ADMIN — REMDESIVIR 100 MG: 100 INJECTION, POWDER, LYOPHILIZED, FOR SOLUTION INTRAVENOUS at 17:04

## 2023-03-03 RX ADMIN — CETIRIZINE HYDROCHLORIDE 10 MG: 10 TABLET ORAL at 09:34

## 2023-03-03 RX ADMIN — LOSARTAN POTASSIUM 50 MG: 50 TABLET, FILM COATED ORAL at 09:34

## 2023-03-03 RX ADMIN — Medication 1 CAPSULE: at 09:34

## 2023-03-03 RX ADMIN — ENOXAPARIN SODIUM 40 MG: 100 INJECTION SUBCUTANEOUS at 20:45

## 2023-03-03 RX ADMIN — AMLODIPINE BESYLATE 2.5 MG: 5 TABLET ORAL at 09:36

## 2023-03-03 RX ADMIN — METOPROLOL TARTRATE 75 MG: 25 TABLET, FILM COATED ORAL at 21:12

## 2023-03-03 RX ADMIN — METOPROLOL TARTRATE 75 MG: 25 TABLET, FILM COATED ORAL at 09:36

## 2023-03-03 RX ADMIN — OXYCODONE HYDROCHLORIDE AND ACETAMINOPHEN 500 MG: 500 TABLET ORAL at 09:34

## 2023-03-03 RX ADMIN — LEVOTHYROXINE SODIUM 75 MCG: 0.07 TABLET ORAL at 09:34

## 2023-03-03 RX ADMIN — Medication 2000 UNITS: at 09:34

## 2023-03-03 NOTE — PLAN OF CARE
Goal Outcome Evaluation:     Problem: Adult Inpatient Plan of Care  Goal: Absence of Hospital-Acquired Illness or Injury  Outcome: Ongoing, Progressing  Intervention: Identify and Manage Fall Risk  Recent Flowsheet Documentation  Taken 3/3/2023 1623 by Marlin Angeles RN  Safety Promotion/Fall Prevention:   activity supervised   room organization consistent   safety round/check completed  Taken 3/3/2023 1412 by Marlin Angeles RN  Safety Promotion/Fall Prevention: activity supervised  Taken 3/3/2023 1045 by Marlin Angeles RN  Safety Promotion/Fall Prevention:   activity supervised   room organization consistent   safety round/check completed  Taken 3/3/2023 0931 by Marlin Angeles RN  Safety Promotion/Fall Prevention: activity supervised  Taken 3/3/2023 0803 by Marlin Angeles RN  Safety Promotion/Fall Prevention:   activity supervised   room organization consistent   safety round/check completed  Intervention: Prevent Skin Injury  Recent Flowsheet Documentation  Taken 3/3/2023 1623 by Marlin Angeles RN  Body Position:   tilted   side-lying  Taken 3/3/2023 1412 by Marlin Angeles RN  Body Position:   weight shifting   position changed independently  Taken 3/3/2023 1045 by Marlin Angeles RN  Body Position: weight shifting  Taken 3/3/2023 0931 by Marlin Angeles RN  Body Position:   weight shifting   left  Skin Protection: adhesive use limited  Taken 3/3/2023 0803 by Marlin Angeles RN  Body Position: weight shifting  Intervention: Prevent and Manage VTE (Venous Thromboembolism) Risk  Recent Flowsheet Documentation  Taken 3/3/2023 1623 by Marlin Angeles RN  Activity Management: activity adjusted per tolerance  Taken 3/3/2023 1412 by Marlin Angeles RN  Activity Management: up ad octaviano  VTE Prevention/Management:   bilateral   sequential compression devices off  Taken 3/3/2023 1045 by Marlin Angeles RN  Activity Management: up ad octaviano  Taken 3/3/2023 0931 by Marlin Angeles RN  Activity  Management: up ad octaviano  VTE Prevention/Management:   bilateral   sequential compression devices off  Taken 3/3/2023 0803 by Marlin Angeles RN  Activity Management: up ad octaviano  Intervention: Prevent Infection  Recent Flowsheet Documentation  Taken 3/3/2023 1623 by Marlin Angeles RN  Infection Prevention:   cohorting utilized   hand hygiene promoted  Taken 3/3/2023 1412 by Marlin Angeles RN  Infection Prevention:   cohorting utilized   hand hygiene promoted  Taken 3/3/2023 1045 by Marlin Angeles RN  Infection Prevention:   cohorting utilized   hand hygiene promoted  Taken 3/3/2023 0931 by Marlin Angeles RN  Infection Prevention:   cohorting utilized   hand hygiene promoted  Taken 3/3/2023 0803 by Marlin Angeles RN  Infection Prevention:   cohorting utilized   hand hygiene promoted     Problem: Adult Inpatient Plan of Care  Goal: Patient-Specific Goal (Individualized)  Outcome: Ongoing, Progressing      Temp is slightly elevated 99.1. HR can drop to the 50s. Complaints of a sore throat; has throat spray. Standby. Room air. 2/5 remdesivir. Rested most of the day. Care plan carefully updated.

## 2023-03-03 NOTE — PROGRESS NOTES
"Nutrition Services    Patient Name:  Stacie Vieira  YOB: 1935  MRN: [id number]  Admit Date:  3/2/2023    Assessment Date:  03/03/23    NUTRITION SCREENING      Reason for Encounter BMI-19.31    Diagnosis/Problem COVID-19     No wt loss noted, per wt hx. Patient ate 75% of breakfast this morning. Will follow up for NFPE on later date        PO Diet Diet: Cardiac Diets; Healthy Heart (2-3 Na+); Texture: Regular Texture (IDDSI 7); Fluid Consistency: Thin (IDDSI 0)   PO Supplements/Snacks    PO Intake % 75% breakfast        Labs  Listed below, reviewed   Physical Findings Alert, oriented, oral WDL,    GI Function Last BM 3/3    Skin Status Skin intact        Height:  Weight:  BMI:    Weight Trend Height: 160 cm (63\")  Weight: 49.4 kg (109 lb) (03/02/23 1013)  Body mass index is 19.31 kg/m².    Stable       Intervention/Plan No nutrition dx at this time will continue to monitor po intake and follow up for NFPE on later date         Results from last 7 days   Lab Units 03/03/23  0326 03/02/23  1736 03/02/23  0921   SODIUM mmol/L  --   --  135*   POTASSIUM mmol/L  --   --  4.1   CHLORIDE mmol/L  --   --  100   CO2 mmol/L  --   --  23.2   BUN mg/dL  --   --  13   CREATININE mg/dL 1.05* 0.94 1.04*   CALCIUM mg/dL  --   --  9.0   BILIRUBIN mg/dL 0.4 0.4 0.5   ALK PHOS U/L 62 71 74   ALT (SGPT) U/L 8 12 13   AST (SGOT) U/L 19 24 16   GLUCOSE mg/dL  --   --  150*     Results from last 7 days   Lab Units 03/02/23  0921   MAGNESIUM mg/dL 1.9   HEMOGLOBIN g/dL 13.8   HEMATOCRIT % 42.1     COVID19   Date Value Ref Range Status   03/02/2023 Detected (C) Not Detected - Ref. Range Final     Lab Results   Component Value Date    HGBA1C 5.4 01/05/2022       RD to follow up per protocol.    Electronically signed by:  Arleen Rod RD  03/03/23 17:34 EST  "

## 2023-03-03 NOTE — CONSULTS
Patient Name: Stacie Vieira  :1935  88 y.o.    Date of Admission: 3/2/2023  Encounter Provider: Nicolette Chamorro MD  Date of Encounter Visit: 23  Place of Service: [facility] CARDIOLOGY  Referring Provider: Samaria Dunne MD  Patient Care Team:  Eber Hua MD as PCP - General (Internal Medicine)  James Del Angel MD as Consulting Physician (Infectious Diseases)  Ezekiel Verde MD as Consulting Physician (Otolaryngology)  Aisha Curiel MD as Consulting Physician (Cardiology)  Sinan Braga MD as Consulting Physician (Pulmonary Disease)  Shawn Felton MD as Consulting Physician (Allergy)  Luis Whitehead MD (Ophthalmology)  Jasmine Salazar MD (Dermatology)  Neal Benjamin MD as Consulting Physician (Orthopedic Surgery)      Chief complaint: cough and lightheaded       History of Present Illness:    This is a patient of Dr. Curiel's.  She has paroxysmal SVT, hypertension, hyperlipidemia, right renal artery stenosis and left carotid artery stenosis.  She saw Rosa in the office on 2023 and was complaining of symptoms like her previous SVT.  She had not had any syncope.  A treadmill stress test was ordered and her olmesartan was reduced.  Metoprolol succinate was discontinued and metoprolol tartrate 75 mg twice a day was started.  She reported the metoprolol was making her fatigued it was reduced to 25 mg twice daily.  She came to the ER on 2023 with lightheadedness and generalized weakness she had presyncope.  She had a low-grade fever and cough.    She had a change in her high-sensitivity troponin from 16 up to 36.  She tested positive for COVID.    She is disgruntled that she continues to have episodes of palpitations and shortness of breath and that no cause has been found.  No chest pain.  Occasional dyspnea on exertion associated with the palpitations but other times she can move  around fine.    Stress test 11/24/21  • Myocardial perfusion imaging indicates a normal myocardial perfusion study with no evidence of ischemia.  • Compared to the prior study from 8/9/2018 the current study reveals no changes.  • Left ventricular ejection fraction is hyperdynamic (Calculated EF > 70%). .  • Impressions are consistent with a low risk study.       ECHO 11/24/21  • Calculated left ventricular EF = 64.9% Estimated left ventricular EF was in agreement with the calculated left ventricular EF. Left ventricular systolic function is normal.  • Mild tricuspid valve regurgitation is present. Calculated right ventricular systolic pressure from tricuspid regurgitation is 37.0 mmHg  • Mild mitral valve regurgitation is present.    Past Medical History:   Diagnosis Date   • Acute stress disorder    • Allergic rhinitis    • C. difficile colitis     hx in past   • Dyspnea on exertion    • Elevated fasting glucose    • Essential hypertension    • Guillain Barré syndrome (HCC)    • H/O: pneumonia    • Health care maintenance    • History of palpitations    • Hyperlipidemia    • Hypokalemia    • Hypothyroidism    • Insomnia    • Labile blood pressure    • MAC (mycobacterium avium-intracellulare complex)     reason for bronch.  dx with this nov 2015   • Osteoporosis    • Palpitations    • Pneumonia     past hx of freq.   • Polyneuropathy 12/20/2017   • PSVT (paroxysmal supraventricular tachycardia) ([facility])    • Renal cyst    • Sinusitis    • Staph infection     in sinus area  recent sx jan 2016   • Stenosis of right carotid artery 10/22/2020   • Stenosis of right renal artery ([facility]) 07/05/2018    Per renal duplex - 60% stenosis, left normal; 5/2019 duplex unchanged    • SVT (supraventricular tachycardia) ([facility])     past hx  on medicine   • Syncope 12/25/2017   • Syncope and collapse    • Thyroiditis        Past Surgical History:   Procedure Laterality Date   • BREAST BIOPSY      PERCUTATNEOUS NEEDLE CORE   • BRONCHOSCOPY       nov 2015   • BRONCHOSCOPY N/A 4/8/2016    Procedure: BRONCHOSCOPY WITH BAL;  Surgeon: Sinan Braga MD;  Location: [facility] ENDOSCOPY;  Service:    • BRONCHOSCOPY N/A 12/9/2017    Procedure: BRONCHOSCOPY;  Surgeon: Real Rousseau MD;  Location: [facility] ENDOSCOPY;  Service:    • BRONCHOSCOPY N/A 7/20/2018    Procedure: BRONCHOSCOPY with BAL;  Surgeon: Sinan Braga MD;  Location: [facility] ENDOSCOPY;  Service: Pulmonary   • COLONOSCOPY N/A 07/08/2005   • DILATATION AND CURETTAGE N/A 01/01/1990   • PAP SMEAR N/A 03/30/2004   • SINUS SURGERY  01/2016   • TONSILLECTOMY           Prior to Admission medications    Medication Sig Start Date End Date Taking? Authorizing Provider   amLODIPine (NORVASC) 2.5 MG tablet Take 1 tablet by mouth 2 (Two) Times a Day. If BP over 140/90 2/23/23  Yes Cari Nunes APRN   Cholecalciferol (VITAMIN D) 2000 units tablet Take 2,000 Units by mouth Daily.   Yes Al Rahman MD   Levoxyl 50 MCG tablet TAKE ONE TABLET BY MOUTH EVERY OTHER DAY 11/21/22  Yes Eber Hua MD   Levoxyl 75 MCG tablet TAKE ONE TABLET BY MOUTH EVERY OTHER DAY 11/21/22  Yes Eber Hua MD   Loratadine 10 MG capsule Take 1 capsule by mouth Daily.   Yes Al Rahman MD   metoprolol tartrate (LOPRESSOR) 25 MG tablet Take 1 tablet by mouth 2 (Two) Times a Day.  Patient taking differently: Take 3 tablets by mouth 2 (Two) Times a Day. 2/28/23  Yes Cari Nunes APRN   Misc Natural Products (JOINT SUPPORT PO) Take  by mouth.   Yes Al Rahman MD   olmesartan (BENICAR) 20 MG tablet Take 1 tablet by mouth Daily. 2/23/23  Yes Cari Nunes APRN   Probiotic Product (PROBIOTIC ADVANCED PO) Take 2 capsules by mouth Daily.   Yes Al Rahman MD   vitamin C (ASCORBIC ACID) 500 MG tablet Take 1 tablet by mouth Daily.   Yes Al Rahman MD   Zinc 50 MG capsule Take  by mouth.   Yes Al Rahman MD       Allergies   Allergen  Reactions   • Influenza Vaccines Mental Status Change     Guillain-[place]   • Ethambutol Other (See Comments)     Vision loss   • Statins Myalgia   • Alendronate Other (See Comments)     Headaches   • Ezetimibe Other (See Comments)     Leg Cramps   • Pravachol [Pravastatin] Other (See Comments)     Leg Cramps       Social History     Socioeconomic History   • Marital status:    Tobacco Use   • Smoking status: Never   • Smokeless tobacco: Never   Vaping Use   • Vaping Use: Never used   Substance and Sexual Activity   • Alcohol use: No     Comment: caffeine use: 1 cup daily   • Drug use: No   • Sexual activity: Never       Family History   Problem Relation Age of Onset   • Hypertension Brother    • Dementia Mother    • Kidney disease Father    • Thyroid disease Other        REVIEW OF SYSTEMS:   All other systems reviewed and negative.        Objective:     Vitals:    03/02/23 2350 03/03/23 0500 03/03/23 0742 03/03/23 0936   BP: 94/52 133/61 109/51    BP Location: Left arm Left arm Right arm    Patient Position: Lying Lying Lying    Pulse: 56 58 60 62   Resp: 18 18 18    Temp: 100 °F (37.8 °C) 98.5 °F (36.9 °C) 99.1 °F (37.3 °C)    TempSrc: Oral Oral Oral    SpO2: 94% 95% 94%    Weight:       Height:         Body mass index is 19.31 kg/m².    Intake/Output Summary (Last 24 hours) at 3/3/2023 1004  Last data filed at 3/3/2023 0506  Gross per 24 hour   Intake 860 ml   Output --   Net 860 ml     Constitutional: She is oriented to person, place, and time. She appears well-developed. She does not appear ill.   HENT:   Head: Normocephalic and atraumatic. Head is without contusion.   Right Ear: Hearing normal. No drainage.   Left Ear: Hearing normal. No drainage.   Nose: No nasal deformity. No epistaxis.   Eyes: Lids are normal. Right eye exhibits no exudate. Left eye exhibits no exudate.  Neck: No JVD present. Carotid bruit is not present. No tracheal deviation present. No thyroid mass and no thyromegaly present.    Cardiovascular: Normal rate, regular rhythm and normal heart sounds.    Pulses:       Posterior tibial pulses are 2+ on the right side, and 2+ on the left side.   Pulmonary/Chest: Effort normal and breath sounds normal.   Abdominal: Soft. Normal appearance and bowel sounds are normal. There is no tenderness.   Musculoskeletal: Normal range of motion.        Right shoulder: She exhibits no deformity.        Left shoulder: She exhibits no deformity.   Neurological: She is alert and oriented to person, place, and time. She has normal strength.   Skin: Skin is warm, dry and intact. No rash noted.   Psychiatric: She has a normal mood and affect. Her behavior is normal. Thought content normal.   Vitals reviewed      Lab Review:     Results from last 7 days   Lab Units 03/03/23  0326 03/02/23  1736 03/02/23  0921   SODIUM mmol/L  --   --  135*   POTASSIUM mmol/L  --   --  4.1   CHLORIDE mmol/L  --   --  100   CO2 mmol/L  --   --  23.2   BUN mg/dL  --   --  13   CREATININE mg/dL 1.05*   < > 1.04*   CALCIUM mg/dL  --   --  9.0   BILIRUBIN mg/dL 0.4   < > 0.5   ALK PHOS U/L 62   < > 74   ALT (SGPT) U/L 8   < > 13   AST (SGOT) U/L 19   < > 16   GLUCOSE mg/dL  --   --  150*    < > = values in this interval not displayed.     Results from last 7 days   Lab Units 03/02/23  1139 03/02/23  0921   HSTROP T ng/L 36* 16*     Results from last 7 days   Lab Units 03/02/23  0921   WBC 10*3/mm3 10.96*   HEMOGLOBIN g/dL 13.8   HEMATOCRIT % 42.1   PLATELETS 10*3/mm3 270         Results from last 7 days   Lab Units 03/02/23  0921   MAGNESIUM mg/dL 1.9                I personally viewed and interpreted the patient's EKG/Telemetry data.        Assessment and Plan:       1.  COVID infection.  2.  History of SVT.  Currently in sinus bradycardia.  Sinus rhythm with atrial bigeminy has also been noted.  Reviewing telemetry and EKGs I do not definitively see atrial fibrillation.  Continue to monitor.  She had a normal 30-day monitor in December.   Metoprolol was decreased to 25 mg twice daily because of fatigue.  I Keila go ahead and decrease it from 75 mg twice daily down to 25 mg since she is bradycardic.  3.  History of hypertension.  Blood pressure low.  Discontinue amlodipine.  Decrease metoprolol to tartrate to 25 mg twice daily.  She is on losartan.  This is a substitution for her home olmesartan.  4.  Peripheral vascular disease  5.  Hyperlipidemia  6.  Abnormal delta on high-sensitivity troponin.  No EKG changes consistent with ischemia.  Normal stress test in 2021.  Benign echo 2021.  She was set up for a treadmill stress test as an outpatient.  I am going to change this over to a treadmill nuclear stress test to be performed in a couple of weeks as an outpatient when she is over COVID.    No further cardiac testing as an inpatient.  Outpatient stress test will be arranged through my office.    Nicolette Chamorro MD  03/03/23  10:04 EST

## 2023-03-03 NOTE — PLAN OF CARE
Problem: Adult Inpatient Plan of Care  Goal: Absence of Hospital-Acquired Illness or Injury  Intervention: Identify and Manage Fall Risk  Recent Flowsheet Documentation  Taken 3/3/2023 0458 by Natalio Lau RN  Safety Promotion/Fall Prevention:   activity supervised   assistive device/personal items within reach   clutter free environment maintained   safety round/check completed  Taken 3/3/2023 0200 by Natalio Lau RN  Safety Promotion/Fall Prevention:   activity supervised   assistive device/personal items within reach   clutter free environment maintained   safety round/check completed  Taken 3/3/2023 0021 by Natalio Lau RN  Safety Promotion/Fall Prevention:   activity supervised   assistive device/personal items within reach   clutter free environment maintained   safety round/check completed  Taken 3/2/2023 2157 by Natalio Lau RN  Safety Promotion/Fall Prevention:   assistive device/personal items within reach   clutter free environment maintained   safety round/check completed  Taken 3/2/2023 2048 by Natalio Lau RN  Safety Promotion/Fall Prevention:   activity supervised   assistive device/personal items within reach   clutter free environment maintained   nonskid shoes/slippers when out of bed   safety round/check completed   Goal Outcome Evaluation:  Plan of Care Reviewed With: patient         Outcome Evaluation: pt alert and oriented X4. on room air. temp elevated on begining of the shift, but improving now. pt denies pain. pt heart rhythm is sinus bharti now. turns self in bed.

## 2023-03-03 NOTE — PROGRESS NOTES
RN called to notify me of rhythm change and EKG is suspicious for a fib. Vitals stable at this time. No other changes. This will be further addressed during rounds. Call if she becomes tachycardic sustaining above 120bpm or hypotensive.

## 2023-03-03 NOTE — CASE MANAGEMENT/SOCIAL WORK
Continued Stay Note  [facility]     Patient Name: Stacie Vieira  MRN: [id number]  Today's Date: 3/3/2023    Admit Date: 3/2/2023    Plan: Home via family   Discharge Plan     Row Name 03/03/23 1417       Plan    Plan Home via family    Patient/Family in Agreement with Plan yes    Plan Comments CCP placed call into pts room on room phone d/t covid isolation; introduced self and role of CCP. Face sheet information and pharmacy verified. Pt lives alone in a single story home with 2 steps to enter and a basement. Pt still drives and is [employer]’s. Pt watches her 3 yr old great granddaughter a few days a week. Pt denies DME. Pt has a living will. Pt declines meds to beds and denies trouble affording or managing her medications. Pt has used HH in the past and has been to [facility]. Dc plan is to return home, family to transport. Jc MONGE/CCP               Discharge Codes    No documentation.               Expected Discharge Date and Time     Expected Discharge Date Expected Discharge Time    Mar 5, 2023             Coral Gray RN

## 2023-03-04 LAB
ALBUMIN SERPL-MCNC: 3.4 G/DL (ref 3.5–5.2)
ALP SERPL-CCNC: 56 U/L (ref 39–117)
ALT SERPL W P-5'-P-CCNC: 8 U/L (ref 1–33)
ANION GAP SERPL CALCULATED.3IONS-SCNC: 9.6 MMOL/L (ref 5–15)
AST SERPL-CCNC: 19 U/L (ref 1–32)
BILIRUB CONJ SERPL-MCNC: <0.2 MG/DL (ref 0–0.3)
BILIRUB INDIRECT SERPL-MCNC: ABNORMAL MG/DL
BILIRUB SERPL-MCNC: 0.3 MG/DL (ref 0–1.2)
BUN SERPL-MCNC: 16 MG/DL (ref 8–23)
BUN/CREAT SERPL: 19.5 (ref 7–25)
CALCIUM SPEC-SCNC: 8.5 MG/DL (ref 8.6–10.5)
CHLORIDE SERPL-SCNC: 103 MMOL/L (ref 98–107)
CO2 SERPL-SCNC: 26.4 MMOL/L (ref 22–29)
CREAT SERPL-MCNC: 0.82 MG/DL (ref 0.57–1)
CREAT SERPL-MCNC: 0.99 MG/DL (ref 0.57–1)
CRP SERPL-MCNC: 10.16 MG/DL (ref 0–0.5)
EGFRCR SERPLBLD CKD-EPI 2021: 55.3 ML/MIN/1.73
EGFRCR SERPLBLD CKD-EPI 2021: 69.3 ML/MIN/1.73
GLUCOSE SERPL-MCNC: 91 MG/DL (ref 65–99)
POTASSIUM SERPL-SCNC: 3.8 MMOL/L (ref 3.5–5.2)
PROT SERPL-MCNC: 6.1 G/DL (ref 6–8.5)
SODIUM SERPL-SCNC: 139 MMOL/L (ref 136–145)
TSH SERPL DL<=0.05 MIU/L-ACNC: 2.03 UIU/ML (ref 0.27–4.2)

## 2023-03-04 PROCEDURE — 25010000002 REMDESIVIR 100 MG/20ML SOLUTION 1 EACH VIAL: Performed by: HOSPITALIST

## 2023-03-04 PROCEDURE — 80076 HEPATIC FUNCTION PANEL: CPT | Performed by: INTERNAL MEDICINE

## 2023-03-04 PROCEDURE — 84443 ASSAY THYROID STIM HORMONE: CPT | Performed by: HOSPITALIST

## 2023-03-04 PROCEDURE — G0378 HOSPITAL OBSERVATION PER HR: HCPCS

## 2023-03-04 PROCEDURE — 82565 ASSAY OF CREATININE: CPT | Performed by: INTERNAL MEDICINE

## 2023-03-04 PROCEDURE — 96372 THER/PROPH/DIAG INJ SC/IM: CPT

## 2023-03-04 PROCEDURE — 86140 C-REACTIVE PROTEIN: CPT | Performed by: HOSPITALIST

## 2023-03-04 PROCEDURE — 63710000001 DIPHENHYDRAMINE PER 50 MG: Performed by: NURSE PRACTITIONER

## 2023-03-04 PROCEDURE — 80048 BASIC METABOLIC PNL TOTAL CA: CPT | Performed by: HOSPITALIST

## 2023-03-04 PROCEDURE — 25010000002 ENOXAPARIN PER 10 MG: Performed by: INTERNAL MEDICINE

## 2023-03-04 RX ORDER — DIPHENHYDRAMINE HYDROCHLORIDE, ZINC ACETATE 2; .1 G/100G; G/100G
1 CREAM TOPICAL 3 TIMES DAILY PRN
Status: DISCONTINUED | OUTPATIENT
Start: 2023-03-04 | End: 2023-03-06 | Stop reason: HOSPADM

## 2023-03-04 RX ORDER — DIPHENHYDRAMINE HCL 25 MG
25 CAPSULE ORAL EVERY 6 HOURS PRN
Status: DISCONTINUED | OUTPATIENT
Start: 2023-03-04 | End: 2023-03-06 | Stop reason: HOSPADM

## 2023-03-04 RX ADMIN — LOSARTAN POTASSIUM 50 MG: 50 TABLET, FILM COATED ORAL at 09:53

## 2023-03-04 RX ADMIN — Medication 2000 UNITS: at 09:53

## 2023-03-04 RX ADMIN — OXYCODONE HYDROCHLORIDE AND ACETAMINOPHEN 500 MG: 500 TABLET ORAL at 09:54

## 2023-03-04 RX ADMIN — LEVOTHYROXINE SODIUM 50 MCG: 0.05 TABLET ORAL at 09:54

## 2023-03-04 RX ADMIN — Medication 1 CAPSULE: at 09:53

## 2023-03-04 RX ADMIN — ENOXAPARIN SODIUM 40 MG: 100 INJECTION SUBCUTANEOUS at 20:26

## 2023-03-04 RX ADMIN — METOPROLOL TARTRATE 25 MG: 25 TABLET, FILM COATED ORAL at 20:26

## 2023-03-04 RX ADMIN — DIPHENHYDRAMINE HYDROCHLORIDE 25 MG: 25 CAPSULE ORAL at 22:27

## 2023-03-04 RX ADMIN — REMDESIVIR 100 MG: 100 INJECTION, POWDER, LYOPHILIZED, FOR SOLUTION INTRAVENOUS at 16:57

## 2023-03-04 RX ADMIN — CETIRIZINE HYDROCHLORIDE 10 MG: 10 TABLET ORAL at 09:53

## 2023-03-04 NOTE — PROGRESS NOTES
Name: Stacie Vieira ADMIT: 3/2/2023   : 1935  PCP: Eber Hua MD    MRN: [id number] LOS: 1 days   AGE/SEX: 88 y.o. female  ROOM: [facility]     Subjective   Subjective   Reports feeling much better today.    Review of Systems     Objective   Objective   Vital Signs  Temp:  [97.1 °F (36.2 °C)-99.1 °F (37.3 °C)] 98 °F (36.7 °C)  Heart Rate:  [56-67] 67  Resp:  [18] 18  BP: (120-142)/(62-83) 136/73  SpO2:  [94 %-96 %] 94 %  on   ;   Device (Oxygen Therapy): room air  Body mass index is 19.31 kg/m².  Physical Exam  Vitals reviewed.   Constitutional:       Comments: Somewhat frail-appearing   HENT:      Head: Normocephalic and atraumatic.   Eyes:      General: No scleral icterus.  Cardiovascular:      Rate and Rhythm: Bradycardia present. Rhythm irregular.   Pulmonary:      Effort: No respiratory distress.      Breath sounds: Normal breath sounds. No wheezing.   Abdominal:      General: Bowel sounds are normal. There is no distension.      Palpations: Abdomen is soft.      Tenderness: There is no abdominal tenderness.   Musculoskeletal:      Right lower leg: No edema.      Left lower leg: No edema.   Skin:     General: Skin is warm and dry.   Neurological:      Mental Status: She is alert and oriented to person, place, and time.   Psychiatric:         Mood and Affect: Mood normal.       Results Review     I reviewed the patient's new clinical results.  Results from last 7 days   Lab Units 23  0921   WBC 10*3/mm3 10.96*   HEMOGLOBIN g/dL 13.8   PLATELETS 10*3/mm3 270     Results from last 7 days   Lab Units 23  0354 23  0326 23  1736 23  0921   SODIUM mmol/L 139  --   --  135*   POTASSIUM mmol/L 3.8  --   --  4.1   CHLORIDE mmol/L 103  --   --  100   CO2 mmol/L 26.4  --   --  23.2   BUN mg/dL 16  --   --  13   CREATININE mg/dL 0.82  0.99 1.05* 0.94 1.04*   GLUCOSE mg/dL 91  --   --  150*   EGFR mL/min/1.73 69.3  55.3* 51.5* 58.8* 52.1*     Results from last 7 days   Lab  Units 03/04/23  0354 03/03/23  0326 03/02/23 1736 03/02/23  0921   ALBUMIN g/dL 3.4* 3.5 4.2 4.2   BILIRUBIN mg/dL 0.3 0.4 0.4 0.5   ALK PHOS U/L 56 62 71 74   AST (SGOT) U/L 19 19 24 16   ALT (SGPT) U/L 8 8 12 13     Results from last 7 days   Lab Units 03/04/23  0354 03/03/23 0326 03/02/23 1736 03/02/23  0921   CALCIUM mg/dL 8.5*  --   --  9.0   ALBUMIN g/dL 3.4* 3.5 4.2 4.2   MAGNESIUM mg/dL  --   --   --  1.9     Results from last 7 days   Lab Units 03/02/23 1736   PROCALCITONIN ng/mL 0.15     No results found for: HGBA1C, POCGLU    No radiology results for the last day  I have personally reviewed all medications:  Scheduled Medications  vitamin C, 500 mg, Oral, Daily  cetirizine, 10 mg, Oral, Daily  cholecalciferol, 2,000 Units, Oral, Daily  enoxaparin, 40 mg, Subcutaneous, Nightly  lactobacillus acidophilus, 1 capsule, Oral, Daily  levothyroxine, 50 mcg, Oral, Every Other Day  levothyroxine, 75 mcg, Oral, Every Other Day  losartan, 50 mg, Oral, Q24H  metoprolol tartrate, 75 mg, Oral, BID  remdesivir, 100 mg, Intravenous, Q24H    Infusions   Diet  Diet: Cardiac Diets; Healthy Heart (2-3 Na+); Texture: Regular Texture (IDDSI 7); Fluid Consistency: Thin (IDDSI 0)    I have personally reviewed:  [x]  Laboratory   [x]  Microbiology   [x]  Radiology   [x]  EKG/Telemetry  []  Cardiology/Vascular   []  Pathology    [x]  Records       Assessment/Plan     Active Hospital Problems    Diagnosis  POA   • **COVID-19 [U07.1]  Yes   • Stage 3a chronic kidney disease (CKD) (HCC) [N18.31]  Yes   • PSVT (paroxysmal supraventricular tachycardia) (HCC) [I47.1]  Yes   • Hypothyroidism [E03.9]  Yes   • Mycobacterium avium complex (HCC) [A31.0]  Yes      Resolved Hospital Problems    Diagnosis Date Resolved POA   • Benign essential HTN [I10] 11/10/2022 Yes       88 y.o. female admitted with COVID-19.    COVID symptoms relatively stable, minimal cough.  Given her age and overall fragility treating empirically with 3-day course  remdesivir.  Monitor inflammatory markers and labs.  CRP quite elevated today at 10.1 but not hypoxic or febrile    PSVT-this seems to be the bulk of her symptoms.  Bradycardia improved.  Cardiology note indicates they are decreasing Lopressor but no order written yet so I will change now.      Elevated troponin- no active chest pain.  Cardiology planning outpatient stress test      · Lovenox 40 mg SC daily for DVT prophylaxis.  · Disposition: Anticipate home 1 to 2 days.  · Updated her son Nato (intensivist MD) by phone per her request.      Eber Resendez MD  [facility]  3/3/2023

## 2023-03-04 NOTE — PLAN OF CARE
Goal Outcome Evaluation:     Problem: Adult Inpatient Plan of Care  Goal: Absence of Hospital-Acquired Illness or Injury  Intervention: Identify and Manage Fall Risk  Recent Flowsheet Documentation  Taken 3/4/2023 1603 by Marlin Angeles RN  Safety Promotion/Fall Prevention:   activity supervised   safety round/check completed  Taken 3/4/2023 1423 by Marlin Angeles RN  Safety Promotion/Fall Prevention:   activity supervised   room organization consistent   safety round/check completed  Taken 3/4/2023 1206 by Marlin Angeles RN  Safety Promotion/Fall Prevention:   activity supervised   room organization consistent   safety round/check completed  Taken 3/4/2023 1039 by Marlin Angeles RN  Safety Promotion/Fall Prevention:   activity supervised   room organization consistent   safety round/check completed  Taken 3/4/2023 0950 by Marlin Angeles RN  Safety Promotion/Fall Prevention:   activity supervised   safety round/check completed   room organization consistent  Taken 3/4/2023 0802 by Marlni Angeles RN  Safety Promotion/Fall Prevention:   activity supervised   room organization consistent   safety round/check completed         VSS. A/ox4. Feeling better today. Independent. RA. On Remdesivir. Care plan carefully updated.

## 2023-03-04 NOTE — PROGRESS NOTES
Name: Stacie Vieira ADMIT: 3/2/2023   : 1935  PCP: Eber Hua MD    MRN: [id number] LOS: 1 days   AGE/SEX: 88 y.o. female  ROOM: [facility]     Subjective   Subjective   C/o dizziness and weakness.  Having episodes of palpitations which she finds quite upsetting and is not happy that she continues to have the episodes.    Review of Systems     Objective   Objective   Vital Signs  Temp:  [97.1 °F (36.2 °C)-99.1 °F (37.3 °C)] 97.1 °F (36.2 °C)  Heart Rate:  [55-63] 56  Resp:  [18] 18  BP: (119-142)/(54-83) 131/83  SpO2:  [93 %-96 %] 94 %  on   ;   Device (Oxygen Therapy): room air  Body mass index is 19.31 kg/m².  Physical Exam  Vitals reviewed.   Constitutional:       Comments: Somewhat frail-appearing   HENT:      Head: Normocephalic and atraumatic.   Eyes:      General: No scleral icterus.  Cardiovascular:      Rate and Rhythm: Bradycardia present. Rhythm irregular.   Pulmonary:      Effort: No respiratory distress.      Breath sounds: Normal breath sounds. No wheezing.   Abdominal:      General: Bowel sounds are normal. There is no distension.      Palpations: Abdomen is soft.      Tenderness: There is no abdominal tenderness.   Musculoskeletal:      Right lower leg: No edema.      Left lower leg: No edema.   Skin:     General: Skin is warm and dry.   Neurological:      Mental Status: She is alert and oriented to person, place, and time.   Psychiatric:         Mood and Affect: Mood normal.       Results Review     I reviewed the patient's new clinical results.  Results from last 7 days   Lab Units 23  0921   WBC 10*3/mm3 10.96*   HEMOGLOBIN g/dL 13.8   PLATELETS 10*3/mm3 270     Results from last 7 days   Lab Units 23  0354 23  0326 23  1736 23  0921   SODIUM mmol/L  --   --   --  135*   POTASSIUM mmol/L  --   --   --  4.1   CHLORIDE mmol/L  --   --   --  100   CO2 mmol/L  --   --   --  23.2   BUN mg/dL  --   --   --  13   CREATININE mg/dL 0.99 1.05* 0.94 1.04*    GLUCOSE mg/dL  --   --   --  150*   EGFR mL/min/1.73 55.3* 51.5* 58.8* 52.1*     Results from last 7 days   Lab Units 03/04/23  0354 03/03/23 0326 03/02/23 1736 03/02/23  0921   ALBUMIN g/dL 3.4* 3.5 4.2 4.2   BILIRUBIN mg/dL 0.3 0.4 0.4 0.5   ALK PHOS U/L 56 62 71 74   AST (SGOT) U/L 19 19 24 16   ALT (SGPT) U/L 8 8 12 13     Results from last 7 days   Lab Units 03/04/23  0354 03/03/23 0326 03/02/23 1736 03/02/23  0921   CALCIUM mg/dL  --   --   --  9.0   ALBUMIN g/dL 3.4* 3.5 4.2 4.2   MAGNESIUM mg/dL  --   --   --  1.9     Results from last 7 days   Lab Units 03/02/23 1736   PROCALCITONIN ng/mL 0.15     No results found for: HGBA1C, POCGLU    No radiology results for the last day  I have personally reviewed all medications:  Scheduled Medications  vitamin C, 500 mg, Oral, Daily  cetirizine, 10 mg, Oral, Daily  cholecalciferol, 2,000 Units, Oral, Daily  enoxaparin, 40 mg, Subcutaneous, Nightly  lactobacillus acidophilus, 1 capsule, Oral, Daily  levothyroxine, 50 mcg, Oral, Every Other Day  levothyroxine, 75 mcg, Oral, Every Other Day  losartan, 50 mg, Oral, Q24H  metoprolol tartrate, 75 mg, Oral, BID  remdesivir, 100 mg, Intravenous, Q24H    Infusions   Diet  Diet: Cardiac Diets; Healthy Heart (2-3 Na+); Texture: Regular Texture (IDDSI 7); Fluid Consistency: Thin (IDDSI 0)    I have personally reviewed:  [x]  Laboratory   [x]  Microbiology   [x]  Radiology   [x]  EKG/Telemetry  []  Cardiology/Vascular   []  Pathology    [x]  Records       Assessment/Plan     Active Hospital Problems    Diagnosis  POA   • **COVID-19 [U07.1]  Yes   • Stage 3a chronic kidney disease (CKD) (HCC) [N18.31]  Yes   • PSVT (paroxysmal supraventricular tachycardia) (HCC) [I47.1]  Yes   • Hypothyroidism [E03.9]  Yes   • Mycobacterium avium complex (HCC) [A31.0]  Yes      Resolved Hospital Problems    Diagnosis Date Resolved POA   • Benign essential HTN [I10] 11/10/2022 Yes       88 y.o. female admitted with COVID-19.    COVID symptoms  relatively stable, minimal cough.  Given her age and overall fragility probably worth treating empirically with 3-day course remdesivir.  Monitor inflammatory markers and labs.    PSVT-this seems to be the bulk of her symptoms.  Currently bradycardic.  Cardiology consult pending to discuss further    Elevated troponin-again await cardiac recommendations.  Not actively having chest pain.    We will check TSH given history of hypothyroidism and palpitations.      · Lovenox 40 mg SC daily for DVT prophylaxis.  · Disposition: Anticipate home in a couple days.      Eber Resendez MD  [facility]  3/3/2023

## 2023-03-05 LAB
ALBUMIN SERPL-MCNC: 3.4 G/DL (ref 3.5–5.2)
ALP SERPL-CCNC: 54 U/L (ref 39–117)
ALT SERPL W P-5'-P-CCNC: 11 U/L (ref 1–33)
AST SERPL-CCNC: 17 U/L (ref 1–32)
BILIRUB CONJ SERPL-MCNC: <0.2 MG/DL (ref 0–0.3)
BILIRUB INDIRECT SERPL-MCNC: ABNORMAL MG/DL
BILIRUB SERPL-MCNC: 0.3 MG/DL (ref 0–1.2)
CREAT SERPL-MCNC: 0.81 MG/DL (ref 0.57–1)
CRP SERPL-MCNC: 5.83 MG/DL (ref 0–0.5)
DEPRECATED RDW RBC AUTO: 44.2 FL (ref 37–54)
EGFRCR SERPLBLD CKD-EPI 2021: 70.4 ML/MIN/1.73
ERYTHROCYTE [DISTWIDTH] IN BLOOD BY AUTOMATED COUNT: 12.8 % (ref 12.3–15.4)
HCT VFR BLD AUTO: 36.9 % (ref 34–46.6)
HGB BLD-MCNC: 12.4 G/DL (ref 12–15.9)
MCH RBC QN AUTO: 31.5 PG (ref 26.6–33)
MCHC RBC AUTO-ENTMCNC: 33.6 G/DL (ref 31.5–35.7)
MCV RBC AUTO: 93.7 FL (ref 79–97)
PLATELET # BLD AUTO: 181 10*3/MM3 (ref 140–450)
PMV BLD AUTO: 9.1 FL (ref 6–12)
PROT SERPL-MCNC: 6.1 G/DL (ref 6–8.5)
RBC # BLD AUTO: 3.94 10*6/MM3 (ref 3.77–5.28)
WBC NRBC COR # BLD: 8.2 10*3/MM3 (ref 3.4–10.8)

## 2023-03-05 PROCEDURE — 86140 C-REACTIVE PROTEIN: CPT | Performed by: HOSPITALIST

## 2023-03-05 PROCEDURE — 85027 COMPLETE CBC AUTOMATED: CPT | Performed by: HOSPITALIST

## 2023-03-05 PROCEDURE — G0378 HOSPITAL OBSERVATION PER HR: HCPCS

## 2023-03-05 PROCEDURE — 25010000002 ENOXAPARIN PER 10 MG: Performed by: INTERNAL MEDICINE

## 2023-03-05 PROCEDURE — 96372 THER/PROPH/DIAG INJ SC/IM: CPT

## 2023-03-05 PROCEDURE — 82565 ASSAY OF CREATININE: CPT | Performed by: INTERNAL MEDICINE

## 2023-03-05 PROCEDURE — 80076 HEPATIC FUNCTION PANEL: CPT | Performed by: INTERNAL MEDICINE

## 2023-03-05 RX ADMIN — ENOXAPARIN SODIUM 40 MG: 100 INJECTION SUBCUTANEOUS at 20:50

## 2023-03-05 RX ADMIN — LEVOTHYROXINE SODIUM 75 MCG: 0.07 TABLET ORAL at 09:26

## 2023-03-05 RX ADMIN — OXYCODONE HYDROCHLORIDE AND ACETAMINOPHEN 500 MG: 500 TABLET ORAL at 09:25

## 2023-03-05 RX ADMIN — Medication 2000 UNITS: at 09:25

## 2023-03-05 RX ADMIN — LOSARTAN POTASSIUM 50 MG: 50 TABLET, FILM COATED ORAL at 09:25

## 2023-03-05 RX ADMIN — METOPROLOL TARTRATE 25 MG: 25 TABLET, FILM COATED ORAL at 20:50

## 2023-03-05 RX ADMIN — Medication 1 CAPSULE: at 09:26

## 2023-03-05 RX ADMIN — CETIRIZINE HYDROCHLORIDE 10 MG: 10 TABLET ORAL at 09:25

## 2023-03-05 RX ADMIN — METOPROLOL TARTRATE 25 MG: 25 TABLET, FILM COATED ORAL at 09:26

## 2023-03-05 NOTE — PLAN OF CARE
Goal Outcome Evaluation:        Problem: Adult Inpatient Plan of Care  Goal: Absence of Hospital-Acquired Illness or Injury  Intervention: Identify and Manage Fall Risk  Recent Flowsheet Documentation  Taken 3/5/2023 1434 by Marlin Angeles RN  Safety Promotion/Fall Prevention: activity supervised  Taken 3/5/2023 1206 by Marlin Angeles RN  Safety Promotion/Fall Prevention:   activity supervised   safety round/check completed   room organization consistent  Taken 3/5/2023 1022 by Marlin Angeles RN  Safety Promotion/Fall Prevention:   activity supervised   room organization consistent   safety round/check completed  Taken 3/5/2023 0921 by Marlin Angeles RN  Safety Promotion/Fall Prevention:   activity supervised   room organization consistent  Taken 3/5/2023 0801 by Marlin Angeles RN  Safety Promotion/Fall Prevention:   activity supervised   room organization consistent   safety round/check completed     Problem: Adult Inpatient Plan of Care  Goal: Absence of Hospital-Acquired Illness or Injury  Outcome: Ongoing, Progressing  Intervention: Identify and Manage Fall Risk  Recent Flowsheet Documentation  Taken 3/5/2023 1434 by Marlin Angeles RN  Safety Promotion/Fall Prevention: activity supervised  Taken 3/5/2023 1206 by Marlin Angeles RN  Safety Promotion/Fall Prevention:   activity supervised   safety round/check completed   room organization consistent  Taken 3/5/2023 1022 by Marlin Angeles RN  Safety Promotion/Fall Prevention:   activity supervised   room organization consistent   safety round/check completed  Taken 3/5/2023 0921 by Marlin Angeles RN  Safety Promotion/Fall Prevention:   activity supervised   room organization consistent  Taken 3/5/2023 0801 by Marlin Angeles RN  Safety Promotion/Fall Prevention:   activity supervised   room organization consistent   safety round/check completed  Intervention: Prevent Skin Injury  Recent Flowsheet Documentation  Taken 3/5/2023 1434 by  Marlin Angeles RN  Body Position: weight shifting  Skin Protection: adhesive use limited  Taken 3/5/2023 1206 by Marlin Angeles RN  Body Position: weight shifting  Taken 3/5/2023 1022 by Marlin Angeles RN  Body Position: weight shifting  Taken 3/5/2023 0921 by Marlin Angeles RN  Body Position: weight shifting  Skin Protection: adhesive use limited  Taken 3/5/2023 0801 by Marlin Angeles RN  Body Position: weight shifting  Intervention: Prevent and Manage VTE (Venous Thromboembolism) Risk  Recent Flowsheet Documentation  Taken 3/5/2023 1434 by Marlin Angeles RN  Activity Management: activity adjusted per tolerance  VTE Prevention/Management:   bilateral   sequential compression devices off  Taken 3/5/2023 1206 by Marlin Angeles RN  Activity Management: activity adjusted per tolerance  Taken 3/5/2023 1022 by Marlin Anglees RN  Activity Management: up ad octaviano  Taken 3/5/2023 0921 by Marlin Angeles RN  Activity Management: up ad octaviano  VTE Prevention/Management:   bilateral   sequential compression devices off  Taken 3/5/2023 0801 by Marlin Angeles RN  Activity Management: up ad octaviano  Intervention: Prevent Infection  Recent Flowsheet Documentation  Taken 3/5/2023 1434 by Marlin Angeles RN  Infection Prevention:   cohorting utilized   hand hygiene promoted  Taken 3/5/2023 1206 by Marlin Angeles RN  Infection Prevention: hand hygiene promoted  Taken 3/5/2023 1022 by Marlin Angeles RN  Infection Prevention:   cohorting utilized   hand hygiene promoted  Taken 3/5/2023 0921 by Marlin Angeles RN  Infection Prevention: cohorting utilized  Taken 3/5/2023 0801 by Marlin Angeles RN  Infection Prevention: hand hygiene promoted      VSS. A/ox4. Rested well. Independent. Covid isolation. Care plan carefully updated.

## 2023-03-06 VITALS
WEIGHT: 109 LBS | HEART RATE: 67 BPM | HEIGHT: 63 IN | OXYGEN SATURATION: 97 % | DIASTOLIC BLOOD PRESSURE: 59 MMHG | SYSTOLIC BLOOD PRESSURE: 125 MMHG | RESPIRATION RATE: 18 BRPM | TEMPERATURE: 97 F | BODY MASS INDEX: 19.31 KG/M2

## 2023-03-06 LAB
ALBUMIN SERPL-MCNC: 3.4 G/DL (ref 3.5–5.2)
ALP SERPL-CCNC: 59 U/L (ref 39–117)
ALT SERPL W P-5'-P-CCNC: 10 U/L (ref 1–33)
AST SERPL-CCNC: 14 U/L (ref 1–32)
BILIRUB CONJ SERPL-MCNC: <0.2 MG/DL (ref 0–0.3)
BILIRUB INDIRECT SERPL-MCNC: ABNORMAL MG/DL
BILIRUB SERPL-MCNC: 0.7 MG/DL (ref 0–1.2)
CREAT SERPL-MCNC: 0.83 MG/DL (ref 0.57–1)
EGFRCR SERPLBLD CKD-EPI 2021: 68.3 ML/MIN/1.73
PROT SERPL-MCNC: 6.1 G/DL (ref 6–8.5)
WHOLE BLOOD HOLD SPECIMEN: NORMAL

## 2023-03-06 PROCEDURE — G0378 HOSPITAL OBSERVATION PER HR: HCPCS

## 2023-03-06 PROCEDURE — 80076 HEPATIC FUNCTION PANEL: CPT | Performed by: INTERNAL MEDICINE

## 2023-03-06 PROCEDURE — 82565 ASSAY OF CREATININE: CPT | Performed by: INTERNAL MEDICINE

## 2023-03-06 RX ADMIN — OXYCODONE HYDROCHLORIDE AND ACETAMINOPHEN 500 MG: 500 TABLET ORAL at 09:31

## 2023-03-06 RX ADMIN — LOSARTAN POTASSIUM 50 MG: 50 TABLET, FILM COATED ORAL at 09:31

## 2023-03-06 RX ADMIN — LEVOTHYROXINE SODIUM 50 MCG: 0.05 TABLET ORAL at 09:32

## 2023-03-06 RX ADMIN — Medication 1 CAPSULE: at 09:31

## 2023-03-06 RX ADMIN — CETIRIZINE HYDROCHLORIDE 10 MG: 10 TABLET ORAL at 09:31

## 2023-03-06 RX ADMIN — Medication 2000 UNITS: at 09:31

## 2023-03-06 RX ADMIN — METOPROLOL TARTRATE 25 MG: 25 TABLET, FILM COATED ORAL at 09:32

## 2023-03-06 NOTE — PROGRESS NOTES
Name: Stacie Vieira ADMIT: 3/2/2023   : 1935  PCP: Eber Hua MD    MRN: [id number] LOS: 2 days   AGE/SEX: 88 y.o. female  ROOM: [facility]     Subjective   Subjective   Feeling worse but no specific complaints. Just weak    Review of Systems     Objective   Objective   Vital Signs  Temp:  [98 °F (36.7 °C)-99.5 °F (37.5 °C)] 98.8 °F (37.1 °C)  Heart Rate:  [58-91] 70  Resp:  [16-18] 18  BP: (104-150)/(67-83) 104/79  SpO2:  [94 %-98 %] 98 %  on   ;   Device (Oxygen Therapy): room air  Body mass index is 19.31 kg/m².  Physical Exam  Vitals reviewed.   Constitutional:       Comments: Somewhat frail-appearing   HENT:      Head: Normocephalic and atraumatic.   Eyes:      General: No scleral icterus.  Cardiovascular:      Rate and Rhythm: Normal rate.   Pulmonary:      Effort: No respiratory distress.      Breath sounds: Normal breath sounds. No wheezing.   Abdominal:      General: Bowel sounds are normal. There is no distension.      Palpations: Abdomen is soft.      Tenderness: There is no abdominal tenderness.   Musculoskeletal:      Right lower leg: No edema.      Left lower leg: No edema.   Skin:     General: Skin is warm and dry.   Neurological:      Mental Status: She is alert and oriented to person, place, and time.   Psychiatric:         Mood and Affect: Mood normal.       Results Review     I reviewed the patient's new clinical results.  Results from last 7 days   Lab Units 23  0259 23  0921   WBC 10*3/mm3 8.20 10.96*   HEMOGLOBIN g/dL 12.4 13.8   PLATELETS 10*3/mm3 181 270     Results from last 7 days   Lab Units 23  0259 23  0354 23  0326 23  1736 23  0921   SODIUM mmol/L  --  139  --   --  135*   POTASSIUM mmol/L  --  3.8  --   --  4.1   CHLORIDE mmol/L  --  103  --   --  100   CO2 mmol/L  --  26.4  --   --  23.2   BUN mg/dL  --  16  --   --  13   CREATININE mg/dL 0.81 0.82  0.99 1.05* 0.94 1.04*   GLUCOSE mg/dL  --  91  --   --  150*   EGFR  mL/min/1.73 70.4 69.3  55.3* 51.5* 58.8* 52.1*     Results from last 7 days   Lab Units 03/05/23  0259 03/04/23  0354 03/03/23 0326 03/02/23  1736   ALBUMIN g/dL 3.4* 3.4* 3.5 4.2   BILIRUBIN mg/dL 0.3 0.3 0.4 0.4   ALK PHOS U/L 54 56 62 71   AST (SGOT) U/L 17 19 19 24   ALT (SGPT) U/L 11 8 8 12     Results from last 7 days   Lab Units 03/05/23  0259 03/04/23  0354 03/03/23 0326 03/02/23 1736 03/02/23  0921   CALCIUM mg/dL  --  8.5*  --   --  9.0   ALBUMIN g/dL 3.4* 3.4* 3.5 4.2 4.2   MAGNESIUM mg/dL  --   --   --   --  1.9     Results from last 7 days   Lab Units 03/02/23  1736   PROCALCITONIN ng/mL 0.15     No results found for: HGBA1C, POCGLU    No radiology results for the last day  I have personally reviewed all medications:  Scheduled Medications  vitamin C, 500 mg, Oral, Daily  cetirizine, 10 mg, Oral, Daily  cholecalciferol, 2,000 Units, Oral, Daily  enoxaparin, 40 mg, Subcutaneous, Nightly  lactobacillus acidophilus, 1 capsule, Oral, Daily  levothyroxine, 50 mcg, Oral, Every Other Day  levothyroxine, 75 mcg, Oral, Every Other Day  losartan, 50 mg, Oral, Q24H  metoprolol tartrate, 25 mg, Oral, BID    Infusions   Diet  Diet: Cardiac Diets; Healthy Heart (2-3 Na+); Texture: Regular Texture (IDDSI 7); Fluid Consistency: Thin (IDDSI 0)    I have personally reviewed:  [x]  Laboratory   [x]  Microbiology   [x]  Radiology   [x]  EKG/Telemetry  []  Cardiology/Vascular   []  Pathology    [x]  Records       Assessment/Plan     Active Hospital Problems    Diagnosis  POA   • **COVID-19 [U07.1]  Yes   • Stage 3a chronic kidney disease (CKD) (HCC) [N18.31]  Yes   • PSVT (paroxysmal supraventricular tachycardia) (HCC) [I47.1]  Yes   • Hypothyroidism [E03.9]  Yes   • Mycobacterium avium complex (HCC) [A31.0]  Yes      Resolved Hospital Problems    Diagnosis Date Resolved POA   • Benign essential HTN [I10] 11/10/2022 Yes       88 y.o. female admitted with COVID-19.    COVID symptoms relatively stable, minimal cough but  mostly just weakness and fatigue.    - s/p 3-day course remdesivir.    - Monitor inflammatory markers.CRP from 10.1 down to 5.8    PSVT-this seems to be the bulk of her symptoms.  Bradycardia improved with med changes.      Elevated troponin- no active chest pain.  Cardiology planning outpatient stress test      · Lovenox 40 mg SC daily for DVT prophylaxis.  · Disposition: Anticipate home 1 to 2 days. Encouraged her to get OOB today      Eber Resendez MD  [facility]  3/3/2023

## 2023-03-06 NOTE — DISCHARGE SUMMARY
[facility]    Date of Discharge:  3/6/2023    PCP: Eber Hua MD    Discharge Diagnosis:   Active Hospital Problems    Diagnosis  POA   • **COVID-19 [U07.1]  Yes   • Stage 3a chronic kidney disease (CKD) ([facility]) [N18.31]  Yes   • PSVT (paroxysmal supraventricular tachycardia) ([facility]) [I47.1]  Yes   • Hypothyroidism [E03.9]  Yes   • Mycobacterium avium complex ([facility]) [A31.0]  Yes      Resolved Hospital Problems    Diagnosis Date Resolved POA   • Benign essential HTN [I10] 11/10/2022 Yes          Consults     Date and Time Order Name Status Description    3/2/2023  6:13 PM Inpatient Cardiology Consult Completed     3/2/2023  1:13 PM [facility] (on-call MD unless specified) Details          Hospital Course  88 y.o. female who initially came in due to lightheadedness and palpitations.  Etiology is secondary to being COVID-positive.  Due to her cardiac history, we did consult cardiology.  At this point in time they are planning on outpatient stress test and have otherwise felt no further inpatient cardiac testing is warranted.  She had some mild elevation of cardiac troponins with some abnormalities to her EKG with a past history of SVT.  Cardiology has actually decreased her metoprolol from 75 mg twice daily to 25 mg twice daily due to bradycardia noticed on telemetry. Her BP has otherwise remain overall stable and she can resume her normal regimen post discharge.  For her COVID, she was treated with remdesivir x3 days.  CRP has shown significant decline from 10 down to 5.  Patient's oxygenation is stable she is 100% on room air.  She is overall clear to auscultation on exam as well.  She only has some residual aspects of fatigue/ malaise from COVID without any respiratory compromise. Case discussed with RN at bedside.  No family present secondary to enhanced precautions for isolation due to COVID.  Patient claims that she lives independently and has been ambulating  the room without any issue or need for additional assistance or cane or walker.  She is medically stable for discharge at any time pending further logistics per [facility].        Temp:  [98 °F (36.7 °C)-99.6 °F (37.6 °C)] 98.8 °F (37.1 °C)  Heart Rate:  [58-72] 65  Resp:  [16-18] 18  BP: (104-137)/(61-83) 126/61  Body mass index is 19.31 kg/m².    Physical Exam  Constitutional:       Comments: PA w/ speech.  Fluent.  Nontoxic   HENT:      Head: Normocephalic.      Nose: Nose normal.      Mouth/Throat:      Mouth: Mucous membranes are moist.      Pharynx: Oropharynx is clear.   Eyes:      Conjunctiva/sclera: Conjunctivae normal.   Cardiovascular:      Rate and Rhythm: Normal rate and regular rhythm.   Pulmonary:      Effort: Pulmonary effort is normal. No respiratory distress.      Breath sounds: Normal breath sounds.   Abdominal:      General: Bowel sounds are normal. There is no distension.      Tenderness: There is no abdominal tenderness.   Musculoskeletal:         General: No swelling.   Skin:     General: Skin is warm and dry.   Neurological:      Mental Status: She is alert and oriented to person, place, and time. Mental status is at baseline.      Cranial Nerves: No cranial nerve deficit.      Motor: No weakness.      Gait: Gait normal.       Disposition: Skilled Nursing Facility (DC - External)       Discharge Medications      Changes to Medications      Instructions Start Date   metoprolol tartrate 25 MG tablet  Commonly known as: LOPRESSOR  What changed: how much to take   25 mg, Oral, 2 Times Daily         Continue These Medications      Instructions Start Date   Levoxyl 50 MCG tablet  Generic drug: levothyroxine   TAKE ONE TABLET BY MOUTH EVERY OTHER DAY      Levoxyl 75 MCG tablet  Generic drug: levothyroxine   TAKE ONE TABLET BY MOUTH EVERY OTHER DAY      Loratadine 10 MG capsule   10 mg, Oral, Daily      olmesartan 20 MG tablet  Commonly known as: BENICAR   20 mg, Oral, Daily      PROBIOTIC ADVANCED PO   2  capsules, Oral, Daily      vitamin C 500 MG tablet  Commonly known as: ASCORBIC ACID   500 mg, Oral, Daily      Vitamin D 50 MCG (2000 UT) tablet   2,000 Units, Oral, Daily         Stop These Medications    amLODIPine 2.5 MG tablet  Commonly known as: NORVASC     JOINT SUPPORT PO     Zinc 50 MG capsule             Additional Instructions for the Follow-ups that You Need to Schedule     Discharge Follow-up with PCP   As directed       Currently Documented PCP:    Eber Hua MD    PCP Phone Number:    [phone]     Follow Up Details: PCP 2 to 3 weeks.  Cardiology per their recommendations            Follow-up Information     Eber Hua MD .    Specialty: Internal Medicine  Why: PCP 2 to 3 weeks.  Cardiology per their recommendations  Contact information:  [address] [address] [address] [phone]                           Diego Campoverde MD  [facility]  03/06/23    Discharge time spent greater than 30 minutes.

## 2023-03-06 NOTE — PLAN OF CARE
Goal Outcome Evaluation:  Plan of Care Reviewed With: patient        Progress: improving  Outcome Evaluation: vss. pt rested well overshift. up ad octaviano.

## 2023-03-06 NOTE — CASE MANAGEMENT/SOCIAL WORK
Case Management Discharge Note      Final Note: Home via family, no additional CCP needs. Jc RN/CCP         Selected Continued Care - Admitted Since 3/2/2023     Destination    No services have been selected for the patient.              Durable Medical Equipment    No services have been selected for the patient.              Dialysis/Infusion    No services have been selected for the patient.              Home Medical Care    No services have been selected for the patient.              Therapy    No services have been selected for the patient.              Community Resources    No services have been selected for the patient.              Community & DME    No services have been selected for the patient.                       Final Discharge Disposition Code: 01 - home or self-care

## 2023-03-06 NOTE — PLAN OF CARE
Goal Outcome Evaluation:            Discharge instructions discussed and the patient verbalized understanding. VSS at the time of discharge. Patient's son, Sammy will be picking up the patient.
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
## 2023-03-07 ENCOUNTER — TELEPHONE (OUTPATIENT)
Dept: CARDIOLOGY | Facility: CLINIC | Age: 88
End: 2023-03-07

## 2023-03-07 NOTE — TELEPHONE ENCOUNTER
Caller: MIA    Relationship: SELF    Best call back number: [phone]    What is the best time to reach you: ANY      What was the call regarding: PT WOULD LIKE TO SPEAK TO DR. RIVAS- SHE IS EXTREMELY CONFUSED ABOUT HER CARE, MEDICATIONS AND TESTING. SHE HAS HAD SO MANY CHANGES BETWEEN HER PRIMARY CARE AND THE ED.      AT THIS POINT, SHE REALLY ISN'T SURE WHAT MEDICATION SHE SHOULD BE TAKING, WHY SHE HAS THESE TESTS SCHEDULED AND WHO SHE NEEDS TO FOLLOW UP WITH.    Do you require a callback: YES, WOULD PREFER TO TALK TO DR. RIVAS IF POSSIBLE

## 2023-03-08 NOTE — TELEPHONE ENCOUNTER
Reviewed recommendations with Stacie Vieira and the patient is agreeable to this plan.    Scheduling: please contact patient to reschedule TREADMILL stress test.  Patient is recovering from COVID and thinks she should be better/able to do test on 3/23.  Patient prefers Thursday appointments.       Thank you,  Lorna KLEIN RN  Triage Nurse [facility]

## 2023-03-08 NOTE — TELEPHONE ENCOUNTER
"Stacie Vieira returned call.    Reviewed Rosa's message with patient and she verbalized understanding.    Patient expressed concerns regarding scheduled stress test.  Patient reports that she has had 2 nuclear stress tests previously that have shown nothing.  Patient also stated that had a problem with the agent that was used for the stress test and had to \"get a shot both times\" after the test to counteract the medication.      Patient stated she was scheduled for a treadmill stress test and was agreeable to this, but received a call telling her the treadmill stress test was cancelled and that she has been rescheduled on 3/10 with a nuclear stress test.  Chart review shows patient is scheduled for stress test with myocardial perfusion:      Is this an exercise stress test or is it strictly pharmacologic?  Patient stated that she is too weak and doesn't feel strong enough to do stress test on Friday and would like to reschedule, but does not want to do a nuclear stress, only a treadmill.    Please let me know how you would like to proceed.    Thank you,  Lorna KLEIN RN  Triage Nurse [facility]  "

## 2023-03-08 NOTE — TELEPHONE ENCOUNTER
Please call this patient and let her know that Dr. Curiel is out until next week.   Also, let her know her medication was changed to see if there would be any improvement in her feelings that she felt were similar to SVT. She has also tested Covid positive which may be complicating matter.     Her potassium was high on 40 mg olmesartan. This was reduced to 20 mg daily and her potassium is better.     She was switched from long acting metoprolol to short acting metoprolol to see if this would help her SVT symptoms. The original dose of 75 mg metoprolol tartrate has caused her HR to be low so it has been reduced.     Again, COVID is likely complicating matters. I will discuss this with Dr. Curiel upon her return.     Thanks!  ANNABEL Contreras

## 2023-03-08 NOTE — TELEPHONE ENCOUNTER
I think Dr. Chamorro switched it to Nuclear stress test when she was in the hospital. We can switch it back to a treadmill.     Thanks!  ANNABEL Contreras

## 2023-03-14 NOTE — PROGRESS NOTES
Chief Complaint  Covid follow up.     Subjective        Stacie Vieira presents to [facility]  History of Present Illness    Patient presents today for hospital follow-up.  She was in the hospital from 3/2/23 through 3/6/2023 with a discharge diagnosis of COVID-19, stage III renal disease, MAC, and PSVT.  She feels tired and no energy and somewhat SOB.  This has been since she left the hospital but the SOB has been going on for long time and has seen cardio for this.  She has no fever or chills.    Hypothyroidism- alternates 75 and 50 mcg  HTN- no CP or HA;  Her bp has been high since leaving the hospital in the 150-160/90s.  She is on benicar 20 qd and metoprolol 25 twice a day.  Her benicar was cut from 40 to 20 bc her K levels were off.                    Hospital course:  Hospital Course  88 y.o. female who initially came in due to lightheadedness and palpitations.  Etiology is secondary to being COVID-positive.  Due to her cardiac history, we did consult cardiology.  At this point in time they are planning on outpatient stress test and have otherwise felt no further inpatient cardiac testing is warranted.  She had some mild elevation of cardiac troponins with some abnormalities to her EKG with a past history of SVT.  Cardiology has actually decreased her metoprolol from 75 mg twice daily to 25 mg twice daily due to bradycardia noticed on telemetry. Her BP has otherwise remain overall stable and she can resume her normal regimen post discharge.  For her COVID, she was treated with remdesivir x3 days.  CRP has shown significant decline from 10 down to 5.  Patient's oxygenation is stable she is 100% on room air.  She is overall clear to auscultation on exam as well.  She only has some residual aspects of fatigue/ malaise from COVID without any respiratory compromise. Case discussed with RN at bedside.  No family present secondary to enhanced precautions for isolation due to COVID.   "Patient claims that she lives independently and has been ambulating the room without any issue or need for additional assistance or cane or walker.  She is medically stable for discharge at any time pending further logistics per [facility].  Objective   Vital Signs:  /98 (BP Location: Right arm, Patient Position: Sitting, Cuff Size: Adult)   Pulse 72   Temp 96.9 °F (36.1 °C) (Temporal)   Resp 18   Ht 159 cm (62.6\")   Wt 49.7 kg (109 lb 9.6 oz)   SpO2 99%   BMI 19.66 kg/m²   Estimated body mass index is 19.66 kg/m² as calculated from the following:    Height as of this encounter: 159 cm (62.6\").    Weight as of this encounter: 49.7 kg (109 lb 9.6 oz).       BMI is within normal parameters. No other follow-up for BMI required.      Physical Exam  Vitals and nursing note reviewed.   Constitutional:       Appearance: Normal appearance. She is well-developed.   Cardiovascular:      Rate and Rhythm: Normal rate and regular rhythm.      Heart sounds: Normal heart sounds. No murmur heard.  Pulmonary:      Effort: Pulmonary effort is normal. No respiratory distress.      Breath sounds: Normal breath sounds. No stridor. No wheezing or rhonchi.   Neurological:      General: No focal deficit present.      Mental Status: She is alert and oriented to person, place, and time. She is not disoriented.   Psychiatric:         Mood and Affect: Mood normal.         Behavior: Behavior normal.        Result Review :                   Assessment and Plan   Diagnoses and all orders for this visit:    1. Dyspnea on exertion (Primary)    2. Hypertension, essential    3. Acquired hypothyroidism             Follow Up   No follow-ups on file.  Patient was given instructions and counseling regarding her condition or for health maintenance advice. Please see specific information pulled into the AVS if appropriate.     I have reviewed patient's hospital visit along with labs(CMP, CBC,TSH, diagnostics, other information.    Given warning signs " for stroke and MI.    Patient to monitor BP over next week and call me with readings at that time and we can make adjustments accordingly if needed.  Follow up with cardio as scheduled.  They will adjust her bp med as needed since they have been making these changes with her bp med.

## 2023-03-15 ENCOUNTER — OFFICE VISIT (OUTPATIENT)
Dept: FAMILY MEDICINE CLINIC | Facility: CLINIC | Age: 88
End: 2023-03-15
Payer: MEDICARE

## 2023-03-15 VITALS
RESPIRATION RATE: 18 BRPM | HEIGHT: 63 IN | OXYGEN SATURATION: 99 % | DIASTOLIC BLOOD PRESSURE: 98 MMHG | BODY MASS INDEX: 19.42 KG/M2 | TEMPERATURE: 96.9 F | HEART RATE: 72 BPM | SYSTOLIC BLOOD PRESSURE: 152 MMHG | WEIGHT: 109.6 LBS

## 2023-03-15 DIAGNOSIS — I10 HYPERTENSION, ESSENTIAL: ICD-10-CM

## 2023-03-15 DIAGNOSIS — E03.9 ACQUIRED HYPOTHYROIDISM: ICD-10-CM

## 2023-03-15 DIAGNOSIS — R06.09 DYSPNEA ON EXERTION: Primary | ICD-10-CM

## 2023-03-15 PROCEDURE — 1160F RVW MEDS BY RX/DR IN RCRD: CPT | Performed by: FAMILY MEDICINE

## 2023-03-15 PROCEDURE — 99214 OFFICE O/P EST MOD 30 MIN: CPT | Performed by: FAMILY MEDICINE

## 2023-03-15 PROCEDURE — 1159F MED LIST DOCD IN RCRD: CPT | Performed by: FAMILY MEDICINE

## 2023-03-23 ENCOUNTER — HOSPITAL ENCOUNTER (OUTPATIENT)
Dept: CARDIOLOGY | Facility: HOSPITAL | Age: 88
Discharge: HOME | End: 2023-03-23
Payer: MEDICARE

## 2023-03-23 ENCOUNTER — TELEPHONE (OUTPATIENT)
Dept: CARDIOLOGY | Facility: HOSPITAL | Age: 88
End: 2023-03-23

## 2023-03-23 DIAGNOSIS — I47.1 PSVT (PAROXYSMAL SUPRAVENTRICULAR TACHYCARDIA): ICD-10-CM

## 2023-03-23 DIAGNOSIS — R06.09 DYSPNEA ON EXERTION: ICD-10-CM

## 2023-03-23 DIAGNOSIS — I47.1 SVT (SUPRAVENTRICULAR TACHYCARDIA): Primary | ICD-10-CM

## 2023-03-23 NOTE — TELEPHONE ENCOUNTER
Patient arrived for treadmill stress test. Baseline EKG obtained (see attached image) and showed SVT () /80. Patient stated that she could feel her heart racing and was short of breath. Patient denied chest pain.    Spoke with Dr. Curiel and Rosa JIN regarding plan of care. Treadmill stress test canceled. 48 Holter monitor ordered and patient instructed to resume metoprolol tartrate that she held prior to her test.     Thank you.  Blessing Gonzalez RN

## 2023-03-30 ENCOUNTER — OFFICE VISIT (OUTPATIENT)
Dept: CARDIOLOGY | Facility: CLINIC | Age: 88
End: 2023-03-30
Payer: MEDICARE

## 2023-03-30 VITALS
DIASTOLIC BLOOD PRESSURE: 70 MMHG | HEIGHT: 62 IN | SYSTOLIC BLOOD PRESSURE: 150 MMHG | WEIGHT: 111 LBS | HEART RATE: 66 BPM | BODY MASS INDEX: 20.43 KG/M2

## 2023-03-30 DIAGNOSIS — R06.09 DYSPNEA ON EXERTION: ICD-10-CM

## 2023-03-30 DIAGNOSIS — N18.2 CKD (CHRONIC KIDNEY DISEASE) STAGE 2, GFR 60-89 ML/MIN: ICD-10-CM

## 2023-03-30 DIAGNOSIS — I10 HYPERTENSION, ESSENTIAL: ICD-10-CM

## 2023-03-30 DIAGNOSIS — E87.5 HYPERKALEMIA: ICD-10-CM

## 2023-03-30 DIAGNOSIS — I47.1 PSVT (PAROXYSMAL SUPRAVENTRICULAR TACHYCARDIA): Primary | ICD-10-CM

## 2023-03-30 DIAGNOSIS — U07.1 COVID-19: ICD-10-CM

## 2023-03-30 PROCEDURE — 1159F MED LIST DOCD IN RCRD: CPT | Performed by: NURSE PRACTITIONER

## 2023-03-30 PROCEDURE — 99214 OFFICE O/P EST MOD 30 MIN: CPT | Performed by: NURSE PRACTITIONER

## 2023-03-30 PROCEDURE — 1160F RVW MEDS BY RX/DR IN RCRD: CPT | Performed by: NURSE PRACTITIONER

## 2023-04-03 PROBLEM — N18.2 CKD (CHRONIC KIDNEY DISEASE) STAGE 2, GFR 60-89 ML/MIN: Status: ACTIVE | Noted: 2023-04-03

## 2023-04-03 PROCEDURE — 93000 ELECTROCARDIOGRAM COMPLETE: CPT | Performed by: NURSE PRACTITIONER

## 2023-04-03 NOTE — PROGRESS NOTES
"      [facility] CARDIOLOGY  [address]  [facility] [address]  [facility] [address]  Phone: [phone]  Patient Name: Stacie Vieira  :1935  Age: 88 y.o.  Primary Cardiologist: Aisha Curiel MD  Encounter Provider:  ANNABEL Amaral    History of Present Illness     Stacie Vieira is a 88 y.o.  female whose medical history includes hypertension, hyperlipidemia, right renal artery stenosis, and left carotid artery stenosis.  She is followed in our office by Dr. Curiel for paroxysmal SVT. I have reviewed the past medical records in preparation of today's visit.     Follow-up:  She is here for hospital follow-up.  2023 she was admitted with lightheadedness and palpitations; she tested positive for COVID-19.  At last visit I had transitioned her from metoprolol succinate to tartrate 75 mg twice daily.  In hospital she did have some bradycardia and her metoprolol tartrate was reduced to 25 mg twice daily.  I had also scheduled her for treadmill stress test as she was reporting episodes that felt like SVT with exertion; after holding her beta-blocker and coming in for stress as she was noted to be in SVT.  Treadmill stress test was canceled; a 48-hour Holter monitor showed the predominant rhythm to be sinus rhythm with average heart rate 58 bpm and there were is 4 beats of SVT noted.  Since that time she has had occasional episodes that feel like SVT to her.  Her blood pressure at home is usually 130s to 170s.  She has occasional lightheadedness.  She usually has the episodes of SVT with elevated heart rate when walking and she is more short of breath.  She denies chest pain, orthopnea, leg swelling, or syncope.  She is taking her medications as prescribed.    Data Review     The following data was reviewed by ANNABEL Amaral on 23:    Vital Signs:   /70 (BP Location: Left arm)   Pulse 66   Ht 157.5 cm (62\")   Wt 50.3 kg (111 " lb)   BMI 20.30 kg/m²       Weight:  Wt Readings from Last 3 Encounters:   03/30/23 50.3 kg (111 lb)   03/15/23 49.7 kg (109 lb 9.6 oz)   03/02/23 49.4 kg (109 lb)     Body mass index is 20.3 kg/m².    Below is a summary of pertinent cardiology findings:  • She is , her long time neighbors are no longer close by, she has a son who lives in [facility] and is an intensivist at [employer]; she gets anxious at night.  • She has had SVT since the 1970s and was treated with propanolol; she reported for previous hospitalizations with SVT.  • December 2017 echocardiogram showed EF 67%, grade 1 LV diastolic dysfunction, a calcified aortic valve, and mild tricuspid valve insufficiency; this was done when she was hospitalized with pneumonia.   • December 2017 1 hospitalized with pneumonia she also had Guillain-Barré and was treated with plasmapheresis.  She did have an episode of syncope due to bradycardia during this hospitalization and was treated with chest compressions and atropine.  • July 2018 renal artery duplex showed normal left renal artery, a large left renal cyst, hemodynamically significant right renal artery stenosis.  • August 2018 myocardial perfusion stress test showed no evidence of ischemia.  • May 2019 renal artery duplex showed no significant left renal artery stenosis and left and 60% right renal artery stenosis.  • September 2019 vascular screening showed less than 50% left internal carotid artery stenosis, normal abdominal aorta.  • November 2021 evaluation for exertional dyspnea included an echocardiogram which showed normal LVEF of 64%, mild tricuspid valve regurgitation, and mild mitral valve regurgitation.  Myocardial perfusion stress study was normal.  Carotid artery duplex study showed bilateral plaque, and mild stenosis in the proximal, mid, and distal left carotid artery.  • June 2022 she had normal PFTs with Dr. Sinan Braga; she has history of myobacterium avium  complex.  • December 2022 30-day event monitor showed a 4 beat run of nonsustained VT that occurred on day 18.  Otherwise the predominant rhythm was normal sinus rhythm with rare PACs and rare PVCs.  • March 2023 she was admitted with lightheadedness and palpitations and tested positive for COVID-19; she was noted to be bradycardic in metoprolol tartrate was reduced to 25 mg twice daily.  • March 2023 she presented to the office for treadmill stress test; she was noted to be in SVT.  Treadmill stress test was canceled and she was set up for 48-hour Holter monitor which showed the predominant rhythm to be sinus rhythm with average heart rate 58 bpm, and a 4 beat run of SVT with rare PACs and PVCs.    Labs:  • 10/18/2022:  cr 0.9, K 5.7, otherwise unremarkable CMP, TSH 0.493, free T4  1.40, Hgb 13.2, Plt 377  • 01/11/2023: K 5.4  • 03/04/2023: cr 0.8, K3.8, otherwise unremarkable CMP, Hgb 12.4, , TSH 2.030      ECG 12 Lead    Date/Time: 4/3/2023 6:21 PM  Performed by: Cari Nunes APRN  Authorized by: Cari Nunes APRN   Comparison: compared with previous ECG from 3/2/2023  Similar to previous ECG  Rhythm: sinus rhythm  Rate: normal  BPM: 66    Clinical impression: normal ECG            Medications     Allergies as of 03/30/2023 - Reviewed 03/30/2023   Allergen Reaction Noted   • Influenza vaccines Mental Status Change 12/21/2017   • Ethambutol Other (See Comments) 01/05/2022   • Statins Myalgia 01/05/2022   • Alendronate Other (See Comments) 02/15/2016   • Ezetimibe Other (See Comments) 02/15/2016   • Pravachol [pravastatin] Other (See Comments) 02/15/2016         Current Outpatient Medications:   •  Cholecalciferol (VITAMIN D) 2000 units tablet, Take 2,000 Units by mouth Daily., Disp: , Rfl:   •  Levoxyl 50 MCG tablet, TAKE ONE TABLET BY MOUTH EVERY OTHER DAY, Disp: 45 tablet, Rfl: 1  •  Levoxyl 75 MCG tablet, TAKE ONE TABLET BY MOUTH EVERY OTHER DAY, Disp: 45 tablet, Rfl: 2  •   Loratadine 10 MG capsule, Take 1 capsule by mouth Daily., Disp: , Rfl:   •  metoprolol tartrate (LOPRESSOR) 25 MG tablet, Take 1.5 tablets by mouth 2 (Two) Times a Day., Disp: 90 tablet, Rfl: 3  •  olmesartan (BENICAR) 20 MG tablet, Take 1 tablet by mouth Daily., Disp: , Rfl:   •  Probiotic Product (PROBIOTIC ADVANCED PO), Take 2 capsules by mouth Daily., Disp: , Rfl:      Past History, Review of Systems, Exam     Past Medical History:   Diagnosis Date   • Acute stress disorder    • Allergic rhinitis    • C. difficile colitis    • Dyspnea on exertion    • Elevated fasting glucose    • Essential hypertension    • Guillain Barré syndrome    • H/O: pneumonia    • Health care maintenance    • History of palpitations    • Hyperlipidemia    • Hypokalemia    • Hypothyroidism    • Insomnia    • Labile blood pressure    • MAC (mycobacterium avium-intracellulare complex)    • Osteoporosis    • Palpitations    • Pneumonia    • Polyneuropathy 12/20/2017   • PSVT (paroxysmal supraventricular tachycardia)    • Renal cyst    • Sinusitis    • Staph infection    • Stenosis of right carotid artery 10/22/2020   • Stenosis of right renal artery 07/05/2018   • SVT (supraventricular tachycardia)    • Syncope 12/25/2017   • Syncope and collapse    • Thyroiditis        Past Surgical History:   has a past surgical history that includes Tonsillectomy; Dilation and curettage of uterus (N/A, 01/01/1990); Bronchoscopy; Bronchoscopy (N/A, 4/8/2016); Colonoscopy (N/A, 07/08/2005); Pap Smear (N/A, 03/30/2004); Breast biopsy; Sinus surgery (01/2016); Bronchoscopy (N/A, 12/9/2017); and Bronchoscopy (N/A, 7/20/2018).     Social History     Socioeconomic History   • Marital status:    Tobacco Use   • Smoking status: Never   • Smokeless tobacco: Never   Vaping Use   • Vaping Use: Never used   Substance and Sexual Activity   • Alcohol use: No     Comment: caffeine use: 1 cup daily   • Drug use: No   • Sexual activity: Never       Review of  Systems   Cardiovascular: Positive for dyspnea on exertion and palpitations. Negative for chest pain, claudication, cyanosis, irregular heartbeat, leg swelling, near-syncope, orthopnea, paroxysmal nocturnal dyspnea and syncope.   Neurological: Positive for light-headedness.   Psychiatric/Behavioral: The patient is nervous/anxious.        Vitals reviewed.   Constitutional:       Appearance: Not in distress.   Eyes:      Conjunctiva/sclera: Conjunctivae normal.      Pupils: Pupils are equal, round, and reactive to light.   HENT:      Head: Normocephalic.      Nose: Nose normal.    Mouth/Throat:      Pharynx: Oropharynx is clear.   Neck:      Vascular: JVD normal.   Pulmonary:      Effort: Pulmonary effort is normal.      Breath sounds: Normal breath sounds. No wheezing. No rhonchi. No rales.   Cardiovascular:      Tachycardia present. Regular rhythm. Normal S1. Normal S2.      Murmurs: There is no murmur.   Pulses:     Intact distal pulses.   Edema:     Peripheral edema absent.   Abdominal:      General: Bowel sounds are normal. There is no distension.      Palpations: Abdomen is soft.      Tenderness: There is no abdominal tenderness.   Musculoskeletal: Normal range of motion.      Cervical back: Normal range of motion and neck supple. Skin:     General: Skin is warm and dry.   Neurological:      Mental Status: Alert and oriented to person, place and time.   Psychiatric:         Attention and Perception: Attention normal.         Mood and Affect: Mood normal.         Speech: Speech normal.         Behavior: Behavior is cooperative.          Assessment and Plan     Assessment:  No diagnosis found.     1. Paroxysmal SVT: She has had issues with SVT since 1988 and has had 3-4 hospitalizations.  She had a 30-day event monitor in December 2022 which was normal.  She is frustrated because she states she is having dyspnea with exertion, lightheadedness, and shakiness which are similar to her previous SVT symptoms.  She was  noted to be in sustained SVT when she presented for treadmill stress test in March 2023; stress test was canceled and she was started back on her beta-blocker.  Her heart rate is better controlled but she still having episodes that feel like SVT associated with dyspnea.  2. Dypnea with exertion when walking stairs or walking too fast.  She believes this is related to SVT.  3. Hyperlipidemia: No recent lipid studies to review.  She is not on a statin.  This is managed by Dr. Bellamy.  4. Left carotid artery stenosis: Carotid artery duplex in November 2021 showed mild left carotid artery stenosis.  She is asymptomatic with this.  5. Right renal artery stenosis: May 2019 renal artery duplex showed less than 60% right renal artery stenosis.  Her renal function is normal.  She is having hyperkalemia on Benicar.  6. CKD 2: She has right renal artery stenosis but normal renal function.  Normal renal function when hospitalized in March 2023.  7. Guillain-Barré syndrome: She had Guillain-Barré during December 2017 hospitalization and was treated with plasmapheresis.  8. Mycobacterium AVM complex: She follows with Dr. Sinan Braga.  She had normal PFTs in June 2022.  9. Hypertension: A little elevated compared to previous.  10. Hyperkalemia: Suspect this is due to right renal artery stenosis and olmesartan.  Normal potassium when hospitalized.    Ms. Vieira is a patient of Dr. Curiel's with history of SVT.  She has had normal cardiac testing of the last year but continues to have symptoms that she associates with SVT; dyspnea with exertion, shakiness, and lightheadedness.  She is also been having issues with hyperkalemia; she has less than 60% right renal artery stenosis.  She also has mild left carotid artery stenosis.  A sustained episode of SVT was noted when she presented for treadmill stress test in March 2023.    I am going to increase her metoprolol to tartrate to 37.5 mg twice daily.  I will see her back in 2  weeks.  I called and discussed these findings with her son Nato.    Return for Please schedule for 4/13/23 with MM.  No orders of the defined types were placed in this encounter.     New Medications Ordered This Visit   Medications   • metoprolol tartrate (LOPRESSOR) 25 MG tablet     Sig: Take 1.5 tablets by mouth 2 (Two) Times a Day.     Dispense:  90 tablet     Refill:  3         Thank you for the opportunity to participate in this patient's care.    ANNABEL Contreras    This office note has been dictated.

## 2023-04-13 ENCOUNTER — OFFICE VISIT (OUTPATIENT)
Dept: CARDIOLOGY | Facility: CLINIC | Age: 88
End: 2023-04-13
Payer: MEDICARE

## 2023-04-13 VITALS
DIASTOLIC BLOOD PRESSURE: 80 MMHG | HEIGHT: 62 IN | HEART RATE: 57 BPM | SYSTOLIC BLOOD PRESSURE: 142 MMHG | WEIGHT: 110 LBS | BODY MASS INDEX: 20.24 KG/M2

## 2023-04-13 DIAGNOSIS — I10 HYPERTENSION, ESSENTIAL: ICD-10-CM

## 2023-04-13 DIAGNOSIS — I47.1 PSVT (PAROXYSMAL SUPRAVENTRICULAR TACHYCARDIA): Primary | ICD-10-CM

## 2023-04-13 DIAGNOSIS — R06.09 DYSPNEA ON EXERTION: ICD-10-CM

## 2023-04-13 RX ORDER — AMLODIPINE BESYLATE 2.5 MG/1
2.5 TABLET ORAL DAILY
Qty: 90 TABLET | Refills: 3 | Status: SHIPPED | OUTPATIENT
Start: 2023-04-13

## 2023-04-13 NOTE — PROGRESS NOTES
"      [facility] CARDIOLOGY  [address] [facility] [address]  [place] [address]  Phone: [phone]  Fax: [phone]  Patient Name: Stacie Vieira  :1935  Age: 88 y.o.  Primary Cardiologist: Aisha Curiel MD  Encounter Provider:  ANNABEL Amaral    History of Present Illness     Stacie Vieira is a 88 y.o.  female whose medical history includes hypertension, hyperlipidemia, right renal artery stenosis, and left carotid artery stenosis.  She is followed in our office by Dr. Curiel for paroxysmal SVT. I have reviewed the past medical records in preparation of today's visit.     Follow-up:  She is here for medication changes.  At last visit I increased her metoprolol to 37.5 mg twice daily.  She still has episodes that feel like brief SVT but has had nothing sustained.  She notices these mostly when she goes up the steps and it is made better when she sits down.  They are associated with some shortness of breath.  Her blood pressure has been a little elevated of late; she reports she did have leg swelling on high-dose amlodipine.  She denies chest pain or lightheadedness.  She did have some tightness in her left neck this past weekend when she sneezed.    Data Review     The following data was reviewed by ANNABEL Amaral on 23:    Vital Signs:   /80   Pulse 57   Ht 157.5 cm (62\")   Wt 49.9 kg (110 lb)   BMI 20.12 kg/m²       Weight:  Wt Readings from Last 3 Encounters:   23 50.2 kg (110 lb 9.6 oz)   23 49.9 kg (110 lb)   23 50.3 kg (111 lb)     Body mass index is 20.12 kg/m².    Below is a summary of pertinent cardiology findings:  • She is , her long time neighbors are no longer close by, she has a son who lives in [facility] and is an intensivist at [employer]; she gets anxious at night.  • She has had SVT since the 1970s and was treated with propanolol; she reported for previous " hospitalizations with SVT.  • December 2017 echocardiogram showed EF 67%, grade 1 LV diastolic dysfunction, a calcified aortic valve, and mild tricuspid valve insufficiency; this was done when she was hospitalized with pneumonia.   • December 2017 1 hospitalized with pneumonia she also had Guillain-Barré and was treated with plasmapheresis.  She did have an episode of syncope due to bradycardia during this hospitalization and was treated with chest compressions and atropine.  • July 2018 renal artery duplex showed normal left renal artery, a large left renal cyst, hemodynamically significant right renal artery stenosis.  • August 2018 myocardial perfusion stress test showed no evidence of ischemia.  • May 2019 renal artery duplex showed no significant left renal artery stenosis and left and 60% right renal artery stenosis.  • September 2019 vascular screening showed less than 50% left internal carotid artery stenosis, normal abdominal aorta.  • November 2021 evaluation for exertional dyspnea included an echocardiogram which showed normal LVEF of 64%, mild tricuspid valve regurgitation, and mild mitral valve regurgitation.  Myocardial perfusion stress study was normal.  Carotid artery duplex study showed bilateral plaque, and mild stenosis in the proximal, mid, and distal left carotid artery.  • June 2022 she had normal PFTs with Dr. Sinan Braga; she has history of myobacterium avium complex.  • December 2022 30-day event monitor showed a 4 beat run of nonsustained VT that occurred on day 18.  Otherwise the predominant rhythm was normal sinus rhythm with rare PACs and rare PVCs.  • March 2023 she was admitted with lightheadedness and palpitations and tested positive for COVID-19; she was noted to be bradycardic in metoprolol tartrate was reduced to 25 mg twice daily.  • March 2023 she presented to the office for treadmill stress test; she was noted to be in SVT.  Treadmill stress test was canceled and she was set up  for 48-hour Holter monitor which showed the predominant rhythm to be sinus rhythm with average heart rate 58 bpm, and a 4 beat run of SVT with rare PACs and PVCs.    Labs:  • 10/18/2022:  cr 0.9, K 5.7, otherwise unremarkable CMP, TSH 0.493, free T4  1.40, Hgb 13.2, Plt 377  • 01/11/2023: K 5.4  • 03/04/2023: cr 0.8, K3.8, otherwise unremarkable CMP, Hgb 12.4, , TSH 2.030  • 4/13/2023 no new labs to review      ECG 12 Lead    Date/Time: 4/13/2023 2:20 PM  Performed by: Cari Nunes APRN  Authorized by: Cari Nunes APRN   Comparison: compared with previous ECG from 3/30/2023  Similar to previous ECG  Rhythm: sinus rhythm  Rate: normal  BPM: 57  T flattening: III, aVL and V1  Other findings: T wave abnormality    Clinical impression: non-specific ECG            Medications     Allergies as of 04/13/2023 - Reviewed 04/13/2023   Allergen Reaction Noted   • Influenza vaccines Mental Status Change 12/21/2017   • Ethambutol Other (See Comments) 01/05/2022   • Statins Myalgia 01/05/2022   • Alendronate Other (See Comments) 02/15/2016   • Ezetimibe Other (See Comments) 02/15/2016   • Pravachol [pravastatin] Other (See Comments) 02/15/2016         Current Outpatient Medications:   •  Cholecalciferol (VITAMIN D) 2000 units tablet, Take 2,000 Units by mouth Daily., Disp: , Rfl:   •  Levoxyl 50 MCG tablet, TAKE ONE TABLET BY MOUTH EVERY OTHER DAY, Disp: 45 tablet, Rfl: 1  •  Levoxyl 75 MCG tablet, TAKE ONE TABLET BY MOUTH EVERY OTHER DAY, Disp: 45 tablet, Rfl: 2  •  Loratadine 10 MG capsule, Take 1 capsule by mouth Daily., Disp: , Rfl:   •  metoprolol tartrate (LOPRESSOR) 25 MG tablet, Take 1 tablet by mouth 2 (Two) Times a Day., Disp: 180 tablet, Rfl: 3  •  olmesartan (BENICAR) 20 MG tablet, Take 1 tablet by mouth Daily., Disp: , Rfl:   •  Probiotic Product (PROBIOTIC ADVANCED PO), Take 2 capsules by mouth Daily., Disp: , Rfl:   •  amLODIPine (NORVASC) 2.5 MG tablet, Take 1 tablet by mouth  Daily. (Patient taking differently: Take 1 tablet by mouth Daily. One in the morning and one at night since the blood pressure has been high.), Disp: 90 tablet, Rfl: 3     Past History, Review of Systems, Exam     Past Medical History:   Diagnosis Date   • Acute stress disorder    • Allergic rhinitis    • C. difficile colitis    • Dyspnea on exertion    • Elevated fasting glucose    • Essential hypertension    • Guillain Barré syndrome    • H/O: pneumonia    • Health care maintenance    • History of palpitations    • Hyperlipidemia    • Hypokalemia    • Hypothyroidism    • Insomnia    • Labile blood pressure    • MAC (mycobacterium avium-intracellulare complex)    • Osteoporosis    • Palpitations    • Pneumonia    • Polyneuropathy 12/20/2017   • PSVT (paroxysmal supraventricular tachycardia)    • Renal cyst    • Sinusitis    • Staph infection    • Stenosis of right carotid artery 10/22/2020   • Stenosis of right renal artery 07/05/2018   • SVT (supraventricular tachycardia)    • Syncope 12/25/2017   • Syncope and collapse    • Thyroiditis        Past Surgical History:   has a past surgical history that includes Tonsillectomy; Dilation and curettage of uterus (N/A, 01/01/1990); Bronchoscopy; Bronchoscopy (N/A, 4/8/2016); Colonoscopy (N/A, 07/08/2005); Pap Smear (N/A, 03/30/2004); Breast biopsy; Sinus surgery (01/2016); Bronchoscopy (N/A, 12/9/2017); and Bronchoscopy (N/A, 7/20/2018).     Social History     Socioeconomic History   • Marital status:    Tobacco Use   • Smoking status: Never   • Smokeless tobacco: Never   Vaping Use   • Vaping Use: Never used   Substance and Sexual Activity   • Alcohol use: No     Comment: caffeine use: 1 cup daily   • Drug use: No   • Sexual activity: Never       Review of Systems   Cardiovascular: Positive for dyspnea on exertion and palpitations. Negative for chest pain, claudication, cyanosis, irregular heartbeat, leg swelling, near-syncope, orthopnea, paroxysmal nocturnal  dyspnea and syncope.   Neurological: Positive for light-headedness.   Psychiatric/Behavioral: The patient is nervous/anxious.        Vitals reviewed.   Constitutional:       Appearance: Not in distress.   Eyes:      Conjunctiva/sclera: Conjunctivae normal.      Pupils: Pupils are equal, round, and reactive to light.   HENT:      Head: Normocephalic.      Nose: Nose normal.    Mouth/Throat:      Pharynx: Oropharynx is clear.   Neck:      Vascular: JVD normal.   Pulmonary:      Effort: Pulmonary effort is normal.      Breath sounds: Normal breath sounds. No wheezing. No rhonchi. No rales.   Cardiovascular:      Tachycardia present. Regular rhythm. Normal S1. Normal S2.      Murmurs: There is no murmur.   Pulses:     Intact distal pulses.   Edema:     Peripheral edema absent.   Abdominal:      General: Bowel sounds are normal. There is no distension.      Palpations: Abdomen is soft.      Tenderness: There is no abdominal tenderness.   Musculoskeletal: Normal range of motion.      Cervical back: Normal range of motion and neck supple. Skin:     General: Skin is warm and dry.   Neurological:      Mental Status: Alert and oriented to person, place and time.   Psychiatric:         Attention and Perception: Attention normal.         Mood and Affect: Mood normal.         Speech: Speech normal.         Behavior: Behavior is cooperative.          Assessment and Plan     Assessment:  1. PSVT (paroxysmal supraventricular tachycardia) (HCC)    2. Dyspnea on exertion    3. Hypertension, essential         1. Paroxysmal SVT: She has had issues with SVT since 1988 and has had 3-4 hospitalizations.  She had a 30-day event monitor in December 2022 which was normal.  She is frustrated because she states she is having dyspnea with exertion, lightheadedness, and shakiness which are similar to her previous SVT symptoms.  She was noted to be in sustained SVT when she presented for treadmill stress test in March 2023; stress test was  canceled and she was started back on her beta-blocker.  Her heart rate is better controlled but she still having episodes that feel like SVT associated with dyspnea.  These are brief and only occur with exertion.  2. Dypnea with exertion when walking stairs or walking too fast.  She believes this is related to SVT.  This is stable.  3. Hyperlipidemia: No recent lipid studies to review.  She is not on a statin.  This is managed by Dr. Bellamy.  4. Left carotid artery stenosis: Carotid artery duplex in November 2021 showed mild left carotid artery stenosis.  She is asymptomatic with this.  5. Right renal artery stenosis: May 2019 renal artery duplex showed less than 60% right renal artery stenosis.  Her renal function is normal.  She is having hyperkalemia on Benicar.  6. CKD 2: She has right renal artery stenosis but normal renal function.  Normal renal function when hospitalized in March 2023.  7. Guillain-Barré syndrome: She had Guillain-Barré during December 2017 hospitalization and was treated with plasmapheresis.  8. Mycobacterium AVM complex: She follows with Dr. Sinan Braga.  She had normal PFTs in June 2022.  9. Hypertension: Running high of late.  10. Hyperkalemia: Suspect this is due to right renal artery stenosis and olmesartan.  Normal potassium when hospitalized.    Ms. Vieira is a patient of Dr. Curiel's with history of SVT.  She has had normal cardiac testing of the last year but continues to have symptoms that she associates with SVT; dyspnea with exertion, shakiness, and lightheadedness.  She is also been having issues with hyperkalemia; she has less than 60% right renal artery stenosis.  She also has mild left carotid artery stenosis.  A sustained episode of SVT was noted when she presented for treadmill stress test in March 2023.    I am going to add 2.5 mg amlodipine for better blood pressure control.  I will check back in a few weeks.  For right now I will have her see Dr. Curiel in 6  months.    Return in about 6 months (around 10/13/2023) for Follow-up with Dr. Curiel.  Orders Placed This Encounter   Procedures   • ECG 12 Lead      New Medications Ordered This Visit   Medications   • metoprolol tartrate (LOPRESSOR) 25 MG tablet     Sig: Take 1 tablet by mouth 2 (Two) Times a Day.     Dispense:  180 tablet     Refill:  3   • amLODIPine (NORVASC) 2.5 MG tablet     Sig: Take 1 tablet by mouth Daily.     Dispense:  90 tablet     Refill:  3         Thank you for the opportunity to participate in this patient's care.    ANNABEL Contreras    This office note has been dictated.

## 2023-04-18 ENCOUNTER — OFFICE VISIT (OUTPATIENT)
Dept: FAMILY MEDICINE CLINIC | Facility: CLINIC | Age: 88
End: 2023-04-18
Payer: MEDICARE

## 2023-04-18 VITALS
SYSTOLIC BLOOD PRESSURE: 144 MMHG | HEIGHT: 62 IN | DIASTOLIC BLOOD PRESSURE: 86 MMHG | BODY MASS INDEX: 20.35 KG/M2 | HEART RATE: 88 BPM | WEIGHT: 110.6 LBS

## 2023-04-18 DIAGNOSIS — I47.1 PSVT (PAROXYSMAL SUPRAVENTRICULAR TACHYCARDIA): ICD-10-CM

## 2023-04-18 DIAGNOSIS — R06.09 DYSPNEA ON EXERTION: ICD-10-CM

## 2023-04-18 DIAGNOSIS — I10 HYPERTENSION, ESSENTIAL: Primary | ICD-10-CM

## 2023-04-18 DIAGNOSIS — E03.9 ACQUIRED HYPOTHYROIDISM: ICD-10-CM

## 2023-04-18 PROCEDURE — 99214 OFFICE O/P EST MOD 30 MIN: CPT | Performed by: INTERNAL MEDICINE

## 2023-04-18 PROCEDURE — 1159F MED LIST DOCD IN RCRD: CPT | Performed by: INTERNAL MEDICINE

## 2023-04-18 PROCEDURE — 1160F RVW MEDS BY RX/DR IN RCRD: CPT | Performed by: INTERNAL MEDICINE

## 2023-04-18 NOTE — PROGRESS NOTES
Subjective   Stacie Vieira is a 88 y.o. female.     Chief Complaint   Patient presents with   • Hypertension   • Hypothyroidism       History of Present Illness   Patient admitted to [facility] from 3/3/2023 3 3/6/2023 with COVID-19 positivity and mild elevation in cardiac troponins with some abnormalities on her EKG.  Cardiology was consulted who decreased her metoprolol secondary to bradycardia.  She was treated with remdesivir for 3 days and she had improved and she was discharged home.  She has since followed up with cardiology with some persistent fatigue and shortness of breath.  She did try to have a stress test which showed a sustained SVT in March and therefore was canceled and the dyspnea is believed secondary to SVT episodes.    History of hypertension.  Currently, has been feeling well and asymptomatic without any headaches, vision changes, cough, chest pain, shortness of breath, swelling, focal neurologic deficit, memory loss or syncope.  Has been taking the medications regularly and adherent with the regimen amlodipine 2.5 mg daily BID, metoprolol tartrate 25 mg twice a day (cannot take higher doses as causes bradycardia but still with SVT) and olmesartan 20 mg 1/2 tab nightly.  Denies medication side effects and no significant interval events.       History of high cholesterol.  Currently, has been feeling well without any myalgias, muscle aches, weakness, numbness, chest pain, short of breath or other issues.  Currently, is no statin secondary to statin intolerance and Zetia intolerance. Last lab on 1/5/22.     History of hypo-thyroid.  Denies fatigue, weakness, constipation/diarrhea, hair/skin changes, weight gain/loss, depression/anxiety, rashes, palpitations, swelling, chest pain, shortness of breath or other issues.  Has been compliant with taking the medication of levoxyl 50 mcg and 75 mcg every other day alternating  with no recent changes.  Denies any difficulty with the current  medication.  Last lab on 10/18/22.     History of stage 3a CKD and stable for > 3 years    The following portions of the patient's history were reviewed and updated as appropriate: allergies, current medications, past family history, past medical history, past social history, past surgical history and problem list.    Depression Screen:      10/18/2022     8:00 AM   PHQ-2/PHQ-9 Depression Screening   Little Interest or Pleasure in Doing Things 0-->not at all   Feeling Down, Depressed or Hopeless 0-->not at all   PHQ-9: Brief Depression Severity Measure Score 0       Past Medical History:   Diagnosis Date   • Acute stress disorder    • Allergic rhinitis    • C. difficile colitis     hx in past   • Dyspnea on exertion    • Elevated fasting glucose    • Essential hypertension    • Guillain Barré syndrome    • H/O: pneumonia    • Health care maintenance    • History of palpitations    • Hyperlipidemia    • Hypokalemia    • Hypothyroidism    • Insomnia    • Labile blood pressure    • MAC (mycobacterium avium-intracellulare complex)     reason for bronch.  dx with this nov 2015   • Osteoporosis    • Palpitations    • Pneumonia     past hx of freq.   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    Per renal duplex - 60% stenosis, left normal; 5/2019 duplex unchanged    • SVT (supraventricular tachycardia)     past hx  on medicine   • Syncope 12/25/2017   • Syncope and collapse    • Thyroiditis        Past Surgical History:   Procedure Laterality Date   • BREAST BIOPSY      PERCUTATNEOUS NEEDLE CORE   • BRONCHOSCOPY      nov 2015   • BRONCHOSCOPY N/A 4/8/2016    Procedure: BRONCHOSCOPY WITH BAL;  Surgeon: Sinan Braga MD;  Location: [facility] ENDOSCOPY;  Service:    • BRONCHOSCOPY N/A 12/9/2017    Procedure:  BRONCHOSCOPY;  Surgeon: Real Rousseau MD;  Location: [facility] ENDOSCOPY;  Service:    • BRONCHOSCOPY N/A 7/20/2018    Procedure: BRONCHOSCOPY with BAL;  Surgeon: Sinan Braga MD;  Location: [facility] ENDOSCOPY;  Service: Pulmonary   • COLONOSCOPY N/A 07/08/2005   • DILATATION AND CURETTAGE N/A 01/01/1990   • PAP SMEAR N/A 03/30/2004   • SINUS SURGERY  01/2016   • TONSILLECTOMY         Family History   Problem Relation Age of Onset   • Hypertension Brother    • Dementia Mother    • Kidney disease Father    • Thyroid disease Other        Social History     Socioeconomic History   • Marital status:    Tobacco Use   • Smoking status: Never   • Smokeless tobacco: Never   Vaping Use   • Vaping Use: Never used   Substance and Sexual Activity   • Alcohol use: No     Comment: caffeine use: 1 cup daily   • Drug use: No   • Sexual activity: Never       Current Outpatient Medications   Medication Sig Dispense Refill   • amLODIPine (NORVASC) 2.5 MG tablet Take 1 tablet by mouth Daily. (Patient taking differently: Take 1 tablet by mouth Daily. One in the morning and one at night since the blood pressure has been high.) 90 tablet 3   • Cholecalciferol (VITAMIN D) 2000 units tablet Take 2,000 Units by mouth Daily.     • Levoxyl 50 MCG tablet TAKE ONE TABLET BY MOUTH EVERY OTHER DAY 45 tablet 1   • Levoxyl 75 MCG tablet TAKE ONE TABLET BY MOUTH EVERY OTHER DAY 45 tablet 2   • Loratadine 10 MG capsule Take 1 capsule by mouth Daily.     • metoprolol tartrate (LOPRESSOR) 25 MG tablet Take 1 tablet by mouth 2 (Two) Times a Day. 180 tablet 3   • olmesartan (BENICAR) 20 MG tablet Take 1 tablet by mouth Daily.     • Probiotic Product (PROBIOTIC ADVANCED PO) Take 2 capsules by mouth Daily.       No current facility-administered medications for this visit.       Review of Systems   Constitutional: Negative for activity change, appetite change, fatigue, fever, unexpected weight gain and unexpected weight loss.   HENT: Negative for  "nosebleeds, rhinorrhea, trouble swallowing and voice change.    Eyes: Negative for visual disturbance.   Respiratory: Positive for shortness of breath. Negative for cough, chest tightness and wheezing.    Cardiovascular: Positive for palpitations. Negative for chest pain and leg swelling.   Gastrointestinal: Negative for abdominal pain, blood in stool, constipation, diarrhea, nausea, vomiting, GERD and indigestion.   Genitourinary: Negative for dysuria, frequency and hematuria.   Musculoskeletal: Negative for arthralgias, back pain and myalgias.   Skin: Negative for rash and wound.   Neurological: Negative for dizziness, tremors, weakness, light-headedness, numbness, headache and memory problem.   Hematological: Negative for adenopathy. Does not bruise/bleed easily.   Psychiatric/Behavioral: Negative for sleep disturbance and depressed mood. The patient is not nervous/anxious.        Objective   /86 (BP Location: Right arm, Patient Position: Sitting, Cuff Size: Adult)   Pulse 88   Ht 157.5 cm (62\")   Wt 50.2 kg (110 lb 9.6 oz)   LMP 01/01/1993 (LMP Unknown)   BMI 20.23 kg/m²     Physical Exam  Vitals and nursing note reviewed.   Constitutional:       General: She is not in acute distress.     Appearance: She is well-developed. She is not diaphoretic.   HENT:      Head: Normocephalic and atraumatic.      Right Ear: External ear normal.      Left Ear: External ear normal.      Nose: Nose normal.   Eyes:      Conjunctiva/sclera: Conjunctivae normal.      Pupils: Pupils are equal, round, and reactive to light.   Neck:      Thyroid: No thyromegaly.      Trachea: No tracheal deviation.   Cardiovascular:      Rate and Rhythm: Normal rate and regular rhythm.      Heart sounds: Normal heart sounds. No murmur heard.    No friction rub. No gallop.   Pulmonary:      Effort: Pulmonary effort is normal. No respiratory distress.      Breath sounds: Normal breath sounds.   Abdominal:      General: Bowel sounds are " normal.      Palpations: Abdomen is soft. There is no mass.      Tenderness: There is no abdominal tenderness. There is no guarding.   Musculoskeletal:         General: Normal range of motion.      Cervical back: Normal range of motion and neck supple.   Lymphadenopathy:      Cervical: No cervical adenopathy.   Skin:     General: Skin is warm and dry.      Capillary Refill: Capillary refill takes less than 2 seconds.      Findings: No rash.   Neurological:      Mental Status: She is alert and oriented to person, place, and time.      Motor: No abnormal muscle tone.      Deep Tendon Reflexes: Reflexes normal.   Psychiatric:         Behavior: Behavior normal.         Thought Content: Thought content normal.         Judgment: Judgment normal.         Recent Results (from the past 2016 hour(s))   Comprehensive Metabolic Panel    Collection Time: 03/02/23  9:21 AM    Specimen: Blood   Result Value Ref Range    Glucose 150 (H) 65 - 99 mg/dL    BUN 13 8 - 23 mg/dL    Creatinine 1.04 (H) 0.57 - 1.00 mg/dL    Sodium 135 (L) 136 - 145 mmol/L    Potassium 4.1 3.5 - 5.2 mmol/L    Chloride 100 98 - 107 mmol/L    CO2 23.2 22.0 - 29.0 mmol/L    Calcium 9.0 8.6 - 10.5 mg/dL    Total Protein 7.1 6.0 - 8.5 g/dL    Albumin 4.2 3.5 - 5.2 g/dL    ALT (SGPT) 13 1 - 33 U/L    AST (SGOT) 16 1 - 32 U/L    Alkaline Phosphatase 74 39 - 117 U/L    Total Bilirubin 0.5 0.0 - 1.2 mg/dL    Globulin 2.9 gm/dL    A/G Ratio 1.4 g/dL    BUN/Creatinine Ratio 12.5 7.0 - 25.0    Anion Gap 11.8 5.0 - 15.0 mmol/L    eGFR 52.1 (L) >60.0 mL/min/1.73   Single High Sensitivity Troponin T    Collection Time: 03/02/23  9:21 AM    Specimen: Blood   Result Value Ref Range    HS Troponin T 16 (H) <10 ng/L   Magnesium    Collection Time: 03/02/23  9:21 AM    Specimen: Blood   Result Value Ref Range    Magnesium 1.9 1.6 - 2.4 mg/dL   Green Top (Gel)    Collection Time: 03/02/23  9:21 AM   Result Value Ref Range    Extra Tube Hold for add-ons.    Lavender Top     Collection Time: 03/02/23  9:21 AM   Result Value Ref Range    Extra Tube hold for add-on    Gold Top - SST    Collection Time: 03/02/23  9:21 AM   Result Value Ref Range    Extra Tube Hold for add-ons.    Light Blue Top    Collection Time: 03/02/23  9:21 AM   Result Value Ref Range    Extra Tube Hold for add-ons.    CBC Auto Differential    Collection Time: 03/02/23  9:21 AM    Specimen: Blood   Result Value Ref Range    WBC 10.96 (H) 3.40 - 10.80 10*3/mm3    RBC 4.54 3.77 - 5.28 10*6/mm3    Hemoglobin 13.8 12.0 - 15.9 g/dL    Hematocrit 42.1 34.0 - 46.6 %    MCV 92.7 79.0 - 97.0 fL    MCH 30.4 26.6 - 33.0 pg    MCHC 32.8 31.5 - 35.7 g/dL    RDW 12.5 12.3 - 15.4 %    RDW-SD 42.4 37.0 - 54.0 fl    MPV 8.8 6.0 - 12.0 fL    Platelets 270 140 - 450 10*3/mm3    Neutrophil % 78.6 (H) 42.7 - 76.0 %    Lymphocyte % 8.2 (L) 19.6 - 45.3 %    Monocyte % 12.4 (H) 5.0 - 12.0 %    Eosinophil % 0.1 (L) 0.3 - 6.2 %    Basophil % 0.3 0.0 - 1.5 %    Immature Grans % 0.4 0.0 - 0.5 %    Neutrophils, Absolute 8.62 (H) 1.70 - 7.00 10*3/mm3    Lymphocytes, Absolute 0.90 0.70 - 3.10 10*3/mm3    Monocytes, Absolute 1.36 (H) 0.10 - 0.90 10*3/mm3    Eosinophils, Absolute 0.01 0.00 - 0.40 10*3/mm3    Basophils, Absolute 0.03 0.00 - 0.20 10*3/mm3    Immature Grans, Absolute 0.04 0.00 - 0.05 10*3/mm3    nRBC 0.0 0.0 - 0.2 /100 WBC   ECG 12 Lead ED Triage Standing Order; Weak / Dizzy / AMS    Collection Time: 03/02/23  9:23 AM   Result Value Ref Range    QT Interval 335 ms   Respiratory Panel PCR w/COVID-19(SARS-CoV-2) CLAUDY/MICHAELA/RASHAAD/PAD/COR/MAD/ANITHA In-House, NP Swab in UTM/VTM, 3-4 HR TAT - Swab, Nasopharynx    Collection Time: 03/02/23  9:55 AM    Specimen: Nasopharynx; Swab   Result Value Ref Range    ADENOVIRUS, PCR Not Detected Not Detected    Coronavirus 229E Not Detected Not Detected    Coronavirus HKU1 Not Detected Not Detected    Coronavirus NL63 Not Detected Not Detected    Coronavirus OC43 Not Detected Not Detected    COVID19 Detected  (C) Not Detected - Ref. Range    Human Metapneumovirus Not Detected Not Detected    Human Rhinovirus/Enterovirus Not Detected Not Detected    Influenza A PCR Not Detected Not Detected    Influenza B PCR Not Detected Not Detected    Parainfluenza Virus 1 Not Detected Not Detected    Parainfluenza Virus 2 Not Detected Not Detected    Parainfluenza Virus 3 Not Detected Not Detected    Parainfluenza Virus 4 Not Detected Not Detected    RSV, PCR Not Detected Not Detected    Bordetella pertussis pcr Not Detected Not Detected    Bordetella parapertussis PCR Not Detected Not Detected    Chlamydophila pneumoniae PCR Not Detected Not Detected    Mycoplasma pneumo by PCR Not Detected Not Detected   Single High Sensitivity Troponin T    Collection Time: 03/02/23 11:39 AM    Specimen: Arm, Right; Blood   Result Value Ref Range    HS Troponin T 36 (H) <10 ng/L   Urinalysis With Culture If Indicated - Urine, Clean Catch    Collection Time: 03/02/23 12:06 PM    Specimen: Urine, Clean Catch   Result Value Ref Range    Color, UA Yellow Yellow, Straw    Appearance, UA Clear Clear    pH, UA 5.5 5.0 - 8.0    Specific Gravity, UA 1.017 1.005 - 1.030    Glucose, UA Negative Negative    Ketones, UA 15 mg/dL (1+) (A) Negative    Bilirubin, UA Negative Negative    Blood, UA Small (1+) (A) Negative    Protein, UA 30 mg/dL (1+) (A) Negative    Leuk Esterase, UA Negative Negative    Nitrite, UA Negative Negative    Urobilinogen, UA 0.2 E.U./dL 0.2 - 1.0 E.U./dL   Urine Culture - Urine, Urine, Clean Catch    Collection Time: 03/02/23 12:06 PM    Specimen: Urine, Clean Catch   Result Value Ref Range    Urine Culture <25,000 CFU/mL Normal Urogenital Ledy    Urinalysis, Microscopic Only - Urine, Clean Catch    Collection Time: 03/02/23 12:06 PM    Specimen: Urine, Clean Catch   Result Value Ref Range    RBC, UA 6-12 (A) None Seen, 0-2 /HPF    WBC, UA 3-5 (A) None Seen, 0-2 /HPF    Bacteria, UA None Seen None Seen /HPF    Squamous Epithelial Cells,  UA 0-2 None Seen, 0-2 /HPF    Hyaline Casts, UA 0-2 None Seen /LPF    Methodology Automated Microscopy    Hepatic Function Panel    Collection Time: 03/02/23  5:36 PM    Specimen: Blood   Result Value Ref Range    Total Protein 6.9 6.0 - 8.5 g/dL    Albumin 4.2 3.5 - 5.2 g/dL    ALT (SGPT) 12 1 - 33 U/L    AST (SGOT) 24 1 - 32 U/L    Alkaline Phosphatase 71 39 - 117 U/L    Total Bilirubin 0.4 0.0 - 1.2 mg/dL    Bilirubin, Direct <0.2 0.0 - 0.3 mg/dL    Bilirubin, Indirect     Creatinine Serum (kidney function) GFR component    Collection Time: 03/02/23  5:36 PM    Specimen: Blood   Result Value Ref Range    Creatinine 0.94 0.57 - 1.00 mg/dL    eGFR 58.8 (L) >60.0 mL/min/1.73   Procalcitonin    Collection Time: 03/02/23  5:36 PM    Specimen: Blood   Result Value Ref Range    Procalcitonin 0.15 0.00 - 0.25 ng/mL   ECG 12 Lead Other; verify rhythm    Collection Time: 03/02/23  9:12 PM   Result Value Ref Range    QT Interval 342 ms   Hepatic Function Panel    Collection Time: 03/03/23  3:26 AM    Specimen: Blood   Result Value Ref Range    Total Protein 6.4 6.0 - 8.5 g/dL    Albumin 3.5 3.5 - 5.2 g/dL    ALT (SGPT) 8 1 - 33 U/L    AST (SGOT) 19 1 - 32 U/L    Alkaline Phosphatase 62 39 - 117 U/L    Total Bilirubin 0.4 0.0 - 1.2 mg/dL    Bilirubin, Direct <0.2 0.0 - 0.3 mg/dL    Bilirubin, Indirect     Creatinine Serum (kidney function) GFR component    Collection Time: 03/03/23  3:26 AM    Specimen: Blood   Result Value Ref Range    Creatinine 1.05 (H) 0.57 - 1.00 mg/dL    eGFR 51.5 (L) >60.0 mL/min/1.73   Hepatic Function Panel    Collection Time: 03/04/23  3:54 AM    Specimen: Blood   Result Value Ref Range    Total Protein 6.1 6.0 - 8.5 g/dL    Albumin 3.4 (L) 3.5 - 5.2 g/dL    ALT (SGPT) 8 1 - 33 U/L    AST (SGOT) 19 1 - 32 U/L    Alkaline Phosphatase 56 39 - 117 U/L    Total Bilirubin 0.3 0.0 - 1.2 mg/dL    Bilirubin, Direct <0.2 0.0 - 0.3 mg/dL    Bilirubin, Indirect     Creatinine Serum (kidney function) GFR  component    Collection Time: 03/04/23  3:54 AM    Specimen: Blood   Result Value Ref Range    Creatinine 0.99 0.57 - 1.00 mg/dL    eGFR 55.3 (L) >60.0 mL/min/1.73   Basic Metabolic Panel    Collection Time: 03/04/23  3:54 AM    Specimen: Blood   Result Value Ref Range    Glucose 91 65 - 99 mg/dL    BUN 16 8 - 23 mg/dL    Creatinine 0.82 0.57 - 1.00 mg/dL    Sodium 139 136 - 145 mmol/L    Potassium 3.8 3.5 - 5.2 mmol/L    Chloride 103 98 - 107 mmol/L    CO2 26.4 22.0 - 29.0 mmol/L    Calcium 8.5 (L) 8.6 - 10.5 mg/dL    BUN/Creatinine Ratio 19.5 7.0 - 25.0    Anion Gap 9.6 5.0 - 15.0 mmol/L    eGFR 69.3 >60.0 mL/min/1.73   TSH    Collection Time: 03/04/23  3:54 AM    Specimen: Blood   Result Value Ref Range    TSH 2.030 0.270 - 4.200 uIU/mL   C-reactive Protein    Collection Time: 03/04/23  3:54 AM    Specimen: Blood   Result Value Ref Range    C-Reactive Protein 10.16 (H) 0.00 - 0.50 mg/dL   Hepatic Function Panel    Collection Time: 03/05/23  2:59 AM    Specimen: Blood   Result Value Ref Range    Total Protein 6.1 6.0 - 8.5 g/dL    Albumin 3.4 (L) 3.5 - 5.2 g/dL    ALT (SGPT) 11 1 - 33 U/L    AST (SGOT) 17 1 - 32 U/L    Alkaline Phosphatase 54 39 - 117 U/L    Total Bilirubin 0.3 0.0 - 1.2 mg/dL    Bilirubin, Direct <0.2 0.0 - 0.3 mg/dL    Bilirubin, Indirect     Creatinine Serum (kidney function) GFR component    Collection Time: 03/05/23  2:59 AM    Specimen: Blood   Result Value Ref Range    Creatinine 0.81 0.57 - 1.00 mg/dL    eGFR 70.4 >60.0 mL/min/1.73   C-reactive Protein    Collection Time: 03/05/23  2:59 AM    Specimen: Blood   Result Value Ref Range    C-Reactive Protein 5.83 (H) 0.00 - 0.50 mg/dL   CBC (No Diff)    Collection Time: 03/05/23  2:59 AM    Specimen: Blood   Result Value Ref Range    WBC 8.20 3.40 - 10.80 10*3/mm3    RBC 3.94 3.77 - 5.28 10*6/mm3    Hemoglobin 12.4 12.0 - 15.9 g/dL    Hematocrit 36.9 34.0 - 46.6 %    MCV 93.7 79.0 - 97.0 fL    MCH 31.5 26.6 - 33.0 pg    MCHC 33.6 31.5 - 35.7  g/dL    RDW 12.8 12.3 - 15.4 %    RDW-SD 44.2 37.0 - 54.0 fl    MPV 9.1 6.0 - 12.0 fL    Platelets 181 140 - 450 10*3/mm3   Hepatic Function Panel    Collection Time: 03/06/23  4:52 AM    Specimen: Blood   Result Value Ref Range    Total Protein 6.1 6.0 - 8.5 g/dL    Albumin 3.4 (L) 3.5 - 5.2 g/dL    ALT (SGPT) 10 1 - 33 U/L    AST (SGOT) 14 1 - 32 U/L    Alkaline Phosphatase 59 39 - 117 U/L    Total Bilirubin 0.7 0.0 - 1.2 mg/dL    Bilirubin, Direct <0.2 0.0 - 0.3 mg/dL    Bilirubin, Indirect     Creatinine Serum (kidney function) GFR component    Collection Time: 03/06/23  4:52 AM    Specimen: Blood   Result Value Ref Range    Creatinine 0.83 0.57 - 1.00 mg/dL    eGFR 68.3 >60.0 mL/min/1.73   Lavender Top    Collection Time: 03/06/23  4:52 AM   Result Value Ref Range    Extra Tube hold for add-on    Holter Monitor - 48 Hour    Collection Time: 03/23/23 11:09 AM   Result Value Ref Range    Target HR (85%) 113 bpm    Max. Pred. HR (100%) 133 bpm     Assessment & Plan   Diagnoses and all orders for this visit:    1. Hypertension, essential (Primary)    2. PSVT (paroxysmal supraventricular tachycardia) (HCC)    3. Dyspnea on exertion    4. Acquired hypothyroidism    Noted SVT with symptoms of short of breath, fatigue and near syncope without syncope.  Not tolerate increasing the B-blocker due to low HR.  Continue the metoprolol tartrate 25 mg twice a day, olmesartan 10 mg total a day and the amlodipine 2.5 mg BID while monitoring for the swelling.  Since only on the higher dose of the amlodipine for 2 days continue the current med and if not improve then consider adding HCTZ.  Recommend not increasing amlodipine any further due to past swelling at 10 mg and will not increase the olmesartan as did cause some hyperkalemia.  May consider electrophysiologist.       · COVID-19 Precautions - Patient was compliant in wearing a mask. When I saw the patient, I used appropriate personal protective equipment (PPE) including mask  and eye shield (standard procedure).  Additionally, I used gown and gloves if indicated.  Hand hygiene was completed before and after seeing the patient.  · Dictated utilizing Dragon Dictation

## 2023-05-04 ENCOUNTER — TELEPHONE (OUTPATIENT)
Dept: CARDIOLOGY | Facility: CLINIC | Age: 88
End: 2023-05-04
Payer: MEDICARE

## 2023-05-04 NOTE — TELEPHONE ENCOUNTER
I added amlodipine at her last visit with me.  Can you call to see how her blood pressure is doing?    Thanks!  ANNABEL Contreras

## 2023-05-04 NOTE — TELEPHONE ENCOUNTER
Spoke with pt, she states that her BP have been all over the place and when she f/u with Dr. Hua on 4/18 he increased the amlodipine to 2.5mg BID, so she takes 2.5mg in the AM and 2.5mg in the PM.    She is going to f/u with Dr. Hua 5/16.    She states she takes her BP at random times and the following are the readings she gave me:    172/82 this AM   142/58 last night @7:15  144/65  164/65  127/51  147/60  118/54

## 2023-05-04 NOTE — TELEPHONE ENCOUNTER
Overall, these are pretty good. No changes for now. Please have her call if BP consistently greater than 150.     Thanks!  ANNABEL Contreras

## 2023-05-16 ENCOUNTER — OFFICE VISIT (OUTPATIENT)
Dept: FAMILY MEDICINE CLINIC | Facility: CLINIC | Age: 88
End: 2023-05-16
Payer: MEDICARE

## 2023-05-16 VITALS
HEIGHT: 62 IN | BODY MASS INDEX: 20.61 KG/M2 | WEIGHT: 112 LBS | DIASTOLIC BLOOD PRESSURE: 88 MMHG | HEART RATE: 83 BPM | OXYGEN SATURATION: 99 % | SYSTOLIC BLOOD PRESSURE: 132 MMHG | TEMPERATURE: 97.8 F

## 2023-05-16 DIAGNOSIS — I10 HYPERTENSION, ESSENTIAL: Primary | ICD-10-CM

## 2023-05-16 DIAGNOSIS — E03.9 ACQUIRED HYPOTHYROIDISM: ICD-10-CM

## 2023-05-16 DIAGNOSIS — M54.50 LUMBAR PAIN: ICD-10-CM

## 2023-05-16 PROCEDURE — 1160F RVW MEDS BY RX/DR IN RCRD: CPT | Performed by: INTERNAL MEDICINE

## 2023-05-16 PROCEDURE — 1159F MED LIST DOCD IN RCRD: CPT | Performed by: INTERNAL MEDICINE

## 2023-05-16 PROCEDURE — 99214 OFFICE O/P EST MOD 30 MIN: CPT | Performed by: INTERNAL MEDICINE

## 2023-05-16 RX ORDER — FLUTICASONE PROPIONATE 50 MCG
2 SPRAY, SUSPENSION (ML) NASAL DAILY
COMMUNITY
Start: 2023-04-27

## 2023-05-16 RX ORDER — LEVOTHYROXINE SODIUM 75 UG/1
TABLET ORAL
Qty: 45 TABLET | Refills: 2 | Status: SHIPPED | OUTPATIENT
Start: 2023-05-16

## 2023-05-16 RX ORDER — AMLODIPINE BESYLATE 2.5 MG/1
2.5 TABLET ORAL DAILY
Qty: 180 TABLET | Refills: 2 | Status: SHIPPED | OUTPATIENT
Start: 2023-05-16

## 2023-05-16 RX ORDER — LEVOTHYROXINE SODIUM 50 UG/1
50 TABLET ORAL EVERY OTHER DAY
Qty: 45 TABLET | Refills: 2 | Status: SHIPPED | OUTPATIENT
Start: 2023-05-16

## 2023-05-16 NOTE — PROGRESS NOTES
Subjective   Stacie Vieira is a 88 y.o. female.     Chief Complaint   Patient presents with   • Hypertension     F/u   • Shortness of Breath       History of Present Illness   Still with short of breath episodes but heart rate staying 50's-60's at home.  No CP or syncope.    History of hypertension.  Currently, has been feeling well and asymptomatic without any headaches, vision changes, cough, chest pain, shortness of breath, swelling, focal neurologic deficit, memory loss or syncope.  Has been taking the medications regularly and adherent with the regimen amlodipine 2.5 mg daily BID (in past had been on total of 10 mg amlodipine but it caused swelling), metoprolol tartrate 25 mg twice a day (cannot take higher doses as causes bradycardia but still with SVT) and olmesartan 20 mg 1/2 tab nightly.  Denies medication side effects and no significant interval events.       History of high cholesterol.  Currently, has been feeling well without any myalgias, muscle aches, weakness, numbness, chest pain, short of breath or other issues.  Currently, is no statin secondary to statin intolerance and Zetia intolerance. Last lab on 1/5/22.     History of hypo-thyroid.  Denies fatigue, weakness, constipation/diarrhea, hair/skin changes, weight gain/loss, depression/anxiety, rashes, palpitations, swelling, chest pain, shortness of breath or other issues.  Has been compliant with taking the medication of levoxyl 50 mcg and 75 mcg every other day alternating  with no recent changes.  Denies any difficulty with the current medication.  Last lab on 10/18/22.     History of SVT episodes and currently using metoprolol tartrate 25 mg twice a day but cannot tolerate higher doses secondary to low heart rate.    History of stage 3a CKD and stable for > 3 years       The following portions of the patient's history were reviewed and updated as appropriate: allergies, current medications, past family history, past medical history, past  social history, past surgical history and problem list.    Depression Screen:      10/18/2022     8:00 AM   PHQ-2/PHQ-9 Depression Screening   Little Interest or Pleasure in Doing Things 0-->not at all   Feeling Down, Depressed or Hopeless 0-->not at all   PHQ-9: Brief Depression Severity Measure Score 0     Past Medical History:   Diagnosis Date   • Acute stress disorder    • Allergic rhinitis    • C. difficile colitis     hx in past   • Dyspnea on exertion    • Elevated fasting glucose    • Essential hypertension    • Guillain Barré syndrome    • H/O: pneumonia    • Health care maintenance    • History of palpitations    • Hyperlipidemia    • Hypokalemia    • Hypothyroidism    • Insomnia    • Labile blood pressure    • MAC (mycobacterium avium-intracellulare complex)     reason for bronch.  dx with this nov 2015   • Osteoporosis    • Palpitations    • Pneumonia     past hx of freq.   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    Per renal duplex - 60% stenosis, left normal; 5/2019 duplex unchanged    • SVT (supraventricular tachycardia)     past hx  on medicine   • Syncope 12/25/2017   • Syncope and collapse    • Thyroiditis      Past Surgical History:   Procedure Laterality Date   • BREAST BIOPSY      PERCUTATNEOUS NEEDLE CORE   • BRONCHOSCOPY      nov 2015   • BRONCHOSCOPY N/A 4/8/2016    Procedure: BRONCHOSCOPY WITH BAL;  Surgeon: Sinan Braga MD;  Location: [facility] ENDOSCOPY;  Service:    • BRONCHOSCOPY N/A 12/9/2017    Procedure: BRONCHOSCOPY;  Surgeon: Real Rousseau MD;  Location: [facility] ENDOSCOPY;  Service:    • BRONCHOSCOPY N/A 7/20/2018    Procedure: BRONCHOSCOPY with BAL;  Surgeon: Sinan Braga MD;  Location: [facility] ENDOSCOPY;  Service: Pulmonary   • COLONOSCOPY N/A 07/08/2005   •  DILATATION AND CURETTAGE N/A 01/01/1990   • PAP SMEAR N/A 03/30/2004   • SINUS SURGERY  01/2016   • TONSILLECTOMY       Family History   Problem Relation Age of Onset   • Hypertension Brother    • Dementia Mother    • Kidney disease Father    • Thyroid disease Other      Social History     Socioeconomic History   • Marital status:    Tobacco Use   • Smoking status: Never   • Smokeless tobacco: Never   Vaping Use   • Vaping Use: Never used   Substance and Sexual Activity   • Alcohol use: No     Comment: caffeine use: 1 cup daily   • Drug use: No   • Sexual activity: Never     Current Outpatient Medications   Medication Sig Dispense Refill   • amLODIPine (NORVASC) 2.5 MG tablet Take 1 tablet by mouth Daily. One in the morning and one at night since the blood pressure has been high. 180 tablet 2   • Cholecalciferol (VITAMIN D) 2000 units tablet Take 2,000 Units by mouth Daily.     • fluticasone (FLONASE) 50 MCG/ACT nasal spray 2 sprays into the nostril(s) as directed by provider Daily.     • Levoxyl 50 MCG tablet Take 1 tablet by mouth Every Other Day. 45 tablet 2   • Levoxyl 75 MCG tablet TAKE ONE TABLET BY MOUTH EVERY OTHER DAY 45 tablet 2   • Loratadine 10 MG capsule Take 1 capsule by mouth Daily.     • metoprolol tartrate (LOPRESSOR) 25 MG tablet Take 1 tablet by mouth 2 (Two) Times a Day. 180 tablet 3   • olmesartan (BENICAR) 20 MG tablet Take 1 tablet by mouth Daily.       No current facility-administered medications for this visit.     Review of Systems   Constitutional: Negative for activity change, appetite change, fatigue, fever, unexpected weight gain and unexpected weight loss.   HENT: Negative for nosebleeds, rhinorrhea, trouble swallowing and voice change.    Eyes: Negative for visual disturbance.   Respiratory: Positive for shortness of breath. Negative for cough, chest tightness and wheezing.    Cardiovascular: Positive for palpitations. Negative for chest pain and leg swelling.  "  Gastrointestinal: Negative for abdominal pain, blood in stool, constipation, diarrhea, nausea, vomiting, GERD and indigestion.   Genitourinary: Negative for dysuria, frequency and hematuria.   Musculoskeletal: Positive for back pain. Negative for arthralgias and myalgias.   Skin: Negative for rash and wound.   Neurological: Negative for dizziness, tremors, weakness, light-headedness, numbness, headache and memory problem.   Hematological: Negative for adenopathy. Does not bruise/bleed easily.   Psychiatric/Behavioral: Negative for sleep disturbance and depressed mood. The patient is not nervous/anxious.        Objective   /88   Pulse 83   Temp 97.8 °F (36.6 °C) (Infrared)   Ht 157.5 cm (62\")   Wt 50.8 kg (112 lb)   LMP 01/01/1993 (LMP Unknown)   SpO2 99%   BMI 20.49 kg/m²     Physical Exam  Vitals and nursing note reviewed.   Constitutional:       General: She is not in acute distress.     Appearance: She is well-developed. She is not diaphoretic.   HENT:      Head: Normocephalic and atraumatic.      Right Ear: External ear normal.      Left Ear: External ear normal.      Nose: Nose normal.   Eyes:      Conjunctiva/sclera: Conjunctivae normal.      Pupils: Pupils are equal, round, and reactive to light.   Neck:      Thyroid: No thyromegaly.      Trachea: No tracheal deviation.   Cardiovascular:      Rate and Rhythm: Regular rhythm. Bradycardia present.      Heart sounds: Normal heart sounds. No murmur heard.    No friction rub. No gallop.   Pulmonary:      Effort: Pulmonary effort is normal. No respiratory distress.      Breath sounds: Normal breath sounds.   Abdominal:      General: Bowel sounds are normal.      Palpations: Abdomen is soft. There is no mass.      Tenderness: There is no abdominal tenderness. There is no guarding.   Musculoskeletal:         General: Normal range of motion.      Cervical back: Normal range of motion and neck supple.      Comments: Patient with some mild elevation of " the right pelvic crest and with a obvious limits of the spine to the right but straight other than some kyphosis.   Lymphadenopathy:      Cervical: No cervical adenopathy.   Skin:     General: Skin is warm and dry.      Capillary Refill: Capillary refill takes less than 2 seconds.      Findings: No rash.   Neurological:      Mental Status: She is alert and oriented to person, place, and time.      Motor: No abnormal muscle tone.      Deep Tendon Reflexes: Reflexes normal.   Psychiatric:         Behavior: Behavior normal.         Thought Content: Thought content normal.         Judgment: Judgment normal.       Assessment & Plan   Diagnoses and all orders for this visit:    1. Hypertension, essential (Primary)  -     amLODIPine (NORVASC) 2.5 MG tablet; Take 1 tablet by mouth Daily. One in the morning and one at night since the blood pressure has been high.  Dispense: 180 tablet; Refill: 2    2. Acquired hypothyroidism  Comments:  need yearly TSH  Orders:  -     Levoxyl 75 MCG tablet; TAKE ONE TABLET BY MOUTH EVERY OTHER DAY  Dispense: 45 tablet; Refill: 2  -     Levoxyl 50 MCG tablet; Take 1 tablet by mouth Every Other Day.  Dispense: 45 tablet; Refill: 2    3. Lumbar pain  -     Ambulatory Referral to Physical Therapy Evaluate and treat    Continue current medication.  We discussed her SVTs episodes and unfortunately cannot increase the beta-blocker as he usually results in her feeling worse and low heart rate.  We will continue the current medication unchanged.  Thyroid is already been followed and we will continue the current dosing.  Physical therapy for lumbar spine.           · COVID-19 Precautions - Patient was compliant in wearing a mask. When I saw the patient, I used appropriate personal protective equipment (PPE) including mask and eye shield (standard procedure).  Additionally, I used gown and gloves if indicated.  Hand hygiene was completed before and after seeing the patient.  · Dictated utilizing Britni  Dictation

## 2023-05-24 ENCOUNTER — EXTERNAL PBMM DATA (OUTPATIENT)
Dept: PHARMACY | Facility: OTHER | Age: 88
End: 2023-05-24
Payer: MEDICARE

## 2023-06-15 ENCOUNTER — TELEPHONE (OUTPATIENT)
Dept: FAMILY MEDICINE CLINIC | Facility: CLINIC | Age: 88
End: 2023-06-15

## 2023-06-15 DIAGNOSIS — Z78.0 POSTMENOPAUSAL: Primary | ICD-10-CM

## 2023-06-15 NOTE — TELEPHONE ENCOUNTER
Caller: Stacie Vieira    Relationship: Self    Best call back number: [phone]  What is the best time to reach you: ANYTIME     Who are you requesting to speak with (clinical staff, provider,  specific staff member): CLINICAL STAFF     Do you know the name of the person who called: SELF     What was the call regarding: PATIENT CALLED AND STATED SHE IS DUE TO HAVE A BONE DENSITY SCAN. PLEASE SEND ORDER [facility] [address]    Is it okay if the provider responds through MyChart: NO

## 2023-07-27 ENCOUNTER — APPOINTMENT (OUTPATIENT)
Dept: WOMENS IMAGING | Facility: HOSPITAL | Age: 88
End: 2023-07-27
Payer: MEDICARE

## 2023-07-27 ENCOUNTER — HOSPITAL ENCOUNTER (OUTPATIENT)
Dept: PET IMAGING | Facility: HOSPITAL | Age: 88
Discharge: HOME | End: 2023-07-27
Payer: MEDICARE

## 2023-07-27 ENCOUNTER — TREATMENT (OUTPATIENT)
Dept: PHYSICAL THERAPY | Facility: CLINIC | Age: 88
End: 2023-07-27
Payer: MEDICARE

## 2023-07-27 DIAGNOSIS — M54.50 ACUTE RIGHT-SIDED LOW BACK PAIN WITHOUT SCIATICA: Primary | ICD-10-CM

## 2023-07-27 DIAGNOSIS — Z78.0 POSTMENOPAUSAL: ICD-10-CM

## 2023-07-27 PROCEDURE — 77080 DXA BONE DENSITY AXIAL: CPT | Performed by: RADIOLOGY

## 2023-07-27 PROCEDURE — 77080 DXA BONE DENSITY AXIAL: CPT

## 2023-07-27 NOTE — PROGRESS NOTES
Physical Therapy Daily Treatment Note    [facility] [place]  [address], [address]  [place] [address]    Visit # 6        Patient: Stacie Vieira   : 1935  Referring practitioner: Eber Hua MD  Date of Initial Evaluation:  Type: THERAPY  Noted: 2023  Today's Date: 2023           ICD-10-CM ICD-9-CM   1. Acute right-sided low back pain without sciatica  M54.50 724.2       Subjective  Stacie Vieira reports:   My back has been feeling good over the last several days.  I have had some BP issues that I have been monitoring at home, and my doctors are also aware of and monitoring.      Objective   See Exercise, Manual, and Modality Logs for complete treatment.   Reviewed current HEP, progressed therex program with exercises as noted.    Trial application IFC at conclusion of session to address elevated pain levels.    Assessment/Plan  Tolerated continued progression of therapeutic exercise/therapeutic activity well today primarily from supine/HL position, no increased pain reported during or after exercises.  Applied IFC in seated position today post-exercise with continued positive response of inc comfort and relaxation during and afterward.    Progress per Plan of Care and Progress strengthening /stabilization /functional activity, progress HEP with SB exercises - last scheduled visit, determine continued PT vs DC at this visit.            Timed:         Manual Therapy:         mins  65703     Therapeutic Exercise:     30    mins  72225     Neuromuscular Ceci:    10    mins  37572    Therapeutic Activity:          mins  11697     Gait Training:           mins  55438     Ultrasound:          mins  60677    Ionto                                   mins  63987  Self Care                           mins  40512    Un-Timed:  Electrical Stimulation:     15    mins 77067 ( )  Traction          mins 38362    Timed Treatment:   40   mins   Total  Treatment:     55   mins    Sathish Mcgregor, PTA  [place] License #[id number]  Physical Therapist Assistant

## 2023-07-31 ENCOUNTER — TELEPHONE (OUTPATIENT)
Dept: FAMILY MEDICINE CLINIC | Facility: CLINIC | Age: 88
End: 2023-07-31
Payer: MEDICARE

## 2023-08-03 ENCOUNTER — TREATMENT (OUTPATIENT)
Dept: PHYSICAL THERAPY | Facility: CLINIC | Age: 88
End: 2023-08-03
Payer: MEDICARE

## 2023-08-03 DIAGNOSIS — M54.50 ACUTE RIGHT-SIDED LOW BACK PAIN WITHOUT SCIATICA: Primary | ICD-10-CM

## 2023-08-03 NOTE — PROGRESS NOTES
Physical Therapy Daily Treatment Note    [facility] [place]  [address]  [place] [id number]    Visit # 7        Patient: Stacie Vieira   : 1935  Referring practitioner: Eber Hua MD  Date of Initial Evaluation:  Type: THERAPY  Noted: 2023  Today's Date: 8/3/2023           ICD-10-CM ICD-9-CM   1. Acute right-sided low back pain without sciatica  M54.50 724.2       Subjective  Stacie Vieira reports:   I feel pretty good today.  I still occasionally get that catching pain in my back but I feel like I have the exercises to keep it under control. Pain has not been more than 2/10 over the past several days.       Objective   See Exercise, Manual, and Modality Logs for complete treatment.   Reviewed current HEP, progressed therex program with exercises as noted.        Assessment & Plan     Assessment    Assessment details: Tolerated continued progression and review of therapeutic exercise/therapeutic activity/HEP well today.  We discussed and practiced additional exercise with use of Swiss ball as pt has available for HEP.    Pt voices understanding of current condition and progression/management from this point.  Has met 4/4 STG and 1/6 LTG currently.     Goals  Plan Goals:  SHORT TERM GOALS: Time for Goal Achievement: 4 weeks  1.  Patient to be compliant and progression of HEP - MET                             2.  Pain level < 2/10 at worst with mentioned activities to improve function - MET  3.  Increased thoracic, lumbar and SIJ mobility to allow for increased lumbar AROM with less pain - MET  4.  Increased lumbar AROM to by 25% in all planes to allow for increased ease with sit-stand transfers and functional activities - MET     LONG TERM GOALS: Time for Goal Achievement: 8 weeks  1.  Pt. to score < 10 % on Back Index - NT  2.  Pain level < 1/10 with all listed activities to return to normal - PROGRESSING  3.  Lumbar AROM to WFL to  allow for return to household & recreational activities w/o increased symptoms - PROGRESSING  4.  (B) LE and lower abdominal strength to 5/5 to allow for pushing, pulling and activities to occur without pain (driving, sitting, household  & Job requirements) - PROGRESSING  5.  No TTP to L paraspinals - MET     6.  Pt to report the ability to walk for 20 min without increased pain - PROGRESSING      Plan  Plan details: Plan to DC at this time per pt request with continued HEP.         Timed:         Manual Therapy:         mins  51585     Therapeutic Exercise:     33    mins  83535     Neuromuscular Ceci:    12    mins  60404    Therapeutic Activity:       10   mins  99293     Gait Training:           mins  54809     Ultrasound:          mins  75078    Ionto                                   mins  41520  Self Care                           mins  36908    Un-Timed:  Electrical Stimulation:         mins 95360 ( )  Traction          mins 34521    Timed Treatment:   55   mins   Total Treatment:     55   mins    Sathish Mcgregor PTA  [place] License #[id number]  Physical Therapist Assistant

## 2023-08-24 ENCOUNTER — EXTERNAL PBMM DATA (OUTPATIENT)
Dept: PHARMACY | Facility: OTHER | Age: 88
End: 2023-08-24
Payer: MEDICARE

## 2023-10-24 ENCOUNTER — OFFICE VISIT (OUTPATIENT)
Dept: FAMILY MEDICINE CLINIC | Facility: CLINIC | Age: 88
End: 2023-10-24
Payer: MEDICARE

## 2023-10-24 VITALS
WEIGHT: 113.4 LBS | OXYGEN SATURATION: 98 % | BODY MASS INDEX: 20.87 KG/M2 | SYSTOLIC BLOOD PRESSURE: 152 MMHG | HEIGHT: 62 IN | HEART RATE: 64 BPM | TEMPERATURE: 96.8 F | DIASTOLIC BLOOD PRESSURE: 80 MMHG

## 2023-10-24 DIAGNOSIS — N18.2 CKD (CHRONIC KIDNEY DISEASE) STAGE 2, GFR 60-89 ML/MIN: ICD-10-CM

## 2023-10-24 DIAGNOSIS — Z88.7 HISTORY OF INFLUENZA VACCINE ALLERGY: ICD-10-CM

## 2023-10-24 DIAGNOSIS — I10 HYPERTENSION, ESSENTIAL: ICD-10-CM

## 2023-10-24 DIAGNOSIS — Z00.00 MEDICARE ANNUAL WELLNESS VISIT, SUBSEQUENT: Primary | ICD-10-CM

## 2023-10-24 DIAGNOSIS — Z78.9 STATIN INTOLERANCE: ICD-10-CM

## 2023-10-24 DIAGNOSIS — E78.2 MIXED HYPERLIPIDEMIA: ICD-10-CM

## 2023-10-24 DIAGNOSIS — E03.9 ACQUIRED HYPOTHYROIDISM: ICD-10-CM

## 2023-10-24 NOTE — PROGRESS NOTES
The ABCs of the Annual Wellness Visit  Subsequent Medicare Wellness Visit    Subjective    Stacie Vieira is a 88 y.o. female who presents for a Subsequent Medicare Wellness Visit.    History of hypertension.  Currently, has been feeling well and asymptomatic without any headaches, vision changes, cough, chest pain, shortness of breath, swelling, focal neurologic deficit, memory loss or syncope.  Has been taking the medications regularly and adherent with the regimen amlodipine 2.5 mg daily BID (in past had been on total of 10 mg amlodipine but it caused swelling), metoprolol tartrate 25 mg twice a day (cannot take higher doses as causes bradycardia but still with SVT) and olmesartan 20 mg 1/2 tab nightly.  Denies medication side effects and no significant interval events.       History of high cholesterol.  Currently, has been feeling well without any myalgias, muscle aches, weakness, numbness, chest pain, short of breath or other issues.  Currently, is no statin secondary to statin intolerance and Zetia intolerance. Last lab on 1/5/22.     History of hypo-thyroid.  Denies fatigue, weakness, constipation/diarrhea, hair/skin changes, weight gain/loss, depression/anxiety, rashes, palpitations, swelling, chest pain, shortness of breath or other issues.  Has been compliant with taking the medication of levoxyl 50 mcg and 75 mcg every other day alternating  with no recent changes.  Denies any difficulty with the current medication.  Last lab on 3/4/23.     History of SVT episodes and currently using metoprolol tartrate 25 mg twice a day but cannot tolerate higher doses secondary to low heart rate.  Still has episodes of racing heart and short of breath episodes.  Has appointment with Dr Curiel tomorrow.     History of stage 3a CKD and stable for > 3 years    The following portions of the patient's history were reviewed and   updated as appropriate: allergies, current medications, past family history, past medical  history, past social history, past surgical history, and problem list.    Compared to one year ago, the patient feels her physical   health is the same.    Compared to one year ago, the patient feels her mental   health is the same.    Recent Hospitalizations:  She was admitted within the past 365 days at [facility].  3/6/2023 at [facility] secondary to COVID    Current Medical Providers:  Patient Care Team:  Eber Hua MD as PCP - General (Internal Medicine)  James Del Angel MD as Consulting Physician (Infectious Diseases)  Ezekiel Verde MD as Consulting Physician (Otolaryngology)  Aisha Curiel MD as Consulting Physician (Cardiology)  Sinan Braga MD as Consulting Physician (Pulmonary Disease)  Shawn Felton MD as Consulting Physician (Allergy)  Luis Whitehead MD (Ophthalmology)  Jasmine Salazar MD (Dermatology)  Neal Benjamin MD as Consulting Physician (Orthopedic Surgery)    Outpatient Medications Prior to Visit   Medication Sig Dispense Refill    amLODIPine (NORVASC) 2.5 MG tablet Take 1 tablet by mouth Daily. One in the morning and one at night since the blood pressure has been high. 180 tablet 2    Cholecalciferol (VITAMIN D) 2000 units tablet Take 1 tablet by mouth Daily.      Levoxyl 50 MCG tablet Take 1 tablet by mouth Every Other Day. 45 tablet 2    Levoxyl 75 MCG tablet TAKE ONE TABLET BY MOUTH EVERY OTHER DAY 45 tablet 2    Loratadine 10 MG capsule Take 1 capsule by mouth Daily.      metoprolol tartrate (LOPRESSOR) 25 MG tablet Take 1 tablet by mouth 2 (Two) Times a Day. 180 tablet 3    Nutritional Supplements (JOINT FORMULA PO) Take  by mouth. Joint advantage gold      olmesartan (BENICAR) 20 MG tablet Take 1 tablet by mouth Daily.      fluticasone (FLONASE) 50 MCG/ACT nasal spray 2 sprays into the nostril(s) as directed by provider Daily.       No facility-administered medications prior to  "visit.     No opioid medication identified on active medication list. I have reviewed chart for other potential  high risk medication/s and harmful drug interactions in the elderly.      Aspirin is not on active medication list.  Aspirin use is not indicated based on review of current medical condition/s. Risk of harm outweighs potential benefits.  .    Patient Active Problem List   Diagnosis    Mycobacterium avium complex    Hypertension, essential    Allergic rhinitis    Hypothyroidism    Hyperlipidemia    Polyneuropathy    Oropharyngeal dysphagia    Guillain-[place] syndrome following vaccination    Anxiety    Dyspnea on exertion    PSVT (paroxysmal supraventricular tachycardia)    Stenosis of right renal artery    Elevated glucose    Stenosis of right carotid artery    Age-related osteoporosis without current pathological fracture    Carotid stenosis, asymptomatic, left    Epiretinal membrane    Ethambutol adverse reaction    Medicare annual wellness visit, subsequent    Statin intolerance    Stage 3a chronic kidney disease (CKD)    History of influenza vaccine allergy    COVID-19    CKD (chronic kidney disease) stage 2, GFR 60-89 ml/min     Advance Care Planning   Advance Care Planning     Advance Directive is on file.  ACP discussion was held with the patient during this visit. Patient has an advance directive in EMR which is still valid.      Objective    Vitals:    10/24/23 0928   BP: 152/80   BP Location: Left arm   Patient Position: Sitting   Cuff Size: Adult   Pulse: 64   Temp: 96.8 °F (36 °C)   TempSrc: Temporal   SpO2: 98%   Weight: 51.4 kg (113 lb 6.4 oz)   Height: 157.5 cm (62.01\")     Estimated body mass index is 20.74 kg/m² as calculated from the following:    Height as of this encounter: 157.5 cm (62.01\").    Weight as of this encounter: 51.4 kg (113 lb 6.4 oz).    BMI is within normal parameters. No other follow-up for BMI required.    Does the patient have evidence of cognitive impairment? No      "   HEALTH RISK ASSESSMENT    Smoking Status:  Social History     Tobacco Use   Smoking Status Never   Smokeless Tobacco Never     Alcohol Consumption:  Social History     Substance and Sexual Activity   Alcohol Use No    Comment: caffeine use: 1 cup daily     Fall Risk Screen:    ALVAREZ Fall Risk Assessment was completed, and patient is at LOW risk for falls.Assessment completed on:10/24/2023    Depression Screening:      10/24/2023     9:34 AM   PHQ-2/PHQ-9 Depression Screening   Little Interest or Pleasure in Doing Things 0-->not at all   Feeling Down, Depressed or Hopeless 0-->not at all   PHQ-9: Brief Depression Severity Measure Score 0       Health Habits and Functional and Cognitive Screening:      10/24/2023     9:31 AM   Functional & Cognitive Status   Do you have difficulty preparing food and eating? No   Do you have difficulty bathing yourself, getting dressed or grooming yourself? No   Do you have difficulty using the toilet? No   Do you have difficulty moving around from place to place? No   Do you have trouble with steps or getting out of a bed or a chair? No   Current Diet Well Balanced Diet   Dental Exam Up to date   Eye Exam Up to date   Exercise (times per week) 0 times per week   Current Exercises Include No Regular Exercise   Do you need help using the phone?  No   Are you deaf or do you have serious difficulty hearing?  No   Do you need help to go to places out of walking distance? No   Do you need help shopping? No   Do you need help preparing meals?  No   Do you need help with housework?  No   Do you need help with laundry? No   Do you need help taking your medications? No   Do you need help managing money? No   Do you ever drive or ride in a car without wearing a seat belt? No   Have you felt unusual stress, anger or loneliness in the last month? No   Who do you live with? Alone   If you need help, do you have trouble finding someone available to you? No   Have you been bothered in the last  four weeks by sexual problems? No   Do you have difficulty concentrating, remembering or making decisions? No       Age-appropriate Screening Schedule:  Refer to the list below for future screening recommendations based on patient's age, sex and/or medical conditions. Orders for these recommended tests are listed in the plan section. The patient has been provided with a written plan.    Health Maintenance   Topic Date Due    LIPID PANEL  10/18/2023    ANNUAL WELLNESS VISIT  10/24/2024    DXA SCAN  07/27/2025    TDAP/TD VACCINES (3 - Td or Tdap) 09/13/2026    Pneumococcal Vaccine 65+  Completed    COVID-19 Vaccine  Discontinued    ZOSTER VACCINE  Discontinued    COLORECTAL CANCER SCREENING  Discontinued          CMS Preventative Services Quick Reference  Risk Factors Identified During Encounter  Immunizations Discussed/Encouraged: Tdap, Influenza, Shingrix, COVID19, and RSV  The above risks/problems have been discussed with the patient.  Pertinent information has been shared with the patient in the After Visit Summary.  An After Visit Summary and PPPS were made available to the patient.    Diagnoses and all orders for this visit:    1. Medicare annual wellness visit, subsequent (Primary)    2. Acquired hypothyroidism  -     TSH  -     T4, Free    3. Hypertension, essential  -     Comprehensive Metabolic Panel  -     Lipid Panel    4. Mixed hyperlipidemia  -     Comprehensive Metabolic Panel  -     Lipid Panel    5. CKD (chronic kidney disease) stage 2, GFR 60-89 ml/min  -     Comprehensive Metabolic Panel    6. Statin intolerance    7. History of influenza vaccine allergy     Reviewed history and annual wellness visit with patient during office time.  Medications reviewed as appropriate.  Discussed advanced directives and living will.  Patient has living will: Living will: Directive already scanned in chart.  Discussed fall risk and precautions encourage removing throw rugs and using grab bars within the home and  bathroom.  Will check the labs as ordered above to evaluate the blood sugars, kidney, liver, cholesterol for screening.  Discussed flu shot recommended to get the high-dose influenza vaccine annually in the fall.  Prevnar-13 and pneumovax-23 up to date and appropriate.  RSV, covid booster Tdap andShingrix vaccination series recommended but patient adamently declines at this time.  Encourage follow-up with the eye doctor on annual basis for glaucoma evaluation.  Discussed weight and encouraged exercise as tolerated while following a healthy diet.  Follow-up with specialists as scheduled.   Follow up with Dr Curiel tomorrow for the palpitations and elevated BP.    Follow Up:   Next Medicare Wellness visit to be scheduled in 1 year.

## 2023-10-24 NOTE — PATIENT INSTRUCTIONS
Medicare Wellness  Personal Prevention Plan of Service     Date of Office Visit:    Encounter Provider:  Eber Hua MD  Place of Service:  [facility]  Patient Name: Stacie Vieira  :  1935    As part of the Medicare Wellness portion of your visit today, we are providing you with this personalized preventive plan of services (PPPS). This plan is based upon recommendations of the [place] Preventive Services Task Force (USPSTF) and the Advisory Committee on Immunization Practices (ACIP).    This lists the preventive care services that should be considered, and provides dates of when you are due. Items listed as completed are up-to-date and do not require any further intervention.    Health Maintenance   Topic Date Due    ANNUAL WELLNESS VISIT  10/18/2023    LIPID PANEL  10/18/2023    DXA SCAN  2025    TDAP/TD VACCINES (3 - Td or Tdap) 2026    Pneumococcal Vaccine 65+  Completed    COVID-19 Vaccine  Discontinued    ZOSTER VACCINE  Discontinued    COLORECTAL CANCER SCREENING  Discontinued       No orders of the defined types were placed in this encounter.      Return in about 6 months (around 2024) for Next scheduled follow up.

## 2023-10-25 LAB
ALBUMIN SERPL-MCNC: 4.9 G/DL (ref 3.5–5.2)
ALBUMIN/GLOB SERPL: 2 G/DL
ALP SERPL-CCNC: 78 U/L (ref 39–117)
ALT SERPL-CCNC: 11 U/L (ref 1–33)
AST SERPL-CCNC: 22 U/L (ref 1–32)
BILIRUB SERPL-MCNC: 0.4 MG/DL (ref 0–1.2)
BUN SERPL-MCNC: 19 MG/DL (ref 8–23)
BUN/CREAT SERPL: 20.9 (ref 7–25)
CALCIUM SERPL-MCNC: 9.7 MG/DL (ref 8.6–10.5)
CHLORIDE SERPL-SCNC: 99 MMOL/L (ref 98–107)
CHOLEST SERPL-MCNC: 237 MG/DL (ref 0–200)
CO2 SERPL-SCNC: 28.9 MMOL/L (ref 22–29)
CREAT SERPL-MCNC: 0.91 MG/DL (ref 0.57–1)
EGFRCR SERPLBLD CKD-EPI 2021: 61.2 ML/MIN/1.73
GLOBULIN SER CALC-MCNC: 2.5 GM/DL
GLUCOSE SERPL-MCNC: 128 MG/DL (ref 65–99)
HDLC SERPL-MCNC: 81 MG/DL (ref 40–60)
LDLC SERPL CALC-MCNC: 113 MG/DL (ref 0–100)
POTASSIUM SERPL-SCNC: 4.8 MMOL/L (ref 3.5–5.2)
PROT SERPL-MCNC: 7.4 G/DL (ref 6–8.5)
SODIUM SERPL-SCNC: 139 MMOL/L (ref 136–145)
T4 FREE SERPL-MCNC: 1.39 NG/DL (ref 0.93–1.7)
TRIGL SERPL-MCNC: 251 MG/DL (ref 0–150)
TSH SERPL DL<=0.005 MIU/L-ACNC: 1.57 UIU/ML (ref 0.27–4.2)
VLDLC SERPL CALC-MCNC: 43 MG/DL (ref 5–40)

## 2023-10-26 ENCOUNTER — OFFICE VISIT (OUTPATIENT)
Dept: CARDIOLOGY | Facility: CLINIC | Age: 88
End: 2023-10-26
Payer: MEDICARE

## 2023-10-26 VITALS
DIASTOLIC BLOOD PRESSURE: 90 MMHG | BODY MASS INDEX: 22.38 KG/M2 | HEART RATE: 90 BPM | SYSTOLIC BLOOD PRESSURE: 154 MMHG | WEIGHT: 114 LBS | HEIGHT: 60 IN

## 2023-10-26 DIAGNOSIS — R94.31 ABNORMAL ELECTROCARDIOGRAM (ECG) (EKG): ICD-10-CM

## 2023-10-26 DIAGNOSIS — I47.10 PSVT (PAROXYSMAL SUPRAVENTRICULAR TACHYCARDIA): ICD-10-CM

## 2023-10-26 DIAGNOSIS — I70.1 STENOSIS OF RIGHT RENAL ARTERY: ICD-10-CM

## 2023-10-26 DIAGNOSIS — E78.2 MIXED HYPERLIPIDEMIA: ICD-10-CM

## 2023-10-26 DIAGNOSIS — I10 HYPERTENSION, ESSENTIAL: ICD-10-CM

## 2023-10-26 DIAGNOSIS — R06.09 DYSPNEA ON EXERTION: Primary | ICD-10-CM

## 2023-10-26 DIAGNOSIS — I65.21 STENOSIS OF RIGHT CAROTID ARTERY: ICD-10-CM

## 2023-10-26 PROCEDURE — 1160F RVW MEDS BY RX/DR IN RCRD: CPT | Performed by: INTERNAL MEDICINE

## 2023-10-26 PROCEDURE — 93000 ELECTROCARDIOGRAM COMPLETE: CPT | Performed by: INTERNAL MEDICINE

## 2023-10-26 PROCEDURE — 99214 OFFICE O/P EST MOD 30 MIN: CPT | Performed by: INTERNAL MEDICINE

## 2023-10-26 PROCEDURE — 1159F MED LIST DOCD IN RCRD: CPT | Performed by: INTERNAL MEDICINE

## 2023-10-26 NOTE — PROGRESS NOTES
Date of Office Visit: 10/26/2023  Encounter Provider: Aisha Curiel MD  Place of Service: [facility]  Patient Name: Stacie Vieira  :1935      Patient ID:  Stacie Vieira is a 88 y.o. female is here for  followup for hypertension.        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    She has had SVT since the 1970s and at that time was put on propranolol.  She had four  hospitalizations associated with this it sounds like, and at some point, they also put her  on digitalis also to control the SVT.  She really has not had any episodes since  that  she knows of.          She has been a .  She says it is very difficult because at  night she gets anxious.  Her neighbors, whom she has known for quite some time, no longer  live beside her due to some deaths and so the evening time is difficult for her.  She does have a son who lives in [place], [place] and is an intensivist at the [employer].       She had an echocardiogram done 17 to ejection fraction 67% grade 1 diastolic dysfunction, calcified aortic valve, mild tricuspid insufficiency.  At that time, she was in the hospital for pneumonia.  She has a history of MAC.  During hospitalization, she was diagnosed with Guillain-Barré syndrome and started plasmapheresis.  She did have an episode on 17 where she had a syncopal episode due to bradycardia.  She did receive chest compressions and got atropine.  She recovered quickly from this.     She had a nonischemic stress nuclear perfusion study done 18.  She had a renal artery duplex on 18 showing normal left renal artery, large left renal cyst measuring 5.1 x 5.4, hemodynamically significant right renal artery stenosis.  She had an echocardiogram done 17 showed ejection fraction  of 67% with grade 1 diastolic dysfunction, mild tricuspid insufficiency and calcification of the aortic valve.     Vascular screening done 9/3/2019 showed <50% left internal carotid artery stenosis, normal abdominal aorta.  Renal artery duplex done 5/15/2019 showed no significant left renal artery stenosis and less than 60% right renal artery stenosis.    Labs on 10/24/2023 showed glucose 128 with otherwise normal CMP, normal TSH and free T4, total cholesterol 237, HLD 1, , triglycerides 251, VLDL 43.  48-hour Holter monitor done 3/23/2023 showed sinus rhythm with rare PACs, 1 4 beat run of SVT with no symptoms, rare PVCs-palpitations and short windedness had no correlations, normal monitor.    Sometimes when she wakes up in the morning, she feels weak and her blood pressure is low.  Her blood pressure then starts rising the afternoon.  She is currently on amlodipine which she takes in the morning, metoprolol twice daily and olmesartan at night.  She is not exercising.  She does have exertional dyspnea and feels like her heart rate rises too much when she is active.  She has had no syncope or falls.  She has no orthopnea or PND and no exertional chest pain.    Past Medical History:   Diagnosis Date    Acute stress disorder     Allergic rhinitis     C. difficile colitis     hx in past    Dyspnea on exertion     Elevated fasting glucose     Essential hypertension     Guillain Barré syndrome     H/O: pneumonia     Health care maintenance     History of palpitations     Hyperlipidemia     Hypokalemia     Hypothyroidism     Insomnia     Labile blood pressure     MAC (mycobacterium avium-intracellulare complex)     reason for bronch.  dx with this nov 2015    Osteoporosis     Palpitations     Pneumonia     past hx of freq.    Polyneuropathy 12/20/2017    PSVT (paroxysmal supraventricular tachycardia)     Renal cyst     Sinusitis     Staph infection     in sinus area  recent sx jan 2016    Stenosis of right carotid  artery 10/22/2020    Stenosis of right renal artery 07/05/2018    Per renal duplex - 60% stenosis, left normal; 5/2019 duplex unchanged     SVT (supraventricular tachycardia)     past hx  on medicine    Syncope 12/25/2017    Syncope and collapse     Thyroiditis          Past Surgical History:   Procedure Laterality Date    BREAST BIOPSY      PERCUTATNEOUS NEEDLE CORE    BRONCHOSCOPY      nov 2015    BRONCHOSCOPY N/A 4/8/2016    Procedure: BRONCHOSCOPY WITH BAL;  Surgeon: Sinan Braga MD;  Location: [facility] ENDOSCOPY;  Service:     BRONCHOSCOPY N/A 12/9/2017    Procedure: BRONCHOSCOPY;  Surgeon: Real Rousseau MD;  Location: [facility] ENDOSCOPY;  Service:     BRONCHOSCOPY N/A 7/20/2018    Procedure: BRONCHOSCOPY with BAL;  Surgeon: Sinan Braga MD;  Location: [facility] ENDOSCOPY;  Service: Pulmonary    COLONOSCOPY N/A 07/08/2005    DILATATION AND CURETTAGE N/A 01/01/1990    PAP SMEAR N/A 03/30/2004    SINUS SURGERY  01/2016    TONSILLECTOMY         Current Outpatient Medications on File Prior to Visit   Medication Sig Dispense Refill    amLODIPine (NORVASC) 2.5 MG tablet Take 1 tablet by mouth Daily. One in the morning and one at night since the blood pressure has been high. 180 tablet 2    Cholecalciferol (VITAMIN D) 2000 units tablet Take 1 tablet by mouth Daily.      Levoxyl 50 MCG tablet Take 1 tablet by mouth Every Other Day. 45 tablet 2    Levoxyl 75 MCG tablet TAKE ONE TABLET BY MOUTH EVERY OTHER DAY 45 tablet 2    Loratadine 10 MG capsule Take 1 capsule by mouth Daily.      metoprolol tartrate (LOPRESSOR) 25 MG tablet Take 1 tablet by mouth 2 (Two) Times a Day. 180 tablet 3    Nutritional Supplements (JOINT FORMULA PO) Take  by mouth. Joint advantage gold      olmesartan (BENICAR) 20 MG tablet Take 1 tablet by mouth Daily.       No current facility-administered medications on file prior to visit.       Social History     Socioeconomic History    Marital status:    Tobacco Use    Smoking status: Never      "Passive exposure: Never    Smokeless tobacco: Never   Vaping Use    Vaping Use: Never used   Substance and Sexual Activity    Alcohol use: No     Comment: caffeine use: 1 cup daily    Drug use: No    Sexual activity: Never           ROS    Procedures    ECG 12 Lead    Date/Time: 10/26/2023 2:19 PM  Performed by: Aisha Curiel MD    Authorized by: Aisha Curiel MD  Comparison: compared with previous ECG   Comparison to previous ECG: Now t wave inversion  Rhythm: sinus rhythm  T inversion: II, III and aVF  T flattening: V3    Clinical impression: abnormal EKG              Objective:      Vitals:    10/26/23 1358   BP: 154/90   Pulse: 90   Weight: 51.7 kg (114 lb)   Height: 157.5 cm (62\")     Body mass index is 20.85 kg/m².    Vitals reviewed.   Constitutional:       General: Not in acute distress.     Appearance: Well-developed. Not diaphoretic.   Eyes:      General: No scleral icterus.     Conjunctiva/sclera: Conjunctivae normal.   HENT:      Head: Normocephalic and atraumatic.   Neck:      Thyroid: No thyromegaly.      Vascular: No carotid bruit or JVD.      Lymphadenopathy: No cervical adenopathy.   Pulmonary:      Effort: Pulmonary effort is normal. No respiratory distress.      Breath sounds: Normal breath sounds. No wheezing. No rhonchi. No rales.   Chest:      Chest wall: Not tender to palpatation.   Cardiovascular:      Normal rate. Regular rhythm.      Murmurs: There is no murmur.      No gallop.  No rub.   Pulses:     Intact distal pulses.      Carotid: 2+ bilaterally.     Radial: 2+ bilaterally.     Dorsalis pedis: 2+ bilaterally.     Posterior tibial: 2+ bilaterally.  Edema:     Peripheral edema absent.   Abdominal:      General: Bowel sounds are normal. There is no distension or abdominal bruit.      Palpations: Abdomen is soft. There is no abdominal mass.      Tenderness: There is no abdominal tenderness.   Musculoskeletal:         General: No deformity.      Extremities: No clubbing " present.     Cervical back: Neck supple. Skin:     General: Skin is warm and dry. There is no cyanosis.      Coloration: Skin is not pale.      Findings: No rash.   Neurological:      Mental Status: Alert and oriented to person, place, and time.      Cranial Nerves: No cranial nerve deficit.   Psychiatric:         Judgment: Judgment normal.         Lab Review:       Assessment:      Diagnosis Plan   1. Dyspnea on exertion  Adult Transthoracic Echo Complete W/ Cont if Necessary Per Protocol      2. Hypertension, essential  Adult Transthoracic Echo Complete W/ Cont if Necessary Per Protocol    Stress Test With Myocardial Perfusion One Day      3. Mixed hyperlipidemia  Stress Test With Myocardial Perfusion One Day      4. PSVT (paroxysmal supraventricular tachycardia)  Stress Test With Myocardial Perfusion One Day      5. Stenosis of right carotid artery        6. Stenosis of right renal artery        7. Abnormal electrocardiogram (ECG) (EKG)  Stress Test With Myocardial Perfusion One Day            Hypertension.  BP is better and more stable - goal is <120/80.   Palpitations.  These are very infrequent and overall have been very well controlled     History of SVT.  No recurrence  Hyperlipidemia, well controlled.   Hypothyroidism, stable.   Anxiety, particularly in the evenings.    Chronic MAC - stable, sees Dr. Braga every 6 months for this.   Guillain-barre 12/2017 - really resolved, doing well.   Exertional dyspnea with new abnormalities on her ECG, set up stress nuclear perfusion study-I want her to walk on a treadmill and to remain on her beta-blocker while walking, this may have to be a low level.  In addition we will set up an echocardiogram.  Moderate right renal artery stenosis       Plan:       See Rosa in 6 months.  Has labile blood pressure which I think is autonomic in nature as well as being partially related to her renal artery stenosis.  Take amlodipine at noon time or later, maintain metoprolol  and losartan, set up echocardiogram and stress study.  I want her to do her stress study on metoprolol.  No other medication changes at this time.

## 2023-11-08 ENCOUNTER — HOSPITAL ENCOUNTER (OUTPATIENT)
Dept: CARDIOLOGY | Facility: HOSPITAL | Age: 88
Discharge: HOME | End: 2023-11-08
Payer: MEDICARE

## 2023-11-08 ENCOUNTER — TELEPHONE (OUTPATIENT)
Dept: CARDIOLOGY | Facility: CLINIC | Age: 88
End: 2023-11-08

## 2023-11-08 VITALS
BODY MASS INDEX: 22.38 KG/M2 | SYSTOLIC BLOOD PRESSURE: 130 MMHG | HEART RATE: 66 BPM | DIASTOLIC BLOOD PRESSURE: 80 MMHG | HEIGHT: 60 IN | WEIGHT: 114 LBS

## 2023-11-08 VITALS — HEIGHT: 60 IN | WEIGHT: 113.98 LBS | BODY MASS INDEX: 22.38 KG/M2

## 2023-11-08 DIAGNOSIS — R94.31 ABNORMAL ELECTROCARDIOGRAM (ECG) (EKG): ICD-10-CM

## 2023-11-08 DIAGNOSIS — I47.10 PSVT (PAROXYSMAL SUPRAVENTRICULAR TACHYCARDIA): ICD-10-CM

## 2023-11-08 DIAGNOSIS — R06.09 DYSPNEA ON EXERTION: ICD-10-CM

## 2023-11-08 DIAGNOSIS — I10 HYPERTENSION, ESSENTIAL: ICD-10-CM

## 2023-11-08 DIAGNOSIS — E78.2 MIXED HYPERLIPIDEMIA: ICD-10-CM

## 2023-11-08 LAB
AORTIC ARCH: 1.7 CM
AORTIC DIMENSIONLESS INDEX: 0.7 (DI)
ASCENDING AORTA: 2.6 CM
BH CV ECHO MEAS - ACS: 1.43 CM
BH CV ECHO MEAS - AO MAX PG: 4.8 MMHG
BH CV ECHO MEAS - AO MEAN PG: 3 MMHG
BH CV ECHO MEAS - AO ROOT DIAM: 2.5 CM
BH CV ECHO MEAS - AO V2 MAX: 109 CM/SEC
BH CV ECHO MEAS - AO V2 VTI: 27.3 CM
BH CV ECHO MEAS - AVA(I,D): 1.68 CM2
BH CV ECHO MEAS - EDV(CUBED): 59.3 ML
BH CV ECHO MEAS - EDV(MOD-SP2): 39 ML
BH CV ECHO MEAS - EDV(MOD-SP4): 44 ML
BH CV ECHO MEAS - EF(MOD-BP): 65.1 %
BH CV ECHO MEAS - EF(MOD-SP2): 66.7 %
BH CV ECHO MEAS - EF(MOD-SP4): 65.9 %
BH CV ECHO MEAS - ESV(CUBED): 15.8 ML
BH CV ECHO MEAS - ESV(MOD-SP2): 13 ML
BH CV ECHO MEAS - ESV(MOD-SP4): 15 ML
BH CV ECHO MEAS - FS: 35.7 %
BH CV ECHO MEAS - IVS/LVPW: 1 CM
BH CV ECHO MEAS - IVSD: 0.7 CM
BH CV ECHO MEAS - LAT PEAK E' VEL: 8.1 CM/SEC
BH CV ECHO MEAS - LV DIASTOLIC VOL/BSA (35-75): 29.9 CM2
BH CV ECHO MEAS - LV MASS(C)D: 75.1 GRAMS
BH CV ECHO MEAS - LV MAX PG: 2.6 MMHG
BH CV ECHO MEAS - LV MEAN PG: 1 MMHG
BH CV ECHO MEAS - LV SYSTOLIC VOL/BSA (12-30): 10.2 CM2
BH CV ECHO MEAS - LV V1 MAX: 80.1 CM/SEC
BH CV ECHO MEAS - LV V1 VTI: 16.7 CM
BH CV ECHO MEAS - LVIDD: 3.9 CM
BH CV ECHO MEAS - LVIDS: 2.5 CM
BH CV ECHO MEAS - LVOT AREA: 2.7 CM2
BH CV ECHO MEAS - LVOT DIAM: 1.87 CM
BH CV ECHO MEAS - LVPWD: 0.7 CM
BH CV ECHO MEAS - MED PEAK E' VEL: 5.5 CM/SEC
BH CV ECHO MEAS - MV A DUR: 0.15 SEC
BH CV ECHO MEAS - MV A MAX VEL: 96.4 CM/SEC
BH CV ECHO MEAS - MV DEC SLOPE: 366.3 CM/SEC2
BH CV ECHO MEAS - MV DEC TIME: 0.17 SEC
BH CV ECHO MEAS - MV E MAX VEL: 86.5 CM/SEC
BH CV ECHO MEAS - MV E/A: 0.9
BH CV ECHO MEAS - MV MAX PG: 5.2 MMHG
BH CV ECHO MEAS - MV MEAN PG: 1.51 MMHG
BH CV ECHO MEAS - MV P1/2T: 77.1 MSEC
BH CV ECHO MEAS - MV V2 VTI: 32.3 CM
BH CV ECHO MEAS - MVA(P1/2T): 2.9 CM2
BH CV ECHO MEAS - MVA(VTI): 1.42 CM2
BH CV ECHO MEAS - PA ACC TIME: 0.18 SEC
BH CV ECHO MEAS - PA V2 MAX: 67.9 CM/SEC
BH CV ECHO MEAS - PI END-D VEL: 62.4 CM/SEC
BH CV ECHO MEAS - PULM A REVS DUR: 0.17 SEC
BH CV ECHO MEAS - PULM A REVS VEL: 28.7 CM/SEC
BH CV ECHO MEAS - PULM DIAS VEL: 33 CM/SEC
BH CV ECHO MEAS - PULM S/D: 1.48
BH CV ECHO MEAS - PULM SYS VEL: 48.8 CM/SEC
BH CV ECHO MEAS - QP/QS: 0.69
BH CV ECHO MEAS - RAP SYSTOLE: 3 MMHG
BH CV ECHO MEAS - RV MAX PG: 1.11 MMHG
BH CV ECHO MEAS - RV V1 MAX: 52.7 CM/SEC
BH CV ECHO MEAS - RV V1 VTI: 11.4 CM
BH CV ECHO MEAS - RVOT DIAM: 1.88 CM
BH CV ECHO MEAS - RVSP: 31.2 MMHG
BH CV ECHO MEAS - SI(MOD-SP2): 17.7 ML/M2
BH CV ECHO MEAS - SI(MOD-SP4): 19.7 ML/M2
BH CV ECHO MEAS - SUP REN AO DIAM: 1.9 CM
BH CV ECHO MEAS - SV(LVOT): 45.9 ML
BH CV ECHO MEAS - SV(MOD-SP2): 26 ML
BH CV ECHO MEAS - SV(MOD-SP4): 29 ML
BH CV ECHO MEAS - SV(RVOT): 31.7 ML
BH CV ECHO MEAS - TAPSE (>1.6): 2.46 CM
BH CV ECHO MEAS - TR MAX PG: 28.2 MMHG
BH CV ECHO MEAS - TR MAX VEL: 265.6 CM/SEC
BH CV ECHO MEASUREMENTS AVERAGE E/E' RATIO: 12.72
BH CV NUCLEAR PRIOR STUDY: 1
BH CV REST NUCLEAR ISOTOPE DOSE: 6.7 MCI
BH CV STRESS BP STAGE 1: NORMAL
BH CV STRESS DURATION MIN STAGE 1: 2
BH CV STRESS DURATION SEC STAGE 1: 20
BH CV STRESS GRADE STAGE 1: 10
BH CV STRESS HR STAGE 1: 103
BH CV STRESS METS STAGE 1: 5
BH CV STRESS NUCLEAR ISOTOPE DOSE: 23.1 MCI
BH CV STRESS PROTOCOL 1: NORMAL
BH CV STRESS PROTOCOL 2 BP STAGE 1: NORMAL
BH CV STRESS PROTOCOL 2 COMMENTS STAGE 1: NORMAL
BH CV STRESS PROTOCOL 2 DOSE REGADENOSON STAGE 1: 0.4
BH CV STRESS PROTOCOL 2 DURATION MIN STAGE 1: 0
BH CV STRESS PROTOCOL 2 DURATION SEC STAGE 1: 10
BH CV STRESS PROTOCOL 2 HR STAGE 1: 103
BH CV STRESS PROTOCOL 2 STAGE 1: 1
BH CV STRESS PROTOCOL 2: NORMAL
BH CV STRESS RECOVERY BP: NORMAL MMHG
BH CV STRESS RECOVERY HR: 75 BPM
BH CV STRESS SPEED STAGE 1: 1.7
BH CV STRESS STAGE 1: 1
BH CV XLRA - RV BASE: 3.6 CM
BH CV XLRA - RV LENGTH: 5.9 CM
BH CV XLRA - RV MID: 2.7 CM
BH CV XLRA - TDI S': 14 CM/SEC
LEFT ATRIUM VOLUME INDEX: 14.8 ML/M2
LV EF NUC BP: 76 %
MAXIMAL PREDICTED HEART RATE: 133 BPM
PERCENT MAX PREDICTED HR: 77.44 %
SINUS: 2.48 CM
STJ: 2.25 CM
STRESS BASELINE BP: NORMAL MMHG
STRESS BASELINE HR: 63 BPM
STRESS PERCENT HR: 91 %
STRESS POST EXERCISE DUR SEC: 10 SEC
STRESS POST PEAK BP: NORMAL MMHG
STRESS POST PEAK HR: 103 BPM
STRESS TARGET HR: 113 BPM

## 2023-11-08 PROCEDURE — 93017 CV STRESS TEST TRACING ONLY: CPT

## 2023-11-08 PROCEDURE — 0 TECHNETIUM TETROFOSMIN KIT: Performed by: INTERNAL MEDICINE

## 2023-11-08 PROCEDURE — 78452 HT MUSCLE IMAGE SPECT MULT: CPT | Performed by: INTERNAL MEDICINE

## 2023-11-08 PROCEDURE — 25010000002 REGADENOSON 0.4 MG/5ML SOLUTION: Performed by: INTERNAL MEDICINE

## 2023-11-08 PROCEDURE — 93306 TTE W/DOPPLER COMPLETE: CPT

## 2023-11-08 PROCEDURE — 93018 CV STRESS TEST I&R ONLY: CPT | Performed by: INTERNAL MEDICINE

## 2023-11-08 PROCEDURE — A9502 TC99M TETROFOSMIN: HCPCS | Performed by: INTERNAL MEDICINE

## 2023-11-08 PROCEDURE — 78452 HT MUSCLE IMAGE SPECT MULT: CPT

## 2023-11-08 PROCEDURE — 93016 CV STRESS TEST SUPVJ ONLY: CPT | Performed by: INTERNAL MEDICINE

## 2023-11-08 PROCEDURE — 93306 TTE W/DOPPLER COMPLETE: CPT | Performed by: INTERNAL MEDICINE

## 2023-11-08 RX ORDER — REGADENOSON 0.08 MG/ML
0.4 INJECTION, SOLUTION INTRAVENOUS
Status: COMPLETED | OUTPATIENT
Start: 2023-11-08 | End: 2023-11-08

## 2023-11-08 RX ADMIN — TETROFOSMIN 1 DOSE: 1.38 INJECTION, POWDER, LYOPHILIZED, FOR SOLUTION INTRAVENOUS at 13:10

## 2023-11-08 RX ADMIN — REGADENOSON 0.4 MG: 0.08 INJECTION, SOLUTION INTRAVENOUS at 13:10

## 2023-11-08 RX ADMIN — TETROFOSMIN 1 DOSE: 1.38 INJECTION, POWDER, LYOPHILIZED, FOR SOLUTION INTRAVENOUS at 12:05

## 2023-11-08 NOTE — TELEPHONE ENCOUNTER
Attempted to call pt twice and line was busy. Will continue to try to reach her.    Thank you,    Cristine Ceballos, RN  Triage [facility] [place]  11/08/23 15:25 EST

## 2023-11-09 ENCOUNTER — TELEPHONE (OUTPATIENT)
Dept: FAMILY MEDICINE CLINIC | Facility: CLINIC | Age: 88
End: 2023-11-09

## 2023-11-09 DIAGNOSIS — I47.10 PSVT (PAROXYSMAL SUPRAVENTRICULAR TACHYCARDIA): Primary | ICD-10-CM

## 2023-11-09 NOTE — TELEPHONE ENCOUNTER
Called and spoke with patient. Went over results and recommendations. She verbalized understanding.    Dr. Curiel,    Pt said she understands that her testing is coming back normal, but she is still symptomatic and doesn't know what to do next. She said she can make it up about a half of a flight before she has a lot of trouble breathing. She also has episodes of feeling her heart racing and she gets short of breath during those episodes.    Pt is willing to do more testing if necessary. She said she isn't trying to be difficult and she appreciates your recommendations.     Thank you,    Cristine Ceballos, RN  Triage [facility] [place]  11/09/23 09:27 EST

## 2023-11-09 NOTE — TELEPHONE ENCOUNTER
Caller: Stacie Vieira    Relationship: Self    Best call back number: [phone]     Caller requesting test results: SELF    What test was performed: LABS    When was the test performed: 10.24.23    Where was the test performed: IN OFFICE    Additional notes: PLEASE CALL PATIENT WITH RESULTS. IF PATIENT IS UNABLE TO ANSWER, PLEASE LEAVE VOICEMAIL.

## 2023-11-09 NOTE — TELEPHONE ENCOUNTER
Notified pt. She verbalized understanding.    Scheduling, could you call her to get a ZIO set up?    Thank you,    Cristine Ceballos, RN  [place] [facility] [place]  11/09/23 13:00 EST

## 2023-12-06 DIAGNOSIS — I10 ESSENTIAL HYPERTENSION: ICD-10-CM

## 2023-12-06 RX ORDER — OLMESARTAN MEDOXOMIL 20 MG/1
20 TABLET ORAL DAILY
Qty: 90 TABLET | Refills: 0 | Status: SHIPPED | OUTPATIENT
Start: 2023-12-06

## 2023-12-07 ENCOUNTER — TELEPHONE (OUTPATIENT)
Dept: CARDIOLOGY | Facility: CLINIC | Age: 88
End: 2023-12-07

## 2023-12-07 NOTE — TELEPHONE ENCOUNTER
I called her about her monitor - having more SVT and she feels this.  Advised to take an extra metoprolol on days when she is having more SVT.  She cannot go on increase metoprolol regimen daily because she is relatively bradycardic most this time.  She will try this and keep me posted.

## 2024-02-10 DIAGNOSIS — E03.9 ACQUIRED HYPOTHYROIDISM: ICD-10-CM

## 2024-02-12 RX ORDER — LEVOTHYROXINE SODIUM 50 UG/1
50 TABLET ORAL EVERY OTHER DAY
Qty: 45 TABLET | Refills: 1 | Status: SHIPPED | OUTPATIENT
Start: 2024-02-12

## 2024-02-12 RX ORDER — LEVOTHYROXINE SODIUM 75 UG/1
TABLET ORAL
Qty: 45 TABLET | Refills: 1 | Status: SHIPPED | OUTPATIENT
Start: 2024-02-12

## 2024-03-05 DIAGNOSIS — I10 ESSENTIAL HYPERTENSION: ICD-10-CM

## 2024-03-05 RX ORDER — OLMESARTAN MEDOXOMIL 20 MG/1
20 TABLET ORAL DAILY
Qty: 90 TABLET | Refills: 0 | Status: SHIPPED | OUTPATIENT
Start: 2024-03-05

## 2024-03-05 NOTE — TELEPHONE ENCOUNTER
LOV             10/24/2023   NOV             4/23/2024   Last RF        12/23/23  Protocol       met    RONNA Bhakta/BRENDA

## 2024-03-19 DIAGNOSIS — I10 HYPERTENSION, ESSENTIAL: ICD-10-CM

## 2024-03-19 RX ORDER — AMLODIPINE BESYLATE 2.5 MG/1
TABLET ORAL
Qty: 180 TABLET | Refills: 2 | OUTPATIENT
Start: 2024-03-19

## 2024-04-23 ENCOUNTER — OFFICE VISIT (OUTPATIENT)
Dept: FAMILY MEDICINE CLINIC | Facility: CLINIC | Age: 89
End: 2024-04-23
Payer: MEDICARE

## 2024-04-23 VITALS
OXYGEN SATURATION: 96 % | SYSTOLIC BLOOD PRESSURE: 136 MMHG | HEIGHT: 60 IN | WEIGHT: 115.8 LBS | TEMPERATURE: 96.4 F | DIASTOLIC BLOOD PRESSURE: 88 MMHG | BODY MASS INDEX: 22.74 KG/M2 | HEART RATE: 55 BPM

## 2024-04-23 DIAGNOSIS — I10 HYPERTENSION, ESSENTIAL: ICD-10-CM

## 2024-04-23 DIAGNOSIS — E03.9 ACQUIRED HYPOTHYROIDISM: Primary | ICD-10-CM

## 2024-04-23 DIAGNOSIS — I10 ESSENTIAL HYPERTENSION: ICD-10-CM

## 2024-04-23 DIAGNOSIS — R00.2 PALPITATIONS: ICD-10-CM

## 2024-04-23 DIAGNOSIS — I47.10 PSVT (PAROXYSMAL SUPRAVENTRICULAR TACHYCARDIA): ICD-10-CM

## 2024-04-23 PROCEDURE — 99214 OFFICE O/P EST MOD 30 MIN: CPT | Performed by: INTERNAL MEDICINE

## 2024-04-23 PROCEDURE — G2211 COMPLEX E/M VISIT ADD ON: HCPCS | Performed by: INTERNAL MEDICINE

## 2024-04-23 RX ORDER — OLMESARTAN MEDOXOMIL 20 MG/1
20 TABLET ORAL DAILY
Qty: 90 TABLET | Refills: 3 | Status: SHIPPED | OUTPATIENT
Start: 2024-04-23

## 2024-04-23 RX ORDER — FLUTICASONE PROPIONATE 50 MCG
SPRAY, SUSPENSION (ML) NASAL
COMMUNITY
Start: 2024-04-03

## 2024-04-23 RX ORDER — AMLODIPINE BESYLATE 2.5 MG/1
2.5 TABLET ORAL DAILY
Qty: 180 TABLET | Refills: 2 | Status: SHIPPED | OUTPATIENT
Start: 2024-04-23 | End: 2024-04-26

## 2024-04-23 NOTE — PROGRESS NOTES
Subjective   Stacie Vieira is a 89 y.o. female.     Chief Complaint   Patient presents with    Hypertension    Hypothyroidism           Rapid Heart Rate     She said her SVT is really giving her problems. She said it is much worse. She said she has talked with her cardiologist and they did a monitor in December and January and the one in January showed significant episodes.        History of Present Illness   History of hypertension.  Currently, has been feeling well and asymptomatic without any headaches, vision changes, cough, chest pain, shortness of breath, swelling, focal neurologic deficit, memory loss or syncope.  Has been taking the medications regularly and adherent with the regimen amlodipine 2.5 mg daily BID (in past had been on total of 10 mg amlodipine but it caused swelling), metoprolol tartrate 25 mg twice a day (cannot take higher doses as causes bradycardia but still with SVT) and olmesartan 20 mg 1/2 tab nightly.  Denies medication side effects and no significant interval events.       History of high cholesterol.  Currently, has been feeling well without any myalgias, muscle aches, weakness, numbness, chest pain, short of breath or other issues.  Currently, is no statin secondary to statin intolerance and Zetia intolerance. Last lab on 10/24/23.     History of hypo-thyroid.  Denies fatigue, weakness, constipation/diarrhea, hair/skin changes, weight gain/loss, depression/anxiety, rashes, palpitations, swelling, chest pain, shortness of breath or other issues.  Has been compliant with taking the medication of levoxyl 50 mcg and 75 mcg every other day alternating  with no recent changes.  Denies any difficulty with the current medication.  Last lab on 10/24/23.     History of SVT episodes and currently using metoprolol tartrate 25 mg twice a day but cannot tolerate higher doses secondary to low heart rate.  Still has episodes of racing heart and short of breath episodes.  Has been noting that her  SVT has seemed so much worse and she did talk to her cardiologist in December and January.  Seems to be happening 2-3 times a day.  Holter monitor performed 11/16/2023 showed some rare PACs and 60 episodes of SVT but otherwise relatively benign.  Echocardiogram and stress test did not show anything significant either.  Has followup with Dr Curiel's office in 3 days.     History of stage 3a CKD and stable for > 3 years.    Has some back pain in the right low back that varies in intensity based on how she stands.    The following portions of the patient's history were reviewed and updated as appropriate: allergies, current medications, past family history, past medical history, past social history, past surgical history and problem list.    Depression Screen:      10/24/2023     9:34 AM   PHQ-2/PHQ-9 Depression Screening   Little Interest or Pleasure in Doing Things 0-->not at all   Feeling Down, Depressed or Hopeless 0-->not at all   PHQ-9: Brief Depression Severity Measure Score 0       Past Medical History:   Diagnosis Date    Acute stress disorder     Allergic rhinitis     C. difficile colitis     hx in past    Dyspnea on exertion     Elevated fasting glucose     Essential hypertension     Guillain Barré syndrome     H/O: pneumonia     Health care maintenance     History of palpitations     Hyperlipidemia     Hypokalemia     Hypothyroidism     Insomnia     Labile blood pressure     MAC (mycobacterium avium-intracellulare complex)     reason for bronch.  dx with this nov 2015    Osteoporosis     Palpitations     Pneumonia     past hx of freq.    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    Per renal duplex - 60% stenosis, left normal; 5/2019 duplex unchanged     SVT (supraventricular tachycardia)      past hx  on medicine    Syncope 12/25/2017    Syncope and collapse     Thyroiditis        Past Surgical History:   Procedure Laterality Date    BREAST BIOPSY      PERCUTATNEOUS NEEDLE CORE    BRONCHOSCOPY      nov 2015    BRONCHOSCOPY N/A 4/8/2016    Procedure: BRONCHOSCOPY WITH BAL;  Surgeon: Sinan Braga MD;  Location: [facility];  Service:     BRONCHOSCOPY N/A 12/9/2017    Procedure: BRONCHOSCOPY;  Surgeon: Real Rousseau MD;  Location: [facility];  Service:     BRONCHOSCOPY N/A 7/20/2018    Procedure: BRONCHOSCOPY with BAL;  Surgeon: Sinan Braga MD;  Location: [facility];  Service: Pulmonary    COLONOSCOPY N/A 07/08/2005    DILATATION AND CURETTAGE N/A 01/01/1990    PAP SMEAR N/A 03/30/2004    SINUS SURGERY  01/2016    TONSILLECTOMY         Family History   Problem Relation Age of Onset    Hypertension Brother     Dementia Mother     Kidney disease Father     Thyroid disease Other        Social History     Socioeconomic History    Marital status:    Tobacco Use    Smoking status: Never     Passive exposure: Never    Smokeless tobacco: Never   Vaping Use    Vaping status: Never Used   Substance and Sexual Activity    Alcohol use: No     Comment: caffeine use: 1 cup daily    Drug use: No    Sexual activity: Never       Current Outpatient Medications   Medication Sig Dispense Refill    amLODIPine (NORVASC) 2.5 MG tablet Take 1 tablet by mouth Daily. One in the morning and one at night since the blood pressure has been high. 180 tablet 2    fluticasone (FLONASE) 50 MCG/ACT nasal spray       Levoxyl 50 MCG tablet TAKE ONE TABLET BY MOUTH EVERY OTHER DAY 45 tablet 1    Levoxyl 75 MCG tablet TAKE ONE TABLET BY MOUTH EVERY OTHER DAY 45 tablet 1    Loratadine 10 MG capsule Take 1 capsule by mouth Daily.      metoprolol tartrate (LOPRESSOR) 25 MG tablet Take 1 tablet by mouth 2 (Two) Times a Day. 180 tablet 3    olmesartan (BENICAR) 20 MG tablet Take 1 tablet by mouth Daily. 90 tablet 3     "Polyethyl Glycol-Propyl Glycol (SYSTANE OP) Apply  to eye(s) as directed by provider.      Cholecalciferol (VITAMIN D) 2000 units tablet Take 1 tablet by mouth Daily.       No current facility-administered medications for this visit.       Review of Systems   Constitutional:  Negative for activity change, appetite change, fatigue, fever, unexpected weight gain and unexpected weight loss.   HENT:  Negative for nosebleeds, rhinorrhea, trouble swallowing and voice change.    Eyes:  Negative for visual disturbance.   Respiratory:  Negative for cough, chest tightness, shortness of breath and wheezing.    Cardiovascular:  Positive for palpitations. Negative for chest pain and leg swelling.   Gastrointestinal:  Negative for abdominal pain, blood in stool, constipation, diarrhea, nausea, vomiting, GERD and indigestion.   Genitourinary:  Negative for dysuria, frequency and hematuria.   Musculoskeletal:  Positive for back pain. Negative for arthralgias and myalgias.   Skin:  Negative for rash and wound.   Neurological:  Negative for dizziness, tremors, weakness, light-headedness, numbness, headache and memory problem.   Hematological:  Negative for adenopathy. Does not bruise/bleed easily.   Psychiatric/Behavioral:  Negative for sleep disturbance and depressed mood. The patient is not nervous/anxious.        Objective   /88 (BP Location: Left arm, Patient Position: Sitting, Cuff Size: Adult)   Pulse 55   Temp 96.4 °F (35.8 °C) (Temporal)   Ht 152 cm (59.84\")   Wt 52.5 kg (115 lb 12.8 oz)   LMP 01/01/1993 (LMP Unknown)   SpO2 96%   BMI 22.73 kg/m²     Physical Exam  Vitals and nursing note reviewed.   Constitutional:       General: She is not in acute distress.     Appearance: She is well-developed. She is not diaphoretic.   HENT:      Head: Normocephalic and atraumatic.      Right Ear: External ear normal.      Left Ear: External ear normal.      Nose: Nose normal.   Eyes:      Conjunctiva/sclera: Conjunctivae " normal.      Pupils: Pupils are equal, round, and reactive to light.   Neck:      Thyroid: No thyromegaly.      Trachea: No tracheal deviation.   Cardiovascular:      Rate and Rhythm: Normal rate and regular rhythm.      Heart sounds: Normal heart sounds. No murmur heard.     No friction rub. No gallop.   Pulmonary:      Effort: Pulmonary effort is normal. No respiratory distress.      Breath sounds: Normal breath sounds.   Abdominal:      General: Bowel sounds are normal.      Palpations: Abdomen is soft. There is no mass.      Tenderness: There is no abdominal tenderness. There is no guarding.   Musculoskeletal:         General: Normal range of motion.      Cervical back: Normal range of motion and neck supple.   Lymphadenopathy:      Cervical: No cervical adenopathy.   Skin:     General: Skin is warm and dry.      Capillary Refill: Capillary refill takes less than 2 seconds.      Findings: No rash.   Neurological:      Mental Status: She is alert and oriented to person, place, and time.      Motor: No abnormal muscle tone.      Deep Tendon Reflexes: Reflexes normal.   Psychiatric:         Behavior: Behavior normal.         Thought Content: Thought content normal.         Judgment: Judgment normal.         No results found for this or any previous visit (from the past 2016 hour(s)).  Assessment & Plan   Diagnoses and all orders for this visit:    1. Acquired hypothyroidism (Primary)  -     TSH Rfx On Abnormal To Free T4    2. Hypertension, essential  -     CBC & Differential  -     amLODIPine (NORVASC) 2.5 MG tablet; Take 1 tablet by mouth Daily. One in the morning and one at night since the blood pressure has been high.  Dispense: 180 tablet; Refill: 2    3. Essential hypertension  Comments:  stable - call if bp over 140/90  Orders:  -     CBC & Differential  -     olmesartan (BENICAR) 20 MG tablet; Take 1 tablet by mouth Daily.  Dispense: 90 tablet; Refill: 3    4. Palpitations  -     TSH Rfx On Abnormal To Free  T4  -     CBC & Differential    5. PSVT (paroxysmal supraventricular tachycardia)  -     TSH Rfx On Abnormal To Free T4  -     CBC & Differential    Symptomatic SVTs with palpitations.  Will check the thyroid and blood counts.  Blood pressure is currently controlled and no changes at this time.  Follow-up with cardiology as scheduled.  Unfortunately she does not tolerate any higher dose of the metoprolol.           COVID-19 Precautions - Patient was compliant in wearing a mask. When I saw the patient, I used appropriate personal protective equipment (PPE) including mask and eye shield (standard procedure).  Additionally, I used gown and gloves if indicated.  Hand hygiene was completed before and after seeing the patient.  Dictated utilizing Dragon Dictation

## 2024-04-24 LAB
BASOPHILS # BLD AUTO: 0.04 10*3/MM3 (ref 0–0.2)
BASOPHILS NFR BLD AUTO: 0.5 % (ref 0–1.5)
EOSINOPHIL # BLD AUTO: 0.26 10*3/MM3 (ref 0–0.4)
EOSINOPHIL NFR BLD AUTO: 3.5 % (ref 0.3–6.2)
ERYTHROCYTE [DISTWIDTH] IN BLOOD BY AUTOMATED COUNT: 12.2 % (ref 12.3–15.4)
HCT VFR BLD AUTO: 44.5 % (ref 34–46.6)
HGB BLD-MCNC: 14.6 G/DL (ref 12–15.9)
IMM GRANULOCYTES # BLD AUTO: 0.03 10*3/MM3 (ref 0–0.05)
IMM GRANULOCYTES NFR BLD AUTO: 0.4 % (ref 0–0.5)
LYMPHOCYTES # BLD AUTO: 2.52 10*3/MM3 (ref 0.7–3.1)
LYMPHOCYTES NFR BLD AUTO: 34.1 % (ref 19.6–45.3)
MCH RBC QN AUTO: 30.7 PG (ref 26.6–33)
MCHC RBC AUTO-ENTMCNC: 32.8 G/DL (ref 31.5–35.7)
MCV RBC AUTO: 93.5 FL (ref 79–97)
MONOCYTES # BLD AUTO: 0.91 10*3/MM3 (ref 0.1–0.9)
MONOCYTES NFR BLD AUTO: 12.3 % (ref 5–12)
NEUTROPHILS # BLD AUTO: 3.63 10*3/MM3 (ref 1.7–7)
NEUTROPHILS NFR BLD AUTO: 49.2 % (ref 42.7–76)
NRBC BLD AUTO-RTO: 0 /100 WBC (ref 0–0.2)
PLATELET # BLD AUTO: 356 10*3/MM3 (ref 140–450)
RBC # BLD AUTO: 4.76 10*6/MM3 (ref 3.77–5.28)
TSH SERPL DL<=0.005 MIU/L-ACNC: 0.72 UIU/ML (ref 0.27–4.2)
WBC # BLD AUTO: 7.39 10*3/MM3 (ref 3.4–10.8)

## 2024-04-26 ENCOUNTER — OFFICE VISIT (OUTPATIENT)
Dept: CARDIOLOGY | Facility: CLINIC | Age: 89
End: 2024-04-26
Payer: MEDICARE

## 2024-04-26 VITALS
BODY MASS INDEX: 22.74 KG/M2 | OXYGEN SATURATION: 100 % | HEART RATE: 64 BPM | SYSTOLIC BLOOD PRESSURE: 152 MMHG | HEIGHT: 60 IN | WEIGHT: 115.8 LBS | DIASTOLIC BLOOD PRESSURE: 78 MMHG

## 2024-04-26 DIAGNOSIS — I10 PRIMARY HYPERTENSION: ICD-10-CM

## 2024-04-26 DIAGNOSIS — R00.2 PALPITATIONS: ICD-10-CM

## 2024-04-26 DIAGNOSIS — I65.22 CAROTID STENOSIS, ASYMPTOMATIC, LEFT: ICD-10-CM

## 2024-04-26 DIAGNOSIS — I47.10 PSVT (PAROXYSMAL SUPRAVENTRICULAR TACHYCARDIA): Primary | ICD-10-CM

## 2024-04-26 PROBLEM — U07.1 COVID-19: Status: RESOLVED | Noted: 2023-03-02 | Resolved: 2024-04-26

## 2024-04-26 PROCEDURE — 1159F MED LIST DOCD IN RCRD: CPT | Performed by: NURSE PRACTITIONER

## 2024-04-26 PROCEDURE — 93000 ELECTROCARDIOGRAM COMPLETE: CPT | Performed by: NURSE PRACTITIONER

## 2024-04-26 PROCEDURE — 99214 OFFICE O/P EST MOD 30 MIN: CPT | Performed by: NURSE PRACTITIONER

## 2024-04-26 PROCEDURE — 1160F RVW MEDS BY RX/DR IN RCRD: CPT | Performed by: NURSE PRACTITIONER

## 2024-04-26 RX ORDER — DILTIAZEM HYDROCHLORIDE 60 MG/1
60 TABLET, FILM COATED ORAL 2 TIMES DAILY
Qty: 60 TABLET | Refills: 1 | Status: SHIPPED | OUTPATIENT
Start: 2024-04-26 | End: 2024-04-29 | Stop reason: SDUPTHER

## 2024-04-26 NOTE — PROGRESS NOTES
Date of Office Visit: 2024  Encounter Provider: ANNABEL Miranda  Place of Service: [facility] CARDIOLOGY  Patient Name: Stacie Vieira  :1935  Primary Cardiologist: Dr. Aisha Curiel    Chief Complaint   Patient presents with    PSVT    Follow-up   :     HPI: Stacie Vieira is a 89 y.o. female who presents today for 6-month cardiac follow-up visit and palpitations.  I have reviewed her medical records.    She has been diagnosed with MAC, hypertension, right renal artery stenosis, carotid artery disease, hyperlipidemia (statin intolerant), hypothyroidism, and stage III chronic kidney disease.  She has been intolerant to spironolactone (hyperkalemia) and higher dosages of beta-blocker due to bradycardia.    She has known paroxysmal supraventricular tachycardia dating back to the 1970s.    She has had cardiac testing over the years. In 2023, echocardiogram showed normal LVEF, aortic valve calcification, trace to mild MR, and moderate TR. Lexiscan myocardial perfusion study showed no ischemia and occasional PVCs/PACs during stress.  7-day Holter monitor showed normal sinus rhythm, average heart rate 59 bpm, 60 episodes of SVT the longest lasting 1 minute and 30 seconds at 147 beats at a rate of 100 bpm, and rare ectopy.    A few days ago, she saw her PCP.  She reported that her SVT/palpitations have worsened.  She has been unable to take higher dosages of metoprolol tartrate because of bradycardia.  He placed her on amlodipine 2.5 mg daily for better blood pressure control.  TSH and hemoglobin/hematocrit were normal.  In 2023, electrolytes were normal.    She presents today for 6-month cardiac follow-up visit.  She is so active!  She walks daily, takes care of her great granddaughter, and still cleans her own home.  She continues to experience daily palpitations in which her heart races for a few seconds to minutes.  This typically makes her  short of breath and sometimes near syncopal.  She denies chest pain, shortness of air, edema, dizziness, or syncope.  Blood pressure runs 130-160s/60-80s.      Past Medical History:   Diagnosis Date    Acute stress disorder     Allergic rhinitis     C. difficile colitis     hx in past    COVID-19 03/02/2023    Dyspnea on exertion     Elevated fasting glucose     Essential hypertension     Guillain Barré syndrome     H/O: pneumonia     Health care maintenance     History of palpitations     Hyperlipidemia     Hypokalemia     Hypothyroidism     Insomnia     Labile blood pressure     MAC (mycobacterium avium-intracellulare complex)     reason for bronch.  dx with this nov 2015    Osteoporosis     Palpitations     Pneumonia     past hx of freq.    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 END[facility]COPY;  Service:     BRONCHOSCOPY N/A 12/9/2017    Procedure: BRONCHOSCOPY;  Surgeon: Real Rousseau MD;  Location: [facility] ENDOSCOPY;  Service:     BRONCHOSCOPY N/A 7/20/2018    Procedure: BRONCHOSCOPY with BAL;  Surgeon: Sinan Braga MD;  Location: [facility] ENDOSCOPY;  Service: Pulmonary    COLONOSCOPY N/A 07/08/2005    DILATATION AND CURETTAGE N/A 01/01/1990    PAP SMEAR N/A 03/30/2004    SINUS SURGERY  01/2016    TONSILLECTOMY         Social History     Socioeconomic History     Marital status:    Tobacco Use    Smoking status: Never     Passive exposure: Never    Smokeless tobacco: Never   Vaping Use    Vaping status: Never Used   Substance and Sexual Activity    Alcohol use: No     Comment: caffeine use: 1 cup daily    Drug use: No    Sexual activity: Never       Family History   Problem Relation Age of Onset    Hypertension Brother     Dementia Mother     Kidney disease Father     Thyroid disease Other        The following portion of the patient's history were reviewed and updated as appropriate: past medical history, past surgical history, past social history, past family history, allergies, current medications, and problem list.    Review of Systems   Constitutional: Negative.   Cardiovascular:  Positive for palpitations.   Respiratory: Negative.     Hematologic/Lymphatic: Negative.    Neurological: Negative.        Allergies   Allergen Reactions    Influenza Vaccines Mental Status Change     Guillain-[place]    Azithromycin Other (See Comments)    Ethambutol Other (See Comments)     Vision loss    Ethambutol Hcl Other (See Comments)    Rifampin Other (See Comments)    Statins Myalgia    Alendronate Other (See Comments)     Headaches    Ezetimibe Other (See Comments)     Leg Cramps    Pravachol [Pravastatin] Other (See Comments)     Leg Cramps         Current Outpatient Medications:     Cholecalciferol (VITAMIN D) 2000 units tablet, Take 1 tablet by mouth Daily., Disp: , Rfl:     fluticasone (FLONASE) 50 MCG/ACT nasal spray, , Disp: , Rfl:     Levoxyl 50 MCG tablet, TAKE ONE TABLET BY MOUTH EVERY OTHER DAY, Disp: 45 tablet, Rfl: 1    Levoxyl 75 MCG tablet, TAKE ONE TABLET BY MOUTH EVERY OTHER DAY, Disp: 45 tablet, Rfl: 1    Loratadine 10 MG capsule, Take 1 capsule by mouth Daily., Disp: , Rfl:     olmesartan (BENICAR) 20 MG tablet, Take 1 tablet by mouth Daily., Disp: 90 tablet, Rfl: 3    Polyethyl Glycol-Propyl Glycol (SYSTANE OP), Apply  to eye(s) as directed by provider., Disp:  ", Rfl:    Metoprolol 25 mg 1 tablet twice per day  Amlodipine 2.5 mg 1 tablet daily        Objective:     Vitals:    04/26/24 1127 04/26/24 1153   BP: 168/70 152/78   BP Location: Right arm Right arm   Patient Position: Sitting Sitting   Cuff Size: Adult    Pulse: 64    SpO2: 100%    Weight: 52.5 kg (115 lb 12.8 oz)    Height: 152 cm (59.84\")      Body mass index is 22.73 kg/m².    PHYSICAL EXAM:    Vitals Reviewed.   General Appearance: No acute distress, well developed and well nourished. Obese.   Eyes: Glasses.   HENT: No hearing loss noted.    Respiratory: No signs of respiratory distress. Respiration rhythm and depth normal.  Clear to auscultation.   Cardiovascular:  Jugular Venous Pressure: Normal  Heart Rate and Rhythm: Normal, Heart Sounds: Normal S1 and S2. No S3 or S4 noted.  Murmurs: No murmurs noted. No rubs, thrills, or gallops.   Lower Extremities: No edema noted.  Musculoskeletal: Normal movement of extremities.  Skin: General appearance normal.    Psychiatric: Patient alert and oriented to person, place, and time. Speech and behavior appropriate. Normal mood and affect.       ECG 12 Lead    Date/Time: 4/26/2024 11:29 AM  Performed by: Winifred Zaldivar APRN    Authorized by: Winifred Zaldivar APRN  Comparison: compared with previous ECG from 10/26/2023  Similar to previous ECG  Rhythm: sinus rhythm  Rate: normal  BPM: 64  Conduction: conduction normal  T Waves: T waves normal  QRS axis: normal  Other findings: non-specific ST-T wave changes    Clinical impression: non-specific ECG            Assessment:       Diagnosis Plan   1. PSVT (paroxysmal supraventricular tachycardia)  Ambulatory Referral to Cardiac Electrophysiology      2. Palpitations  Ambulatory Referral to Cardiac Electrophysiology      3. Primary hypertension  Ambulatory Referral to Cardiac Electrophysiology             Plan:       1.  PSVT and palpitations.  These occur multiple times per day briefly and sometimes she feels near " syncopal.  These were documented on last Holter monitor.  I recommended stopping metoprolol and and amlodipine.  Start diltiazem 60 mg 1 tablet twice per day for better control of the palpitations, heart rate, and hypertension.  Refer to EP for consideration of SVT ablation.    2.  Hypertension.  Will see if her blood pressure is better controlled on diltiazem.    3.  Carotid artery stenosis.  Mild stenosis of left and plaque on right.  Last checked in 2021.    4.  Chronic mild ST depression on EKG: Stable.    5.  Follow-up with me in 1 week.  Call with any concerns.    As always, it has been a pleasure to participate in your patient's care. Thank you.         Sincerely,         ANNABEL Wu  [facility]      Dictated utilizing Dragon Dictation  I spent 30 minutes reviewing her medical records/testing/previous office notes/labs, face-to-face interaction with patient, physical examination, formulating the plan of care, and discussion of plan of care with patient.

## 2024-04-29 ENCOUNTER — TELEPHONE (OUTPATIENT)
Dept: CARDIOLOGY | Facility: CLINIC | Age: 89
End: 2024-04-29
Payer: MEDICARE

## 2024-04-29 RX ORDER — DILTIAZEM HYDROCHLORIDE 60 MG/1
60 CAPSULE, EXTENDED RELEASE ORAL 2 TIMES DAILY
Qty: 60 CAPSULE | Refills: 1 | Status: SHIPPED | OUTPATIENT
Start: 2024-04-29

## 2024-04-29 NOTE — TELEPHONE ENCOUNTER
----- Message from Winifred Zaldivar sent at 4/29/2024  3:45 PM EDT -----  Patient is due to come in this week for a follow up per TK. Patient stated that she was supposed to take a new medication prescribed by TK. Patient was not able to start that medication due to the cost. Patient would like to know if she would be able to get another medication that her insurance would cover. Does patient still need to come in this week to be seen for her follow up? Please advise.

## 2024-04-29 NOTE — TELEPHONE ENCOUNTER
Please call patient and let her know that I sent in another diltiazem order.  Lets see if this 1 is less expensive.  If not, please let me know.  Cancel the appointment with me this week and reschedule 1 week after starting new medication.  Thank you

## 2024-04-29 NOTE — TELEPHONE ENCOUNTER
I spoke to patient and was able to get her scheduled May 17 th at 3 pm. Patient states she will not be able to  the medication and start it until this Friday. This was the the closest appointment and best day for patient Is this okay ?

## 2024-04-30 NOTE — TELEPHONE ENCOUNTER
If she is not starting the new medicine until Friday, May 17, that I do not need to see her that day.  Please cancel appointment.  Please reschedule for 1 to 2 weeks after she starts the medication.  Thank you

## 2024-05-07 ENCOUNTER — TELEPHONE (OUTPATIENT)
Dept: CARDIOLOGY | Facility: CLINIC | Age: 89
End: 2024-05-07
Payer: MEDICARE

## 2024-05-07 ENCOUNTER — TELEPHONE (OUTPATIENT)
Dept: FAMILY MEDICINE CLINIC | Facility: CLINIC | Age: 89
End: 2024-05-07
Payer: MEDICARE

## 2024-05-17 ENCOUNTER — OFFICE VISIT (OUTPATIENT)
Dept: CARDIOLOGY | Facility: CLINIC | Age: 89
End: 2024-05-17
Payer: MEDICARE

## 2024-05-17 VITALS
HEART RATE: 70 BPM | OXYGEN SATURATION: 97 % | DIASTOLIC BLOOD PRESSURE: 60 MMHG | WEIGHT: 116.4 LBS | SYSTOLIC BLOOD PRESSURE: 177 MMHG | BODY MASS INDEX: 22.85 KG/M2 | HEIGHT: 60 IN

## 2024-05-17 DIAGNOSIS — I10 PRIMARY HYPERTENSION: ICD-10-CM

## 2024-05-17 DIAGNOSIS — R09.89 PVC (PULMONARY VENOUS CONGESTION): ICD-10-CM

## 2024-05-17 DIAGNOSIS — R00.2 PALPITATIONS: Primary | ICD-10-CM

## 2024-05-17 DIAGNOSIS — I47.10 PSVT (PAROXYSMAL SUPRAVENTRICULAR TACHYCARDIA): ICD-10-CM

## 2024-05-17 DIAGNOSIS — I49.1 PAC (PREMATURE ATRIAL CONTRACTION): ICD-10-CM

## 2024-05-17 NOTE — PROGRESS NOTES
Date of Office Visit: 2024  Encounter Provider: ANNABEL Miranda  Place of Service: [facility] CARDIOLOGY  Patient Name: Stacie Vieira  :1935  Primary Cardiologist: Dr. Aisha Curiel    Chief Complaint   Patient presents with    Palpitations   :     HPI: Stacie Vieira is a 89 y.o. female who presents today for cardiac follow-up visit.  I have reviewed her medical records.    She has been diagnosed with MAC, hypertension, right renal artery stenosis, carotid artery disease, hyperlipidemia (statin intolerant), hypothyroidism, and stage III chronic kidney disease.  She has been intolerant to spironolactone (hyperkalemia) and higher dosages of beta-blocker due to bradycardia.     She has known paroxysmal supraventricular tachycardia dating back to the 1970s.     She has had cardiac testing over the years. In 2023, echocardiogram showed normal LVEF, aortic valve calcification, trace to mild MR, and moderate TR. Lexiscan myocardial perfusion study showed no ischemia and occasional PVCs/PACs during stress.  7-day Holter monitor showed normal sinus rhythm, average heart rate 59 bpm, 60 episodes of SVT the longest lasting 1 minute and 30 seconds at 147 beats at a rate of 100 bpm, and rare ectopy.    In 2024, she was reporting palpitations and near syncopal episodes.  She had COVID in 2023 and reported palpitations after that.  I stopped metoprolol and amlodipine.  Started diltiazem 60 mg twice per day and referred her to her EP team for consideration of SVT ablation (now scheduled 2024).    Last week she contacted the office stating that she has taken 3 doses of diltiazem 60 mg.  Her heart rate was typically running in the 50-60s when on metoprolol and was running in the 70s.  She would notice her heart rate increased into the 100s at rest with accompanying symptoms of weakness, dyspnea, and presyncope.  Blood pressure was also elevated  "140s-150s/60s.  Dr. Curiel recommended stopping diltiazem and restarting metoprolol and amlodipine.    She presents today for a follow-up visit.  She said the diltiazem made her feel \"awful\" and she was shaking.  Her heart rates were elevated in the low 100s which makes her feel very symptomatic.  When her heart rate is above 100 she gets more short of breath.  She denies chest pain, edema, dizziness, or syncope.  She is requesting to go back on the metoprolol.  She is so active for age, but says that she is not as active for the person that she is.  This is very frustrating for her.      Past Medical History:   Diagnosis Date    Acute stress disorder     Allergic rhinitis     C. difficile colitis     hx in past    COVID-19 03/02/2023    Dyspnea on exertion     Elevated fasting glucose     Essential hypertension     Guillain Barré syndrome     H/O: pneumonia     Health care maintenance     History of palpitations     Hyperlipidemia     Hypokalemia     Hypothyroidism     Insomnia     Labile blood pressure     MAC (mycobacterium avium-intracellulare complex)     reason for bronch.  dx with this nov 2015    Osteoporosis     Palpitations     Pneumonia     past hx of freq.    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    Per renal duplex - 60% stenosis, left normal; 5/2019 duplex unchanged     SVT (supraventricular tachycardia)     past hx  on medicine    Syncope 12/25/2017    Syncope and collapse     Thyroiditis        Past Surgical History:   Procedure Laterality Date    BREAST BIOPSY      PERCUTATNEOUS NEEDLE CORE    BRONCHOSCOPY      nov 2015    BRONCHOSCOPY N/A 4/8/2016    Procedure: BRONCHOSCOPY WITH BAL;  Surgeon: Sinan Braga MD;  Location: [facility] ENDOSCOPY;  Service:     BRONCHOSCOPY N/A 12/9/2017 "    Procedure: BRONCHOSCOPY;  Surgeon: Real Roussaeu MD;  Location: [facility] ENDOSCOPY;  Service:     BRONCHOSCOPY N/A 7/20/2018    Procedure: BRONCHOSCOPY with BAL;  Surgeon: Sinan Braga MD;  Location: [facility] ENDOSCOPY;  Service: Pulmonary    COLONOSCOPY N/A 07/08/2005    DILATATION AND CURETTAGE N/A 01/01/1990    PAP SMEAR N/A 03/30/2004    SINUS SURGERY  01/2016    TONSILLECTOMY         Social History     Socioeconomic History    Marital status:    Tobacco Use    Smoking status: Never     Passive exposure: Never    Smokeless tobacco: Never   Vaping Use    Vaping status: Never Used   Substance and Sexual Activity    Alcohol use: No     Comment: caffeine use: 1 cup daily    Drug use: No    Sexual activity: Never       Family History   Problem Relation Age of Onset    Hypertension Brother     Dementia Mother     Kidney disease Father     Thyroid disease Other        The following portion of the patient's history were reviewed and updated as appropriate: past medical history, past surgical history, past social history, past family history, allergies, current medications, and problem list.    Review of Systems   Constitutional: Positive for malaise/fatigue.   Cardiovascular:  Positive for dyspnea on exertion and palpitations.   Respiratory: Negative.     Hematologic/Lymphatic: Negative.    Neurological: Negative.        Allergies   Allergen Reactions    Influenza Vaccines Mental Status Change     Guillain-[place]    Azithromycin Other (See Comments)    Ethambutol Other (See Comments)     Vision loss    Ethambutol Hcl Other (See Comments)    Rifampin Other (See Comments)    Statins Myalgia    Alendronate Other (See Comments)     Headaches    Ezetimibe Other (See Comments)     Leg Cramps    Pravachol [Pravastatin] Other (See Comments)     Leg Cramps         Current Outpatient Medications:     Cholecalciferol (VITAMIN D) 2000 units tablet, Take 1 tablet by mouth Daily., Disp: , Rfl:     fluticasone (FLONASE) 50  "MCG/ACT nasal spray, , Disp: , Rfl:     Levoxyl 50 MCG tablet, TAKE ONE TABLET BY MOUTH EVERY OTHER DAY, Disp: 45 tablet, Rfl: 1    Levoxyl 75 MCG tablet, TAKE ONE TABLET BY MOUTH EVERY OTHER DAY, Disp: 45 tablet, Rfl: 1    Loratadine 10 MG capsule, Take 1 capsule by mouth Daily., Disp: , Rfl:     METOPROLOL TARTRATE PO, Take 25 mg by mouth 2 (Two) Times a Day., Disp: , Rfl:     olmesartan (BENICAR) 20 MG tablet, Take 1 tablet by mouth Daily., Disp: 90 tablet, Rfl: 3    Polyethyl Glycol-Propyl Glycol (SYSTANE OP), Apply  to eye(s) as directed by provider., Disp: , Rfl:          Objective:     Vitals:    05/17/24 1500 05/17/24 1503   BP: 138/68 177/60   BP Location: Left arm Left arm   Patient Position: Sitting Sitting   Cuff Size: Adult    Pulse: 70    SpO2: 97%    Weight: 52.8 kg (116 lb 6.4 oz)    Height: 152 cm (59.84\")      Body mass index is 22.85 kg/m².    PHYSICAL EXAM:    Vitals Reviewed.   General Appearance: No acute distress, well developed and well nourished.  Thin.  Eyes: Glasses.   HENT: No hearing loss noted.    Respiratory: No signs of respiratory distress. Respiration rhythm and depth normal.  Clear to auscultation.   Cardiovascular:  Jugular Venous Pressure: Normal  Heart Rate and Rhythm: Normal, Heart Sounds: Normal S1 and S2. No S3 or S4 noted.  Murmurs: No murmurs noted. No rubs, thrills, or gallops.   Lower Extremities: No edema noted.  Musculoskeletal: Normal movement of extremities.  Skin: General appearance normal.    Psychiatric: Patient alert and oriented to person, place, and time. Speech and behavior appropriate. Normal mood and affect.       ECG 12 Lead    Date/Time: 4/26/2024 2:56 PM  Performed by: Winifred Zaldivar APRN    Authorized by: Winifred Zaldivar APRN  Comparison: compared with previous ECG from 5/17/2024  Similar to previous ECG  Rhythm: sinus rhythm  Ectopy: unifocal PVCs  Rate: normal  BPM: 70  Conduction: conduction normal  ST Segments: ST segments normal  T Waves: T " waves normal  QRS axis: normal    Clinical impression: non-specific ECG            Assessment:       Diagnosis Plan   1. Palpitations        2. PAC (premature atrial contraction)        3. PVC (pulmonary venous congestion)        4. PSVT (paroxysmal supraventricular tachycardia)        5. Primary hypertension               Plan:       She presents today for follow-up visit.  I was hopeful by stopping the metoprolol and the amlodipine and placing her on diltiazem that her symptoms would improve.  She was on a low-dose diltiazem so was not surprised that her heart rate was in the low 100s.  But she felt so awful on the medication that she never wants to take it again.  She is requesting to restart metoprolol tartrate 25 mg twice per day.  Blood pressure is well-controlled, hold off on the amlodipine.  She is also seeing Dr. Silas Man (EP service) in June 2024.    She is 89 years old and very active and high functioning.  She says she realizes that she is getting older, but she says it is unlike her to slow down.  She really would like to have the same energy that she did 6 months ago.    She will call with any further concerns.    As always, it has been a pleasure to participate in your patient's care. Thank you.         Sincerely,         ANNABEL Wu  [facility] Cardiology      Dictated utilizing Dragon Dictation

## 2024-06-01 DIAGNOSIS — I10 ESSENTIAL HYPERTENSION: ICD-10-CM

## 2024-06-03 RX ORDER — OLMESARTAN MEDOXOMIL 20 MG/1
20 TABLET ORAL DAILY
Qty: 90 TABLET | Refills: 3 | OUTPATIENT
Start: 2024-06-03

## 2024-06-18 ENCOUNTER — OFFICE VISIT (OUTPATIENT)
Age: 89
End: 2024-06-18
Payer: MEDICARE

## 2024-06-18 VITALS
DIASTOLIC BLOOD PRESSURE: 88 MMHG | WEIGHT: 115 LBS | HEIGHT: 60 IN | BODY MASS INDEX: 22.58 KG/M2 | SYSTOLIC BLOOD PRESSURE: 134 MMHG | HEART RATE: 81 BPM

## 2024-06-18 DIAGNOSIS — I47.10 PSVT (PAROXYSMAL SUPRAVENTRICULAR TACHYCARDIA): Primary | ICD-10-CM

## 2024-06-18 PROCEDURE — 93000 ELECTROCARDIOGRAM COMPLETE: CPT | Performed by: INTERNAL MEDICINE

## 2024-06-18 PROCEDURE — 99214 OFFICE O/P EST MOD 30 MIN: CPT | Performed by: INTERNAL MEDICINE

## 2024-06-18 NOTE — PROGRESS NOTES
Date of Office Visit: 2024  Encounter Provider: Silas Man MD  Place of Service: [facility] CARDIOLOGY  Patient Name: Stacie Vieira  :1935    Chief Complaint   Patient presents with    PSVT   :     HPI: Stacie Vieira is a 89 y.o. female who presents today for SVT    She reports a long history of palpitations, tachycardia going back to 1970's    She has been in the ER/hospitalized for these symptoms twice.  Once in the  and once in .      She doesn't recall any details from those hospitalizations.      She does not recall ablation ever being mentioned.     Recently, she notice some palpitations.     It was attempted to switch her to diltiazem, but she didn't tolerate this.      He has continued to have some palpitations, on higher dose of atenolol           Past Medical History:   Diagnosis Date    Acute stress disorder     Allergic rhinitis     C. difficile colitis     hx in past    COVID-19 2023    Dyspnea on exertion     Elevated fasting glucose     Essential hypertension     Guillain Barré syndrome     H/O: pneumonia     Health care maintenance     History of palpitations     Hyperlipidemia     Hypokalemia     Hypothyroidism     Insomnia     Labile blood pressure     MAC (mycobacterium avium-intracellulare complex)     reason for bronch.  dx with this 2015    Osteoporosis     Palpitations     Pneumonia     past hx of freq.    Polyneuropathy 2017    PSVT (paroxysmal supraventricular tachycardia)     Renal cyst     Sinusitis     Staph infection     in sinus area  recent sx 2016    Stenosis of right carotid artery 10/22/2020    Stenosis of right renal artery 2018    Per renal duplex - 60% stenosis, left normal; 2019 duplex unchanged     SVT (supraventricular tachycardia)     past hx  on medicine    Syncope 2017    Syncope and collapse     Thyroiditis        Past Surgical History:   Procedure Laterality Date    BREAST  BIOPSY      PERCUTATNEOUS NEEDLE CORE    BRONCHOSCOPY      nov 2015    BRONCHOSCOPY N/A 4/8/2016    Procedure: BRONCHOSCOPY WITH BAL;  Surgeon: Sinan Braga MD;  Location: [facility] ENDOSCOPY;  Service:     BRONCHOSCOPY N/A 12/9/2017    Procedure: BRONCHOSCOPY;  Surgeon: Real Rousseau MD;  Location: [facility] ENDOSCOPY;  Service:     BRONCHOSCOPY N/A 7/20/2018    Procedure: BRONCHOSCOPY with BAL;  Surgeon: Sinan Braga MD;  Location: [facility] ENDOSCOPY;  Service: Pulmonary    COLONOSCOPY N/A 07/08/2005    DILATATION AND CURETTAGE N/A 01/01/1990    PAP SMEAR N/A 03/30/2004    SINUS SURGERY  01/2016    TONSILLECTOMY         Social History     Socioeconomic History    Marital status:    Tobacco Use    Smoking status: Never     Passive exposure: Never    Smokeless tobacco: Never   Vaping Use    Vaping status: Never Used   Substance and Sexual Activity    Alcohol use: No     Comment: caffeine use: 1 cup daily    Drug use: No    Sexual activity: Never       Family History   Problem Relation Age of Onset    Hypertension Brother     Dementia Mother     Kidney disease Father     Thyroid disease Other        Review of Systems   Constitutional: Negative.   Cardiovascular:  Positive for palpitations.   Respiratory: Negative.     Gastrointestinal: Negative.        Allergies   Allergen Reactions    Influenza Vaccines Mental Status Change     Guillain-[place]    Azithromycin Other (See Comments)    Ethambutol Other (See Comments)     Vision loss    Ethambutol Hcl Other (See Comments)    Rifampin Other (See Comments)    Statins Myalgia    Alendronate Other (See Comments)     Headaches    Ezetimibe Other (See Comments)     Leg Cramps    Pravachol [Pravastatin] Other (See Comments)     Leg Cramps         Current Outpatient Medications:     Cholecalciferol (VITAMIN D) 2000 units tablet, Take 1 tablet by mouth Daily., Disp: , Rfl:     fluticasone (FLONASE) 50 MCG/ACT nasal spray, , Disp: , Rfl:     Levoxyl 50 MCG tablet, TAKE ONE  "TABLET BY MOUTH EVERY OTHER DAY, Disp: 45 tablet, Rfl: 1    Levoxyl 75 MCG tablet, TAKE ONE TABLET BY MOUTH EVERY OTHER DAY, Disp: 45 tablet, Rfl: 1    Loratadine 10 MG capsule, Take 1 capsule by mouth Daily., Disp: , Rfl:     METOPROLOL TARTRATE PO, Take 25 mg by mouth 2 (Two) Times a Day., Disp: , Rfl:     olmesartan (BENICAR) 20 MG tablet, Take 1 tablet by mouth Daily., Disp: 90 tablet, Rfl: 3    Polyethyl Glycol-Propyl Glycol (SYSTANE OP), Apply  to eye(s) as directed by provider., Disp: , Rfl:       Objective:     Vitals:    06/18/24 1056   BP: 134/88   Pulse: 81   Weight: 52.2 kg (115 lb)   Height: 152 cm (59.84\")     Body mass index is 22.58 kg/m².    PHYSICAL EXAM:    Vitals and nursing note reviewed.   Constitutional:       General: Not in acute distress.     Appearance: Normal and healthy appearance. Well-developed. Not diaphoretic.   Eyes:      General:         Right eye: No discharge.         Left eye: No discharge.   HENT:      Head: Normocephalic and atraumatic.    Mouth/Throat:      Pharynx: No oropharyngeal exudate.   Neck:      Thyroid: No thyromegaly.   Pulmonary:      Effort: Pulmonary effort is normal. No respiratory distress.      Breath sounds: Normal breath sounds.   Cardiovascular:      Normal rate. Regular rhythm.   Edema:     Peripheral edema absent.   Abdominal:      General: There is no distension.      Palpations: Abdomen is soft.      Tenderness: There is no abdominal tenderness.   Musculoskeletal:         General: No deformity.      Cervical back: Normal range of motion and neck supple. Skin:     General: Skin is warm and dry.   Neurological:      General: No focal deficit present.      Mental Status: Alert and oriented to person, place, and time.      Deep Tendon Reflexes: Reflexes are normal and symmetric.   Psychiatric:         Attention and Perception: Attention and perception normal.         Mood and Affect: Mood and affect normal.         Behavior: Behavior normal. Behavior is " cooperative.         Thought Content: Thought content normal.         Judgment: Judgment normal.             ECG 12 Lead    Date/Time: 6/18/2024 3:29 PM  Performed by: Silas Man MD    Authorized by: Silas Man MD  Comparison: compared with previous ECG   Rhythm: supraventricular tachycardia            Assessment:       Diagnosis Plan   1. 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  She reports mild symptoms.     I discussed EPS and ablation, but she didn't feel that she wanted to go through with this at this time.     I would just continue her current beta blocker dose.  Further changes are unlikely to significantly change symptoms.     As always, it has been a pleasure to participate in your patient's care.      Sincerely,         Silas Man MD

## 2024-06-21 ENCOUNTER — TELEPHONE (OUTPATIENT)
Dept: CARDIOLOGY | Facility: CLINIC | Age: 89
End: 2024-06-21
Payer: MEDICARE

## 2024-06-21 NOTE — TELEPHONE ENCOUNTER
Ms. Vieira recently saw Dr. Man and I reviewed the encounter.     Please arrange a 6 month fu appt with Dr. Curiel. Thank you.

## 2024-07-08 NOTE — TELEPHONE ENCOUNTER
Received refill request for Metoprolol Tartrate 25mg BID.    LOV w/ dot 5/17/24  FU w/ Dr. Curiel 1/2/25  Last Labs 4/23/24 & 10/24/23    Please review and sign.    ASHA Vazquez

## 2024-07-29 ENCOUNTER — APPOINTMENT (OUTPATIENT)
Dept: WOMENS IMAGING | Facility: HOSPITAL | Age: 89
End: 2024-07-29
Payer: MEDICARE

## 2024-07-29 PROCEDURE — 77063 BREAST TOMOSYNTHESIS BI: CPT | Performed by: RADIOLOGY

## 2024-07-29 PROCEDURE — 77067 SCR MAMMO BI INCL CAD: CPT | Performed by: RADIOLOGY

## 2024-08-08 DIAGNOSIS — E03.9 ACQUIRED HYPOTHYROIDISM: ICD-10-CM

## 2024-08-08 RX ORDER — AMLODIPINE BESYLATE 2.5 MG/1
2.5 TABLET ORAL DAILY
COMMUNITY
Start: 2024-07-08

## 2024-08-08 RX ORDER — LEVOTHYROXINE SODIUM 50 UG/1
50 TABLET ORAL EVERY OTHER DAY
Qty: 45 TABLET | Refills: 1 | Status: SHIPPED | OUTPATIENT
Start: 2024-08-08

## 2024-08-08 RX ORDER — LEVOTHYROXINE SODIUM 75 UG/1
TABLET ORAL
Qty: 45 TABLET | Refills: 1 | Status: SHIPPED | OUTPATIENT
Start: 2024-08-08

## 2024-08-08 NOTE — TELEPHONE ENCOUNTER
LOV                   4/23/2024  NOV                   10/22/2024  Last RF              2/12/24       PROTOCOL       Met    [address]/LACIER

## 2024-09-11 ENCOUNTER — TELEPHONE (OUTPATIENT)
Dept: FAMILY MEDICINE CLINIC | Facility: CLINIC | Age: 89
End: 2024-09-11
Payer: MEDICARE

## 2024-10-09 ENCOUNTER — OFFICE VISIT (OUTPATIENT)
Dept: FAMILY MEDICINE CLINIC | Facility: CLINIC | Age: 89
End: 2024-10-09
Payer: MEDICARE

## 2024-10-09 VITALS
OXYGEN SATURATION: 98 % | HEIGHT: 59 IN | TEMPERATURE: 97.6 F | HEART RATE: 62 BPM | SYSTOLIC BLOOD PRESSURE: 120 MMHG | WEIGHT: 116 LBS | DIASTOLIC BLOOD PRESSURE: 76 MMHG | BODY MASS INDEX: 23.39 KG/M2

## 2024-10-09 DIAGNOSIS — M54.50 RIGHT LUMBAR PAIN: ICD-10-CM

## 2024-10-09 DIAGNOSIS — I10 HYPERTENSION, ESSENTIAL: ICD-10-CM

## 2024-10-09 DIAGNOSIS — E78.2 MIXED HYPERLIPIDEMIA: ICD-10-CM

## 2024-10-09 DIAGNOSIS — N18.31 STAGE 3A CHRONIC KIDNEY DISEASE (CKD): ICD-10-CM

## 2024-10-09 DIAGNOSIS — E03.9 ACQUIRED HYPOTHYROIDISM: Primary | ICD-10-CM

## 2024-10-09 DIAGNOSIS — M25.612 DECREASED RANGE OF MOTION OF LEFT SHOULDER: ICD-10-CM

## 2024-10-09 DIAGNOSIS — G89.29 CHRONIC LEFT SHOULDER PAIN: ICD-10-CM

## 2024-10-09 DIAGNOSIS — M41.26 OTHER IDIOPATHIC SCOLIOSIS, LUMBAR REGION: ICD-10-CM

## 2024-10-09 DIAGNOSIS — R30.0 DYSURIA: ICD-10-CM

## 2024-10-09 DIAGNOSIS — M25.512 CHRONIC LEFT SHOULDER PAIN: ICD-10-CM

## 2024-10-09 LAB
BILIRUB BLD-MCNC: NEGATIVE MG/DL
CLARITY, POC: CLEAR
COLOR UR: YELLOW
EXPIRATION DATE: ABNORMAL
GLUCOSE UR STRIP-MCNC: NEGATIVE MG/DL
KETONES UR QL: NEGATIVE
LEUKOCYTE EST, POC: NEGATIVE
Lab: ABNORMAL
NITRITE UR-MCNC: NEGATIVE MG/ML
PH UR: 6 [PH] (ref 5–8)
PROT UR STRIP-MCNC: NEGATIVE MG/DL
RBC # UR STRIP: ABNORMAL /UL
SP GR UR: 1.01 (ref 1–1.03)
UROBILINOGEN UR QL: NORMAL

## 2024-10-09 PROCEDURE — 1160F RVW MEDS BY RX/DR IN RCRD: CPT | Performed by: INTERNAL MEDICINE

## 2024-10-09 PROCEDURE — 1126F AMNT PAIN NOTED NONE PRSNT: CPT | Performed by: INTERNAL MEDICINE

## 2024-10-09 PROCEDURE — 81003 URINALYSIS AUTO W/O SCOPE: CPT | Performed by: INTERNAL MEDICINE

## 2024-10-09 PROCEDURE — 1159F MED LIST DOCD IN RCRD: CPT | Performed by: INTERNAL MEDICINE

## 2024-10-09 PROCEDURE — G2211 COMPLEX E/M VISIT ADD ON: HCPCS | Performed by: INTERNAL MEDICINE

## 2024-10-09 PROCEDURE — 99214 OFFICE O/P EST MOD 30 MIN: CPT | Performed by: INTERNAL MEDICINE

## 2024-10-09 NOTE — PROGRESS NOTES
Subjective   Stacie Vieira is a 89 y.o. female.     Chief Complaint   Patient presents with    Hypothyroidism       History of Present Illness   History of hypertension.  Currently, has been feeling well and asymptomatic without any headaches, vision changes, cough, chest pain, shortness of breath, swelling, focal neurologic deficit, memory loss or syncope.  Has been taking the medications regularly and adherent with the regimen amlodipine 2.5 mg daily BID (in past had been on total of 10 mg amlodipine but it caused swelling), metoprolol tartrate 25 mg twice a day (cannot take higher doses as causes bradycardia but still with SVT) and olmesartan 20 mg 1/2 tab nightly.  Denies medication side effects and no significant interval events.       History of high cholesterol.  Currently, has been feeling well without any myalgias, muscle aches, weakness, numbness, chest pain, short of breath or other issues.  Currently, is no statin secondary to statin intolerance and Zetia intolerance. Last lab on 10/24/23.     History of hypo-thyroid.  Denies fatigue, weakness, constipation/diarrhea, hair/skin changes, weight gain/loss, depression/anxiety, rashes, palpitations, swelling, chest pain, shortness of breath or other issues.  Has been compliant with taking the medication of levoxyl 50 mcg and 75 mcg every other day alternating  with no recent changes.  Denies any difficulty with the current medication.  Last lab on 1 4/23/2024.     History of SVT episodes and currently using metoprolol tartrate 25 mg twice a day but cannot tolerate higher doses secondary to low heart rate.  Still has episodes of racing heart and short of breath episodes.  Has been noting that her SVT has seemed so much worse and she did talk to her cardiologist in December and January.  Seems to be happening 2-3 times a day.  Holter monitor performed 11/16/2023 showed some rare PACs and 60 episodes of SVT but otherwise relatively benign.  Echocardiogram and  stress test did not show anything significant either.  Has followup with Dr Curiel's office in 3 days.     History of stage 3a CKD and stable for > 3 years.     Has some back pain in the right low back that varies in intensity based on how she stands and persistent over 6 months now.  Also, 3 months of left shoulder pain and unable to raise the arm and unable to extend and abduct the left arm with grinding.  No history of falls or injuries except for 30 years ago with proximal left humerus fracture fall down stairs in home..       The following portions of the patient's history were reviewed and updated as appropriate: allergies, current medications, past family history, past medical history, past social history, past surgical history and problem list.    Depression Screen:      10/9/2024     1:38 PM   PHQ-2/PHQ-9 Depression Screening   Little Interest or Pleasure in Doing Things 0-->not at all   Feeling Down, Depressed or Hopeless 0-->not at all   PHQ-9: Brief Depression Severity Measure Score 0       Past Medical History:   Diagnosis Date    Acute stress disorder     Allergic rhinitis     C. difficile colitis     hx in past    COVID-19 03/02/2023    Dyspnea on exertion     Elevated fasting glucose     Essential hypertension     Guillain Barré syndrome     H/O: pneumonia     Health care maintenance     History of palpitations     Hyperlipidemia     Hypokalemia     Hypothyroidism     Insomnia     Labile blood pressure     MAC (mycobacterium avium-intracellulare complex)     reason for bronch.  dx with this nov 2015    Osteoporosis     Palpitations     Pneumonia     past hx of freq.    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    Per renal duplex - 60% stenosis, left normal; 5/2019 duplex unchanged      SVT (supraventricular tachycardia)     past hx  on medicine    Syncope 12/25/2017    Syncope and collapse     Thyroiditis        Past Surgical History:   Procedure Laterality Date    BREAST BIOPSY      PERCUTATNEOUS NEEDLE CORE    BRONCHOSCOPY      nov 2015    BRONCHOSCOPY N/A 4/8/2016    Procedure: BRONCHOSCOPY WITH BAL;  Surgeon: Sinan Braga MD;  Location: [facility] ENDOSCOPY;  Service:     BRONCHOSCOPY N/A 12/9/2017    Procedure: BRONCHOSCOPY;  Surgeon: Real Rousseau MD;  Location: [facility] ENDOSCOPY;  Service:     BRONCHOSCOPY N/A 7/20/2018    Procedure: BRONCHOSCOPY with BAL;  Surgeon: Sinan Braga MD;  Location: [facility] ENDOSCOPY;  Service: Pulmonary    COLONOSCOPY N/A 07/08/2005    DILATATION AND CURETTAGE N/A 01/01/1990    PAP SMEAR N/A 03/30/2004    SINUS SURGERY  01/2016    TONSILLECTOMY         Family History   Problem Relation Age of Onset    Hypertension Brother     Dementia Mother     Kidney disease Father     Thyroid disease Other        Social History     Socioeconomic History    Marital status:    Tobacco Use    Smoking status: Never     Passive exposure: Never    Smokeless tobacco: Never   Vaping Use    Vaping status: Never Used   Substance and Sexual Activity    Alcohol use: No     Comment: caffeine use: 1 cup daily    Drug use: No    Sexual activity: Never       Current Outpatient Medications   Medication Sig Dispense Refill    amLODIPine (NORVASC) 2.5 MG tablet Take 1 tablet by mouth Daily.      Cholecalciferol (VITAMIN D) 2000 units tablet Take 1 tablet by mouth Daily.      fluticasone (FLONASE) 50 MCG/ACT nasal spray       Levoxyl 50 MCG tablet TAKE 1 TABLET BY MOUTH EVERY OTHER DAY 45 tablet 1    Levoxyl 75 MCG tablet TAKE 1 TABLET BY MOUTH EVERY OTHER DAY 45 tablet 1    Loratadine 10 MG capsule Take 1 capsule by mouth Daily.      metoprolol tartrate (LOPRESSOR) 25 MG tablet Take 1 tablet by mouth 2 (Two) Times a Day. 180 tablet 3    olmesartan (BENICAR) 20 MG tablet Take 1 tablet by  "mouth Daily. 90 tablet 3    Polyethyl Glycol-Propyl Glycol (SYSTANE OP) Apply  to eye(s) as directed by provider.       No current facility-administered medications for this visit.       Review of Systems   Constitutional:  Negative for activity change, appetite change, fatigue, fever, unexpected weight gain and unexpected weight loss.   HENT:  Negative for nosebleeds, rhinorrhea, trouble swallowing and voice change.    Eyes:  Negative for visual disturbance.   Respiratory:  Negative for cough, chest tightness, shortness of breath and wheezing.    Cardiovascular:  Negative for chest pain, palpitations and leg swelling.   Gastrointestinal:  Negative for abdominal pain, blood in stool, constipation, diarrhea, nausea, vomiting, GERD and indigestion.   Genitourinary:  Negative for dysuria, frequency and hematuria.   Musculoskeletal:  Negative for arthralgias, back pain and myalgias.        Left shoulder pain and right lumbar pain.   Skin:  Negative for rash and wound.   Neurological:  Negative for dizziness, tremors, weakness, light-headedness, numbness, headache and memory problem.   Hematological:  Negative for adenopathy. Does not bruise/bleed easily.   Psychiatric/Behavioral:  Negative for sleep disturbance and depressed mood. The patient is not nervous/anxious.        Objective   /76 (BP Location: Right arm, Patient Position: Sitting, Cuff Size: Adult)   Pulse 62   Temp 97.6 °F (36.4 °C) (Temporal)   Ht 149.9 cm (59\")   Wt 52.6 kg (116 lb)   LMP 01/01/1993 (LMP Unknown)   SpO2 98%   BMI 23.43 kg/m²     Physical Exam  Vitals and nursing note reviewed.   Constitutional:       General: She is not in acute distress.     Appearance: She is well-developed. She is not diaphoretic.   HENT:      Head: Normocephalic and atraumatic.      Right Ear: External ear normal.      Left Ear: External ear normal.      Nose: Nose normal.   Eyes:      Conjunctiva/sclera: Conjunctivae normal.      Pupils: Pupils are equal, " round, and reactive to light.   Neck:      Thyroid: No thyromegaly.      Trachea: No tracheal deviation.   Cardiovascular:      Rate and Rhythm: Normal rate and regular rhythm.      Heart sounds: Normal heart sounds. No murmur heard.     No friction rub. No gallop.   Pulmonary:      Effort: Pulmonary effort is normal. No respiratory distress.      Breath sounds: Normal breath sounds.   Abdominal:      General: Bowel sounds are normal.      Palpations: Abdomen is soft. There is no mass.      Tenderness: There is no abdominal tenderness. There is no guarding.   Musculoskeletal:         General: Normal range of motion.      Cervical back: Normal range of motion and neck supple.      Comments: Unable to abduct fully the left shoulder.   Lymphadenopathy:      Cervical: No cervical adenopathy.   Skin:     General: Skin is warm and dry.      Capillary Refill: Capillary refill takes less than 2 seconds.      Findings: No rash.   Neurological:      Mental Status: She is alert and oriented to person, place, and time.      Motor: No abnormal muscle tone.      Deep Tendon Reflexes: Reflexes normal.   Psychiatric:         Behavior: Behavior normal.         Thought Content: Thought content normal.         Judgment: Judgment normal.         Recent Results (from the past 2016 hour(s))   POCT urinalysis dipstick, automated    Collection Time: 10/09/24  4:28 PM    Specimen: Urine   Result Value Ref Range    Color Yellow Yellow, Straw, Dark Yellow, Misa    Clarity, UA Clear Clear    Specific Gravity  1.010 1.005 - 1.030    pH, Urine 6.0 5.0 - 8.0    Leukocytes Negative Negative    Nitrite, UA Negative Negative    Protein, POC Negative Negative mg/dL    Glucose, UA Negative Negative mg/dL    Ketones, UA Negative Negative    Urobilinogen, UA Normal Normal, 0.2 E.U./dL    Bilirubin Negative Negative    Blood, UA 1+ (A) Negative    Lot Number [id number]     Expiration Date 1/14/2025    Lumbar spine x-ray: Evidence of scoliosis  without any evidence of fracture but with some spurring consistent with some lumbar degenerative changes.    Left shoulder x-ray: Inferior humeral head with spur.  Mild narrowing joint space otherwise no fractures or masses.    Assessment & Plan   Diagnoses and all orders for this visit:    1. Acquired hypothyroidism (Primary)  -     TSH Rfx On Abnormal To Free T4    2. Hypertension, essential  -     Comprehensive Metabolic Panel  -     Lipid Panel    3. Mixed hyperlipidemia  -     Comprehensive Metabolic Panel  -     Lipid Panel    4. Stage 3a chronic kidney disease (CKD)  -     Comprehensive Metabolic Panel    5. Right lumbar pain  -     XR Shoulder 2+ View Left (In Office)    6. Chronic left shoulder pain  -     XR Spine Lumbar 2 or 3 View (In Office)    7. Decreased range of motion of left shoulder  -     XR Spine Lumbar 2 or 3 View (In Office)    8. Dysuria  -     POCT urinalysis dipstick, automated  -     Urine Culture - Urine, Urine, Clean Catch    Hypertension controlled.  Due for labs as noted above.  Urine with some small amount of blood.  Will culture at this time.  Back pain appears to be related more to the scoliosis and some mild degenerative changes in the spine.  Left shoulder is significant for bone spur in the inferior aspect humeral head which may be causing muscle problem.  I believe referring to orthopedics is given for our next best step.  Referral has been entered.         Dictated utilizing Dragon Dictation

## 2024-10-10 LAB
ALBUMIN SERPL-MCNC: 4.4 G/DL (ref 3.5–5.2)
ALBUMIN/GLOB SERPL: 1.6 G/DL
ALP SERPL-CCNC: 71 U/L (ref 39–117)
ALT SERPL-CCNC: 11 U/L (ref 1–33)
AST SERPL-CCNC: 22 U/L (ref 1–32)
BILIRUB SERPL-MCNC: 0.3 MG/DL (ref 0–1.2)
BUN SERPL-MCNC: 14 MG/DL (ref 8–23)
BUN/CREAT SERPL: 17.1 (ref 7–25)
CALCIUM SERPL-MCNC: 9.4 MG/DL (ref 8.6–10.5)
CHLORIDE SERPL-SCNC: 99 MMOL/L (ref 98–107)
CHOLEST SERPL-MCNC: 240 MG/DL (ref 0–200)
CO2 SERPL-SCNC: 27.6 MMOL/L (ref 22–29)
CREAT SERPL-MCNC: 0.82 MG/DL (ref 0.57–1)
EGFRCR SERPLBLD CKD-EPI 2021: 68.9 ML/MIN/1.73
GLOBULIN SER CALC-MCNC: 2.8 GM/DL
GLUCOSE SERPL-MCNC: 95 MG/DL (ref 65–99)
HDLC SERPL-MCNC: 85 MG/DL (ref 40–60)
LDLC SERPL CALC-MCNC: 120 MG/DL (ref 0–100)
POTASSIUM SERPL-SCNC: 4.7 MMOL/L (ref 3.5–5.2)
PROT SERPL-MCNC: 7.2 G/DL (ref 6–8.5)
SODIUM SERPL-SCNC: 136 MMOL/L (ref 136–145)
TRIGL SERPL-MCNC: 205 MG/DL (ref 0–150)
TSH SERPL DL<=0.005 MIU/L-ACNC: 0.77 UIU/ML (ref 0.27–4.2)
VLDLC SERPL CALC-MCNC: 35 MG/DL (ref 5–40)

## 2024-10-12 LAB
BACTERIA UR CULT: NO GROWTH
BACTERIA UR CULT: NORMAL

## 2024-10-17 ENCOUNTER — TELEPHONE (OUTPATIENT)
Dept: FAMILY MEDICINE CLINIC | Facility: CLINIC | Age: 89
End: 2024-10-17

## 2024-10-17 NOTE — TELEPHONE ENCOUNTER
PATIENT CALLED FOR LAB RESULTS.     PLEASE CALL [phone]    ALSO PLEASE MAIL A COPY OF LABS TO ADDRESS ON FILE.    SHE WILL BE LEAVING FOR APPT AT 2:45 AND BE BACK AFTER 4:00

## 2024-10-22 ENCOUNTER — OFFICE VISIT (OUTPATIENT)
Dept: ORTHOPEDIC SURGERY | Facility: CLINIC | Age: 89
End: 2024-10-22
Payer: MEDICARE

## 2024-10-22 VITALS — TEMPERATURE: 97.1 F | BODY MASS INDEX: 23.39 KG/M2 | WEIGHT: 116 LBS | HEIGHT: 59 IN

## 2024-10-22 DIAGNOSIS — M19.012 GLENOHUMERAL ARTHRITIS, LEFT: Primary | ICD-10-CM

## 2024-10-22 DIAGNOSIS — G89.29 CHRONIC LEFT SHOULDER PAIN: ICD-10-CM

## 2024-10-22 DIAGNOSIS — M25.512 CHRONIC LEFT SHOULDER PAIN: ICD-10-CM

## 2024-10-22 NOTE — PROGRESS NOTES
[facility]              New Problem      Patient Name: Stacie Vieira  : 1935  Primary Care Physician: Eber Hua MD        Chief Complaint:  Left shoulder pain    HPI:   Stacie Vieira is a 89 y.o. year old who presents today for evaluation.   History of Present Illness  The patient is an 89-year-old female who presents for evaluation of shoulder pain.    She has been experiencing difficulty in fully extending her arm for the past 3 months. She recently resumed her Silver Sneakers program and noticed some discomfort during certain activities. An x-ray taken by her doctor revealed the presence of a bone spur. The pain does not disturb her sleep unless she moves in a specific way. She occasionally takes Tylenol for relief.    She also mentions that her knee and back pain are more severe than her shoulder pain.    She sustained a fracture to her arm years ago, which has since healed.      Past Medical/Surgical, Social and Family History:  I have reviewed and/or updated pertinent history as noted in the medical record including:  Past Medical History:   Diagnosis Date    Acute stress disorder     Allergic rhinitis     C. difficile colitis     hx in past    COVID-19 2023    Dyspnea on exertion     Elevated fasting glucose     Essential hypertension     Guillain Barré syndrome     H/O: pneumonia     Health care maintenance     History of palpitations     Hyperlipidemia     Hypokalemia     Hypothyroidism     Insomnia     Labile blood pressure     MAC (mycobacterium avium-intracellulare complex)     reason for bronch.  dx with this 2015    Osteoporosis     Palpitations     Pneumonia     past hx of freq.    Polyneuropathy 2017    PSVT (paroxysmal supraventricular tachycardia)     Renal cyst     Sinusitis     Staph infection     in sinus area  recent sx 2016    Stenosis of right carotid artery 10/22/2020    Stenosis of right renal artery 2018    Per renal duplex - 60%  stenosis, left normal; 5/2019 duplex unchanged     SVT (supraventricular tachycardia)     past hx  on medicine    Syncope 12/25/2017    Syncope and collapse     Thyroiditis      Past Surgical History:   Procedure Laterality Date    BREAST BIOPSY      PERCUTATNEOUS NEEDLE CORE    BRONCHOSCOPY      nov 2015    BRONCHOSCOPY N/A 4/8/2016    Procedure: BRONCHOSCOPY WITH BAL;  Surgeon: Sinan Braga MD;  Location: [facility] ENDOSCOPY;  Service:     BRONCHOSCOPY N/A 12/9/2017    Procedure: BRONCHOSCOPY;  Surgeon: Real Rousseau MD;  Location: [facility] ENDOSCOPY;  Service:     BRONCHOSCOPY N/A 7/20/2018    Procedure: BRONCHOSCOPY with BAL;  Surgeon: Sinan Braga MD;  Location: [facility] ENDOSCOPY;  Service: Pulmonary    COLONOSCOPY N/A 07/08/2005    DILATATION AND CURETTAGE N/A 01/01/1990    PAP SMEAR N/A 03/30/2004    SINUS SURGERY  01/2016    TONSILLECTOMY       Social History     Occupational History    Not on file   Tobacco Use    Smoking status: Never     Passive exposure: Never    Smokeless tobacco: Never   Vaping Use    Vaping status: Never Used   Substance and Sexual Activity    Alcohol use: No     Comment: caffeine use: 1 cup daily    Drug use: No    Sexual activity: Never          Allergies:   Allergies   Allergen Reactions    Influenza Vaccines Mental Status Change     Guillain-[place]    Azithromycin Other (See Comments)    Ethambutol Other (See Comments)     Vision loss    Ethambutol Hcl Other (See Comments)    Rifampin Other (See Comments)    Statins Myalgia    Alendronate Other (See Comments)     Headaches    Ezetimibe Other (See Comments)     Leg Cramps    Pravachol [Pravastatin] Other (See Comments)     Leg Cramps       Medications:   Home Medications:  Current Outpatient Medications on File Prior to Visit   Medication Sig    amLODIPine (NORVASC) 2.5 MG tablet Take 1 tablet by mouth Daily.    Cholecalciferol (VITAMIN D) 2000 units tablet Take 1 tablet by mouth Daily.    fluticasone (FLONASE) 50 MCG/ACT nasal spray      Levoxyl 50 MCG tablet TAKE 1 TABLET BY MOUTH EVERY OTHER DAY    Levoxyl 75 MCG tablet TAKE 1 TABLET BY MOUTH EVERY OTHER DAY    Loratadine 10 MG capsule Take 1 capsule by mouth Daily.    metoprolol tartrate (LOPRESSOR) 25 MG tablet Take 1 tablet by mouth 2 (Two) Times a Day.    olmesartan (BENICAR) 20 MG tablet Take 1 tablet by mouth Daily.    Polyethyl Glycol-Propyl Glycol (SYSTANE OP) Apply  to eye(s) as directed by provider. (Patient not taking: Reported on 10/22/2024)     No current facility-administered medications on file prior to visit.         ROS:  ROS negative except as listed in the HPI.    Physical Exam:   89 y.o. female  Body mass index is 23.42 kg/m²., 52.6 kg (116 lb)  Vitals:    10/22/24 1408   Temp: 97.1 °F (36.2 °C)     General: Alert, cooperative, appears well and in no observable distress. Appears stated age and BMI as listed above.  HEENT: Normocephalic, atraumatic on external visual inspection.  CV: No significant peripheral edema.  Respiratory: Normal respiratory effort.  Skin: Warm & well perfused; appropriate skin turgor.  Psych: Appropriate mood & affect.  Neuro: Gross sensation and motor intact in affected extremity/extremities.  Vascular: Peripheral pulses palpable in affected extremity/extremities.   Physical Exam      MSK Exam:    Left Shoulder  No obvious deformities or wounds.   No redness or significant swelling.  ROM is limited with a FIRM endpoint.  Flexion 150  ER 40  IR lumbar spine  4 to 4+/5 strength with isometric testing of the rotator cuff, no significant pain.    Right Shoulder  No deformity or wounds.  No redness or swelling.  No tenderness to palpation.  Full, painless AROM.  Cuff Strength 4+ to 5/5 with isometric testing of the rotator cuff.  Negative provocative testing.    Brief examination of the cervical spine did not reproduce any specific radicular pattern or symptoms.   Strength is reasonable and symmetric in the upper extremities.        Radiology:    Results  Imaging  X-ray of the shoulder shows some arthritis and a bone spur at the bottom of the humerus.    Images were taken prior to the visit today and were independently reviewed by me. 10/29/2024    Procedure:   N/A      Misc. Data/Labs: N/A    Assessment & Plan:  Assessment & Plan  1. Shoulder arthritis.  The shoulder pain is likely due to arthritis, as confirmed by the x-ray showing bone spurs and arthritis in the shoulder. She has limited range of motion and some weakness, which may be due to the long-term lack of optimal shoulder function. She was advised to continue her exercise regimen, particularly the Silver Sneakers program, but to avoid overexertion and activities that cause discomfort. Oral and topical medications were discussed as options to manage the pain. If the pain becomes bothersome, a cortisone injection can be considered. She was encouraged to maintain mobility as much as possible.      ICD-10-CM ICD-9-CM   1. Glenohumeral arthritis, left  M19.012 716.91   2. Chronic left shoulder pain  M25.512 719.41    G89.29 338.29     No orders of the defined types were placed in this encounter.    No orders of the defined types were placed in this encounter.    Happy to see her back anytime for her knees and we could always refer her to see ANNABEL Fraga for her back. She will call for a follow up as needed.    Continue with activity modifications as needed/discussed.  Continue ICE and/or HEAT PRN.  Recommend to continue activity as tolerated, focus on stretching and strengthening of the joint.    Focus on posture and body mechanics to improve/prevent symptoms.   Patient encouraged to call with questions or concerns prior to follow up.  Will discuss with attending as needed.  Consider additional referrals, work up and/or advanced imaging as indicated or if patient fails to respond to conservative care.    Return if symptoms worsen or fail to improve.    Patient or patient  representative verbalized consent for the use of Ambient Listening during the visit with  ANNABEL Cali for chart documentation. 10/22/2024  14:43 EDT     ANNABEL Corbin    Dictation software was used to complete a portion or all of this note.

## 2024-12-26 ENCOUNTER — OFFICE VISIT (OUTPATIENT)
Dept: CARDIOLOGY | Facility: CLINIC | Age: 89
End: 2024-12-26
Payer: MEDICARE

## 2024-12-26 VITALS
BODY MASS INDEX: 23.18 KG/M2 | WEIGHT: 115 LBS | HEIGHT: 59 IN | SYSTOLIC BLOOD PRESSURE: 126 MMHG | DIASTOLIC BLOOD PRESSURE: 76 MMHG | HEART RATE: 60 BPM

## 2024-12-26 DIAGNOSIS — I47.10 PSVT (PAROXYSMAL SUPRAVENTRICULAR TACHYCARDIA): ICD-10-CM

## 2024-12-26 DIAGNOSIS — I10 PRIMARY HYPERTENSION: Primary | ICD-10-CM

## 2024-12-26 DIAGNOSIS — E78.2 MIXED HYPERLIPIDEMIA: ICD-10-CM

## 2024-12-26 PROCEDURE — 93000 ELECTROCARDIOGRAM COMPLETE: CPT | Performed by: INTERNAL MEDICINE

## 2024-12-26 PROCEDURE — 1159F MED LIST DOCD IN RCRD: CPT | Performed by: INTERNAL MEDICINE

## 2024-12-26 PROCEDURE — 1160F RVW MEDS BY RX/DR IN RCRD: CPT | Performed by: INTERNAL MEDICINE

## 2024-12-26 PROCEDURE — 99214 OFFICE O/P EST MOD 30 MIN: CPT | Performed by: INTERNAL MEDICINE

## 2024-12-26 NOTE — PROGRESS NOTES
Date of Office Visit: 2024  Encounter Provider: Aisha Curiel MD  Place of Service: [facility] CARDIOLOGY  Patient Name: Stacie Vieira  :1935      Patient ID:  Stacie Vieira is a 89 y.o. 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     She has had SVT since the  and at that time was put on propranolol.  She had four  hospitalizations associated with this it sounds like, and at some point, they also put her  on digitalis also to control the SVT.  She really has not had any episodes since  that  she knows of.       She has been a .  She says it is very difficult because at  night she gets anxious.  Her neighbors, whom she has known for quite some time, no longer  live beside her due to some deaths and so the evening time is difficult for her.  She does have a son who lives in [place], [place] and is an intensivist at the [employer].       She had an echocardiogram done 17 to ejection fraction 67% grade 1 diastolic dysfunction, calcified aortic valve, mild tricuspid insufficiency.  At that time, she was in the hospital for pneumonia.  She has a history of MAC.  During hospitalization, she was diagnosed with Guillain-Barré syndrome and started plasmapheresis.  She did have an episode on 17 where she had a syncopal episode due to bradycardia.  She did receive chest compressions and got atropine.  She recovered quickly from this.     She had a nonischemic stress nuclear perfusion study done 18.  She had a renal artery duplex on 18 showing normal left renal artery, large left renal cyst measuring 5.1 x 5.4, hemodynamically significant right renal artery stenosis.  She had an echocardiogram done 17 showed ejection fraction of 67% with  grade 1 diastolic dysfunction, mild tricuspid insufficiency and calcification of the aortic valve.     Vascular screening done 9/3/2019 showed <50% left internal carotid artery stenosis, normal abdominal aorta.  Renal artery duplex done 5/15/2019 showed no significant left renal artery stenosis and<60% right renal artery stenosis.     48-hour Holter monitor done 3/23/2023 showed sinus rhythm with rare PACs, 1 4 beat run of SVT with no symptoms, rare PVCs-palpitations and short windedness had no correlations, normal monitor.    She had a nonischemic stress nuclear perfusion study done 11/8/2023.  Echocardiogram done 11/8/2023 showed ejection ration of 65% with normal diastolic function, moderate tricuspid insufficiency, normal RVSP, calcification of the aortic valve without stenosis.  She wore a 2-week Zio patch monitor 11/60/23 which showed 60 episodes of SVT, the longest episode of SVT was 11/27/2023 with heart rate of 100 bpm, no symptoms, lasting 1 minute and 30 seconds, was atrial tachycardia, no atrial fibrillation was noted during the recording, no significant pauses.     Labs in 10/9/2024 show normal CMP, normal TSH, total cholesterol 240, HDL 85, , triglycerides 205, VLDL 35.She saw Dr. Man 5/2020 for and decided that she did not want to have ablation.    She still has tachycardia which is more frequent but does not last this long.  She did not feel that her visit with EP was very helpful.  She has had no syncope, falls.  She has no chest pain or pressure.  She does have short windedness with activity which is aggravating to her.  On Tuesday and Thursdays, she does Silver sneakers.  On Mondays and Wednesdays, she does her granddaughters laundry.      Past Medical History:   Diagnosis Date    Acute stress disorder     Allergic rhinitis     C. difficile colitis     hx in past    COVID-19 03/02/2023    Dyspnea on exertion     Elevated fasting glucose     Essential hypertension     Guillain Barré  syndrome     H/O: pneumonia     Health care maintenance     History of palpitations     Hyperlipidemia     Hypokalemia     Hypothyroidism     Insomnia     Labile blood pressure     MAC (mycobacterium avium-intracellulare complex)     reason for bronch.  dx with this nov 2015    Osteoporosis     Palpitations     Pneumonia     past hx of freq.    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    Per renal duplex - 60% stenosis, left normal; 5/2019 duplex unchanged     SVT (supraventricular tachycardia)     past hx  on medicine    Syncope 12/25/2017    Syncope and collapse     Thyroiditis          Past Surgical History:   Procedure Laterality Date    BREAST BIOPSY      PERCUTATNEOUS NEEDLE CORE    BRONCHOSCOPY      nov 2015    BRONCHOSCOPY N/A 4/8/2016    Procedure: BRONCHOSCOPY WITH BAL;  Surgeon: Sinan Braga MD;  Location: [facility] ENDOSCOPY;  Service:     BRONCHOSCOPY N/A 12/9/2017    Procedure: BRONCHOSCOPY;  Surgeon: Real Rousseau MD;  Location: [facility] ENDOSCOPY;  Service:     BRONCHOSCOPY N/A 7/20/2018    Procedure: BRONCHOSCOPY with BAL;  Surgeon: Sinan Braga MD;  Location: [facility] ENDOSCOPY;  Service: Pulmonary    COLONOSCOPY N/A 07/08/2005    DILATATION AND CURETTAGE N/A 01/01/1990    PAP SMEAR N/A 03/30/2004    SINUS SURGERY  01/2016    TONSILLECTOMY         Current Outpatient Medications on File Prior to Visit   Medication Sig Dispense Refill    amLODIPine (NORVASC) 2.5 MG tablet Take 1 tablet by mouth Daily.      Cholecalciferol (VITAMIN D) 2000 units tablet Take 1 tablet by mouth Daily.      fluticasone (FLONASE) 50 MCG/ACT nasal spray       Levoxyl 50 MCG tablet TAKE 1 TABLET BY MOUTH EVERY OTHER DAY 45 tablet 1    Levoxyl 75 MCG tablet TAKE 1 TABLET BY MOUTH EVERY OTHER DAY 45 tablet 1     "Loratadine 10 MG capsule Take 1 capsule by mouth Daily.      metoprolol tartrate (LOPRESSOR) 25 MG tablet Take 1 tablet by mouth 2 (Two) Times a Day. 180 tablet 3    olmesartan (BENICAR) 20 MG tablet Take 1 tablet by mouth Daily. 90 tablet 3    Polyethyl Glycol-Propyl Glycol (SYSTANE OP) Apply  to eye(s) as directed by provider.       No current facility-administered medications on file prior to visit.       Social History     Socioeconomic History    Marital status:    Tobacco Use    Smoking status: Never     Passive exposure: Never    Smokeless tobacco: Never   Vaping Use    Vaping status: Never Used   Substance and Sexual Activity    Alcohol use: No     Comment: caffeine use: 1 cup daily    Drug use: No    Sexual activity: Never             Procedures    ECG 12 Lead    Date/Time: 12/26/2024 10:07 AM  Performed by: Aisha Curiel MD    Authorized by: Aisha Curiel MD  Comparison: compared with previous ECG   Similar to previous ECG  Rhythm: sinus rhythm    Clinical impression: normal ECG              Objective:      Vitals:    12/26/24 0954   BP: 126/76   Pulse: 60   Weight: 52.2 kg (115 lb)   Height: 149.9 cm (59\")     Body mass index is 23.23 kg/m².    Vitals reviewed.   Constitutional:       General: Not in acute distress.     Appearance: Not diaphoretic.   Neck:      Vascular: No carotid bruit or JVD.   Pulmonary:      Effort: Pulmonary effort is normal.      Breath sounds: Normal breath sounds.   Cardiovascular:      Normal rate. Regular rhythm.      Murmurs: There is no murmur.      No gallop.  No rub.   Pulses:     Intact distal pulses.      Carotid: 2+ bilaterally.     Radial: 2+ bilaterally.     Dorsalis pedis: 2+ bilaterally.     Posterior tibial: 2+ bilaterally.  Edema:     Peripheral edema absent.   Neurological:      Cranial Nerves: No cranial nerve deficit.         Lab Review:       Assessment:      Diagnosis Plan   1. Primary hypertension        2. PSVT (paroxysmal " supraventricular tachycardia)        3. Mixed hyperlipidemia            Hypertension.  BP is better and more stable - goal is <120/80.   Palpitations.  These are very infrequent and overall have been very well controlled     History of SVT.  No recurrence  Hyperlipidemia, well controlled.   Hypothyroidism, stable.   Anxiety, particularly in the evenings.    Chronic MAC - stable, sees Dr. Braga every 6 months for this.   Guillain-barre 12/2017 - really resolved, doing well.   Exertional dyspnea with new abnormalities on her ECG, nonischemic stress nuclear perfusion study done 11/2023 with normal echocardiogram.  Moderate right renal artery stenosis     Plan:       See Coral in 6 months, no medication changes, overall doing well.  No other testing at this time.

## 2025-02-04 DIAGNOSIS — E03.9 ACQUIRED HYPOTHYROIDISM: ICD-10-CM

## 2025-02-04 RX ORDER — LEVOTHYROXINE SODIUM 50 UG/1
50 TABLET ORAL EVERY OTHER DAY
Qty: 45 TABLET | Refills: 1 | Status: SHIPPED | OUTPATIENT
Start: 2025-02-04

## 2025-02-04 RX ORDER — LEVOTHYROXINE SODIUM 75 UG/1
TABLET ORAL
Qty: 45 TABLET | Refills: 1 | Status: SHIPPED | OUTPATIENT
Start: 2025-02-04

## 2025-02-04 NOTE — TELEPHONE ENCOUNTER
LOV                   10/9/2024  NOV                   4/9/2025  Last refill           8/8/24    Protocol           met

## 2025-04-07 RX ORDER — AMLODIPINE BESYLATE 2.5 MG/1
TABLET ORAL
Qty: 180 TABLET | Refills: 0 | Status: SHIPPED | OUTPATIENT
Start: 2025-04-07

## 2025-04-09 ENCOUNTER — OFFICE VISIT (OUTPATIENT)
Dept: FAMILY MEDICINE CLINIC | Facility: CLINIC | Age: 89
End: 2025-04-09
Payer: MEDICARE

## 2025-04-09 VITALS
HEIGHT: 59 IN | HEART RATE: 60 BPM | TEMPERATURE: 97.7 F | DIASTOLIC BLOOD PRESSURE: 60 MMHG | BODY MASS INDEX: 23.22 KG/M2 | SYSTOLIC BLOOD PRESSURE: 118 MMHG | OXYGEN SATURATION: 97 % | WEIGHT: 115.2 LBS

## 2025-04-09 DIAGNOSIS — Z00.00 MEDICARE ANNUAL WELLNESS VISIT, SUBSEQUENT: Primary | ICD-10-CM

## 2025-04-09 NOTE — PROGRESS NOTES
Subjective   The ABCs of the Annual Wellness Visit  Medicare Wellness Visit      Stacie Vieira is a 89 y.o. patient who presents for a Medicare Wellness Visit.    History of hypertension.  Currently, has been feeling well and asymptomatic without any headaches, vision changes, cough, chest pain, shortness of breath, swelling, focal neurologic deficit, memory loss or syncope.  Has been taking the medications regularly and adherent with the regimen amlodipine 2.5 mg daily BID (in past had been on total of 10 mg amlodipine but it caused swelling), metoprolol tartrate 25 mg twice a day (cannot take higher doses as causes bradycardia but still with SVT) and olmesartan 20 mg 1/2 tab nightly.  Denies medication side effects and no significant interval events.       History of high cholesterol.  Currently, has been feeling well without any myalgias, muscle aches, weakness, numbness, chest pain, short of breath or other issues.  Currently, is no statin secondary to statin intolerance and Zetia intolerance. Last lab on 10/24/23.     History of hypo-thyroid.  Denies fatigue, weakness, constipation/diarrhea, hair/skin changes, weight gain/loss, depression/anxiety, rashes, palpitations, swelling, chest pain, shortness of breath or other issues.  Has been compliant with taking the medication of levoxyl 50 mcg and 75 mcg every other day alternating  with no recent changes.  Denies any difficulty with the current medication.  Last lab on 4/23/2024.     History of SVT episodes and currently using metoprolol tartrate 25 mg twice a day but cannot tolerate higher doses secondary to low heart rate.  Still has episodes of racing heart and short of breath episodes.  Has been noting that her SVT has seemed so much worse and she did talk to her cardiologist in December and January.  Seems to be happening 2-3 times a day.  Holter monitor performed 11/16/2023 showed some rare PACs and 60 episodes of SVT but otherwise relatively benign.   Echocardiogram and stress test did not show anything significant either.  Has followed up with Dr Curiel of cardiology.     History of stage 3a CKD and stable for > 6 years.     Has some back pain in the right low back that varies in intensity based on how she stands and persistent over 6 months now.  Also, 3 months of left shoulder pain and unable to raise the arm and unable to extend and abduct the left arm with grinding.  No history of falls or injuries except for 30 years ago with proximal left humerus fracture fall down stairs in home..       The following portions of the patient's history were reviewed and updated as appropriate: allergies, current medications, past family history, past medical history, past social history, past surgical history, and problem list.  Compared to one year ago, the patient's physical health is the same.  Compared to one year ago, the patient's mental health is the same.    Recent Hospitalizations:  She was not admitted to the hospital during the last year.     Current Medical Providers:  Patient Care Team:  Eber Hua MD as PCP - General (Internal Medicine)  James Del Angel MD as Consulting Physician (Infectious Diseases)  Ezekiel Verde MD as Consulting Physician (Otolaryngology)  Aisha Curiel MD as Cardiologist (Cardiology)  Sinan Braga MD as Consulting Physician (Pulmonary Disease)  Shawn Felton MD as Consulting Physician (Allergy)  Luis Whitehead MD (Ophthalmology)  Jasmine Salazar MD (Inactive) (Dermatology)  Neal Benjamin MD as Consulting Physician (Orthopedic Surgery)    Outpatient Medications Prior to Visit   Medication Sig Dispense Refill    amLODIPine (NORVASC) 2.5 MG tablet TAKE 1 TABLET BY MOUTH EVERY MORNING AND TAKE ONE TABLET BY MOUTH ONCE NIGHTLY SINCE THE BLOOD PRESSURE HAS BEEN HIGH 180 tablet 0    Cholecalciferol (VITAMIN D) 2000 units tablet Take 1 tablet by mouth Daily.      fluticasone  (FLONASE) 50 MCG/ACT nasal spray       Levoxyl 50 MCG tablet TAKE 1 TABLET BY MOUTH EVERY OTHER DAY 45 tablet 1    Levoxyl 75 MCG tablet TAKE 1 TABLET BY MOUTH EVERY OTHER DAY 45 tablet 1    Loratadine 10 MG capsule Take 1 capsule by mouth Daily.      metoprolol tartrate (LOPRESSOR) 25 MG tablet Take 1 tablet by mouth 2 (Two) Times a Day. 180 tablet 3    olmesartan (BENICAR) 20 MG tablet Take 1 tablet by mouth Daily. 90 tablet 3    Polyethyl Glycol-Propyl Glycol (SYSTANE OP) Apply  to eye(s) as directed by provider.      benzonatate (TESSALON) 200 MG capsule Take 1 capsule by mouth 3 (Three) Times a Day As Needed for Cough. (Patient not taking: Reported on 4/9/2025) 21 capsule 0    predniSONE (DELTASONE) 10 MG (21) dose pack Take  by mouth Daily. Use as directed on package (Patient not taking: Reported on 4/9/2025) 21 tablet 0     No facility-administered medications prior to visit.     No opioid medication identified on active medication list. I have reviewed chart for other potential  high risk medication/s and harmful drug interactions in the elderly.      Aspirin is not on active medication list.  Aspirin use is not indicated based on review of current medical condition/s. Risk of harm outweighs potential benefits.  .    Patient Active Problem List   Diagnosis    Mycobacterium avium complex    Hypertension, essential    Allergic rhinitis    Hypothyroidism    Hyperlipidemia    Polyneuropathy    Oropharyngeal dysphagia    Guillain-Columbia syndrome following vaccination    Anxiety    PVC (pulmonary venous congestion)    Dyspnea on exertion    PSVT (paroxysmal supraventricular tachycardia)    Stenosis of right renal artery    Elevated glucose    Stenosis of right carotid artery    Age-related osteoporosis without current pathological fracture    Carotid stenosis, asymptomatic, left    Epiretinal membrane    Ethambutol adverse reaction    Medicare annual wellness visit, subsequent    Statin intolerance    Stage 3a  "chronic kidney disease (CKD)    History of influenza vaccine allergy    CKD (chronic kidney disease) stage 2, GFR 60-89 ml/min    Abnormal electrocardiogram (ECG) (EKG)    Palpitations    PAC (premature atrial contraction)    Other idiopathic scoliosis, lumbar region    Chronic left shoulder pain     Advance Care Planning Advance Directive is on file.  ACP discussion was held with the patient during this visit. Patient has an advance directive in EMR which is still valid.       Objective   Vitals:    25 1342   BP: 118/60   BP Location: Left arm   Patient Position: Sitting   Cuff Size: Large Adult   Pulse: 60   Temp: 97.7 °F (36.5 °C)   TempSrc: Temporal   SpO2: 97%   Weight: 52.3 kg (115 lb 3.2 oz)   Height: 149.9 cm (59.02\")   PainSc: 1   Comment: but can get worse if doing things   PainLoc: Back       Estimated body mass index is 23.25 kg/m² as calculated from the following:    Height as of this encounter: 149.9 cm (59.02\").    Weight as of this encounter: 52.3 kg (115 lb 3.2 oz).    BMI is within normal parameters. No other follow-up for BMI required.         Does the patient have evidence of cognitive impairment? No, Minicog 4 out of 5 score.                                                                                             Health  Risk Assessment    Smoking Status:  Social History     Tobacco Use   Smoking Status Never    Passive exposure: Never   Smokeless Tobacco Never     Alcohol Consumption:  Social History     Substance and Sexual Activity   Alcohol Use No    Comment: caffeine use: 1 cup daily       Fall Risk Screen  STEADI Fall Risk Assessment was completed, and patient is at LOW risk for falls.Assessment completed on:2025    Depression Screening   Little interest or pleasure in doing things? Not at all   Feeling down, depressed, or hopeless? Not at all   PHQ-2 Total Score 0      Health Habits and Functional and Cognitive Screenin/9/2025     1:55 PM   Functional & Cognitive " Status   Do you have difficulty preparing food and eating? No   Do you have difficulty bathing yourself, getting dressed or grooming yourself? No   Do you have difficulty using the toilet? No   Do you have difficulty moving around from place to place? No   Do you have trouble with steps or getting out of a bed or a chair? No   Current Diet Well Balanced Diet   Dental Exam Up to date   Eye Exam Up to date   Exercise (times per week) 2 times per week   Current Exercises Include Aerobics   Do you need help using the phone?  No   Are you deaf or do you have serious difficulty hearing?  Yes   Do you need help to go to places out of walking distance? No   Do you need help shopping? No   Do you need help preparing meals?  No   Do you need help with housework?  No   Do you need help with laundry? No   Do you need help taking your medications? No   Do you need help managing money? No   Do you ever drive or ride in a car without wearing a seat belt? No   Have you felt unusual stress, anger or loneliness in the last month? No   Who do you live with? Alone   If you need help, do you have trouble finding someone available to you? No   Have you been bothered in the last four weeks by sexual problems? No   Do you have difficulty concentrating, remembering or making decisions? No           Age-appropriate Screening Schedule:  Refer to the list below for future screening recommendations based on patient's age, sex and/or medical conditions. Orders for these recommended tests are listed in the plan section. The patient has been provided with a written plan.    Health Maintenance List  Health Maintenance   Topic Date Due    RSV Vaccine - Adults (1 - 1-dose 75+ series) Never done    ANNUAL WELLNESS VISIT  10/24/2024    DXA SCAN  07/27/2025    LIPID PANEL  10/09/2025    TDAP/TD VACCINES (3 - Td or Tdap) 09/13/2026    Pneumococcal Vaccine 50+  Completed    COVID-19 Vaccine  Discontinued    MAMMOGRAM  Discontinued    ZOSTER VACCINE   Discontinued    COLORECTAL CANCER SCREENING  Discontinued                                                                                                                                              CMS Preventative Services Quick Reference  Risk Factors Identified During Encounter  Hearing Problem:  Patietn already seeing audiology and using hearing aids  Immunizations Discussed/Encouraged: Shingrix and RSV (Respiratory Syncytial Virus)  Diagnoses and all orders for this visit:    1. Medicare annual wellness visit, subsequent (Primary)      Reviewed history and annual wellness visit with patient during office time.  Medications reviewed as appropriate.  Discussed advanced directives and living will.  Patient has living will: Living will: Directive already scanned in chart.  Discussed fall risk and precautions encourage removing throw rugs and using grab bars within the home and bathroom.  Will check the labs as ordered above to evaluate the blood sugars, kidney, liver, cholesterol for screening.  Discussed flu shot recommended to get the high-dose influenza vaccine annually in the fall patient is unable to take influenza vaccines due to allergies.  Tdap, Prevnar-13 and pneumovax-23 up to date and appropriate.  RSV and Shingrix vaccination series recommended but patient declines.  Encourage follow-up with the eye doctor on annual basis for glaucoma evaluation.  Discussed weight and noted to be appropriate.  encouraged exercise as tolerated while following a healthy diet. Follow-up with specialists as scheduled.           The above risks/problems have been discussed with the patient.  Pertinent information has been shared with the patient in the After Visit Summary.  An After Visit Summary and PPPS were made available to the patient.    Follow Up:   Next Medicare Wellness visit to be scheduled in 1 year.

## 2025-04-09 NOTE — PATIENT INSTRUCTIONS
Medicare Wellness  Personal Prevention Plan of Service     Date of Office Visit:    Encounter Provider:  Eber Hua MD  Place of Service:  [facility] PRIMARY CARE  Patient Name: Stacie Vieira  :  1935    As part of the Medicare Wellness portion of your visit today, we are providing you with this personalized preventive plan of services (PPPS). This plan is based upon recommendations of the [place] Preventive Services Task Force (USPSTF) and the Advisory Committee on Immunization Practices (ACIP).    This lists the preventive care services that should be considered, and provides dates of when you are due. Items listed as completed are up-to-date and do not require any further intervention.    Health Maintenance   Topic Date Due    RSV Vaccine - Adults (1 - 1-dose 75+ series) Never done    ANNUAL WELLNESS VISIT  10/24/2024    DXA SCAN  2025    LIPID PANEL  10/09/2025    TDAP/TD VACCINES (3 - Td or Tdap) 2026    Pneumococcal Vaccine 50+  Completed    COVID-19 Vaccine  Discontinued    MAMMOGRAM  Discontinued    ZOSTER VACCINE  Discontinued    COLORECTAL CANCER SCREENING  Discontinued       No orders of the defined types were placed in this encounter.      Return in about 6 months (around 10/9/2025) for Next scheduled follow up.

## 2025-05-29 DIAGNOSIS — I10 ESSENTIAL HYPERTENSION: ICD-10-CM

## 2025-05-29 RX ORDER — OLMESARTAN MEDOXOMIL 20 MG/1
20 TABLET ORAL DAILY
Qty: 90 TABLET | Refills: 1 | Status: SHIPPED | OUTPATIENT
Start: 2025-05-29

## 2025-07-04 RX ORDER — AMLODIPINE BESYLATE 2.5 MG/1
TABLET ORAL
Qty: 180 TABLET | Refills: 0 | Status: SHIPPED | OUTPATIENT
Start: 2025-07-04

## 2025-07-08 ENCOUNTER — OFFICE VISIT (OUTPATIENT)
Dept: CARDIOLOGY | Age: 89
End: 2025-07-08
Payer: MEDICARE

## 2025-07-08 ENCOUNTER — LAB (OUTPATIENT)
Dept: LAB | Facility: HOSPITAL | Age: 89
End: 2025-07-08
Payer: MEDICARE

## 2025-07-08 ENCOUNTER — HOSPITAL ENCOUNTER (OUTPATIENT)
Dept: GENERAL RADIOLOGY | Facility: HOSPITAL | Age: 89
Discharge: HOME | End: 2025-07-08
Payer: MEDICARE

## 2025-07-08 VITALS
WEIGHT: 115 LBS | OXYGEN SATURATION: 97 % | HEART RATE: 57 BPM | SYSTOLIC BLOOD PRESSURE: 125 MMHG | DIASTOLIC BLOOD PRESSURE: 80 MMHG | BODY MASS INDEX: 23.21 KG/M2

## 2025-07-08 DIAGNOSIS — E78.2 MIXED HYPERLIPIDEMIA: ICD-10-CM

## 2025-07-08 DIAGNOSIS — R06.09 DOE (DYSPNEA ON EXERTION): ICD-10-CM

## 2025-07-08 DIAGNOSIS — I10 HYPERTENSION, ESSENTIAL: ICD-10-CM

## 2025-07-08 DIAGNOSIS — I10 HYPERTENSION, ESSENTIAL: Primary | ICD-10-CM

## 2025-07-08 DIAGNOSIS — I47.10 PSVT (PAROXYSMAL SUPRAVENTRICULAR TACHYCARDIA): ICD-10-CM

## 2025-07-08 LAB
ALBUMIN SERPL-MCNC: 4.2 G/DL (ref 3.5–5.2)
ALBUMIN/GLOB SERPL: 1.3 G/DL
ALP SERPL-CCNC: 64 U/L (ref 39–117)
ALT SERPL W P-5'-P-CCNC: 9 U/L (ref 1–33)
ANION GAP SERPL CALCULATED.3IONS-SCNC: 11 MMOL/L (ref 5–15)
AST SERPL-CCNC: 21 U/L (ref 1–32)
BASOPHILS # BLD AUTO: 0.02 10*3/MM3 (ref 0–0.2)
BASOPHILS NFR BLD AUTO: 0.3 % (ref 0–1.5)
BILIRUB SERPL-MCNC: 0.3 MG/DL (ref 0–1.2)
BUN SERPL-MCNC: 13 MG/DL (ref 8–23)
BUN/CREAT SERPL: 14 (ref 7–25)
CALCIUM SPEC-SCNC: 9.5 MG/DL (ref 8.6–10.5)
CHLORIDE SERPL-SCNC: 102 MMOL/L (ref 98–107)
CO2 SERPL-SCNC: 27 MMOL/L (ref 22–29)
CREAT SERPL-MCNC: 0.93 MG/DL (ref 0.57–1)
DEPRECATED RDW RBC AUTO: 44.3 FL (ref 37–54)
EGFRCR SERPLBLD CKD-EPI 2021: 58.9 ML/MIN/1.73
EOSINOPHIL # BLD AUTO: 0.18 10*3/MM3 (ref 0–0.4)
EOSINOPHIL NFR BLD AUTO: 2.6 % (ref 0.3–6.2)
ERYTHROCYTE [DISTWIDTH] IN BLOOD BY AUTOMATED COUNT: 12.5 % (ref 12.3–15.4)
GLOBULIN UR ELPH-MCNC: 3.2 GM/DL
GLUCOSE SERPL-MCNC: 124 MG/DL (ref 65–99)
HCT VFR BLD AUTO: 43.3 % (ref 34–46.6)
HGB BLD-MCNC: 14.4 G/DL (ref 12–15.9)
IMM GRANULOCYTES # BLD AUTO: 0.02 10*3/MM3 (ref 0–0.05)
IMM GRANULOCYTES NFR BLD AUTO: 0.3 % (ref 0–0.5)
LYMPHOCYTES # BLD AUTO: 1.83 10*3/MM3 (ref 0.7–3.1)
LYMPHOCYTES NFR BLD AUTO: 26.3 % (ref 19.6–45.3)
MCH RBC QN AUTO: 32.3 PG (ref 26.6–33)
MCHC RBC AUTO-ENTMCNC: 33.3 G/DL (ref 31.5–35.7)
MCV RBC AUTO: 97.1 FL (ref 79–97)
MONOCYTES # BLD AUTO: 0.91 10*3/MM3 (ref 0.1–0.9)
MONOCYTES NFR BLD AUTO: 13.1 % (ref 5–12)
NEUTROPHILS NFR BLD AUTO: 3.99 10*3/MM3 (ref 1.7–7)
NEUTROPHILS NFR BLD AUTO: 57.4 % (ref 42.7–76)
NRBC BLD AUTO-RTO: 0 /100 WBC (ref 0–0.2)
PLATELET # BLD AUTO: 278 10*3/MM3 (ref 140–450)
PMV BLD AUTO: 9 FL (ref 6–12)
POTASSIUM SERPL-SCNC: 5.1 MMOL/L (ref 3.5–5.2)
PROT SERPL-MCNC: 7.4 G/DL (ref 6–8.5)
RBC # BLD AUTO: 4.46 10*6/MM3 (ref 3.77–5.28)
SODIUM SERPL-SCNC: 140 MMOL/L (ref 136–145)
TROPONIN T SERPL HS-MCNC: 15 NG/L
WBC NRBC COR # BLD AUTO: 6.95 10*3/MM3 (ref 3.4–10.8)

## 2025-07-08 PROCEDURE — 71046 X-RAY EXAM CHEST 2 VIEWS: CPT

## 2025-07-08 PROCEDURE — 84484 ASSAY OF TROPONIN QUANT: CPT

## 2025-07-08 PROCEDURE — 80053 COMPREHEN METABOLIC PANEL: CPT

## 2025-07-08 PROCEDURE — 85025 COMPLETE CBC W/AUTO DIFF WBC: CPT

## 2025-07-08 PROCEDURE — 36415 COLL VENOUS BLD VENIPUNCTURE: CPT

## 2025-07-08 NOTE — PROGRESS NOTES
Date of Office Visit: 2025  Encounter Provider: ANNABEL Cedeño  Place of Service: [facility] CARDIOLOGY  Established cardiologist: Aisha Curiel MD  Patient Name: Stacie Vieira  :1935      Patient ID:  Stacie Vieira is a 89 y.o. 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     She is . She does have a son who lives in [place], [place] and is an intensivist at the [employer].      History of Present Illness  In the  she had SVT was placed on propranolol and at some point digoxin as well.  No recurrence of this since the  as far as she knows.     echocardiogram with EF 67% G1 DD calcified aortic valve mild tricuspid insufficiency.  She was in the hospital for pneumonia this year and diagnosed with Guillain-Barré syndrome and was started on plasmapheresis.  She had an episode of syncope related to bradycardia in 2017 and she received chest compressions and atropine and recovered from that quickly.     2018 nonischemic stress nuclear study.     Vascular screening done 9/3/2019 showed <50% left internal carotid artery stenosis, normal abdominal aorta.  Renal artery duplex done 5/15/2019 showed no significant left renal artery stenosis and<60% right renal artery stenosis.     48-hour Holter monitor done 3/23/2023 showed sinus rhythm with rare PACs, 1 4 beat run of SVT with no symptoms, rare PVCs-palpitations and short windedness had no correlations, normal monitor.     had a nonischemic stress nuclear study and echo at this time with EF 65% normal diastolic function moderate tricuspid insufficiency normal RVSP calcified aortic valve without stenosis.  2 weeks ZIO done in 2023 showed 60 episodes of SVT longest episode was 1 minute 30 seconds with a  heart rate of 100 bpm and no symptoms were recorded.  No A-fib was noted.  No significant pauses.  She did see Dr. Gibson in May 2024 and at that time she decided she did not want an ablation.    She last saw Dr. Curiel in the office 12/26/2024.  She was doing well and no medication changes were made.    Today Stacie tells me she has been feeling more short of breath with exertion over the past 1 month she is also at times had a cough.  It has limited her activity somewhat.  She is unsure if it is related to her MAC she has noticed some episodes of SVT as well with heart racing.  There is not been any chest pain or pressure.  There is no diaphoresis nausea neck pain or jaw pain.  No presyncope/syncope.  No edema. No fever or chills.  A close friend's  recently passed away this is also given her a little more anxiety and her friend lives out of state and she is unable to be with her during this time and that is difficult.  She helps her granddaughter with laundry about once a week.     Current Outpatient Medications on File Prior to Visit   Medication Sig Dispense Refill    amLODIPine (NORVASC) 2.5 MG tablet TAKE 1 TABLET BY MOUTH EVERY MORNING AND TAKE 1 TABLET BY MOUTH ONCE NIGHTLY SINCE BLOOD PRESSURE HAS BEEN HIGH 180 tablet 0    Cholecalciferol (VITAMIN D) 2000 units tablet Take 1 tablet by mouth Daily.      fluticasone (FLONASE) 50 MCG/ACT nasal spray       Levoxyl 50 MCG tablet TAKE 1 TABLET BY MOUTH EVERY OTHER DAY 45 tablet 1    Levoxyl 75 MCG tablet TAKE 1 TABLET BY MOUTH EVERY OTHER DAY 45 tablet 1    Loratadine 10 MG capsule Take 1 capsule by mouth Daily.      metoprolol tartrate (LOPRESSOR) 25 MG tablet Take 1 tablet by mouth 2 (Two) Times a Day. 180 tablet 3    olmesartan (BENICAR) 20 MG tablet TAKE 1 TABLET BY MOUTH DAILY 90 tablet 1    Polyethyl Glycol-Propyl Glycol (SYSTANE OP) Apply  to eye(s) as directed by provider.       No current facility-administered medications on file prior to  visit.         Procedures    ECG 12 Lead    Date/Time: 7/8/2025 10:41 AM  Performed by: Coral Medrano APRN    Authorized by: Coral Medrano APRN  Comparison: compared with previous ECG from 12/26/2024  Similar to previous ECG  Rhythm: sinus rhythm  BPM: 57              Objective:      Vitals:    07/08/25 1030 07/08/25 1106   BP: 125/80    Pulse: 57    SpO2: (!) 81% 97%   Weight: 52.2 kg (115 lb)      Body mass index is 23.21 kg/m².  Wt Readings from Last 3 Encounters:   07/08/25 52.2 kg (115 lb)   04/09/25 52.3 kg (115 lb 3.2 oz)   04/03/25 50.3 kg (111 lb)         Constitutional:       Appearance: Not in distress.   Eyes:      Pupils: Pupils are equal, round, and reactive to light.   Neck:      Vascular: No JVD.   Pulmonary:      Effort: Pulmonary effort is normal.      Breath sounds: Normal breath sounds.   Cardiovascular:      Normal rate. Regular rhythm.      Murmurs: There is no murmur.   Pulses:     Intact distal pulses.   Edema:     Peripheral edema absent.   Musculoskeletal:      Cervical back: Normal range of motion. Skin:     General: Skin is warm and dry.   Neurological:      Mental Status: Alert and oriented to person, place and time.   Psychiatric:         Speech: Speech normal.       Lab Review:   10/9/2024 total cholesterol 240  HDL 85 .  Unremarkable CMP.  TSH 0.769.  Assessment:     1. Hypertension, essential    2. Mixed hyperlipidemia    3. PSVT (paroxysmal supraventricular tachycardia)    4. LARA (dyspnea on exertion)      Hypertension.  BP is better and more stable - goal is <120/80.   Palpitations.  These are very infrequent and overall have been very well controlled     History of SVT.  No recurrence  Hyperlipidemia, well controlled.   Hypothyroidism, stable.   Anxiety, particularly in the evenings.    Chronic MAC - stable, sees Dr. Braga every 6 months for this.   Guillain-barre 12/2017 - really resolved, doing well.   Exertional dyspnea with new abnormalities on her  ECG, nonischemic stress nuclear perfusion study done 11/2023 with normal echocardiogram.  Moderate right renal artery stenosis    Plan:   1 month increased exertional dyspnea.  There is also been intermittently a cough and some heart racing.  Suspect this is from her chronic MAC and or episodes of SVT.  She did have some tricuspid insufficiency on the echo in 2023.  I am going to repeat this have also ordered a Zio.  She has not seen pulmonology in a while.  Chest x-ray and labs were ordered and she will call Dr. Braga's office to schedule a follow-up.   She will schedule 2 to 3-week follow-up with myself this can be canceled if cardiac testing is unremarkable.   She will also see Dr. Curiel in 6 months  I called her son Nato per pts request and reviewed plan with him as well.     Thank you for allowing me to participate in this patient's care. Please call with any questions or concerns.   > 40 minutes in total pt care  I spent 4 minutes on the separately reported service of EKG . This time is not included in the time used to support the E/M service also reported today.         Dragon dictation device was utilized in this note.

## 2025-07-14 ENCOUNTER — HOSPITAL ENCOUNTER (OUTPATIENT)
Dept: CARDIOLOGY | Facility: HOSPITAL | Age: 89
Discharge: HOME | End: 2025-07-14
Admitting: FAMILY MEDICINE
Payer: MEDICARE

## 2025-07-14 VITALS
BODY MASS INDEX: 23.18 KG/M2 | HEART RATE: 70 BPM | SYSTOLIC BLOOD PRESSURE: 160 MMHG | WEIGHT: 115 LBS | DIASTOLIC BLOOD PRESSURE: 82 MMHG | HEIGHT: 59 IN

## 2025-07-14 DIAGNOSIS — E78.2 MIXED HYPERLIPIDEMIA: ICD-10-CM

## 2025-07-14 DIAGNOSIS — R06.09 DOE (DYSPNEA ON EXERTION): ICD-10-CM

## 2025-07-14 DIAGNOSIS — I47.10 PSVT (PAROXYSMAL SUPRAVENTRICULAR TACHYCARDIA): ICD-10-CM

## 2025-07-14 DIAGNOSIS — I10 HYPERTENSION, ESSENTIAL: ICD-10-CM

## 2025-07-14 LAB
AORTIC DIMENSIONLESS INDEX: 0.75 (DI)
ASCENDING AORTA: 2.7 CM
AV MEAN PRESS GRAD SYS DOP V1V2: 3 MMHG
AV VMAX SYS DOP: 111 CM/SEC
BH CV ECHO MEAS - ACS: 1.54 CM
BH CV ECHO MEAS - AO MAX PG: 4.9 MMHG
BH CV ECHO MEAS - AO ROOT AREA (BSA CORRECTED): 1.6 CM2
BH CV ECHO MEAS - AO ROOT DIAM: 2.42 CM
BH CV ECHO MEAS - AO V2 VTI: 27.2 CM
BH CV ECHO MEAS - AVA(I,D): 2.26 CM2
BH CV ECHO MEAS - EDV(CUBED): 68.3 ML
BH CV ECHO MEAS - EDV(MOD-SP2): 51 ML
BH CV ECHO MEAS - EDV(MOD-SP4): 54 ML
BH CV ECHO MEAS - EF(MOD-SP2): 64.7 %
BH CV ECHO MEAS - EF(MOD-SP4): 61.1 %
BH CV ECHO MEAS - ESV(CUBED): 17.8 ML
BH CV ECHO MEAS - ESV(MOD-SP2): 18 ML
BH CV ECHO MEAS - ESV(MOD-SP4): 21 ML
BH CV ECHO MEAS - FS: 36.1 %
BH CV ECHO MEAS - IVS/LVPW: 1.03 CM
BH CV ECHO MEAS - IVSD: 0.73 CM
BH CV ECHO MEAS - LAT PEAK E' VEL: 5.4 CM/SEC
BH CV ECHO MEAS - LV DIASTOLIC VOL/BSA (35-75): 37 CM2
BH CV ECHO MEAS - LV MASS(C)D: 84 GRAMS
BH CV ECHO MEAS - LV MAX PG: 3.8 MMHG
BH CV ECHO MEAS - LV MEAN PG: 2 MMHG
BH CV ECHO MEAS - LV SYSTOLIC VOL/BSA (12-30): 14.4 CM2
BH CV ECHO MEAS - LV V1 MAX: 97.9 CM/SEC
BH CV ECHO MEAS - LV V1 VTI: 20.4 CM
BH CV ECHO MEAS - LVIDD: 4.1 CM
BH CV ECHO MEAS - LVIDS: 2.6 CM
BH CV ECHO MEAS - LVOT AREA: 3 CM2
BH CV ECHO MEAS - LVOT DIAM: 1.96 CM
BH CV ECHO MEAS - LVPWD: 0.71 CM
BH CV ECHO MEAS - MED PEAK E' VEL: 6.4 CM/SEC
BH CV ECHO MEAS - MR MAX PG: 165.6 MMHG
BH CV ECHO MEAS - MR MAX VEL: 643.4 CM/SEC
BH CV ECHO MEAS - MV A DUR: 0.15 SEC
BH CV ECHO MEAS - MV A MAX VEL: 90 CM/SEC
BH CV ECHO MEAS - MV DEC SLOPE: 460.5 CM/SEC2
BH CV ECHO MEAS - MV DEC TIME: 0.25 SEC
BH CV ECHO MEAS - MV E MAX VEL: 86.9 CM/SEC
BH CV ECHO MEAS - MV E/A: 0.97
BH CV ECHO MEAS - MV MAX PG: 4 MMHG
BH CV ECHO MEAS - MV MEAN PG: 1.48 MMHG
BH CV ECHO MEAS - MV P1/2T: 57.8 MSEC
BH CV ECHO MEAS - MV V2 VTI: 28.8 CM
BH CV ECHO MEAS - MVA(P1/2T): 3.8 CM2
BH CV ECHO MEAS - MVA(VTI): 2.14 CM2
BH CV ECHO MEAS - PA V2 MAX: 69.5 CM/SEC
BH CV ECHO MEAS - PULM A REVS DUR: 0.12 SEC
BH CV ECHO MEAS - PULM A REVS VEL: 35.7 CM/SEC
BH CV ECHO MEAS - PULM DIAS VEL: 45.1 CM/SEC
BH CV ECHO MEAS - PULM S/D: 1.47
BH CV ECHO MEAS - PULM SYS VEL: 66.1 CM/SEC
BH CV ECHO MEAS - QP/QS: 0.32
BH CV ECHO MEAS - RAP SYSTOLE: 3 MMHG
BH CV ECHO MEAS - RV MAX PG: 1.06 MMHG
BH CV ECHO MEAS - RV V1 MAX: 51.4 CM/SEC
BH CV ECHO MEAS - RV V1 VTI: 10.2 CM
BH CV ECHO MEAS - RVOT DIAM: 1.57 CM
BH CV ECHO MEAS - RVSP: 32.2 MMHG
BH CV ECHO MEAS - SV(LVOT): 61.5 ML
BH CV ECHO MEAS - SV(MOD-SP2): 33 ML
BH CV ECHO MEAS - SV(MOD-SP4): 33 ML
BH CV ECHO MEAS - SV(RVOT): 19.6 ML
BH CV ECHO MEAS - SVI(LVOT): 42.2 ML/M2
BH CV ECHO MEAS - SVI(MOD-SP2): 22.6 ML/M2
BH CV ECHO MEAS - SVI(MOD-SP4): 22.6 ML/M2
BH CV ECHO MEAS - TAPSE (>1.6): 2.7 CM
BH CV ECHO MEAS - TR MAX PG: 29.2 MMHG
BH CV ECHO MEAS - TR MAX VEL: 270.3 CM/SEC
BH CV ECHO MEASUREMENTS AVERAGE E/E' RATIO: 14.73
BH CV XLRA - RV BASE: 3.5 CM
BH CV XLRA - RV LENGTH: 5.7 CM
BH CV XLRA - RV MID: 2.15 CM
BH CV XLRA - TDI S': 11.9 CM/SEC
LEFT ATRIUM VOLUME INDEX: 32.6 ML/M2
LV EF BIPLANE MOD: 62.8 %
SINUS: 2.6 CM
STJ: 2.27 CM

## 2025-07-14 PROCEDURE — 93306 TTE W/DOPPLER COMPLETE: CPT

## 2025-07-14 PROCEDURE — 93306 TTE W/DOPPLER COMPLETE: CPT | Performed by: INTERNAL MEDICINE

## 2025-07-14 RX ORDER — METOPROLOL TARTRATE 25 MG/1
25 TABLET, FILM COATED ORAL 2 TIMES DAILY
Qty: 180 TABLET | Refills: 3 | Status: SHIPPED | OUTPATIENT
Start: 2025-07-14

## 2025-07-24 ENCOUNTER — TRANSCRIBE ORDERS (OUTPATIENT)
Dept: ADMINISTRATIVE | Facility: HOSPITAL | Age: 89
End: 2025-07-24
Payer: MEDICARE

## 2025-07-24 DIAGNOSIS — R06.02 SOB (SHORTNESS OF BREATH): Primary | ICD-10-CM

## 2025-08-02 DIAGNOSIS — E03.9 ACQUIRED HYPOTHYROIDISM: ICD-10-CM

## 2025-08-04 RX ORDER — LEVOTHYROXINE SODIUM 75 UG/1
75 TABLET ORAL
Qty: 45 TABLET | Refills: 1 | Status: SHIPPED | OUTPATIENT
Start: 2025-08-04

## 2025-08-04 RX ORDER — LEVOTHYROXINE SODIUM 50 UG/1
50 TABLET ORAL
Qty: 45 TABLET | Refills: 1 | Status: SHIPPED | OUTPATIENT
Start: 2025-08-04

## 2025-08-05 DIAGNOSIS — E03.9 ACQUIRED HYPOTHYROIDISM: ICD-10-CM

## 2025-08-05 RX ORDER — LEVOTHYROXINE SODIUM 75 UG/1
75 TABLET ORAL
Qty: 45 TABLET | Refills: 1 | OUTPATIENT
Start: 2025-08-05

## 2025-08-05 RX ORDER — LEVOTHYROXINE SODIUM 50 UG/1
50 TABLET ORAL
Qty: 45 TABLET | Refills: 1 | OUTPATIENT
Start: 2025-08-05

## 2025-08-13 ENCOUNTER — OFFICE VISIT (OUTPATIENT)
Dept: CARDIOLOGY | Age: 89
End: 2025-08-13
Payer: MEDICARE

## 2025-08-13 VITALS
BODY MASS INDEX: 22.82 KG/M2 | WEIGHT: 113 LBS | HEART RATE: 54 BPM | SYSTOLIC BLOOD PRESSURE: 125 MMHG | DIASTOLIC BLOOD PRESSURE: 70 MMHG

## 2025-08-13 DIAGNOSIS — I10 HYPERTENSION, ESSENTIAL: ICD-10-CM

## 2025-08-13 DIAGNOSIS — R06.09 DYSPNEA ON EXERTION: Primary | ICD-10-CM

## 2025-08-13 DIAGNOSIS — I47.10 PSVT (PAROXYSMAL SUPRAVENTRICULAR TACHYCARDIA): ICD-10-CM

## 2025-08-13 PROCEDURE — 93000 ELECTROCARDIOGRAM COMPLETE: CPT | Performed by: FAMILY MEDICINE

## 2025-08-13 PROCEDURE — 99214 OFFICE O/P EST MOD 30 MIN: CPT | Performed by: FAMILY MEDICINE

## 2025-08-14 ENCOUNTER — APPOINTMENT (OUTPATIENT)
Dept: WOMENS IMAGING | Facility: HOSPITAL | Age: 89
End: 2025-08-14
Payer: MEDICARE

## 2025-08-14 PROCEDURE — 77063 BREAST TOMOSYNTHESIS BI: CPT | Performed by: RADIOLOGY

## 2025-08-14 PROCEDURE — 77067 SCR MAMMO BI INCL CAD: CPT | Performed by: RADIOLOGY

## 2025-08-18 ENCOUNTER — RESULTS FOLLOW-UP (OUTPATIENT)
Dept: FAMILY MEDICINE CLINIC | Facility: CLINIC | Age: 89
End: 2025-08-18
Payer: MEDICARE

## 2025-08-26 ENCOUNTER — HOSPITAL ENCOUNTER (OUTPATIENT)
Dept: CT IMAGING | Facility: HOSPITAL | Age: 89
Discharge: HOME | End: 2025-08-26
Admitting: INTERNAL MEDICINE
Payer: MEDICARE

## 2025-08-26 DIAGNOSIS — R06.02 SOB (SHORTNESS OF BREATH): ICD-10-CM

## 2025-08-26 PROCEDURE — 25510000001 IOPAMIDOL 61 % SOLUTION: Performed by: INTERNAL MEDICINE

## 2025-08-26 PROCEDURE — 71260 CT THORAX DX C+: CPT

## 2025-08-26 RX ORDER — IOPAMIDOL 612 MG/ML
100 INJECTION, SOLUTION INTRAVASCULAR
Status: COMPLETED | OUTPATIENT
Start: 2025-08-26 | End: 2025-08-26

## 2025-08-26 RX ADMIN — IOPAMIDOL 75 ML: 612 INJECTION, SOLUTION INTRAVENOUS at 11:06

## (undated) DEVICE — VITAL SIGNS™ JACKSON-REES CIRCUITS: Brand: VITAL SIGNS™

## (undated) DEVICE — LN SMPL O2 NASL/ORL SMART/CAPNOLINE PLS A/

## (undated) DEVICE — SINGLE USE SUCTION VALVE MAJ-209: Brand: SINGLE USE SUCTION VALVE (STERILE)

## (undated) DEVICE — ADAPT SWVL FIBROPTIC BRONCH

## (undated) DEVICE — BITEBLOCK OMNI BLOC

## (undated) DEVICE — MSK AIRWY LARYNG LMA UNIQUE STD PK SZ4

## (undated) DEVICE — CANNULA,ADULT,SOFT-TOUCH,7'TUBE,UC: Brand: PENDING

## (undated) DEVICE — TRAP,MUCUS SPECIMEN, 80CC: Brand: MEDLINE

## (undated) DEVICE — SINGLE USE BIOPSY VALVE MAJ-210: Brand: SINGLE USE BIOPSY VALVE (STERILE)

## (undated) DEVICE — TUBING, SUCTION, 1/4" X 10', STRAIGHT: Brand: MEDLINE

## (undated) DEVICE — TBG 02 CRUSH RESIST LF CLR 7FT